# Patient Record
Sex: FEMALE | Race: WHITE | Employment: OTHER | URBAN - METROPOLITAN AREA
[De-identification: names, ages, dates, MRNs, and addresses within clinical notes are randomized per-mention and may not be internally consistent; named-entity substitution may affect disease eponyms.]

---

## 2017-01-17 ENCOUNTER — OFFICE VISIT (OUTPATIENT)
Dept: CARDIOLOGY CLINIC | Age: 77
End: 2017-01-17

## 2017-01-17 VITALS
DIASTOLIC BLOOD PRESSURE: 80 MMHG | WEIGHT: 236.6 LBS | SYSTOLIC BLOOD PRESSURE: 140 MMHG | HEART RATE: 63 BPM | BODY MASS INDEX: 39.42 KG/M2 | RESPIRATION RATE: 18 BRPM | HEIGHT: 65 IN | OXYGEN SATURATION: 98 %

## 2017-01-17 DIAGNOSIS — I48.91 ATRIAL FIBRILLATION, UNSPECIFIED TYPE (HCC): Primary | ICD-10-CM

## 2017-01-17 DIAGNOSIS — E03.9 ACQUIRED HYPOTHYROIDISM: ICD-10-CM

## 2017-01-17 DIAGNOSIS — I10 ESSENTIAL HYPERTENSION: Chronic | ICD-10-CM

## 2017-01-17 DIAGNOSIS — R42 LIGHTHEADEDNESS: ICD-10-CM

## 2017-01-17 DIAGNOSIS — E78.2 MIXED HYPERLIPIDEMIA: ICD-10-CM

## 2017-01-17 DIAGNOSIS — I48.0 PAROXYSMAL ATRIAL FIBRILLATION (HCC): Primary | ICD-10-CM

## 2017-01-17 NOTE — PROGRESS NOTES
Chief Complaint   Patient presents with    Irregular Heart Beat     6 month follow up, c/o lightheadedness at times

## 2017-01-17 NOTE — PROGRESS NOTES
See cardiology for results      Pt received a 2 week event monitor. Instructions given verbally as well as an instruction sheet. Pt verbalized understanding.

## 2017-01-17 NOTE — PROGRESS NOTES
Subjective:      Genaro Manley is a 78 y.o. female is here for f/u regarding afib. She continues to have episodes of lightheadedness with position changes. Particularly when bending over then standing up. The patient denies chest pain/ shortness of breath, orthopnea, PND, LE edema, palpitations, syncope,  or fatigue.        Patient Active Problem List    Diagnosis Date Noted    Acquired hypothyroidism 12/14/2015    ACP (advance care planning) 12/14/2015    HTN (hypertension) 07/21/2015    Thyroid activity decreased 04/14/2015    Atrial fibrillation ([address] 03/16/2015    A-fib ([address] 03/10/2015    Angina, class III ([address] 01/27/2015    Morbid obesity ([address]     Hyperlipidemia 07/14/2014    Hypokalemia 07/14/2014    Encounter for screening colonoscopy 08/21/2013    History of rectal bleeding 08/21/2013    Postmenopausal bleeding 10/18/2011    Endometrial polyp 10/18/2011      Aleksandar Iverson MD  Past Medical History   Diagnosis Date    Arthritis      right knee, left shoulder    Diverticulosis     Frequency of urination     High cholesterol     Hypertension     Morbid obesity ([address]     Thyroid disease      hypothyroid    Unspecified adverse effect of anesthesia      low BP      Past Surgical History   Procedure Laterality Date    Hx other surgical       lymph node bx    Pr breast surgery procedure unlisted  1982     breast bx rt    Hx gyn       myomectomy on uterus    Hx gyn  2011     hystereoscopy D&C    Hx lymph node dissection       biopsy    Pr cardiac surg procedure unlist  01/2015     cardiac catheterization, no intervention, medical management     Allergies   Allergen Reactions    Adhesive Hives    Amoxicillin Unknown (comments)    Lipitor [Atorvastatin] Myalgia     Gets the flu      Family History   Problem Relation Age of Onset    Alzheimer Mother     Heart Disease Mother     Heart Disease Father     Hypertension Father     Cancer Paternal Aunt     Diabetes Paternal Grandmother     negative for cardiac disease  Social History     Social History    Marital status:      Spouse name: N/A    Number of children: N/A    Years of education: N/A     Social History Main Topics    Smoking status: Never Smoker    Smokeless tobacco: Never Used    Alcohol use No    Drug use: No    Sexual activity: Not Asked     Other Topics Concern    None     Social History Narrative     Current Outpatient Prescriptions   Medication Sig    lisinopril (PRINIVIL, ZESTRIL) 10 mg tablet Daily    verapamil ER (CALAN-SR) 120 mg tablet TAKE 1 TABLET BY MOUTH EVERY MORNING FOR BLOOD PRESSURE    rosuvastatin (CRESTOR) 20 mg tablet Take 1 Tab by mouth nightly.  levothyroxine (SYNTHROID) 75 mcg tablet Take 1 Tab by mouth Daily (before breakfast).  cloNIDine HCl (CATAPRES) 0.2 mg tablet Take 1 Tab by mouth two (2) times a day.  hydrochlorothiazide (HYDRODIURIL) 25 mg tablet Take 1 Tab by mouth daily.  potassium chloride (K-DUR, KLOR-CON) 10 mEq tablet Take 2 Tabs by mouth two (2) times a day.  sotalol (BETAPACE) 120 mg tablet Take 1 Tab by mouth every twelve (12) hours.  aspirin delayed-release 81 mg tablet Take 81 mg by mouth every evening.  CALCIUM CARBONATE/VITAMIN D3 (CALTRATE 600 + D PO) Take 1 Tab by mouth two (2) times a day.  MULTIVITAMIN W-MINERALS/LUTEIN (CENTRUM SILVER PO) Take 1 Tab by mouth daily (after lunch).  KLOR-CON 10 10 mEq tablet TAKE 2 TABLETS TWICE A DAY     No current facility-administered medications for this visit. Vitals:    01/17/17 1031 01/17/17 1043 01/17/17 1044   BP: 122/78 140/82 140/80   Pulse: 63     Resp: 18     SpO2: 98%     Weight: 236 lb 9.6 oz (107.3 kg)     Height: 5' 5\" (1.651 m)         I have reviewed the nurses notes, vitals, problem list, allergy list, medical history, family, social history and medications. Review of Symptoms:    General: Pt denies excessive weight gain or loss.  Pt is able to conduct ADL's  HEENT: Denies blurred vision, headaches, epistaxis and difficulty swallowing. Respiratory: Denies shortness of breath, ANGUIANO, wheezing or stridor. Cardiovascular: Denies precordial pain, palpitations, edema or PND  Gastrointestinal: Denies poor appetite, indigestion, abdominal pain or blood in stool  Urinary: Denies dysuria, pyuria  Musculoskeletal: Denies pain or swelling from muscles or joints  Neurologic: Denies tremor, paresthesias, +positional lightheadedness  Skin: Denies rash, itching or texture change. Psych: Denies depression      Physical Exam:      General: Well developed, in no acute distress. HEENT: Eyes - PERRL, no jvd  Heart:  Normal S1/S2 negative S3 or S4. Regular, no murmur, gallop or rub.   Respiratory: Clear bilaterally x 4, no wheezing or rales  Abdomen:   Soft, non-tender, bowel sounds are active.   Extremities:  No edema, normal cap refill, no cyanosis. Musculoskeletal: No clubbing  Neuro: A&Ox3, speech clear, gait stable. Skin: Skin color is normal. No rashes or lesions.  Non diaphoretic  Vascular: 2+ pulses symmetric in all extremities    Cardiographics    Ekg: SB, rate 55    Results for orders placed or performed during the hospital encounter of 03/16/15   EKG, 12 LEAD, INITIAL   Result Value Ref Range    Ventricular Rate 57 BPM    Atrial Rate 57 BPM    P-R Interval 164 ms    QRS Duration 82 ms    Q-T Interval 462 ms    QTC Calculation (Bezet) 449 ms    Calculated P Axis 84 degrees    Calculated R Axis -5 degrees    Calculated T Axis 21 degrees    Diagnosis       Sinus bradycardia      Confirmed by Veronica Macias ([id number]) on 3/18/2015 11:05:42 AM           Lab Results   Component Value Date/Time    WBC 4.8 10/12/2016 08:50 AM    HGB 13.2 10/12/2016 08:50 AM    HCT 39.2 10/12/2016 08:50 AM    PLATELET 644 08/80/4414 08:50 AM    MCV 93 10/12/2016 08:50 AM      Lab Results   Component Value Date/Time    Sodium 143 10/12/2016 08:50 AM    Potassium 4.4 10/12/2016 08:50 AM    Chloride 102 10/12/2016 08:50 AM    CO2 27 10/12/2016 08:50 AM    Anion gap 9 03/18/2015 03:40 AM    Glucose 102 10/12/2016 08:50 AM    BUN 14 10/12/2016 08:50 AM    Creatinine 0.85 10/12/2016 08:50 AM    BUN/Creatinine ratio 16 10/12/2016 08:50 AM    GFR est AA 78 10/12/2016 08:50 AM    GFR est non-AA 68 10/12/2016 08:50 AM    Calcium 9.9 10/12/2016 08:50 AM    Bilirubin, total 0.4 10/12/2016 08:50 AM    ALT 18 10/12/2016 08:50 AM    AST 16 10/12/2016 08:50 AM    Alk. phosphatase 61 10/12/2016 08:50 AM    Protein, total 6.8 10/12/2016 08:50 AM    Albumin 4.3 10/12/2016 08:50 AM    Globulin 3.6 03/16/2015 10:10 AM    A-G Ratio 1.7 10/12/2016 08:50 AM         Assessment:     Assessment:        ICD-10-CM ICD-9-CM    1. Atrial fibrillation, unspecified type (HCC) I48.91 427.31 AMB POC EKG ROUTINE W/ 12 LEADS, INTER & REP      2D ECHO COMPLETE ADULT (TTE) W OR WO CONTR   2. Lightheadedness R42 780.4 AMB POC EKG ROUTINE W/ 12 LEADS, INTER & REP      2D ECHO COMPLETE ADULT (TTE) W OR WO CONTR   3. Essential hypertension I10 401.9 AMB POC EKG ROUTINE W/ 12 LEADS, INTER & REP      2D ECHO COMPLETE ADULT (TTE) W OR WO CONTR   4. Mixed hyperlipidemia E78.2 272.2 AMB POC EKG ROUTINE W/ 12 LEADS, INTER & REP      2D ECHO COMPLETE ADULT (TTE) W OR WO CONTR   5. Acquired hypothyroidism E03.9 244.9 AMB POC EKG ROUTINE W/ 12 LEADS, INTER & REP      2D ECHO COMPLETE ADULT (TTE) W OR WO CONTR     Orders Placed This Encounter    AMB POC EKG ROUTINE W/ 12 LEADS, INTER & REP     Order Specific Question:   Reason for Exam:     Answer:   routine    2D ECHO COMPLETE ADULT (TTE) W OR WO CONTR     Standing Status:   Future     Standing Expiration Date:   7/17/2017     Order Specific Question:   Reason for Exam:     Answer:   lightheadedness     Order Specific Question:   Contrast Enhancement (Bubble Study, Definity, Optison) may be used if criteria listed in established evidence-based protocol has been identified.      Answer:   Yes        Plan:   Ms Felecia Saldivar is here today for f/u regarding afib. She continues to c/o positional lightheadedness, denies other cardiac complaints. ECG remains SB. BP not orthostatic. I will evaluate with an echo and two week event monitor. She will follow up for results. Continue medical management for afib, htn, hypothyroidism. Thank you for allowing me to participate in Marleni Little 's care.     Ella Leonard MD, April Santillan

## 2017-01-19 ENCOUNTER — CLINICAL SUPPORT (OUTPATIENT)
Dept: CARDIOLOGY CLINIC | Age: 77
End: 2017-01-19

## 2017-01-19 DIAGNOSIS — R42 LIGHTHEADEDNESS: ICD-10-CM

## 2017-01-19 DIAGNOSIS — E03.9 ACQUIRED HYPOTHYROIDISM: ICD-10-CM

## 2017-01-19 DIAGNOSIS — I10 ESSENTIAL HYPERTENSION: Chronic | ICD-10-CM

## 2017-01-19 DIAGNOSIS — E78.2 MIXED HYPERLIPIDEMIA: ICD-10-CM

## 2017-01-19 DIAGNOSIS — I48.91 ATRIAL FIBRILLATION, UNSPECIFIED TYPE (HCC): ICD-10-CM

## 2017-01-25 ENCOUNTER — TELEPHONE (OUTPATIENT)
Dept: CARDIOLOGY CLINIC | Age: 77
End: 2017-01-25

## 2017-01-25 NOTE — TELEPHONE ENCOUNTER
----- Message from Lyudmila Radford MD sent at 1/24/2017  8:28 AM EST -----  pls let her know that her lvef is wnl.  ----- Message -----     From: Jerry Gar Result In     Sent: 1/24/2017   8:16 AM       To: Lyudmila Radford MD

## 2017-02-09 RX ORDER — ROSUVASTATIN CALCIUM 20 MG/1
20 TABLET, COATED ORAL
Qty: 30 TAB | Refills: 1 | Status: SHIPPED | OUTPATIENT
Start: 2017-02-09 | End: 2017-04-05 | Stop reason: SDUPTHER

## 2017-02-28 ENCOUNTER — OFFICE VISIT (OUTPATIENT)
Dept: CARDIOLOGY CLINIC | Age: 77
End: 2017-02-28

## 2017-02-28 VITALS
HEART RATE: 72 BPM | BODY MASS INDEX: 38.51 KG/M2 | SYSTOLIC BLOOD PRESSURE: 112 MMHG | DIASTOLIC BLOOD PRESSURE: 68 MMHG | WEIGHT: 231.1 LBS | HEIGHT: 65 IN | RESPIRATION RATE: 16 BRPM | OXYGEN SATURATION: 98 %

## 2017-02-28 DIAGNOSIS — I10 ESSENTIAL HYPERTENSION: ICD-10-CM

## 2017-02-28 DIAGNOSIS — I48.0 PAROXYSMAL ATRIAL FIBRILLATION (HCC): Primary | ICD-10-CM

## 2017-02-28 DIAGNOSIS — E78.2 MIXED HYPERLIPIDEMIA: ICD-10-CM

## 2017-02-28 DIAGNOSIS — I48.91 ATRIAL FIBRILLATION, UNSPECIFIED TYPE (HCC): Primary | ICD-10-CM

## 2017-02-28 NOTE — MR AVS SNAPSHOT
Visit Information Date & Time Provider Department Dept. Phone Encounter #  
 2/28/2017  2:30 PM Dolores Stanley, [address] [facility] [phone] [id number] Follow-up Instructions Return in about 4 weeks (around 3/28/2017). Routing History Follow-up and Disposition History Your Appointments 4/18/2017 11:15 AM  
ROUTINE C[facility] with Jolena Ganser, [address] [facility]: 6 month follow up  
 [facility] [address] [id number]  
[address] [facility] [address] Upcoming Health Maintenance Date Due  
 MEDICARE YEARLY EXAM 12/14/2016 GLAUCOMA SCREENING Q2Y 8/1/2018 Pneumococcal 65+ Low/Medium Risk (2 of 2 - PPSV23) 11/19/2018 COLONOSCOPY 8/21/2023 DTaP/Tdap/Td series (2 - Td) 4/14/2025 Allergies as of 2/28/2017  Review Complete On: 2/28/2017 By: Dolores Stanley MD  
  
 Severity Noted Reaction Type Reactions Adhesive  07/14/2014    Hives Amoxicillin  10/12/2011    Unknown (comments) Lipitor [Atorvastatin]  07/14/2014   Side Effect Myalgia Gets the flu Current Immunizations  Reviewed on 4/14/2015 Name Date Influenza Vaccine 11/18/2014 Influenza Vaccine (Quad) 12/14/2015 12:17 PM  
 Influenza Vaccine (Quad) PF 10/18/2016 Pneumococcal Vaccine (Unspecified Type) 11/19/2013 Tdap 4/14/2015 Not reviewed this visit You Were Diagnosed With   
  
 Codes Comments Atrial fibrillation, unspecified type ([facility]    -  Primary ICD-10-CM: I48.91 
ICD-9-CM: 427.31 Mixed hyperlipidemia     ICD-10-CM: E78.2 ICD-9-CM: 272.2 Essential hypertension     ICD-10-CM: I10 
ICD-9-CM: 401.9 Vitals BP  
  
  
  
  
  
 112/68 (BP 1 Location: Left arm, BP Patient Position: Sitting) Vitals History BMI and BSA Data  Body Mass Index Body Surface Area  
 38.46 kg/m 2 2.19 m 2  
  
  
 Preferred Pharmacy Pharmacy Name Phone [id number]- [place], [place] - [address] [phone] Your Updated Medication List  
  
   
This list is accurate as of: 2/28/17  3:19 PM.  Always use your most recent med list.  
  
  
  
  
 aspirin delayed-release 81 mg tablet Take 81 mg by mouth every evening. CALTRATE 600 + D PO Take 1 Tab by mouth two (2) times a day. CENTRUM SILVER PO Take 1 Tab by mouth daily (after lunch). cloNIDine HCl 0.2 mg tablet Commonly known as:  CATAPRES Take 1 Tab by mouth two (2) times a day. hydroCHLOROthiazide 25 mg tablet Commonly known as:  HYDRODIURIL Take 1 Tab by mouth daily. KLOR-CON 10 10 mEq tablet Generic drug:  potassium chloride SR  
TAKE 2 TABLETS TWICE A DAY  
  
 levothyroxine 75 mcg tablet Commonly known as:  SYNTHROID Take 1 Tab by mouth Daily (before breakfast). lisinopril 10 mg tablet Commonly known as:  Delsie Commander Daily  
  
 rosuvastatin 20 mg tablet Commonly known as:  CRESTOR Take 1 Tab by mouth nightly. sotalol 120 mg tablet Commonly known as:  Mylinda Saint Take 1 Tab by mouth every twelve (12) hours. verapamil  mg tablet Commonly known as:  CALAN-SR  
TAKE 1 TABLET BY MOUTH EVERY MORNING FOR BLOOD PRESSURE We Performed the Following AMB POC EKG ROUTINE W/ 12 LEADS, INTER & REP [56658 CPT(R)] Follow-up Instructions Return in about 4 weeks (around 3/28/2017). Introducing [facility]! Dear Jackson Roman: Thank you for requesting a [employer] account. Our records indicate that you already have an active [employer] account. You can access your account anytime at [email or web address] Did you know that you can access your hospital and ER discharge instructions at any time in [employer]? You can also review all of your test results from your hospital stay or ER visit. Additional Information If you have questions, please visit the Frequently Asked Questions section of the [employer] website at [email or web address] Remember, [facility] is NOT to be used for urgent needs. For medical emergencies, dial 911. Now available from your iPhone and Android! Please provide this summary of care documentation to your next provider. Your primary care clinician is listed as Adriana Willingham. If you have any questions after today's visit, please call [phone].

## 2017-02-28 NOTE — PROGRESS NOTES
Subjective:      Douglas Sykes is a 78 y.o. female is here for follow up of her dizziness and abnormal event monitor.       Patient Active Problem List    Diagnosis Date Noted    Acquired hypothyroidism 12/14/2015    ACP (advance care planning) 12/14/2015    HTN (hypertension) 07/21/2015    Thyroid activity decreased 04/14/2015    Atrial fibrillation ([address] 03/16/2015    A-fib ([address] 03/10/2015    Angina, class III ([address] 01/27/2015    Morbid obesity ([address]     Hyperlipidemia 07/14/2014    Hypokalemia 07/14/2014    Encounter for screening colonoscopy 08/21/2013    History of rectal bleeding 08/21/2013    Postmenopausal bleeding 10/18/2011    Endometrial polyp 10/18/2011      Myron Simmons MD  Past Medical History:   Diagnosis Date    Arthritis     right knee, left shoulder    Diverticulosis     Frequency of urination     High cholesterol     Hypertension     Morbid obesity ([address]     Thyroid disease     hypothyroid    Unspecified adverse effect of anesthesia     low BP      Past Surgical History:   Procedure Laterality Date    BREAST SURGERY PROCEDURE UNLISTED  1982    breast bx rt    CARDIAC SURG PROCEDURE UNLIST  01/2015    cardiac catheterization, no intervention, medical management    HX GYN      myomectomy on uterus    HX GYN  2011    hystereoscopy D&C    HX LYMPH NODE DISSECTION      biopsy    HX OTHER SURGICAL      lymph node bx     Allergies   Allergen Reactions    Adhesive Hives    Amoxicillin Unknown (comments)    Lipitor [Atorvastatin] Myalgia     Gets the flu      Family History   Problem Relation Age of Onset    Alzheimer Mother     Heart Disease Mother     Heart Disease Father     Hypertension Father     Cancer Paternal Aunt     Diabetes Paternal Grandmother     negative for cardiac disease  Social History     Social History    Marital status:      Spouse name: N/A    Number of children: N/A    Years of education: N/A     Social History Main Topics  Smoking status: Never Smoker    Smokeless tobacco: Never Used    Alcohol use No    Drug use: No    Sexual activity: Not Asked     Other Topics Concern    None     Social History Narrative     Current Outpatient Prescriptions   Medication Sig    rosuvastatin (CRESTOR) 20 mg tablet Take 1 Tab by mouth nightly.  lisinopril (PRINIVIL, ZESTRIL) 10 mg tablet Daily    KLOR-CON 10 10 mEq tablet TAKE 2 TABLETS TWICE A DAY    verapamil ER (CALAN-SR) 120 mg tablet TAKE 1 TABLET BY MOUTH EVERY MORNING FOR BLOOD PRESSURE    levothyroxine (SYNTHROID) 75 mcg tablet Take 1 Tab by mouth Daily (before breakfast).  cloNIDine HCl (CATAPRES) 0.2 mg tablet Take 1 Tab by mouth two (2) times a day.  hydrochlorothiazide (HYDRODIURIL) 25 mg tablet Take 1 Tab by mouth daily.  sotalol (BETAPACE) 120 mg tablet Take 1 Tab by mouth every twelve (12) hours.  aspirin delayed-release 81 mg tablet Take 81 mg by mouth every evening.  CALCIUM CARBONATE/VITAMIN D3 (CALTRATE 600 + D PO) Take 1 Tab by mouth two (2) times a day.  MULTIVITAMIN W-MINERALS/LUTEIN (CENTRUM SILVER PO) Take 1 Tab by mouth daily (after lunch). No current facility-administered medications for this visit. Vitals:    02/28/17 1428   BP: 112/68   Pulse: 72   Resp: 16   SpO2: 98%   Weight: 231 lb 1.6 oz (104.8 kg)   Height: 5' 5\" (1.651 m)       I have reviewed the nurses notes, vitals, problem list, allergy list, medical history, family, social history and medications. Review of Symptoms:    General: Pt denies excessive weight gain or loss. Pt is able to conduct ADL's  HEENT: Denies blurred vision, headaches, epistaxis and difficulty swallowing. Respiratory: Denies shortness of breath, ANGUIANO, wheezing or stridor.   Cardiovascular: +palpitations, Denies precordial pain, edema or PND  Gastrointestinal: Denies poor appetite, indigestion, abdominal pain or blood in stool  Urinary: Denies dysuria, pyuria  Musculoskeletal: Denies pain or swelling from muscles or joints  Neurologic: Denies tremor, paresthesias, or sensory motor disturbance  Skin: Denies rash, itching or texture change. Psych: Denies depression      Physical Exam:      General: Well developed, in no acute distress. HEENT: Eyes - PERRL, no jvd  Heart:  Normal S1/S2 negative S3 or S4. Regular, no murmur, gallop or rub.   Respiratory: Clear bilaterally x 4, no wheezing or rales  Abdomen:   Soft, non-tender, bowel sounds are active.   Extremities:  No edema, normal cap refill, no cyanosis. Musculoskeletal: No clubbing  Neuro: A&Ox3, speech clear, gait stable. Skin: Skin color is normal. No rashes or lesions.  Non diaphoretic  Vascular: 2+ pulses symmetric in all extremities    Cardiographics    Ekg: nsr    Monitor - pafib    Results for orders placed or performed during the hospital encounter of 03/16/15   EKG, 12 LEAD, INITIAL   Result Value Ref Range    Ventricular Rate 57 BPM    Atrial Rate 57 BPM    P-R Interval 164 ms    QRS Duration 82 ms    Q-T Interval 462 ms    QTC Calculation (Bezet) 449 ms    Calculated P Axis 84 degrees    Calculated R Axis -5 degrees    Calculated T Axis 21 degrees    Diagnosis       Sinus bradycardia      Confirmed by Monty Tucker ([id number]) on 3/18/2015 11:05:42 AM           Lab Results   Component Value Date/Time    WBC 4.8 10/12/2016 08:50 AM    HGB 13.2 10/12/2016 08:50 AM    HCT 39.2 10/12/2016 08:50 AM    PLATELET 408 46/48/3687 08:50 AM    MCV 93 10/12/2016 08:50 AM      Lab Results   Component Value Date/Time    Sodium 143 10/12/2016 08:50 AM    Potassium 4.4 10/12/2016 08:50 AM    Chloride 102 10/12/2016 08:50 AM    CO2 27 10/12/2016 08:50 AM    Anion gap 9 03/18/2015 03:40 AM    Glucose 102 10/12/2016 08:50 AM    BUN 14 10/12/2016 08:50 AM    Creatinine 0.85 10/12/2016 08:50 AM    BUN/Creatinine ratio 16 10/12/2016 08:50 AM    GFR est AA 78 10/12/2016 08:50 AM    GFR est non-AA 68 10/12/2016 08:50 AM    Calcium 9.9 10/12/2016 08:50 AM Bilirubin, total 0.4 10/12/2016 08:50 AM    AST (SGOT) 16 10/12/2016 08:50 AM    Alk. phosphatase 61 10/12/2016 08:50 AM    Protein, total 6.8 10/12/2016 08:50 AM    Albumin 4.3 10/12/2016 08:50 AM    Globulin 3.6 03/16/2015 10:10 AM    A-G Ratio 1.7 10/12/2016 08:50 AM    ALT (SGPT) 18 10/12/2016 08:50 AM         Assessment:     Assessment:        ICD-10-CM ICD-9-CM    1. Atrial fibrillation, unspecified type ([facility] I48.91 427.31 AMB POC EKG ROUTINE W/ 12 LEADS, INTER & REP   2. Mixed hyperlipidemia E78.2 272.2    3. Essential hypertension I10 401.9      Orders Placed This Encounter    AMB POC EKG ROUTINE W/ 12 LEADS, INTER & REP     Order Specific Question:   Reason for Exam:     Answer:   Routine        Plan:   Ms Qing Riley is having pafib on sotalol and associated dizziness. She is a candidate for an afib ablation/ILR. I discussed the risks/benefits/alternatives of the procedure with the patient. Risks include (but are not limited to) bleeding, heart block, infection, cva/mi/tamponade/esophageal perforation/pv stenosis/death. The patient understands that there is a 8-8% major complication rate and agrees to proceed. Post ablation, we will stop her sotalol. Thank you for this interesting consultation. Continue medical management for htn. Thank you for allowing me to participate in Leona Cooper 's care.     Vj Grullon MD, Vicki Anderson

## 2017-03-01 RX ORDER — SOTALOL HYDROCHLORIDE 120 MG/1
TABLET ORAL
Qty: 60 TAB | Refills: 11 | Status: SHIPPED | OUTPATIENT
Start: 2017-03-01 | End: 2017-03-22

## 2017-03-07 ENCOUNTER — HOSPITAL ENCOUNTER (OUTPATIENT)
Dept: LAB | Age: 77
Discharge: HOME | End: 2017-03-07
Payer: MEDICARE

## 2017-03-07 ENCOUNTER — HOSPITAL ENCOUNTER (OUTPATIENT)
Dept: GENERAL RADIOLOGY | Age: 77
Discharge: HOME | End: 2017-03-07
Payer: MEDICARE

## 2017-03-07 DIAGNOSIS — I48.0 PAROXYSMAL ATRIAL FIBRILLATION (HCC): ICD-10-CM

## 2017-03-07 PROCEDURE — 85025 COMPLETE CBC W/AUTO DIFF WBC: CPT

## 2017-03-07 PROCEDURE — 83735 ASSAY OF MAGNESIUM: CPT

## 2017-03-07 PROCEDURE — 71020 XR CHEST PA LAT: CPT

## 2017-03-07 PROCEDURE — 36415 COLL VENOUS BLD VENIPUNCTURE: CPT

## 2017-03-07 PROCEDURE — 85610 PROTHROMBIN TIME: CPT

## 2017-03-07 PROCEDURE — 80053 COMPREHEN METABOLIC PANEL: CPT

## 2017-03-08 LAB
ALBUMIN SERPL-MCNC: 4.2 G/DL (ref 3.5–4.8)
ALBUMIN/GLOB SERPL: 1.8 {RATIO} (ref 1.1–2.5)
ALP SERPL-CCNC: 64 IU/L (ref 39–117)
ALT SERPL-CCNC: 13 IU/L (ref 0–32)
AST SERPL-CCNC: 12 IU/L (ref 0–40)
BASOPHILS # BLD AUTO: 0.1 X10E3/UL (ref 0–0.2)
BASOPHILS NFR BLD AUTO: 1 %
BILIRUB SERPL-MCNC: 0.3 MG/DL (ref 0–1.2)
BUN SERPL-MCNC: 11 MG/DL (ref 8–27)
BUN/CREAT SERPL: 14 (ref 11–26)
CALCIUM SERPL-MCNC: 9.3 MG/DL (ref 8.7–10.3)
CHLORIDE SERPL-SCNC: 100 MMOL/L (ref 96–106)
CO2 SERPL-SCNC: 26 MMOL/L (ref 18–29)
CREAT SERPL-MCNC: 0.8 MG/DL (ref 0.57–1)
EOSINOPHIL # BLD AUTO: 0.2 X10E3/UL (ref 0–0.4)
EOSINOPHIL NFR BLD AUTO: 4 %
ERYTHROCYTE [DISTWIDTH] IN BLOOD BY AUTOMATED COUNT: 13.7 % (ref 12.3–15.4)
GLOBULIN SER CALC-MCNC: 2.4 G/DL (ref 1.5–4.5)
GLUCOSE SERPL-MCNC: 96 MG/DL (ref 65–99)
HCT VFR BLD AUTO: 38.8 % (ref 34–46.6)
HGB BLD-MCNC: 12.8 G/DL (ref 11.1–15.9)
IMM GRANULOCYTES # BLD: 0 X10E3/UL (ref 0–0.1)
IMM GRANULOCYTES NFR BLD: 0 %
INR PPP: 0.9 (ref 0.8–1.2)
LYMPHOCYTES # BLD AUTO: 1.9 X10E3/UL (ref 0.7–3.1)
LYMPHOCYTES NFR BLD AUTO: 34 %
MAGNESIUM SERPL-MCNC: 2 MG/DL (ref 1.6–2.3)
MCH RBC QN AUTO: 30.5 PG (ref 26.6–33)
MCHC RBC AUTO-ENTMCNC: 33 G/DL (ref 31.5–35.7)
MCV RBC AUTO: 93 FL (ref 79–97)
MONOCYTES # BLD AUTO: 0.5 X10E3/UL (ref 0.1–0.9)
MONOCYTES NFR BLD AUTO: 8 %
NEUTROPHILS # BLD AUTO: 3 X10E3/UL (ref 1.4–7)
NEUTROPHILS NFR BLD AUTO: 53 %
PLATELET # BLD AUTO: 201 X10E3/UL (ref 150–379)
POTASSIUM SERPL-SCNC: 3.8 MMOL/L (ref 3.5–5.2)
PROT SERPL-MCNC: 6.6 G/DL (ref 6–8.5)
PROTHROMBIN TIME: 9.7 SEC (ref 9.1–12)
RBC # BLD AUTO: 4.19 X10E6/UL (ref 3.77–5.28)
SODIUM SERPL-SCNC: 141 MMOL/L (ref 134–144)
WBC # BLD AUTO: 5.7 X10E3/UL (ref 3.4–10.8)

## 2017-03-10 ENCOUNTER — HOSPITAL ENCOUNTER (OUTPATIENT)
Dept: CT IMAGING | Age: 77
Discharge: HOME | End: 2017-03-10
Attending: NURSE PRACTITIONER
Payer: MEDICARE

## 2017-03-10 DIAGNOSIS — I48.0 PAROXYSMAL ATRIAL FIBRILLATION (HCC): ICD-10-CM

## 2017-03-10 PROCEDURE — 74011250636 HC RX REV CODE- 250/636: Performed by: NURSE PRACTITIONER

## 2017-03-10 PROCEDURE — 71275 CT ANGIOGRAPHY CHEST: CPT

## 2017-03-10 PROCEDURE — 74011636320 HC RX REV CODE- 636/320: Performed by: NURSE PRACTITIONER

## 2017-03-10 RX ORDER — SODIUM CHLORIDE 0.9 % (FLUSH) 0.9 %
10 SYRINGE (ML) INJECTION
Status: COMPLETED | OUTPATIENT
Start: 2017-03-10 | End: 2017-03-10

## 2017-03-10 RX ORDER — SODIUM CHLORIDE 9 MG/ML
50 INJECTION, SOLUTION INTRAVENOUS
Status: COMPLETED | OUTPATIENT
Start: 2017-03-10 | End: 2017-03-10

## 2017-03-10 RX ADMIN — IOPAMIDOL 100 ML: 755 INJECTION, SOLUTION INTRAVENOUS at 14:00

## 2017-03-10 RX ADMIN — Medication 10 ML: at 14:00

## 2017-03-10 RX ADMIN — SODIUM CHLORIDE 50 ML/HR: 900 INJECTION, SOLUTION INTRAVENOUS at 14:00

## 2017-03-17 ENCOUNTER — ANESTHESIA (OUTPATIENT)
Dept: NON INVASIVE DIAGNOSTICS | Age: 77
DRG: 274 | End: 2017-03-17
Payer: MEDICARE

## 2017-03-17 ENCOUNTER — ANESTHESIA EVENT (OUTPATIENT)
Dept: NON INVASIVE DIAGNOSTICS | Age: 77
DRG: 274 | End: 2017-03-17
Payer: MEDICARE

## 2017-03-17 ENCOUNTER — TELEPHONE (OUTPATIENT)
Dept: CARDIOLOGY CLINIC | Age: 77
End: 2017-03-17

## 2017-03-17 ENCOUNTER — HOSPITAL ENCOUNTER (INPATIENT)
Dept: NON INVASIVE DIAGNOSTICS | Age: 77
LOS: 3 days | Discharge: NURSING, REHAB OR LONG-TERM CARE | DRG: 274 | End: 2017-03-22
Attending: INTERNAL MEDICINE | Admitting: INTERNAL MEDICINE
Payer: MEDICARE

## 2017-03-17 PROBLEM — Z98.890 S/P ABLATION OF ATRIAL FIBRILLATION: Status: ACTIVE | Noted: 2017-03-17

## 2017-03-17 PROBLEM — Z86.79 S/P ABLATION OF ATRIAL FIBRILLATION: Status: ACTIVE | Noted: 2017-03-17

## 2017-03-17 LAB
ACT BLD: 142 SECS (ref 79–138)
ACT BLD: 255 SECS (ref 79–138)
ACT BLD: 337 SECS (ref 79–138)

## 2017-03-17 PROCEDURE — C1894 INTRO/SHEATH, NON-LASER: HCPCS

## 2017-03-17 PROCEDURE — 77030015398 HC CBL EP EXT STJU -C

## 2017-03-17 PROCEDURE — C1769 GUIDE WIRE: HCPCS

## 2017-03-17 PROCEDURE — 77030034850

## 2017-03-17 PROCEDURE — 77030013079 HC BLNKT BAIR HGGR 3M -A: Performed by: NURSE ANESTHETIST, CERTIFIED REGISTERED

## 2017-03-17 PROCEDURE — 77030020506 HC NDL TRNSPTL NRG BAYL -F

## 2017-03-17 PROCEDURE — 77030030806 HC PTCH ENSIT NAVX STJU -G

## 2017-03-17 PROCEDURE — 74011636320 HC RX REV CODE- 636/320: Performed by: INTERNAL MEDICINE

## 2017-03-17 PROCEDURE — 77030008771 HC TU NG SALEM SUMP -A: Performed by: NURSE ANESTHETIST, CERTIFIED REGISTERED

## 2017-03-17 PROCEDURE — C1759 CATH, INTRA ECHOCARDIOGRAPHY: HCPCS

## 2017-03-17 PROCEDURE — C1893 INTRO/SHEATH, FIXED,NON-PEEL: HCPCS

## 2017-03-17 PROCEDURE — 77030013797 HC KT TRNSDUC PRSSR EDWD -A

## 2017-03-17 PROCEDURE — C1733 CATH, EP, OTHR THAN COOL-TIP: HCPCS

## 2017-03-17 PROCEDURE — 74011250636 HC RX REV CODE- 250/636: Performed by: ANESTHESIOLOGY

## 2017-03-17 PROCEDURE — 77030029163 HC CBL EP DX ACHV DISP MEDT -C

## 2017-03-17 PROCEDURE — 76210000011 HC REC RM PH I  7.5 TO 8 HR

## 2017-03-17 PROCEDURE — 77030010880 HC CBL EP SUPRME STJU -C

## 2017-03-17 PROCEDURE — 93613 INTRACARDIAC EPHYS 3D MAPG: CPT

## 2017-03-17 PROCEDURE — 77030018729 HC ELECTRD DEFIB PAD CARD -B

## 2017-03-17 PROCEDURE — 77030008543 HC TBNG MON PRSS MRTM -A

## 2017-03-17 PROCEDURE — 77030019908 HC STETH ESOPH SIMS -A: Performed by: NURSE ANESTHETIST, CERTIFIED REGISTERED

## 2017-03-17 PROCEDURE — 02K83ZZ MAP CONDUCTION MECHANISM, PERCUTANEOUS APPROACH: ICD-10-PCS | Performed by: INTERNAL MEDICINE

## 2017-03-17 PROCEDURE — 93325 DOPPLER ECHO COLOR FLOW MAPG: CPT

## 2017-03-17 PROCEDURE — 74011250636 HC RX REV CODE- 250/636: Performed by: INTERNAL MEDICINE

## 2017-03-17 PROCEDURE — 02573ZZ DESTRUCTION OF LEFT ATRIUM, PERCUTANEOUS APPROACH: ICD-10-PCS | Performed by: INTERNAL MEDICINE

## 2017-03-17 PROCEDURE — 77030026438 HC STYL ET INTUB CARD -A: Performed by: NURSE ANESTHETIST, CERTIFIED REGISTERED

## 2017-03-17 PROCEDURE — 77030029065 HC DRSG HEMO QCLOT ZMED -B

## 2017-03-17 PROCEDURE — 77030011640 HC PAD GRND REM COVD -A

## 2017-03-17 PROCEDURE — 85347 COAGULATION TIME ACTIVATED: CPT

## 2017-03-17 PROCEDURE — C1731 CATH, EP, 20 OR MORE ELEC: HCPCS

## 2017-03-17 PROCEDURE — 77030016894 HC CBL EP DX CATH3 STJU -B

## 2017-03-17 PROCEDURE — 74011250636 HC RX REV CODE- 250/636

## 2017-03-17 PROCEDURE — 77030030261 HC CBL UMB ELEC DISP MEDT -C

## 2017-03-17 PROCEDURE — 65610000006 HC RM INTENSIVE CARE

## 2017-03-17 PROCEDURE — 99218 HC RM OBSERVATION: CPT

## 2017-03-17 PROCEDURE — C1730 CATH, EP, 19 OR FEW ELECT: HCPCS

## 2017-03-17 PROCEDURE — 77030030278 HC COAX UMB DISP MEDT -C

## 2017-03-17 PROCEDURE — 93308 TTE F-UP OR LMTD: CPT

## 2017-03-17 PROCEDURE — 82962 GLUCOSE BLOOD TEST: CPT

## 2017-03-17 PROCEDURE — 77030008684 HC TU ET CUF COVD -B: Performed by: NURSE ANESTHETIST, CERTIFIED REGISTERED

## 2017-03-17 RX ORDER — MIDAZOLAM HYDROCHLORIDE 1 MG/ML
INJECTION, SOLUTION INTRAMUSCULAR; INTRAVENOUS
Status: DISPENSED
Start: 2017-03-17 | End: 2017-03-18

## 2017-03-17 RX ORDER — FENTANYL CITRATE 50 UG/ML
INJECTION, SOLUTION INTRAMUSCULAR; INTRAVENOUS
Status: DISPENSED
Start: 2017-03-17 | End: 2017-03-18

## 2017-03-17 RX ORDER — PHENYLEPHRINE HCL IN 0.9% NACL 0.4MG/10ML
SYRINGE (ML) INTRAVENOUS AS NEEDED
Status: DISCONTINUED | OUTPATIENT
Start: 2017-03-17 | End: 2017-03-17 | Stop reason: HOSPADM

## 2017-03-17 RX ORDER — SOTALOL HYDROCHLORIDE 80 MG/1
120 TABLET ORAL EVERY 12 HOURS
Status: DISCONTINUED | OUTPATIENT
Start: 2017-03-17 | End: 2017-03-19

## 2017-03-17 RX ORDER — SODIUM CHLORIDE 0.9 % (FLUSH) 0.9 %
5-10 SYRINGE (ML) INJECTION AS NEEDED
Status: DISCONTINUED | OUTPATIENT
Start: 2017-03-17 | End: 2017-03-17 | Stop reason: HOSPADM

## 2017-03-17 RX ORDER — HEPARIN SODIUM 200 [USP'U]/100ML
2000 INJECTION, SOLUTION INTRAVENOUS ONCE
Status: COMPLETED | OUTPATIENT
Start: 2017-03-17 | End: 2017-03-22

## 2017-03-17 RX ORDER — HEPARIN SODIUM 1000 [USP'U]/ML
1000-30000 INJECTION, SOLUTION INTRAVENOUS; SUBCUTANEOUS ONCE
Status: COMPLETED | OUTPATIENT
Start: 2017-03-17 | End: 2017-03-17

## 2017-03-17 RX ORDER — MIDAZOLAM HYDROCHLORIDE 1 MG/ML
INJECTION, SOLUTION INTRAMUSCULAR; INTRAVENOUS AS NEEDED
Status: DISCONTINUED | OUTPATIENT
Start: 2017-03-17 | End: 2017-03-17 | Stop reason: HOSPADM

## 2017-03-17 RX ORDER — SODIUM CHLORIDE, SODIUM LACTATE, POTASSIUM CHLORIDE, CALCIUM CHLORIDE 600; 310; 30; 20 MG/100ML; MG/100ML; MG/100ML; MG/100ML
25 INJECTION, SOLUTION INTRAVENOUS CONTINUOUS
Status: DISCONTINUED | OUTPATIENT
Start: 2017-03-17 | End: 2017-03-17 | Stop reason: HOSPADM

## 2017-03-17 RX ORDER — PROTAMINE SULFATE 10 MG/ML
25-100 INJECTION, SOLUTION INTRAVENOUS
Status: DISCONTINUED | OUTPATIENT
Start: 2017-03-17 | End: 2017-03-17

## 2017-03-17 RX ORDER — ONDANSETRON 4 MG/1
TABLET, ORALLY DISINTEGRATING ORAL
Status: DISPENSED
Start: 2017-03-17 | End: 2017-03-18

## 2017-03-17 RX ORDER — FENTANYL CITRATE 50 UG/ML
25 INJECTION, SOLUTION INTRAMUSCULAR; INTRAVENOUS
Status: DISCONTINUED | OUTPATIENT
Start: 2017-03-17 | End: 2017-03-17 | Stop reason: HOSPADM

## 2017-03-17 RX ORDER — DIPHENHYDRAMINE HYDROCHLORIDE 50 MG/ML
12.5 INJECTION, SOLUTION INTRAMUSCULAR; INTRAVENOUS AS NEEDED
Status: DISCONTINUED | OUTPATIENT
Start: 2017-03-17 | End: 2017-03-17 | Stop reason: HOSPADM

## 2017-03-17 RX ORDER — SODIUM CHLORIDE 9 MG/ML
125 INJECTION, SOLUTION INTRAVENOUS CONTINUOUS
Status: DISCONTINUED | OUTPATIENT
Start: 2017-03-17 | End: 2017-03-20

## 2017-03-17 RX ORDER — FENTANYL CITRATE 50 UG/ML
12.5-5 INJECTION, SOLUTION INTRAMUSCULAR; INTRAVENOUS
Status: DISCONTINUED | OUTPATIENT
Start: 2017-03-17 | End: 2017-03-22 | Stop reason: HOSPADM

## 2017-03-17 RX ORDER — SODIUM CHLORIDE 0.9 % (FLUSH) 0.9 %
5-10 SYRINGE (ML) INJECTION EVERY 8 HOURS
Status: DISCONTINUED | OUTPATIENT
Start: 2017-03-17 | End: 2017-03-17 | Stop reason: HOSPADM

## 2017-03-17 RX ORDER — ONDANSETRON 2 MG/ML
4 INJECTION INTRAMUSCULAR; INTRAVENOUS ONCE
Status: COMPLETED | OUTPATIENT
Start: 2017-03-17 | End: 2017-03-17

## 2017-03-17 RX ORDER — LIDOCAINE HYDROCHLORIDE 10 MG/ML
0.1 INJECTION, SOLUTION EPIDURAL; INFILTRATION; INTRACAUDAL; PERINEURAL AS NEEDED
Status: DISCONTINUED | OUTPATIENT
Start: 2017-03-17 | End: 2017-03-17 | Stop reason: HOSPADM

## 2017-03-17 RX ORDER — DOPAMINE HYDROCHLORIDE 320 MG/100ML
2.5-2 INJECTION, SOLUTION INTRAVENOUS
Status: DISCONTINUED | OUTPATIENT
Start: 2017-03-17 | End: 2017-03-17

## 2017-03-17 RX ORDER — PROPOFOL 10 MG/ML
INJECTION, EMULSION INTRAVENOUS AS NEEDED
Status: DISCONTINUED | OUTPATIENT
Start: 2017-03-17 | End: 2017-03-17 | Stop reason: HOSPADM

## 2017-03-17 RX ORDER — SODIUM CHLORIDE 9 MG/ML
INJECTION, SOLUTION INTRAVENOUS
Status: DISCONTINUED | OUTPATIENT
Start: 2017-03-17 | End: 2017-03-17 | Stop reason: HOSPADM

## 2017-03-17 RX ORDER — HYDROCODONE BITARTRATE AND ACETAMINOPHEN 5; 325 MG/1; MG/1
1 TABLET ORAL
Status: DISCONTINUED | OUTPATIENT
Start: 2017-03-17 | End: 2017-03-22 | Stop reason: HOSPADM

## 2017-03-17 RX ORDER — HYDROMORPHONE HYDROCHLORIDE 1 MG/ML
0.2 INJECTION, SOLUTION INTRAMUSCULAR; INTRAVENOUS; SUBCUTANEOUS
Status: DISCONTINUED | OUTPATIENT
Start: 2017-03-17 | End: 2017-03-17 | Stop reason: HOSPADM

## 2017-03-17 RX ORDER — HEPARIN SODIUM 10000 [USP'U]/100ML
INJECTION, SOLUTION INTRAVENOUS
Status: COMPLETED
Start: 2017-03-17 | End: 2017-03-17

## 2017-03-17 RX ORDER — MIDAZOLAM HYDROCHLORIDE 1 MG/ML
1-5 INJECTION, SOLUTION INTRAMUSCULAR; INTRAVENOUS
Status: DISCONTINUED | OUTPATIENT
Start: 2017-03-17 | End: 2017-03-22 | Stop reason: HOSPADM

## 2017-03-17 RX ORDER — SOTALOL HYDROCHLORIDE 80 MG/1
120 TABLET ORAL ONCE
Status: DISPENSED | OUTPATIENT
Start: 2017-03-17 | End: 2017-03-18

## 2017-03-17 RX ORDER — ACETAMINOPHEN 325 MG/1
650 TABLET ORAL
Status: DISCONTINUED | OUTPATIENT
Start: 2017-03-17 | End: 2017-03-22 | Stop reason: HOSPADM

## 2017-03-17 RX ORDER — MUPIROCIN 20 MG/G
OINTMENT TOPICAL 2 TIMES DAILY
Status: DISCONTINUED | OUTPATIENT
Start: 2017-03-18 | End: 2017-03-22 | Stop reason: HOSPADM

## 2017-03-17 RX ORDER — ONDANSETRON 2 MG/ML
INJECTION INTRAMUSCULAR; INTRAVENOUS AS NEEDED
Status: DISCONTINUED | OUTPATIENT
Start: 2017-03-17 | End: 2017-03-17 | Stop reason: HOSPADM

## 2017-03-17 RX ORDER — SODIUM CHLORIDE 0.9 % (FLUSH) 0.9 %
5-10 SYRINGE (ML) INJECTION AS NEEDED
Status: DISCONTINUED | OUTPATIENT
Start: 2017-03-17 | End: 2017-03-22 | Stop reason: HOSPADM

## 2017-03-17 RX ORDER — DOBUTAMINE HYDROCHLORIDE 200 MG/100ML
INJECTION INTRAVENOUS
Status: COMPLETED
Start: 2017-03-17 | End: 2017-03-17

## 2017-03-17 RX ORDER — ONDANSETRON 2 MG/ML
INJECTION INTRAMUSCULAR; INTRAVENOUS
Status: COMPLETED
Start: 2017-03-17 | End: 2017-03-17

## 2017-03-17 RX ORDER — SUCCINYLCHOLINE CHLORIDE 20 MG/ML
INJECTION INTRAMUSCULAR; INTRAVENOUS AS NEEDED
Status: DISCONTINUED | OUTPATIENT
Start: 2017-03-17 | End: 2017-03-17 | Stop reason: HOSPADM

## 2017-03-17 RX ORDER — PHENYLEPHRINE HYDROCHLORIDE 10 MG/ML
INJECTION INTRAVENOUS
Status: DISPENSED
Start: 2017-03-17 | End: 2017-03-18

## 2017-03-17 RX ORDER — SODIUM CHLORIDE 0.9 % (FLUSH) 0.9 %
5-10 SYRINGE (ML) INJECTION EVERY 8 HOURS
Status: DISCONTINUED | OUTPATIENT
Start: 2017-03-17 | End: 2017-03-22 | Stop reason: HOSPADM

## 2017-03-17 RX ORDER — ASPIRIN 81 MG/1
81 TABLET ORAL EVERY EVENING
Status: DISCONTINUED | OUTPATIENT
Start: 2017-03-17 | End: 2017-03-22 | Stop reason: HOSPADM

## 2017-03-17 RX ORDER — PROTAMINE SULFATE 10 MG/ML
INJECTION, SOLUTION INTRAVENOUS
Status: DISCONTINUED
Start: 2017-03-17 | End: 2017-03-22 | Stop reason: HOSPADM

## 2017-03-17 RX ORDER — FENTANYL CITRATE 50 UG/ML
INJECTION, SOLUTION INTRAMUSCULAR; INTRAVENOUS AS NEEDED
Status: DISCONTINUED | OUTPATIENT
Start: 2017-03-17 | End: 2017-03-17 | Stop reason: HOSPADM

## 2017-03-17 RX ADMIN — SODIUM CHLORIDE: 9 INJECTION, SOLUTION INTRAVENOUS at 12:44

## 2017-03-17 RX ADMIN — Medication 120 MCG: at 13:31

## 2017-03-17 RX ADMIN — DOBUTAMINE HYDROCHLORIDE 20 MCG/KG/MIN: 200 INJECTION INTRAVENOUS at 14:21

## 2017-03-17 RX ADMIN — HYDROMORPHONE HYDROCHLORIDE 0.2 MG: 1 INJECTION, SOLUTION INTRAMUSCULAR; INTRAVENOUS; SUBCUTANEOUS at 21:45

## 2017-03-17 RX ADMIN — ONDANSETRON 4 MG: 2 INJECTION INTRAMUSCULAR; INTRAVENOUS at 19:00

## 2017-03-17 RX ADMIN — SODIUM CHLORIDE 125 ML/HR: 900 INJECTION, SOLUTION INTRAVENOUS at 17:30

## 2017-03-17 RX ADMIN — HYDROMORPHONE HYDROCHLORIDE 0.2 MG: 1 INJECTION, SOLUTION INTRAMUSCULAR; INTRAVENOUS; SUBCUTANEOUS at 20:50

## 2017-03-17 RX ADMIN — PHENYLEPHRINE HYDROCHLORIDE 75 MCG/MIN: 10 INJECTION INTRAVENOUS at 23:28

## 2017-03-17 RX ADMIN — HYDROMORPHONE HYDROCHLORIDE 0.2 MG: 1 INJECTION, SOLUTION INTRAMUSCULAR; INTRAVENOUS; SUBCUTANEOUS at 21:20

## 2017-03-17 RX ADMIN — PHENYLEPHRINE HYDROCHLORIDE 100 MCG/MIN: 10 INJECTION INTRAVENOUS at 19:10

## 2017-03-17 RX ADMIN — HEPARIN SODIUM 8000 UNITS: 1000 INJECTION, SOLUTION INTRAVENOUS; SUBCUTANEOUS at 14:00

## 2017-03-17 RX ADMIN — PROPOFOL 150 MG: 10 INJECTION, EMULSION INTRAVENOUS at 12:49

## 2017-03-17 RX ADMIN — DOPAMINE HYDROCHLORIDE IN DEXTROSE 2.5 MCG/KG/MIN: 3.2 INJECTION, SOLUTION INTRAVENOUS at 18:40

## 2017-03-17 RX ADMIN — HEPARIN SODIUM 5000 UNITS/HR: 10000 INJECTION, SOLUTION INTRAVENOUS at 13:42

## 2017-03-17 RX ADMIN — IOPAMIDOL 50 ML: 755 INJECTION, SOLUTION INTRAVENOUS at 13:34

## 2017-03-17 RX ADMIN — ONDANSETRON HYDROCHLORIDE 4 MG: 2 INJECTION, SOLUTION INTRAMUSCULAR; INTRAVENOUS at 19:00

## 2017-03-17 RX ADMIN — FENTANYL CITRATE 100 MCG: 50 INJECTION, SOLUTION INTRAMUSCULAR; INTRAVENOUS at 12:49

## 2017-03-17 RX ADMIN — PROTAMINE SULFATE 100 MG: 10 INJECTION, SOLUTION INTRAVENOUS at 14:27

## 2017-03-17 RX ADMIN — SODIUM CHLORIDE: 9 INJECTION, SOLUTION INTRAVENOUS at 14:48

## 2017-03-17 RX ADMIN — MIDAZOLAM HYDROCHLORIDE 2 MG: 1 INJECTION, SOLUTION INTRAMUSCULAR; INTRAVENOUS at 12:45

## 2017-03-17 RX ADMIN — SUCCINYLCHOLINE CHLORIDE 160 MG: 20 INJECTION INTRAMUSCULAR; INTRAVENOUS at 12:49

## 2017-03-17 RX ADMIN — HYDROMORPHONE HYDROCHLORIDE 0.2 MG: 1 INJECTION, SOLUTION INTRAMUSCULAR; INTRAVENOUS; SUBCUTANEOUS at 20:35

## 2017-03-17 RX ADMIN — HEPARIN SODIUM 15000 UNITS: 1000 INJECTION, SOLUTION INTRAVENOUS; SUBCUTANEOUS at 13:42

## 2017-03-17 RX ADMIN — SODIUM CHLORIDE 125 ML/HR: 900 INJECTION, SOLUTION INTRAVENOUS at 23:28

## 2017-03-17 RX ADMIN — Medication 80 MCG: at 13:25

## 2017-03-17 RX ADMIN — ONDANSETRON 4 MG: 2 INJECTION INTRAMUSCULAR; INTRAVENOUS at 14:28

## 2017-03-17 RX ADMIN — HYDROMORPHONE HYDROCHLORIDE 0.2 MG: 1 INJECTION, SOLUTION INTRAMUSCULAR; INTRAVENOUS; SUBCUTANEOUS at 21:05

## 2017-03-17 RX ADMIN — Medication 120 MCG: at 14:00

## 2017-03-17 RX ADMIN — HEPARIN SODIUM 4000 UNITS: 200 INJECTION, SOLUTION INTRAVENOUS at 13:34

## 2017-03-17 NOTE — DISCHARGE INSTRUCTIONS
[address]  [phone]        1600 [facility] INSTRUCTIONS    Patient ID:  Monica Gonzalez  [id number]  38 y.o.  1940    Admit Date: 3/17/2017    Discharge Date: 3/22/2017     Admitting Physician: Mechelle Garcia MD     Discharge Physician: Dr. Joon Lorenzo    Admission Diagnoses:   a fib  Recovery Needed in PACU  a fib  Pericardial effusion with cardiac tamponade    Discharge Diagnoses: Active Problems:    S/P ablation of atrial fibrillation (3/17/2017)      Overview: 3/17/17      Pericardial effusion with cardiac tamponade (3/18/2017)        Discharge Condition: Good    Cardiology Procedures this Admission:  a-fib ablation    Disposition: home    Reference discharge instructions provided by nursing for diet and activity. Follow-up with Dr Joon Lorenzo in one week. Call [phone] to make an appointment. Signed:  Girish Rodriguez NP  3/22/2017  3:21 PM    S/P ABLATION DISCHARGE INSTRUCTIONS    It is normal to feel tired the first couple days. Take it easy and follow the physicians instructions. CHECK THE CATHETER INSERTION SITE DAILY:  You may shower 24 hours after the procedure, remove the bandage during showering. Wash with soap and water and pat dry. Gentle cleaning of the site with soap and water is sufficient, cover with a dry clean dressing or bandage. Do not apply creams or powders to the area. Do not sit in a bathtub or pool of water for 7 days or until wound has completely healed. Temporary bruising and discomfort is normal and may last a few weeks. You may have a  formation of a small lump at the site which may last up to 6 weeks. CALL THE PHYSICIAN:  If the site becomes red, swollen or feels warm to the touch  If there is bleeding or drainage or if there is unusual pain at the groin or down the leg. If there is any bleeding, lie down, apply pressure or have someone apply pressure with a clean cloth until the bleeding stops.   If the bleeding continues, call 911 to be transported to the hospital.  DO NOT DRIVE YOURSELF, [address]. Activity:      For the first 24-48 hours or as instructed by the physician:  No lifting, pushing or pulling over 5 pounds and no straining the insertion site. Do not life grocery bags or the garbage can, do not run the vacuum  or  for 7 days. Start with short walks as in the hospital and gradually increase as tolerated each day. It is recommended to walk 30 minutes 5-7 days per week. Follow your physicians instructions on activity. Avoid walking outside in extremes of heat or cold. Walk inside when it is cold and windy or hot and humid. Things to keep in mind:  No driving for at least 5 days, or as designated by your physician. Limit the number of times you go up and down the stairs  Take rests and pace yourself with activity. Be careful and do not strain with bowel movements. Medications: Take all medications as prescribed  Call your physician if you have any questions  Keep an updated list of your medications with you at all times and give a list to your physician and pharmacist    Signs and Symptoms:  Be cautious of symptoms of angina or recurrent symptoms such as chest discomfort, unusual shortness of breath or fatigue, palpitations. After Care: Follow up with your physician as instructed. Follow a heart healthy diet with proper portion control, daily stress management, daily exercise, blood pressure and cholesterol control , and smoking cessation. AFTER YOU TRANSESOPHAGEAL ECHOCARDIOGRAM    Do not eat or drink for at least two hours after your procedure. Your throat will be numb and there is a risk you might have difficulty swallowing for a while. Be careful when you do eat or drink for the first time especially with hot fluids since you could easily burn your throat.     Call your doctor if:    · You are bleeding from your throat or mouth. · You have trouble breathing all of a sudden. · You have chest pain or any pain that spreads to your neck, jaw, or arms. · You have questions or concerns.   · You have a fever greater than 101°F.

## 2017-03-17 NOTE — PERIOP NOTES
Dopamine gtt started as ordered. Patient SBP remains 70s-80s despite increase in titration of gtt. Increased in HR noted to 130s with frequent ectopy. Dopamine decreased, Dr. Tiara Morris re-paged. Telephone orders received to d/c Dopamine and start Naeem gtt, STAT Echo, and transfer order for CCU. Nursing supervisor notified of order for STAT echo and transfer.

## 2017-03-17 NOTE — ANESTHESIA POSTPROCEDURE EVALUATION
Post-Anesthesia Evaluation and Assessment    Patient: Emmanuelle Bhatti MRN: [id number]  SSN: [id number]    YOB: 1940  Age: 78 y.o. Sex: female       Cardiovascular Function/Vital Signs  Visit Vitals    /77    Pulse 82    Temp 36.5 °C (97.7 °F)    Resp 16    Ht 5' 5\" (1.651 m)    Wt 104.3 kg (230 lb)    SpO2 100%    BMI 38.27 kg/m2       Patient is status post general anesthesia for * No procedures listed *. Nausea/Vomiting: None    Postoperative hydration reviewed and adequate. Pain:  Pain Scale 1: Numeric (0 - 10) (03/17/17 1231)  Pain Intensity 1: 0 (03/17/17 1231)   Managed    Neurological Status: At baseline    Mental Status and Level of Consciousness: Arousable    Pulmonary Status:   O2 Device: Nasal cannula (03/17/17 1508)   Adequate oxygenation and airway patent    Complications related to anesthesia: None    Post-anesthesia assessment completed.  No concerns    Signed By: Supriya Steinberg MD     March 17, 2017

## 2017-03-17 NOTE — IP AVS SNAPSHOT
[address] [facility] 
[phone] Patient: Monica Gonzalez MRN: [id number] RLF:2/81/5632 You are allergic to the following Allergen Reactions Adhesive Hives Amoxicillin Unknown (comments) Lipitor (Atorvastatin) Myalgia Gets the flu Recent Documentation Height Weight BMI OB Status Smoking Status 1.651 m 107 kg 39.25 kg/m2 Postmenopausal Never Smoker Emergency Contacts Name Discharge Info Relation Home Work Mobile Jamison Sers  Other Relative [6] [phone] Tracy Fajardo  Friend [5] [phone] About your hospitalization You were admitted on:  March 17, 2017 You last received care in the:  [facility] 2 CARDIOPULMONARY CARE You were discharged on:  March 22, 2017 Unit phone number:  [phone] Why you were hospitalized Your primary diagnosis was:  Not on File Your diagnoses also included:  S/P Ablation Of Atrial Fibrillation, Pericardial Effusion With Cardiac Tamponade Providers Seen During Your Hospitalizations Provider Role Specialty Primary office phone Clinton Kincaid MD Attending Provider Cardiology [phone] Mechelle Garcia MD Attending Provider Cardiology [phone] Your Primary Care Physician (PCP) Primary Care Physician Office Phone Office Fax Doroteo Grady [phone] Follow-up Information Follow up With Details Comments Contact Info Nikko Pisano MD Schedule an appointment as soon as possible for a visit in 10 days  [facility] [address] [facility] [address] [facility] 
[phone] Clinton Kincaid MD Schedule an appointment as soon as possible for a visit in 1 week  [address] [facility] 
[phone] [address] [facility] [address]   [address] [address] 
[phone] Your Appointments Tuesday April 18, 2017 11:15 AM EDT ROUTINE CARE with Rogena Brittle, [address] [facility] [address]) [address] [facility] [address] [place]  
[phone] Current Discharge Medication List  
  
CONTINUE these medications which have CHANGED Dose & Instructions Dispensing Information Comments Morning Noon Evening Bedtime  
 sotalol 80 mg tablet Commonly known as:  Tayler Loja What changed:  See the new instructions. Your last dose was: Your next dose is:    
   
   
 Dose:  40 mg Take 0.5 Tabs by mouth every twelve (12) hours. Quantity:  30 Tab Refills:  2 CONTINUE these medications which have NOT CHANGED Dose & Instructions Dispensing Information Comments Morning Noon Evening Bedtime  
 apixaban 5 mg tablet Commonly known as:  Virgel Fatoumata Your last dose was: Your next dose is:    
   
   
 Dose:  5 mg Take 1 Tab by mouth two (2) times a day. Quantity:  60 Tab Refills:  6  
     
   
   
   
  
 aspirin delayed-release 81 mg tablet Your last dose was: Your next dose is:    
   
   
 Dose:  81 mg Take 81 mg by mouth every evening. Refills:  0  
     
   
   
   
  
 CALTRATE 600 + D PO Your last dose was: Your next dose is:    
   
   
 Dose:  1 Tab Take 1 Tab by mouth two (2) times a day. Refills:  0 CENTRUM SILVER PO Your last dose was: Your next dose is:    
   
   
 Dose:  1 Tab Take 1 Tab by mouth daily (after lunch). Refills:  0  
     
   
   
   
  
 cloNIDine HCl 0.2 mg tablet Commonly known as:  CATAPRES Your last dose was: Your next dose is:    
   
   
 Dose:  0.2 mg Take 1 Tab by mouth two (2) times a day. Quantity:  180 Tab Refills:  3  
     
   
   
   
  
 hydroCHLOROthiazide 25 mg tablet Commonly known as:  HYDRODIURIL Your last dose was: Your next dose is:    
   
   
 Dose:  25 mg Take 1 Tab by mouth daily. Quantity:  90 Tab Refills:  3 KLOR-CON 10 10 mEq tablet Generic drug:  potassium chloride SR Your last dose was: Your next dose is: TAKE 2 TABLETS TWICE A DAY Refills:  3  
     
   
   
   
  
 levothyroxine 75 mcg tablet Commonly known as:  SYNTHROID Your last dose was: Your next dose is:    
   
   
 Dose:  75 mcg Take 1 Tab by mouth Daily (before breakfast). Quantity:  90 Tab Refills:  3  
     
   
   
   
  
 lisinopril 10 mg tablet Commonly known as:  Kyler Shutters Your last dose was: Your next dose is:    
   
   
 Daily Quantity:  90 Tab Refills:  1  
     
   
   
   
  
 rosuvastatin 20 mg tablet Commonly known as:  CRESTOR Your last dose was: Your next dose is:    
   
   
 Dose:  20 mg Take 1 Tab by mouth nightly. Quantity:  30 Tab Refills:  1  
     
   
   
   
  
 verapamil  mg tablet Commonly known as:  CALAN-SR Your last dose was: Your next dose is: TAKE 1 TABLET BY MOUTH EVERY MORNING FOR BLOOD PRESSURE Quantity:  90 Tab Refills:  2 Where to Get Your Medications These medications were sent to [address] [place] [address] S [address] [place], [place] [address] S. [address] [place] [address] Hours:  24-hours Phone:  [phone]  
  sotalol 80 mg tablet Discharge Instructions [address] [phone] ABLATION DISCHARGE INSTRUCTIONS Patient ID: Grace Graham [id number] 
9 y.o. 
1940 Admit Date: 3/17/2017 Discharge Date: 3/22/2017 Admitting Physician: Wade Loo MD  
 
Discharge Physician: Dr. Earline Stewart 
 
Admission Diagnoses:  
a fib Recovery Needed in PACU 
a fib Pericardial effusion with cardiac tamponade Discharge Diagnoses: Active Problems: S/P ablation of atrial fibrillation (3/17/2017) Overview: 3/17/17 Pericardial effusion with cardiac tamponade (3/18/2017) Discharge Condition: Good Cardiology Procedures this Admission:  a-fib ablation Disposition: home Reference discharge instructions provided by nursing for diet and activity. Follow-up with Dr Earline Stewart in one week. Call [phone] to make an appointment. Signed: 
Rita Michelle NP 
3/22/2017 
3:21 PM 
 
S/P ABLATION DISCHARGE INSTRUCTIONS It is normal to feel tired the first couple days. Take it easy and follow the physicians instructions. CHECK THE CATHETER INSERTION SITE DAILY: 
You may shower 24 hours after the procedure, remove the bandage during showering. Wash with soap and water and pat dry. Gentle cleaning of the site with soap and water is sufficient, cover with a dry clean dressing or bandage. Do not apply creams or powders to the area. Do not sit in a bathtub or pool of water for 7 days or until wound has completely healed. Temporary bruising and discomfort is normal and may last a few weeks. You may have a  formation of a small lump at the site which may last up to 6 weeks. CALL THE PHYSICIAN: If the site becomes red, swollen or feels warm to the touch If there is bleeding or drainage or if there is unusual pain at the groin or down the leg. If there is any bleeding, lie down, apply pressure or have someone apply pressure with a clean cloth until the bleeding stops. If the bleeding continues, call 911 to be transported to the hospital. 
DO  [facility] 078. Activity: For the first 24-48 hours or as instructed by the physician: No lifting, pushing or pulling over 5 pounds and no straining the insertion site. Do not life grocery bags or the garbage can, do not run the vacuum  or  for 7 days. Start with short walks as in the hospital and gradually increase as tolerated each day. It is recommended to walk 30 minutes 5-7 days per week. Follow your physicians instructions on activity. Avoid walking outside in extremes of heat or cold. Walk inside when it is cold and windy or hot and humid. Things to keep in mind: 
No driving for at least 5 days, or as designated by your physician. Limit the number of times you go up and down the stairs Take rests and pace yourself with activity. Be careful and do not strain with bowel movements. Medications: Take all medications as prescribed Call your physician if you have any questions Keep an updated list of your medications with you at all times and give a list to your physician and pharmacist 
 
Signs and Symptoms: 
Be cautious of symptoms of angina or recurrent symptoms such as chest discomfort, unusual shortness of breath or fatigue, palpitations. After Care: Follow up with your physician as instructed. Follow a heart healthy diet with proper portion control, daily stress management, daily exercise, blood pressure and cholesterol control , and smoking cessation. AFTER YOU TRANSESOPHAGEAL ECHOCARDIOGRAM 
 
Do not eat or drink for at least two hours after your procedure. Your throat will be numb and there is a risk you might have difficulty swallowing for a while. Be careful when you do eat or drink for the first time especially with hot fluids since you could easily burn your throat. Call your doctor if: 
 
· You are bleeding from your throat or mouth. · You have trouble breathing all of a sudden. · You have chest pain or any pain that spreads to your neck, jaw, or arms. · You have questions or concerns. · You have a fever greater than 101°F. Discharge Instructions Attachments/References ATRIAL FIBRILLATION (ENGLISH) Discharge Orders None ACO Transitions of Care Introducing [employer] [address] Daphney Vidales offers a voluntary care coordination program to provide high quality service and care to [facility] fee-for-service beneficiaries. Anjum Villanueva was designed to help you enhance your health and well-being through the following services: ? Transitions of Care  support for individuals who are transitioning from one care setting to another (example: Hospital to home). ? Chronic and Complex Care Coordination  support for individuals and caregivers of those with serious or chronic illnesses or with more than one chronic (ongoing) condition and those who take a number of different medications. If you meet specific medical criteria, a [facility] [address] may call you directly to coordinate your care with your primary care physician and your other care providers. For questions about the [facility] programs, please, contact your physicians office. For general questions or additional information about Accountable Care Organizations: 
Please visit www.medicare.gov/acos. html or call 1-800-MEDICARE (8-739.803.3128) TTY users should call 3-569.518.1030. [employer] Announcement We are excited to announce that we are making your provider's discharge notes available to you in [employer]. You will see these notes when they are completed and signed by the physician that discharged you from your recent hospital stay. If you have any questions or concerns about any information you see in [employer], please call the Health Information Department where you were seen or reach out to your Primary Care Provider for more information about your plan of care. Introducing [facility]! Dear Lucille Cast: Thank you for requesting a [employer] account. Our records indicate that you already have an active [employer] account. You can access your account anytime at [email or web address] Did you know that you can access your hospital and ER discharge instructions at any time in [employer]? You can also review all of your test results from your hospital stay or ER visit. Additional Information If you have questions, please visit the Frequently Asked Questions section of the [employer] website at [email or web address] Remember, [employer] is NOT to be used for urgent needs. For medical emergencies, dial 911. Now available from your iPhone and Android! General Information Please provide this summary of care documentation to your next provider. Patient Signature:  ____________________________________________________________ Date:  ____________________________________________________________  
  
Pamela Rodriguez Provider Signature:  ____________________________________________________________ Date:  ____________________________________________________________ More Information Atrial Fibrillation: Care Instructions Your Care Instructions Atrial fibrillation is an irregular and often fast heartbeat. Treating this condition is important for several reasons. It can cause blood clots, which can travel from your heart to your brain and cause a stroke. If you have a fast heartbeat, you may feel lightheaded, dizzy, and weak. An irregular heartbeat can also increase your risk for heart failure. Atrial fibrillation is often the result of another heart condition, such as high blood pressure or coronary artery disease. Making changes to improve your heart condition will help you stay healthy and active. Follow-up care is a key part of your treatment and safety.  Be sure to make and go to all appointments, and call your doctor if you are having problems. It's also a good idea to know your test results and keep a list of the medicines you take. How can you care for yourself at home? Medicines · Take your medicines exactly as prescribed. Call your doctor if you think you are having a problem with your medicine. You will get more details on the specific medicines your doctor prescribes. · If your doctor has given you a blood thinner to prevent a stroke, be sure you get instructions about how to take your medicine safely. Blood thinners can cause serious bleeding problems. · Do not take any vitamins, over-the-counter drugs, or herbal products without talking to your doctor first. 
Lifestyle changes · Do not smoke. Smoking can increase your chance of a stroke and heart attack. If you need help quitting, talk to your doctor about stop-smoking programs and medicines. These can increase your chances of quitting for good. · Eat a heart-healthy diet. · Stay at a healthy weight. Lose weight if you need to. · Limit alcohol to 2 drinks a day for men and 1 drink a day for women. Too much alcohol can cause health problems. · Avoid colds and flu. Get a pneumococcal vaccine shot. If you have had one before, ask your doctor whether you need another dose. Get a flu shot every year. If you must be around people with colds or flu, wash your hands often. Activity · If your doctor recommends it, get more exercise. Walking is a good choice. Bit by bit, increase the amount you walk every day. Try for at least 30 minutes on most days of the week. You also may want to swim, bike, or do other activities. Your doctor may suggest that you join a cardiac rehabilitation program so that you can have help increasing your physical activity safely. · Start light exercise if your doctor says it is okay. Even a small amount will help you get stronger, have more energy, and manage stress.  Walking is an easy way to get exercise. Start out by walking a little more than you did in the hospital. Gradually increase the amount you walk. · When you exercise, watch for signs that your heart is working too hard. You are pushing too hard if you cannot talk while you are exercising. If you become short of breath or dizzy or have chest pain, sit down and rest immediately. · Check your pulse regularly. Place two fingers on the artery at the palm side of your wrist, in line with your thumb. If your heartbeat seems uneven or fast, talk to your doctor. When should you call for help? Call 911 anytime you think you may need emergency care. For example, call if: 
· You have symptoms of a heart attack. These may include: ¨ Chest pain or pressure, or a strange feeling in the chest. 
¨ Sweating. ¨ Shortness of breath. ¨ Nausea or vomiting. ¨ Pain, pressure, or a strange feeling in the back, neck, jaw, or upper belly or in one or both shoulders or arms. ¨ Lightheadedness or sudden weakness. ¨ A fast or irregular heartbeat. After you call 911, the  may tell you to chew 1 adult-strength or 2 to 4 low-dose aspirin. Wait for an ambulance. Do not try to drive yourself. · You have symptoms of a stroke. These may include: 
¨ Sudden numbness, tingling, weakness, or loss of movement in your face, arm, or leg, especially on only one side of your body. ¨ Sudden vision changes. ¨ Sudden trouble speaking. ¨ Sudden confusion or trouble understanding simple statements. ¨ Sudden problems with walking or balance. ¨ A sudden, severe headache that is different from past headaches. · You passed out (lost consciousness). Call your doctor now or seek immediate medical care if: 
· You have new or increased shortness of breath. · You feel dizzy or lightheaded, or you feel like you may faint. · Your heart rate becomes irregular. · You can feel your heart flutter in your chest or skip heartbeats.  Tell your doctor if these symptoms are new or worse. Watch closely for changes in your health, and be sure to contact your doctor if you have any problems. Where can you learn more? Go to http://feliz-arlene.info/. Enter U020 in the search box to learn more about \"Atrial Fibrillation: Care Instructions. \" Current as of: January 27, 2016 Content Version: 11.1 © 0577-1349 [employer]. Care instructions adapted under license by [employer] (which disclaims liability or warranty for this information). If you have questions about a medical condition or this instruction, always ask your healthcare professional. [address] any warranty or liability for your use of this information.

## 2017-03-17 NOTE — PROCEDURES
[address] [place] [address]  [phone]    Indications and Pre-Procedure Diagnosis:  Cuba Pereira is a 78 y.o. female with atrial fibrillation is referred for electr-physiologic evaluation and intervention. Post Procedure Diagnosis    Atrial fibrillation    Atrial Fibrillation Ablation Summary  Informed consent was obtained. The patient was brought to the EP lab where KATHRIN demonstrated no thrombus in the left atrial appendage. All vascular access sites were prepped and draped in the usual sterile fashion and the Seldinger technique was used to catherize the RFV and LFV with multi-polar electrode catheters, which were placed in the appropriate intra-cardiac sites under fluoroscopic guidance (see catheter list). Right and left atrial pacing and recording, His bundle recording, and right ventricular pacing and recording were performed. Continuous pulse oximetry and cuff BP monitoring were performed. During the procedure, the patient received Versed, Fentanyl and Propofol for sedation per anesthesia personnel. Transeptal Puncture  A transeptal atrial puncture was performed using fluoroscopic and intracardiac ultrasound guidance. An SL1 sheath was dragged from the SVC along the septum until a sudden leftward displacement was observed. Engagement of the Fossa Ovalis was confirmed by the interatrial tenting seen under intracardiac ultrasound guidance. The Virl [place] NRG needle was advanced into the left atrium and its positioned confirmed with contrast injection. The dilator and sheath were advanced carefully over the needle into the body of the left atrium and the dilator/needle removed. A Flexcath sheath was exchanged over the wire for the SL1 sheath.  No pericardial effusion was appreciated on intracardiac ultrasound so a bolus injection of heparin 100 units/kg was administered, with a continuous heparin gtt of 5000 units/hr so that the activated clotting time maintained was between 300 and 400 seconds with additional boluses q 30 minutes as necessary. A 3D shell representing the left atrium and pulmonary veins was constructed with the electroanatomic mapping system. An Achieve circular mapping catheter was advanced into the left atrium through the Flexcath sheath and a map of the body of the LA and the pulmonary veins was created. Pulmonary vein venography was also performed at that time. A 28 mm Medtronic Cryo balloon was advanced into the left atrium. Cryo ablation of the left atrium was performed at the PV ostia to encircle the right and left PVs. Cryo energy was applied for a total of 20 minutes with confirmed entrance/exit block of four pulmonary veins. Autonomic manipulation with Dobutamine @ 20 mcg/min was performed during this procedure. Post ablation, rapid atrial pacing to 240 msec, on and off dobutamine, failed to induce any atrial arrhythmias. At the end of the procedure, all catheters were removed, heparin reversed, groin sheaths pulled and hemostasis achieved. The patient left the laboratory in a stable condition. Fluoroscopy and procedure times were 7 and 75 minutes respectively. Estimated blood loss: <10 ml. Sharp counts: correct. Specimen (s) collected: none. The procedure related complication occurred: none. The following problems were encountered: none. Conduction intervals (ms)    A-A A-H H-V P-R QRS Q-T R-R V-V  777 84 58 176 76 [phone]    AV mame conduction    VA Block when pacing at 600 ms    Findings and Summary    This study demonstrates:  1. PVI of all 4 PVs  2. No further inducible atrial arrhythmias on dobutamine with rapid atrial pacing    Recommendation:  1. Cont sotalol and OAC    Thank you for allowing me to participate in this patients care.     Edvin Milligan MD, [place] [facility]

## 2017-03-17 NOTE — PERIOP NOTES
Patient started on Dopamine and after titration to 5mcg/kg/min patients HR jumping from 80's - 130's,  Dr. Lia Chandra.

## 2017-03-17 NOTE — H&P (VIEW-ONLY)
Subjective:      Tash Neff is a 78 y.o. female is here for follow up of her dizziness and abnormal event monitor.       Patient Active Problem List    Diagnosis Date Noted    Acquired hypothyroidism 12/14/2015    ACP (advance care planning) 12/14/2015    HTN (hypertension) 07/21/2015    Thyroid activity decreased 04/14/2015    Atrial fibrillation ([address] 03/16/2015    A-fib ([address] 03/10/2015    Angina, class III ([address] 01/27/2015    Morbid obesity ([address]     Hyperlipidemia 07/14/2014    Hypokalemia 07/14/2014    Encounter for screening colonoscopy 08/21/2013    History of rectal bleeding 08/21/2013    Postmenopausal bleeding 10/18/2011    Endometrial polyp 10/18/2011      Naila Michel MD  Past Medical History:   Diagnosis Date    Arthritis     right knee, left shoulder    Diverticulosis     Frequency of urination     High cholesterol     Hypertension     Morbid obesity ([address]     Thyroid disease     hypothyroid    Unspecified adverse effect of anesthesia     low BP      Past Surgical History:   Procedure Laterality Date    BREAST SURGERY PROCEDURE UNLISTED  1982    breast bx rt    CARDIAC SURG PROCEDURE UNLIST  01/2015    cardiac catheterization, no intervention, medical management    HX GYN      myomectomy on uterus    HX GYN  2011    hystereoscopy D&C    HX LYMPH NODE DISSECTION      biopsy    HX OTHER SURGICAL      lymph node bx     Allergies   Allergen Reactions    Adhesive Hives    Amoxicillin Unknown (comments)    Lipitor [Atorvastatin] Myalgia     Gets the flu      Family History   Problem Relation Age of Onset    Alzheimer Mother     Heart Disease Mother     Heart Disease Father     Hypertension Father     Cancer Paternal Aunt     Diabetes Paternal Grandmother     negative for cardiac disease  Social History     Social History    Marital status:      Spouse name: N/A    Number of children: N/A    Years of education: N/A     Social History Main Topics  Smoking status: Never Smoker    Smokeless tobacco: Never Used    Alcohol use No    Drug use: No    Sexual activity: Not Asked     Other Topics Concern    None     Social History Narrative     Current Outpatient Prescriptions   Medication Sig    rosuvastatin (CRESTOR) 20 mg tablet Take 1 Tab by mouth nightly.  lisinopril (PRINIVIL, ZESTRIL) 10 mg tablet Daily    KLOR-CON 10 10 mEq tablet TAKE 2 TABLETS TWICE A DAY    verapamil ER (CALAN-SR) 120 mg tablet TAKE 1 TABLET BY MOUTH EVERY MORNING FOR BLOOD PRESSURE    levothyroxine (SYNTHROID) 75 mcg tablet Take 1 Tab by mouth Daily (before breakfast).  cloNIDine HCl (CATAPRES) 0.2 mg tablet Take 1 Tab by mouth two (2) times a day.  hydrochlorothiazide (HYDRODIURIL) 25 mg tablet Take 1 Tab by mouth daily.  sotalol (BETAPACE) 120 mg tablet Take 1 Tab by mouth every twelve (12) hours.  aspirin delayed-release 81 mg tablet Take 81 mg by mouth every evening.  CALCIUM CARBONATE/VITAMIN D3 (CALTRATE 600 + D PO) Take 1 Tab by mouth two (2) times a day.  MULTIVITAMIN W-MINERALS/LUTEIN (CENTRUM SILVER PO) Take 1 Tab by mouth daily (after lunch). No current facility-administered medications for this visit. Vitals:    02/28/17 1428   BP: 112/68   Pulse: 72   Resp: 16   SpO2: 98%   Weight: 231 lb 1.6 oz (104.8 kg)   Height: 5' 5\" (1.651 m)       I have reviewed the nurses notes, vitals, problem list, allergy list, medical history, family, social history and medications. Review of Symptoms:    General: Pt denies excessive weight gain or loss. Pt is able to conduct ADL's  HEENT: Denies blurred vision, headaches, epistaxis and difficulty swallowing. Respiratory: Denies shortness of breath, ANGUIANO, wheezing or stridor.   Cardiovascular: +palpitations, Denies precordial pain, edema or PND  Gastrointestinal: Denies poor appetite, indigestion, abdominal pain or blood in stool  Urinary: Denies dysuria, pyuria  Musculoskeletal: Denies pain or swelling from muscles or joints  Neurologic: Denies tremor, paresthesias, or sensory motor disturbance  Skin: Denies rash, itching or texture change. Psych: Denies depression      Physical Exam:      General: Well developed, in no acute distress. HEENT: Eyes - PERRL, no jvd  Heart:  Normal S1/S2 negative S3 or S4. Regular, no murmur, gallop or rub.   Respiratory: Clear bilaterally x 4, no wheezing or rales  Abdomen:   Soft, non-tender, bowel sounds are active.   Extremities:  No edema, normal cap refill, no cyanosis. Musculoskeletal: No clubbing  Neuro: A&Ox3, speech clear, gait stable. Skin: Skin color is normal. No rashes or lesions.  Non diaphoretic  Vascular: 2+ pulses symmetric in all extremities    Cardiographics    Ekg: nsr    Monitor - pafib    Results for orders placed or performed during the hospital encounter of 03/16/15   EKG, 12 LEAD, INITIAL   Result Value Ref Range    Ventricular Rate 57 BPM    Atrial Rate 57 BPM    P-R Interval 164 ms    QRS Duration 82 ms    Q-T Interval 462 ms    QTC Calculation (Bezet) 449 ms    Calculated P Axis 84 degrees    Calculated R Axis -5 degrees    Calculated T Axis 21 degrees    Diagnosis       Sinus bradycardia      Confirmed by Monty Tucker ([id number]) on 3/18/2015 11:05:42 AM           Lab Results   Component Value Date/Time    WBC 4.8 10/12/2016 08:50 AM    HGB 13.2 10/12/2016 08:50 AM    HCT 39.2 10/12/2016 08:50 AM    PLATELET 233 07/28/2292 08:50 AM    MCV 93 10/12/2016 08:50 AM      Lab Results   Component Value Date/Time    Sodium 143 10/12/2016 08:50 AM    Potassium 4.4 10/12/2016 08:50 AM    Chloride 102 10/12/2016 08:50 AM    CO2 27 10/12/2016 08:50 AM    Anion gap 9 03/18/2015 03:40 AM    Glucose 102 10/12/2016 08:50 AM    BUN 14 10/12/2016 08:50 AM    Creatinine 0.85 10/12/2016 08:50 AM    BUN/Creatinine ratio 16 10/12/2016 08:50 AM    GFR est AA 78 10/12/2016 08:50 AM    GFR est non-AA 68 10/12/2016 08:50 AM    Calcium 9.9 10/12/2016 08:50 AM Bilirubin, total 0.4 10/12/2016 08:50 AM    AST (SGOT) 16 10/12/2016 08:50 AM    Alk. phosphatase 61 10/12/2016 08:50 AM    Protein, total 6.8 10/12/2016 08:50 AM    Albumin 4.3 10/12/2016 08:50 AM    Globulin 3.6 03/16/2015 10:10 AM    A-G Ratio 1.7 10/12/2016 08:50 AM    ALT (SGPT) 18 10/12/2016 08:50 AM         Assessment:     Assessment:        ICD-10-CM ICD-9-CM    1. Atrial fibrillation, unspecified type ([facility] I48.91 427.31 AMB POC EKG ROUTINE W/ 12 LEADS, INTER & REP   2. Mixed hyperlipidemia E78.2 272.2    3. Essential hypertension I10 401.9      Orders Placed This Encounter    AMB POC EKG ROUTINE W/ 12 LEADS, INTER & REP     Order Specific Question:   Reason for Exam:     Answer:   Routine        Plan:   Ms Francisca Ernandez is having pafib on sotalol and associated dizziness. She is a candidate for an afib ablation/ILR. I discussed the risks/benefits/alternatives of the procedure with the patient. Risks include (but are not limited to) bleeding, heart block, infection, cva/mi/tamponade/esophageal perforation/pv stenosis/death. The patient understands that there is a 1-9% major complication rate and agrees to proceed. Post ablation, we will stop her sotalol. Thank you for this interesting consultation. Continue medical management for htn. Thank you for allowing me to participate in Frank Guardado 's care.     Raiza Junior MD, Javier Triplett

## 2017-03-17 NOTE — INTERVAL H&P NOTE
H&P Update:  Giulia An was seen and examined. History and physical has been reviewed. The patient has been examined.  There have been no significant clinical changes since the completion of the originally dated History and Physical.    Signed By: Madison Venegas MD     March 17, 2017 8:12 AM

## 2017-03-17 NOTE — PERIOP NOTES
notified of BP 85/55 for past 30minutes, and of IV fluids 300cc over past 30mins. A&O ,skin W&D, denies nausea or pain \"just restless:* Cardiac monitor shows NSR @ 87, O2sat RA 98%. No order type specified * rec'd for Dopamine @ 2.5mcg & titrate to keep SBP>90. IVCU notified & charge nurse states can take pt on Dopamine drip up to 10mcg.

## 2017-03-18 PROBLEM — I31.4 PERICARDIAL EFFUSION WITH CARDIAC TAMPONADE: Status: ACTIVE | Noted: 2017-03-18

## 2017-03-18 PROBLEM — I31.39 PERICARDIAL EFFUSION WITH CARDIAC TAMPONADE: Status: ACTIVE | Noted: 2017-03-18

## 2017-03-18 LAB
ANION GAP BLD CALC-SCNC: 12 MMOL/L (ref 5–15)
ATRIAL RATE: 92 BPM
BUN SERPL-MCNC: 15 MG/DL (ref 6–20)
BUN/CREAT SERPL: 10 (ref 12–20)
CALCIUM SERPL-MCNC: 7.9 MG/DL (ref 8.5–10.1)
CALCULATED P AXIS, ECG09: 27 DEGREES
CALCULATED R AXIS, ECG10: -17 DEGREES
CALCULATED T AXIS, ECG11: 44 DEGREES
CHLORIDE SERPL-SCNC: 112 MMOL/L (ref 97–108)
CO2 SERPL-SCNC: 20 MMOL/L (ref 21–32)
CREAT SERPL-MCNC: 1.44 MG/DL (ref 0.55–1.02)
DIAGNOSIS, 93000: NORMAL
ERYTHROCYTE [DISTWIDTH] IN BLOOD BY AUTOMATED COUNT: 13.5 % (ref 11.5–14.5)
GLUCOSE BLD STRIP.AUTO-MCNC: 132 MG/DL (ref 65–100)
GLUCOSE SERPL-MCNC: 136 MG/DL (ref 65–100)
HCT VFR BLD AUTO: 36.7 % (ref 35–47)
HGB BLD-MCNC: 12.5 G/DL (ref 11.5–16)
MAGNESIUM SERPL-MCNC: 1.9 MG/DL (ref 1.6–2.4)
MCH RBC QN AUTO: 31.2 PG (ref 26–34)
MCHC RBC AUTO-ENTMCNC: 34.1 G/DL (ref 30–36.5)
MCV RBC AUTO: 91.5 FL (ref 80–99)
P-R INTERVAL, ECG05: 196 MS
PLATELET # BLD AUTO: 147 K/UL (ref 150–400)
POTASSIUM SERPL-SCNC: 3.7 MMOL/L (ref 3.5–5.1)
Q-T INTERVAL, ECG07: 370 MS
QRS DURATION, ECG06: 80 MS
QTC CALCULATION (BEZET), ECG08: 457 MS
RBC # BLD AUTO: 4.01 M/UL (ref 3.8–5.2)
SERVICE CMNT-IMP: ABNORMAL
SODIUM SERPL-SCNC: 144 MMOL/L (ref 136–145)
VENTRICULAR RATE, ECG03: 92 BPM
WBC # BLD AUTO: 10.6 K/UL (ref 3.6–11)

## 2017-03-18 PROCEDURE — 93308 TTE F-UP OR LMTD: CPT

## 2017-03-18 PROCEDURE — 77010033678 HC OXYGEN DAILY

## 2017-03-18 PROCEDURE — 74011250637 HC RX REV CODE- 250/637: Performed by: INTERNAL MEDICINE

## 2017-03-18 PROCEDURE — 65610000006 HC RM INTENSIVE CARE

## 2017-03-18 PROCEDURE — 93005 ELECTROCARDIOGRAM TRACING: CPT

## 2017-03-18 PROCEDURE — 77030013785 HC KT PERICARDCENT BSC -C

## 2017-03-18 PROCEDURE — 80048 BASIC METABOLIC PNL TOTAL CA: CPT | Performed by: INTERNAL MEDICINE

## 2017-03-18 PROCEDURE — 99218 HC RM OBSERVATION: CPT

## 2017-03-18 PROCEDURE — 74011250636 HC RX REV CODE- 250/636: Performed by: INTERNAL MEDICINE

## 2017-03-18 PROCEDURE — 74011250636 HC RX REV CODE- 250/636

## 2017-03-18 PROCEDURE — 0W9D30Z DRAINAGE OF PERICARDIAL CAVITY WITH DRAINAGE DEVICE, PERCUTANEOUS APPROACH: ICD-10-PCS | Performed by: INTERNAL MEDICINE

## 2017-03-18 PROCEDURE — 83735 ASSAY OF MAGNESIUM: CPT | Performed by: INTERNAL MEDICINE

## 2017-03-18 PROCEDURE — 74011250636 HC RX REV CODE- 250/636: Performed by: ANESTHESIOLOGY

## 2017-03-18 PROCEDURE — 77030033263 HC DRSG MEPILEX 16-48IN BORD MOLN -B

## 2017-03-18 PROCEDURE — 85027 COMPLETE CBC AUTOMATED: CPT | Performed by: INTERNAL MEDICINE

## 2017-03-18 PROCEDURE — 33010 CARDIAC CATHETERIZATION: CPT

## 2017-03-18 PROCEDURE — 77030002996 HC SUT SLK J&J -A

## 2017-03-18 PROCEDURE — 36415 COLL VENOUS BLD VENIPUNCTURE: CPT | Performed by: INTERNAL MEDICINE

## 2017-03-18 PROCEDURE — 77030021678 HC GLIDESCP STAT DISP VERT -B

## 2017-03-18 PROCEDURE — 74011000250 HC RX REV CODE- 250

## 2017-03-18 RX ORDER — POTASSIUM CHLORIDE 750 MG/1
10 TABLET, FILM COATED, EXTENDED RELEASE ORAL DAILY
Status: DISCONTINUED | OUTPATIENT
Start: 2017-03-18 | End: 2017-03-18

## 2017-03-18 RX ORDER — MIDAZOLAM HYDROCHLORIDE 1 MG/ML
INJECTION, SOLUTION INTRAMUSCULAR; INTRAVENOUS
Status: COMPLETED
Start: 2017-03-18 | End: 2017-03-18

## 2017-03-18 RX ORDER — LIDOCAINE HYDROCHLORIDE 10 MG/ML
1-20 INJECTION INFILTRATION; PERINEURAL ONCE
Status: COMPLETED | OUTPATIENT
Start: 2017-03-18 | End: 2017-03-18

## 2017-03-18 RX ORDER — HEPARIN SODIUM 200 [USP'U]/100ML
500 INJECTION, SOLUTION INTRAVENOUS ONCE
Status: COMPLETED | OUTPATIENT
Start: 2017-03-18 | End: 2017-03-18

## 2017-03-18 RX ORDER — VERAPAMIL HYDROCHLORIDE 240 MG/1
120 TABLET, FILM COATED, EXTENDED RELEASE ORAL
Status: DISCONTINUED | OUTPATIENT
Start: 2017-03-18 | End: 2017-03-22 | Stop reason: HOSPADM

## 2017-03-18 RX ORDER — CLONIDINE HYDROCHLORIDE 0.1 MG/1
0.2 TABLET ORAL 2 TIMES DAILY
Status: DISCONTINUED | OUTPATIENT
Start: 2017-03-18 | End: 2017-03-21

## 2017-03-18 RX ORDER — MIDAZOLAM HYDROCHLORIDE 1 MG/ML
.5-2 INJECTION, SOLUTION INTRAMUSCULAR; INTRAVENOUS
Status: DISCONTINUED | OUTPATIENT
Start: 2017-03-18 | End: 2017-03-18 | Stop reason: HOSPADM

## 2017-03-18 RX ORDER — FENTANYL CITRATE 50 UG/ML
25-50 INJECTION, SOLUTION INTRAMUSCULAR; INTRAVENOUS
Status: DISCONTINUED | OUTPATIENT
Start: 2017-03-18 | End: 2017-03-18 | Stop reason: HOSPADM

## 2017-03-18 RX ORDER — LIDOCAINE HYDROCHLORIDE 10 MG/ML
INJECTION INFILTRATION; PERINEURAL
Status: COMPLETED
Start: 2017-03-18 | End: 2017-03-18

## 2017-03-18 RX ORDER — HYDROCHLOROTHIAZIDE 25 MG/1
25 TABLET ORAL DAILY
Status: DISCONTINUED | OUTPATIENT
Start: 2017-03-18 | End: 2017-03-22 | Stop reason: HOSPADM

## 2017-03-18 RX ORDER — HEPARIN SODIUM 200 [USP'U]/100ML
INJECTION, SOLUTION INTRAVENOUS
Status: DISCONTINUED
Start: 2017-03-18 | End: 2017-03-22 | Stop reason: HOSPADM

## 2017-03-18 RX ORDER — LEVOTHYROXINE SODIUM 75 UG/1
75 TABLET ORAL
Status: DISCONTINUED | OUTPATIENT
Start: 2017-03-18 | End: 2017-03-22 | Stop reason: HOSPADM

## 2017-03-18 RX ORDER — FENTANYL CITRATE 50 UG/ML
INJECTION, SOLUTION INTRAMUSCULAR; INTRAVENOUS
Status: COMPLETED
Start: 2017-03-18 | End: 2017-03-18

## 2017-03-18 RX ORDER — POTASSIUM CHLORIDE 750 MG/1
20 TABLET, FILM COATED, EXTENDED RELEASE ORAL 2 TIMES DAILY
Status: DISCONTINUED | OUTPATIENT
Start: 2017-03-18 | End: 2017-03-18

## 2017-03-18 RX ORDER — POTASSIUM CHLORIDE 750 MG/1
20 TABLET, FILM COATED, EXTENDED RELEASE ORAL 2 TIMES DAILY
Status: DISCONTINUED | OUTPATIENT
Start: 2017-03-18 | End: 2017-03-22 | Stop reason: HOSPADM

## 2017-03-18 RX ORDER — HEPARIN SODIUM 200 [USP'U]/100ML
INJECTION, SOLUTION INTRAVENOUS
Status: COMPLETED
Start: 2017-03-18 | End: 2017-03-18

## 2017-03-18 RX ORDER — LISINOPRIL 5 MG/1
10 TABLET ORAL DAILY
Status: DISCONTINUED | OUTPATIENT
Start: 2017-03-18 | End: 2017-03-22 | Stop reason: HOSPADM

## 2017-03-18 RX ADMIN — HYDROCODONE BITARTRATE AND ACETAMINOPHEN 1 TABLET: 5; 325 TABLET ORAL at 08:00

## 2017-03-18 RX ADMIN — ASPIRIN 81 MG: 81 TABLET, COATED ORAL at 01:55

## 2017-03-18 RX ADMIN — Medication 10 ML: at 01:55

## 2017-03-18 RX ADMIN — Medication 10 ML: at 05:33

## 2017-03-18 RX ADMIN — LEVOTHYROXINE SODIUM 75 MCG: 75 TABLET ORAL at 07:32

## 2017-03-18 RX ADMIN — LIDOCAINE HYDROCHLORIDE 8 ML: 10 INJECTION, SOLUTION INFILTRATION; PERINEURAL at 01:00

## 2017-03-18 RX ADMIN — FENTANYL CITRATE 25 MCG: 50 INJECTION, SOLUTION INTRAMUSCULAR; INTRAVENOUS at 00:57

## 2017-03-18 RX ADMIN — APIXABAN 5 MG: 5 TABLET, FILM COATED ORAL at 09:42

## 2017-03-18 RX ADMIN — MIDAZOLAM HYDROCHLORIDE 1 MG: 1 INJECTION, SOLUTION INTRAMUSCULAR; INTRAVENOUS at 00:56

## 2017-03-18 RX ADMIN — VERAPAMIL HYDROCHLORIDE 120 MG: 120 TABLET, FILM COATED, EXTENDED RELEASE ORAL at 08:04

## 2017-03-18 RX ADMIN — CLONIDINE HYDROCHLORIDE 0.2 MG: 0.1 TABLET ORAL at 09:43

## 2017-03-18 RX ADMIN — MUPIROCIN: 20 OINTMENT TOPICAL at 17:51

## 2017-03-18 RX ADMIN — LISINOPRIL 10 MG: 5 TABLET ORAL at 09:45

## 2017-03-18 RX ADMIN — HYDROCODONE BITARTRATE AND ACETAMINOPHEN 1 TABLET: 5; 325 TABLET ORAL at 12:41

## 2017-03-18 RX ADMIN — PHENYLEPHRINE HYDROCHLORIDE 10 MCG/MIN: 10 INJECTION INTRAVENOUS at 17:51

## 2017-03-18 RX ADMIN — SODIUM CHLORIDE 125 ML/HR: 900 INJECTION, SOLUTION INTRAVENOUS at 17:50

## 2017-03-18 RX ADMIN — Medication 10 ML: at 20:37

## 2017-03-18 RX ADMIN — HEPARIN SODIUM 1000 UNITS: 200 INJECTION, SOLUTION INTRAVENOUS at 00:58

## 2017-03-18 RX ADMIN — POTASSIUM CHLORIDE 20 MEQ: 750 TABLET, FILM COATED, EXTENDED RELEASE ORAL at 17:51

## 2017-03-18 RX ADMIN — HYDROCODONE BITARTRATE AND ACETAMINOPHEN 1 TABLET: 5; 325 TABLET ORAL at 16:30

## 2017-03-18 RX ADMIN — PHENYLEPHRINE HYDROCHLORIDE 300 MCG/MIN: 10 INJECTION INTRAVENOUS at 00:01

## 2017-03-18 RX ADMIN — LIDOCAINE HYDROCHLORIDE 8 ML: 10 INJECTION INFILTRATION; PERINEURAL at 01:00

## 2017-03-18 RX ADMIN — SOTALOL HYDROCHLORIDE 120 MG: 80 TABLET ORAL at 09:44

## 2017-03-18 RX ADMIN — HYDROCHLOROTHIAZIDE 25 MG: 25 TABLET ORAL at 09:45

## 2017-03-18 RX ADMIN — POTASSIUM CHLORIDE 20 MEQ: 750 TABLET, FILM COATED, EXTENDED RELEASE ORAL at 09:43

## 2017-03-18 RX ADMIN — ASPIRIN 81 MG: 81 TABLET, COATED ORAL at 17:51

## 2017-03-18 RX ADMIN — APIXABAN 5 MG: 5 TABLET, FILM COATED ORAL at 17:51

## 2017-03-18 RX ADMIN — HYDROCODONE BITARTRATE AND ACETAMINOPHEN 1 TABLET: 5; 325 TABLET ORAL at 20:35

## 2017-03-18 RX ADMIN — SODIUM CHLORIDE 125 ML/HR: 900 INJECTION, SOLUTION INTRAVENOUS at 10:23

## 2017-03-18 RX ADMIN — MUPIROCIN: 20 OINTMENT TOPICAL at 09:48

## 2017-03-18 NOTE — PROGRESS NOTES
Spiritual Care Assessment/Progress Notes    Rohini Che [id number]  [id number]    1940  78 y.o.  female    Patient Telephone Number: [phone] (home)   Orthodox Affiliation: [facility]   Language: [place]   Extended Emergency Contact Information  Primary Emergency Contact: Tabitha Harrell  Address: [facility] [address]            [place] [address] [address] [place] Phone: [phone]  Mobile Phone: [phone]  Relation: Other Relative  Secondary Emergency Contact: Veena Leary Phone: [phone]  Mobile Phone: [phone]  Relation: Friend   Patient Active Problem List    Diagnosis Date Noted    Pericardial effusion with cardiac tamponade 03/18/2017    S/P ablation of atrial fibrillation 03/17/2017    Acquired hypothyroidism 12/14/2015    ACP (advance care planning) 12/14/2015    HTN (hypertension) 07/21/2015    Thyroid activity decreased 04/14/2015    Atrial fibrillation ([facility] 03/16/2015    A-fib ([address] 03/10/2015    Angina, class III ([address] 01/27/2015    Morbid obesity ([facility]     Hyperlipidemia 07/14/2014    Hypokalemia 07/14/2014    Encounter for screening colonoscopy 08/21/2013    History of rectal bleeding 08/21/2013    Postmenopausal bleeding 10/18/2011    Endometrial polyp 10/18/2011        Date: 3/18/2017       Level of Orthodox/Spiritual Activity:  []         Involved in bronson tradition/spiritual practice    []         Not involved in bronson tradition/spiritual practice  [x]         Spiritually oriented    []         Claims no spiritual orientation    []         seeking spiritual identity  []         Feels alienated from Spiritism practice/tradition  []         Feels angry about Spiritism practice/tradition  [x]         Spirituality/Spiritism tradition is a resource for coping at this time.   []         Not able to assess due to medical condition    Services Provided Today:  []         crisis intervention    []         reading Scriptures  [x]         spiritual assessment    [x]         prayer  [x]         empathic listening/emotional support  []         rites and rituals (cite in comments)  []         life review     []         Anabaptist support  []         theological development    []         advocacy  []         ethical dialog     []         blessing  []         bereavement support    []         support to family  []         anticipatory grief support   []         help with AMD  []         spiritual guidance    []         meditation      Spiritual Care Needs  [x]         Emotional Support  [x]         Spiritual/Taoist Care  []         Loss/Adjustment  []         Advocacy/Referral                /Ethics  []         No needs expressed at               this time  []         Other: (note in               comments)  Spiritual Care Plan  []         Follow up visits with               pt/family  []         Provide materials  []         Schedule sacraments  []         Contact Community               Clergy  [x]         Follow up as needed  []         Other: (note in               comments)     Comments:  Initial visit in CCU for spiritual assessment. Patient's sister was visiting. Patient shared she is experiencing some pain in her neck this afternoon. Hailee RN brought medication during visit. Patient shared she feels well supported. She also has spiritual resources on which to lean. She was receptive to assurance of prayer. Advised her of  availability.   Isabel Sanchez, BJ, [facility], [address]     Paging Service  [phone])

## 2017-03-18 NOTE — PROGRESS NOTES
2310 TRANSFER - IN REPORT:    Verbal report received from Mono Luna on Dequan Peoples  being received from pacu(unit) for routine postop  S/p prashanth with ablation    Report consisted of patients Situation, Background, Assessment and   Recommendations(SBAR). Information from the following report(s)  was reviewed with the receiving nurse. Sbar, intake and output, cardiac rhythm    Opportunity for questions and clarification was provided. Assessment completed upon patients arrival to unit and care assumed. Recd pt with pamella at 50 mcgs/min nsr 90s skin cool pt aox4 denies cp/sob b/l groin with dressing cdi, dp pulses check with doppler, faint but palpable, bs check 132,   sbp 60s, gave 250 ml of bolus nss, urine in shannon 10 cc, titrated pamella up to 300 mcgs , and called Dr Digna Mackenzie  Cardiology and updated with pt status  12 mn Dr Digna Mackenzie  At bedside. Pt bp now 124 nsr at 86, pt to go back to cath lab stat echo placed and called  1230 pt remained awake, bedside report given to cath lab rn. Pt to cath lab via bed, monitor  0140 bedside shift report recd from cath lab s/p  Pericardiocentecis ptrocedure .  Patient drowsy easily aroused nsr on monitor pt off pamella sbp 130s, on nss 125 via left hand, b/l groin no bleeding or hematoma, mid chest drain with sanguinous scant amt, denies cp or sob, closely monitor  0400 pt sleeping no complain , sinus on monitor, b/l groin no bleeding or hematoma  0600 no changes, groin site intact no new drain noted on pericardial drain

## 2017-03-18 NOTE — PROGRESS NOTES
Limited stat echo completed in PACU to rule out pericardial effusion. Moderate sized effusion noted to have developed since afternoon procedure where it was noted that none existed. Notified Dr. Rosenda Graham who decided to monitor patient.

## 2017-03-18 NOTE — PROCEDURES
Moderate PE noted earlier this evening without darin tamponnade doubled in size with transient drop in SBP to 60's, recovered after pamella and fluid bolus. Echo guided tap of large PE via subxiphoid approach with success. 500 cc of bloody fluid removed until tapped dry with full recovery of BP and resolution of sinus tach. Pericardial drain left in place.   No complications

## 2017-03-18 NOTE — PROGRESS NOTES
0700 - Verbal and bedside shift report received from [facility]. Care of patient assumed. 0800 - Patient assessment completed as documented. The patient is alert, oriented times four, and follows commands. Lungs are clear bilaterally, bowel sounds are active, and pulses are palpable in all four extremities. The patient denies any shortness of breath, complains of pain at pericardial drain site 6/10, medicated with Norco 5-325 mg. Patient assisted up to chair. 1000 - Oxygen saturations 100%, patient now on room air, no complaints of shortness of breath. Will continue to monitor closely. 1200 - 60 ml of sanguinous drainage from pericardial drain. Patient assisted back up to chair, resting quietly, will sister at bedside. 1241 - Medicated with Norco 5-325 mg for pain at pericardial drain site 6/10.    1400 - Patient assisted back to bed. 40 ml of serosanguinous drainage from pericardial drain. 1406 - BP - 77/50, Phenylephrine infusion started at 15 mcg/min. 1419 - BP - 108/57, Phenylephrine infusion rate decreased to 10 mcg/min. 1600 - 50 ml of serosanguinous drainage from pericardial drain. 1630 - Medicated with Norco 5-325 mg for pain at pericardial drain site 8/10.    1800 - 25 ml of serosanguinous drainage from pericardial drain.

## 2017-03-18 NOTE — PROGRESS NOTES
[address] [address]  [phone]      Cardiology Progress Note      3/18/2017 12:56 PM    Admit Date: 3/17/2017    Admit Diagnosis:   a fib;Recovery Needed in PACU;a fib    Subjective:     Sam Morfin CP with a deep breath    Visit Vitals    /58    Pulse 81    Temp (P) 98.3 °F (36.8 °C)    Resp 15    Ht 5' 5\" (1.651 m)    Wt 235 lb 14.3 oz (107 kg)    SpO2 99%    BMI 39.25 kg/m2       Current Facility-Administered Medications   Medication Dose Route Frequency    hydroCHLOROthiazide (HYDRODIURIL) tablet 25 mg  25 mg Oral DAILY    cloNIDine HCl (CATAPRES) tablet 0.2 mg  0.2 mg Oral BID    levothyroxine (SYNTHROID) tablet 75 mcg  75 mcg Oral ACB    verapamil ER (CALAN-SR) tablet 120 mg  120 mg Oral DAILY WITH BREAKFAST    lisinopril (PRINIVIL, ZESTRIL) tablet 10 mg  10 mg Oral DAILY    heparinized saline 2 units/mL 1,000 unit/500 mL infusion        potassium chloride SR (KLOR-CON 10) tablet 20 mEq  20 mEq Oral BID    fentaNYL citrate (PF) injection 12.5-50 mcg  12.5-50 mcg IntraVENous Multiple    midazolam (VERSED) injection 1-5 mg  1-5 mg IntraVENous Multiple    protamine 10 mg/mL injection        sodium chloride (NS) flush 5-10 mL  5-10 mL IntraVENous Q8H    sodium chloride (NS) flush 5-10 mL  5-10 mL IntraVENous PRN    acetaminophen (TYLENOL) tablet 650 mg  650 mg Oral Q4H PRN    HYDROcodone-acetaminophen (NORCO) 5-325 mg per tablet 1 Tab  1 Tab Oral Q4H PRN    aspirin delayed-release tablet 81 mg  81 mg Oral QPM    sotalol (BETAPACE) tablet 120 mg  120 mg Oral Q12H    apixaban (ELIQUIS) tablet 5 mg  5 mg Oral BID    0.9% sodium chloride infusion  125 mL/hr IntraVENous CONTINUOUS    PHENYLephrine (NEOSYNEPHRINE) 30,000 mcg in 0.9% sodium chloride 250 mL infusion   mcg/min IntraVENous TITRATE    mupirocin (BACTROBAN) 2 % ointment   Both Nostrils BID       Objective:      Physical Exam:  General Appearance:    Chest:   Clear  Cardiovascular:  Regular rate and rhythm, no murmur.   Abdomen:   Soft, non-tender, bowel sounds are active.   Extremities:   Skin:  Warm and dry.     Data Review:   Recent Labs      03/18/17   0609   WBC  10.6   HGB  12.5   HCT  36.7   PLT  147*     Recent Labs      03/18/17   0742   NA  144   K  3.7   CL  112*   CO2  20*   GLU  136*   BUN  15   CREA  1.44*   CA  7.9*   MG  1.9       No results for input(s): TROIQ, CPK, CKMB in the last 72 hours. Intake/Output Summary (Last 24 hours) at 03/18/17 1256  Last data filed at 03/18/17 1100   Gross per 24 hour   Intake          5524.41 ml   Output             1145 ml   Net          4379.41 ml        Telemetry:   EKG:  Cxray:    Assessment:     Active Problems:    Pericardial effusion with cardiac tamponade (3/18/2017)      S/P ablation of atrial fibrillation (3/17/2017)      Overview: 3/17/17        Plan:     Effusion small by echo this AM.  Hemodynamically stable. Juanita Chute out another 60 cc bloody fluid. Leave drain in till drainage stopped.

## 2017-03-18 NOTE — PROGRESS NOTES
[facility]  Pulmonary, Critical Care, and Sleep Medicine    Name: Giulia An MRN: [id number]   : 1940 Hospital: [facility]   Date: 3/18/2017        Critical Care Initial Patient Consult    IMPRESSION:   · S/p Atrial Fibrillation ablation. · Pt had a post procedure pericardial effusion, has pericardial drain in place. Had 500cc of fluid removed last pm.  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 Alba Robin for above. Patient is a 78 y.o. female who is feeling pretty good this am. NO acute complaints. Has mild chest pain worse with deep inspiration. ROS was otherwise negative. Past Medical History:   Diagnosis Date    Arthritis     right knee, left shoulder    Diverticulosis     Frequency of urination     High cholesterol     Hypertension     Morbid obesity ([address]     Thyroid disease     hypothyroid    Unspecified adverse effect of anesthesia     low BP      Past Surgical History:   Procedure Laterality Date    BREAST SURGERY PROCEDURE UNLISTED      breast bx rt    CARDIAC SURG PROCEDURE UNLIST  2015    cardiac catheterization, no intervention, medical management    HX GYN      myomectomy on uterus    HX GYN  2011    hystereoscopy D&C    HX LYMPH NODE DISSECTION      biopsy    HX OTHER SURGICAL      lymph node bx      Prior to Admission medications    Medication Sig Start Date End Date Taking? Authorizing Provider   sotalol (BETAPACE) 120 mg tablet TAKE 1 TAB BY MOUTH EVERY TWELVE (12) HOURS. 3/1/17  Yes Madison Venegas MD   rosuvastatin (CRESTOR) 20 mg tablet Take 1 Tab by mouth nightly.  17  Yes Brando Drew MD   lisinopril (PRINIVIL, ZESTRIL) 10 mg tablet Daily 16  Yes Brando Drew MD   KLOR-CON 10 10 mEq tablet TAKE 2 TABLETS TWICE A DAY 10/7/16  Yes Historical Provider   verapamil ER (CALAN-SR) 120 mg tablet TAKE 1 TABLET BY MOUTH EVERY MORNING FOR BLOOD PRESSURE 10/18/16  Yes Nikko Pisano MD   levothyroxine (SYNTHROID) 75 mcg tablet Take 1 Tab by mouth Daily (before breakfast). 7/11/16  Yes Nikko Pisano MD   cloNIDine HCl (CATAPRES) 0.2 mg tablet Take 1 Tab by mouth two (2) times a day. 4/18/16  Yes Nikko Pisano MD   hydrochlorothiazide (HYDRODIURIL) 25 mg tablet Take 1 Tab by mouth daily. 4/11/16  Yes Nikko Pisano MD   aspirin delayed-release 81 mg tablet Take 81 mg by mouth every evening. Yes Historical Provider   apixaban (ELIQUIS) 5 mg tablet Take 1 Tab by mouth two (2) times a day. 3/16/17   Roddy Ann NP   CALCIUM CARBONATE/VITAMIN D3 (CALTRATE 600 + D PO) Take 1 Tab by mouth two (2) times a day. Historical Provider   MULTIVITAMIN W-MINERALS/LUTEIN (CENTRUM SILVER PO) Take 1 Tab by mouth daily (after lunch).     Historical Provider     Current Facility-Administered Medications   Medication Dose Route Frequency    hydroCHLOROthiazide (HYDRODIURIL) tablet 25 mg  25 mg Oral DAILY    cloNIDine HCl (CATAPRES) tablet 0.2 mg  0.2 mg Oral BID    levothyroxine (SYNTHROID) tablet 75 mcg  75 mcg Oral ACB    verapamil ER (CALAN-SR) tablet 120 mg  120 mg Oral DAILY WITH BREAKFAST    lisinopril (PRINIVIL, ZESTRIL) tablet 10 mg  10 mg Oral DAILY    heparinized saline 2 units/mL 1,000 unit/500 mL infusion        potassium chloride SR (KLOR-CON 10) tablet 20 mEq  20 mEq Oral BID    protamine 10 mg/mL injection        sodium chloride (NS) flush 5-10 mL  5-10 mL IntraVENous Q8H    aspirin delayed-release tablet 81 mg  81 mg Oral QPM    sotalol (BETAPACE) tablet 120 mg  120 mg Oral Q12H    apixaban (ELIQUIS) tablet 5 mg  5 mg Oral BID    0.9% sodium chloride infusion  125 mL/hr IntraVENous CONTINUOUS    PHENYLephrine (NEOSYNEPHRINE) 30,000 mcg in 0.9% sodium chloride 250 mL infusion   mcg/min IntraVENous TITRATE    sotalol (BETAPACE) tablet 120 mg  120 mg Oral ONCE    mupirocin (BACTROBAN) 2 % ointment   Both Nostrils BID     Allergies   Allergen Reactions    Adhesive Hives    Amoxicillin Unknown (comments)    Lipitor [Atorvastatin] Myalgia     Gets the flu      Social History   Substance Use Topics    Smoking status: Never Smoker    Smokeless tobacco: Never Used    Alcohol use No      Family History   Problem Relation Age of Onset    Alzheimer Mother     Heart Disease Mother     Heart Disease Father     Hypertension Father     Cancer Paternal Aunt     Diabetes Paternal Grandmother         Review of Systems:  A comprehensive review of systems was negative. Objective:   Vital Signs:    Visit Vitals    /69    Pulse (!) 102    Temp 99.2 °F (37.3 °C)    Resp 24    Ht 5' 5\" (1.651 m)    Wt 107 kg (235 lb 14.3 oz)    SpO2 98%    BMI 39.25 kg/m2       O2 Device: Nasal cannula   O2 Flow Rate (L/min): 2 l/min   Temp (24hrs), Av °F (36.7 °C), Min:96.8 °F (36 °C), Max:99.2 °F (37.3 °C)       Intake/Output:   Last shift:       07 -  1900  In: 272.9 [I.V.:272.9]  Out: 250 [Urine:250]  Last 3 shifts:  190 -  0700  In: 5001.5 [P.O.:240; I.V.:4761.5]  Out: 835 [Urine:815]    Intake/Output Summary (Last 24 hours) at 17 0935  Last data filed at 17 0900   Gross per 24 hour   Intake          5274.41 ml   Output             1085 ml   Net          4189.41 ml       Physical Exam:    General:  Alert, cooperative, no distress, appears stated age. Sitting up in chair. Head:  Normocephalic, without obvious abnormality, atraumatic. Eyes:  Conjunctivae/corneas clear. PERRL, EOMs intact. Nose: Nares normal. Septum midline. Mucosa normal. No drainage or sinus tenderness. Throat: Lips, mucosa, and tongue normal. Teeth and gums normal.   Neck: Supple, symmetrical, trachea midline, no adenopathy, thyroid: no enlargment/tenderness/nodules, no carotid bruit and no JVD. Back:   Symmetric, no curvature. ROM normal.   Lungs:   Clear to auscultation bilaterally. Chest wall:  No tenderness or deformity. Heart:  Regular rate and rhythm, S1, S2 normal, no murmur, click, rub or gallop. Abdomen:   Soft, non-tender. Bowel sounds normal. No masses,  No organomegaly. Extremities: Extremities normal, atraumatic, no cyanosis or edema. Pulses: 2+ and symmetric all extremities. Skin: Skin color, texture, turgor normal. No rashes or lesions   Lymph nodes: Cervical, supraclavicular, and axillary nodes normal.   Neurologic: Grossly nonfocal       Data:     Recent Results (from the past 24 hour(s))   POC ACTIVATED CLOTTING TIME    Collection Time: 03/17/17  1:55 PM   Result Value Ref Range    Activated Clotting Time (POC) 255 (H) 79 - 138 SECS   POC ACTIVATED CLOTTING TIME    Collection Time: 03/17/17  2:16 PM   Result Value Ref Range    Activated Clotting Time (POC) 337 (H) 79 - 138 SECS   POC ACTIVATED CLOTTING TIME    Collection Time: 03/17/17  2:38 PM   Result Value Ref Range    Activated Clotting Time (POC) 142 (H) 79 - 138 SECS   GLUCOSE, POC    Collection Time: 03/17/17 11:54 PM   Result Value Ref Range    Glucose (POC) 132 (H) 65 - 100 mg/dL    Performed by Killian Tipton    EKG, 12 LEAD, INITIAL    Collection Time: 03/18/17  2:53 AM   Result Value Ref Range    Ventricular Rate 92 BPM    Atrial Rate 92 BPM    P-R Interval 196 ms    QRS Duration 80 ms    Q-T Interval 370 ms    QTC Calculation (Bezet) 457 ms    Calculated P Axis 27 degrees    Calculated R Axis -17 degrees    Calculated T Axis 44 degrees    Diagnosis       Normal sinus rhythm  Low voltage QRS  Cannot rule out Anterior infarct (cited on or before 18-MAR-2017)  Abnormal ECG  When compared with ECG of 18-MAR-2015 06:25,  Vent.  rate has increased BY  35 BPM     CBC W/O DIFF    Collection Time: 03/18/17  6:09 AM   Result Value Ref Range    WBC 10.6 3.6 - 11.0 K/uL    RBC 4.01 3.80 - 5.20 M/uL    HGB 12.5 11.5 - 16.0 g/dL    HCT 36.7 35.0 - 47.0 %    MCV 91.5 80.0 - 99.0 FL    MCH 31.2 26.0 - 34.0 PG    MCHC 34.1 30.0 - 36.5 g/dL    RDW 13.5 11.5 - 14.5 %    PLATELET 295 (L) 366 - 653 K/uL   METABOLIC PANEL, BASIC    Collection Time: 03/18/17  7:42 AM   Result Value Ref Range    Sodium 144 136 - 145 mmol/L    Potassium 3.7 3.5 - 5.1 mmol/L    Chloride 112 (H) 97 - 108 mmol/L    CO2 20 (L) 21 - 32 mmol/L    Anion gap 12 5 - 15 mmol/L    Glucose 136 (H) 65 - 100 mg/dL    BUN 15 6 - 20 MG/DL    Creatinine 1.44 (H) 0.55 - 1.02 MG/DL    BUN/Creatinine ratio 10 (L) 12 - 20      GFR est AA 43 (L) >60 ml/min/1.73m2    GFR est non-AA 35 (L) >60 ml/min/1.73m2    Calcium 7.9 (L) 8.5 - 10.1 MG/DL   MAGNESIUM    Collection Time: 03/18/17  7:42 AM   Result Value Ref Range    Magnesium 1.9 1.6 - 2.4 mg/dL               Imaging:  I have personally reviewed the patients radiographs and have reviewed the reports:  No new CXR       Mercedes Batista MD

## 2017-03-18 NOTE — PERIOP NOTES
TRANSFER - OUT REPORT:    Verbal report given to Sayra GILL(name) on Ashley Arriaza  being transferred to [facility](unit) for routine progression of care       Report consisted of patients Situation, Background, Assessment and   Recommendations(SBAR). Information from the following report(s) OR Summary, Procedure Summary, Intake/Output and MAR was reviewed with the receiving nurse. Opportunity for questions and clarification was provided.       Patient transported with:   Monitor  O2 @ 2 liters  Registered Nurse   Naeem Drip

## 2017-03-18 NOTE — PERIOP NOTES
Dr. Lia Gan notified patient C/O chest tightness and difficulty taking a breath. He said, he expected her to have the discomfort due to the moderate fluid collection.

## 2017-03-18 NOTE — PROGRESS NOTES
[address], [place], [address] [facility]  [phone]      Cardiology Progress Note      3/18/2017 12:47 PM    Admit Date: 3/17/2017    Admit Diagnosis:   a fib;Recovery Needed in PACU;a fib    Subjective:     Silvana Zhao had PVI earlier today. BP dropped and responded to pamella but not volume. Echo this past evening showed mod effusion without darin tamponnade. Had been [address] [address]inner in PACU for several hours with decent BP and UO, though HR has been creeping up. After arrival in ICU SBP dropped to 60 and responded to fluid bolus. Now sbp 110. CP with inspiration.     Visit Vitals    /58    Pulse 81    Temp (P) 98.3 °F (36.8 °C)    Resp 15    Ht 5' 5\" (1.651 m)    Wt 235 lb 14.3 oz (107 kg)    SpO2 99%    BMI 39.25 kg/m2       Current Facility-Administered Medications   Medication Dose Route Frequency    hydroCHLOROthiazide (HYDRODIURIL) tablet 25 mg  25 mg Oral DAILY    cloNIDine HCl (CATAPRES) tablet 0.2 mg  0.2 mg Oral BID    levothyroxine (SYNTHROID) tablet 75 mcg  75 mcg Oral ACB    verapamil ER (CALAN-SR) tablet 120 mg  120 mg Oral DAILY WITH BREAKFAST    lisinopril (PRINIVIL, ZESTRIL) tablet 10 mg  10 mg Oral DAILY    heparinized saline 2 units/mL 1,000 unit/500 mL infusion        potassium chloride SR (KLOR-CON 10) tablet 20 mEq  20 mEq Oral BID    fentaNYL citrate (PF) injection 12.5-50 mcg  12.5-50 mcg IntraVENous Multiple    midazolam (VERSED) injection 1-5 mg  1-5 mg IntraVENous Multiple    protamine 10 mg/mL injection        sodium chloride (NS) flush 5-10 mL  5-10 mL IntraVENous Q8H    sodium chloride (NS) flush 5-10 mL  5-10 mL IntraVENous PRN    acetaminophen (TYLENOL) tablet 650 mg  650 mg Oral Q4H PRN    HYDROcodone-acetaminophen (NORCO) 5-325 mg per tablet 1 Tab  1 Tab Oral Q4H PRN    aspirin delayed-release tablet 81 mg  81 mg Oral QPM    sotalol (BETAPACE) tablet 120 mg  120 mg Oral Q12H    apixaban (ELIQUIS) tablet 5 mg  5 mg Oral BID    0.9% sodium chloride infusion  125 mL/hr IntraVENous CONTINUOUS    PHENYLephrine (NEOSYNEPHRINE) 30,000 mcg in 0.9% sodium chloride 250 mL infusion   mcg/min IntraVENous TITRATE    mupirocin (BACTROBAN) 2 % ointment   Both Nostrils BID       Objective:      Physical Exam:  General Appearance:  clammy  Chest:   Clear  Cardiovascular:  sinus tach  Abdomen:   Soft, non-tender, bowel sounds are active.   Extremities:   Skin:  Warm and dry.     Data Review:   Recent Labs      03/18/17   0609   WBC  10.6   HGB  12.5   HCT  36.7   PLT  147*     Recent Labs      03/18/17   0742   NA  144   K  3.7   CL  112*   CO2  20*   GLU  136*   BUN  15   CREA  1.44*   CA  7.9*   MG  1.9       No results for input(s): TROIQ, CPK, CKMB in the last 72 hours. Intake/Output Summary (Last 24 hours) at 03/18/17 1248  Last data filed at 03/18/17 1100   Gross per 24 hour   Intake          5524.41 ml   Output             1145 ml   Net          4379.41 ml        Telemetry:   EKG:  Cxray:    Assessment:     Active Problems:    Pericardial effusion with cardiac tamponade (3/18/2017)      S/P ablation of atrial fibrillation (3/17/2017)      Overview: 3/17/17        Plan:     Clinically in tamponnade. Mod effusion noted earlier has likely increased. Will bring her emergently to cardiac cath lab for echo guided pericardiocentesis and drain placement.

## 2017-03-19 LAB
ANION GAP BLD CALC-SCNC: 10 MMOL/L (ref 5–15)
BUN SERPL-MCNC: 19 MG/DL (ref 6–20)
BUN/CREAT SERPL: 14 (ref 12–20)
CALCIUM SERPL-MCNC: 7.8 MG/DL (ref 8.5–10.1)
CHLORIDE SERPL-SCNC: 111 MMOL/L (ref 97–108)
CO2 SERPL-SCNC: 22 MMOL/L (ref 21–32)
CREAT SERPL-MCNC: 1.32 MG/DL (ref 0.55–1.02)
ERYTHROCYTE [DISTWIDTH] IN BLOOD BY AUTOMATED COUNT: 13.6 % (ref 11.5–14.5)
GLUCOSE SERPL-MCNC: 104 MG/DL (ref 65–100)
HCT VFR BLD AUTO: 33.9 % (ref 35–47)
HGB BLD-MCNC: 11.3 G/DL (ref 11.5–16)
MAGNESIUM SERPL-MCNC: 2 MG/DL (ref 1.6–2.4)
MCH RBC QN AUTO: 30.7 PG (ref 26–34)
MCHC RBC AUTO-ENTMCNC: 33.3 G/DL (ref 30–36.5)
MCV RBC AUTO: 92.1 FL (ref 80–99)
PLATELET # BLD AUTO: 148 K/UL (ref 150–400)
POTASSIUM SERPL-SCNC: 3.2 MMOL/L (ref 3.5–5.1)
RBC # BLD AUTO: 3.68 M/UL (ref 3.8–5.2)
SODIUM SERPL-SCNC: 143 MMOL/L (ref 136–145)
WBC # BLD AUTO: 11.7 K/UL (ref 3.6–11)

## 2017-03-19 PROCEDURE — 36415 COLL VENOUS BLD VENIPUNCTURE: CPT | Performed by: INTERNAL MEDICINE

## 2017-03-19 PROCEDURE — 83735 ASSAY OF MAGNESIUM: CPT | Performed by: INTERNAL MEDICINE

## 2017-03-19 PROCEDURE — 99218 HC RM OBSERVATION: CPT

## 2017-03-19 PROCEDURE — 74011250637 HC RX REV CODE- 250/637: Performed by: INTERNAL MEDICINE

## 2017-03-19 PROCEDURE — 65660000000 HC RM CCU STEPDOWN

## 2017-03-19 PROCEDURE — 85027 COMPLETE CBC AUTOMATED: CPT | Performed by: INTERNAL MEDICINE

## 2017-03-19 PROCEDURE — 80048 BASIC METABOLIC PNL TOTAL CA: CPT | Performed by: INTERNAL MEDICINE

## 2017-03-19 PROCEDURE — 74011250636 HC RX REV CODE- 250/636: Performed by: ANESTHESIOLOGY

## 2017-03-19 RX ORDER — POTASSIUM CHLORIDE 1.5 G/1.77G
40 POWDER, FOR SOLUTION ORAL
Status: COMPLETED | OUTPATIENT
Start: 2017-03-19 | End: 2017-03-19

## 2017-03-19 RX ORDER — DOCUSATE SODIUM 100 MG/1
100 CAPSULE, LIQUID FILLED ORAL
Status: DISCONTINUED | OUTPATIENT
Start: 2017-03-19 | End: 2017-03-21

## 2017-03-19 RX ORDER — SOTALOL HYDROCHLORIDE 80 MG/1
80 TABLET ORAL DAILY
Status: DISCONTINUED | OUTPATIENT
Start: 2017-03-20 | End: 2017-03-20

## 2017-03-19 RX ADMIN — VERAPAMIL HYDROCHLORIDE 120 MG: 120 TABLET, FILM COATED, EXTENDED RELEASE ORAL at 07:57

## 2017-03-19 RX ADMIN — SODIUM CHLORIDE 125 ML/HR: 900 INJECTION, SOLUTION INTRAVENOUS at 08:00

## 2017-03-19 RX ADMIN — Medication 10 ML: at 21:28

## 2017-03-19 RX ADMIN — CLONIDINE HYDROCHLORIDE 0.2 MG: 0.1 TABLET ORAL at 09:56

## 2017-03-19 RX ADMIN — POTASSIUM CHLORIDE 20 MEQ: 750 TABLET, FILM COATED, EXTENDED RELEASE ORAL at 18:12

## 2017-03-19 RX ADMIN — POTASSIUM CHLORIDE 40 MEQ: 1.5 POWDER, FOR SOLUTION ORAL at 11:05

## 2017-03-19 RX ADMIN — SOTALOL HYDROCHLORIDE 120 MG: 80 TABLET ORAL at 09:57

## 2017-03-19 RX ADMIN — APIXABAN 5 MG: 5 TABLET, FILM COATED ORAL at 18:12

## 2017-03-19 RX ADMIN — APIXABAN 5 MG: 5 TABLET, FILM COATED ORAL at 09:40

## 2017-03-19 RX ADMIN — POTASSIUM CHLORIDE 20 MEQ: 750 TABLET, FILM COATED, EXTENDED RELEASE ORAL at 09:40

## 2017-03-19 RX ADMIN — HYDROCHLOROTHIAZIDE 25 MG: 25 TABLET ORAL at 09:56

## 2017-03-19 RX ADMIN — Medication 10 ML: at 06:20

## 2017-03-19 RX ADMIN — CLONIDINE HYDROCHLORIDE 0.2 MG: 0.1 TABLET ORAL at 18:12

## 2017-03-19 RX ADMIN — DOCUSATE SODIUM 100 MG: 100 CAPSULE, LIQUID FILLED ORAL at 21:42

## 2017-03-19 RX ADMIN — SODIUM CHLORIDE 125 ML/HR: 900 INJECTION, SOLUTION INTRAVENOUS at 00:12

## 2017-03-19 RX ADMIN — MUPIROCIN: 20 OINTMENT TOPICAL at 18:13

## 2017-03-19 RX ADMIN — ASPIRIN 81 MG: 81 TABLET, COATED ORAL at 18:12

## 2017-03-19 RX ADMIN — SODIUM CHLORIDE 125 ML/HR: 900 INJECTION, SOLUTION INTRAVENOUS at 16:00

## 2017-03-19 RX ADMIN — MUPIROCIN: 20 OINTMENT TOPICAL at 09:41

## 2017-03-19 RX ADMIN — LEVOTHYROXINE SODIUM 75 MCG: 75 TABLET ORAL at 07:39

## 2017-03-19 NOTE — PROGRESS NOTES
Pharmacy Monitoring of Sotalol for Atrial Arrhythmias    Pharmacy monitoring of this 78 y.o. female being treated with sotalol for atrial arrhythmias. Sotalol dose ordered: 120 mg BID (PTA dose)  Baseline QTc/JTc interval (on admission prior to dose) for new starts: na (patient was on her home dose of sotalol 120 mg BID)  QTc/JTc interval 2 hours after dose required for new starts or if available for continuation of chronic therapy: to start tomorrow  Drug interactions: none  Creatinine clearance(ml/min): 44  Potassium supplementation ordered: Yes  Magnesium supplementation ordered: no  Phosphate supplementation ordered: no      Recent Labs      03/19/17   0437  03/18/17   0742   K  3.2*  3.7   MG  2.0  1.9   CREA  1.32*  1.44*   BUN  19  15     Wt Readings from Last 1 Encounters:   03/17/17 107 kg (235 lb 14.3 oz)     Ht Readings from Last 1 Encounters:   03/17/17 165.1 cm (65\")       Impression/Plan: Patient admitted with sotalol 120 mg BID (PTA dose), but her SCr on March 7 was 0.8 and on this admission, it went up to 1.44-->1. 32. Discussed with Dr. Selma Downey and he agreed to decrease the dose to 80 mg every day   Will monitor QT, RR, and QRS 2 hours after each dose. Will also monitor K, Mg and SCr. Thanks,    Noe Moe, PHARMD     QTc = QT / Square Root(RR)  RR = 60/HR  JTc = JT / Square Root(RR)  JTc = (QT-QRS) / Square Root(RR) = (QT-QRS) / Square Root (60/HR)    Do not send the sotalol monitoring forms to nursing. 1. Starting Dose: 80 mg twice daily (BID) if CrCl > 60 mL/min and 80 mg once daily (QD) if CrCl is 40-60 mL/min. If CrCl <40 mL/min, sotalol is contraindicated. 2. Administer the appropriate dose of sotalol and begin continuous ECG monitoring with QTc interval measurements 2-4 hours after each dose using the same lead for each measurement. Maintenance of sotalol therapy  · For new starts monitor renal function and QTc interval regularly.  If QTc ? 520 msec (JTc ? 430 msec if QRS > 100), sotalol dose should be reduced and patients monitored until QTc < 520. If QTc >520 msec on lowest maintenance dose (80 mg) the drug should be discontinued. EKG monitoring is not required per our protocol for those admitted from home on the medication. · Electrolytes and renal function should be monitored and corrected as necessary for all patients. Doses usually do not need to be held in the event of hypokalemia and/or hypomagnesemia as long as the electrolytes are being repleted. Contraindications to Sotalol   sick sinus syndrome without pacemaker   HR < 50   second or third degree AV block without pacemaker   congenital or acquired long QT syndrome   baseline QTc interval > 450 msec (JTc ? 330 msec if QRS > 100 msec)   serum potassium < 4 mEq/L   uncontrolled heart failure   cardiogenic shock   creatinine clearance  < 40 mL/min. Sotalol dosing for patients previously tolerating 80 mg dose, but with inadequate symptom control   Calculated CrCl (mL/min) use IBW in Calculation Dose Sotalol dose reduction if QTc increases to > 520 msec   (JTc increases to ? 430 msec if QRS > 100 msec)   > 60 120 mg PO Q12H 80 mg PO Q12H    40 to 60 120 mg PO daily 80 mg PO daily     Sotalol dosing for patients previously tolerating 120 mg dose, but with inadequate symptom control  Calculated CrCl (mL/min) use IBW in Calculation Dose Sotalol dose reduction if QTc increases to > 520 msec   (JTc increases to ? 430 msec if QRS > 100 msec)   > 60 160 mg PO Q12H 120 mg PO Q12H    40 to 60 160 mg PO daily 120 mg PO daily     Drug Interactions  Increased risk bradycardia, AV block and/or hypotension:  digoxin, calcium channel blockers, beta blockers, reserpine, guanethidine.     Increased risk torsades (QT prolongation):  haloperidol, methadone, droperidol, cisapride, bepridil, tricyclic antidepressants, macrolides, fluoroquinolones, drugs causing potassium and/or magnesium wasting (eg. diuretics)  see www.qtdrugs. org for comprehensive information

## 2017-03-19 NOTE — PROGRESS NOTES
[address] [id number]      Cardiology Progress Note      3/19/2017 2:28 PM    Admit Date: 3/17/2017    Admit Diagnosis:   a fib;Recovery Needed in PACU;a fib;Pericardial effusion wi*    Subjective:     Sherryle Freund     Visit Vitals    /62    Pulse 71    Temp 97.6 °F (36.4 °C)    Resp 24    Ht 5' 5\" (1.651 m)    Wt 235 lb 14.3 oz (107 kg)    SpO2 96%    BMI 39.25 kg/m2       Current Facility-Administered Medications   Medication Dose Route Frequency    hydroCHLOROthiazide (HYDRODIURIL) tablet 25 mg  25 mg Oral DAILY    cloNIDine HCl (CATAPRES) tablet 0.2 mg  0.2 mg Oral BID    levothyroxine (SYNTHROID) tablet 75 mcg  75 mcg Oral ACB    verapamil ER (CALAN-SR) tablet 120 mg  120 mg Oral DAILY WITH BREAKFAST    lisinopril (PRINIVIL, ZESTRIL) tablet 10 mg  10 mg Oral DAILY    heparinized saline 2 units/mL 1,000 unit/500 mL infusion        potassium chloride SR (KLOR-CON 10) tablet 20 mEq  20 mEq Oral BID    fentaNYL citrate (PF) injection 12.5-50 mcg  12.5-50 mcg IntraVENous Multiple    midazolam (VERSED) injection 1-5 mg  1-5 mg IntraVENous Multiple    protamine 10 mg/mL injection        sodium chloride (NS) flush 5-10 mL  5-10 mL IntraVENous Q8H    sodium chloride (NS) flush 5-10 mL  5-10 mL IntraVENous PRN    acetaminophen (TYLENOL) tablet 650 mg  650 mg Oral Q4H PRN    HYDROcodone-acetaminophen (NORCO) 5-325 mg per tablet 1 Tab  1 Tab Oral Q4H PRN    aspirin delayed-release tablet 81 mg  81 mg Oral QPM    sotalol (BETAPACE) tablet 120 mg  120 mg Oral Q12H    apixaban (ELIQUIS) tablet 5 mg  5 mg Oral BID    0.9% sodium chloride infusion  125 mL/hr IntraVENous CONTINUOUS    PHENYLephrine (NEOSYNEPHRINE) 30,000 mcg in 0.9% sodium chloride 250 mL infusion   mcg/min IntraVENous TITRATE    mupirocin (BACTROBAN) 2 % ointment   Both Nostrils BID       Objective:      Physical Exam:  General Appearance:    Chest:   Clear  Cardiovascular:  Regular rate and rhythm, no murmur.   Abdomen:   Soft, non-tender, bowel sounds are active.   Extremities:   Skin:  Warm and dry.     Data Review:   Recent Labs      03/19/17   0437  03/18/17   0609   WBC  11.7*  10.6   HGB  11.3*  12.5   HCT  33.9*  36.7   PLT  148*  147*     Recent Labs      03/19/17   0437  03/18/17   0742   NA  143  144   K  3.2*  3.7   CL  111*  112*   CO2  22  20*   GLU  104*  136*   BUN  19  15   CREA  1.32*  1.44*   CA  7.8*  7.9*   MG  2.0  1.9       No results for input(s): TROIQ, CPK, CKMB in the last 72 hours. Intake/Output Summary (Last 24 hours) at 03/19/17 1428  Last data filed at 03/19/17 1330   Gross per 24 hour   Intake          4059.59 ml   Output             2148 ml   Net          1911.59 ml        Telemetry:   EKG:  Cxray:    Assessment:     Active Problems:    Pericardial effusion with cardiac tamponade (3/18/2017)      S/P ablation of atrial fibrillation (3/17/2017)      Overview: 3/17/17        Plan:     Holding lisinopril due to low BP. Only a small amount of  Clear drainage today. Leak appears to have stopped. Drainage catheter out ezra if this remains true.

## 2017-03-19 NOTE — PROGRESS NOTES
1930- Bedside shift change report given to ENA Ko RN (oncoming nurse) by ISAAC Samson(offgoing nurse). Report included the following information SBAR, Kardex, Intake/Output, MAR and Recent Results. [id number]- assessment as noted, pt medicated with norco Per PRN order for pain 4/10. Pericardial drain drained of 28 ml serosang dng. Will cont to monitor closely  2100-Naeem gtt increased to 15 mcg for hypotension, will continue to titrate as necessary(see MAR)  0000- pt denies pain, 25 ml of serosang fluid drained from pericardial drain. 0400- no change in assessment, pericardial drain drained of 15 ml serosang dng, BP improved, will wean naeem gtt as pt tolerates. (see MAR)  0600-remainder of shift uneventful, continuing to wean naeem as pt tolerates.

## 2017-03-19 NOTE — PROGRESS NOTES
0700 - Verbal and bedside shift report received from [facility], [address] [facility]. Care of patient assumed. 0730 - Phenylephrine gtt stopped at this time, Blood Pressure - 159/77.    0800 - Patient assessment complete as documented. The patient is alert, oriented times four and follows commands. Lungs are clear bilaterally, bowel sounds are active, and pulses are palpable in all four extremities. The patient denies any pain or shortness of breath. Vital signs are stable. [address] with Dr. Cecilia Gar regarding patient's current blood pressure - 147/72 and Phenylephrine infusion being stopped at 0730 this morning and issues with hypotension yesterday and overnight. Received orders to hold Lisinopril 10 mg PO. Also reported pericardial drain amount of 15 ml of serous/clear fluid.

## 2017-03-19 NOTE — PROGRESS NOTES
[facility]  Pulmonary, Critical Care, and Sleep Medicine    Name: Federico Lua MRN: [id number]   : 1940 Hospital: [facility]   Date: 3/19/2017        Critical Care Patient Consult    IMPRESSION:   · S/p Atrial Fibrillation ablation. · Pt had a post procedure pericardial effusion, has pericardial drain in place. Had 500cc of fluid removed last pm.  · Hypotension has resolved. Now off pamella. · Peripheral edema in hand and legs. 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 Driss Luther for above. Patient is a 78 y.o. female who is feeling pretty good this am. NO acute complaints. Has mild chest pain worse with deep inspiration. ROS was otherwise negative. Past Medical History:   Diagnosis Date    Arthritis     right knee, left shoulder    Diverticulosis     Frequency of urination     High cholesterol     Hypertension     Morbid obesity ([address]     Thyroid disease     hypothyroid    Unspecified adverse effect of anesthesia     low BP      Past Surgical History:   Procedure Laterality Date    BREAST SURGERY PROCEDURE UNLISTED      breast bx rt    CARDIAC SURG PROCEDURE UNLIST  2015    cardiac catheterization, no intervention, medical management    HX GYN      myomectomy on uterus    HX GYN      hystereoscopy D&C    HX LYMPH NODE DISSECTION      biopsy    HX OTHER SURGICAL      lymph node bx      Prior to Admission medications    Medication Sig Start Date End Date Taking? Authorizing Provider   sotalol (BETAPACE) 120 mg tablet TAKE 1 TAB BY MOUTH EVERY TWELVE (12) HOURS. 3/1/17  Yes Hernandez Bradley MD   rosuvastatin (CRESTOR) 20 mg tablet Take 1 Tab by mouth nightly.  17  Yes Arielle Jackson MD   lisinopril (PRINIVIL, ZESTRIL) 10 mg tablet Daily 12/29/16  Yes Sridhar King MD   KLOR-CON 10 10 mEq tablet TAKE 2 TABLETS TWICE A DAY 10/7/16  Yes Historical Provider   verapamil ER (CALAN-SR) 120 mg tablet TAKE 1 TABLET BY MOUTH EVERY MORNING FOR BLOOD PRESSURE 10/18/16  Yes Sridhar King MD   levothyroxine (SYNTHROID) 75 mcg tablet Take 1 Tab by mouth Daily (before breakfast). 7/11/16  Yes Sridhar King MD   cloNIDine HCl (CATAPRES) 0.2 mg tablet Take 1 Tab by mouth two (2) times a day. 4/18/16  Yes Sridhar King MD   hydrochlorothiazide (HYDRODIURIL) 25 mg tablet Take 1 Tab by mouth daily. 4/11/16  Yes Sridhar King MD   aspirin delayed-release 81 mg tablet Take 81 mg by mouth every evening. Yes Historical Provider   apixaban (ELIQUIS) 5 mg tablet Take 1 Tab by mouth two (2) times a day. 3/16/17   Eusebio Costello NP   CALCIUM CARBONATE/VITAMIN D3 (CALTRATE 600 + D PO) Take 1 Tab by mouth two (2) times a day. Historical Provider   MULTIVITAMIN W-MINERALS/LUTEIN (CENTRUM SILVER PO) Take 1 Tab by mouth daily (after lunch).     Historical Provider     Current Facility-Administered Medications   Medication Dose Route Frequency    hydroCHLOROthiazide (HYDRODIURIL) tablet 25 mg  25 mg Oral DAILY    cloNIDine HCl (CATAPRES) tablet 0.2 mg  0.2 mg Oral BID    levothyroxine (SYNTHROID) tablet 75 mcg  75 mcg Oral ACB    verapamil ER (CALAN-SR) tablet 120 mg  120 mg Oral DAILY WITH BREAKFAST    lisinopril (PRINIVIL, ZESTRIL) tablet 10 mg  10 mg Oral DAILY    heparinized saline 2 units/mL 1,000 unit/500 mL infusion        potassium chloride SR (KLOR-CON 10) tablet 20 mEq  20 mEq Oral BID    protamine 10 mg/mL injection        sodium chloride (NS) flush 5-10 mL  5-10 mL IntraVENous Q8H    aspirin delayed-release tablet 81 mg  81 mg Oral QPM    sotalol (BETAPACE) tablet 120 mg  120 mg Oral Q12H    apixaban (ELIQUIS) tablet 5 mg  5 mg Oral BID    0.9% sodium chloride infusion  125 mL/hr IntraVENous CONTINUOUS    PHENYLephrine (NEOSYNEPHRINE) 30,000 mcg in 0.9% sodium chloride 250 mL infusion   mcg/min IntraVENous TITRATE    mupirocin (BACTROBAN) 2 % ointment   Both Nostrils BID     Allergies   Allergen Reactions    Adhesive Hives    Amoxicillin Unknown (comments)    Lipitor [Atorvastatin] Myalgia     Gets the flu      Social History   Substance Use Topics    Smoking status: Never Smoker    Smokeless tobacco: Never Used    Alcohol use No      Family History   Problem Relation Age of Onset    Alzheimer Mother     Heart Disease Mother     Heart Disease Father     Hypertension Father     Cancer Paternal Aunt     Diabetes Paternal Grandmother         Review of Systems:  A comprehensive review of systems was negative. Objective:   Vital Signs:    Visit Vitals    /72    Pulse 88    Temp 97.6 °F (36.4 °C)    Resp 20    Ht 5' 5\" (1.651 m)    Wt 107 kg (235 lb 14.3 oz)    SpO2 97%    BMI 39.25 kg/m2       O2 Device: Room air   O2 Flow Rate (L/min): 2 l/min   Temp (24hrs), Av.6 °F (37 °C), Min:97.6 °F (36.4 °C), Max:99.4 °F (37.4 °C)       Intake/Output:   Last shift:       0701 - 1900  In: 376.3 [I.V.:376.3]  Out: 440 [Urine:425; Drains:15]  Last 3 shifts:  190 -  0700  In: 7447.8 [P.O.:1560; I.V.:5887.8]  Out: 7942 [Urine:2520; Drains:243]    Intake/Output Summary (Last 24 hours) at 17 1007  Last data filed at 17 1000   Gross per 24 hour   Intake          4184.59 ml   Output             2078 ml   Net          2106.59 ml       Physical Exam:    General:  Alert, cooperative, no distress, appears stated age. Sitting up in chair eating breakfast when seen . Head:  Normocephalic, without obvious abnormality, atraumatic. Eyes:  Conjunctivae/corneas clear. PERRL, EOMs intact. Nose: Nares normal. Septum midline. Mucosa normal. No drainage or sinus tenderness.    Throat: Lips, mucosa, and tongue normal. Teeth and gums normal.   Neck: Supple, symmetrical, trachea midline, no adenopathy, thyroid: no enlargment/tenderness/nodules, no carotid bruit and no JVD. Back:   Symmetric, no curvature. ROM normal.   Lungs:   Clear to auscultation bilaterally. Chest wall:  No tenderness or deformity. Heart:  Regular rate and rhythm, S1, S2 normal, no murmur, click, rub or gallop. Abdomen:   Soft, non-tender. Bowel sounds normal. No masses,  No organomegaly. Extremities: Extremities normal, atraumatic, no cyanosis or has edema in hand and feet. Pulses: 2+ and symmetric all extremities.    Skin: Skin color, texture, turgor normal. No rashes or lesions   Lymph nodes: Cervical, supraclavicular, and axillary nodes normal.   Neurologic: Grossly nonfocal       Data:     Recent Results (from the past 24 hour(s))   CBC W/O DIFF    Collection Time: 03/19/17  4:37 AM   Result Value Ref Range    WBC 11.7 (H) 3.6 - 11.0 K/uL    RBC 3.68 (L) 3.80 - 5.20 M/uL    HGB 11.3 (L) 11.5 - 16.0 g/dL    HCT 33.9 (L) 35.0 - 47.0 %    MCV 92.1 80.0 - 99.0 FL    MCH 30.7 26.0 - 34.0 PG    MCHC 33.3 30.0 - 36.5 g/dL    RDW 13.6 11.5 - 14.5 %    PLATELET 490 (L) 174 - 142 K/uL   METABOLIC PANEL, BASIC    Collection Time: 03/19/17  4:37 AM   Result Value Ref Range    Sodium 143 136 - 145 mmol/L    Potassium 3.2 (L) 3.5 - 5.1 mmol/L    Chloride 111 (H) 97 - 108 mmol/L    CO2 22 21 - 32 mmol/L    Anion gap 10 5 - 15 mmol/L    Glucose 104 (H) 65 - 100 mg/dL    BUN 19 6 - 20 MG/DL    Creatinine 1.32 (H) 0.55 - 1.02 MG/DL    BUN/Creatinine ratio 14 12 - 20      GFR est AA 48 (L) >60 ml/min/1.73m2    GFR est non-AA 39 (L) >60 ml/min/1.73m2    Calcium 7.8 (L) 8.5 - 10.1 MG/DL   MAGNESIUM    Collection Time: 03/19/17  4:37 AM   Result Value Ref Range    Magnesium 2.0 1.6 - 2.4 mg/dL               Imaging:  I have personally reviewed the patients radiographs and have reviewed the reports:  No new CXR       Demarco Guzmán MD

## 2017-03-19 NOTE — PROGRESS NOTES
Electrolyte repletion for patients receiving sotalol      Estimated Creatinine Clearance: 44.8 mL/min (based on Cr of 1.32). Estimated Creatinine Clearance (using IBW): 33.1 mL/min  Recent Labs      17   0437  17   0742  17   0609   CREA  1.32*  1.44*   --    BUN  19  15   --    WBC  11.7*   --   10.6   NA  143  144   --    K  3.2*  3.7   --    MG  2.0  1.9   --    CA  7.8*  7.9*   --    HGB  11.3*   --   12.5   HCT  33.9*   --   36.7   PLT  148*   --   147*     Temp (24hrs), Av.6 °F (37 °C), Min:97.6 °F (36.4 °C), Max:99.4 °F (37.4 °C)      Class III AAD patient receiving: Sotalol 120mg q12h    CrCl ~44 ml/min  Magnesium sulfate dose ordered: NO  Potassium chloride dose ordered: Yes  Potassium phosphate dose ordered: No     A/P:  Based on the current renal function, please consider decreasing the dose of Sotalol to 80 mg every day   Repleted K and will recheck later this afternoon.     Roma Bravo, RONNAD

## 2017-03-20 LAB
ANION GAP BLD CALC-SCNC: 8 MMOL/L (ref 5–15)
BUN SERPL-MCNC: 15 MG/DL (ref 6–20)
BUN/CREAT SERPL: 13 (ref 12–20)
CALCIUM SERPL-MCNC: 7.9 MG/DL (ref 8.5–10.1)
CHLORIDE SERPL-SCNC: 109 MMOL/L (ref 97–108)
CO2 SERPL-SCNC: 23 MMOL/L (ref 21–32)
CREAT SERPL-MCNC: 1.14 MG/DL (ref 0.55–1.02)
GLUCOSE SERPL-MCNC: 99 MG/DL (ref 65–100)
MAGNESIUM SERPL-MCNC: 2.1 MG/DL (ref 1.6–2.4)
POTASSIUM SERPL-SCNC: 3.4 MMOL/L (ref 3.5–5.1)
SODIUM SERPL-SCNC: 140 MMOL/L (ref 136–145)

## 2017-03-20 PROCEDURE — 65660000000 HC RM CCU STEPDOWN

## 2017-03-20 PROCEDURE — 83735 ASSAY OF MAGNESIUM: CPT | Performed by: INTERNAL MEDICINE

## 2017-03-20 PROCEDURE — 80048 BASIC METABOLIC PNL TOTAL CA: CPT | Performed by: INTERNAL MEDICINE

## 2017-03-20 PROCEDURE — 74011250637 HC RX REV CODE- 250/637: Performed by: INTERNAL MEDICINE

## 2017-03-20 PROCEDURE — 36415 COLL VENOUS BLD VENIPUNCTURE: CPT | Performed by: INTERNAL MEDICINE

## 2017-03-20 PROCEDURE — 93306 TTE W/DOPPLER COMPLETE: CPT

## 2017-03-20 PROCEDURE — 97161 PT EVAL LOW COMPLEX 20 MIN: CPT

## 2017-03-20 PROCEDURE — 97116 GAIT TRAINING THERAPY: CPT

## 2017-03-20 RX ORDER — SOTALOL HYDROCHLORIDE 80 MG/1
120 TABLET ORAL DAILY
Status: DISCONTINUED | OUTPATIENT
Start: 2017-03-20 | End: 2017-03-21

## 2017-03-20 RX ORDER — ROSUVASTATIN CALCIUM 20 MG/1
20 TABLET, COATED ORAL
Status: DISCONTINUED | OUTPATIENT
Start: 2017-03-20 | End: 2017-03-22 | Stop reason: HOSPADM

## 2017-03-20 RX ORDER — DOCUSATE SODIUM 100 MG/1
100 CAPSULE, LIQUID FILLED ORAL 2 TIMES DAILY
Status: DISCONTINUED | OUTPATIENT
Start: 2017-03-20 | End: 2017-03-21

## 2017-03-20 RX ORDER — POTASSIUM CHLORIDE 750 MG/1
40 TABLET, FILM COATED, EXTENDED RELEASE ORAL
Status: COMPLETED | OUTPATIENT
Start: 2017-03-20 | End: 2017-03-20

## 2017-03-20 RX ADMIN — DOCUSATE SODIUM 100 MG: 100 CAPSULE, LIQUID FILLED ORAL at 17:59

## 2017-03-20 RX ADMIN — Medication 10 ML: at 05:26

## 2017-03-20 RX ADMIN — LISINOPRIL 10 MG: 5 TABLET ORAL at 09:22

## 2017-03-20 RX ADMIN — APIXABAN 5 MG: 5 TABLET, FILM COATED ORAL at 09:23

## 2017-03-20 RX ADMIN — POTASSIUM CHLORIDE 20 MEQ: 750 TABLET, FILM COATED, EXTENDED RELEASE ORAL at 17:58

## 2017-03-20 RX ADMIN — LEVOTHYROXINE SODIUM 75 MCG: 75 TABLET ORAL at 07:36

## 2017-03-20 RX ADMIN — POTASSIUM CHLORIDE 40 MEQ: 750 TABLET, FILM COATED, EXTENDED RELEASE ORAL at 09:21

## 2017-03-20 RX ADMIN — APIXABAN 5 MG: 5 TABLET, FILM COATED ORAL at 17:58

## 2017-03-20 RX ADMIN — ROSUVASTATIN CALCIUM 20 MG: 20 TABLET, COATED ORAL at 22:00

## 2017-03-20 RX ADMIN — VERAPAMIL HYDROCHLORIDE 120 MG: 120 TABLET, FILM COATED, EXTENDED RELEASE ORAL at 07:36

## 2017-03-20 RX ADMIN — MUPIROCIN: 20 OINTMENT TOPICAL at 09:25

## 2017-03-20 RX ADMIN — CLONIDINE HYDROCHLORIDE 0.2 MG: 0.1 TABLET ORAL at 17:58

## 2017-03-20 RX ADMIN — POTASSIUM CHLORIDE 20 MEQ: 750 TABLET, FILM COATED, EXTENDED RELEASE ORAL at 09:21

## 2017-03-20 RX ADMIN — DOCUSATE SODIUM 100 MG: 100 CAPSULE, LIQUID FILLED ORAL at 10:44

## 2017-03-20 RX ADMIN — SOTALOL HYDROCHLORIDE 120 MG: 80 TABLET ORAL at 09:23

## 2017-03-20 RX ADMIN — HYDROCHLOROTHIAZIDE 25 MG: 25 TABLET ORAL at 09:23

## 2017-03-20 RX ADMIN — CLONIDINE HYDROCHLORIDE 0.2 MG: 0.1 TABLET ORAL at 09:22

## 2017-03-20 NOTE — PROGRESS NOTES
1324: TRANSFER - IN REPORT:    Verbal report received from [facility] (name) on Olga Alarcon  being received from CCU(unit) for routine progression of care      Report consisted of patients Situation, Background, Assessment and   Recommendations(SBAR). Information from the following report(s) SBAR, Kardex, Procedure Summary and Recent Results was reviewed with the receiving nurse. Opportunity for questions and clarification was provided. Assessment completed upon patients arrival to unit and care assumed. 1400: Primary Nurse Katherine Ayoub RN and Taj Nathan RN performed a dual skin assessment on this patient No impairment noted except Bilateral incisions to Groin and Incision to Midsternal region.    Yonathan score is 19

## 2017-03-20 NOTE — PROGRESS NOTES
Cardiology Progress Note            [address]  [phone]    3/20/2017 10:23 AM    Admit Date: 3/17/2017    Admit Diagnosis: a fib;Recovery Needed in PACU;a fib;Pericardial effusion wi*    Subjective:     Rolo Minor   denies chest pain.     Visit Vitals    /74    Pulse 86    Temp 99 °F (37.2 °C)    Resp 18    Ht 5' 5\" (1.651 m)    Wt 235 lb 14.3 oz (107 kg)    SpO2 98%    BMI 39.25 kg/m2     Current Facility-Administered Medications   Medication Dose Route Frequency    sotalol (BETAPACE) tablet 120 mg  120 mg Oral DAILY    docusate sodium (COLACE) capsule 100 mg  100 mg Oral BID    Sotalol (please measure the QT, RR and QRS intervals 2 hours after the dose)  1 Each Other DAILY    docusate sodium (COLACE) capsule 100 mg  100 mg Oral DAILY PRN    hydroCHLOROthiazide (HYDRODIURIL) tablet 25 mg  25 mg Oral DAILY    cloNIDine HCl (CATAPRES) tablet 0.2 mg  0.2 mg Oral BID    levothyroxine (SYNTHROID) tablet 75 mcg  75 mcg Oral ACB    verapamil ER (CALAN-SR) tablet 120 mg  120 mg Oral DAILY WITH BREAKFAST    lisinopril (PRINIVIL, ZESTRIL) tablet 10 mg  10 mg Oral DAILY    heparinized saline 2 units/mL 1,000 unit/500 mL infusion        potassium chloride SR (KLOR-CON 10) tablet 20 mEq  20 mEq Oral BID    fentaNYL citrate (PF) injection 12.5-50 mcg  12.5-50 mcg IntraVENous Multiple    midazolam (VERSED) injection 1-5 mg  1-5 mg IntraVENous Multiple    protamine 10 mg/mL injection        sodium chloride (NS) flush 5-10 mL  5-10 mL IntraVENous Q8H    sodium chloride (NS) flush 5-10 mL  5-10 mL IntraVENous PRN    acetaminophen (TYLENOL) tablet 650 mg  650 mg Oral Q4H PRN    HYDROcodone-acetaminophen (NORCO) 5-325 mg per tablet 1 Tab  1 Tab Oral Q4H PRN    aspirin delayed-release tablet 81 mg  81 mg Oral QPM    apixaban (ELIQUIS) tablet 5 mg  5 mg Oral BID    mupirocin (BACTROBAN) 2 % ointment   Both Nostrils BID         Objective:      Visit Vitals  /74    Pulse 86    Temp 99 °F (37.2 °C)    Resp 18    Ht 5' 5\" (1.651 m)    Wt 235 lb 14.3 oz (107 kg)    SpO2 98%    BMI 39.25 kg/m2       Physical Exam:  Abdomen: soft, non-tender  Extremities: extremities normal  Heart: regular rate and rhythm  Lungs: clear to auscultation bilaterally  Pulses: 2+ and symmetric    Data Review:   Labs:    Recent Labs      03/19/17   0437  03/18/17   0609   WBC  11.7*  10.6   HGB  11.3*  12.5   HCT  33.9*  36.7   PLT  148*  147*     Recent Labs      03/20/17   0420  03/19/17   0437  03/18/17   0742   NA  140  143  144   K  3.4*  3.2*  3.7   CL  109*  111*  112*   CO2  23  22  20*   GLU  99  104*  136*   BUN  15  19  15   CREA  1.14*  1.32*  1.44*   CA  7.9*  7.8*  7.9*   MG  2.1  2.0  1.9       No results for input(s): TROIQ, CPK, CKMB in the last 72 hours. Intake/Output Summary (Last 24 hours) at 03/20/17 1023  Last data filed at 03/20/17 0900   Gross per 24 hour   Intake             3135 ml   Output             4545 ml   Net            -1410 ml        Telemetry: nsr    Assessment:     Active Problems:    S/P ablation of atrial fibrillation (3/17/2017)      Overview: 3/17/17      Pericardial effusion with cardiac tamponade (3/18/2017)        Plan:     Emmanuelle Bhatti is doing well. She is in sinus. She had minimal pericardial drainage overnight - less than 50 cc of serosanginous fluid. Will pull drain today and then can be transferred to floor.     Fiordaliza Andino MD, [facility]    3/20/2017

## 2017-03-20 NOTE — PROGRESS NOTES
Pharmacy Monitoring of Sotalol for Atrial Arrhythmias    Pharmacy monitoring of this 78 y.o. female being treated with sotalol for atrial arrhythmias. Sotalol dose ordered: 120 mg BID (PTA dose)  Baseline QTc/JTc interval (on admission prior to dose) for new starts: na (patient was on her home dose of sotalol 120 mg BID)  QTc/JTc interval 2 hours after dose required for new starts or if available for continuation of chronic therapy: to start tomorrow  Drug interactions: none  Creatinine clearance(ml/min): 51  Potassium supplementation ordered: Yes  Magnesium supplementation ordered: no  Phosphate supplementation ordered: no      Recent Labs      03/20/17   0420  03/19/17   0437  03/18/17   0742   K  3.4*  3.2*  3.7   MG  2.1  2.0  1.9   CREA  1.14*  1.32*  1.44*   BUN  15  19  15     Wt Readings from Last 1 Encounters:   03/17/17 107 kg (235 lb 14.3 oz)     Ht Readings from Last 1 Encounters:   03/17/17 165.1 cm (65\")       Impression/Plan: Patient admitted with sotalol 120 mg BID (PTA dose), but her SCr on March 7 was 0.8 and on this admission, it 1.44-->1.32-->1.14 (improving)  Discussed with Dr. Katerine Shaffer and he agreed to decrease the dose to 120 mg every day   Will monitor QT, RR, and QRS 2 hours after each dose.   Will increase the sotalol to 120 mg every day  Will replete with 40 meq of K in addition to the 20 BID  On 3/20, AR 0.15, QRS 0.1, QT 0.43, RR 0.81 and QTC 0.48     Noe Camargo, PHARMD       Sotalol dosing for patients previously tolerating 80 mg dose, but with inadequate symptom control   Calculated CrCl (mL/min) use IBW in Calculation Dose Sotalol dose reduction if QTc increases to > 520 msec   (JTc increases to ? 430 msec if QRS > 100 msec)   > 60 120 mg PO Q12H 80 mg PO Q12H    40 to 60 120 mg PO daily 80 mg PO daily      Sotalol dosing for patients previously tolerating 120 mg dose, but with inadequate symptom control  Calculated CrCl (mL/min) use IBW in Calculation Dose Sotalol dose reduction if QTc increases to > 520 msec   (JTc increases to ? 430 msec if QRS > 100 msec)   > 60 160 mg PO Q12H 120 mg PO Q12H    40 to 60 160 mg PO daily 120 mg PO daily

## 2017-03-20 NOTE — PROGRESS NOTES
0700 - Verbal and bedside shift report received from Harriet Nova RN. Care of patient assumed. 0800 - Patient assessment completed as documented. The patient is alert, oriented times four, and follows commands. Lungs are clear bilaterally, bowel sounds are active, and pulses are palpable in all four extremities. The patient denies any pain or shortness of breath. Vital signs stable. 0815 - ECHO Tech at bedside. 0900 - Patient up to chair with 2 person assist, currently eating breakfast, with no complaints. 1315 - Urinary catheter discontinued    1325- TRANSFER - OUT REPORT:    Verbal report given to Marilin Travis RN on Concha Torres  being transferred to CIU (unit) for routine progression of care       Report consisted of patients Situation, Background, Assessment and   Recommendations(SBAR). Information from the following report(s) SBAR, Kardex, Procedure Summary, Intake/Output, MAR, Recent Results and Cardiac Rhythm normal sinus rhythm was reviewed with the receiving nurse. Lines:   Peripheral IV 03/16/15 Left Hand (Active)       Peripheral IV 03/18/17 Left Hand (Active)   Site Assessment Clean, dry, & intact 3/20/2017 12:00 PM   Phlebitis Assessment 0 3/20/2017 12:00 PM   Infiltration Assessment 0 3/20/2017 12:00 PM   Dressing Status Clean, dry, & intact; Occlusive 3/20/2017 12:00 PM   Dressing Type Tape;Transparent 3/20/2017 12:00 PM   Hub Color/Line Status Pink;Capped 3/20/2017 12:00 PM   Action Taken Open ports on tubing capped 3/20/2017 12:00 AM   Alcohol Cap Used Yes 3/20/2017 12:00 PM        Opportunity for questions and clarification was provided.       Patient transported with:   Monitor  Registered Nurse

## 2017-03-20 NOTE — PROGRESS NOTES
[facility]  Pulmonary, Critical Care, and Sleep Medicine    Name: Emmanuelle Bhatti MRN: [id number]   : 1940 Hospital: [facility]   Date: 3/20/2017        Critical Care Patient Consult    IMPRESSION:   · S/p Atrial Fibrillation ablation. · Pt had a post procedure pericardial effusion, has pericardial drain in place. Had 500cc of fluid removed when pigtail was placed. Managed per Cardiology. · Hypotension has resolved. Now off pamella for 24 hrs. · Peripheral edema in hand and legs. 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  · BP meds per cards  · Will be available as needed     Subjective/History:   Last 24 hrs: Pt has no acute complaints. NO back pain, no chest pain, no abdominal pain. Has some swelling of hands and feet. This patient has been seen and evaluated at the request of Dr. Eloy Orosco for above. Patient is a 78 y.o. female who is feeling pretty good this am. NO acute complaints. Has mild chest pain worse with deep inspiration. ROS was otherwise negative. Past Medical History:   Diagnosis Date    Arthritis     right knee, left shoulder    Diverticulosis     Frequency of urination     High cholesterol     Hypertension     Morbid obesity ([address]     Thyroid disease     hypothyroid    Unspecified adverse effect of anesthesia     low BP      Past Surgical History:   Procedure Laterality Date    BREAST SURGERY PROCEDURE UNLISTED      breast bx rt    CARDIAC SURG PROCEDURE UNLIST  2015    cardiac catheterization, no intervention, medical management    HX GYN      myomectomy on uterus    HX GYN      hystereoscopy D&C    HX LYMPH NODE DISSECTION      biopsy    HX OTHER SURGICAL      lymph node bx      Prior to Admission medications    Medication Sig Start Date End Date Taking?  Authorizing Provider   sotalol (BETAPACE) 120 mg tablet TAKE 1 TAB BY MOUTH EVERY TWELVE (12) HOURS. 3/1/17  Yes Nikolas Rojas MD   rosuvastatin (CRESTOR) 20 mg tablet Take 1 Tab by mouth nightly. 2/9/17  Yes Ramon Cook MD   lisinopril (PRINIVIL, ZESTRIL) 10 mg tablet Daily 12/29/16  Yes Ramon Cook MD   KLOR-CON 10 10 mEq tablet TAKE 2 TABLETS TWICE A DAY 10/7/16  Yes Historical Provider   verapamil ER (CALAN-SR) 120 mg tablet TAKE 1 TABLET BY MOUTH EVERY MORNING FOR BLOOD PRESSURE 10/18/16  Yes Ramon Cook MD   levothyroxine (SYNTHROID) 75 mcg tablet Take 1 Tab by mouth Daily (before breakfast). 7/11/16  Yes Ramon Cook MD   cloNIDine HCl (CATAPRES) 0.2 mg tablet Take 1 Tab by mouth two (2) times a day. 4/18/16  Yes Ramon Cook MD   hydrochlorothiazide (HYDRODIURIL) 25 mg tablet Take 1 Tab by mouth daily. 4/11/16  Yes Ramon Cook MD   aspirin delayed-release 81 mg tablet Take 81 mg by mouth every evening. Yes Historical Provider   apixaban (ELIQUIS) 5 mg tablet Take 1 Tab by mouth two (2) times a day. 3/16/17   Carlos Herbert NP   CALCIUM CARBONATE/VITAMIN D3 (CALTRATE 600 + D PO) Take 1 Tab by mouth two (2) times a day. Historical Provider   MULTIVITAMIN W-MINERALS/LUTEIN (CENTRUM SILVER PO) Take 1 Tab by mouth daily (after lunch).     Historical Provider     Current Facility-Administered Medications   Medication Dose Route Frequency    sotalol (BETAPACE) tablet 120 mg  120 mg Oral DAILY    potassium chloride SR (KLOR-CON 10) tablet 40 mEq  40 mEq Oral NOW    Sotalol (please measure the QT, RR and QRS intervals 2 hours after the dose)  1 Each Other DAILY    hydroCHLOROthiazide (HYDRODIURIL) tablet 25 mg  25 mg Oral DAILY    cloNIDine HCl (CATAPRES) tablet 0.2 mg  0.2 mg Oral BID    levothyroxine (SYNTHROID) tablet 75 mcg  75 mcg Oral ACB    verapamil ER (CALAN-SR) tablet 120 mg  120 mg Oral DAILY WITH BREAKFAST    lisinopril (PRINIVIL, ZESTRIL) tablet 10 mg  10 mg Oral DAILY    heparinized saline 2 units/mL 1,000 unit/500 mL infusion        potassium chloride SR (KLOR-CON 10) tablet 20 mEq  20 mEq Oral BID    protamine 10 mg/mL injection        sodium chloride (NS) flush 5-10 mL  5-10 mL IntraVENous Q8H    aspirin delayed-release tablet 81 mg  81 mg Oral QPM    apixaban (ELIQUIS) tablet 5 mg  5 mg Oral BID    0.9% sodium chloride infusion  125 mL/hr IntraVENous CONTINUOUS    PHENYLephrine (NEOSYNEPHRINE) 30,000 mcg in 0.9% sodium chloride 250 mL infusion   mcg/min IntraVENous TITRATE    mupirocin (BACTROBAN) 2 % ointment   Both Nostrils BID     Allergies   Allergen Reactions    Adhesive Hives    Amoxicillin Unknown (comments)    Lipitor [Atorvastatin] Myalgia     Gets the flu      Social History   Substance Use Topics    Smoking status: Never Smoker    Smokeless tobacco: Never Used    Alcohol use No      Family History   Problem Relation Age of Onset    Alzheimer Mother     Heart Disease Mother     Heart Disease Father     Hypertension Father     Cancer Paternal Aunt     Diabetes Paternal Grandmother         Review of Systems:  A comprehensive review of systems was negative. Objective:   Vital Signs:    Visit Vitals    /78 (BP 1 Location: Right arm, BP Patient Position: At rest;Head of bed elevated (Comment degrees))    Pulse 75    Temp 99 °F (37.2 °C)    Resp 19    Ht 5' 5\" (1.651 m)    Wt 107 kg (235 lb 14.3 oz)    SpO2 97%    BMI 39.25 kg/m2       O2 Device: Room air   O2 Flow Rate (L/min): 2 l/min   Temp (24hrs), Av.9 °F (37.2 °C), Min:98.3 °F (36.8 °C), Max:99.4 °F (37.4 °C)       Intake/Output:   Last shift:      701 - 1900  In: 0   Out: 180 [Urine:175; Drains:5]  Last 3 shifts: 1901 -  07  In: 4962.1 [P.O.:1140;  I.V.:3822.1]  Out: 7877 [Urine:5390; Drains:108]    Intake/Output Summary (Last 24 hours) at 17 0847  Last data filed at 17 0800   Gross per 24 hour   Intake             3145 ml   Output             4645 ml Net            -1500 ml       Physical Exam:    General:  Alert, cooperative, no distress, appears stated age. Lying in bed, no distress. Head:  Normocephalic, without obvious abnormality, atraumatic. Eyes:  Conjunctivae/corneas clear. PERRL, EOMs intact. Nose: Nares normal. Septum midline. Mucosa normal. No drainage or sinus tenderness. Throat: Lips, mucosa, and tongue normal. Teeth and gums normal.   Neck: Supple, symmetrical, trachea midline, no adenopathy, thyroid: no enlargment/tenderness/nodules, no carotid bruit and no JVD. Back:   Symmetric, no curvature. ROM normal.   Lungs:   Clear to auscultation bilaterally. Chest wall:  No tenderness or deformity. Heart:  Regular rate and rhythm, S1, S2 normal, no murmur, click, rub or gallop. Abdomen:   Soft, non-tender. Bowel sounds normal. No masses,  No organomegaly. Extremities: Extremities normal, atraumatic, no cyanosis or has edema in hand and feet. Pulses: 2+ and symmetric all extremities.    Skin: Skin color, texture, turgor normal. No rashes or lesions   Lymph nodes: Cervical, supraclavicular, and axillary nodes normal.   Neurologic: Grossly nonfocal       Data:     Recent Results (from the past 24 hour(s))   METABOLIC PANEL, BASIC    Collection Time: 03/20/17  4:20 AM   Result Value Ref Range    Sodium 140 136 - 145 mmol/L    Potassium 3.4 (L) 3.5 - 5.1 mmol/L    Chloride 109 (H) 97 - 108 mmol/L    CO2 23 21 - 32 mmol/L    Anion gap 8 5 - 15 mmol/L    Glucose 99 65 - 100 mg/dL    BUN 15 6 - 20 MG/DL    Creatinine 1.14 (H) 0.55 - 1.02 MG/DL    BUN/Creatinine ratio 13 12 - 20      GFR est AA 56 (L) >60 ml/min/1.73m2    GFR est non-AA 46 (L) >60 ml/min/1.73m2    Calcium 7.9 (L) 8.5 - 10.1 MG/DL   MAGNESIUM    Collection Time: 03/20/17  4:20 AM   Result Value Ref Range    Magnesium 2.1 1.6 - 2.4 mg/dL               Imaging:  I have personally reviewed the patients radiographs and have reviewed the reports:  No new CXR       Ninfa Cristina MD

## 2017-03-20 NOTE — PROGRESS NOTES
Problem: Mobility Impaired (Adult and Pediatric)  Goal: *Acute Goals and Plan of Care (Insert Text)  Physical Therapy Goals  Initiated 3/20/2017  1. Patient will move from supine to sit and sit to supine in bed with independence within 7 day(s). 2. Patient will transfer from bed to chair and chair to bed with modified independence using the least restrictive device within 7 day(s). 3. Patient will perform sit to stand with modified independence within 7 day(s). 4. Patient will ambulate with modified independence for 150 feet with the least restrictive device within 7 day(s). 5. Patient will ascend/descend 3 stairs with single handrail(s) with supervision/set-up within 7 day(s). PHYSICAL THERAPY EVALUATION  Patient: Radha Antonio (81 y.o. female)  Date: 3/20/2017  Primary Diagnosis: a fib  Recovery Needed in PACU  a fib  Pericardial effusion with cardiac tamponade    3 Days Post-Op   Precautions:   Fall      ASSESSMENT :  Based on the objective data described below, the patient presents with decreased functional mobility, impaired balance and gait, decreased tolerance to activity and patient complaints of bilateral LE edema. Patient received supine in bed and agreeable to therapy. Patient able to complete supine to sit with CGA and additional time to complete, sit<>stand with cardiac cart with CGA-min assist. Patient ambulated 75 feet with cardiac cart with CGA-min assist at times for generalized instability; no overt LOB noted. Patient with continued reports of \"my legs feel heavy! This feels so weird! \" Patient encouraged to remain seated up in chair after gait. RN present to transport patient to new unit with wheelchair. Patient will continue to benefit from PT to progress mobility as tolerated and return to prior level of function. Prior to admission patient was ambulatory without assistive device and was living alone. Patient may benefit from a short stay at rehab upon discharge.  Will continue to further assess d/c recs during hospital stay. Patient will benefit from skilled intervention to address the above impairments. Patients rehabilitation potential is considered to be Good  Factors which may influence rehabilitation potential include:   [ ]         None noted  [ ]         Mental ability/status  [X]         Medical condition  [ ]         Home/family situation and support systems  [ ]         Safety awareness  [ ]         Pain tolerance/management  [ ]         Other:        PLAN :  Recommendations and Planned Interventions:  [X]           Bed Mobility Training             [X]    Neuromuscular Re-Education  [X]           Transfer Training                   [ ]    Orthotic/Prosthetic Training  [X]           Gait Training                         [ ]    Modalities  [X]           Therapeutic Exercises           [ ]    Edema Management/Control  [X]           Therapeutic Activities            [X]    Patient and Family Training/Education  [ ]           Other (comment):     Frequency/Duration: Patient will be followed by physical therapy  5 times a week to address goals. Discharge Recommendations: Rehab  Further Equipment Recommendations for Discharge: TBD at rehab       SUBJECTIVE:   Patient stated I can't believe how weak I have gotten since being here.       OBJECTIVE DATA SUMMARY:   HISTORY:    Past Medical History:   Diagnosis Date    Arthritis       right knee, left shoulder    Diverticulosis      Frequency of urination      High cholesterol      Hypertension      Morbid obesity ([address]      Thyroid disease       hypothyroid    Unspecified adverse effect of anesthesia       low BP     Past Surgical History:   Procedure Laterality Date    BREAST SURGERY PROCEDURE UNLISTED   1982     breast bx rt    CARDIAC SURG PROCEDURE UNLIST   01/2015     cardiac catheterization, no intervention, medical management    HX GYN         myomectomy on uterus    HX GYN   2011     hystereoscopy D&C    HX LYMPH NODE DISSECTION         biopsy    HX OTHER SURGICAL         lymph node bx     Prior Level of Function/Home Situation: independent, ambulatory without assistive device. Lives alone in a 1 story home, +  Personal factors and/or comorbidities impacting plan of care:      Home Situation  Home Environment: Private residence  # Steps to Enter: 3  Rails to Enter: Yes  Hand Rails : Bilateral (too wide to reach at the same time)  One/Two Story Residence: One story  Living Alone: Yes  Support Systems: Friends \ neighbors  Patient Expects to be Discharged to[de-identified] Private residence  Current DME Used/Available at Home: None     EXAMINATION/PRESENTATION/DECISION MAKING:   Critical Behavior:  Neurologic State: Alert, Appropriate for age, Eyes open spontaneously  Orientation Level: Oriented X4, Appropriate for age  Cognition: Follows commands, Appropriate safety awareness, Appropriate for age attention/concentration, Appropriate decision making, Recognition of people/places     Hearing: Auditory  Auditory Impairment: None  Skin:  Intact to exposed skin  Edema: bilateral LE edema  Range Of Motion:  AROM: Within functional limits           PROM: Within functional limits           Strength:    Strength: Within functional limits  Tone & Sensation:      Sensation: Intact      Functional Mobility:  Bed Mobility:     Supine to Sit: Contact guard assistance; Additional time     Scooting: Contact guard assistance  Transfers:  Sit to Stand: Contact guard assistance;Minimum assistance  Stand to Sit: Contact guard assistance  Balance:   Sitting: Intact  Standing: Impaired  Standing - Static: Good  Standing - Dynamic : Fair  Ambulation/Gait Training:  Distance (ft): 75 Feet (ft)  Assistive Device: Gait belt (cardiac cart)  Ambulation - Level of Assistance: Minimal assistance;Contact guard assistance        Gait Abnormalities: Decreased step clearance;Trunk sway increased; Path deviations        Base of Support: Widened     Speed/Letty: Volar Video decreased (<100 feet/min)  Step Length: Right shortened;Left shortened     Functional Measure:  Tinetti test:      Sitting Balance: 1  Arises: 1  Attempts to Rise: 2  Immediate Standing Balance: 1  Standing Balance: 1  Nudged: 1  Eyes Closed: 1  Turn 360 Degrees - Continuous/Discontinuous: 0  Turn 360 Degrees - Steady/Unsteady: 0  Sitting Down: 1  Balance Score: 9  Indication of Gait: 1  R Step Length/Height: 1  L Step Length/Height: 1  R Foot Clearance: 1  L Foot Clearance: 1  Step Symmetry: 1  Step Continuity: 1  Path: 1  Trunk: 0  Walking Time: 0  Gait Score: 8  Total Score: 17         Tinetti Test and G-code impairment scale:  Percentage of Impairment CH     0%    CI     1-19% CJ     20-39% CK     40-59% CL     60-79% CM     80-99% CN      100%   Tinetti  Score 0-28 28 23-27 17-22 12-16 6-11 1-5 0          Tinetti Tool Score Risk of Falls  <19 = High Fall Risk  19-24 = Moderate Fall Risk  25-28 = Low Fall Risk  Tinetti ME. Performance-Oriented Assessment of Mobility Problems in Elderly Patients. [facility]; [id number]. (Scoring Description: PT Bulletin Feb. 10, 1993)     Older adults: Gino Stern et al, 2009; n = 1000 [facility] elderly evaluated with ABC, FRANNIE, ADL, and IADL)  · Mean FRANNIE score for males aged 69-68 years = 26.21(3.40)  · Mean FRANNIE score for females age 69-68 years = 25.16(4.30)  · Mean FRANNIE score for males over 80 years = 23.29(6.02)  · Mean FRANNIE score for females over 80 years = 17.20(8.32)            G codes: In compliance with CMSs Claims Based Outcome Reporting, the following G-code set was chosen for this patient based on their primary functional limitation being treated: The outcome measure chosen to determine the severity of the functional limitation was the Tinetti with a score of 17/28 which was correlated with the impairment scale.       · Mobility - Walking and Moving Around:               - CURRENT STATUS:    CJ - 20%-39% impaired, limited or restricted               - GOAL STATUS:           CI - 1%-19% impaired, limited or restricted               - D/C STATUS:                       ---------------To be determined---------------      Physical Therapy Evaluation Charge Determination   History Examination Presentation Decision-Making   HIGH Complexity :3+ comorbidities / personal factors will impact the outcome/ POC  HIGH Complexity : 4+ Standardized tests and measures addressing body structure, function, activity limitation and / or participation in recreation  LOW Complexity : Stable, uncomplicated  Other outcome measures Tinetti  LOW       Based on the above components, the patient evaluation is determined to be of the following complexity level: LOW      Pain:  Pain Scale 1: Numeric (0 - 10)  Pain Intensity 1: 0  Pain Location 1: Back;Neck     Pain Description 1: Aching  Pain Intervention(s) 1: Declines  Activity Tolerance:   Good  Please refer to the flowsheet for vital signs taken during this treatment. After treatment:   [X]         Patient left in no apparent distress sitting up in chair--wheelchair to be transferred off the unit  [ ]         Patient left in no apparent distress in bed  [X]         Call bell left within reach  [X]         Nursing notified  [ ]         Caregiver present  [ ]         Bed alarm activated      COMMUNICATION/EDUCATION:   The patients plan of care was discussed with: Registered Nurse.  [X]         Fall prevention education was provided and the patient/caregiver indicated understanding. [X]         Patient/family have participated as able in goal setting and plan of care. [X]         Patient/family agree to work toward stated goals and plan of care. [ ]         Patient understands intent and goals of therapy, but is neutral about his/her participation. [ ]         Patient is unable to participate in goal setting and plan of care.      Thank you for this referral.  Alycia Gresham, PT , DPT   Time Calculation: 20 mins

## 2017-03-20 NOTE — CARDIO/PULMONARY
Cardiopulmonary Rehab Nursing Entry:    Chart reviewed and pt visited. Pt 79 yo admitted with a-fib, a/p ablation. Printed material given and discussed re: cardiac ablation, post ablation instructions, healthy heart diet, and risk factors for CVD. Discussed post ablation restrictions (Avoiding heavy lifting and keeping the site clean and dry), what to do if bleeding/bruising is noted at the insertion site and when to call the dorctor. Also discussed heart healthy habits  and avoiding cardiac risk factors. Pt denied smoking. Pt non-smoker. Pt verbalized understanding.

## 2017-03-20 NOTE — INTERDISCIPLINARY ROUNDS
Interdisciplinary team rounds were held with the following team members:Case Management, Diabetes Treatment Specialist, Nursing, Nutrition, Occupational Therapy, Physical Therapy,Pharmacy, Physician and Respiratory Therapy. Plan of care discussed. See clinical pathway and/or care plan for interventions and desired outcomes.     Goals: PT/OT, up OOB with assist, echo completed

## 2017-03-21 LAB
ANION GAP BLD CALC-SCNC: 9 MMOL/L (ref 5–15)
BASOPHILS # BLD AUTO: 0 K/UL (ref 0–0.1)
BASOPHILS # BLD: 0 % (ref 0–1)
BUN SERPL-MCNC: 14 MG/DL (ref 6–20)
BUN/CREAT SERPL: 12 (ref 12–20)
CALCIUM SERPL-MCNC: 9.1 MG/DL (ref 8.5–10.1)
CHLORIDE SERPL-SCNC: 108 MMOL/L (ref 97–108)
CO2 SERPL-SCNC: 25 MMOL/L (ref 21–32)
CREAT SERPL-MCNC: 1.13 MG/DL (ref 0.55–1.02)
EOSINOPHIL # BLD: 0.2 K/UL (ref 0–0.4)
EOSINOPHIL NFR BLD: 3 % (ref 0–7)
ERYTHROCYTE [DISTWIDTH] IN BLOOD BY AUTOMATED COUNT: 13.2 % (ref 11.5–14.5)
GLUCOSE SERPL-MCNC: 101 MG/DL (ref 65–100)
HCT VFR BLD AUTO: 31.8 % (ref 35–47)
HGB BLD-MCNC: 10.7 G/DL (ref 11.5–16)
LYMPHOCYTES # BLD AUTO: 24 % (ref 12–49)
LYMPHOCYTES # BLD: 1.7 K/UL (ref 0.8–3.5)
MAGNESIUM SERPL-MCNC: 2 MG/DL (ref 1.6–2.4)
MCH RBC QN AUTO: 30.7 PG (ref 26–34)
MCHC RBC AUTO-ENTMCNC: 33.6 G/DL (ref 30–36.5)
MCV RBC AUTO: 91.4 FL (ref 80–99)
MONOCYTES # BLD: 0.5 K/UL (ref 0–1)
MONOCYTES NFR BLD AUTO: 7 % (ref 5–13)
NEUTS SEG # BLD: 4.6 K/UL (ref 1.8–8)
NEUTS SEG NFR BLD AUTO: 66 % (ref 32–75)
PLATELET # BLD AUTO: 154 K/UL (ref 150–400)
POTASSIUM SERPL-SCNC: 3.8 MMOL/L (ref 3.5–5.1)
POTASSIUM SERPL-SCNC: 4.1 MMOL/L (ref 3.5–5.1)
RBC # BLD AUTO: 3.48 M/UL (ref 3.8–5.2)
SODIUM SERPL-SCNC: 142 MMOL/L (ref 136–145)
WBC # BLD AUTO: 7 K/UL (ref 3.6–11)

## 2017-03-21 PROCEDURE — 97116 GAIT TRAINING THERAPY: CPT

## 2017-03-21 PROCEDURE — 36415 COLL VENOUS BLD VENIPUNCTURE: CPT | Performed by: INTERNAL MEDICINE

## 2017-03-21 PROCEDURE — 74011250637 HC RX REV CODE- 250/637: Performed by: NURSE PRACTITIONER

## 2017-03-21 PROCEDURE — G8988 SELF CARE GOAL STATUS: HCPCS | Performed by: OCCUPATIONAL THERAPIST

## 2017-03-21 PROCEDURE — 97165 OT EVAL LOW COMPLEX 30 MIN: CPT | Performed by: OCCUPATIONAL THERAPIST

## 2017-03-21 PROCEDURE — 74011250637 HC RX REV CODE- 250/637: Performed by: INTERNAL MEDICINE

## 2017-03-21 PROCEDURE — 83735 ASSAY OF MAGNESIUM: CPT | Performed by: INTERNAL MEDICINE

## 2017-03-21 PROCEDURE — 84132 ASSAY OF SERUM POTASSIUM: CPT | Performed by: INTERNAL MEDICINE

## 2017-03-21 PROCEDURE — 65660000000 HC RM CCU STEPDOWN

## 2017-03-21 PROCEDURE — 97535 SELF CARE MNGMENT TRAINING: CPT | Performed by: OCCUPATIONAL THERAPIST

## 2017-03-21 PROCEDURE — 85025 COMPLETE CBC W/AUTO DIFF WBC: CPT | Performed by: INTERNAL MEDICINE

## 2017-03-21 PROCEDURE — 80048 BASIC METABOLIC PNL TOTAL CA: CPT | Performed by: INTERNAL MEDICINE

## 2017-03-21 PROCEDURE — G8987 SELF CARE CURRENT STATUS: HCPCS | Performed by: OCCUPATIONAL THERAPIST

## 2017-03-21 RX ORDER — CLONIDINE HYDROCHLORIDE 0.1 MG/1
0.3 TABLET ORAL 2 TIMES DAILY
Status: DISCONTINUED | OUTPATIENT
Start: 2017-03-21 | End: 2017-03-22 | Stop reason: HOSPADM

## 2017-03-21 RX ORDER — SOTALOL HYDROCHLORIDE 80 MG/1
40 TABLET ORAL EVERY 12 HOURS
Status: DISCONTINUED | OUTPATIENT
Start: 2017-03-21 | End: 2017-03-22 | Stop reason: HOSPADM

## 2017-03-21 RX ORDER — POLYETHYLENE GLYCOL 3350 17 G/17G
17 POWDER, FOR SOLUTION ORAL DAILY
Status: DISCONTINUED | OUTPATIENT
Start: 2017-03-21 | End: 2017-03-22 | Stop reason: HOSPADM

## 2017-03-21 RX ORDER — DOCUSATE SODIUM 100 MG/1
100 CAPSULE, LIQUID FILLED ORAL 2 TIMES DAILY
Status: DISCONTINUED | OUTPATIENT
Start: 2017-03-21 | End: 2017-03-22 | Stop reason: HOSPADM

## 2017-03-21 RX ORDER — FACIAL-BODY WIPES
10 EACH TOPICAL DAILY PRN
Status: DISCONTINUED | OUTPATIENT
Start: 2017-03-21 | End: 2017-03-22 | Stop reason: HOSPADM

## 2017-03-21 RX ORDER — POTASSIUM CHLORIDE 1.5 G/1.77G
40 POWDER, FOR SOLUTION ORAL
Status: COMPLETED | OUTPATIENT
Start: 2017-03-21 | End: 2017-03-21

## 2017-03-21 RX ORDER — SOTALOL HYDROCHLORIDE 80 MG/1
80 TABLET ORAL EVERY 12 HOURS
Status: DISCONTINUED | OUTPATIENT
Start: 2017-03-21 | End: 2017-03-21

## 2017-03-21 RX ADMIN — APIXABAN 5 MG: 5 TABLET, FILM COATED ORAL at 09:07

## 2017-03-21 RX ADMIN — SOTALOL HYDROCHLORIDE 120 MG: 80 TABLET ORAL at 09:06

## 2017-03-21 RX ADMIN — DOCUSATE SODIUM 100 MG: 100 CAPSULE, LIQUID FILLED ORAL at 17:19

## 2017-03-21 RX ADMIN — LISINOPRIL 10 MG: 5 TABLET ORAL at 09:06

## 2017-03-21 RX ADMIN — CLONIDINE HYDROCHLORIDE 0.3 MG: 0.1 TABLET ORAL at 17:19

## 2017-03-21 RX ADMIN — POTASSIUM CHLORIDE 40 MEQ: 1.5 POWDER, FOR SOLUTION ORAL at 11:51

## 2017-03-21 RX ADMIN — POLYETHYLENE GLYCOL 3350 17 G: 17 POWDER, FOR SOLUTION ORAL at 12:02

## 2017-03-21 RX ADMIN — CLONIDINE HYDROCHLORIDE 0.2 MG: 0.1 TABLET ORAL at 09:08

## 2017-03-21 RX ADMIN — MUPIROCIN: 20 OINTMENT TOPICAL at 09:12

## 2017-03-21 RX ADMIN — SOTALOL HYDROCHLORIDE 40 MG: 80 TABLET ORAL at 21:36

## 2017-03-21 RX ADMIN — Medication 5 ML: at 14:00

## 2017-03-21 RX ADMIN — DOCUSATE SODIUM 100 MG: 100 CAPSULE, LIQUID FILLED ORAL at 09:07

## 2017-03-21 RX ADMIN — BISACODYL 10 MG: 10 SUPPOSITORY RECTAL at 17:19

## 2017-03-21 RX ADMIN — POTASSIUM CHLORIDE 20 MEQ: 750 TABLET, FILM COATED, EXTENDED RELEASE ORAL at 17:19

## 2017-03-21 RX ADMIN — Medication 10 ML: at 02:41

## 2017-03-21 RX ADMIN — POTASSIUM CHLORIDE 20 MEQ: 750 TABLET, FILM COATED, EXTENDED RELEASE ORAL at 09:06

## 2017-03-21 RX ADMIN — APIXABAN 5 MG: 5 TABLET, FILM COATED ORAL at 17:19

## 2017-03-21 RX ADMIN — VERAPAMIL HYDROCHLORIDE 120 MG: 120 TABLET, FILM COATED, EXTENDED RELEASE ORAL at 09:07

## 2017-03-21 RX ADMIN — LEVOTHYROXINE SODIUM 75 MCG: 75 TABLET ORAL at 09:08

## 2017-03-21 RX ADMIN — MUPIROCIN: 20 OINTMENT TOPICAL at 18:00

## 2017-03-21 RX ADMIN — Medication 10 ML: at 21:39

## 2017-03-21 RX ADMIN — ROSUVASTATIN CALCIUM 20 MG: 20 TABLET, COATED ORAL at 21:38

## 2017-03-21 RX ADMIN — HYDROCHLOROTHIAZIDE 25 MG: 25 TABLET ORAL at 09:07

## 2017-03-21 NOTE — PROGRESS NOTES
Problem: Mobility Impaired (Adult and Pediatric)  Goal: *Acute Goals and Plan of Care (Insert Text)  Physical Therapy Goals  Initiated 3/20/2017  1. Patient will move from supine to sit and sit to supine in bed with independence within 7 day(s). 2. Patient will transfer from bed to chair and chair to bed with modified independence using the least restrictive device within 7 day(s). 3. Patient will perform sit to stand with modified independence within 7 day(s). 4. Patient will ambulate with modified independence for 150 feet with the least restrictive device within 7 day(s). 5. Patient will ascend/descend 3 stairs with single handrail(s) with supervision/set-up within 7 day(s). PHYSICAL THERAPY TREATMENT  Patient: Marissa Champagne (14 y.o. female)  Date: 3/21/2017  Diagnosis: a fib, Pericardial effusion with cardiac tamponade 4 Days Post-Op      Precautions: Fall  Chart, physical therapy assessment, plan of care and goals were reviewed. ASSESSMENT: pt progressing well towards goals, no LOB(with RW), no SOB, did well with transfers, good motivation, vc's for safety and proper RW use. Progression toward goals:  [ ]      Improving appropriately and progressing toward goals  [X]      Improving slowly and progressing toward goals  [ ]      Not making progress toward goals and plan of care will be adjusted       PLAN:  Patient continues to benefit from skilled intervention to address the above impairments. Continue treatment per established plan of care.   Discharge Recommendations:  Home Health  Further Equipment Recommendations for Discharge:  rolling walker       OBJECTIVE DATA SUMMARY:      Critical Behavior:  Neurologic State: Alert  Orientation Level: Oriented X4  Cognition: Follows commands, Appropriate for age attention/concentration, Appropriate safety awareness  Safety/Judgement: Awareness of environment, Good awareness of safety precautions, Insight into deficits      Functional Mobility Training:  Bed Mobility: pt sitting in chair on arrival     Transfers:  Sit to Stand: Stand-by asssistance  Stand to Sit: Stand-by asssistance  Level of Assistance: Stand-by asssistance      Balance:  Sitting: Intact; Without support  Standing: Intact; With support  Standing - Static: Good;Constant support  Standing - Dynamic : Good      Ambulation/Gait Training:  Distance (ft): 140 Feet (ft)  Assistive Device: Gait belt;Walker, rolling  Ambulation - Level of Assistance: Contact guard assistance  Gait Abnormalities: Decreased step clearance  Right Side Weight Bearing: Full  Left Side Weight Bearing: Full  Base of Support: Widened  Stance:  (equal)  Speed/Letty: Pace decreased (<100 feet/min)  Step Length: Left shortened;Right shortened     Pain:  Pain Scale 1: Numeric (0 - 10)  Pain Intensity 1: 0     Activity Tolerance: fair     After treatment:   [X] Patient left in no apparent distress sitting up in chair  [ ] Patient left in no apparent distress in bed  [X] Call bell left within reach  [X] Nursing notified  [ ] Caregiver present  [ ] Bed alarm activated      COMMUNICATION/COLLABORATION:   The patients plan of care was discussed with: Registered Nurse     Deya Gonzalez PTA   Time Calculation: 20 mins

## 2017-03-21 NOTE — PROGRESS NOTES
Care Management:    Patient admitted with afib . She is post pericardial effusion with cardiac tamponade. She had drain placed and that is now being removed. She is for an echo today. Cardiology and pulmonary following. PT and OT are working with patient and recommending rehab. I have sent referral via ecin to [address] [place] [address]. FOC on chart. Patient independent with ADL's prior to admission . She is active with her PCP. She uses [facility]. Care Management Interventions  PCP Verified by CM: Yes  Mode of Transport at Discharge: Other (see comment) (family)  Physical Therapy Consult: Yes  Occupational Therapy Consult: Yes  Current Support Network: Lives Alone (Lives in a single story home. she is independent with ADLs. She drives. No DME. )  Confirm Follow Up Transport: Self  Plan discussed with Pt/Family/Caregiver: Yes     Chart reviewed and we will cont to follow. Anticipate rehab prior to returning home. Second IM letter given.      Gregoria Hilliard crm ac [id number]

## 2017-03-21 NOTE — PROGRESS NOTES
Problem: Self Care Deficits Care Plan (Adult)  Goal: *Acute Goals and Plan of Care (Insert Text)  Occupational Therapy Goals  Initiated 3/21/2017  1. Patient will perform grooming while standing at the sink with modified independence within 7 day(s). 2. Patient will perform bathing with supervision/set-up within 7 day(s). 3. Patient will perform lower body dressing with modified independence within 7 day(s). 4. Patient will perform toilet transfers with modified independence within 7 day(s). 5. Patient will perform all aspects of toileting with modified independence within 7 day(s). 6. Patient will independently utilize energy conservation techniques during functional activities within 7 day(s). OCCUPATIONAL THERAPY EVALUATION  Patient: Brittny Scott (66 y.o. female)  Date: 3/21/2017  Primary Diagnosis: a fib  Recovery Needed in PACU  a fib  Pericardial effusion with cardiac tamponade    4 Days Post-Op   Precautions:  Fall      ASSESSMENT :  Based on the objective data described below, the patient presents with deficits in self-care as compared to her functional baseline, primarily due to decreased activity tolerance, decreased strength, decreased dynamic balance, and mild decreased safety. She would benefit from use of RW during functional mobility/ADL, but she declines need, even though she admits to being slightly unsteady. Discussed this with PT in hopes that they can convince her to use a RW to increase safety. She does require some assistance with lower body ADL and will benefit from skilled OT treatment to maximize independence and safety. At this point, she is not safe to return home alone. She will benefit from brief inpatient rehab to enable her to safely transition back to independent living. Patient will benefit from skilled intervention to address the above impairments.   Patients rehabilitation potential is considered to be Good  Factors which may influence rehabilitation potential include:   [X]             None noted  [ ]             Mental ability/status  [ ]             Medical condition  [ ]             Home/family situation and support systems  [ ]             Safety awareness  [ ]             Pain tolerance/management  [ ]             Other:        PLAN :  Recommendations and Planned Interventions:  [X]               Self Care Training                  [X]        Therapeutic Activities  [X]               Functional Mobility Training    [ ]        Cognitive Retraining  [ ]               Therapeutic Exercises           [X]        Endurance Activities  [X]               Balance Training                   [ ]        Neuromuscular Re-Education  [ ]               Visual/Perceptual Training     [X]   Home Safety Training  [X]               Patient Education                 [X]        Family Training/Education  [ ]               Other (comment):     Frequency/Duration: Patient will be followed by occupational therapy 4 times a week to address goals. Discharge Recommendations: Inpatient Rehab  Further Equipment Recommendations for Discharge: Defer to rehab       SUBJECTIVE:   Patient stated I don't this I need that thing.  (rolling walker)      OBJECTIVE DATA SUMMARY:   HISTORY:   Past Medical History:   Diagnosis Date    Arthritis       right knee, left shoulder    Diverticulosis      Frequency of urination      High cholesterol      Hypertension      Morbid obesity ([address]      Thyroid disease       hypothyroid    Unspecified adverse effect of anesthesia       low BP     Past Surgical History:   Procedure Laterality Date    BREAST SURGERY PROCEDURE UNLISTED   1982     breast bx rt    CARDIAC SURG PROCEDURE UNLIST   01/2015     cardiac catheterization, no intervention, medical management    HX GYN         myomectomy on uterus    HX GYN   2011     hystereoscopy D&C    HX LYMPH NODE DISSECTION         biopsy    HX OTHER SURGICAL         lymph node bx        Prior Level of Function/Home Situation: Lives alone and was independent with ADL and light IADL. Expanded or extensive additional review of patient history:      Home Situation  Home Environment: Private residence  # Steps to Enter: 3  Rails to Enter: Yes  Hand Rails : Bilateral (too wide to reach at the same time)  One/Two Story Residence: One story  Living Alone: Yes  Support Systems: Friends \ neighbors  Patient Expects to be Discharged to[de-identified] Private residence  Current DME Used/Available at Home: None  Tub or Shower Type: Shower  [X]  Right hand dominant             [ ]  Left hand dominant     EXAMINATION OF PERFORMANCE DEFICITS:  Cognitive/Behavioral Status:  Neurologic State: Alert  Orientation Level: Oriented X4  Cognition: Follows commands; Appropriate for age attention/concentration; Appropriate safety awareness  Perception: Appears intact  Perseveration: No perseveration noted  Safety/Judgement: Awareness of environment;Good awareness of safety precautions; Insight into deficits     Vision/Perceptual:    Not assessed. No acute changes reported. Range of Motion:  AROM: Within functional limits  PROM: Within functional limits                    Strength:  Strength: Within functional limits              Coordination:     Fine Motor Skills-Upper: Left Intact; Right Intact    Gross Motor Skills-Upper: Left Intact; Right Intact     Tone & Sensation:     Sensation: Intact                       Balance:  Sitting: Intact  Standing: Impaired  Standing - Static: Good  Standing - Dynamic : Fair (Discussed possibility of using RW, but she declines)     Functional Mobility and Transfers for ADLs:  Bed Mobility:        Transfers:  Sit to Stand: Stand-by asssistance  Stand to Sit: Stand-by asssistance  Toilet Transfer : Stand-by asssistance     ADL Assessment:  Feeding: Independent     Oral Facial Hygiene/Grooming: Stand-by assistance     Bathing: Minimum assistance     Upper Body Dressing: Supervision     Lower Body Dressing: Moderate assistance     Toileting: Contact guard assistance;Minimum assistance;Assist x1                 ADL Intervention and task modifications:                                            Cognitive Retraining  Safety/Judgement: Awareness of environment;Good awareness of safety precautions; Insight into deficits        Functional Measure:  Barthel Index:      Bathin  Bladder: 10  Bowels: 10  Groomin  Dressin  Feeding: 10  Mobility: 0 (less than 50 yards)  Stairs: 0  Toilet Use: 5  Transfer (Bed to Chair and Back): 10  Total: 55         Barthel and G-code impairment scale:  Percentage of impairment CH  0% CI  1-19% CJ  20-39% CK  40-59% CL  60-79% CM  80-99% CN  100%   Barthel Score 0-100 100 99-80 79-60 59-40 20-39 1-19    0   Barthel Score 0-20 20 17-19 13-16 9-12 5-8 1-4 0      The Barthel ADL Index: Guidelines  1. The index should be used as a record of what a patient does, not as a record of what a patient could do. 2. The main aim is to establish degree of independence from any help, physical or verbal, however minor and for whatever reason. 3. The need for supervision renders the patient not independent. 4. A patient's performance should be established using the best available evidence. Asking the patient, friends/relatives and nurses are the usual sources, but direct observation and common sense are also important. However direct testing is not needed. 5. Usually the patient's performance over the preceding 24-48 hours is important, but occasionally longer periods will be relevant. 6. Middle categories imply that the patient supplies over 50 per cent of the effort. 7. Use of aids to be independent is allowed. Cristian Amin., Barthel, D.W. (0793). Functional evaluation: the Barthel Index. [facility] (14)2. Essie Haro yaneli SHANNON Storey, Deepthi Sykes.Batsheva., [facility] [place] [facility], [facility] ().  Measuring the change indisability after inpatient rehabilitation; comparison of the responsiveness of the Barthel Index and Functional [place] Measure. Journal of Neurology, Neurosurgery, and Psychiatry, 66(4), 121-887. MICAELA Mercedes, BRIAN Garcia, & Umair Cole M.A. (2004.) Assessment of post-stroke quality of life in cost-effectiveness studies: The usefulness of the Barthel Index and the EuroQoL-5D. Quality of Life Research, 13, 510-73         G codes: In compliance with CMSs Claims Based Outcome Reporting, the following G-code set was chosen for this patient based on their primary functional limitation being treated: The outcome measure chosen to determine the severity of the functional limitation was the Barthel Index with a score of 55/100 which was correlated with the impairment scale.       · Self Care:               - CURRENT STATUS:    CK - 40%-59% impaired, limited or restricted               - GOAL STATUS:           CI - 1%-19% impaired, limited or restricted               - D/C STATUS:                       ---------------To be determined---------------      Occupational Therapy Evaluation Charge Determination   History Examination Decision-Making   LOW Complexity : Brief history review  LOW Complexity : 1-3 performance deficits relating to physical, cognitive , or psychosocial skils that result in activity limitations and / or participation restrictions  LOW Complexity : No comorbidities that affect functional and no verbal or physical assistance needed to complete eval tasks       Based on the above components, the patient evaluation is determined to be of the following complexity level: LOW   Pain:  Pain Scale 1: Numeric (0 - 10)  Pain Intensity 1: 0              Activity Tolerance:   Fair  After treatment:   [X] Patient left in no apparent distress sitting up in recliner chair with feet elevated  [ ] Patient left in no apparent distress in bed  [X] Call bell left within reach  [X] Nursing notified  [ ] Caregiver present  [ ] Bed alarm activated      COMMUNICATION/EDUCATION:   The patients plan of care was discussed with: Physical Therapist, Care Manager, and Registered Nurse.  [X] Home safety education was provided and the patient/caregiver indicated understanding. [ ] Patient/family have participated as able in goal setting and plan of care. [X] Patient/family agree to work toward stated goals and plan of care. [ ] Patient understands intent and goals of therapy, but is neutral about his/her participation. [ ] Patient is unable to participate in goal setting and plan of care. This patients plan of care is appropriate for delegation to [facility] [place].      Thank you for this referral.  Willam Barnes OTR/L  Time Calculation: 24 mins

## 2017-03-21 NOTE — PROGRESS NOTES
Bedside and Verbal shift change report given to BRIDGET Mejia by Jane Castellano RN.  Report included the following information SBAR, Kardex, Intake/Output, MAR, Recent Results and Cardiac Rhythm NSR. ]

## 2017-03-21 NOTE — PROGRESS NOTES
Bedside and Verbal shift change report given to Tomás Winston, RN by Norma Rolon RN. Report included the following information SBAR, Kardex, Intake/Output, MAR, Recent Results and Cardiac Rhythm NSR\.

## 2017-03-21 NOTE — PROGRESS NOTES
Cardiology Progress Note            [address], [address] [place] [address]  [phone]    3/21/2017 12:25 PM    Admit Date: 3/17/2017    Admit Diagnosis: a fib;Recovery Needed in PACU;a fib;Pericardial effusion wi*    Subjective:     Federico Grit   denies chest pain.     Visit Vitals    /66 (BP 1 Location: Left arm, BP Patient Position: At rest)    Pulse 74    Temp 98 °F (36.7 °C)    Resp 16    Ht 5' 5\" (1.651 m)    Wt 235 lb 14.3 oz (107 kg)    SpO2 97%    BMI 39.25 kg/m2     Current Facility-Administered Medications   Medication Dose Route Frequency    Potassium - Redraw 2 hours after replacement 40 meq given  1 Each Other ONCE    cloNIDine HCl (CATAPRES) tablet 0.3 mg  0.3 mg Oral BID    polyethylene glycol (MIRALAX) packet 17 g  17 g Oral DAILY    bisacodyl (DULCOLAX) suppository 10 mg  10 mg Rectal DAILY PRN    sotalol (BETAPACE) tablet 80 mg  80 mg Oral Q12H    docusate sodium (COLACE) capsule 100 mg  100 mg Oral BID    rosuvastatin (CRESTOR) tablet 20 mg  20 mg Oral QHS    Sotalol (please measure the QT, RR and QRS intervals 2 hours after the dose)  1 Each Other DAILY    docusate sodium (COLACE) capsule 100 mg  100 mg Oral DAILY PRN    hydroCHLOROthiazide (HYDRODIURIL) tablet 25 mg  25 mg Oral DAILY    levothyroxine (SYNTHROID) tablet 75 mcg  75 mcg Oral ACB    verapamil ER (CALAN-SR) tablet 120 mg  120 mg Oral DAILY WITH BREAKFAST    lisinopril (PRINIVIL, ZESTRIL) tablet 10 mg  10 mg Oral DAILY    heparinized saline 2 units/mL 1,000 unit/500 mL infusion        potassium chloride SR (KLOR-CON 10) tablet 20 mEq  20 mEq Oral BID    fentaNYL citrate (PF) injection 12.5-50 mcg  12.5-50 mcg IntraVENous Multiple    midazolam (VERSED) injection 1-5 mg  1-5 mg IntraVENous Multiple    protamine 10 mg/mL injection        sodium chloride (NS) flush 5-10 mL  5-10 mL IntraVENous Q8H    sodium chloride (NS) flush 5-10 mL  5-10 mL IntraVENous PRN    acetaminophen (TYLENOL) tablet 650 mg  650 mg Oral Q4H PRN    HYDROcodone-acetaminophen (NORCO) 5-325 mg per tablet 1 Tab  1 Tab Oral Q4H PRN    aspirin delayed-release tablet 81 mg  81 mg Oral QPM    apixaban (ELIQUIS) tablet 5 mg  5 mg Oral BID    mupirocin (BACTROBAN) 2 % ointment   Both Nostrils BID         Objective:      Visit Vitals    /66 (BP 1 Location: Left arm, BP Patient Position: At rest)    Pulse 74    Temp 98 °F (36.7 °C)    Resp 16    Ht 5' 5\" (1.651 m)    Wt 235 lb 14.3 oz (107 kg)    SpO2 97%    BMI 39.25 kg/m2       Physical Exam:  Abdomen: soft, non-tender  Extremities: extremities normal  Heart: regular rate and rhythm  Lungs: clear to auscultation bilaterally  Pulses: 2+ and symmetric    Data Review:   Labs:    Recent Labs      03/21/17   0242  03/19/17   0437   WBC  7.0  11.7*   HGB  10.7*  11.3*   HCT  31.8*  33.9*   PLT  154  148*     Recent Labs      03/21/17   0242  03/20/17   0420  03/19/17   0437   NA  142  140  143   K  3.8  3.4*  3.2*   CL  108  109*  111*   CO2  25  23  22   GLU  101*  99  104*   BUN  14  15  19   CREA  1.13*  1.14*  1.32*   CA  9.1  7.9*  7.8*   MG  2.0  2.1  2.0       No results for input(s): TROIQ, CPK, CKMB in the last 72 hours. Intake/Output Summary (Last 24 hours) at 03/21/17 1225  Last data filed at 03/20/17 1731   Gross per 24 hour   Intake                0 ml   Output             1225 ml   Net            -1225 ml        Telemetry: nsr    Assessment:     Active Problems:    S/P ablation of atrial fibrillation (3/17/2017)      Overview: 3/17/17      Pericardial effusion with cardiac tamponade (3/18/2017)        Plan:     Concha Torres is in sinus on sotalol. Will cont eliquis and low dose sotalol. Appreciate case management input for dispo.     Crystal Barrera MD, [facility]    3/21/2017

## 2017-03-22 VITALS
TEMPERATURE: 98.3 F | HEART RATE: 67 BPM | DIASTOLIC BLOOD PRESSURE: 57 MMHG | HEIGHT: 65 IN | WEIGHT: 235.89 LBS | RESPIRATION RATE: 18 BRPM | BODY MASS INDEX: 39.3 KG/M2 | OXYGEN SATURATION: 96 % | SYSTOLIC BLOOD PRESSURE: 105 MMHG

## 2017-03-22 LAB
ANION GAP BLD CALC-SCNC: 10 MMOL/L (ref 5–15)
BUN SERPL-MCNC: 15 MG/DL (ref 6–20)
BUN/CREAT SERPL: 13 (ref 12–20)
CALCIUM SERPL-MCNC: 9.3 MG/DL (ref 8.5–10.1)
CHLORIDE SERPL-SCNC: 106 MMOL/L (ref 97–108)
CO2 SERPL-SCNC: 24 MMOL/L (ref 21–32)
CREAT SERPL-MCNC: 1.12 MG/DL (ref 0.55–1.02)
GLUCOSE SERPL-MCNC: 98 MG/DL (ref 65–100)
MAGNESIUM SERPL-MCNC: 2 MG/DL (ref 1.6–2.4)
POTASSIUM SERPL-SCNC: 3.9 MMOL/L (ref 3.5–5.1)
SODIUM SERPL-SCNC: 140 MMOL/L (ref 136–145)

## 2017-03-22 PROCEDURE — 74011250637 HC RX REV CODE- 250/637: Performed by: INTERNAL MEDICINE

## 2017-03-22 PROCEDURE — 97116 GAIT TRAINING THERAPY: CPT

## 2017-03-22 PROCEDURE — 36415 COLL VENOUS BLD VENIPUNCTURE: CPT | Performed by: INTERNAL MEDICINE

## 2017-03-22 PROCEDURE — 83735 ASSAY OF MAGNESIUM: CPT | Performed by: INTERNAL MEDICINE

## 2017-03-22 PROCEDURE — 74011250637 HC RX REV CODE- 250/637: Performed by: NURSE PRACTITIONER

## 2017-03-22 PROCEDURE — 80048 BASIC METABOLIC PNL TOTAL CA: CPT | Performed by: INTERNAL MEDICINE

## 2017-03-22 RX ORDER — SOTALOL HYDROCHLORIDE 80 MG/1
40 TABLET ORAL EVERY 12 HOURS
Qty: 30 TAB | Refills: 2 | Status: SHIPPED | OUTPATIENT
Start: 2017-03-22 | End: 2017-06-15 | Stop reason: SDUPTHER

## 2017-03-22 RX ORDER — POTASSIUM CHLORIDE 1.5 G/1.77G
40 POWDER, FOR SOLUTION ORAL
Status: COMPLETED | OUTPATIENT
Start: 2017-03-22 | End: 2017-03-22

## 2017-03-22 RX ADMIN — POLYETHYLENE GLYCOL 3350 17 G: 17 POWDER, FOR SOLUTION ORAL at 08:17

## 2017-03-22 RX ADMIN — APIXABAN 5 MG: 5 TABLET, FILM COATED ORAL at 08:17

## 2017-03-22 RX ADMIN — SOTALOL HYDROCHLORIDE 40 MG: 80 TABLET ORAL at 08:17

## 2017-03-22 RX ADMIN — POTASSIUM CHLORIDE 20 MEQ: 750 TABLET, FILM COATED, EXTENDED RELEASE ORAL at 08:17

## 2017-03-22 RX ADMIN — HYDROCHLOROTHIAZIDE 25 MG: 25 TABLET ORAL at 08:17

## 2017-03-22 RX ADMIN — MUPIROCIN: 20 OINTMENT TOPICAL at 08:18

## 2017-03-22 RX ADMIN — Medication 10 ML: at 14:06

## 2017-03-22 RX ADMIN — LEVOTHYROXINE SODIUM 75 MCG: 75 TABLET ORAL at 07:18

## 2017-03-22 RX ADMIN — CLONIDINE HYDROCHLORIDE 0.3 MG: 0.1 TABLET ORAL at 08:17

## 2017-03-22 RX ADMIN — Medication 10 ML: at 05:55

## 2017-03-22 RX ADMIN — LISINOPRIL 10 MG: 5 TABLET ORAL at 08:17

## 2017-03-22 RX ADMIN — VERAPAMIL HYDROCHLORIDE 120 MG: 120 TABLET, FILM COATED, EXTENDED RELEASE ORAL at 08:17

## 2017-03-22 RX ADMIN — DOCUSATE SODIUM 100 MG: 100 CAPSULE, LIQUID FILLED ORAL at 08:17

## 2017-03-22 RX ADMIN — POTASSIUM CHLORIDE 40 MEQ: 1.5 POWDER, FOR SOLUTION ORAL at 14:06

## 2017-03-22 NOTE — DISCHARGE SUMMARY
[address] [place] [address]  [phone]     Cardiology Discharge Summary     Patient ID:  Jamison Talavera  [id number]  28 y.o.  1940    Admit Date: 3/17/2017    Discharge Date: 3/22/2017     Admitting Physician: Es Higgins MD     Discharge Physician: Eva Gandhi NP/ Dr. Inez Madrigal    Admission Diagnoses:   a fib  Recovery Needed in PACU  a fib  Pericardial effusion with cardiac tamponade    Discharge Diagnoses:    Active Problems:    S/P ablation of atrial fibrillation (3/17/2017)      Overview: 3/17/17      Pericardial effusion with cardiac tamponade (3/18/2017)        Discharge Condition: Good    Cardiology Procedures this Admission:  a-fib ablation; drained pericardial effusion    Patient Active Problem List    Diagnosis Date Noted    Pericardial effusion with cardiac tamponade 03/18/2017    S/P ablation of atrial fibrillation 03/17/2017    Acquired hypothyroidism 12/14/2015    ACP (advance care planning) 12/14/2015    HTN (hypertension) 07/21/2015    Thyroid activity decreased 04/14/2015    Atrial fibrillation ([address] 03/16/2015    A-fib ([address] 03/10/2015    Angina, class III ([address] 01/27/2015    Morbid obesity ([address]     Hyperlipidemia 07/14/2014    Hypokalemia 07/14/2014    Encounter for screening colonoscopy 08/21/2013    History of rectal bleeding 08/21/2013    Postmenopausal bleeding 10/18/2011    Endometrial polyp 10/18/2011      Past Medical History:   Diagnosis Date    Arthritis     right knee, left shoulder    Diverticulosis     Frequency of urination     High cholesterol     Hypertension     Morbid obesity ([address]     Thyroid disease     hypothyroid    Unspecified adverse effect of anesthesia     low BP      Social History     Social History    Marital status:      Spouse name: N/A    Number of children: N/A    Years of education: N/A     Social History Main Topics    Smoking status: Never Smoker    Smokeless tobacco: Never Used    Alcohol use No    Drug use: No    Sexual activity: Not Asked     Other Topics Concern    None     Social History Narrative     Allergies   Allergen Reactions    Adhesive Hives    Amoxicillin Unknown (comments)    Lipitor [Atorvastatin] Myalgia     Gets the flu      Family History   Problem Relation Age of Onset    Alzheimer Mother     Heart Disease Mother     Heart Disease Father     Hypertension Father     Cancer Paternal Aunt     Diabetes Paternal Grandmother         Hospital Course: Sylvain Peterson is a 78 y.o. female who underwent an elective a-fib ablation on 3/17/17. Her recovery was complicated by development of a pericardial effusion. The patient began to develop symptoms of tamponade, so her pericardial effusion was drained by Dr. Sofia Garcia. Follow-up ECHOs showed improvement and the patient had no further symptoms. A-fib s/p ablation:  SR after ablation. Continue new dose of sotalol, potassium replacement, and eliquis. HTN:  Patient's BP well controlled at discharged. Patient was leary of increase in clonidine, so dose was returned to patient's home dose at discharge, and any further changes can occur during out-patient follow-up. Continue home clonidine, sotalol, verapamil, lisinopril. No other changes to home medications. Consults: None    Visit Vitals    /57 (BP 1 Location: Right arm, BP Patient Position: Sitting)    Pulse 67    Temp 98.3 °F (36.8 °C)    Resp 18    Ht 5' 5\" (1.651 m)    Wt 107 kg (235 lb 14.3 oz)    SpO2 96%    BMI 39.25 kg/m2       Physical Exam  Abdomen: obese, soft, non-tender.  Bowel sounds normal.   Extremities: extremities normal, trace edema, pulses palpable  Heart: regular rate and rhythm, no murmur, S3/JVD  Lungs: clear to auscultation bilaterally  Neck: supple, symmetrical, trachea midline,   Neurologic: Grossly normal    Labs:   Recent Labs      03/21/17   0242   WBC  7.0   HGB  10.7*   HCT  31.8*   PLT  154     Recent Labs 03/22/17   0252  03/21/17   1500  03/21/17   0242  03/20/17   0420   NA  140   --   142  140   K  3.9  4.1  3.8  3.4*   CL  106   --   108  109*   CO2  24   --   25  23   GLU  98   --   101*  99   BUN  15   --   14  15   CREA  1.12*   --   1.13*  1.14*   CA  9.3   --   9.1  7.9*   MG  2.0   --   2.0  2.1       No results for input(s): TROIQ, CPK, CKMB in the last 72 hours. EKG: SR  Echo: (3/17) EF 50-55%; right atrium compressing. Moderate pericardial effusion was identified circumferential to the heart. No evidence of hemodynampic compromise. (3/18) a large pericardial effusion was identified. Features were consistent with tamponade physiology. (3/18)  EF 55-60%; a small pericardial effusion was identified                (3/20) EF 60%; no wall motion abnormalities; a trivial pericardial effusion was identified. Disposition: rehab      Patient Instructions:   Current Discharge Medication List      CONTINUE these medications which have CHANGED    Details   sotalol (BETAPACE) 80 mg tablet Take 0.5 Tabs by mouth every twelve (12) hours. Qty: 30 Tab, Refills: 2         CONTINUE these medications which have NOT CHANGED    Details   rosuvastatin (CRESTOR) 20 mg tablet Take 1 Tab by mouth nightly. Qty: 30 Tab, Refills: 1      lisinopril (PRINIVIL, ZESTRIL) 10 mg tablet Daily  Qty: 90 Tab, Refills: 1      KLOR-CON 10 10 mEq tablet TAKE 2 TABLETS TWICE A DAY  Refills: 3      verapamil ER (CALAN-SR) 120 mg tablet TAKE 1 TABLET BY MOUTH EVERY MORNING FOR BLOOD PRESSURE  Qty: 90 Tab, Refills: 2    Associated Diagnoses: Essential hypertension      levothyroxine (SYNTHROID) 75 mcg tablet Take 1 Tab by mouth Daily (before breakfast). Qty: 90 Tab, Refills: 3      cloNIDine HCl (CATAPRES) 0.2 mg tablet Take 1 Tab by mouth two (2) times a day. Qty: 180 Tab, Refills: 3      hydrochlorothiazide (HYDRODIURIL) 25 mg tablet Take 1 Tab by mouth daily.   Qty: 90 Tab, Refills: 3      aspirin delayed-release 81 mg tablet Take 81 mg by mouth every evening. apixaban (ELIQUIS) 5 mg tablet Take 1 Tab by mouth two (2) times a day. Qty: 60 Tab, Refills: 6      CALCIUM CARBONATE/VITAMIN D3 (CALTRATE 600 + D PO) Take 1 Tab by mouth two (2) times a day. MULTIVITAMIN W-MINERALS/LUTEIN (CENTRUM SILVER PO) Take 1 Tab by mouth daily (after lunch). Referenced discharge instructions provided by nursing for diet and activity. Follow up with: Dr. Shelli Gooden in 1 week. Signed:  Valorie Robertson NP  3/22/2017  3:18 PM     Pt seen and examined. Agree with assessment and plan as documented above.     Hernandez Bradley MD, [facility]

## 2017-03-22 NOTE — CARDIO/PULMONARY
Cardiopulmonary Rehab Nursing Entry:     Chart reviewed and pt visited. Pt 77 yo admitted with a-fib, a/p ablation. Met with pt sitting up in chair. She denied questions regarding post-procedure instructions. Current d/c pending placement in short-term rehab. Pt is well versed in medications and doses that she will be on. No additional concerns offered.

## 2017-03-22 NOTE — PROGRESS NOTES
Cardiology Progress Note            [address] [place], [place] [address]Becky, [address] [facility]  [phone]    3/22/2017 10:03 AM    Admit Date: 3/17/2017    Admit Diagnosis: a fib;Recovery Needed in PACU;a fib;Pericardial effusion wi*    Subjective:     Veola Gutting   denies chest pain.     Visit Vitals    /69 (BP 1 Location: Left arm, BP Patient Position: Sitting)    Pulse 66    Temp 98.2 °F (36.8 °C)    Resp 18    Ht 5' 5\" (1.651 m)    Wt 235 lb 14.3 oz (107 kg)    SpO2 98%    BMI 39.25 kg/m2     Current Facility-Administered Medications   Medication Dose Route Frequency    cloNIDine HCl (CATAPRES) tablet 0.3 mg  0.3 mg Oral BID    polyethylene glycol (MIRALAX) packet 17 g  17 g Oral DAILY    bisacodyl (DULCOLAX) suppository 10 mg  10 mg Rectal DAILY PRN    sotalol (BETAPACE) tablet 40 mg  40 mg Oral Q12H    docusate sodium (COLACE) capsule 100 mg  100 mg Oral BID    rosuvastatin (CRESTOR) tablet 20 mg  20 mg Oral QHS    Sotalol (please measure the QT, RR and QRS intervals 2 hours after the dose)  1 Each Other DAILY    hydroCHLOROthiazide (HYDRODIURIL) tablet 25 mg  25 mg Oral DAILY    levothyroxine (SYNTHROID) tablet 75 mcg  75 mcg Oral ACB    verapamil ER (CALAN-SR) tablet 120 mg  120 mg Oral DAILY WITH BREAKFAST    lisinopril (PRINIVIL, ZESTRIL) tablet 10 mg  10 mg Oral DAILY    heparinized saline 2 units/mL 1,000 unit/500 mL infusion        potassium chloride SR (KLOR-CON 10) tablet 20 mEq  20 mEq Oral BID    fentaNYL citrate (PF) injection 12.5-50 mcg  12.5-50 mcg IntraVENous Multiple    midazolam (VERSED) injection 1-5 mg  1-5 mg IntraVENous Multiple    protamine 10 mg/mL injection        sodium chloride (NS) flush 5-10 mL  5-10 mL IntraVENous Q8H    sodium chloride (NS) flush 5-10 mL  5-10 mL IntraVENous PRN    acetaminophen (TYLENOL) tablet 650 mg  650 mg Oral Q4H PRN    HYDROcodone-acetaminophen (NORCO) 5-325 mg per tablet 1 Tab  1 Tab Oral Q4H PRN    aspirin delayed-release tablet 81 mg  81 mg Oral QPM    apixaban (ELIQUIS) tablet 5 mg  5 mg Oral BID    mupirocin (BACTROBAN) 2 % ointment   Both Nostrils BID         Objective:      Visit Vitals    /69 (BP 1 Location: Left arm, BP Patient Position: Sitting)    Pulse 66    Temp 98.2 °F (36.8 °C)    Resp 18    Ht 5' 5\" (1.651 m)    Wt 235 lb 14.3 oz (107 kg)    SpO2 98%    BMI 39.25 kg/m2       Physical Exam:  Abdomen: soft, non-tender  Extremities: extremities normal  Heart: regular rate and rhythm  Lungs: clear to auscultation bilaterally  Pulses: 2+ and symmetric    Data Review:   Labs:    Recent Labs      03/21/17   0242   WBC  7.0   HGB  10.7*   HCT  31.8*   PLT  154     Recent Labs      03/22/17   0252  03/21/17   1500  03/21/17   0242  03/20/17   0420   NA  140   --   142  140   K  3.9  4.1  3.8  3.4*   CL  106   --   108  109*   CO2  24   --   25  23   GLU  98   --   101*  99   BUN  15   --   14  15   CREA  1.12*   --   1.13*  1.14*   CA  9.3   --   9.1  7.9*   MG  2.0   --   2.0  2.1       No results for input(s): TROIQ, CPK, CKMB in the last 72 hours. Intake/Output Summary (Last 24 hours) at 03/22/17 1003  Last data filed at 03/22/17 0401   Gross per 24 hour   Intake             1200 ml   Output                0 ml   Net             1200 ml        Telemetry: nsr    Assessment:     Active Problems:    S/P ablation of atrial fibrillation (3/17/2017)      Overview: 3/17/17      Pericardial effusion with cardiac tamponade (3/18/2017)        Plan:     Cuba Pereira is doing well. Her cr is at baseline. Repeat echo demonstrates nl lvef. She is on oac.  Discharge is pending placement per case management    Marvin Puckett MD, [facility]    3/22/2017

## 2017-03-22 NOTE — PROGRESS NOTES
[facility] declined patient for she was too high level for the program. Patient still wishes to go to rehab as she reports being unsteady and has no support at home. [place] of choice provided and referral placed to [facility] per patient request. [facility] has accepted patient and has a bed today. Patient is arranging transport to SNF    Nursing to call report to [phone]    Care Management Interventions  PCP Verified by CM: Yes  Mode of Transport at Discharge:  Other (see comment)  Transition of Care Consult (CM Consult): SNF ([facility])  MyChart Signup: No  Discharge Durable Medical Equipment: No  Physical Therapy Consult: Yes  Occupational Therapy Consult: Yes  Speech Therapy Consult: No  Current Support Network: Lives Alone  Confirm Follow Up Transport: Family  Plan discussed with Pt/Family/Caregiver: Yes  Freedom of Choice Offered: Yes  Discharge Location  Discharge Placement: Skilled nursing facility

## 2017-03-22 NOTE — PROGRESS NOTES
Nutrition Services      Nutrition Screen:  Wt Readings from Last 10 Encounters:   03/17/17 107 kg (235 lb 14.3 oz)   02/28/17 104.8 kg (231 lb 1.6 oz)   01/17/17 107.3 kg (236 lb 9.6 oz)   10/18/16 106.6 kg (235 lb)   08/01/16 107 kg (235 lb 12.8 oz)   07/12/16 107.2 kg (236 lb 6.4 oz)   01/07/16 108.3 kg (238 lb 12.8 oz)   12/14/15 106.6 kg (235 lb)   10/08/15 108 kg (238 lb)   07/21/15 108.6 kg (239 lb 6.4 oz)     Body mass index is 39.25 kg/(m^2). Supplements:                        _____ ordered ______  declined. __ __  Pt is nutritionally stable at this time, will rescreen in 7 days. __x__    Pt is at nutritional risk and will be rescreened in 3-6 days. __ __  Pt is at moderate or high nutritional risk, will refer to RD for assessment.        Rachel Rosenberg  Dietetic Technician, Registered

## 2017-03-22 NOTE — PROGRESS NOTES
[facility] to [address] [place] [address]                                                                        78 y.o.   female    Kelly [address]   Room: [address] [place] [address] 2 CARDIOPULMONARY CARE  Unit Phone# :  [phone]      [address]  [facility] [address] [facility]  [address] [address]  Dept: [phone]  Loc: [phone]                    SITUATION     Admitted:  3/17/2017         Attending Provider:  Mariah Younger MD       Consultations:  None    PCP:  Tim Grace MD   [phone]    Treatment Team: Attending Provider: Mariah Younger MD; Care Manager: SHANKAR Whiting    Admitting Dx:  a fib  Recovery Needed in PACU  a fib  Pericardial effusion with cardiac tamponade       Principal Problem: <principal problem not specified>    5 Days Post-Op of   * No procedures listed *   BY: * No surgeons listed *             ON: * No dates entered *                  Code Status: Full Code                Advance Directives:   Advance Care Planning 3/17/2017   Patient's Healthcare Decision Maker is: Legal Next of Jared 71   Primary Decision Maker Name -   Primary Decision [facility] Phone Number -   Primary Decision Maker Relationship to Patient -   Secondary Decision Maker Name Thuy Ward (sister)   Confirm Advance Directive None   Patient Would Like to Complete Advance Directive No    (Send w/patient)   No Doesnt Have       Isolation:  There are currently no Active Isolations       MDRO: No current active infections    Pain Medications given:  N/A        Special Equipment needed: yes  Type of equipment: walker       BACKGROUND     Allergies:   Allergies   Allergen Reactions    Adhesive Hives    Amoxicillin Unknown (comments)    Lipitor [Atorvastatin] Myalgia     Gets the flu       Past Medical History:   Diagnosis Date    Arthritis     right knee, left shoulder    Diverticulosis     Frequency of urination     High cholesterol     Hypertension     Morbid obesity ([facility] [address]     Thyroid disease     hypothyroid    Unspecified adverse effect of anesthesia     low BP       Past Surgical History:   Procedure Laterality Date    BREAST SURGERY PROCEDURE UNLISTED  1982    breast bx rt    CARDIAC SURG PROCEDURE UNLIST  01/2015    cardiac catheterization, no intervention, medical management    HX GYN      myomectomy on uterus    HX GYN  2011    hystereoscopy D&C    HX LYMPH NODE DISSECTION      biopsy    HX OTHER SURGICAL      lymph node bx       Prescriptions Prior to Admission   Medication Sig    sotalol (BETAPACE) 120 mg tablet TAKE 1 TAB BY MOUTH EVERY TWELVE (12) HOURS.  rosuvastatin (CRESTOR) 20 mg tablet Take 1 Tab by mouth nightly.  lisinopril (PRINIVIL, ZESTRIL) 10 mg tablet Daily    KLOR-CON 10 10 mEq tablet TAKE 2 TABLETS TWICE A DAY    verapamil ER (CALAN-SR) 120 mg tablet TAKE 1 TABLET BY MOUTH EVERY MORNING FOR BLOOD PRESSURE    levothyroxine (SYNTHROID) 75 mcg tablet Take 1 Tab by mouth Daily (before breakfast).  cloNIDine HCl (CATAPRES) 0.2 mg tablet Take 1 Tab by mouth two (2) times a day.  hydrochlorothiazide (HYDRODIURIL) 25 mg tablet Take 1 Tab by mouth daily.  aspirin delayed-release 81 mg tablet Take 81 mg by mouth every evening.  apixaban (ELIQUIS) 5 mg tablet Take 1 Tab by mouth two (2) times a day.  CALCIUM CARBONATE/VITAMIN D3 (CALTRATE 600 + D PO) Take 1 Tab by mouth two (2) times a day.  MULTIVITAMIN W-MINERALS/LUTEIN (CENTRUM SILVER PO) Take 1 Tab by mouth daily (after lunch). Hard scripts included in transfer packet no    Vaccinations:    Immunization History   Administered Date(s) Administered    Influenza Vaccine 11/18/2014    Influenza Vaccine (Quad) 12/14/2015    Influenza Vaccine (Quad) PF 10/18/2016    Pneumococcal Vaccine (Unspecified Type) 11/19/2013    Tdap 04/14/2015       Readmission Risks:    Known Risks:          The Charlson CoMorbitiy Index tool is an evidenced based tool that has more automatic generated information. The tool looks at many different items such as the age of the patient, how many times they were admitted in the last calendar year, current length of stay in the hospital and their diagnosis. All of these items are pulled automatically from information documented in the chart from various places and will generate a score that predicts whether a patient is at low (less than 13), medium (13-20) or high (21 or greater) risk of being readmitted.         ASSESSMENT                Temp: 98.3 °F (36.8 °C) (03/22/17 1518) Pulse (Heart Rate): 67 (03/22/17 1518)     Resp Rate: 18 (03/22/17 1518)           BP: 105/57 (03/22/17 1518)     O2 Sat (%): 96 % (03/22/17 1518)     Weight: 107 kg (235 lb 14.3 oz)    Height: 5' 5\" (165.1 cm) (03/17/17 1231)       If above not within 1 hour of discharge:    BP:_____  P:____  R:____ T:_____ O2 Sat: ___%  O2: ______    Active Orders   Diet    DIET CARDIAC Regular         Orientation: oriented to time, place, person and situation     Active Behaviors: None                                   Active Lines/Drains:  (Peg Tube / Anderson / CL or S/L?): no    Urinary Status: Voiding     Last BM: Last Bowel Movement Date: 03/22/17     Skin Integrity: Incision (comment)   Wound Groin Left-DRESSING STATUS: Clean, dry, and intact  Wound Groin Right-DRESSING STATUS: Clean, dry, and intact  Wound Abdomen Mid-DRESSING STATUS: Clean, dry, and intact    Wound Groin Left-DRESSING TYPE: Open to air  Wound Groin Right-DRESSING TYPE: Open to air  Wound Abdomen Mid-DRESSING TYPE: Foam    Mobility: Slightly limited   Weight Bearing Status: WBAT (Weight Bearing as Tolerated)      Gait Training  Assistive Device: Walker, rolling, Gait belt  Ambulation - Level of Assistance: Contact guard assistance  Distance (ft): 160 Feet (ft)         Lab Results   Component Value Date/Time    Glucose 98 03/22/2017 02:52 AM    Hemoglobin A1c 5.8 10/12/2016 08:50 AM    INR 0.9 03/07/2017 02:40 PM INR 0.9 03/16/2015 10:10 AM    HGB 10.7 03/21/2017 02:42 AM    HGB 11.3 03/19/2017 04:37 AM        RECOMMENDATION     See After Visit Summary (AVS) for:  · Discharge instructions  · After [address]   · Special equipment needed (entered pre-discharge by Care Management)  · Medication Reconciliation    · Follow up Appointment(s)         Report given/sent by:   Bhupendra Sam RN                    Verbal report given to: Mayelin Jarquin RN         Estimated discharge time:  3/22/2017 at 36

## 2017-03-22 NOTE — PROGRESS NOTES
went into the room because pt needed help with her chair. 2 Yazidi members of the pt's Yazidi, [facility], were present. she shared of how her bronson has helped through this time. Pt's Yazidi is supporting her well. Advised of  availability and assurance of continued spiritual support from chaplains if needed. Clyde Avina M.S.   Spiritual Care Department  If need arise please call LOTUS ([phone])

## 2017-03-22 NOTE — PROGRESS NOTES
1930 Received report from Kiersten Beltran RN. Assumed patient care. [facility] SHIFT NURSING NOTE    Bedside shift change report given to Hugo Vasquez RN (oncoming nurse) by dR Mendoza RN (offgoing nurse). Report included the following information SBAR, Kardex, Intake/Output, MAR, Recent Results and Cardiac Rhythm NSR.    SHIFT SUMMARY:         Admission Date 3/17/2017   Admission Diagnosis a fib  Recovery Needed in PACU  a fib  Pericardial effusion with cardiac tamponade   Consults None        Consults   [x] PT   [x] OT   [] Speech   [] Palliative      [] Hospice    [] Case Management   [] None   Cardiac Monitoring   [x] Yes   [] No     Antibiotics   [] Yes   [x] No   GI Prophylaxis  (Ex: Protonix, Pepcid, etc,.)   [] Yes   [x] No          DVT Prophylaxis   SCDs:             Papa stockings:         [x] Medication (Ex: Lovenox, Eliquis, Brilinta, Coumadin,  Heparin, etc..)   [] Contraindicated   [] No VTE needed       Urinary Catheter [REMOVED] Urinary Catheter 03/17/17 Anderson-Criteria for Appropriate Use: Strict I/Os     [REMOVED] Urinary Catheter 03/17/17 Anderson-Urine Output (mL): 225 ml     LDAs               Peripheral IV 03/21/17 Left Antecubital (Active)   Site Assessment Clean, dry, & intact 3/22/2017  4:01 AM   Phlebitis Assessment 0 3/22/2017  4:01 AM   Infiltration Assessment 0 3/22/2017  4:01 AM   Dressing Status Clean, dry, & intact 3/22/2017  4:01 AM   Dressing Type Tape;Transparent 3/22/2017  4:01 AM   Hub Color/Line Status Blue;Flushed;Patent 3/22/2017  4:01 AM                      I/Os   Intake/Output Summary (Last 24 hours) at 03/22/17 0612  Last data filed at 03/22/17 0401   Gross per 24 hour   Intake             1200 ml   Output                0 ml   Net             1200 ml         Activity Level Activity Level: Up with Assistance     Activity Assistance: Partial (one person)   Diet Active Orders   Diet    DIET CARDIAC Regular      Purposeful Rounding every 1-2 hour?    [x] Yes    Luis Score  Total Score: 3   Bed Alarm (If score 3 or >)   [x] Yes    [] Refused (See signed refusal form in chart)   Yonathan Score  Yonathan Score: 18       Yonathan Score (if score 14 or less)   [] PMT consult   [] Nutrition consult   [] Wound Care consult      []  Specialty bed         Influenza Vaccine Received Flu Vaccine for Current Season (usually Sept-March): Yes               Needs prior to discharge:   Home O2 required:    [] Yes   [x] No     If yes, how much O2 required?     Other:    Last Bowel Movement Date: 03/21/17   Readmission Risk Assessment Tool Score Low Risk            12       Total Score        3 Relationship with PCP    4 More than 1 Admission in calendar year    5 Patient Insurance is Medicare, Medicaid or Self Pay        Criteria that do not apply:    Patient Living Status    Patient Length of Stay > 5    Charlson Comorbidity Score       Expected Length of Stay 4d 9h   Actual Length of Stay 3

## 2017-03-23 ENCOUNTER — PATIENT OUTREACH (OUTPATIENT)
Dept: INTERNAL MEDICINE CLINIC | Age: 77
End: 2017-03-23

## 2017-03-23 NOTE — PROGRESS NOTES
NN DK IP post hospitalization      Patient referral received from [facility]. Patient admitted to [facility] 3/17/17-3/22/17 with diagnosis of Atrial Fibrillation. Patient is followed by [facility];per EMR, patient outreach attempt made today by Cardiology NN for DK follow up. This note will not be viewable in [address]

## 2017-03-28 ENCOUNTER — OFFICE VISIT (OUTPATIENT)
Dept: CARDIOLOGY CLINIC | Age: 77
End: 2017-03-28

## 2017-03-28 VITALS
RESPIRATION RATE: 16 BRPM | HEIGHT: 65 IN | WEIGHT: 233.8 LBS | BODY MASS INDEX: 38.95 KG/M2 | OXYGEN SATURATION: 97 % | DIASTOLIC BLOOD PRESSURE: 88 MMHG | HEART RATE: 76 BPM | SYSTOLIC BLOOD PRESSURE: 142 MMHG

## 2017-03-28 DIAGNOSIS — I10 ESSENTIAL HYPERTENSION: ICD-10-CM

## 2017-03-28 DIAGNOSIS — I48.91 ATRIAL FIBRILLATION, UNSPECIFIED TYPE (HCC): Primary | ICD-10-CM

## 2017-03-28 RX ORDER — SOTALOL HYDROCHLORIDE 120 MG/1
TABLET ORAL
Refills: 11 | COMMUNITY
Start: 2017-03-01 | End: 2017-03-28

## 2017-03-28 NOTE — MR AVS SNAPSHOT
Visit Information Date & Time Provider Department Dept. Phone Encounter #  
 3/28/2017  1:15 PM Nikolas Pate, [address] [facility] [phone] [id number] Your Appointments 4/18/2017 11:15 AM  
ROUTINE CARE with Jaida Srini, [address] [place] [address],[address]) Appt Note: 6 month follow up  
 [address] [address]  
[address] [facility] [address] [place] [address] Upcoming Health Maintenance Date Due  
 MEDICARE YEARLY EXAM 12/14/2016 GLAUCOMA SCREENING Q2Y 8/1/2018 Pneumococcal 65+ Low/Medium Risk (2 of 2 - PPSV23) 11/19/2018 COLONOSCOPY 8/21/2023 DTaP/Tdap/Td series (2 - Td) 4/14/2025 Allergies as of 3/28/2017  Review Complete On: 3/28/2017 By: Foreign Gold NP Severity Noted Reaction Type Reactions Adhesive  07/14/2014    Hives Amoxicillin  10/12/2011    Unknown (comments) Lipitor [Atorvastatin]  07/14/2014   Side Effect Myalgia Gets the flu Current Immunizations  Reviewed on 4/14/2015 Name Date Influenza Vaccine 11/18/2014 Influenza Vaccine (Quad) 12/14/2015 12:17 PM  
 Influenza Vaccine (Quad) PF 10/18/2016 Pneumococcal Vaccine (Unspecified Type) 11/19/2013 Tdap 4/14/2015 Not reviewed this visit You Were Diagnosed With   
  
 Codes Comments Atrial fibrillation, unspecified type ([facility]    -  Primary ICD-10-CM: I48.91 
ICD-9-CM: 427.31 Vitals BP Pulse Resp Height(growth percentile) Weight(growth percentile) SpO2  
 142/88 (BP 1 Location: Left arm, BP Patient Position: Sitting) 76 16 5' 5\" (1.651 m) 233 lb 12.8 oz (106.1 kg) 97% BMI OB Status Smoking Status 38.91 kg/m2 Postmenopausal Never Smoker Vitals History BMI and BSA Data Body Mass Index Body Surface Area  
 38.91 kg/m 2 2.21 m 2 Preferred Pharmacy Pharmacy Name Phone [id number]- [place], [place] - [address] [phone] Your Updated Medication List  
  
   
This list is accurate as of: 3/28/17  2:08 PM.  Always use your most recent med list.  
  
  
  
  
 apixaban 5 mg tablet Commonly known as:  Sejal Loll Take 1 Tab by mouth two (2) times a day. CALTRATE 600 + D PO Take 1 Tab by mouth two (2) times a day. CENTRUM SILVER PO Take 1 Tab by mouth daily (after lunch). cloNIDine HCl 0.2 mg tablet Commonly known as:  CATAPRES Take 1 Tab by mouth two (2) times a day. hydroCHLOROthiazide 25 mg tablet Commonly known as:  HYDRODIURIL Take 1 Tab by mouth daily. KLOR-CON 10 10 mEq tablet Generic drug:  potassium chloride SR  
TAKE 2 TABLETS TWICE A DAY  
  
 levothyroxine 75 mcg tablet Commonly known as:  SYNTHROID Take 1 Tab by mouth Daily (before breakfast). lisinopril 10 mg tablet Commonly known as:  Darlene Pinch Daily  
  
 rosuvastatin 20 mg tablet Commonly known as:  CRESTOR Take 1 Tab by mouth nightly. sotalol 80 mg tablet Commonly known as:  Alphia Sin Take 0.5 Tabs by mouth every twelve (12) hours. verapamil  mg tablet Commonly known as:  CALAN-SR  
TAKE 1 TABLET BY MOUTH EVERY MORNING FOR BLOOD PRESSURE We Performed the Following AMB POC EKG ROUTINE W/ 12 LEADS, INTER & REP [11021 CPT(R)] Introducing [facility] [place] [facility]! Dear Haresh Alvarado: Thank you for requesting a [employer] account. Our records indicate that you already have an active [employer] account. You can access your account anytime at [email or web address] Did you know that you can access your hospital and ER discharge instructions at any time in [employer]? You can also review all of your test results from your hospital stay or ER visit. Additional Information If you have questions, please visit the Frequently Asked Questions section of the [email or web address] com website at [email or web address] Remember, [email or web address]. com is NOT to be used for urgent needs. For medical emergencies, dial 911. Now available from your iPhone and Android! Please provide this summary of care documentation to your next provider. Your primary care clinician is listed as Anaid Jennings. If you have any questions after today's visit, please call [phone].

## 2017-03-28 NOTE — PROGRESS NOTES
Chief Complaint   Patient presents with   [facility] Follow Up     s/p ablation

## 2017-03-28 NOTE — PROGRESS NOTES
Subjective:      Dequan Davalos is a 78 y.o. female is here for f/u s/p AF ablation. She denies cardiac complaints but is concerned that her blood pressure is too high today. The patient denies chest pain/ shortness of breath, orthopnea, PND, LE edema, palpitations, syncope, presyncope or fatigue.        Patient Active Problem List    Diagnosis Date Noted    Pericardial effusion with cardiac tamponade 03/18/2017    S/P ablation of atrial fibrillation 03/17/2017    Acquired hypothyroidism 12/14/2015    ACP (advance care planning) 12/14/2015    HTN (hypertension) 07/21/2015    Thyroid activity decreased 04/14/2015    Atrial fibrillation ([address] 03/16/2015    A-fib ([address] 03/10/2015    Angina, class III ([address] 01/27/2015    Morbid obesity ([address]     Hyperlipidemia 07/14/2014    Hypokalemia 07/14/2014    Encounter for screening colonoscopy 08/21/2013    History of rectal bleeding 08/21/2013    Postmenopausal bleeding 10/18/2011    Endometrial polyp 10/18/2011      Darrion Vieira MD  Past Medical History:   Diagnosis Date    Arthritis     right knee, left shoulder    Diverticulosis     Frequency of urination     High cholesterol     Hypertension     Morbid obesity ([address]     Thyroid disease     hypothyroid    Unspecified adverse effect of anesthesia     low BP      Past Surgical History:   Procedure Laterality Date    BREAST SURGERY PROCEDURE UNLISTED  1982    breast bx rt    CARDIAC SURG PROCEDURE UNLIST  01/2015    cardiac catheterization, no intervention, medical management    HX GYN      myomectomy on uterus    HX GYN  2011    hystereoscopy D&C    HX LYMPH NODE DISSECTION      biopsy    HX OTHER SURGICAL      lymph node bx     Allergies   Allergen Reactions    Adhesive Hives    Amoxicillin Unknown (comments)    Lipitor [Atorvastatin] Myalgia     Gets the flu      Family History   Problem Relation Age of Onset    Alzheimer Mother     Heart Disease Mother     Heart Disease Father  Hypertension Father     Cancer Paternal Aunt     Diabetes Paternal Grandmother     negative for cardiac disease  Social History     Social History    Marital status:      Spouse name: N/A    Number of children: N/A    Years of education: N/A     Social History Main Topics    Smoking status: Never Smoker    Smokeless tobacco: Never Used    Alcohol use No    Drug use: No    Sexual activity: Not Asked     Other Topics Concern    None     Social History Narrative     Current Outpatient Prescriptions   Medication Sig    sotalol (BETAPACE) 80 mg tablet Take 0.5 Tabs by mouth every twelve (12) hours.  apixaban (ELIQUIS) 5 mg tablet Take 1 Tab by mouth two (2) times a day.  rosuvastatin (CRESTOR) 20 mg tablet Take 1 Tab by mouth nightly.  lisinopril (PRINIVIL, ZESTRIL) 10 mg tablet Daily    KLOR-CON 10 10 mEq tablet TAKE 2 TABLETS TWICE A DAY    verapamil ER (CALAN-SR) 120 mg tablet TAKE 1 TABLET BY MOUTH EVERY MORNING FOR BLOOD PRESSURE    levothyroxine (SYNTHROID) 75 mcg tablet Take 1 Tab by mouth Daily (before breakfast).  cloNIDine HCl (CATAPRES) 0.2 mg tablet Take 1 Tab by mouth two (2) times a day.  hydrochlorothiazide (HYDRODIURIL) 25 mg tablet Take 1 Tab by mouth daily.  CALCIUM CARBONATE/VITAMIN D3 (CALTRATE 600 + D PO) Take 1 Tab by mouth two (2) times a day.  MULTIVITAMIN W-MINERALS/LUTEIN (CENTRUM SILVER PO) Take 1 Tab by mouth daily (after lunch). No current facility-administered medications for this visit. Vitals:    03/28/17 1318 03/28/17 1324   BP: 140/90 142/88   Pulse: 76    Resp: 16    SpO2: 97%    Weight: 233 lb 12.8 oz (106.1 kg)    Height: 5' 5\" (1.651 m)        I have reviewed the nurses notes, vitals, problem list, allergy list, medical history, family, social history and medications. Review of Symptoms:    General: Pt denies excessive weight gain or loss.  Pt is able to conduct ADL's  HEENT: Denies blurred vision, headaches, epistaxis and difficulty swallowing. Respiratory: Denies shortness of breath, ANGUIANO, wheezing or stridor. Cardiovascular: Denies precordial pain, palpitations, edema or PND  Gastrointestinal: Denies poor appetite, indigestion, abdominal pain or blood in stool  Urinary: Denies dysuria, pyuria  Musculoskeletal: Denies pain or swelling from muscles or joints  Neurologic: Denies tremor, paresthesias, or sensory motor disturbance  Skin: Denies rash, itching or texture change. Psych: Denies depression      Physical Exam:      General: Well developed, in no acute distress. HEENT: Eyes - PERRL, no jvd  Heart:  Normal S1/S2 negative S3 or S4. Regular, no murmur, gallop or rub.   Respiratory: Clear bilaterally x 4, no wheezing or rales  Abdomen:   Soft, non-tender, bowel sounds are active.   Extremities:  No edema, normal cap refill, no cyanosis. Musculoskeletal: No clubbing  Neuro: A&Ox3, speech clear, gait stable. Skin: Skin color is normal. No rashes or lesions.  Non diaphoretic  Vascular: 2+ pulses symmetric in all extremities    Cardiographics    Ekg:sinus rhythm     Results for orders placed or performed during the hospital encounter of 03/17/17   EKG, 12 LEAD, INITIAL   Result Value Ref Range    Ventricular Rate 92 BPM    Atrial Rate 92 BPM    P-R Interval 196 ms    QRS Duration 80 ms    Q-T Interval 370 ms    QTC Calculation (Bezet) 457 ms    Calculated P Axis 27 degrees    Calculated R Axis -17 degrees    Calculated T Axis 44 degrees    Diagnosis       Normal sinus rhythm  Low voltage QRS  Confirmed by Angelito Stevens ([id number]) on 3/18/2017 10:40:23 AM           Lab Results   Component Value Date/Time    WBC 7.0 03/21/2017 02:42 AM    HGB 10.7 03/21/2017 02:42 AM    HCT 31.8 03/21/2017 02:42 AM    PLATELET 844 01/84/5376 02:42 AM    MCV 91.4 03/21/2017 02:42 AM      Lab Results   Component Value Date/Time    Sodium 140 03/22/2017 02:52 AM    Potassium 3.9 03/22/2017 02:52 AM    Chloride 106 03/22/2017 02:52 AM    CO2 24 03/22/2017 02:52 AM    Anion gap 10 03/22/2017 02:52 AM    Glucose 98 03/22/2017 02:52 AM    BUN 15 03/22/2017 02:52 AM    Creatinine 1.12 03/22/2017 02:52 AM    BUN/Creatinine ratio 13 03/22/2017 02:52 AM    GFR est AA 57 03/22/2017 02:52 AM    GFR est non-AA 47 03/22/2017 02:52 AM    Calcium 9.3 03/22/2017 02:52 AM    Bilirubin, total 0.3 03/07/2017 02:40 PM    AST (SGOT) 12 03/07/2017 02:40 PM    Alk. phosphatase 64 03/07/2017 02:40 PM    Protein, total 6.6 03/07/2017 02:40 PM    Albumin 4.2 03/07/2017 02:40 PM    Globulin 3.6 03/16/2015 10:10 AM    A-G Ratio 1.8 03/07/2017 02:40 PM    ALT (SGPT) 13 03/07/2017 02:40 PM         Assessment:     Assessment:        ICD-10-CM ICD-9-CM    1. Atrial fibrillation, unspecified type (HCC) I48.91 427.31 AMB POC EKG ROUTINE W/ 12 LEADS, INTER & REP     Orders Placed This Encounter    AMB POC EKG ROUTINE W/ 12 LEADS, INTER & REP     Order Specific Question:   Reason for Exam:     Answer:   Routine    DISCONTD: sotalol (BETAPACE) 120 mg tablet     Sig: TAKE 1 TAB BY MOUTH EVERY TWELVE (12) HOURS. Refill:  11        Plan:   Ms. Jeffery Butt is here for follow up of PVI with subsequent pericardial effusion with cardiac tamponade. Dressing was removed in office today, drain site is intact without redness, drainage or swelling. EKG demonstrates NSR on Sotalol 40mg BID. She will start recording blood pressures at home and follow up with Dr. Zelda Wade for higher trending numbers. Will plan for follow up with Dr. Guillermina Barnett in 3 months with monitor before visit. BP upon retake: 138/78    Continue medical management for HTN. Thank you for allowing me to participate in Olga Alarcon 's care.     Kitty Barraza MD, Zuleyka Sousa

## 2017-03-29 ENCOUNTER — PATIENT OUTREACH (OUTPATIENT)
Dept: INTERNAL MEDICINE CLINIC | Age: 77
End: 2017-03-29

## 2017-03-29 NOTE — PROGRESS NOTES
Community Care Team Documentation for Patient in [facility]     Patient discharged from [facility] to [address] [place] [address], on 3/22/17 (date). Hospital Discharge diagnosis:  S/p ablation of atrial fibrillation. SNF Attending Provider:  Dr. Pina Milligan    Patient was discharged home on 3/26/17 with [facility] [address] Will notify nurse navigators for PCP and cardiology. Patient will need follow up appointments scheduled.       PCP : Rhoderick Romberg, MD    Nurse Navigator:  Rupal Otero RN  Cardiology Nurse Navigator:  Martha Barr RN

## 2017-03-30 ENCOUNTER — TELEPHONE (OUTPATIENT)
Dept: INTERNAL MEDICINE CLINIC | Age: 77
End: 2017-03-30

## 2017-03-30 ENCOUNTER — PATIENT OUTREACH (OUTPATIENT)
Dept: INTERNAL MEDICINE CLINIC | Age: 77
End: 2017-03-30

## 2017-03-30 ENCOUNTER — PATIENT OUTREACH (OUTPATIENT)
Dept: CARDIOLOGY CLINIC | Age: 77
End: 2017-03-30

## 2017-03-30 NOTE — PROGRESS NOTES
Called PCP [facility] to follow up on outpt therapy for pt. NN Eitan to explore and appreciates call that pt was discharged from facility.

## 2017-03-30 NOTE — PROGRESS NOTES
4:28 pm:  Received call from SCOT, Nurse Navigator at [facility] re: patient possibly needing physical therapy. It was noted that [facility] was to be pt's home care agency after discharge from SNF, and wondering if therapy was ordered as well as nursing. No notes present in chart. Will call [facility]. 4:45 pm:  Called [facility]. Was informed that patient declined their service, saying that she hoped to get the ok to resume driving from her cardiologist during office visit, and that she would do physical therapy as outpatient. .  Will call patient to clarify.

## 2017-03-31 ENCOUNTER — PATIENT OUTREACH (OUTPATIENT)
Dept: INTERNAL MEDICINE CLINIC | Age: 77
End: 2017-03-31

## 2017-03-31 NOTE — PROGRESS NOTES
Post Discharge Note    Patient discharged on 3/26/17 from [facility]. Spoke with patient today and reviewed home medication and patient verbalizes understanding self management of medications. Reviewed red flags for signs of infection, bleeding precautions, elevated blood pressure and patient understands when to seek medical attention from PCP. Patient attended initial f/u with Dr. Larisa Reyes on 3/28/17. Patients  follow up visit with pcp Dr. Jazlyn Arenas is scheduled for 4/5/17. Patient verbalizes understanding of discharge plan and special follow up with Dr. Larisa Reyes in 3 months. Goals        Patient Stated     Returns to baseline activity level. (pt-stated)            3-31-17:  Patient states she wants to get outpatient physical therapy. Will request order from Dr. Keith Peters at f/u visit next week. States she feels weak and is not able to walk long distances. States she has had fractured ankles in the past and wants to increase strength, flexibility, and balance. Other     Attends follow-up appointments as directed. 3/31/17:  Saw Dr. Larisa Reyes on 3-28-17 for f/u post discharge from SNF. Appt scheduled with Dr. Keith Peters on April 5 for pcp f/u.  Knowledge and adherence of prescribed medication (ie. action, side effects, missed dose, etc.).            3-31-17:  Med rec completed. Patient states compliance with meds, no issues with getting them filled. Reviewed new med Eliquis:  Reason for prescribing, side effects, bleeding precautions.  Patient/Family verbalizes understanding of self-management of chronic disease. 3-31-17:  Patient states she is monitoring her blood pressure at home. States it has been running 188/70 to 131/80. Patient to maintain a list of daily readings and bring to her office visit with Dr. Keith Peters.  Understands red flags post discharge. 3/31/17:  Patient states bilateral groin wounds are healing well.   Bruising present but no redness, swelling, or discharge. States chest wound is clear. No bandages on wounds currently. Reviewed signs/symptoms of infection: redness, swelling, discharge, fever, and to contact pcp or Dr. Hernandez Shown office if they occur. This note will not be viewable in [address]

## 2017-04-03 ENCOUNTER — PATIENT OUTREACH (OUTPATIENT)
Dept: INTERNAL MEDICINE CLINIC | Age: 77
End: 2017-04-03

## 2017-04-03 ENCOUNTER — HOSPITAL ENCOUNTER (OUTPATIENT)
Dept: LAB | Age: 77
Discharge: HOME | End: 2017-04-03
Payer: MEDICARE

## 2017-04-03 PROCEDURE — 80061 LIPID PANEL: CPT

## 2017-04-03 PROCEDURE — 83036 HEMOGLOBIN GLYCOSYLATED A1C: CPT

## 2017-04-03 PROCEDURE — 84443 ASSAY THYROID STIM HORMONE: CPT

## 2017-04-03 PROCEDURE — 36415 COLL VENOUS BLD VENIPUNCTURE: CPT

## 2017-04-03 PROCEDURE — 80053 COMPREHEN METABOLIC PANEL: CPT

## 2017-04-03 NOTE — PROGRESS NOTES
Goals        Patient Stated     Returns to baseline activity level. (pt-stated)            3-31-17:  Patient states she wants to get outpatient physical therapy. Will request order from Dr. Breanne Vail at f/u visit next week. States she feels weak and is not able to walk long distances. States she has had fractured ankles in the past and wants to increase strength, flexibility, and balance. Other     Attends follow-up appointments as directed. 3/31/17:  Saw Dr. Skinny Garcia on 3-28-17 for f/u post discharge from SNF. Appt scheduled with Dr. Breanne Vail on April 5 for pcp f/u.  Knowledge and adherence of prescribed medication (ie. action, side effects, missed dose, etc.).            3-31-17:  Med rec completed. Patient states compliance with meds, no issues with getting them filled. Reviewed new med Eliquis:  Reason for prescribing, side effects, bleeding precautions.  Patient/Family verbalizes understanding of self-management of chronic disease. 3-31-17:  Patient states she is monitoring her blood pressure at home. States it has been running 188/70 to 131/80. Patient to maintain a list of daily readings and bring to her office visit with Dr. Breanne Vail. 4/3/17:  Patient concerned due to fluctuating blood pressure readings. On 4/1/17:  633 to 967 systolic. On 4/2/17: 162/98 and 158/101. On 4/3/17:  142/88 and 133/94. States she was worried about the 629 diastolic reading. Has had achy feeling in the back of her head and had trouble sleeping last night. No head pain, no blurred vision, no nausea, no dizziness.  Understands red flags post discharge. 3/31/17:  Patient states bilateral groin wounds are healing well. Bruising present but no redness, swelling, or discharge. States chest wound is clear. No bandages on wounds currently.   Reviewed signs/symptoms of infection: redness, swelling, discharge, fever, and to contact pcp or Dr. Denis Jean office if they occur. Patient reassured about blood pressure readings (see note in goals above), advised to take blood pressure once a day at the same time of day, and to record the readings to bring to office visit 4/5/17. If she develops symptoms such as headache, blurred vision, dizziness, nausea to take it more often, and to notify the office. Reinforced that she may call this [facility] on direct line if needed and that there is an on call provider available after hours. Patient also states that she has developed a frequent dry cough. Patient described it as \"involuntary\" and that she has no symptoms of URI or allergies. Advised that this is a known side effect of lisinopril and to let Dr. Papo Gee know during her upcoming visit.

## 2017-04-04 ENCOUNTER — DOCUMENTATION ONLY (OUTPATIENT)
Dept: INTERNAL MEDICINE CLINIC | Age: 77
End: 2017-04-04

## 2017-04-04 LAB
ALBUMIN SERPL-MCNC: 4.3 G/DL (ref 3.5–4.8)
ALBUMIN/GLOB SERPL: 1.7 {RATIO} (ref 1.2–2.2)
ALP SERPL-CCNC: 75 IU/L (ref 39–117)
ALT SERPL-CCNC: 20 IU/L (ref 0–32)
AST SERPL-CCNC: 12 IU/L (ref 0–40)
BILIRUB SERPL-MCNC: 0.2 MG/DL (ref 0–1.2)
BUN SERPL-MCNC: 17 MG/DL (ref 8–27)
BUN/CREAT SERPL: 16 (ref 12–28)
CALCIUM SERPL-MCNC: 9.8 MG/DL (ref 8.7–10.3)
CHLORIDE SERPL-SCNC: 101 MMOL/L (ref 96–106)
CHOLEST SERPL-MCNC: 154 MG/DL (ref 100–199)
CO2 SERPL-SCNC: 24 MMOL/L (ref 18–29)
CREAT SERPL-MCNC: 1.05 MG/DL (ref 0.57–1)
EST. AVERAGE GLUCOSE BLD GHB EST-MCNC: 123 MG/DL
GLOBULIN SER CALC-MCNC: 2.6 G/DL (ref 1.5–4.5)
GLUCOSE SERPL-MCNC: 114 MG/DL (ref 65–99)
HBA1C MFR BLD: 5.9 % (ref 4.8–5.6)
HDLC SERPL-MCNC: 43 MG/DL
LDLC SERPL CALC-MCNC: 81 MG/DL (ref 0–99)
POTASSIUM SERPL-SCNC: 4.1 MMOL/L (ref 3.5–5.2)
PROT SERPL-MCNC: 6.9 G/DL (ref 6–8.5)
SODIUM SERPL-SCNC: 141 MMOL/L (ref 134–144)
TRIGL SERPL-MCNC: 151 MG/DL (ref 0–149)
TSH SERPL DL<=0.005 MIU/L-ACNC: 0.78 UIU/ML (ref 0.45–4.5)
VLDLC SERPL CALC-MCNC: 30 MG/DL (ref 5–40)

## 2017-04-04 NOTE — PROGRESS NOTES
Medicare Part B Preventive Services  Limitations  Recommendation  Scheduled    Bone Mass Measurement  (age 72 & older, biennial)  Requires diagnosis related to osteoporosis or estrogen deficiency. Biennial benefit unless patient has history of long-term glucocorticoid tx or baseline is needed because initial test was by other method  Completed 8/08/16 and normal     Recommended every 2 years  Due 8/2018   Cardiovascular Screening Blood Tests (every 5 years)  Total cholesterol, HDL, Triglycerides  Order as a panel if possible  Completed 4/2017    Recommended annually  Due 4/2018   Colorectal Cancer Screening  -Fecal occult blood test (annual)  -Flexible sigmoidoscopy (5y)  -Screening colonoscopy (10y)  -Barium Enema    Completed 8/21/2013 with Dr. Padmaja Alves    Recommended every 10 years  Due 2023    Counseling to Prevent Tobacco Use (up to 8 sessions per year)  - Counseling greater than 3 and up to 10 minutes  - Counseling greater than 10 minutes  Patients must be asymptomatic of tobacco-related conditions to receive as preventive service  N/A  N/A    Diabetes Screening Tests (at least every 3 years, Medicare covers annually or at 6-month intervals for prediabetic patients)     Fasting blood sugar (FBS) or glucose tolerance test (GTT)  Patient must be diagnosed with one of the following:  -Hypertension, Dyslipidemia, obesity, previous impaired FBS or GTT  Or any two of the following: overweight, FH of diabetes, age ? 72, history of gestational diabetes, birth of baby weighing more than 9 pounds  Completed: A1C 5.9 in 4/17    Recommended every 3-6 months for diabetics/pre diabetics  Due 7/2017   Diabetes Self-Management Training (DSMT) (no USPSTF recommendation)  Requires referral by treating physician for patient with diabetes or renal disease. 10 hours of initial DSMT session of no less than 30 minutes each in a continuous 12-month period. 2 hours of follow-up DSMT in subsequent years.   N/A  N/A    Glaucoma Screening (no USPSTF recommendation)  Diabetes mellitus, family history, , age 48 or over,  American, age 72 or over  Completed 8/01/2016 with Dr. Joshua Wan    Recommended annually    Due 8/2017   Human Immunodeficiency Virus (HIV) Screening (annually for increased risk patients)  HIV-1 and HIV-2 by EIA, RAY, rapid antibody test, or oral mucosa transudate  Patient must be at increased risk for HIV infection per USPSTF guidelines or pregnant. Tests covered annually for patients at increased risk. Pregnant patients may receive up to 3 test during pregnancy. N/A  N/A    Medical Nutrition Therapy (MNT) (for diabetes or renal disease not recommended schedule)  Requires referral by treating physician for patient with diabetes or renal disease. Can be provided in same year as diabetes self-management training (DSMT), and CMS recommends medical nutrition therapy take place after DSMT. Up to 3 hours for initial year and 2 hours in subsequent years. N/A  N/A    Prostate Cancer Screening (annually up to age 76)  - Digital rectal exam (ANGEL)  - Prostate specific antigen (PSA)  Annually (age 48 or over), ANGEL not paid separately when covered E/M service is provided on same date  N/A  N/A    Seasonal Influenza Vaccination (annually)    Completed 10/28/2016    Recommended annually    Due fall 2017   Pneumococcal Vaccination (once after 72)    Completed pneumovax:  11/19/2013  Recommended once over the age of 72  You are due for a Prevnar 13 vaccine. You can get this at your local pharmacy. Discuss this with Dr. Grace Kilpatrick during your next visit due to reaction to last Pneumonia vaccine      Hepatitis B Vaccinations (if medium/high risk)  Medium/high risk factors: End-stage renal disease,  Hemophiliacs who received Factor VIII or IX concentrates, Clients of institutions for the mentally retarded, Persons who live in the same house as a HepB virus carrier, Homosexual men, Illicit injectable drug abusers. N/A  N/A    Screening Mammography (biennial age 54-69)? Annually (age 36 or over)  Completed 8/08/16 and negative    Recommended annually   Due 8/2017   Screening Pap Tests and Pelvic Examination (up to age 79 and after 79 if unknown history or abnormal study last 10 years)  Every 25 months except high risk  Completed 11/09/15 with Dr. Jinny Willard    Recommended every 2 years    Due 11/2017   Ultrasound Screening for Abdominal Aortic Aneurysm (AAA) (once)  Patient must be referred through [facility] and not have had a screening for abdominal aortic aneurysm before under Medicare.  Limited to patients who meet one of the following criteria:  - Men who are 73-68 years old and have smoked more than 100 cigarettes in their lifetime.  -Anyone with a FH of AAA  -Anyone recommended for screening by USPSTF  N/A  n/A    Family Practice Management 2011

## 2017-04-05 ENCOUNTER — OFFICE VISIT (OUTPATIENT)
Dept: INTERNAL MEDICINE CLINIC | Age: 77
End: 2017-04-05

## 2017-04-05 ENCOUNTER — TELEPHONE (OUTPATIENT)
Dept: INTERNAL MEDICINE CLINIC | Age: 77
End: 2017-04-05

## 2017-04-05 VITALS
DIASTOLIC BLOOD PRESSURE: 70 MMHG | TEMPERATURE: 98.5 F | HEIGHT: 65 IN | OXYGEN SATURATION: 99 % | RESPIRATION RATE: 18 BRPM | HEART RATE: 70 BPM | SYSTOLIC BLOOD PRESSURE: 109 MMHG | BODY MASS INDEX: 37.49 KG/M2 | WEIGHT: 225 LBS

## 2017-04-05 DIAGNOSIS — R73.01 IFG (IMPAIRED FASTING GLUCOSE): ICD-10-CM

## 2017-04-05 DIAGNOSIS — I48.0 PAROXYSMAL ATRIAL FIBRILLATION (HCC): ICD-10-CM

## 2017-04-05 DIAGNOSIS — I10 ESSENTIAL HYPERTENSION: Primary | ICD-10-CM

## 2017-04-05 DIAGNOSIS — R26.89 POOR BALANCE: ICD-10-CM

## 2017-04-05 DIAGNOSIS — E87.6 HYPOKALEMIA: ICD-10-CM

## 2017-04-05 DIAGNOSIS — Z23 ENCOUNTER FOR IMMUNIZATION: ICD-10-CM

## 2017-04-05 DIAGNOSIS — R05.9 COUGH: ICD-10-CM

## 2017-04-05 DIAGNOSIS — E78.2 MIXED HYPERLIPIDEMIA: ICD-10-CM

## 2017-04-05 DIAGNOSIS — Z13.39 SCREENING FOR ALCOHOLISM: ICD-10-CM

## 2017-04-05 DIAGNOSIS — Z00.00 ROUTINE GENERAL MEDICAL EXAMINATION AT A HEALTH CARE FACILITY: ICD-10-CM

## 2017-04-05 DIAGNOSIS — E03.9 ACQUIRED HYPOTHYROIDISM: ICD-10-CM

## 2017-04-05 RX ORDER — ROSUVASTATIN CALCIUM 20 MG/1
20 TABLET, COATED ORAL
Qty: 90 TAB | Refills: 1 | Status: SHIPPED | OUTPATIENT
Start: 2017-04-05 | End: 2017-10-20 | Stop reason: SDUPTHER

## 2017-04-05 RX ORDER — LOSARTAN POTASSIUM 25 MG/1
25 TABLET ORAL DAILY
Qty: 30 TAB | Refills: 6 | Status: SHIPPED | OUTPATIENT
Start: 2017-04-05 | End: 2017-10-06 | Stop reason: SDUPTHER

## 2017-04-05 NOTE — PROGRESS NOTES
HISTORY OF PRESENT ILLNESS  Irene Zavala is a 78 y.o. female. HPI   Last here 10/18/16. Pt is here to f/u on chronic conditions    BP today is 109/70  BP at home has been fluctuating- 140s/90s, 150/101, 131/82, most recently 120s/70s, 113/74  Pt brought in her home cuff today to check for accuracy and her cuff read higher than ours   Continues lisinopril 10mg, clonidine 0.2mg BID, and HCTZ 25mg, and verapamil 120mg   She is also still taking sotalol but taking decreased dose of this     Ms. Shivani Busch is a 78y.o. year old female, she is seen today for Transition of Care services following a hospital admission from 3/17/17 to 3/22/17 and then SN 3/22/17 to 3/26/17. Our office Nurse Navigator performed an outreach to Ms. Erick Adkins with variety of NN first on 3/23/17, 3/29/17 and 3/30/17 and 3/31/17 and again 4/03/17 (within 2 business days of discharge) to complete medication reconciliation and a telephonic assessment of her condition. Pt completed cardiac ablation 3/17/17 for her afib per Dr. Tayla Jones (cardio)  She was admitted from 3/17/17 to 4/84/30 d/t complications  She had pericardial effusion with this  Pt had been in afib and had been following closely with Dr. Kim Olivares (cardio)  Reviewed notes 2/28/17: Ms Erick Adkins is having pafib on sotalol and associated dizziness. She is a candidate for an afib ablation/ILR. I discussed the risks/benefits/alternatives of the procedure with the patient. Risks include (but are not limited to) bleeding, heart block, infection, cva/mi/tamponade/esophageal perforation/pv stenosis/death. The patient understands that there is a 0-0% major complication rate and agrees to proceed. Post ablation, we will stop her sotalol. Thank you for this interesting consultation.   Pt was then in subacute rehab after her hospital admission for four days  She has been discharged and at home for about one week   She was not discharged with any home health but will be setting up home PT, she would like me to order this for her, provided this for her today   Pt would like to complete PT for her strengthening, she has some weakness to her legs   Since being home she has been doing well   She is now on eliquis 5mg BID per cardiology  No longer taking ASA daily   Denies any bleeding or falls with this, some increased bruising    Reviewed last note from Dr. Felipe Stern (cardio) 3/28/17  Ms. Niels Collet is here for follow up of PVI with subsequent pericardial effusion with cardiac tamponade. Dressing was removed in office today, drain site is intact without redness, drainage or swelling. EKG demonstrates NSR on Sotalol 40mg BID. She will start recording blood pressures at home and follow up with Dr. Jeremy Peralta for higher trending numbers. Will plan for follow up with Dr. Felipe Stern in 3 months with monitor before visit. BP upon retake: 138/78  Continue medical management for HTN. Reviewed ECHO 1/17: normal  Pt will f/u with Dr. Felipe Stern in about 3 months     Continues crestor 20mg daily for cholesterol   Lipids now at goal    Reviewed last labs 4/17   a1c stable, LDL 81- at goal, thyroid nl    Continues klor-con 20meq BID for her potassium    Wt is down 10 lbs since last visit  Congratulated her on this  Discussed diet and weight loss     Pt c/o cough since her ablation  Discussed phrenic nerve irritation  Also discussed lisinopril and SE of cough  Changing this to losartan  If not improving, she will contact office. Continues synthroid 75mcg daily     Recall She had a cervical polyp, was having vaginal bleeding, this was removed by galgano, vaginal bleeding has since resolved.      Recall She reports being given a cpap but states she does not have gama was told she just has jerking legs at night.      ACP: SDM is her sister, Albino Goldstein  Provided information today for her to complete at home        PREVENTIVE:    Colonoscopy: 8/21/13, Dr. Ketan Menchaca, repeat 10 years   Pap: Dr. Pricilla Armstrong, 11/15   Mammogram: 8/08/16 negative  DEXA: 8/08/16 normal   Tdap: 4/14/2015  Pneumovax: 11/19/2013  [id number]: given today, 4/05/2017  Zostavax: h/o shingles, declines  Flu shot: 10/18/2016  A1C: 11/14 6.1, 4/15 6.1, 7/15 6.1, 12/15 5.9, 4/16 5.8, 8/16 5.9, 10/16 5.8, 4/17 5.9  Eye exam: Dr. Arabella Maria, 8/01/16  Lipids: 4/17 LDL 81          Patient Active Problem List    Diagnosis Date Noted    Pericardial effusion with cardiac tamponade 03/18/2017    S/P ablation of atrial fibrillation 03/17/2017    Acquired hypothyroidism 12/14/2015    ACP (advance care planning) 12/14/2015    HTN (hypertension) 07/21/2015    Thyroid activity decreased 04/14/2015    Atrial fibrillation ([facility] 03/16/2015    A-fib ([facility] 03/10/2015    Angina, class III ([facility] [address] 01/27/2015    Morbid obesity ([facility]     Hyperlipidemia 07/14/2014    Hypokalemia 07/14/2014    Encounter for screening colonoscopy 08/21/2013    History of rectal bleeding 08/21/2013    Postmenopausal bleeding 10/18/2011    Endometrial polyp 10/18/2011     Current Outpatient Prescriptions   Medication Sig Dispense Refill    sotalol (BETAPACE) 80 mg tablet Take 0.5 Tabs by mouth every twelve (12) hours. 30 Tab 2    apixaban (ELIQUIS) 5 mg tablet Take 1 Tab by mouth two (2) times a day. 60 Tab 6    rosuvastatin (CRESTOR) 20 mg tablet Take 1 Tab by mouth nightly. 30 Tab 1    lisinopril (PRINIVIL, ZESTRIL) 10 mg tablet Daily 90 Tab 1    KLOR-CON 10 10 mEq tablet TAKE 2 TABLETS TWICE A DAY  3    verapamil ER (CALAN-SR) 120 mg tablet TAKE 1 TABLET BY MOUTH EVERY MORNING FOR BLOOD PRESSURE 90 Tab 2    levothyroxine (SYNTHROID) 75 mcg tablet Take 1 Tab by mouth Daily (before breakfast). 90 Tab 3    cloNIDine HCl (CATAPRES) 0.2 mg tablet Take 1 Tab by mouth two (2) times a day. 180 Tab 3    hydrochlorothiazide (HYDRODIURIL) 25 mg tablet Take 1 Tab by mouth daily. 90 Tab 3    CALCIUM CARBONATE/VITAMIN D3 (CALTRATE 600 + D PO) Take 1 Tab by mouth two (2) times a day.       MULTIVITAMIN W-MINERALS/LUTEIN (CENTRUM SILVER PO) Take 1 Tab by mouth daily (after lunch). Past Surgical History:   Procedure Laterality Date    BREAST SURGERY PROCEDURE UNLISTED  1982    breast bx rt    CARDIAC SURG PROCEDURE UNLIST  01/2015    cardiac catheterization, no intervention, medical management    HX GYN      myomectomy on uterus    HX GYN  2011    hystereoscopy D&C    HX LYMPH NODE DISSECTION      biopsy    HX OTHER SURGICAL      lymph node bx      Lab Results  Component Value Date/Time   WBC 7.0 03/21/2017 02:42 AM   HGB 10.7 03/21/2017 02:42 AM   HCT 31.8 03/21/2017 02:42 AM   PLATELET 314 72/93/7856 02:42 AM   MCV 91.4 03/21/2017 02:42 AM       Lab Results  Component Value Date/Time   Cholesterol, total 154 04/03/2017 09:03 AM   HDL Cholesterol 43 04/03/2017 09:03 AM   LDL, calculated 81 04/03/2017 09:03 AM   Triglyceride 151 04/03/2017 09:03 AM       Lab Results  Component Value Date/Time   GFR est AA 60 04/03/2017 09:03 AM   GFR est non-AA 52 04/03/2017 09:03 AM   Creatinine 1.05 04/03/2017 09:03 AM   BUN 17 04/03/2017 09:03 AM   Sodium 141 04/03/2017 09:03 AM   Potassium 4.1 04/03/2017 09:03 AM   Chloride 101 04/03/2017 09:03 AM   CO2 24 04/03/2017 09:03 AM         Review of Systems   Respiratory: Negative for shortness of breath. Cardiovascular: Negative for chest pain. Physical Exam   Constitutional: She is oriented to person, place, and time. She appears well-developed and well-nourished. No distress. HENT:   Head: Normocephalic and atraumatic. Mouth/Throat: Oropharynx is clear and moist. No oropharyngeal exudate. Eyes: Conjunctivae and EOM are normal. Right eye exhibits no discharge. Left eye exhibits no discharge. Neck: Normal range of motion. Neck supple. Cardiovascular: Normal rate, regular rhythm, normal heart sounds and intact distal pulses. Exam reveals no gallop and no friction rub. No murmur heard. Pulmonary/Chest: Effort normal and breath sounds normal. No respiratory distress. She has no wheezes. She has no rales. She exhibits no tenderness. Musculoskeletal: Normal range of motion. She exhibits no edema, tenderness or deformity. Lymphadenopathy:     She has no cervical adenopathy. Neurological: She is alert and oriented to person, place, and time. Coordination normal.   Skin: Skin is warm and dry. No rash noted. She is not diaphoretic. No erythema. No pallor. Psychiatric: She has a normal mood and affect. Her behavior is normal.       ASSESSMENT and PLAN    ICD-10-CM ICD-9-CM    1. Essential hypertension    BP is well controlled on verapamil, HCTZ, lisinopril, and clonidine 0.2mg BID, however she does report of a cough since her ablation, unclear if d/t lisinopril or from the ablation,     will change lisinopril 10mg to losartan 25mg. Cough should resolve in next six weeks, if it does not, she will contact office. Otherwise, no changes to BP medication   I10 401.9    2. Routine general medical examination at a health care facility Z00.00 V70.0    3. Screening for alcoholism    Screen    Z13.89 V79.1    4. Paroxysmal atrial fibrillation (HCC)    S/p ablation with some complications from this. Now back home, done with rehab, UTD with Dr. Felipe Stern, on sotalol still, hopeful to get off of this down the road, on eliquis for antiocoagulation, currently in nsr. Stable   I48.0 427.31    5. Poor balance    Will start PT    R26.89 781.99 REFERRAL TO PHYSICAL THERAPY   6. Hypokalemia    Continues on klor-con 10meq, two tablets BID. At goal on last labs    E87.6 276.8    7. Mixed hyperlipidemia    Now on crestor, well controlled, continue. E78.2 272.2    8. Acquired hypothyroidism    Continues on synthroid 75mcg, at goal on recent labs    E03.9 244.9    9. IFG (impaired fasting glucose)    a1c stable, continue with diet and weight loss    R73.01 790.21    10. Encounter for immunization    ionutni91 given today    Z23 V03.89 PNEUMOCOCCAL CONJ VACCINE 13 VALENT IM   11.  Cough    See above, changing lisinopril  R05 786.2       Depression screen reviewed and negative    Written by Rosa Sanchez, as dictated by González Bronson MD.    Current diagnosis and concerns discussed with pt at length. Understands risks and benefits or current treatment plan and medications and accepts the treatment and medication with any possible risks.   Pt asks appropriate questions which were answered.   Pt instructed to call with any concerns or problems. This note will not be viewable in [address]

## 2017-04-05 NOTE — MR AVS SNAPSHOT
Visit Information Date & Time Provider Department Dept. Phone Encounter #  
 4/5/2017  2:00 PM Vidhya Foley, [address] [phone] [id number] Follow-up Instructions Return in about 6 months (around 10/5/2017). Your Appointments 6/21/2017 11:30 AM  
HOLTER MONITOR with [address], [facility]) Appt Note: Per Dr Yonny Arana. Dr ABDULLAHI to put in order. REM $0CP  
 [address] [facility]  
[phone] [address]  
  
    
 6/27/2017 10:45 AM  
3 MONTH with Leobardo Berry MD  
[address] [facility]) Appt Note: Per Dr Yonny Arana $0CP REM  
 [address]  
[phone] [address] Upcoming Health Maintenance Date Due  
 MEDICARE YEARLY EXAM 12/14/2016 GLAUCOMA SCREENING Q2Y 8/1/2018 Pneumococcal 65+ Low/Medium Risk (2 of 2 - PPSV23) 11/19/2018 COLONOSCOPY 8/21/2023 DTaP/Tdap/Td series (2 - Td) 4/14/2025 Allergies as of 4/5/2017  Review Complete On: 4/5/2017 By: Vidhya Foley MD  
  
 Severity Noted Reaction Type Reactions Adhesive  07/14/2014    Hives Amoxicillin  10/12/2011    Unknown (comments) Lipitor [Atorvastatin]  07/14/2014   Side Effect Myalgia Gets the flu Current Immunizations  Reviewed on 4/14/2015 Name Date Influenza Vaccine 11/18/2014 Influenza Vaccine (Quad) 12/14/2015 12:17 PM  
 Influenza Vaccine (Quad) PF 10/18/2016 Pneumococcal Vaccine (Unspecified Type) 11/19/2013 Tdap 4/14/2015 Not reviewed this visit You Were Diagnosed With   
  
 Codes Comments Essential hypertension    -  Primary ICD-10-CM: I10 
ICD-9-CM: 401.9 Routine general medical examination at a health care facility     ICD-10-CM: Z00.00 ICD-9-CM: V70.0  Screening for alcoholism     ICD-10-CM: Z13.89 
 ICD-9-CM: V79.1 Paroxysmal atrial fibrillation (HCC)     ICD-10-CM: I48.0 ICD-9-CM: 427.31 Poor balance     ICD-10-CM: R26.89 
ICD-9-CM: 781.99 Hypokalemia     ICD-10-CM: E87.6 ICD-9-CM: 276.8 Mixed hyperlipidemia     ICD-10-CM: E78.2 ICD-9-CM: 272.2 Acquired hypothyroidism     ICD-10-CM: E03.9 ICD-9-CM: 244.9 IFG (impaired fasting glucose)     ICD-10-CM: R73.01 
ICD-9-CM: 790.21 Vitals BP Pulse Temp Resp Height(growth percentile) SpO2  
 109/70 (BP 1 Location: Left arm, BP Patient Position: Sitting) 70 98.5 °F (36.9 °C) (Oral) 18 5' 5\" (1.651 m) 99% OB Status Smoking Status Postmenopausal Never Smoker Vitals History Preferred Pharmacy Pharmacy Name Phone [id number], [place] - [address] [phone] Your Updated Medication List  
  
   
This list is accurate as of: 4/5/17  2:29 PM.  Always use your most recent med list.  
  
  
  
  
 apixaban 5 mg tablet Commonly known as:  Cyndia Senait Take 1 Tab by mouth two (2) times a day. CALTRATE 600 + D PO Take 1 Tab by mouth two (2) times a day. CENTRUM SILVER PO Take 1 Tab by mouth daily (after lunch). cloNIDine HCl 0.2 mg tablet Commonly known as:  CATAPRES Take 1 Tab by mouth two (2) times a day. hydroCHLOROthiazide 25 mg tablet Commonly known as:  HYDRODIURIL Take 1 Tab by mouth daily. KLOR-CON 10 10 mEq tablet Generic drug:  potassium chloride SR  
TAKE 2 TABLETS TWICE A DAY  
  
 levothyroxine 75 mcg tablet Commonly known as:  SYNTHROID Take 1 Tab by mouth Daily (before breakfast). lisinopril 10 mg tablet Commonly known as:  Liset Aidan Daily  
  
 rosuvastatin 20 mg tablet Commonly known as:  CRESTOR Take 1 Tab by mouth nightly. sotalol 80 mg tablet Commonly known as:  Arce Benne Take 0.5 Tabs by mouth every twelve (12) hours. verapamil  mg tablet Commonly known as:  CALAN-SR  
TAKE 1 TABLET BY MOUTH EVERY MORNING FOR BLOOD PRESSURE Prescriptions Printed Refills  
 rosuvastatin (CRESTOR) 20 mg tablet 1 Sig: Take 1 Tab by mouth nightly. Class: Print Route: Oral  
  
We Performed the Following REFERRAL TO PHYSICAL THERAPY [FWK48 Custom] Comments:  
 Please evaluate patient for balance/strength/gait Please schedule and authorize patient for services daily Follow-up Instructions Return in about 6 months (around 10/5/2017). Referral Information Referral ID Referred By Referred To  
  
 [address] [place] [address], [address] [place] [address] [address] [place], [address] [facility] Phone: [phone] Fax: [phone] Visits Status Start Date End Date 1 New Request 4/5/17 4/5/18 If your referral has a status of pending review or denied, additional information will be sent to support the outcome of this decision. Patient Instructions Medicare Part B Preventive Services  Limitations  Recommendation  Scheduled Bone Mass Measurement 
(age 72 & older, biennial)  Requires diagnosis related to osteoporosis or estrogen deficiency. Biennial benefit unless patient has history of long-term glucocorticoid tx or baseline is needed because initial test was by other method  Completed 8/08/16 and normal 
   
Recommended every 2 years  Due 8/2018 Cardiovascular Screening Blood Tests (every 5 years) Total cholesterol, HDL, Triglycerides  Order as a panel if possible  Completed 4/2017 
  
Recommended annually  Due 4/2018 Colorectal Cancer Screening 
-Fecal occult blood test (annual) -Flexible sigmoidoscopy (5y) 
-Screening colonoscopy (10y) -Barium Enema     Completed 8/21/2013 with Dr. Kenna Knowles 
  
Recommended every 10 years  Due 2023 Counseling to Prevent Tobacco Use (up to 8 sessions per year) - Counseling greater than 3 and up to 10 minutes - Counseling greater than 10 minutes  Patients must be asymptomatic of tobacco-related conditions to receive as preventive service  N/A  N/A Diabetes Screening Tests (at least every 3 years, Medicare covers annually or at 6-month intervals for prediabetic patients) 
   
Fasting blood sugar (FBS) or glucose tolerance test (GTT)  Patient must be diagnosed with one of the following: 
-Hypertension, Dyslipidemia, obesity, previous impaired FBS or GTT 
Or any two of the following: overweight, FH of diabetes, age ? 72, history of gestational diabetes, birth of baby weighing more than 9 pounds  Completed: A1C 5.9 in 4/17 
  
Recommended every 3-6 months for diabetics/pre diabetics  Due 7/2017 Diabetes Self-Management Training (DSMT) (no USPSTF recommendation)  Requires referral by treating physician for patient with diabetes or renal disease. 10 hours of initial DSMT session of no less than 30 minutes each in a continuous 12-month period. 2 hours of follow-up DSMT in subsequent years. N/A  N/A Glaucoma Screening (no USPSTF recommendation)  Diabetes mellitus, family history, , age 48 or over,  American, age 72 or over  Completed 8/01/2016 with Dr. Kathie Benjamin 
  
Recommended annually  Due 8/2017 Human Immunodeficiency Virus (HIV) Screening (annually for increased risk patients) HIV-1 and HIV-2 by EIA, RAY, rapid antibody test, or oral mucosa transudate  Patient must be at increased risk for HIV infection per USPSTF guidelines or pregnant. Tests covered annually for patients at increased risk. Pregnant patients may receive up to 3 test during pregnancy. N/A  N/A Medical Nutrition Therapy (MNT) (for diabetes or renal disease not recommended schedule)  Requires referral by treating physician for patient with diabetes or renal disease.  Can be provided in same year as diabetes self-management training (DSMT), and CMS recommends medical nutrition therapy take place after DSMT. Up to 3 hours for initial year and 2 hours in subsequent years. N/A  N/A Prostate Cancer Screening (annually up to age 76) - Digital rectal exam (ANGEL) - Prostate specific antigen (PSA)  Annually (age 48 or over), ANGEL not paid separately when covered E/M service is provided on same date  N/A  N/A Seasonal Influenza Vaccination (annually)     Completed 10/28/2016 
  
Recommended annually  Due fall 2017 Pneumococcal Vaccination (once after 65)     Completed pneumovax: 
11/19/2013 Recommended once over the age of 72  You are due for a Prevnar 13 vaccine. --given today Hepatitis B Vaccinations (if medium/high risk)  Medium/high risk factors: End-stage renal disease, Hemophiliacs who received Factor VIII or IX concentrates, Clients of institutions for the mentally retarded, Persons who live in the same house as a HepB virus carrier, Homosexual men, Illicit injectable drug abusers. N/A  N/A Screening Mammography (biennial age 54-69)? Annually (age 36 or over)  Completed 8/08/16 and negative 
  
Recommended annually 
  Due 8/2017 Screening Pap Tests and Pelvic Examination (up to age 79 and after 79 if unknown history or abnormal study last 10 years)  Every 24 months except high risk  Completed 11/09/15 with Dr. Aldo Rivera 
  
Recommended every 2 years  Due 11/2017 Ultrasound Screening for Abdominal Aortic Aneurysm (AAA) (once)  Patient must be referred through IPPE and not have had a screening for abdominal aortic aneurysm before under Medicare. Limited to patients who meet one of the following criteria: 
- Men who are 73-68 years old and have smoked more than 100 cigarettes in their lifetime. 
-Anyone with a FH of AAA 
-Anyone recommended for screening by USPSTF  N/A  n/A Family Practice Management 2011 
  
 
 
  
Introducing hospitals & HEALTH SERVICES! Dear Lorenzo Damian: Thank you for requesting a [employer] account. Our records indicate that you already have an active [employer] account. You can access your account anytime at [email or web address] Did you know that you can access your hospital and ER discharge instructions at any time in [employer]? You can also review all of your test results from your hospital stay or ER visit. Additional Information If you have questions, please visit the Frequently Asked Questions section of the [employer] website at [email or web address] Remember, [employer] is NOT to be used for urgent needs. For medical emergencies, dial 911. Now available from your iPhone and Android! Please provide this summary of care documentation to your next provider. Your primary care clinician is listed as Jorge A Awad. If you have any questions after today's visit, please call [phone].

## 2017-04-05 NOTE — PROGRESS NOTES
This is a Subsequent Medicare Annual Wellness Visit providing Personalized Prevention Plan Services (PPPS) (Performed 12 months after initial AWV and PPPS )    I have reviewed the patient's medical history in detail and updated the computerized patient record. History     Past Medical History:   Diagnosis Date    Arthritis     right knee, left shoulder    Diverticulosis     Frequency of urination     High cholesterol     Hypertension     Morbid obesity ([address]     Thyroid disease     hypothyroid    Unspecified adverse effect of anesthesia     low BP      Past Surgical History:   Procedure Laterality Date    BREAST SURGERY PROCEDURE UNLISTED  1982    breast bx rt    CARDIAC SURG PROCEDURE UNLIST  01/2015    cardiac catheterization, no intervention, medical management    HX GYN      myomectomy on uterus    HX GYN  2011    hystereoscopy D&C    HX LYMPH NODE DISSECTION      biopsy    HX OTHER SURGICAL      lymph node bx     Current Outpatient Prescriptions   Medication Sig Dispense Refill    sotalol (BETAPACE) 80 mg tablet Take 0.5 Tabs by mouth every twelve (12) hours. 30 Tab 2    apixaban (ELIQUIS) 5 mg tablet Take 1 Tab by mouth two (2) times a day. 60 Tab 6    rosuvastatin (CRESTOR) 20 mg tablet Take 1 Tab by mouth nightly. 30 Tab 1    lisinopril (PRINIVIL, ZESTRIL) 10 mg tablet Daily 90 Tab 1    KLOR-CON 10 10 mEq tablet TAKE 2 TABLETS TWICE A DAY  3    verapamil ER (CALAN-SR) 120 mg tablet TAKE 1 TABLET BY MOUTH EVERY MORNING FOR BLOOD PRESSURE 90 Tab 2    levothyroxine (SYNTHROID) 75 mcg tablet Take 1 Tab by mouth Daily (before breakfast). 90 Tab 3    cloNIDine HCl (CATAPRES) 0.2 mg tablet Take 1 Tab by mouth two (2) times a day. 180 Tab 3    hydrochlorothiazide (HYDRODIURIL) 25 mg tablet Take 1 Tab by mouth daily. 90 Tab 3    CALCIUM CARBONATE/VITAMIN D3 (CALTRATE 600 + D PO) Take 1 Tab by mouth two (2) times a day.       MULTIVITAMIN W-MINERALS/LUTEIN (CENTRUM SILVER PO) Take 1 Tab by mouth daily (after lunch). Allergies   Allergen Reactions    Adhesive Hives    Amoxicillin Unknown (comments)    Lipitor [Atorvastatin] Myalgia     Gets the flu     Family History   Problem Relation Age of Onset    Alzheimer Mother     Heart Disease Mother     Heart Disease Father     Hypertension Father     Cancer Paternal Aunt     Diabetes Paternal Grandmother      Social History   Substance Use Topics    Smoking status: Never Smoker    Smokeless tobacco: Never Used    Alcohol use No     Patient Active Problem List   Diagnosis Code    Postmenopausal bleeding N95.0    Endometrial polyp N84.0    Encounter for screening colonoscopy Z12.11    History of rectal bleeding Z87.19    Hyperlipidemia E78.5    Hypokalemia E87.6    Morbid obesity ([address] E66.01    Angina, class III ([address] I20.9    A-fib ([address] I48.91    Atrial fibrillation ([address] I48.91    Thyroid activity decreased E03.9    HTN (hypertension) I10    Acquired hypothyroidism E03.9    ACP (advance care planning) Z71.89    S/P ablation of atrial fibrillation Z98.890, Z86.79    Pericardial effusion with cardiac tamponade I31.3, I31.4       Depression Risk Factor Screening:     PHQ 2 / 9, over the last two weeks 4/5/2017   Little interest or pleasure in doing things Not at all   Feeling down, depressed or hopeless Not at all   Total Score PHQ 2 0     Alcohol Risk Factor Screening: On any occasion during the past 3 months, have you had more than 3 drinks containing alcohol? No    Do you average more than 7 drinks per week? No  No alcohol     Functional Ability and Level of Safety:     Hearing Loss   normal-to-mild  No issues  Activities of Daily Living   Self-care. Requires assistance with: no ADLs    Fall Risk     Fall Risk Assessment, last 12 mths 4/5/2017   Able to walk? Yes   Fall in past 12 months?  No   Fall with injury? -   Number of falls in past 12 months -   Fall Risk Score -   poor balance discussed PT   Abuse Screen Patient is not abused  Lives alone    Has friends who help her as needed  Sister lives in town   Review of Systems   Not required    Physical Examination     Evaluation of Cognitive Function:  Mood/affect:  neutral  Appearance: age appropriate  Family member/caregiver input: none    No exam performed today, awv. Patient Care Team:  Alejandra Conrad MD as PCP - General (Internal Medicine)  Christie Palma MD (Colon and Rectal Surgery)  Cathy Vale MD (Ophthalmology)  Elysia Wilburn MD (Obstetrics & Gynecology)  Deborah Shoemaker, BRIDGET as Nurse Leonela Ceja MD (Cardiology)  Piter Quintana MD (Gynecology)  Dipika Pitts LPN as Ambulatory Care Navigator  Laurel Armstrong RN as Transitional Nurse Navigator  Katina Padron, BRIDGET as Ambulatory Care Navigator  Reuben Grady MD (Cardiology)  Updated    Advice/Referrals/Counseling   Education and counseling provided:  Are appropriate based on today's review and evaluation  End-of-Life planning (with patient's consent)  Pneumococcal Vaccine  Screening for glaucoma      Assessment/Plan       ICD-10-CM ICD-9-CM    1. Routine general medical examination at a health care facility Z00.00 V70.0    2. Screening for alcoholism Z13.89 V79.1    . Discussed with patient about advance medical directive. Provided patient blank AMD and Your Right to Decide Booklet. Requested that if completed to provide a copy of AMD to office.    ACP: SDM is her sister, David Dunne  Provided information today for her to complete at home        Colonoscopy: 8/21/13, Dr. Jimbo Verma, repeat 10 years   Pap: Dr. Willi Dick, 11/15   Repeat 11/17  Mammogram: 8/08/16 negative repeat 8/17  DEXA: 8/08/16 normal  Repeat 8/18     Tdap: 4/14/2015  Pneumovax: 11/19/2013  [id number]: given today, 4/05/2017  Zostavax: h/o shingles, declines  Flu shot: 10/18/2016    Lipids: 4/17 LDL 81  A1C: 4/17 5.9    Eye exam: Dr. Josh Norton, 8/01/16      Medication reconciliation completed by MA and reviewed by me. Medical/surgical/social/family history reviewed and updated by me. Patient provided AVS and preventative screening table. Patient verbalized understanding of all information discussed.

## 2017-04-05 NOTE — TELEPHONE ENCOUNTER
Spoke to Alejandra Mcleod at [address] [place] [address] states that w/ losartan and the potassium, it could result in hyperkalemia. Alejandra Mcleod informed per PCP for pt to continue w/ potassium and start losartan. Alejandra Mcleod verbalized understanding of information discussed w/ no further questions at this time.

## 2017-04-05 NOTE — PATIENT INSTRUCTIONS
Medicare Part B Preventive Services  Limitations  Recommendation  Scheduled    Bone Mass Measurement  (age 72 & older, biennial)  Requires diagnosis related to osteoporosis or estrogen deficiency. Biennial benefit unless patient has history of long-term glucocorticoid tx or baseline is needed because initial test was by other method  Completed 8/08/16 and normal      Recommended every 2 years  Due 8/2018   Cardiovascular Screening Blood Tests (every 5 years)  Total cholesterol, HDL, Triglycerides  Order as a panel if possible  Completed 4/2017     Recommended annually  Due 4/2018   Colorectal Cancer Screening  -Fecal occult blood test (annual)  -Flexible sigmoidoscopy (5y)  -Screening colonoscopy (10y)  -Barium Enema     Completed 8/21/2013 with Dr. Bruce Spivey     Recommended every 10 years  Due 2023    Counseling to Prevent Tobacco Use (up to 8 sessions per year)  - Counseling greater than 3 and up to 10 minutes  - Counseling greater than 10 minutes  Patients must be asymptomatic of tobacco-related conditions to receive as preventive service  N/A  N/A    Diabetes Screening Tests (at least every 3 years, Medicare covers annually or at 6-month intervals for prediabetic patients)      Fasting blood sugar (FBS) or glucose tolerance test (GTT)  Patient must be diagnosed with one of the following:  -Hypertension, Dyslipidemia, obesity, previous impaired FBS or GTT  Or any two of the following: overweight, FH of diabetes, age ? 72, history of gestational diabetes, birth of baby weighing more than 9 pounds  Completed: A1C 5.9 in 4/17     Recommended every 3-6 months for diabetics/pre diabetics  Due 7/2017   Diabetes Self-Management Training (DSMT) (no USPSTF recommendation)  Requires referral by treating physician for patient with diabetes or renal disease. 10 hours of initial DSMT session of no less than 30 minutes each in a continuous 12-month period. 2 hours of follow-up DSMT in subsequent years.   N/A  N/A    Glaucoma Screening (no USPSTF recommendation)  Diabetes mellitus, family history, , age 48 or over,  American, age 72 or over  Completed 8/01/2016 with Dr. Tanisha Prado     Recommended annually  Due 8/2017   Human Immunodeficiency Virus (HIV) Screening (annually for increased risk patients)  HIV-1 and HIV-2 by EIA, RAY, rapid antibody test, or oral mucosa transudate  Patient must be at increased risk for HIV infection per USPSTF guidelines or pregnant. Tests covered annually for patients at increased risk. Pregnant patients may receive up to 3 test during pregnancy. N/A  N/A    Medical Nutrition Therapy (MNT) (for diabetes or renal disease not recommended schedule)  Requires referral by treating physician for patient with diabetes or renal disease. Can be provided in same year as diabetes self-management training (DSMT), and CMS recommends medical nutrition therapy take place after DSMT. Up to 3 hours for initial year and 2 hours in subsequent years. N/A  N/A    Prostate Cancer Screening (annually up to age 76)  - Digital rectal exam (ANGEL)  - Prostate specific antigen (PSA)  Annually (age 48 or over), ANGEL not paid separately when covered E/M service is provided on same date  N/A  N/A    Seasonal Influenza Vaccination (annually)     Completed 10/28/2016     Recommended annually  Due fall 2017   Pneumococcal Vaccination (once after 72)     Completed pneumovax:  11/19/2013  Recommended once over the age of 72  You are due for a Prevnar 13 vaccine. --given today    Hepatitis B Vaccinations (if medium/high risk)  Medium/high risk factors: End-stage renal disease,  Hemophiliacs who received Factor VIII or IX concentrates, Clients of institutions for the mentally retarded, Persons who live in the same house as a HepB virus carrier, Homosexual men, Illicit injectable drug abusers. N/A  N/A    Screening Mammography (biennial age 54-69)?   Annually (age 36 or over)  Completed 8/08/16 and negative     Recommended annually    Due 8/2017   Screening Pap Tests and Pelvic Examination (up to age 79 and after 79 if unknown history or abnormal study last 10 years)  Every 25 months except high risk  Completed 11/09/15 with Dr. Shmuel Dominguez     Recommended every 2 years  Due 11/2017   Ultrasound Screening for Abdominal Aortic Aneurysm (AAA) (once)  Patient must be referred through [facility] and not have had a screening for abdominal aortic aneurysm before under Medicare. Limited to patients who meet one of the following criteria:  - Men who are 73-68 years old and have smoked more than 100 cigarettes in their lifetime.  -Anyone with a FH of AAA  -Anyone recommended for screening by USPSTF  N/A  n/A    Family Practice Management 2011       Vaccine Information Statement     Pneumococcal Conjugate Vaccine (PCV13): What You Need to Know    Many Vaccine Information Statements are available in German and other languages. See www.immunize.org/vis. Hojas de información Sobre Vacunas están disponibles en español y en muchos otros idiomas. Visite www.immunize.org/vis. 1. Why get vaccinated? Vaccination can protect both children and adults from pneumococcal disease. Pneumococcal disease is caused by bacteria that can spread from person to person through close contact. It can cause ear infections, and it can also lead to more serious infections of the:   Lungs (pneumonia),   Blood (bacteremia), and   Covering of the brain and spinal cord (meningitis). Pneumococcal pneumonia is most common among adults. Pneumococcal meningitis can cause deafness and brain damage, and it kills about 1 child in 10 who get it. Anyone can get pneumococcal disease, but children under 3years of age and adults 72 years and older, people with certain medical conditions, and cigarette smokers are at the highest risk.      Before there was a vaccine, the [facility] saw:   more than 700 cases of meningitis,   about 13,000 blood infections,   about 5 million ear infections, and   about 200 deaths  in children under 5 each year from pneumococcal disease. Since vaccine became available, severe pneumococcal disease in these children has fallen by 88%. About 18,000 older adults die of pneumococcal disease each year in the [place]. Treatment of pneumococcal infections with penicillin and other drugs is not as effective as it used to be, because some strains of the disease have become resistant to these drugs. This makes prevention of the disease, through vaccination, even more important. 2. PCV13 vaccine    Pneumococcal conjugate vaccine (called PCV13) protects against 13 types of pneumococcal bacteria. PCV13 is routinely given to children at 2, 4, 6, and 1515 months of age. It is also recommended for children and adults 3to 59years of age with certain health conditions, and for all adults 72years of age and older. Your doctor can give you details. 3. Some people should not get this vaccine    Anyone who has ever had a life-threatening allergic reaction to a dose of this vaccine, to an earlier pneumococcal vaccine called PCV7, or to any vaccine containing diphtheria toxoid (for example, DTaP), should not get PCV13. Anyone with a severe allergy to any component of PCV13 should not get the vaccine. Tell your doctor if the person being vaccinated has any severe allergies. If the person scheduled for vaccination is not feeling well, your healthcare provider might decide to reschedule the shot on another day. 4. Risks of a vaccine reaction    With any medicine, including vaccines, there is a chance of reactions. These are usually mild and go away on their own, but serious reactions are also possible. Problems reported following PCV13 varied by age and dose in the series.  The most common problems reported among children were:    About half became drowsy after the shot, had a temporary loss of appetite, or had redness or tenderness where the shot was given.  About 1 out of 3 had swelling where the shot was given.  About 1 out of 3 had a mild fever, and about 1 in 20 had a fever over 102.2°F.   Up to about 8 out of 10 became fussy or irritable. Adults have reported pain, redness, and swelling where the shot was given; also mild fever, fatigue, headache, chills, or muscle pain. [facility] children who get PCV13 along with inactivated flu vaccine at the same time may be at increased risk for seizures caused by fever. Ask your doctor for more information. Problems that could happen after any vaccine:     People sometimes faint after a medical procedure, including vaccination. Sitting or lying down for about 15 minutes can help prevent fainting, and injuries caused by a fall. Tell your doctor if you feel dizzy, or have vision changes or ringing in the ears.  Some older children and adults get severe pain in the shoulder and have difficulty moving the arm where a shot was given. This happens very rarely.  Any medication can cause a severe allergic reaction. Such reactions from a vaccine are very rare, estimated at about 1 in a million doses, and would happen within a few minutes to a few hours after the vaccination. As with any medicine, there is a very small chance of a vaccine causing a serious injury or death. The safety of vaccines is always being monitored. For more information, visit: www.cdc.gov/vaccinesafety/     5. What if there is a serious reaction? What should I look for?  Look for anything that concerns you, such as signs of a severe allergic reaction, very high fever, or unusual behavior. Signs of a severe allergic reaction can include hives, swelling of the face and throat, difficulty breathing, a fast heartbeat, dizziness, and weakness  usually within a few minutes to a few hours after the vaccination. What should I do?      If you think it is a severe allergic reaction or other emergency that cant wait, call 9-1-1 or get the person to the nearest hospital. Otherwise, call your doctor. Reactions should be reported to the Vaccine Adverse Event Reporting System (VAERS). Your doctor should file this report, or you can do it yourself through the VAERS web site at www.vaers. [facility].gov, or by calling 1-748.690.2206. VAERS does not give medical advice. 6. The National Vaccine Injury Compensation Program    The [facility] Vaccine Injury Compensation Program (VICP) is a federal program that was created to compensate people who may have been injured by certain vaccines. Persons who believe they may have been injured by a vaccine can learn about the program and about filing a claim by calling 8-554.163.5793 or visiting the [employer] website at www.[facility].gov/vaccinecompensation. There is a time limit to file a claim for compensation. 7. How can I learn more?  Ask your healthcare provider. He or she can give you the vaccine package insert or suggest other sources of information.  Call your local or state health department.  Contact the Centers for Disease Control and Prevention (CDC):  - Call 1-846.171.8349 (5-276-CNR-INFO) or  - Visit CDCs website at www.cdc.gov/vaccines    Vaccine Information Statement   PCV13 Vaccine   11/5/2015   44 VEGA Mike Mage [id number]    Department of Health and Human Services  Centers for Disease Control and Prevention    Office Use Only

## 2017-04-10 ENCOUNTER — HOSPITAL ENCOUNTER (OUTPATIENT)
Dept: PHYSICAL THERAPY | Age: 77
Discharge: HOME | End: 2017-04-10
Payer: MEDICARE

## 2017-04-10 PROCEDURE — G8978 MOBILITY CURRENT STATUS: HCPCS | Performed by: PHYSICAL THERAPIST

## 2017-04-10 PROCEDURE — 97161 PT EVAL LOW COMPLEX 20 MIN: CPT | Performed by: PHYSICAL THERAPIST

## 2017-04-10 PROCEDURE — G8979 MOBILITY GOAL STATUS: HCPCS | Performed by: PHYSICAL THERAPIST

## 2017-04-10 PROCEDURE — 97110 THERAPEUTIC EXERCISES: CPT | Performed by: PHYSICAL THERAPIST

## 2017-04-10 NOTE — PROGRESS NOTES
PT INITIAL EVALUATION NOTE - [facility] 2-15    Patient Name: Jaqueline Dyson  Date:4/10/2017  : 1940  [x]  Patient  Verified  Payor: Soraya Seymour / Plan: [place] MEDICARE PART A & B / Product Type: Medicare /    In time: 3:05pm  Out time:4:00pm  Total Treatment Time (min): 55  Total Timed Codes (min): 30  1:1 Treatment Time ( [address] only): 30   Visit #: 1     Treatment Area: Other abnormalities of gait and mobility [R26.89]    SUBJECTIVE  Pain Level (0-10 scale): 0  Any medication changes, allergies to medications, adverse drug reactions, diagnosis change, or new procedure performed?: [] No    [x] Yes (see summary sheet for update)  Subjective: The patient reports that she had a heart ablasion in March and had some complications that resulted in ICU and became weak. She went to [facility] for 4 days but couldn't stand her room/roommate situation and left early. She still feels a little weak in her legs. She has a history of falls , broke her left ankle, then broke her left wrist 6 weeks after, and broke both ankles after a fall in . She can go to the grocery store for 1 hour with the cart but is tired afterwards. She likes doing yardwork and would like to not feel so tired. Her heart medicines may be causing some dizziness. She lives alone.      PLOF: independent with ADLs  Mechanism of Injury: chronic/acute onset from heart surgery  [facility]  PMHx/Surgical Hx: recent heart ablation  Work Hx: n/a  Living Situation: lives alone  Pt Goals: to not fear falling  Barriers: none  Motivation: good  Substance use: n/a  FABQ Score: 18/24  Cognition: A & O x 3        OBJECTIVE    Posture:  Slight kyphotic  Gait and Functional Mobility:  Slight right trunk sway  Palpation: no pain        Lumbar AROM:          R  L    Flexion    75%      Extension   limited      Side Bending   Limited bilaterally      Rotation   Limited bilaterally        LOWER QUARTER   MUSCLE STRENGTH  KEY       R  L  0 - No Contraction  L1, L2 Psoas  4-  4-  1 - Trace   L3 Quads  4-  4-  2 - Poor   L4 Tib Ant  4  4  3 - Fair    L5 EHL  4  4  4 - Good   S1 Peroneals  4  4  5 - Normal   S2 Hams  4  4    Mobility Assessment: hypomobile in hips and lumbar bilaterally      MMT:              HIP Abd: 3+/5 bilaterally  Neurological: Reflexes / Sensations: WFL    30 min Therapeutic Exercise:  [x] See flow sheet :   Rationale: increase ROM, increase strength, improve coordination and improve balance to improve the patients ability to perform daily activities.           With   [x] TE   [] TA   [] neuro   [] other: Patient Education: [x] Review HEP    [x] Progressed/Changed HEP based on:   [x] positioning   [x] body mechanics   [] transfers   [] heat/ice application    [] other:      Other Objective/Functional Measures:FOTO=  52    SLS: R= 8s; L=5s  ModCTSIB: 1= 30s; 2= 30s; 4= 30s; 5= 18s (LOB)  Tandem: R= 30s; L= 30s  30s sit to stand: 6x  5x sit to stand: 25s  TUs    Pain Level (0-10 scale) post treatment: 0    ASSESSMENT/Changes in Function:     [x]  See Plan of [facility], PT 4/10/2017  3:07 PM

## 2017-04-10 NOTE — PROGRESS NOTES
[address] [place] [address] [address] (MOB IV), [address] [facility]  [place], [address]  Phone: [phone] Fax: [phone]     Plan of Care/Statement of Necessity for Physical Therapy Services  2-15    Patient name: Lisha Negron  : 1940  Provider#: [id number]  Referral source: Molly Romberg, MD      Medical/Treatment Diagnosis: Other abnormalities of gait and mobility [R26.89]     Prior Hospitalization: see medical history     Comorbidities: arthritis, back pain, BMI over 30, CHF, HTN, osteoporosis  Prior Level of Function: 20 min. Of exercise seldom or never  Medications: Verified on Patient Summary List  Start of Care: 4/10/17      Onset Date: 2017   The Plan of Care and following information is based on the information from the initial evaluation. Assessment/ key information: The patient has had chronic fall risk issues, but was recently hospitalized due to complications after heart ablation surgery and has been weak ever since. She has decreased lower extremity strength bilaterally, particularly with hip abduction, and she has poor vestibular and proprioceptive input that also affect her balance. She will benefit from generalized strengthening and neuromusculature re-education exercises to decrease her fear of falling and improve her functional mobility.      Evaluation Complexity History MEDIUM  Complexity : 1-2 comorbidities / personal factors will impact the outcome/ POC ; Examination MEDIUM Complexity : 3 Standardized tests and measures addressing body structure, function, activity limitation and / or participation in recreation  ;Presentation LOW Complexity : Stable, uncomplicated  ;Clinical Decision Making MEDIUM Complexity : FOTO score of 26-74  Overall Complexity Rating: LOW     Problem List: pain affecting function, decrease ROM, decrease strength, impaired gait/ balance, decrease ADL/ functional abilitiies, decrease activity tolerance, decrease flexibility/ joint mobility and decrease transfer abilities   Treatment Plan may include any combination of the following: Therapeutic exercise, Therapeutic activities, Neuromuscular re-education, Physical agent/modality, Gait/balance training, Manual therapy, Patient education, Self Care training, Functional mobility training, Home safety training and Stair training  Patient / Family readiness to learn indicated by: asking questions and trying to perform skills  Persons(s) to be included in education: patient (P)  Barriers to Learning/Limitations: None  Patient Goal (s): to not have a fear of falling  Patient Self Reported Health Status: fair  Rehabilitation Potential: good    Short Term Goals: To be accomplished in 2 treatments:   1.) The patient will be independent with her HEP. Long Term Goals: To be accomplished in 16 treatments:   1.) The patient will improve her 30s sit to stand test to at least 8x to show improvement in functional strength.   2.) The patient will improve her SLS to at least 10s bilaterall to show improvement in general balance for daily activities. 3.) The patient will report decreased fatigue after 1 hour of ambulation while grocery shopping. Frequency / Duration: Patient to be seen 2 times per week for 16 treatments. Patient/ Caregiver education and instruction: self care, activity modification and exercises    [x]  Plan of care has been reviewed with PTA    G-Codes (GP)  Mobility   Current  CK= 40-59%   Goal  CJ= 20-39%    The severity rating is based on clinical judgment and the FOTO Score score. Certification Period: 4/10/17-7/10/17  Felecia Rodriguez, PT 4/10/2017 4:34 PM    ________________________________________________________________________    I certify that the above Therapy Services are being furnished while the patient is under my care. I agree with the treatment plan and certify that this therapy is necessary.     Physician's Signature:____________________ Date:____________Time: _________

## 2017-04-11 ENCOUNTER — TELEPHONE (OUTPATIENT)
Dept: INTERNAL MEDICINE CLINIC | Age: 77
End: 2017-04-11

## 2017-04-11 NOTE — TELEPHONE ENCOUNTER
Spoke to Riccardo at [place] [address] states that pt requesting order for rosuvastatin. The following prescription was called into pt's local pharmacy:    rosuvastatin (CRESTOR) 20 mg tablet [[id number]]   Order Details   Dose: 20 mg Route: Oral Frequency: EVERY BEDTIME   Dispense Quantity:  90 Tab Refills:  1 Fills Remaining:  --           Sig: Take 1 Tab by mouth nightly.          Written Date:  04/05/17 Expiration Date:  --     Start Date:  04/05/17 End Date:  --            Ordering Provider:  -- ARUN #:  -- NPI:  --    Authorizing Provider:  Caron Cameron MD ARUN #:  [id number] NPI:  [id number]       Original Order:  rosuvastatin (CRESTOR) 20 mg tablet [[id number]]      Pharmacy:  [address] [place] [address] [address]  [address] #:  [id number]     Pharmacy Comments:  --          Quantity Remaining:  -- Quantity Filled:  --

## 2017-04-11 NOTE — TELEPHONE ENCOUNTER
[facility] needs a call back in reference to Prescription we show approved on 4/5/17 that they have not received. Please call to update.  Thank you

## 2017-04-12 RX ORDER — HYDROCHLOROTHIAZIDE 25 MG/1
25 TABLET ORAL DAILY
Qty: 90 TAB | Refills: 3 | Status: SHIPPED | COMMUNITY
Start: 2017-04-12 | End: 2018-04-04 | Stop reason: SDUPTHER

## 2017-04-13 ENCOUNTER — HOSPITAL ENCOUNTER (OUTPATIENT)
Dept: PHYSICAL THERAPY | Age: 77
Discharge: HOME | End: 2017-04-13
Payer: MEDICARE

## 2017-04-13 PROCEDURE — 97110 THERAPEUTIC EXERCISES: CPT | Performed by: PHYSICAL THERAPIST

## 2017-04-13 PROCEDURE — 97112 NEUROMUSCULAR REEDUCATION: CPT | Performed by: PHYSICAL THERAPIST

## 2017-04-13 RX ORDER — POTASSIUM CHLORIDE 750 MG/1
TABLET, FILM COATED, EXTENDED RELEASE ORAL
Qty: 360 TAB | Refills: 1 | Status: SHIPPED | COMMUNITY
Start: 2017-04-13 | End: 2017-10-11 | Stop reason: SDUPTHER

## 2017-04-13 NOTE — PROGRESS NOTES
PT DAILY TREATMENT NOTE - [facility] 2-15    Patient Name: Anthony Cao  Date:2017  : 1940  [x]  Patient  Verified  Payor: Shelby Adarsh / Plan: [place] / Product Type: Medicare /    In time:2:08am  Out time:3:05am  Total Treatment Time (min): 57  Total Timed Codes (min): 45  1:1 Treatment Time ( [address] only): 45   Visit #: 2     Treatment Area: Other abnormalities of gait and mobility [R26.89]    SUBJECTIVE  Pain Level (0-10 scale): 0  Any medication changes, allergies to medications, adverse drug reactions, diagnosis change, or new procedure performed?: [x] No    [] Yes (see summary sheet for update)  Subjective functional status/changes:   [] No changes reported  The patient reports that she's doing okay with the exercises at home. OBJECTIVE    30 min Therapeutic Exercise:  [x] See flow sheet :   Rationale: increase ROM, increase strength and improve coordination to improve the patients ability to perform daily activities. 15 min Neuromuscular Re-education:  [x]  See flow sheet :   Rationale: improve coordination, improve balance and increase proprioception  to improve the patients ability to perform daily activities. With   [x] TE   [] TA   [] neuro   [] other: Patient Education: [x] Review HEP    [x] Progressed/Changed HEP based on:   [x] positioning   [x] body mechanics   [] transfers   [] heat/ice application    [] other:      Other Objective/Functional Measures: The patient had good balance today     Pain Level (0-10 scale) post treatment: 0    ASSESSMENT/Changes in Function:     The patient was challenged with a wobble board but improved with strength overall.  Patient will continue to benefit from skilled PT services to modify and progress therapeutic interventions, address functional mobility deficits, address ROM deficits, address strength deficits, analyze and address soft tissue restrictions, analyze and cue movement patterns, analyze and modify body mechanics/ergonomics, assess and modify postural abnormalities, address imbalance/dizziness and instruct in home and community integration to attain remaining goals. [x]  See Plan of Care  []  See progress note/recertification  []  See Discharge Summary         Progress towards goals / Updated goals: The patient is progressing towards goals.     PLAN  [x]  Upgrade activities as tolerated     [x]  Continue plan of care  [x]  Update interventions per flow sheet       []  Discharge due to:_  []  Other:_      Edie Tang, PT 4/13/2017  3:46 PM

## 2017-04-18 ENCOUNTER — HOSPITAL ENCOUNTER (OUTPATIENT)
Dept: PHYSICAL THERAPY | Age: 77
Discharge: HOME | End: 2017-04-18
Payer: MEDICARE

## 2017-04-18 PROCEDURE — 97110 THERAPEUTIC EXERCISES: CPT | Performed by: PHYSICAL THERAPIST

## 2017-04-18 PROCEDURE — 97112 NEUROMUSCULAR REEDUCATION: CPT | Performed by: PHYSICAL THERAPIST

## 2017-04-18 NOTE — PROGRESS NOTES
PT DAILY TREATMENT NOTE - [facility] 2-15    Patient Name: Berna Funez  Date:2017  : 1940  [x]  Patient  Verified  Payor: Jeff Fuentes / Plan: [place] MED[place] PART A & B / Product Type: Medicare /    In time:2:05pm  Out time:3:00pm  Total Treatment Time (min): 55  Total Timed Codes (min): 55  1:1 Treatment Time ( [address] only): 55   Visit #: 3     Treatment Area: Other abnormalities of gait and mobility [R26.89]    SUBJECTIVE  Pain Level (0-10 scale): 0  Any medication changes, allergies to medications, adverse drug reactions, diagnosis change, or new procedure performed?: [x] No    [] Yes (see summary sheet for update)  Subjective functional status/changes:   [] No changes reported  Patient stated she felt a little tired today from doing yard work this morning    OBJECTIVE    35 min Therapeutic Exercise:  [x] See flow sheet :   Rationale: increase ROM, increase strength, improve coordination and improve balance to improve the patients ability to perform dynamic balance activities without loss of balance. 20 min Neuromuscular Re-education:  []  See flow sheet :   Rationale: increase strength, improve coordination, improve balance and increase proprioception  to improve the patients ability to perform dynamic balance activities without loss of balance. With   [x] TE   [] TA   [x] neuro   [] other: Patient Education: [x] Review HEP    [x] Progressed/Changed HEP based on:   [] positioning   [x] body mechanics   [] transfers   [] heat/ice application    [] other:      Pain Level (0-10 scale) post treatment: 0    ASSESSMENT/Changes in Function:   Patient showed improved strength in bilateral hip abduction today with increased number of repetition of exercises and better quality movement. She continues to demonstrate issues with balance requiring CG assist for most dynamic LE activities today and minimal assist to recover balance on several occasions while performing step up activity.   Patient will continue to benefit from skilled PT services to modify and progress therapeutic interventions, address functional mobility deficits, address ROM deficits, address strength deficits, analyze and address soft tissue restrictions, analyze and cue movement patterns, analyze and modify body mechanics/ergonomics, assess and modify postural abnormalities, address imbalance/dizziness and instruct in home and community integration to attain remaining goals.      [x]  See Plan of Care  []  See progress note/recertification  []  See Discharge Summary      PLAN  [x]  Upgrade activities as tolerated     [x]  Continue plan of care  [x]  Update interventions per flow sheet       []  Discharge due to:_  []  Other:_      Inder Baumann, PT 4/18/2017  3:06 PM

## 2017-04-20 ENCOUNTER — HOSPITAL ENCOUNTER (OUTPATIENT)
Dept: PHYSICAL THERAPY | Age: 77
Discharge: HOME | End: 2017-04-20
Payer: MEDICARE

## 2017-04-20 PROCEDURE — 97110 THERAPEUTIC EXERCISES: CPT | Performed by: PHYSICAL THERAPIST

## 2017-04-20 NOTE — PROGRESS NOTES
PT DAILY TREATMENT NOTE - [facility] 2-15    Patient Name: Juliet Peter  Date:2017  : 1940  [x]  Patient  Verified  Payor: Eddie Hutchinson / Plan: [place] MEDICARE PART A & B / Product Type: Medicare /    In time:1:35pm  Out time:2:18pm  Total Treatment Time (min): 43  Total Timed Codes (min): 43  1:1 Treatment Time ([address] only): 35   Visit #: 4     Treatment Area: Other abnormalities of gait and mobility [R26.89]    SUBJECTIVE  Pain Level (0-10 scale): 0  Any medication changes, allergies to medications, adverse drug reactions, diagnosis change, or new procedure performed?: [x] No    [] Yes (see summary sheet for update)  Subjective functional status/changes:   [] No changes reported  The patient reports that she was a little sore but was fine after last visit. OBJECTIVE     35 min Therapeutic Exercise:  [x] See flow sheet :   Rationale: increase ROM, increase strength and improve coordination to improve the patients ability to perform daily activities. With   [x] TE   [] TA   [] neuro   [] other: Patient Education: [x] Review HEP    [x] Progressed/Changed HEP based on:   [x] positioning   [x] body mechanics   [] transfers   [] heat/ice application    [] other:      Other Objective/Functional Measures: Pt. Had improved strength today     Pain Level (0-10 scale) post treatment: 0    ASSESSMENT/Changes in Function:     Patient tolerated more dynamic balancing therex today. Patient will continue to benefit from skilled PT services to modify and progress therapeutic interventions, address functional mobility deficits, address ROM deficits, address strength deficits, analyze and address soft tissue restrictions, analyze and cue movement patterns, analyze and modify body mechanics/ergonomics, assess and modify postural abnormalities, address imbalance/dizziness and instruct in home and community integration to attain remaining goals.      [x]  See Plan of Care  []  See progress note/recertification  []  See Discharge Summary         Progress towards goals / Updated goals: The patient is progressing towards goals.     PLAN  [x]  Upgrade activities as tolerated     [x]  Continue plan of care  [x]  Update interventions per flow sheet       []  Discharge due to:_  []  Other:_      Johnson Baez, PT 4/20/2017  1:58 PM

## 2017-04-25 ENCOUNTER — HOSPITAL ENCOUNTER (OUTPATIENT)
Dept: PHYSICAL THERAPY | Age: 77
Discharge: HOME | End: 2017-04-25
Payer: MEDICARE

## 2017-04-25 PROCEDURE — 97112 NEUROMUSCULAR REEDUCATION: CPT | Performed by: PHYSICAL THERAPIST

## 2017-04-25 PROCEDURE — 97110 THERAPEUTIC EXERCISES: CPT | Performed by: PHYSICAL THERAPIST

## 2017-04-25 NOTE — PROGRESS NOTES
PT DAILY TREATMENT NOTE - [facility] 2-15    Patient Name: Sandra Marcos  Date:2017  : 1940  [x]  Patient  Verified  Payor: Chris De Santiago / Plan: [place] MEDICARE PART A & B / Product Type: Medicare /    In time:1:01pm  Out time:1:40pm  Total Treatment Time (min): 39  Total Timed Codes (min): 30  1:1 Treatment Time ([address] only): 30   Visit #: 5     Treatment Area: Other abnormalities of gait and mobility [R26.89]    SUBJECTIVE  Pain Level (0-10 scale): 0  Any medication changes, allergies to medications, adverse drug reactions, diagnosis change, or new procedure performed?: [x] No    [] Yes (see summary sheet for update)  Subjective functional status/changes:   [] No changes reported  The patient reports that she's doing okay. OBJECTIVE    10 min Therapeutic Exercise:  [x] See flow sheet :   Rationale: increase ROM, increase strength and improve coordination to improve the patients ability to perform daily activities. 20 min Neuromuscular Re-education:  []  See flow sheet :   Rationale: improve coordination, improve balance and increase proprioception  to improve the patients ability to perform daily activities. With   [x] TE   [] TA   [] neuro   [] other: Patient Education: [x] Review HEP    [x] Progressed/Changed HEP based on:   [x] positioning   [x] body mechanics   [] transfers   [] heat/ice application    [] other:      Other Objective/Functional Measures: Pt. Had improved balance today     Pain Level (0-10 scale) post treatment: 0    ASSESSMENT/Changes in Function:     The patient progressed with balancing therex and obstacle course.  Patient will continue to benefit from skilled PT services to modify and progress therapeutic interventions, address functional mobility deficits, address ROM deficits, address strength deficits, analyze and address soft tissue restrictions, analyze and cue movement patterns, analyze and modify body mechanics/ergonomics, assess and modify postural abnormalities, address imbalance/dizziness and instruct in home and community integration to attain remaining goals. [x]  See Plan of Care  []  See progress note/recertification  []  See Discharge Summary         Progress towards goals / Updated goals: The patient is progressing towards goals.     PLAN  [x]  Upgrade activities as tolerated     [x]  Continue plan of care  [x]  Update interventions per flow sheet       []  Discharge due to:_  []  Other:_      Johnson Baez, PT 4/25/2017  2:05 PM

## 2017-04-27 ENCOUNTER — HOSPITAL ENCOUNTER (OUTPATIENT)
Dept: PHYSICAL THERAPY | Age: 77
Discharge: HOME | End: 2017-04-27
Payer: MEDICARE

## 2017-04-27 PROCEDURE — 97112 NEUROMUSCULAR REEDUCATION: CPT | Performed by: PHYSICAL THERAPIST

## 2017-04-27 PROCEDURE — 97110 THERAPEUTIC EXERCISES: CPT | Performed by: PHYSICAL THERAPIST

## 2017-04-27 RX ORDER — CLONIDINE HYDROCHLORIDE 0.2 MG/1
0.2 TABLET ORAL 2 TIMES DAILY
Qty: 180 TAB | Refills: 1 | Status: SHIPPED | OUTPATIENT
Start: 2017-04-27 | End: 2017-10-18 | Stop reason: SDUPTHER

## 2017-04-27 NOTE — PROGRESS NOTES
PT DAILY TREATMENT NOTE - [facility] 2-15    Patient Name: Ludmila Medal  Date:2017  : 1940  [x]  Patient  Verified  Payor: Renaldo Early / Plan: [place] MEDICARE PART A & B / Product Type: Medicare /    In time:1:52pm  Out time:2:40pm  Total Treatment Time (min): 48  Total Timed Codes (min): 45  1:1 Treatment Time ( [address] only): 45   Visit #: 6     Treatment Area: Other abnormalities of gait and mobility [R26.89]    SUBJECTIVE  Pain Level (0-10 scale): 0  Any medication changes, allergies to medications, adverse drug reactions, diagnosis change, or new procedure performed?: [x] No    [] Yes (see summary sheet for update)  Subjective functional status/changes:   [] No changes reported  The patient reports that she felt like she did something after last session, and was able to garden yesterday, but gets fatigued quickly. She felt fine walking around the garden. OBJECTIVE    20 min Therapeutic Exercise:  [x] See flow sheet :   Rationale: increase ROM, increase strength and improve coordination to improve the patients ability to perform daily activities. 25 min Neuromuscular Re-education:  [x]  See flow sheet :   Rationale: improve coordination, improve balance and increase proprioception  to improve the patients ability to perform daily activities. With   [x] TE   [] TA   [] neuro   [] other: Patient Education: [x] Review HEP    [x] Progressed/Changed HEP based on:   [x] positioning   [x] body mechanics   [] transfers   [] heat/ice application    [] other:      Other Objective/Functional Measures: Pt. Had improved balance     Pain Level (0-10 scale) post treatment: 0    ASSESSMENT/Changes in Function:     The patient progressed with being comfortable stepping up on a 6in box during a dynamic obstacle course.  Patient will continue to benefit from skilled PT services to modify and progress therapeutic interventions, address functional mobility deficits, address ROM deficits, address strength deficits, analyze and address soft tissue restrictions, analyze and cue movement patterns, analyze and modify body mechanics/ergonomics, assess and modify postural abnormalities, address imbalance/dizziness and instruct in home and community integration to attain remaining goals. [x]  See Plan of Care  []  See progress note/recertification  []  See Discharge Summary         Progress towards goals / Updated goals: The patient is progressing towards goals.     PLAN  [x]  Upgrade activities as tolerated     [x]  Continue plan of care  [x]  Update interventions per flow sheet       []  Discharge due to:_  []  Other:_      Pretty Lilly, PT 4/27/2017  4:00 PM

## 2017-04-28 ENCOUNTER — PATIENT OUTREACH (OUTPATIENT)
Dept: INTERNAL MEDICINE CLINIC | Age: 77
End: 2017-04-28

## 2017-05-02 ENCOUNTER — HOSPITAL ENCOUNTER (OUTPATIENT)
Dept: PHYSICAL THERAPY | Age: 77
Discharge: HOME | End: 2017-05-02
Payer: MEDICARE

## 2017-05-02 PROCEDURE — 97110 THERAPEUTIC EXERCISES: CPT | Performed by: PHYSICAL THERAPIST

## 2017-05-02 PROCEDURE — 97112 NEUROMUSCULAR REEDUCATION: CPT | Performed by: PHYSICAL THERAPIST

## 2017-05-02 NOTE — PROGRESS NOTES
PT DAILY TREATMENT NOTE - [facility] 2-15    Patient Name: Tracy Iverson  Date:2017  : 1940  [x]  Patient  Verified  Payor: Alena Washington / Plan: [place] MEDICARE PART A & B / Product Type: Medicare /    In time:1:08pm  Out time:1:47pm  Total Treatment Time (min): 39  Total Timed Codes (min): 30  1:1 Treatment Time ( [address] only): 30   Visit #: 7     Treatment Area: Other abnormalities of gait and mobility [R26.89]    SUBJECTIVE  Pain Level (0-10 scale): 0  Any medication changes, allergies to medications, adverse drug reactions, diagnosis change, or new procedure performed?: [x] No    [] Yes (see summary sheet for update)  Subjective functional status/changes:   [] No changes reported  The patient reports that she is feeling pretty good. OBJECTIVE    15 min Therapeutic Exercise:  [x] See flow sheet :   Rationale: increase ROM, increase strength and improve coordination to improve the patients ability to perform daily activities. 15 min Neuromuscular Re-education:  [x]  See flow sheet :   Rationale: improve coordination, improve balance and increase proprioception  to improve the patients ability to perform daily activities. With   [x] TE   [] TA   [] neuro   [] other: Patient Education: [x] Review HEP    [x] Progressed/Changed HEP based on:   [x] positioning   [x] body mechanics   [] transfers   [] heat/ice application    [] other:      Other Objective/Functional Measures: The patient had improved balance today. Pain Level (0-10 scale) post treatment: 0    ASSESSMENT/Changes in Function:     The patient progressed with increased resistance and dynamic exercises to tolerance today.  Patient will continue to benefit from skilled PT services to modify and progress therapeutic interventions, address functional mobility deficits, address ROM deficits, address strength deficits, analyze and address soft tissue restrictions, analyze and cue movement patterns, analyze and modify body mechanics/ergonomics, assess and modify postural abnormalities, address imbalance/dizziness and instruct in home and community integration to attain remaining goals. [x]  See Plan of Care  []  See progress note/recertification  []  See Discharge Summary         Progress towards goals / Updated goals: The patient is progressing towards goals.     PLAN  [x]  Upgrade activities as tolerated     [x]  Continue plan of care  [x]  Update interventions per flow sheet       []  Discharge due to:_  []  Other:_      Inder Baumann, PT 5/2/2017  1:34 PM

## 2017-05-04 ENCOUNTER — HOSPITAL ENCOUNTER (OUTPATIENT)
Dept: PHYSICAL THERAPY | Age: 77
Discharge: HOME | End: 2017-05-04
Payer: MEDICARE

## 2017-05-04 PROCEDURE — 97112 NEUROMUSCULAR REEDUCATION: CPT | Performed by: PHYSICAL THERAPIST

## 2017-05-04 PROCEDURE — 97110 THERAPEUTIC EXERCISES: CPT | Performed by: PHYSICAL THERAPIST

## 2017-05-04 NOTE — PROGRESS NOTES
PT DAILY TREATMENT NOTE - [facility] 2-15    Patient Name: Ata Ennis  Date:2017  : 1940  [x]  Patient  Verified  Payor: [place] MEDICARE / Plan: [place] MEDICARE PART A & B / Product Type: Medicare /    In time:1:00pm  Out time:1:46pm  Total Treatment Time (min): 46  Total Timed Codes (min): 1:30pm  1:1 Treatment Time ( [address] only): 30   Visit #: 8     Treatment Area: Other abnormalities of gait and mobility [R26.89]    SUBJECTIVE  Pain Level (0-10 scale): 0  Any medication changes, allergies to medications, adverse drug reactions, diagnosis change, or new procedure performed?: [x] No    [] Yes (see summary sheet for update)  Subjective functional status/changes:   [] No changes reported  The patient reports that she feels like she's getting stronger, but still feels off balance, especially when she leans forward. OBJECTIVE    15 min Therapeutic Exercise:  [x] See flow sheet :   Rationale: increase ROM, increase strength and improve coordination to improve the patients ability to perform daily activities. 15 min Neuromuscular Re-education:  [x]  See flow sheet :   Rationale: improve coordination, improve balance and increase proprioception  to improve the patients ability to perform daily activities. With   [x] TE   [] TA   [] neuro   [] other: Patient Education: [x] Review HEP    [x] Progressed/Changed HEP based on:   [x] positioning   [x] body mechanics   [] transfers   [] heat/ice application    [] other:      Other Objective/Functional Measures: Pt. Had improved strength and balance     Pain Level (0-10 scale) post treatment: 0    ASSESSMENT/Changes in Function:     The patient progressed with improved balance and strength today.  Patient will continue to benefit from skilled PT services to modify and progress therapeutic interventions, address functional mobility deficits, address ROM deficits, address strength deficits, analyze and address soft tissue restrictions, analyze and cue movement patterns, analyze and modify body mechanics/ergonomics, assess and modify postural abnormalities, address imbalance/dizziness and instruct in home and community integration to attain remaining goals. [x]  See Plan of Care  []  See progress note/recertification  []  See Discharge Summary         Progress towards goals / Updated goals: The patient is progressing towards goals.     PLAN  [x]  Upgrade activities as tolerated     [x]  Continue plan of care  [x]  Update interventions per flow sheet       []  Discharge due to:_  []  Other:_      Robert Carrasquillo, PT 5/4/2017  1:21 PM

## 2017-05-09 ENCOUNTER — HOSPITAL ENCOUNTER (OUTPATIENT)
Dept: PHYSICAL THERAPY | Age: 77
Discharge: HOME | End: 2017-05-09
Payer: MEDICARE

## 2017-05-09 PROCEDURE — 97112 NEUROMUSCULAR REEDUCATION: CPT | Performed by: PHYSICAL THERAPIST

## 2017-05-09 PROCEDURE — G8979 MOBILITY GOAL STATUS: HCPCS | Performed by: PHYSICAL THERAPIST

## 2017-05-09 PROCEDURE — G8980 MOBILITY D/C STATUS: HCPCS | Performed by: PHYSICAL THERAPIST

## 2017-05-09 NOTE — PROGRESS NOTES
PT DAILY TREATMENT NOTE - [facility] 2-15    Patient Name: Pepe Francis  Date:2017  : 1940  [x]  Patient  Verified  Payor: Rachael Host / Plan: [place] MED[place] PART [place] & B / Product Type: Medicare /    In time:1:10pm  Out time:1:40pm  Total Treatment Time (min): 30  Total Timed Codes (min): 30  1:1 Treatment Time ([address] only): 30   Visit #: 9     Treatment Area: Other abnormalities of gait and mobility [R26.89]    SUBJECTIVE  Pain Level (0-10 scale): 0  Any medication changes, allergies to medications, adverse drug reactions, diagnosis change, or new procedure performed?: [x] No    [] Yes (see summary sheet for update)  Subjective functional status/changes:   [] No changes reported  The patient reports that she feels stronger    OBJECTIVE     30 min Neuromuscular Re-education:  [x]  See flow sheet :   Rationale: increase strength, improve coordination, improve balance and increase proprioception  to improve the patients ability to perform daily activities. With   [x] TE   [] TA   [] neuro   [] other: Patient Education: [x] Review HEP    [x] Progressed/Changed HEP based on:   [x] positioning   [x] body mechanics   [] transfers   [] heat/ice application    [] other:      Other Objective/Functional Measures: FOTO= 50 (52 at eval)    SLS: R=  20s (8s at eval); L= 11s (5s at eval)  ModCTSIB: 1= 30s; 2= 30s; 4= 30s; 5= 28s (18s with LOB at eval)  30s sit to stand: 13x (6x)  5x sit to stand: 10s (25s at eval)  TUs (17s at eval)     Pain Level (0-10 scale) post treatment: 0    ASSESSMENT/Changes in Function:     Patient has MET goals and will be discharged to home with her independent HEP. []  See Plan of Care  []  See progress note/recertification  [x]  See Discharge Summary         Progress towards goals / Updated goals: The patient has MET goals and will be discharged today.      PLAN  []  Upgrade activities as tolerated     []  Continue plan of care  []  Update interventions per flow sheet       [x] Discharge due to: Pt. Has MET goals.   []  Other:_      Andres Wu, PT 5/9/2017  3:58 PM

## 2017-05-09 NOTE — PROGRESS NOTES
[employer] [facility]  [address] ([facility]), [address] [facility]  [place], [address]  Phone: [phone] Fax: [phone]    Discharge Summary 2-15    Patient name: Paola Diez  : 1940  Provider#: [id number]  Referral source: Romy Kemp MD      Medical/Treatment Diagnosis: Other abnormalities of gait and mobility [R26.89]     Prior Hospitalization: see medical history     Comorbidities: See Plan of Care  Prior Level of Function: See Plan of Care  Medications: Verified on Patient Summary List    Start of Care: 4/10/17      Onset Date:2017   Visits from Start of Care: 9     Missed Visits: 0  Reporting Period : 4/10/17 to 17    Assessment/Summary of care: The patient has progressed significantly in all balance and objective measures with functional mobility, more than doubling her single leg stance time from 5s to 11s on the left and 8s to 20s on the right, improving her 30s sit to stand test from only 6x to 13x, and improving her time up and go time from 17s to 9s. She reports that she feels like she has improved strength, and will still get fatigued if she's working out in the yard for long periods of time, but feels more comfortable walking. She will continue to maintain her improvements with her home exercise program.    Short Term Goals: To be accomplished in 2 treatments:  1.) The patient will be independent with her HEP.- MET  Long Term Goals: To be accomplished in 16 treatments:  1.) The patient will improve her 30s sit to stand test to at least 8x to show improvement in functional strength.- MET  2.) The patient will improve her SLS to at least 10s bilaterally to show improvement in general balance for daily activities. - MET  3.) The patient will report decreased fatigue after 1 hour of ambulation while grocery shopping.- MET     G-Codes (GP)  Mobility    Goal  CJ= 20-39%  D/C  CJ= 20-39%    The severity rating is based on clinical judgment and the FOTO Score score.     RECOMMENDATIONS:  [x]Discontinue therapy: [x]Patient has reached or is progressing toward set goals     []Patient is non-compliant or has abdicated     []Due to lack of appreciable progress towards set goals     []Other   Ania, YOVANY 5/9/2017 4:05 PM

## 2017-05-11 ENCOUNTER — APPOINTMENT (OUTPATIENT)
Dept: PHYSICAL THERAPY | Age: 77
End: 2017-05-11
Payer: MEDICARE

## 2017-06-15 RX ORDER — SOTALOL HYDROCHLORIDE 80 MG/1
40 TABLET ORAL EVERY 12 HOURS
Qty: 30 TAB | Refills: 2 | Status: SHIPPED | OUTPATIENT
Start: 2017-06-15 | End: 2017-07-25 | Stop reason: ALTCHOICE

## 2017-07-10 RX ORDER — LEVOTHYROXINE SODIUM 75 UG/1
75 TABLET ORAL
Qty: 90 TAB | Refills: 3 | Status: SHIPPED | OUTPATIENT
Start: 2017-07-10 | End: 2018-04-11 | Stop reason: SDUPTHER

## 2017-07-10 NOTE — TELEPHONE ENCOUNTER
Requested Prescriptions     Pending Prescriptions Disp Refills    levothyroxine (SYNTHROID) 75 mcg tablet 90 Tab 3     Sig: Take 1 Tab by mouth Daily (before breakfast).        Last Office Visit: 4.5.17    Upcoming Appointment: 10.6.17

## 2017-07-19 ENCOUNTER — CLINICAL SUPPORT (OUTPATIENT)
Dept: CARDIOLOGY CLINIC | Age: 77
End: 2017-07-19

## 2017-07-19 DIAGNOSIS — I48.0 PAROXYSMAL ATRIAL FIBRILLATION (HCC): Primary | ICD-10-CM

## 2017-07-25 ENCOUNTER — OFFICE VISIT (OUTPATIENT)
Dept: CARDIOLOGY CLINIC | Age: 77
End: 2017-07-25

## 2017-07-25 VITALS
RESPIRATION RATE: 18 BRPM | WEIGHT: 230 LBS | HEART RATE: 84 BPM | BODY MASS INDEX: 38.32 KG/M2 | DIASTOLIC BLOOD PRESSURE: 76 MMHG | SYSTOLIC BLOOD PRESSURE: 148 MMHG | OXYGEN SATURATION: 96 % | HEIGHT: 65 IN

## 2017-07-25 DIAGNOSIS — E03.9 ACQUIRED HYPOTHYROIDISM: ICD-10-CM

## 2017-07-25 DIAGNOSIS — I10 ESSENTIAL HYPERTENSION: Primary | Chronic | ICD-10-CM

## 2017-07-25 DIAGNOSIS — I48.0 PAROXYSMAL ATRIAL FIBRILLATION (HCC): ICD-10-CM

## 2017-07-25 DIAGNOSIS — E78.2 MIXED HYPERLIPIDEMIA: ICD-10-CM

## 2017-07-25 NOTE — PROGRESS NOTES
Subjective:      Bronson Barajas is a 78 y.o. female is here for follow up of AF. She reports some fatigue since her ablation, it has improved with time. The patient denies chest pain/ shortness of breath, orthopnea, PND, LE edema, palpitations, syncope, presyncope.        Patient Active Problem List    Diagnosis Date Noted    Pericardial effusion with cardiac tamponade 03/18/2017    S/P ablation of atrial fibrillation 03/17/2017    Acquired hypothyroidism 12/14/2015    ACP (advance care planning) 12/14/2015    HTN (hypertension) 07/21/2015    Thyroid activity decreased 04/14/2015    Atrial fibrillation ([address] 03/16/2015    A-fib ([address] 03/10/2015    Angina, class III ([address] 01/27/2015    Morbid obesity ([address]     Hyperlipidemia 07/14/2014    Hypokalemia 07/14/2014    Encounter for screening colonoscopy 08/21/2013    History of rectal bleeding 08/21/2013    Postmenopausal bleeding 10/18/2011    Endometrial polyp 10/18/2011      Angelica Chan MD  Past Medical History:   Diagnosis Date    Arthritis     right knee, left shoulder    Diverticulosis     Frequency of urination     High cholesterol     Hypertension     Morbid obesity ([address]     Thyroid disease     hypothyroid    Unspecified adverse effect of anesthesia     low BP      Past Surgical History:   Procedure Laterality Date    BREAST SURGERY PROCEDURE UNLISTED  1982    breast bx rt    CARDIAC SURG PROCEDURE UNLIST  01/2015    cardiac catheterization, no intervention, medical management    HX GYN      myomectomy on uterus    HX GYN  2011    hystereoscopy D&C    HX LYMPH NODE DISSECTION      biopsy    HX OTHER SURGICAL      lymph node bx     Allergies   Allergen Reactions    Adhesive Hives    Amoxicillin Unknown (comments)    Lipitor [Atorvastatin] Myalgia     Gets the flu      Family History   Problem Relation Age of Onset    Alzheimer Mother     Heart Disease Mother     Heart Disease Father     Hypertension Father    [facility] Cancer Paternal Aunt     Diabetes Paternal Grandmother     negative for cardiac disease  Social History     Social History    Marital status:      Spouse name: N/A    Number of children: N/A    Years of education: N/A     Social History Main Topics    Smoking status: Never Smoker    Smokeless tobacco: Never Used    Alcohol use No    Drug use: No    Sexual activity: Not Asked     Other Topics Concern    None     Social History Narrative     Current Outpatient Prescriptions   Medication Sig    levothyroxine (SYNTHROID) 75 mcg tablet Take 1 Tab by mouth Daily (before breakfast).  cloNIDine HCl (CATAPRES) 0.2 mg tablet Take 1 Tab by mouth two (2) times a day.  KLOR-CON 10 10 mEq tablet Take two tablets twice a day    hydroCHLOROthiazide (HYDRODIURIL) 25 mg tablet Take 1 Tab by mouth daily.  rosuvastatin (CRESTOR) 20 mg tablet Take 1 Tab by mouth nightly.  losartan (COZAAR) 25 mg tablet Take 1 Tab by mouth daily.  apixaban (ELIQUIS) 5 mg tablet Take 1 Tab by mouth two (2) times a day.  verapamil ER (CALAN-SR) 120 mg tablet TAKE 1 TABLET BY MOUTH EVERY MORNING FOR BLOOD PRESSURE    CALCIUM CARBONATE/VITAMIN D3 (CALTRATE 600 + D PO) Take 1 Tab by mouth two (2) times a day.  MULTIVITAMIN W-MINERALS/LUTEIN (CENTRUM SILVER PO) Take 1 Tab by mouth daily (after lunch). No current facility-administered medications for this visit. Vitals:    07/25/17 1506 07/25/17 1519 07/25/17 1521   BP: 136/70 146/74 148/76   Pulse: 84     Resp: 18     SpO2: 96%     Weight: 230 lb (104.3 kg)     Height: 5' 5\" (1.651 m)         I have reviewed the nurses notes, vitals, problem list, allergy list, medical history, family, social history and medications. Review of Symptoms:    General: +fatigue, Pt denies excessive weight gain or loss. Pt is able to conduct ADL's  HEENT: Denies blurred vision, headaches, epistaxis and difficulty swallowing.   Respiratory: Denies shortness of breath, ANGUIANO, wheezing or stridor. Cardiovascular: Denies precordial pain, palpitations, edema or PND  Gastrointestinal: Denies poor appetite, indigestion, abdominal pain or blood in stool  Urinary: Denies dysuria, pyuria  Musculoskeletal: Denies pain or swelling from muscles or joints  Neurologic: Denies tremor, paresthesias, or sensory motor disturbance  Skin: Denies rash, itching or texture change. Psych: Denies depression      Physical Exam:      General: Well developed, in no acute distress. HEENT: Eyes - PERRL, no jvd  Heart:  Normal S1/S2 negative S3 or S4. Regular, no murmur, gallop or rub.   Respiratory: Clear bilaterally x 4, no wheezing or rales  Abdomen:   Soft, non-tender, bowel sounds are active.   Extremities:  No edema, normal cap refill, no cyanosis. Musculoskeletal: No clubbing  Neuro: A&Ox3, speech clear, gait stable. Skin: Skin color is normal. No rashes or lesions.  Non diaphoretic  Vascular: 2+ pulses symmetric in all extremities    Cardiographics    Ekg: NSR      Results for orders placed or performed in visit on 07/19/17   CARDIAC HOLTER MONITOR, 24 HOURS    Narrative    ECG Monitor/24 hours, Complete    Reason for Holter Monitor   A-FIB    Heartbeat    Slowest 47  Average 64  Fastest  101      Results:   Underlying Rhythm: Normal sinus rhythm      Atrial Arrhythmias: premature atrial contractions; occasional, atrial couplets and atrial triplets            AV Conduction: normal    Ventricular Arrhythmias: premature ventricular contractions; rare     ST Segment Analysis:normal     Symptom Correlation:  none    Comment:   Sinus rhythm with bursts of atrial ectopy (less than 10 beats a time)     Jessica Valladares MD, Roselie Hash            Results for orders placed or performed during the hospital encounter of 03/17/17   EKG, 12 LEAD, INITIAL   Result Value Ref Range    Ventricular Rate 92 BPM    Atrial Rate 92 BPM    P-R Interval 196 ms    QRS Duration 80 ms    Q-T Interval 370 ms    QTC Calculation (Bezet) 457 ms    Calculated P Axis 27 degrees    Calculated R Axis -17 degrees    Calculated T Axis 44 degrees    Diagnosis       Normal sinus rhythm  Low voltage QRS  Confirmed by Sharrell Halsted ([id number]) on 3/18/2017 10:40:23 AM           Lab Results   Component Value Date/Time    WBC 7.0 03/21/2017 02:42 AM    HGB 10.7 03/21/2017 02:42 AM    HCT 31.8 03/21/2017 02:42 AM    PLATELET 285 59/67/0730 02:42 AM    MCV 91.4 03/21/2017 02:42 AM      Lab Results   Component Value Date/Time    Sodium 141 04/03/2017 09:03 AM    Potassium 4.1 04/03/2017 09:03 AM    Chloride 101 04/03/2017 09:03 AM    CO2 24 04/03/2017 09:03 AM    Anion gap 10 03/22/2017 02:52 AM    Glucose 114 04/03/2017 09:03 AM    BUN 17 04/03/2017 09:03 AM    Creatinine 1.05 04/03/2017 09:03 AM    BUN/Creatinine ratio 16 04/03/2017 09:03 AM    GFR est AA 60 04/03/2017 09:03 AM    GFR est non-AA 52 04/03/2017 09:03 AM    Calcium 9.8 04/03/2017 09:03 AM    Bilirubin, total 0.2 04/03/2017 09:03 AM    AST (SGOT) 12 04/03/2017 09:03 AM    Alk. phosphatase 75 04/03/2017 09:03 AM    Protein, total 6.9 04/03/2017 09:03 AM    Albumin 4.3 04/03/2017 09:03 AM    Globulin 3.6 03/16/2015 10:10 AM    A-G Ratio 1.7 04/03/2017 09:03 AM    ALT (SGPT) 20 04/03/2017 09:03 AM         Assessment:     Assessment:        ICD-10-CM ICD-9-CM    1. Essential hypertension I10 401.9 AMB POC EKG ROUTINE W/ 12 LEADS, INTER & REP   2. Paroxysmal atrial fibrillation (HCC) I48.0 427.31    3. Mixed hyperlipidemia E78.2 272.2    4. Acquired hypothyroidism E03.9 244.9      Orders Placed This Encounter    AMB POC EKG ROUTINE W/ 12 LEADS, INTER & REP     Order Specific Question:   Reason for Exam:     Answer:   routine        Plan:   Ms. Cari Bailey is here for follow up regarding afib. She has some lingering fatigue, denies other cardiac complaints. ECG remains SR. Echocardiogram demonstrated EF of 60%. Holter monitor demonstrated NSR with short bursts of atrial ectopy.  She can stop sotalol and follow up in 3 months with monitor prior to visit. Continue medical management for HTN, hyperlipidemia, hypothyroidism. Thank you for allowing me to participate in Mac Santos 's care.     Liliana Crigler, NP Gerilyn Body, MD, Gilson Joe

## 2017-07-25 NOTE — PROGRESS NOTES
Chief Complaint   Patient presents with    Irregular Heart Beat     3 mo appt. C/O fatigue. C/O dizziness.

## 2017-07-25 NOTE — MR AVS SNAPSHOT
Visit Information Date & Time Provider Department Dept. Phone Encounter #  
 7/25/2017  3:30 PM Lyudmila Clement Radford, [address] [facility] [phone] [id number] Your Appointments 8/1/2017  4:00 PM  
Vascular Consult with Christi Rick MD  
[facility] [address]) Appt Note: per Dr Robert Murguia -- check circulation in [address] [facility]  
[phone] [address] [facility]  
  
    
 10/6/2017  1:45 PM  
ROUTINE CARE with Kia Patches, [address] [place] [address],[address]) Appt Note: 6 month follow up  
 [facility] [address] [facility]  
[phone]  
  
   
 [facility] [address] [facility]  [address] [facility]  
  
    
 10/24/2017 10:00 AM  
HOLTER MONITOR with Vi Limb [facility] [address]) Appt Note: $0CP 7/25/17ksr [address] [facility]  
[phone] [address] [facility]  
  
    
 10/31/2017  1:00 PM  
3 MONTH with 91 Clement Radford MD  
[facility] [address]) Appt Note: $0CP 7/25/17ksr [address] [facility]  
[phone] [address] [facility] Upcoming Health Maintenance Date Due INFLUENZA AGE 9 TO ADULT 8/1/2017 MEDICARE YEARLY EXAM 4/6/2018 GLAUCOMA SCREENING Q2Y 8/1/2018 COLONOSCOPY 8/21/2023 DTaP/Tdap/Td series (2 - Td) 4/14/2025 Allergies as of 7/25/2017  Review Complete On: 7/25/2017 By: Tasneem Juarez NP Severity Noted Reaction Type Reactions Adhesive  07/14/2014    Hives Amoxicillin  10/12/2011    Unknown (comments) Lipitor [Atorvastatin]  07/14/2014   Side Effect Myalgia Gets the flu Current Immunizations  Reviewed on 4/5/2017 Name Date Influenza Vaccine 11/18/2014 Influenza Vaccine (Quad) 12/14/2015 12:17 PM  
 Influenza Vaccine (Quad) PF 10/18/2016 Pneumococcal Conjugate (PCV-13) 4/5/2017  2:45 PM  
 Pneumococcal Vaccine (Unspecified Type) 11/19/2013 Tdap 4/14/2015 Not reviewed this visit You Were Diagnosed With   
  
 Codes Comments Essential hypertension    -  Primary ICD-10-CM: I10 
ICD-9-CM: 401.9 Paroxysmal atrial fibrillation (HCC)     ICD-10-CM: I48.0 ICD-9-CM: 427.31 Mixed hyperlipidemia     ICD-10-CM: E78.2 ICD-9-CM: 272.2 Acquired hypothyroidism     ICD-10-CM: E03.9 ICD-9-CM: 279. 9 Vitals BP Pulse Resp Height(growth percentile) Weight(growth percentile) SpO2  
 148/76 (BP 1 Location: Right arm, BP Patient Position: Standing) 84 18 5' 5\" (1.651 m) 230 lb (104.3 kg) 96% BMI OB Status Smoking Status 38.27 kg/m2 Postmenopausal Never Smoker Vitals History BMI and BSA Data Body Mass Index Body Surface Area  
 38.27 kg/m 2 2.19 m 2 Preferred Pharmacy Pharmacy Name Phone [facility]Y #[id number]- [place], [place] - [address] [phone] Your Updated Medication List  
  
   
This list is accurate as of: 7/25/17  4:14 PM.  Always use your most recent med list.  
  
  
  
  
 apixaban 5 mg tablet Commonly known as:  Artice Josephine Take 1 Tab by mouth two (2) times a day. CALTRATE 600 + D PO Take 1 Tab by mouth two (2) times a day. CENTRUM SILVER PO Take 1 Tab by mouth daily (after lunch). cloNIDine HCl 0.2 mg tablet Commonly known as:  CATAPRES Take 1 Tab by mouth two (2) times a day. hydroCHLOROthiazide 25 mg tablet Commonly known as:  HYDRODIURIL Take 1 Tab by mouth daily. KLOR-CON 10 10 mEq tablet Generic drug:  potassium chloride SR Take two tablets twice a day  
  
 levothyroxine 75 mcg tablet Commonly known as:  SYNTHROID Take 1 Tab by mouth Daily (before breakfast). losartan 25 mg tablet Commonly known as:  COZAAR Take 1 Tab by mouth daily. rosuvastatin 20 mg tablet Commonly known as:  CRESTOR Take 1 Tab by mouth nightly. verapamil  mg tablet Commonly known as:  CALAN-SR  
TAKE 1 TABLET BY MOUTH EVERY MORNING FOR BLOOD PRESSURE We Performed the Following AMB POC EKG ROUTINE W/ 12 LEADS, INTER & REP [22723 CPT(R)] Introducing [facility]! Dear Malika Moss: Thank you for requesting a [employer] account. Our records indicate that you already have an active [employer] account. You can access your account anytime at [email or web address] Did you know that you can access your hospital and ER discharge instructions at any time in Bionaturis? You can also review all of your test results from your hospital stay or ER visit. Additional Information If you have questions, please visit the Frequently Asked Questions section of the B[employer] website at [email or web address] Remember, Bionaturis is NOT to be used for urgent needs. For medical emergencies, dial 911. Now available from your iPhone and Android! Please provide this summary of care documentation to your next provider. Your primary care clinician is listed as Jaida Sinclair. If you have any questions after today's visit, please call [phone].

## 2017-08-01 ENCOUNTER — OFFICE VISIT (OUTPATIENT)
Dept: CARDIOLOGY CLINIC | Age: 77
End: 2017-08-01

## 2017-08-01 VITALS
BODY MASS INDEX: 38.42 KG/M2 | HEIGHT: 65 IN | HEART RATE: 91 BPM | SYSTOLIC BLOOD PRESSURE: 140 MMHG | DIASTOLIC BLOOD PRESSURE: 81 MMHG | WEIGHT: 230.6 LBS | RESPIRATION RATE: 20 BRPM

## 2017-08-01 DIAGNOSIS — I73.9 CLAUDICATION (HCC): Primary | ICD-10-CM

## 2017-08-01 DIAGNOSIS — I10 ESSENTIAL HYPERTENSION: Chronic | ICD-10-CM

## 2017-08-01 DIAGNOSIS — E78.2 MIXED HYPERLIPIDEMIA: ICD-10-CM

## 2017-08-01 NOTE — MR AVS SNAPSHOT
Visit Information Date & Time Provider Department Dept. Phone Encounter #  
 8/1/2017  4:00 PM Destin Bravo, [address] [facility] [phone] [id number] Your Appointments 8/7/2017 11:30 AM  
ANKLE BRIACHIAL INDEX with VASCULAR, [facility] [address] [place] [address] Road) Appt Note: ANKLE BRACHIAL INDEX [id number] (Order [id number]) [address]  
[phone] [address]  
  
    
 10/6/2017  1:45 PM  
ROUTINE CARE with Lety Cohn, [address] [place] [address],[address]) Appt Note: 6 month follow up  
 [address] [facility]  
[phone]  
  
   
 [address] [facility]  [address] [facility]  
  
    
 10/24/2017 10:00 AM  
HOLTER MONITOR with Shailesh Israel [facility] [address] [place] [address]) Appt Note: $0CP 7/25/17ksr [address]  
[phone] [address]  
  
    
 10/31/2017  1:00 PM  
3 MONTH with Heidi Milan MD  
[facility] [address] [place] [address]) Appt Note: $0CP 7/25/17ksr [address]  
[phone] [address] Upcoming Health Maintenance Date Due INFLUENZA AGE 9 TO ADULT 8/1/2017 MEDICARE YEARLY EXAM 4/6/2018 GLAUCOMA SCREENING Q2Y 8/1/2018 COLONOSCOPY 8/21/2023 DTaP/Tdap/Td series (2 - Td) 4/14/2025 Allergies as of 8/1/2017  Review Complete On: 8/1/2017 By: Destin Bravo MD  
  
 Severity Noted Reaction Type Reactions Adhesive  07/14/2014    Hives Amoxicillin  10/12/2011    Unknown (comments) Lipitor [Atorvastatin]  07/14/2014   Side Effect Myalgia Gets the flu Current Immunizations  Reviewed on 4/5/2017 Name Date Influenza Vaccine 11/18/2014 Influenza Vaccine (Quad) 12/14/2015 12:17 PM  
 Influenza Vaccine (Quad) PF 10/18/2016 Pneumococcal Conjugate (PCV-13) 4/5/2017  2:45 PM  
 Pneumococcal Vaccine (Unspecified Type) 11/19/2013 Tdap 4/14/2015 Not reviewed this visit You Were Diagnosed With   
  
 Codes Comments Claudication [facility])    -  Primary ICD-10-CM: I73.9 ICD-9-CM: 443.9 Mixed hyperlipidemia     ICD-10-CM: E78.2 ICD-9-CM: 272.2 Essential hypertension     ICD-10-CM: I10 
ICD-9-CM: 401.9 Vitals BP Pulse Resp Height(growth percentile) Weight(growth percentile) BMI  
 140/81 (BP 1 Location: Left arm, BP Patient Position: Sitting) 91 20 5' 5\" (1.651 m) 230 lb 9.6 oz (104.6 kg) 38.37 kg/m2 OB Status Smoking Status Postmenopausal Never Smoker Vitals History BMI and BSA Data Body Mass Index Body Surface Area  
 38.37 kg/m 2 2.19 m 2 Preferred Pharmacy Pharmacy Name Phone [facility]ACY [id number], [place] - [address] [phone] Your Updated Medication List  
  
   
This list is accurate as of: 8/1/17  4:32 PM.  Always use your most recent med list.  
  
  
  
  
 apixaban 5 mg tablet Commonly known as:  Obed Gentleman Take 1 Tab by mouth two (2) times a day. CALTRATE 600 + D PO Take 1 Tab by mouth two (2) times a day. CENTRUM SILVER PO Take 1 Tab by mouth daily (after lunch). cloNIDine HCl 0.2 mg tablet Commonly known as:  CATAPRES Take 1 Tab by mouth two (2) times a day. hydroCHLOROthiazide 25 mg tablet Commonly known as:  HYDRODIURIL Take 1 Tab by mouth daily. KLOR-CON 10 10 mEq tablet Generic drug:  potassium chloride SR Take two tablets twice a day  
  
 levothyroxine 75 mcg tablet Commonly known as:  SYNTHROID Take 1 Tab by mouth Daily (before breakfast). losartan 25 mg tablet Commonly known as:  COZAAR  
 Take 1 Tab by mouth daily. rosuvastatin 20 mg tablet Commonly known as:  CRESTOR Take 1 Tab by mouth nightly. verapamil  mg tablet Commonly known as:  CALAN-SR  
TAKE 1 TABLET BY MOUTH EVERY MORNING FOR BLOOD PRESSURE To-Do List   
 08/01/2017 Imaging:  ANKLE BRACHIAL INDEX Introducing [facility]! Dear Rock Parker: Thank you for requesting a Socialb[employer] account. Our records indicate that you already have an active Socialb[employer] account. You can access your account anytime at [email or web address] Did you know that you can access your hospital and ER discharge instructions at any time in Socialbomb? You can also review all of your test results from your hospital stay or ER visit. Additional Information If you have questions, please visit the Frequently Asked Questions section of the Socialb[employer] website at [email or web address] Remember, Socialbomb is NOT to be used for urgent needs. For medical emergencies, dial 911. Now available from your iPhone and Android! Please provide this summary of care documentation to your next provider. Your primary care clinician is listed as Violet Baker. If you have any questions after today's visit, please call [phone].

## 2017-08-01 NOTE — PROGRESS NOTES
8/1/2017 4:31 PM      Subjective:     Monty Dykes is seen in office today for further evaluation of left foot claudication and tightness. She was seen by Dr. Micaela Goins last week and referred to my vascular clinic to assess for PAD due to above symptoms. She denies any symptoms of leg cramps, swelling, leg heaviness, throbbing. Symptom are primarily focused in left foot. No previous vascular history. Denies any leg swelling or h/o DVT. Visit Vitals    /81 (BP 1 Location: Left arm, BP Patient Position: Sitting)    Pulse 91    Resp 20    Ht 5' 5\" (1.651 m)    Wt 230 lb 9.6 oz (104.6 kg)    BMI 38.37 kg/m2     Current Outpatient Prescriptions   Medication Sig    levothyroxine (SYNTHROID) 75 mcg tablet Take 1 Tab by mouth Daily (before breakfast).  cloNIDine HCl (CATAPRES) 0.2 mg tablet Take 1 Tab by mouth two (2) times a day.  KLOR-CON 10 10 mEq tablet Take two tablets twice a day    hydroCHLOROthiazide (HYDRODIURIL) 25 mg tablet Take 1 Tab by mouth daily.  rosuvastatin (CRESTOR) 20 mg tablet Take 1 Tab by mouth nightly.  losartan (COZAAR) 25 mg tablet Take 1 Tab by mouth daily.  apixaban (ELIQUIS) 5 mg tablet Take 1 Tab by mouth two (2) times a day.  verapamil ER (CALAN-SR) 120 mg tablet TAKE 1 TABLET BY MOUTH EVERY MORNING FOR BLOOD PRESSURE    CALCIUM CARBONATE/VITAMIN D3 (CALTRATE 600 + D PO) Take 1 Tab by mouth two (2) times a day.  MULTIVITAMIN W-MINERALS/LUTEIN (CENTRUM SILVER PO) Take 1 Tab by mouth daily (after lunch). No current facility-administered medications for this visit.           Objective:      Visit Vitals    /81 (BP 1 Location: Left arm, BP Patient Position: Sitting)    Pulse 91    Resp 20    Ht 5' 5\" (1.651 m)    Wt 230 lb 9.6 oz (104.6 kg)    BMI 38.37 kg/m2       Data Review:     Reviewed and/or ordered active problem list, medication list tests    Past Medical History:   Diagnosis Date    Arthritis     right knee, left shoulder    Diverticulosis     Frequency of urination     High cholesterol     Hypertension     Morbid obesity ([address]     Thyroid disease     hypothyroid    Unspecified adverse effect of anesthesia     low BP      Past Surgical History:   Procedure Laterality Date    BREAST SURGERY PROCEDURE UNLISTED  1982    breast bx rt    CARDIAC SURG PROCEDURE UNLIST  01/2015    cardiac catheterization, no intervention, medical management    HX GYN      myomectomy on uterus    HX GYN  2011    hystereoscopy D&C    HX LYMPH NODE DISSECTION      biopsy    HX OTHER SURGICAL      lymph node bx     Allergies   Allergen Reactions    Adhesive Hives    Amoxicillin Unknown (comments)    Lipitor [Atorvastatin] Myalgia     Gets the flu      Family History   Problem Relation Age of Onset    Alzheimer Mother     Heart Disease Mother     Heart Disease Father     Hypertension Father     Cancer Paternal Aunt     Diabetes Paternal Grandmother       Social History     Social History    Marital status:      Spouse name: N/A    Number of children: N/A    Years of education: N/A     Occupational History    Not on file. Social History Main Topics    Smoking status: Never Smoker    Smokeless tobacco: Never Used    Alcohol use No    Drug use: No    Sexual activity: Not on file     Other Topics Concern    Not on file     Social History Narrative       Review of Systems     General: Not Present- Anorexia, Chills, Dietary Changes, Fatigue, Fever, Medication Changes, Night Sweats, Weight Gain > 10lbs. and Weight Loss > 10lbs. .  Skin: Not Present- Bruising and Excessive Sweating. HEENT: Not Present- Headache, Visual Loss and Vertigo. Respiratory: Not Present- Cough, Decreased Exercise Tolerance, Difficulty Breathing, Snoring and Wheezing.   Cardiovascular: Not Present- Abnormal Blood Pressure, Chest Pain, Difficulty Breathing On Exertion, Edema, Fainting / Blacking Out, Irregular Heart Beat, Orthopnea, Palpitations, Paroxysmal Nocturnal Dyspnea, Rapid Heart Rate, Shortness of Breath and Swelling of Extremities. Gastrointestinal: Not Present- Black, Tarry Stool, Bloody Stool, Diarrhea, Hematemesis, Rectal Bleeding and Vomiting. Musculoskeletal: Not Present- Muscle Pain and Muscle Weakness. Neurological: Not Present- Dizziness. Psychiatric: Not Present- Depression. Endocrine: Not Present- Cold Intolerance, Heat Intolerance and Thyroid Problems. Hematology: Not Present- Abnormal Bleeding, Anemia, Blood Clots and Easy Bruising.       Physical Exam   The physical exam findings are as follows:       General   Mental Status - Alert. General Appearance - Cooperative and Well groomed. Not in acute distress. Orientation - Oriented to time, Oriented to place and Oriented to person. Build & Nutrition - Well developed. HEENT  Head - Normal.  Eye - Normal.      Neck   Carotid Arteries - normal upstroke. No Bruits. Chest and Lung Exam   Inspection:   Chest Wall: - Normal. Accessory muscles - No use of accessory muscles in breathing. Auscultation:   Breath sounds: - Normal.      Cardiovascular   Inspection: Jugular vein - Bilateral - Inspection Normal.  Palpation/Percussion:   Apical Impulse: - Normal.  Auscultation: Rhythm - Regular. Heart Sounds - S1 WNL and S2 WNL. No S3 or S4. Murmurs & Other Heart Sounds: Auscultation of the heart reveals - No Murmurs. Abdomen   Palpation/Percussion: Palpation and Percussion of the abdomen reveal - No Palpable abdominal masses. Auscultation: Auscultation of the abdomen reveals - Bowel sounds normal.      Peripheral Vascular   Upper Extremity: Inspection - Bilateral - No Cyanotic nailbeds or Digital clubbing. Palpation: Radial pulse - Bilateral - Normal.  Lower Extremity:   Palpation: Femoral pulse - Bilateral - Normal. Dorsalis pedis pulse - Bilateral - Normal. Posterior tibia pulse - Bilateral - Normal. Edema - Bilateral - No edema.   Auscultation - No Bilateral Groin Bruits. Neurologic   Mental Status: Affect - normal.  Motor: - Normal. Gait - Normal.        Assessment:       ICD-10-CM ICD-9-CM    1. Claudication ([address] I73.9 443. 9 ANKLE BRACHIAL INDEX   2. Mixed hyperlipidemia E78.2 272.2 ANKLE BRACHIAL INDEX   3. Essential hypertension I10 401.9 ANKLE BRACHIAL INDEX       Plan:     1. Check ADA to assess for PAD. If normal, then advised pt to f/u wit podiatry. Symptoms appears to be secondary to musculoskeletal and probably ingrowing big toenail. 2. HTN: BP controlled. 3. On statin. 4. F/u with EP for a fib. On NOAC.

## 2017-08-03 ENCOUNTER — TELEPHONE (OUTPATIENT)
Dept: CARDIOLOGY CLINIC | Age: 77
End: 2017-08-03

## 2017-08-03 NOTE — TELEPHONE ENCOUNTER
Spoke with patient - head feels funny and she feels shaky. Her BP was elevated (unsure of the numbers) and was HR >100 when she had it check at the local fire station. Her Sotalol was stopped two weeks ago at the last visit. Please advise.

## 2017-08-03 NOTE — TELEPHONE ENCOUNTER
Please call patient she states that Dr. Evie Cabral changed her meds and she is now feeling shakey her head feels funny. She went to a local fire station to have her BP checked and they advised her to go the er or call her doctor.     Thanks

## 2017-08-04 ENCOUNTER — CLINICAL SUPPORT (OUTPATIENT)
Dept: CARDIOLOGY CLINIC | Age: 77
End: 2017-08-04

## 2017-08-04 VITALS
BODY MASS INDEX: 37.95 KG/M2 | DIASTOLIC BLOOD PRESSURE: 80 MMHG | OXYGEN SATURATION: 98 % | RESPIRATION RATE: 18 BRPM | SYSTOLIC BLOOD PRESSURE: 156 MMHG | HEIGHT: 65 IN | WEIGHT: 227.8 LBS | HEART RATE: 92 BPM

## 2017-08-04 DIAGNOSIS — I10 ESSENTIAL HYPERTENSION: Chronic | ICD-10-CM

## 2017-08-04 DIAGNOSIS — I48.0 PAROXYSMAL ATRIAL FIBRILLATION (HCC): Primary | ICD-10-CM

## 2017-08-04 RX ORDER — SOTALOL HYDROCHLORIDE 80 MG/1
40 TABLET ORAL 2 TIMES DAILY
Qty: 30 TAB | Refills: 3
Start: 2017-08-04 | End: 2017-09-25 | Stop reason: SDUPTHER

## 2017-08-04 NOTE — PROGRESS NOTES
Chief Complaint   Patient presents with    Irregular Heart Beat     For EKG. Off Sotalol. C/O fast HR.

## 2017-08-04 NOTE — PROGRESS NOTES
Suzanne Taylor, ERIK  Yolie Martinez  1940  Magan Feliz MD          Subjective:    Yolie Martinez is a 78 y.o. female is here for a follow up on atrial fibrillation. The patient denies chest pain or shortness of breath. Noted not feeling quite right on Tuesday on and off. Felt poorly most of yesterday. Noted her HR was above 100 and irregular.  She was never aware of her atrial fibrillation in the past.      Patient Active Problem List    Diagnosis Date Noted    Pericardial effusion with cardiac tamponade 03/18/2017    S/P ablation of atrial fibrillation 03/17/2017    Acquired hypothyroidism 12/14/2015    ACP (advance care planning) 12/14/2015    HTN (hypertension) 07/21/2015    Thyroid activity decreased 04/14/2015    Atrial fibrillation ([address] 03/16/2015    A-fib ([address] 03/10/2015    Angina, class III ([address] 01/27/2015    Morbid obesity ([address]     Hyperlipidemia 07/14/2014    Hypokalemia 07/14/2014    Encounter for screening colonoscopy 08/21/2013    History of rectal bleeding 08/21/2013    Postmenopausal bleeding 10/18/2011    Endometrial polyp 10/18/2011      Past Medical History:   Diagnosis Date    Arthritis     right knee, left shoulder    Diverticulosis     Frequency of urination     High cholesterol     Hypertension     Morbid obesity ([address]     Thyroid disease     hypothyroid    Unspecified adverse effect of anesthesia     low BP      Past Surgical History:   Procedure Laterality Date    BREAST SURGERY PROCEDURE UNLISTED  1982    breast bx rt    CARDIAC SURG PROCEDURE UNLIST  01/2015    cardiac catheterization, no intervention, medical management    HX GYN      myomectomy on uterus    HX GYN  2011    hystereoscopy D&C    HX LYMPH NODE DISSECTION      biopsy    HX OTHER SURGICAL      lymph node bx     Allergies   Allergen Reactions    Adhesive Hives    Amoxicillin Unknown (comments)    Lipitor [Atorvastatin] Myalgia     Gets the flu      Family History   Problem Relation Age of Onset    Alzheimer Mother     Heart Disease Mother     Heart Disease Father     Hypertension Father     Cancer Paternal Aunt     Diabetes Paternal Grandmother       Current Outpatient Prescriptions   Medication Sig    sotalol (BETAPACE) 80 mg tablet Take 0.5 Tabs by mouth two (2) times a day.  levothyroxine (SYNTHROID) 75 mcg tablet Take 1 Tab by mouth Daily (before breakfast).  cloNIDine HCl (CATAPRES) 0.2 mg tablet Take 1 Tab by mouth two (2) times a day.  KLOR-CON 10 10 mEq tablet Take two tablets twice a day    hydroCHLOROthiazide (HYDRODIURIL) 25 mg tablet Take 1 Tab by mouth daily.  rosuvastatin (CRESTOR) 20 mg tablet Take 1 Tab by mouth nightly.  losartan (COZAAR) 25 mg tablet Take 1 Tab by mouth daily.  apixaban (ELIQUIS) 5 mg tablet Take 1 Tab by mouth two (2) times a day.  verapamil ER (CALAN-SR) 120 mg tablet TAKE 1 TABLET BY MOUTH EVERY MORNING FOR BLOOD PRESSURE    CALCIUM CARBONATE/VITAMIN D3 (CALTRATE 600 + D PO) Take 1 Tab by mouth two (2) times a day.  MULTIVITAMIN W-MINERALS/LUTEIN (CENTRUM SILVER PO) Take 1 Tab by mouth daily (after lunch). No current facility-administered medications for this visit. Vitals:    08/04/17 0817 08/04/17 0823   BP: 160/80 156/80   Pulse: 92    Resp: 18    SpO2: 98%    Weight: 227 lb 12.8 oz (103.3 kg)    Height: 5' 5\" (1.651 m)      EKG - Sinus  Rhythm   -Old inferior infarct. -  Negative precordial T-waves. Ventricular rate 94. I have reviewed the nurses notes, vitals, problem list, allergy list, medical history, social history and medications. Review of Systems:    General: Pt denies excessive weight gain or loss. Pt is able to conduct ADL's  Respiratory: Denies shortness of breath, ANGUIANO, wheezing or stridor. Cardiovascular: Denies precordial pain, palpitations, edema or PND  Gastrointestinal: Denies poor appetite, indigestion, abdominal pain or blood in stool  .     Physical ExamPhysical Exam:    General: Well developed, in no acute distress. Benna Him Heart:  Normal S1/S2, No murmur, no S3 or S4. Neuro: A&Ox3, speech clear, gait stable. Assessment:   Assessment:     ICD-10-CM ICD-9-CM    1. Paroxysmal atrial fibrillation (HCC) I48.0 427.31    2. Essential hypertension I10 401.9 AMB POC EKG ROUTINE W/ 12 LEADS, INTER & REP        Plan:     Pt presents today with suspected PAF for the last 2 days. She is in sinus rhythm today and feels ok. Will restart sotalol 40 mg BID. She is on eliquis. Will touch base with patient next week. She will purchase a home bp monitor and possibly pulse ox to monitor her VS at home. She is scheduled to see EP in 3 mo. Advised pt to call on call MD this weekend if she feels poorly. Pt verbalizes understanding and is in agreement with the plan.        Maria Dolores Mcgee, ANP

## 2017-08-07 ENCOUNTER — OFFICE VISIT (OUTPATIENT)
Dept: CARDIOLOGY CLINIC | Age: 77
End: 2017-08-07

## 2017-08-07 ENCOUNTER — TELEPHONE (OUTPATIENT)
Dept: CARDIOLOGY CLINIC | Age: 77
End: 2017-08-07

## 2017-08-07 DIAGNOSIS — M79.672 LEFT FOOT PAIN: Primary | ICD-10-CM

## 2017-08-08 DIAGNOSIS — I10 ESSENTIAL HYPERTENSION: Chronic | ICD-10-CM

## 2017-08-08 RX ORDER — VERAPAMIL HYDROCHLORIDE 120 MG/1
TABLET, FILM COATED, EXTENDED RELEASE ORAL
Qty: 90 TAB | Refills: 2 | Status: SHIPPED | OUTPATIENT
Start: 2017-08-08 | End: 2017-10-31 | Stop reason: SDUPTHER

## 2017-08-17 ENCOUNTER — TELEPHONE (OUTPATIENT)
Dept: CARDIOLOGY CLINIC | Age: 77
End: 2017-08-17

## 2017-08-17 ENCOUNTER — HOSPITAL ENCOUNTER (EMERGENCY)
Age: 77
Discharge: HOME | End: 2017-08-17
Attending: EMERGENCY MEDICINE | Admitting: EMERGENCY MEDICINE
Payer: MEDICARE

## 2017-08-17 ENCOUNTER — APPOINTMENT (OUTPATIENT)
Dept: CT IMAGING | Age: 77
End: 2017-08-17
Attending: EMERGENCY MEDICINE
Payer: MEDICARE

## 2017-08-17 VITALS
HEART RATE: 93 BPM | RESPIRATION RATE: 16 BRPM | WEIGHT: 225.53 LBS | HEIGHT: 65 IN | DIASTOLIC BLOOD PRESSURE: 93 MMHG | SYSTOLIC BLOOD PRESSURE: 163 MMHG | OXYGEN SATURATION: 96 % | BODY MASS INDEX: 37.58 KG/M2 | TEMPERATURE: 98.6 F

## 2017-08-17 DIAGNOSIS — R00.2 PALPITATIONS: Primary | ICD-10-CM

## 2017-08-17 DIAGNOSIS — R51.9 PRESSURE IN HEAD: ICD-10-CM

## 2017-08-17 DIAGNOSIS — N30.01 ACUTE CYSTITIS WITH HEMATURIA: ICD-10-CM

## 2017-08-17 DIAGNOSIS — I99.8 FLUCTUATING BLOOD PRESSURE: ICD-10-CM

## 2017-08-17 LAB
ALBUMIN SERPL-MCNC: 3.8 G/DL (ref 3.5–5)
ALBUMIN/GLOB SERPL: 1 {RATIO} (ref 1.1–2.2)
ALP SERPL-CCNC: 68 U/L (ref 45–117)
ALT SERPL-CCNC: 21 U/L (ref 12–78)
ANION GAP SERPL CALC-SCNC: 5 MMOL/L (ref 5–15)
APPEARANCE UR: CLEAR
AST SERPL-CCNC: 11 U/L (ref 15–37)
ATRIAL RATE: 87 BPM
BACTERIA URNS QL MICRO: NEGATIVE /HPF
BASOPHILS # BLD: 0 K/UL (ref 0–0.1)
BASOPHILS NFR BLD: 1 % (ref 0–1)
BILIRUB SERPL-MCNC: 0.4 MG/DL (ref 0.2–1)
BILIRUB UR QL: NEGATIVE
BUN SERPL-MCNC: 17 MG/DL (ref 6–20)
BUN/CREAT SERPL: 15 (ref 12–20)
CALCIUM SERPL-MCNC: 9.2 MG/DL (ref 8.5–10.1)
CALCULATED P AXIS, ECG09: 36 DEGREES
CALCULATED R AXIS, ECG10: -15 DEGREES
CALCULATED T AXIS, ECG11: 35 DEGREES
CHLORIDE SERPL-SCNC: 104 MMOL/L (ref 97–108)
CK SERPL-CCNC: 86 U/L (ref 26–192)
CO2 SERPL-SCNC: 28 MMOL/L (ref 21–32)
COLOR UR: ABNORMAL
CREAT SERPL-MCNC: 1.11 MG/DL (ref 0.55–1.02)
DIAGNOSIS, 93000: NORMAL
EOSINOPHIL # BLD: 0.1 K/UL (ref 0–0.4)
EOSINOPHIL NFR BLD: 2 % (ref 0–7)
EPITH CASTS URNS QL MICRO: ABNORMAL /LPF
ERYTHROCYTE [DISTWIDTH] IN BLOOD BY AUTOMATED COUNT: 12.6 % (ref 11.5–14.5)
GLOBULIN SER CALC-MCNC: 3.9 G/DL (ref 2–4)
GLUCOSE SERPL-MCNC: 110 MG/DL (ref 65–100)
GLUCOSE UR STRIP.AUTO-MCNC: NEGATIVE MG/DL
HCT VFR BLD AUTO: 35.4 % (ref 35–47)
HGB BLD-MCNC: 12.5 G/DL (ref 11.5–16)
HGB UR QL STRIP: ABNORMAL
HYALINE CASTS URNS QL MICRO: ABNORMAL /LPF (ref 0–5)
INR PPP: 1 (ref 0.9–1.1)
KETONES UR QL STRIP.AUTO: NEGATIVE MG/DL
LEUKOCYTE ESTERASE UR QL STRIP.AUTO: ABNORMAL
LYMPHOCYTES # BLD: 1 K/UL (ref 0.8–3.5)
LYMPHOCYTES NFR BLD: 18 % (ref 12–49)
MAGNESIUM SERPL-MCNC: 2.2 MG/DL (ref 1.6–2.4)
MCH RBC QN AUTO: 31.9 PG (ref 26–34)
MCHC RBC AUTO-ENTMCNC: 35.3 G/DL (ref 30–36.5)
MCV RBC AUTO: 90.3 FL (ref 80–99)
MONOCYTES # BLD: 0.4 K/UL (ref 0–1)
MONOCYTES NFR BLD: 8 % (ref 5–13)
NEUTS SEG # BLD: 3.8 K/UL (ref 1.8–8)
NEUTS SEG NFR BLD: 71 % (ref 32–75)
NITRITE UR QL STRIP.AUTO: NEGATIVE
P-R INTERVAL, ECG05: 158 MS
PH UR STRIP: 7 [PH] (ref 5–8)
PLATELET # BLD AUTO: 176 K/UL (ref 150–400)
POTASSIUM SERPL-SCNC: 3.4 MMOL/L (ref 3.5–5.1)
PROT SERPL-MCNC: 7.7 G/DL (ref 6.4–8.2)
PROT UR STRIP-MCNC: NEGATIVE MG/DL
PROTHROMBIN TIME: 10.4 SEC (ref 9–11.1)
Q-T INTERVAL, ECG07: 400 MS
QRS DURATION, ECG06: 82 MS
QTC CALCULATION (BEZET), ECG08: 481 MS
RBC # BLD AUTO: 3.92 M/UL (ref 3.8–5.2)
RBC #/AREA URNS HPF: ABNORMAL /HPF (ref 0–5)
SODIUM SERPL-SCNC: 137 MMOL/L (ref 136–145)
SP GR UR REFRACTOMETRY: 1.02 (ref 1–1.03)
TROPONIN I SERPL-MCNC: <0.04 NG/ML
UROBILINOGEN UR QL STRIP.AUTO: 0.2 EU/DL (ref 0.2–1)
VENTRICULAR RATE, ECG03: 87 BPM
WBC # BLD AUTO: 5.3 K/UL (ref 3.6–11)
WBC URNS QL MICRO: ABNORMAL /HPF (ref 0–4)

## 2017-08-17 PROCEDURE — 93005 ELECTROCARDIOGRAM TRACING: CPT

## 2017-08-17 PROCEDURE — 80053 COMPREHEN METABOLIC PANEL: CPT | Performed by: EMERGENCY MEDICINE

## 2017-08-17 PROCEDURE — 84484 ASSAY OF TROPONIN QUANT: CPT | Performed by: EMERGENCY MEDICINE

## 2017-08-17 PROCEDURE — 82550 ASSAY OF CK (CPK): CPT | Performed by: EMERGENCY MEDICINE

## 2017-08-17 PROCEDURE — 99284 EMERGENCY DEPT VISIT MOD MDM: CPT

## 2017-08-17 PROCEDURE — 85610 PROTHROMBIN TIME: CPT | Performed by: EMERGENCY MEDICINE

## 2017-08-17 PROCEDURE — 70450 CT HEAD/BRAIN W/O DYE: CPT

## 2017-08-17 PROCEDURE — 81001 URINALYSIS AUTO W/SCOPE: CPT | Performed by: EMERGENCY MEDICINE

## 2017-08-17 PROCEDURE — 83735 ASSAY OF MAGNESIUM: CPT | Performed by: EMERGENCY MEDICINE

## 2017-08-17 PROCEDURE — 85025 COMPLETE CBC W/AUTO DIFF WBC: CPT | Performed by: EMERGENCY MEDICINE

## 2017-08-17 PROCEDURE — 36415 COLL VENOUS BLD VENIPUNCTURE: CPT | Performed by: EMERGENCY MEDICINE

## 2017-08-17 RX ORDER — NITROFURANTOIN 25; 75 MG/1; MG/1
100 CAPSULE ORAL 2 TIMES DAILY
Qty: 6 CAP | Refills: 0 | Status: SHIPPED | OUTPATIENT
Start: 2017-08-17 | End: 2017-08-20

## 2017-08-17 RX ORDER — POTASSIUM CHLORIDE 20 MEQ/1
40 TABLET, EXTENDED RELEASE ORAL
Status: DISCONTINUED | OUTPATIENT
Start: 2017-08-17 | End: 2017-08-17 | Stop reason: HOSPADM

## 2017-08-17 NOTE — ED PROVIDER NOTES
HPI Comments: Irma Ramos is a 78 y.o. Female with hx of HTN, hypercholesterolemia, arthritis, and diverticulosis who presents ambulatory to [facility] ED with CC of palpitations since ~2200 last night (8/16/17). Pt also reports 5/10 head \"pressure\" x \"a few weeks,\" becoming worse since last night, which she states is exacerbated by ambulation and by positional changes. She states she has had elevated blood pressure since onset of her symptoms, which she states has been progressively increasing, and was as high as ~181/101 last night. Pt also notes blurred vision in her right eye PTA today, which has since resolved. Pt denies specific trauma or injury to which her symptoms might be attributed. She reports hx of A-fib and associated ablation. Pt specifically denies nausea, vomiting, CP, SOB, numbness/tingling, and weakness of any nature. PCP: Karis Pollard MD  Cardiology: Triny Lea MD    There are no other complaints, changes, or physical findings at this time. The history is provided by the patient. No  was used.         Past Medical History:   Diagnosis Date    Arthritis     right knee, left shoulder    Diverticulosis     Frequency of urination     High cholesterol     Hypertension     Morbid obesity ([address]     Thyroid disease     hypothyroid    Unspecified adverse effect of anesthesia     low BP       Past Surgical History:   Procedure Laterality Date    BREAST SURGERY PROCEDURE UNLISTED  1982    breast bx rt    CARDIAC SURG PROCEDURE UNLIST  01/2015    cardiac catheterization, no intervention, medical management    HX GYN      myomectomy on uterus    HX GYN  2011    hystereoscopy D&C    HX LYMPH NODE DISSECTION      biopsy    HX OTHER SURGICAL      lymph node bx         Family History:   Problem Relation Age of Onset    Alzheimer Mother     Heart Disease Mother     Heart Disease Father     Hypertension Father     Cancer Paternal Aunt     Diabetes Paternal Grandmother        Social History     Social History    Marital status:      Spouse name: N/A    Number of children: N/A    Years of education: N/A     Occupational History    Not on file. Social History Main Topics    Smoking status: Never Smoker    Smokeless tobacco: Never Used    Alcohol use No    Drug use: No    Sexual activity: Not on file     Other Topics Concern    Not on file     Social History Narrative         ALLERGIES: Adhesive; Amoxicillin; and Lipitor [atorvastatin]    Review of Systems   Constitutional: Negative for fever. HENT: Negative for congestion. Eyes: Positive for visual disturbance (R eye blurred; resolved). Respiratory: Negative for shortness of breath. Cardiovascular: Positive for palpitations. Negative for chest pain. + elevated BP   Gastrointestinal: Negative for nausea and vomiting. Endocrine: Negative for heat intolerance. Genitourinary: Negative. Musculoskeletal: Negative for back pain. Skin: Negative for rash. Allergic/Immunologic: Negative for immunocompromised state. Neurological: Negative for weakness, numbness and headaches. - tingling; +head \"pressure\"   Hematological: Does not bruise/bleed easily. Psychiatric/Behavioral: Negative. All other systems reviewed and are negative. Patient Vitals for the past 12 hrs:   Temp Pulse Resp BP SpO2   08/17/17 0930 98.6 °F (37 °C) 93 16 (!) 163/93 96 %            Physical Exam   Constitutional: She is oriented to person, place, and time. She appears well-developed and well-nourished. She appears distressed (mild). Elevated BMI   HENT:   Head: Normocephalic and atraumatic. Eyes: EOM are normal. Pupils are equal, round, and reactive to light. Fundi normal   Neck: Normal range of motion. No spinous process tenderness and no muscular tenderness present. Cardiovascular: Normal rate, regular rhythm and normal heart sounds.     Pulmonary/Chest: Effort normal and breath sounds normal. No respiratory distress. Abdominal: Soft. Bowel sounds are normal. She exhibits no mass. There is no tenderness. Musculoskeletal: Normal range of motion. She exhibits no edema. Neurological: She is alert and oriented to person, place, and time. Coordination normal.   Sensory and motor intact    Skin: Skin is warm and dry. Psychiatric: She has a normal mood and affect. Nursing note and vitals reviewed. MDM  Number of Diagnoses or Management Options  Acute cystitis with hematuria:   Fluctuating blood pressure:   Palpitations:   Pressure in head:   Diagnosis management comments: DDx: Palpitations, A-fib, SVT, Electrolyte abnormality, CAD, Dehydration, Anemia, CVA, TIA, ICH       Amount and/or Complexity of Data Reviewed  Clinical lab tests: reviewed and ordered  Tests in the radiology section of CPT®: ordered and reviewed  Tests in the medicine section of CPT®: ordered and reviewed  Review and summarize past medical records: yes  Independent visualization of images, tracings, or specimens: yes    Patient Progress  Patient progress: stable    ED Course       Procedures    EKG interpretation: 09:39  Rhythm: normal sinus rhythm; and regular . Rate (approx.): 87; Axis: normal; CT interval: normal; QRS interval: normal ; ST/T wave: normal; Other findings: no significant changes.     LABORATORY TESTS:  Recent Results (from the past 12 hour(s))   EKG, 12 LEAD, INITIAL    Collection Time: 08/17/17  9:39 AM   Result Value Ref Range    Ventricular Rate 87 BPM    Atrial Rate 87 BPM    P-R Interval 158 ms    QRS Duration 82 ms    Q-T Interval 400 ms    QTC Calculation (Bezet) 481 ms    Calculated P Axis 36 degrees    Calculated R Axis -15 degrees    Calculated T Axis 35 degrees    Diagnosis       Normal sinus rhythm  Inferior infarct , age undetermined  When compared with ECG of 18-MAR-2017 02:53,  No significant change was found     CBC WITH AUTOMATED DIFF    Collection Time: 08/17/17 10:11 AM Result Value Ref Range    WBC 5.3 3.6 - 11.0 K/uL    RBC 3.92 3.80 - 5.20 M/uL    HGB 12.5 11.5 - 16.0 g/dL    HCT 35.4 35.0 - 47.0 %    MCV 90.3 80.0 - 99.0 FL    MCH 31.9 26.0 - 34.0 PG    MCHC 35.3 30.0 - 36.5 g/dL    RDW 12.6 11.5 - 14.5 %    PLATELET 675 398 - 861 K/uL    NEUTROPHILS 71 32 - 75 %    LYMPHOCYTES 18 12 - 49 %    MONOCYTES 8 5 - 13 %    EOSINOPHILS 2 0 - 7 %    BASOPHILS 1 0 - 1 %    ABS. NEUTROPHILS 3.8 1.8 - 8.0 K/UL    ABS. LYMPHOCYTES 1.0 0.8 - 3.5 K/UL    ABS. MONOCYTES 0.4 0.0 - 1.0 K/UL    ABS. EOSINOPHILS 0.1 0.0 - 0.4 K/UL    ABS. BASOPHILS 0.0 0.0 - 0.1 K/UL   PROTHROMBIN TIME + INR    Collection Time: 08/17/17 10:11 AM   Result Value Ref Range    INR 1.0 0.9 - 1.1      Prothrombin time 10.4 9.0 - 38.9 sec   METABOLIC PANEL, COMPREHENSIVE    Collection Time: 08/17/17 10:11 AM   Result Value Ref Range    Sodium 137 136 - 145 mmol/L    Potassium 3.4 (L) 3.5 - 5.1 mmol/L    Chloride 104 97 - 108 mmol/L    CO2 28 21 - 32 mmol/L    Anion gap 5 5 - 15 mmol/L    Glucose 110 (H) 65 - 100 mg/dL    BUN 17 6 - 20 MG/DL    Creatinine 1.11 (H) 0.55 - 1.02 MG/DL    BUN/Creatinine ratio 15 12 - 20      GFR est AA 58 (L) >60 ml/min/1.73m2    GFR est non-AA 48 (L) >60 ml/min/1.73m2    Calcium 9.2 8.5 - 10.1 MG/DL    Bilirubin, total 0.4 0.2 - 1.0 MG/DL    ALT (SGPT) 21 12 - 78 U/L    AST (SGOT) 11 (L) 15 - 37 U/L    Alk.  phosphatase 68 45 - 117 U/L    Protein, total 7.7 6.4 - 8.2 g/dL    Albumin 3.8 3.5 - 5.0 g/dL    Globulin 3.9 2.0 - 4.0 g/dL    A-G Ratio 1.0 (L) 1.1 - 2.2     MAGNESIUM    Collection Time: 08/17/17 10:11 AM   Result Value Ref Range    Magnesium 2.2 1.6 - 2.4 mg/dL   CK    Collection Time: 08/17/17 10:11 AM   Result Value Ref Range    CK 86 26 - 192 U/L   TROPONIN I    Collection Time: 08/17/17 10:11 AM   Result Value Ref Range    Troponin-I, Qt. <0.04 <0.05 ng/mL   URINALYSIS W/ RFLX MICROSCOPIC    Collection Time: 08/17/17 10:57 AM   Result Value Ref Range    Color YELLOW/STRAW Appearance CLEAR CLEAR      Specific gravity 1.017 1.003 - 1.030      pH (UA) 7.0 5.0 - 8.0      Protein NEGATIVE  NEG mg/dL    Glucose NEGATIVE  NEG mg/dL    Ketone NEGATIVE  NEG mg/dL    Bilirubin NEGATIVE  NEG      Blood TRACE (A) NEG      Urobilinogen 0.2 0.2 - 1.0 EU/dL    Nitrites NEGATIVE  NEG      Leukocyte Esterase SMALL (A) NEG      WBC 5-10 0 - 4 /hpf    RBC 5-10 0 - 5 /hpf    Epithelial cells FEW FEW /lpf    Bacteria NEGATIVE  NEG /hpf    Hyaline cast 0-2 0 - 5 /lpf       IMAGING RESULTS:  CT Results  (Last 48 hours)               08/17/17 1036  CT HEAD WO CONT Final result    Impression:  IMPRESSION: No acute abnormality. Narrative:  EXAM:  CT HEAD WO CONT       INDICATION:   Headache       COMPARISON: None. CONTRAST:  None. TECHNIQUE: Unenhanced CT of the head was performed using 5 mm images. Brain and   bone windows were generated. CT dose reduction was achieved through use of a   standardized protocol tailored for this examination and automatic exposure   control for dose modulation. FINDINGS:   The ventricles and sulci are normal in size, shape and configuration and   midline. Minimal small vessel ischemic changes are seen in the periventricular   white matter. There is no intracranial hemorrhage, extra-axial collection, mass,   mass effect or midline shift. The basilar cisterns are open. No acute infarct   is identified. The bone windows demonstrate no abnormalities. The visualized   portions of the paranasal sinuses and mastoid air cells are clear. Vascular   calcification is noted. MEDICATIONS GIVEN:  Medications   potassium chloride (K-DUR, KLOR-CON) SR tablet 40 mEq (not administered)       IMPRESSION:  1. Palpitations    2. Pressure in head    3. Fluctuating blood pressure    4. Acute cystitis with hematuria        PLAN:  1.    Current Discharge Medication List      START taking these medications    Details   nitrofurantoin, macrocrystal-monohydrate, (MACROBID) 100 mg capsule Take 1 Cap by mouth two (2) times a day for 3 days. Qty: 6 Cap, Refills: 0           2. Follow-up Information     Follow up With Details Comments [address], [place] Call today  [address] [facility]  [address] Neri Latham MD In 4 days As needed [address] [facility]  [address] [id number]      [facility]ospitals EMERGENCY DEPT  If symptoms worsen [address]  [address] [facility]  [phone]        Return to ED if worse     DISCHARGE NOTE  11:29 AM  The patient has been re-evaluated and is ready for discharge. Reviewed available results with patient. Counseled pt on diagnosis and care plan. Pt has expressed understanding, and all questions have been answered. Pt agrees with plan and agrees to follow up as recommended, or return to the ED if their symptoms worsen. Discharge instructions have been provided and explained to the pt, along with reasons to return to the ED. Attestations: This note is prepared by Reece Riedel, acting as Scribe for Merari Shen MD.    Merari Shen MD: The scribe's documentation has been prepared under my direction and personally reviewed by me in its entirety. I confirm that the note above accurately reflects all work, treatment, procedures, and medical decision making performed by me.

## 2017-08-17 NOTE — DISCHARGE INSTRUCTIONS
Headache: Care Instructions  Your Care Instructions    Headaches have many possible causes. Most headaches aren't a sign of a more serious problem, and they will get better on their own. Home treatment may help you feel better faster. The doctor has checked you carefully, but problems can develop later. If you notice any problems or new symptoms, get medical treatment right away. Follow-up care is a key part of your treatment and safety. Be sure to make and go to all appointments, and call your doctor if you are having problems. It's also a good idea to know your test results and keep a list of the medicines you take. How can you care for yourself at home? · Do not drive if you have taken a prescription pain medicine. · Rest in a quiet, dark room until your headache is gone. Close your eyes and try to relax or go to sleep. Don't watch TV or read. · Put a cold, moist cloth or cold pack on the painful area for 10 to 20 minutes at a time. Put a thin cloth between the cold pack and your skin. · Use a warm, moist towel or a heating pad set on low to relax tight shoulder and neck muscles. · Have someone gently massage your neck and shoulders. · Take pain medicines exactly as directed. ¨ If the doctor gave you a prescription medicine for pain, take it as prescribed. ¨ If you are not taking a prescription pain medicine, ask your doctor if you can take an over-the-counter medicine. · Be careful not to take pain medicine more often than the instructions allow, because you may get worse or more frequent headaches when the medicine wears off. · Do not ignore new symptoms that occur with a headache, such as a fever, weakness or numbness, vision changes, or confusion. These may be signs of a more serious problem. To prevent headaches  · Keep a headache diary so you can figure out what triggers your headaches. Avoiding triggers may help you prevent headaches.  Record when each headache began, how long it lasted, and what the pain was like (throbbing, aching, stabbing, or dull). Write down any other symptoms you had with the headache, such as nausea, flashing lights or dark spots, or sensitivity to bright light or loud noise. Note if the headache occurred near your period. List anything that might have triggered the headache, such as certain foods (chocolate, cheese, wine) or odors, smoke, bright light, stress, or lack of sleep. · Find healthy ways to deal with stress. Headaches are most common during or right after stressful times. Take time to relax before and after you do something that has caused a headache in the past.  · Try to keep your muscles relaxed by keeping good posture. Check your jaw, face, neck, and shoulder muscles for tension, and try relaxing them. When sitting at a desk, change positions often, and stretch for 30 seconds each hour. · Get plenty of sleep and exercise. · Eat regularly and well. Long periods without food can trigger a headache. · Treat yourself to a massage. Some people find that regular massages are very helpful in relieving tension. · Limit caffeine by not drinking too much coffee, tea, or soda. But don't quit caffeine suddenly, because that can also give you headaches. · Reduce eyestrain from computers by blinking frequently and looking away from the computer screen every so often. Make sure you have proper eyewear and that your monitor is set up properly, about an arm's length away. · Seek help if you have depression or anxiety. Your headaches may be linked to these conditions. Treatment can both prevent headaches and help with symptoms of anxiety or depression. When should you call for help? Call 911 anytime you think you may need emergency care. For example, call if:  · You have signs of a stroke. These may include:  ¨ Sudden numbness, paralysis, or weakness in your face, arm, or leg, especially on only one side of your body. ¨ Sudden vision changes.   ¨ Sudden trouble speaking. ¨ Sudden confusion or trouble understanding simple statements. ¨ Sudden problems with walking or balance. ¨ A sudden, severe headache that is different from past headaches. Call your doctor now or seek immediate medical care if:  · You have a new or worse headache. · Your headache gets much worse. Where can you learn more? Go to http://feliz-arlene.info/. Enter M271 in the search box to learn more about \"Headache: Care Instructions. \"  Current as of: October 14, 2016  Content Version: 11.3  © 5723-6160 [employer]. Care instructions adapted under license by [employer] (which disclaims liability or warranty for this information). If you have questions about a medical condition or this instruction, always ask your healthcare professional. [address] any warranty or liability for your use of this information. Palpitations: Care Instructions  Your Care Instructions    Heart palpitations are the uncomfortable sensation that your heart is beating fast or irregularly. You might feel pounding or fluttering in your chest. It might feel like your heart is skipping a beat. Although palpitations may be caused by a heart problem, they also occur because of stress, fatigue, or use of alcohol, caffeine, or nicotine. Many medicines, including diet pills, antihistamines, decongestants, and some herbal products, can cause heart palpitations. Nearly everyone has palpitations from time to time. Depending on your symptoms, your doctor may need to do more tests to try to find the cause of your palpitations. Follow-up care is a key part of your treatment and safety. Be sure to make and go to all appointments, and call your doctor if you are having problems. It's also a good idea to know your test results and keep a list of the medicines you take. How can you care for yourself at home? · Avoid caffeine, nicotine, and excess alcohol.   · Do not take illegal drugs, such as methamphetamines and cocaine. · Do not take weight loss or diet medicines unless you talk with your doctor first.  · Get plenty of sleep. · Do not overeat. · If you have palpitations again, take deep breaths and try to relax. This may slow a racing heart. · If you start to feel lightheaded, lie down to avoid injuries that might result if you pass out and fall down. · Keep a record of your palpitations and bring it to your next doctor's appointment. Write down:  ¨ The date and time. ¨ Your pulse. (If your heart is beating fast, it may be hard to count your pulse.)  ¨ What you were doing when the palpitations started. ¨ How long the palpitations lasted. ¨ Any other symptoms. · If an activity causes palpitations, slow down or stop. Talk to your doctor before you do that activity again. · Take your medicines exactly as prescribed. Call your doctor if you think you are having a problem with your medicine. When should you call for help? Call 911 anytime you think you may need emergency care. For example, call if:  · You passed out (lost consciousness). · You have symptoms of a heart attack. These may include:  ¨ Chest pain or pressure, or a strange feeling in the chest.  ¨ Sweating. ¨ Shortness of breath. ¨ Pain, pressure, or a strange feeling in the back, neck, jaw, or upper belly or in one or both shoulders or arms. ¨ Lightheadedness or sudden weakness. ¨ A fast or irregular heartbeat. After you call 911, the  may tell you to chew 1 adult-strength or 2 to 4 low-dose aspirin. Wait for an ambulance. Do not try to drive yourself. · You have symptoms of a stroke. These may include:  ¨ Sudden numbness, tingling, weakness, or loss of movement in your face, arm, or leg, especially on only one side of your body. ¨ Sudden vision changes. ¨ Sudden trouble speaking. ¨ Sudden confusion or trouble understanding simple statements.   ¨ Sudden problems with walking or balance. ¨ A sudden, severe headache that is different from past headaches. Call your doctor now or seek immediate medical care if:  · You have heart palpitations and:  ¨ Are dizzy or lightheaded, or you feel like you may faint. ¨ Have new or increased shortness of breath. Watch closely for changes in your health, and be sure to contact your doctor if:  · You continue to have heart palpitations. Where can you learn more? Go to http://feliz-arlene.info/. Enter R508 in the search box to learn more about \"Palpitations: Care Instructions. \"  Current as of: April 3, 2017  Content Version: 11.3  © 7869-7817 [employer]. Care instructions adapted under license by [employer] (which disclaims liability or warranty for this information). If you have questions about a medical condition or this instruction, always ask your healthcare professional. [place] [address] any warranty or liability for your use of this information. Urinary Tract Infection in Women: Care Instructions  Your Care Instructions    A urinary tract infection, or UTI, is a general term for an infection anywhere between the kidneys and the urethra (where urine comes out). Most UTIs are bladder infections. They often cause pain or burning when you urinate. UTIs are caused by bacteria and can be cured with antibiotics. Be sure to complete your treatment so that the infection goes away. Follow-up care is a key part of your treatment and safety. Be sure to make and go to all appointments, and call your doctor if you are having problems. It's also a good idea to know your test results and keep a list of the medicines you take. How can you care for yourself at home? · Take your antibiotics as directed. Do not stop taking them just because you feel better. You need to take the full course of antibiotics. · Drink extra water and other fluids for the next day or two.  This may help wash out the bacteria that are causing the infection. (If you have kidney, heart, or liver disease and have to limit fluids, talk with your doctor before you increase your fluid intake.)  · Avoid drinks that are carbonated or have caffeine. They can irritate the bladder. · Urinate often. Try to empty your bladder each time. · To relieve pain, take a hot bath or lay a heating pad set on low over your lower belly or genital area. Never go to sleep with a heating pad in place. To prevent UTIs  · Drink plenty of water each day. This helps you urinate often, which clears bacteria from your system. (If you have kidney, heart, or liver disease and have to limit fluids, talk with your doctor before you increase your fluid intake.)  · Urinate when you need to. · Urinate right after you have sex. · Change sanitary pads often. · Avoid douches, bubble baths, feminine hygiene sprays, and other feminine hygiene products that have deodorants. · After going to the bathroom, wipe from front to back. When should you call for help? Call your doctor now or seek immediate medical care if:  · Symptoms such as fever, chills, nausea, or vomiting get worse or appear for the first time. · You have new pain in your back just below your rib cage. This is called flank pain. · There is new blood or pus in your urine. · You have any problems with your antibiotic medicine. Watch closely for changes in your health, and be sure to contact your doctor if:  · You are not getting better after taking an antibiotic for 2 days. · Your symptoms go away but then come back. Where can you learn more? Go to http://feliz-arlene.info/. Enter O932 in the search box to learn more about \"Urinary Tract Infection in Women: Care Instructions. \"  Current as of: November 28, 2016  Content Version: 11.3  © 0407-6910 [employer].  Care instructions adapted under license by [employer] (which disclaims liability or warranty for this information). If you have questions about a medical condition or this instruction, always ask your healthcare professional. [place] [address] any warranty or liability for your use of this information.

## 2017-08-17 NOTE — ED NOTES
Patient arrived to ED with c/o heart palpitations that started last night when she was going to bed at 2200. Pt denies pain with palpitations. Pt also notes head pressure for a few weeks that is worse when she bends over. Pt reported \"I just don't feel right. \"  Pt placed on monitor x3. Will continue to monitor.

## 2017-08-17 NOTE — TELEPHONE ENCOUNTER
Received a call from patient stating her BP is elevated and she doesn't feel well. Advised pt that she will need to be seen at either an ED or urgent care. Pt declined and wants to be seen in the office. Transferred to call center to schedule appt with Dr. Erika Ricks.

## 2017-09-26 RX ORDER — SOTALOL HYDROCHLORIDE 80 MG/1
40 TABLET ORAL 2 TIMES DAILY
Qty: 30 TAB | Refills: 3 | Status: SHIPPED | OUTPATIENT
Start: 2017-09-26 | End: 2018-01-23 | Stop reason: SDUPTHER

## 2017-09-28 ENCOUNTER — TELEPHONE (OUTPATIENT)
Dept: INTERNAL MEDICINE CLINIC | Age: 77
End: 2017-09-28

## 2017-09-28 NOTE — TELEPHONE ENCOUNTER
Pt wants to come in tomorrow morning @ 8 am to do labs.   Please put orders in system today yet she is asking

## 2017-09-29 ENCOUNTER — HOSPITAL ENCOUNTER (OUTPATIENT)
Dept: LAB | Age: 77
Discharge: HOME | End: 2017-09-29
Payer: MEDICARE

## 2017-09-29 ENCOUNTER — APPOINTMENT (OUTPATIENT)
Dept: INTERNAL MEDICINE CLINIC | Age: 77
End: 2017-09-29

## 2017-09-29 DIAGNOSIS — E78.2 MIXED HYPERLIPIDEMIA: ICD-10-CM

## 2017-09-29 DIAGNOSIS — E03.1 CONGENITAL HYPOTHYROIDISM WITHOUT GOITER: Primary | ICD-10-CM

## 2017-09-29 PROCEDURE — 80061 LIPID PANEL: CPT

## 2017-09-29 PROCEDURE — 36415 COLL VENOUS BLD VENIPUNCTURE: CPT

## 2017-09-29 PROCEDURE — 84443 ASSAY THYROID STIM HORMONE: CPT

## 2017-09-29 PROCEDURE — 85025 COMPLETE CBC W/AUTO DIFF WBC: CPT

## 2017-09-29 PROCEDURE — 80053 COMPREHEN METABOLIC PANEL: CPT

## 2017-09-30 LAB
ALBUMIN SERPL-MCNC: 4.4 G/DL (ref 3.5–4.8)
ALBUMIN/GLOB SERPL: 1.6 {RATIO} (ref 1.2–2.2)
ALP SERPL-CCNC: 66 IU/L (ref 39–117)
ALT SERPL-CCNC: 16 IU/L (ref 0–32)
AST SERPL-CCNC: 16 IU/L (ref 0–40)
BASOPHILS # BLD AUTO: 0 X10E3/UL (ref 0–0.2)
BASOPHILS NFR BLD AUTO: 1 %
BILIRUB SERPL-MCNC: 0.3 MG/DL (ref 0–1.2)
BUN SERPL-MCNC: 17 MG/DL (ref 8–27)
BUN/CREAT SERPL: 16 (ref 12–28)
CALCIUM SERPL-MCNC: 9.8 MG/DL (ref 8.7–10.3)
CHLORIDE SERPL-SCNC: 103 MMOL/L (ref 96–106)
CHOLEST SERPL-MCNC: 157 MG/DL (ref 100–199)
CO2 SERPL-SCNC: 26 MMOL/L (ref 18–29)
CREAT SERPL-MCNC: 1.07 MG/DL (ref 0.57–1)
EOSINOPHIL # BLD AUTO: 0.2 X10E3/UL (ref 0–0.4)
EOSINOPHIL NFR BLD AUTO: 4 %
ERYTHROCYTE [DISTWIDTH] IN BLOOD BY AUTOMATED COUNT: 13.5 % (ref 12.3–15.4)
GLOBULIN SER CALC-MCNC: 2.8 G/DL (ref 1.5–4.5)
GLUCOSE SERPL-MCNC: 100 MG/DL (ref 65–99)
HCT VFR BLD AUTO: 36.1 % (ref 34–46.6)
HDLC SERPL-MCNC: 51 MG/DL
HGB BLD-MCNC: 12.3 G/DL (ref 11.1–15.9)
IMM GRANULOCYTES # BLD: 0 X10E3/UL (ref 0–0.1)
IMM GRANULOCYTES NFR BLD: 0 %
LDLC SERPL CALC-MCNC: 72 MG/DL (ref 0–99)
LYMPHOCYTES # BLD AUTO: 1.6 X10E3/UL (ref 0.7–3.1)
LYMPHOCYTES NFR BLD AUTO: 29 %
MCH RBC QN AUTO: 31 PG (ref 26.6–33)
MCHC RBC AUTO-ENTMCNC: 34.1 G/DL (ref 31.5–35.7)
MCV RBC AUTO: 91 FL (ref 79–97)
MONOCYTES # BLD AUTO: 0.3 X10E3/UL (ref 0.1–0.9)
MONOCYTES NFR BLD AUTO: 6 %
NEUTROPHILS # BLD AUTO: 3.3 X10E3/UL (ref 1.4–7)
NEUTROPHILS NFR BLD AUTO: 60 %
PLATELET # BLD AUTO: 219 X10E3/UL (ref 150–379)
POTASSIUM SERPL-SCNC: 4.2 MMOL/L (ref 3.5–5.2)
PROT SERPL-MCNC: 7.2 G/DL (ref 6–8.5)
RBC # BLD AUTO: 3.97 X10E6/UL (ref 3.77–5.28)
SODIUM SERPL-SCNC: 143 MMOL/L (ref 134–144)
TRIGL SERPL-MCNC: 168 MG/DL (ref 0–149)
TSH SERPL DL<=0.005 MIU/L-ACNC: 1.19 UIU/ML (ref 0.45–4.5)
VLDLC SERPL CALC-MCNC: 34 MG/DL (ref 5–40)
WBC # BLD AUTO: 5.5 X10E3/UL (ref 3.4–10.8)

## 2017-10-06 ENCOUNTER — OFFICE VISIT (OUTPATIENT)
Dept: INTERNAL MEDICINE CLINIC | Age: 77
End: 2017-10-06

## 2017-10-06 VITALS
RESPIRATION RATE: 16 BRPM | HEIGHT: 65 IN | DIASTOLIC BLOOD PRESSURE: 76 MMHG | BODY MASS INDEX: 37.99 KG/M2 | TEMPERATURE: 97.8 F | WEIGHT: 228 LBS | SYSTOLIC BLOOD PRESSURE: 135 MMHG | HEART RATE: 60 BPM | OXYGEN SATURATION: 98 %

## 2017-10-06 DIAGNOSIS — D17.1 LIPOMA OF TORSO: ICD-10-CM

## 2017-10-06 DIAGNOSIS — L60.0 INGROWN TOENAIL: ICD-10-CM

## 2017-10-06 DIAGNOSIS — E78.2 MIXED HYPERLIPIDEMIA: ICD-10-CM

## 2017-10-06 DIAGNOSIS — E87.6 HYPOKALEMIA: ICD-10-CM

## 2017-10-06 DIAGNOSIS — Z23 ENCOUNTER FOR IMMUNIZATION: ICD-10-CM

## 2017-10-06 DIAGNOSIS — R73.01 IFG (IMPAIRED FASTING GLUCOSE): ICD-10-CM

## 2017-10-06 DIAGNOSIS — I10 ESSENTIAL HYPERTENSION: Primary | Chronic | ICD-10-CM

## 2017-10-06 DIAGNOSIS — I48.91 ATRIAL FIBRILLATION, UNSPECIFIED TYPE (HCC): ICD-10-CM

## 2017-10-06 DIAGNOSIS — E66.01 MORBID OBESITY (HCC): ICD-10-CM

## 2017-10-06 DIAGNOSIS — E03.9 ACQUIRED HYPOTHYROIDISM: ICD-10-CM

## 2017-10-06 LAB — HBA1C MFR BLD HPLC: 5.6 %

## 2017-10-06 RX ORDER — LOSARTAN POTASSIUM 25 MG/1
25 TABLET ORAL DAILY
Qty: 90 TAB | Refills: 1 | Status: SHIPPED | OUTPATIENT
Start: 2017-10-06 | End: 2018-04-20 | Stop reason: SDUPTHER

## 2017-10-06 NOTE — MR AVS SNAPSHOT
Visit Information Date & Time Provider Department Dept. Phone Encounter #  
 10/6/2017  1:45 PM Leeann Fan, [address] [phone] [id number] Follow-up Instructions Return in about 6 months (around 4/6/2018). Your Appointments 10/24/2017 10:00 AM  
HOLTER MONITOR with [address], [facility]) Appt Note: $0CP 7/25/17ksr [address].  
[phone] [address].  
  
    
 10/31/2017  1:00 PM  
3 MONTH with Ana María Tinajero MD  
[facility]) Appt Note: $0CP 7/25/17ksr [address].  
[phone] [address] Upcoming Health Maintenance Date Due INFLUENZA AGE 9 TO ADULT 8/1/2017 MEDICARE YEARLY EXAM 4/6/2018 GLAUCOMA SCREENING Q2Y 8/1/2018 COLONOSCOPY 8/21/2023 DTaP/Tdap/Td series (2 - Td) 4/14/2025 Allergies as of 10/6/2017  Review Complete On: 10/6/2017 By: Shannan Sandoval LPN Severity Noted Reaction Type Reactions Adhesive  07/14/2014    Hives Amoxicillin  10/12/2011    Unknown (comments) Lipitor [Atorvastatin]  07/14/2014   Side Effect Myalgia Gets the flu Current Immunizations  Reviewed on 4/5/2017 Name Date Influenza Vaccine 11/18/2014 Influenza Vaccine (Quad) 12/14/2015 12:17 PM  
 Influenza Vaccine (Quad) PF 10/18/2016 Pneumococcal Conjugate (PCV-13) 4/5/2017  2:45 PM  
 Pneumococcal Vaccine (Unspecified Type) 11/19/2013 Tdap 4/14/2015 Not reviewed this visit You Were Diagnosed With   
  
 Codes Comments Essential hypertension    -  Primary ICD-10-CM: I10 
ICD-9-CM: 401.9 Ingrown toenail     ICD-10-CM: L60.0 ICD-9-CM: 703.0  IFG (impaired fasting glucose)     ICD-10-CM: R73.01 
ICD-9-CM: 790.21   
 Morbid obesity ([facility]     ICD-10-CM: E66.01 
ICD-9-CM: 278.01 Atrial fibrillation, unspecified type ([facility]     ICD-10-CM: I48.91 
ICD-9-CM: 427.31 Acquired hypothyroidism     ICD-10-CM: E03.9 ICD-9-CM: 244.9 Mixed hyperlipidemia     ICD-10-CM: E78.2 ICD-9-CM: 272.2 Hypokalemia     ICD-10-CM: E87.6 ICD-9-CM: 276.8 Body mass index (BMI) of 35.0 to 35.9 in adult     ICD-10-CM: Z68.35 ICD-9-CM: V85.35 Vitals BP Pulse Temp Resp Height(growth percentile) Weight(growth percentile) 135/76 (BP 1 Location: Left arm, BP Patient Position: Sitting) 60 97.8 °F (36.6 °C) (Oral) 16 5' 5\" (1.651 m) 228 lb (103.4 kg) SpO2 BMI OB Status Smoking Status 98% 37.94 kg/m2 Postmenopausal Never Smoker Vitals History BMI and BSA Data Body Mass Index Body Surface Area  
 37.94 kg/m 2 2.18 m 2 Preferred Pharmacy Pharmacy Name Phone [id number]- [place], [place] - [address] [phone] Your Updated Medication List  
  
   
This list is accurate as of: 10/6/17  2:42 PM.  Always use your most recent med list.  
  
  
  
  
 apixaban 5 mg tablet Commonly known as:  Kenvil Maze Take 1 Tab by mouth two (2) times a day. CALTRATE 600 + D PO Take 1 Tab by mouth two (2) times a day. CENTRUM SILVER PO Take 1 Tab by mouth daily (after lunch). cloNIDine HCl 0.2 mg tablet Commonly known as:  CATAPRES Take 1 Tab by mouth two (2) times a day. hydroCHLOROthiazide 25 mg tablet Commonly known as:  HYDRODIURIL Take 1 Tab by mouth daily. KLOR-CON 10 10 mEq tablet Generic drug:  potassium chloride SR Take two tablets twice a day  
  
 levothyroxine 75 mcg tablet Commonly known as:  SYNTHROID Take 1 Tab by mouth Daily (before breakfast). losartan 25 mg tablet Commonly known as:  COZAAR Take 1 Tab by mouth daily. rosuvastatin 20 mg tablet Commonly known as:  CRESTOR  
 Take 1 Tab by mouth nightly. sotalol 80 mg tablet Commonly known as:  Sherl Shine Take 0.5 Tabs by mouth two (2) times a day. * verapamil  mg tablet Commonly known as:  CALAN-SR  
TAKE 1 TABLET BY MOUTH EVERY MORNING FOR BLOOD PRESSURE  
  
 * verapamil  mg tablet Commonly known as:  CALAN-SR  
TAKE 1 TABLET BY MOUTH EVERY MORNING FOR BLOOD PRESSURE  
  
 * Notice: This list has 2 medication(s) that are the same as other medications prescribed for you. Read the directions carefully, and ask your doctor or other care provider to review them with you. Prescriptions Printed Refills  
 losartan (COZAAR) 25 mg tablet 1 Sig: Take 1 Tab by mouth daily. Class: Print Route: Oral  
  
We Performed the Following AMB POC HEMOGLOBIN A1C [51336 CPT(R)] REFERRAL TO PODIATRY [REF90 Custom] Follow-up Instructions Return in about 6 months (around 4/6/2018). Referral Information Referral ID Referred By Referred To  
  
 [id number] Gayle King [address], [address] [place] [address] Visits Status Start Date End Date 1 New Request 10/6/17 10/6/18 If your referral has a status of pending review or denied, additional information will be sent to support the outcome of this decision. Introducing [facility]! Dear Radha Burgess: Thank you for requesting a [employer] account. Our records indicate that you already have an active [employer] account. You can access your account anytime at [email or web address] Did you know that you can access your hospital and ER discharge instructions at any time in [employer]? You can also review all of your test results from your hospital stay or ER visit. Additional Information If you have questions, please visit the Frequently Asked Questions section of the [employer] website at [email or web address] Remember, MyChart is NOT to be used for urgent needs. For medical emergencies, dial 911. Now available from your iPhone and Android! Please provide this summary of care documentation to your next provider. Your primary care clinician is listed as Patricia Centeno. If you have any questions after today's visit, please call [phone].

## 2017-10-06 NOTE — PROGRESS NOTES
HISTORY OF PRESENT ILLNESS  Fiordaliza Robbins is a 78 y.o. female. HPI   Last here 4/5/17. Pt is here to f/u on chronic conditions.     BP today is 135/76   BP at home running around 164/97, 152/84, 176/92, 144/81, 125/76, 141/70, however she was feeling poorly for awhile, now better, readings at home in the 130/70 range   Last visit, her home cuff read higher than our cuff  Continues on clonidine 0.2mg BID, HCTZ 25mg and verapamil 120mg   Last visit, I changed her lisinopril to losartan 25mg daily as she was having a cough - reports compliance, tolerating well  Cough is now resolved    She is also still taking a decreased dose of sotalol for afib and eliquis--no bleeding/falls    Wt is up 3 lbs since last visit  Discussed diet and weight loss     Reviewed last labs 9/17   Thyroid, kidney, liver and blood counts were nl  Will get A1C today    Pt states that her BP has been fluctuating  Pt was told to go to the ED per Dr. Abena Huston (cardio)  Pt went to the ED on 8/17/17  Pt was told that she had a UTI  Pt was provided with macrobid with improvement to sx     Since then bp has been good    Pt completed cardiac ablation 3/17/17 for her afib per Dr. Jordan Burgos (cardio)  Pt states that she has been feeling lethargic since this procedure    Pt has been completing PT for balance with improvement to sx    Pt saw Dr. Abena Huston (cardio) for f/u after a fib  Reviewed last notes 7/5/17: Ms. Jagdeep Hilario is here for follow up regarding afib. She has some lingering fatigue, denies other cardiac complaints. ECG remains SR. Echocardiogram demonstrated EF of 60%. Holter monitor demonstrated NSR with short bursts of atrial ectopy. She can stop sotalol and follow up in 3 months with monitor prior to visit. Continue medical management for HTN, hyperlipidemia, hypothyroidism. Next visit scheduled for end of 10/17    Pt follows with Dr. Jordyn Briseno (cardio) for foot circulation  Reviewed last notes 8/1/17: 1. Check ADA to assess for PAD.  If normal, then advised pt to f/u wit podiatry. Symptoms appears to be secondary to musculoskeletal and probably ingrowing big toenail. 2. HTN: BP controlled. 3. On statin. 4. F/u with EP for a fib. On NOAC.      Reviewed ABIs 8/7/17: normal resting ABIs  Reviewed Holter Monitor 7/17: sinus rhythm with bursts of ectopy    Pt has an ingrown toenail per Dr. Camelia Abreu (cardio)  Pt used to follow with a pod on [facility]  Provided referral for Dr. Toshia Goldstein (pod) today    On exam, pt had a 2\" palpable mass, mid abd  Discussed that mass in abd is likely a lipoma   Discussed possibly having a US abd in the near future    Continues on eliquis 5mg BID for anticoagulation per cardio  Pt is no longer taking ASA daily   Denies any bleeding or falls with this medication     Continues crestor 20mg daily for cholesterol, lipids at goal  Pt wonders about the SE of this medication - addressed this today     Continues klor-con 20meq BID for her potassium     Last visit, pt c/o cough since her ablation - resolved    Pt c/o feeling bloated since last visit  Pt thought that she had a bladder infection  Pt went to Dr. Luci Servin (gyn) and had a UA completed - negative  If sx exacerbate, an US of ovaries will be completed  Discussed possibly taking prilosec     Continues on synthroid 75mcg daily      Recall She had a cervical polyp, was having vaginal bleeding, this was removed by julio césar, vaginal bleeding has since resolved.       Recall She reports being given a cpap but states she does not have gama was told she just has jerking legs at night.      ACP: SDM is her sister, Kathe Boyle  Provided information today for her to complete at home         PREVENTIVE:    Colonoscopy: 8/21/13, Dr. Lesly Sears, repeat 10 years   Pap: Dr. Lisa Robbins, 11/15   [facility] will get [facility] for review  DEXA: 8/08/16 normal   Tdap: 4/14/2015  Pneumovax: 11/19/2013  [id number]: given today, 4/05/2017  Zostavax: h/o shingles, declines  Flu shot: 10/06/17  A1C: 11/14 6.1, 4/15 6.1, 7/15 6.1, 12/15 5.9, 4/16 5.8, 8/16 5.9, 10/16 5.8, 4/17 5.9, 10/17 POC 5.6  Eye exam: Dr. Michoacano Pryor, 8/17  Lipids: 9/17 LDL 72       Patient Active Problem List    Diagnosis Date Noted    Pericardial effusion with cardiac tamponade 03/18/2017    S/P ablation of atrial fibrillation 03/17/2017    Acquired hypothyroidism 12/14/2015    ACP (advance care planning) 12/14/2015    HTN (hypertension) 07/21/2015    Thyroid activity decreased 04/14/2015    Atrial fibrillation ([facility] [address] [address] 03/16/2015    A-fib ([facility] [address] 03/10/2015    Angina, class III ([facility] [address] 01/27/2015    Morbid obesity ([facility] [address] [facility]     Hyperlipidemia 07/14/2014    Hypokalemia 07/14/2014    Encounter for screening colonoscopy 08/21/2013    History of rectal bleeding 08/21/2013    Postmenopausal bleeding 10/18/2011    Endometrial polyp 10/18/2011     Current Outpatient Prescriptions   Medication Sig Dispense Refill    sotalol (BETAPACE) 80 mg tablet Take 0.5 Tabs by mouth two (2) times a day. 30 Tab 3    verapamil ER (CALAN-SR) 120 mg tablet TAKE 1 TABLET BY MOUTH EVERY MORNING FOR BLOOD PRESSURE 90 Tab 2    levothyroxine (SYNTHROID) 75 mcg tablet Take 1 Tab by mouth Daily (before breakfast). 90 Tab 3    cloNIDine HCl (CATAPRES) 0.2 mg tablet Take 1 Tab by mouth two (2) times a day. 180 Tab 1    KLOR-CON 10 10 mEq tablet Take two tablets twice a day 360 Tab 1    hydroCHLOROthiazide (HYDRODIURIL) 25 mg tablet Take 1 Tab by mouth daily. 90 Tab 3    rosuvastatin (CRESTOR) 20 mg tablet Take 1 Tab by mouth nightly. 90 Tab 1    losartan (COZAAR) 25 mg tablet Take 1 Tab by mouth daily. 30 Tab 6    apixaban (ELIQUIS) 5 mg tablet Take 1 Tab by mouth two (2) times a day. 60 Tab 6    verapamil ER (CALAN-SR) 120 mg tablet TAKE 1 TABLET BY MOUTH EVERY MORNING FOR BLOOD PRESSURE 90 Tab 2    CALCIUM CARBONATE/VITAMIN D3 (CALTRATE 600 + D PO) Take 1 Tab by mouth two (2) times a day.       MULTIVITAMIN W-MINERALS/LUTEIN (CENTRUM SILVER PO) Take 1 Tab by mouth daily (after lunch). Past Surgical History:   Procedure Laterality Date    BREAST SURGERY PROCEDURE UNLISTED  1982    breast bx rt    CARDIAC SURG PROCEDURE UNLIST  01/2015    cardiac catheterization, no intervention, medical management    HX GYN      myomectomy on uterus    HX GYN  2011    hystereoscopy D&C    HX LYMPH NODE DISSECTION      biopsy    HX OTHER SURGICAL      lymph node bx      Lab Results  Component Value Date/Time   WBC 5.5 09/29/2017 09:11 AM   HGB 12.3 09/29/2017 09:11 AM   HCT 36.1 09/29/2017 09:11 AM   PLATELET 991 38/20/7476 09:11 AM   MCV 91 09/29/2017 09:11 AM     Lab Results  Component Value Date/Time   Cholesterol, total 157 09/29/2017 09:11 AM   HDL Cholesterol 51 09/29/2017 09:11 AM   LDL, calculated 72 09/29/2017 09:11 AM   Triglyceride 168 09/29/2017 09:11 AM     Lab Results  Component Value Date/Time   GFR est non-AA 51 09/29/2017 09:11 AM   GFR est AA 59 09/29/2017 09:11 AM   Creatinine 1.07 09/29/2017 09:11 AM   BUN 17 09/29/2017 09:11 AM   Sodium 143 09/29/2017 09:11 AM   Potassium 4.2 09/29/2017 09:11 AM   Chloride 103 09/29/2017 09:11 AM   CO2 26 09/29/2017 09:11 AM   Magnesium 2.2 08/17/2017 10:11 AM          Review of Systems   Constitutional: Positive for malaise/fatigue. Respiratory: Negative for cough and shortness of breath. Cardiovascular: Negative for chest pain. Physical Exam   Constitutional: She is oriented to person, place, and time. She appears well-developed and well-nourished. No distress. HENT:   Head: Normocephalic and atraumatic. Right Ear: External ear normal.   Left Ear: External ear normal.   Mouth/Throat: Oropharynx is clear and moist. No oropharyngeal exudate. Eyes: Conjunctivae and EOM are normal. Right eye exhibits no discharge. Left eye exhibits no discharge. Neck: Normal range of motion. Neck supple. No thyromegaly present. Cardiovascular: Normal rate, regular rhythm and intact distal pulses. Exam reveals no gallop and no friction rub. Murmur (Slight) heard. Pulmonary/Chest: Effort normal and breath sounds normal. No respiratory distress. She has no wheezes. She has no rales. She exhibits no tenderness. Abdominal: Soft. She exhibits mass (2\" palpable mass, mid abd). She exhibits no distension. There is no tenderness. There is no rebound and no guarding. Musculoskeletal: Normal range of motion. She exhibits no edema, tenderness or deformity. Lymphadenopathy:     She has no cervical adenopathy. Neurological: She is alert and oriented to person, place, and time. She has normal reflexes. She exhibits normal muscle tone. Coordination normal.   Skin: Skin is warm and dry. No rash noted. She is not diaphoretic. No erythema. No pallor. Psychiatric: She has a normal mood and affect. Her behavior is normal.       ASSESSMENT and PLAN    ICD-10-CM ICD-9-CM    1. Essential hypertension    BP adequately controlled on losartan 25mg, clonidine 0.2mg BID, HCTZ and verapamil, cough has resolved off of lisinopril  I10 401.9 LIPID PANEL      METABOLIC PANEL, COMPREHENSIVE      HEMOGLOBIN A1C WITH EAG      TSH 3RD GENERATION      CBC W/O DIFF   2. Ingrown toenail    Refer to pod as she desires L60.0 703.0 REFERRAL TO PODIATRY      LIPID PANEL      METABOLIC PANEL, COMPREHENSIVE      HEMOGLOBIN A1C WITH EAG      TSH 3RD GENERATION      CBC W/O DIFF   3. IFG (impaired fasting glucose)    a1c stable today in clinic, will continue to monitor R73.01 790.21 AMB POC HEMOGLOBIN A1C      LIPID PANEL      METABOLIC PANEL, COMPREHENSIVE      HEMOGLOBIN A1C WITH EAG      TSH 3RD GENERATION      CBC W/O DIFF   4. Morbid obesity ([address]    Counseled on portion control, diet and w/l E66.01 278.01 LIPID PANEL      METABOLIC PANEL, COMPREHENSIVE      HEMOGLOBIN A1C WITH EAG      TSH 3RD GENERATION      CBC W/O DIFF   5.  Atrial fibrillation, unspecified type [facility])    S/p ablation, remains on sotatol and an NSR, continues on eliquis for anticoagulation  I48.91 427.31 LIPID PANEL      METABOLIC PANEL, COMPREHENSIVE      HEMOGLOBIN A1C WITH EAG      TSH 3RD GENERATION      CBC W/O DIFF   6. Acquired hypothyroidism    At goal on synthroid 75mcg daily, continue, no change to dose   E03.9 244.9 LIPID PANEL      METABOLIC PANEL, COMPREHENSIVE      HEMOGLOBIN A1C WITH EAG      TSH 3RD GENERATION      CBC W/O DIFF   7. Mixed hyperlipidemia    Controlled on crestor, does not have SE, will continue  E78.2 272.2 LIPID PANEL      METABOLIC PANEL, COMPREHENSIVE      HEMOGLOBIN A1C WITH EAG      TSH 3RD GENERATION      CBC W/O DIFF   8. Hypokalemia    Continues klor-con 20meqs BID E87.6 276.8 LIPID PANEL      METABOLIC PANEL, COMPREHENSIVE      HEMOGLOBIN A1C WITH EAG      TSH 3RD GENERATION      CBC W/O DIFF   9. Body mass index (BMI) of 35.0 to 35.9 in adult    See above Z68.35 V85.35 LIPID PANEL      METABOLIC PANEL, COMPREHENSIVE      HEMOGLOBIN A1C WITH EAG      TSH 3RD GENERATION      CBC W/O DIFF   10. Lipoma of torso    Discussed that mass in abd is likely a lipoma, will get US to confirm D17.1 214.1 [facility]      LIPID PANEL      METABOLIC PANEL, COMPREHENSIVE      HEMOGLOBIN A1C WITH EAG      TSH 3RD GENERATION      CBC W/O DIFF      Depression screen reviewed and negative. Scribed by Beau Martinez of [address], as dictated by Dr. Mateusz Delgado. Current diagnosis and concerns discussed with pt at length. Pt understands risks and benefits or current treatment plan and medications, and accepts the treatment and medication with any possible risks. Pt asks appropriate questions, which were answered. Pt was instructed to call with any concerns or problems. This note will not be viewable in [address]

## 2017-10-11 ENCOUNTER — HOSPITAL ENCOUNTER (OUTPATIENT)
Dept: ULTRASOUND IMAGING | Age: 77
Discharge: HOME | End: 2017-10-11
Attending: INTERNAL MEDICINE
Payer: MEDICARE

## 2017-10-11 DIAGNOSIS — D17.1 LIPOMA OF TORSO: ICD-10-CM

## 2017-10-11 PROCEDURE — 76705 ECHO EXAM OF ABDOMEN: CPT

## 2017-10-11 RX ORDER — POTASSIUM CHLORIDE 750 MG/1
TABLET, FILM COATED, EXTENDED RELEASE ORAL
Qty: 360 TAB | Refills: 1 | Status: SHIPPED | OUTPATIENT
Start: 2017-10-11 | End: 2018-04-11 | Stop reason: SDUPTHER

## 2017-10-12 RX ORDER — APIXABAN 5 MG/1
TABLET, FILM COATED ORAL
Qty: 60 TAB | Refills: 6 | Status: SHIPPED | OUTPATIENT
Start: 2017-10-12 | End: 2018-05-14 | Stop reason: SDUPTHER

## 2017-10-12 NOTE — PROGRESS NOTES
Let her know that [address] shows mass c/w lipoma (benign in nature) if causing sx can see surgeon to discuss removal.

## 2017-10-13 ENCOUNTER — TELEPHONE (OUTPATIENT)
Dept: INTERNAL MEDICINE CLINIC | Age: 77
End: 2017-10-13

## 2017-10-13 NOTE — PROGRESS NOTES
Called, spoke to pt. Two pt identifiers confirmed. Pt informed per Dr. Roxanne Orellana shows mass c/w lipoma (benign in nature) if causing sx can see surgeon to discuss removal.  Pt verbalized understanding of information discussed w/ no further questions at this time.

## 2017-10-18 RX ORDER — CLONIDINE HYDROCHLORIDE 0.2 MG/1
TABLET ORAL
Qty: 180 TAB | Refills: 1 | Status: SHIPPED | OUTPATIENT
Start: 2017-10-18 | End: 2018-04-20 | Stop reason: SDUPTHER

## 2017-10-20 RX ORDER — ROSUVASTATIN CALCIUM 20 MG/1
TABLET, COATED ORAL
Qty: 90 TAB | Refills: 1 | Status: SHIPPED | OUTPATIENT
Start: 2017-10-20 | End: 2018-04-20 | Stop reason: SDUPTHER

## 2017-10-24 ENCOUNTER — CLINICAL SUPPORT (OUTPATIENT)
Dept: CARDIOLOGY CLINIC | Age: 77
End: 2017-10-24

## 2017-10-24 DIAGNOSIS — Z86.79 S/P ABLATION OF ATRIAL FIBRILLATION: Primary | ICD-10-CM

## 2017-10-24 DIAGNOSIS — Z98.890 S/P ABLATION OF ATRIAL FIBRILLATION: Primary | ICD-10-CM

## 2017-10-31 ENCOUNTER — OFFICE VISIT (OUTPATIENT)
Dept: CARDIOLOGY CLINIC | Age: 77
End: 2017-10-31

## 2017-10-31 VITALS
HEIGHT: 65 IN | BODY MASS INDEX: 38.29 KG/M2 | WEIGHT: 229.8 LBS | RESPIRATION RATE: 18 BRPM | DIASTOLIC BLOOD PRESSURE: 72 MMHG | SYSTOLIC BLOOD PRESSURE: 118 MMHG | OXYGEN SATURATION: 97 % | HEART RATE: 72 BPM

## 2017-10-31 DIAGNOSIS — Z98.890 S/P ABLATION OF ATRIAL FIBRILLATION: Primary | ICD-10-CM

## 2017-10-31 DIAGNOSIS — Z98.890 S/P ABLATION OF ATRIAL FIBRILLATION: ICD-10-CM

## 2017-10-31 DIAGNOSIS — Z86.79 S/P ABLATION OF ATRIAL FIBRILLATION: Primary | ICD-10-CM

## 2017-10-31 DIAGNOSIS — Z86.79 S/P ABLATION OF ATRIAL FIBRILLATION: ICD-10-CM

## 2017-10-31 DIAGNOSIS — I48.0 PAROXYSMAL ATRIAL FIBRILLATION (HCC): ICD-10-CM

## 2017-10-31 DIAGNOSIS — I49.9 IRREGULAR HEART BEAT: Primary | ICD-10-CM

## 2017-10-31 DIAGNOSIS — I10 ESSENTIAL HYPERTENSION: Chronic | ICD-10-CM

## 2017-10-31 DIAGNOSIS — I49.9 IRREGULAR HEART BEAT: ICD-10-CM

## 2017-10-31 DIAGNOSIS — E78.2 MIXED HYPERLIPIDEMIA: ICD-10-CM

## 2017-10-31 NOTE — PROGRESS NOTES
Subjective:      Sam Morfin is a 78 y.o. female is here for follow up of AF. She has ongoing fatigue despite trial off of Sotalol. She is now taking it again. She states that she has palpitations every other week or so.     Patient Active Problem List    Diagnosis Date Noted    Irregular heart beat 10/31/2017    Pericardial effusion with cardiac tamponade 03/18/2017    S/P ablation of atrial fibrillation 03/17/2017    Acquired hypothyroidism 12/14/2015    ACP (advance care planning) 12/14/2015    HTN (hypertension) 07/21/2015    Thyroid activity decreased 04/14/2015    Atrial fibrillation ([address] 03/16/2015    A-fib ([address] 03/10/2015    Angina, class III ([address] 01/27/2015    Morbid obesity ([address]     Hyperlipidemia 07/14/2014    Hypokalemia 07/14/2014    Encounter for screening colonoscopy 08/21/2013    History of rectal bleeding 08/21/2013    Postmenopausal bleeding 10/18/2011    Endometrial polyp 10/18/2011      Damaris Thrasher MD  Past Medical History:   Diagnosis Date    Arthritis     right knee, left shoulder    Diverticulosis     Frequency of urination     High cholesterol     Hypertension     Morbid obesity ([address]     Thyroid disease     hypothyroid    Unspecified adverse effect of anesthesia     low BP      Past Surgical History:   Procedure Laterality Date    BREAST SURGERY PROCEDURE UNLISTED  1982    breast bx rt    CARDIAC SURG PROCEDURE UNLIST  01/2015    cardiac catheterization, no intervention, medical management    HX GYN      myomectomy on uterus    HX GYN  2011    hystereoscopy D&C    HX LYMPH NODE DISSECTION      biopsy    HX OTHER SURGICAL      lymph node bx     Allergies   Allergen Reactions    Adhesive Hives    Amoxicillin Unknown (comments)    Lipitor [Atorvastatin] Myalgia     Gets the flu      Family History   Problem Relation Age of Onset    Alzheimer Mother     Heart Disease Mother     Heart Disease Father     Hypertension Father     Cancer Paternal Aunt     Diabetes Paternal Grandmother     negative for cardiac disease  Social History     Social History    Marital status:      Spouse name: N/A    Number of children: N/A    Years of education: N/A     Social History Main Topics    Smoking status: Never Smoker    Smokeless tobacco: Never Used    Alcohol use No    Drug use: No    Sexual activity: Not Asked     Other Topics Concern    None     Social History Narrative     Current Outpatient Prescriptions   Medication Sig    rosuvastatin (CRESTOR) 20 mg tablet TAKE 1 TABLET BY MOUTH EVERY DAY    cloNIDine HCl (CATAPRES) 0.2 mg tablet TAKE 1 TAB BY MOUTH TWO (2) TIMES A DAY.  ELIQUIS 5 mg tablet TAKE 1 TAB BY MOUTH TWO (2) TIMES A DAY.  KLOR-CON 10 10 mEq tablet TAKE TWO TABLETS TWICE A DAY    losartan (COZAAR) 25 mg tablet Take 1 Tab by mouth daily.  sotalol (BETAPACE) 80 mg tablet Take 0.5 Tabs by mouth two (2) times a day.  levothyroxine (SYNTHROID) 75 mcg tablet Take 1 Tab by mouth Daily (before breakfast).  hydroCHLOROthiazide (HYDRODIURIL) 25 mg tablet Take 1 Tab by mouth daily.  verapamil ER (CALAN-SR) 120 mg tablet TAKE 1 TABLET BY MOUTH EVERY MORNING FOR BLOOD PRESSURE    CALCIUM CARBONATE/VITAMIN D3 (CALTRATE 600 + D PO) Take 1 Tab by mouth daily.  MULTIVITAMIN W-MINERALS/LUTEIN (CENTRUM SILVER PO) Take 1 Tab by mouth daily (after lunch). No current facility-administered medications for this visit. Vitals:    10/31/17 1254   BP: 118/72   Pulse: 72   Resp: 18   SpO2: 97%   Weight: 229 lb 12.8 oz (104.2 kg)   Height: 5' 5\" (1.651 m)       I have reviewed the nurses notes, vitals, problem list, allergy list, medical history, family, social history and medications. Review of Symptoms:    General: Pt denies excessive weight gain or loss. Pt is able to conduct ADL's  HEENT: Denies blurred vision, headaches, epistaxis and difficulty swallowing.   Respiratory: Denies shortness of breath, ANGUIANO, wheezing or stridor. Cardiovascular: +palpitations, Denies precordial pain, edema or PND  Gastrointestinal: Denies poor appetite, indigestion, abdominal pain or blood in stool  Urinary: Denies dysuria, pyuria  Musculoskeletal: Denies pain or swelling from muscles or joints  Neurologic: Denies tremor, paresthesias, or sensory motor disturbance  Skin: Denies rash, itching or texture change. Psych: Denies depression      Physical Exam:      General: Well developed, in no acute distress. HEENT: Eyes - PERRL, no jvd  Heart:  Normal S1/S2 negative S3 or S4. Regular, no murmur, gallop or rub.   Respiratory: Clear bilaterally x 4, no wheezing or rales  Abdomen:   Soft, non-tender, bowel sounds are active.   Extremities:  No edema, normal cap refill, no cyanosis. Musculoskeletal: No clubbing  Neuro: A&Ox3, speech clear, gait stable. Skin: Skin color is normal. No rashes or lesions.  Non diaphoretic  Vascular: 2+ pulses symmetric in all extremities    Cardiographics    Ekg: normal sinus rhythm     Results for orders placed or performed in visit on 10/24/17   CARDIAC HOLTER MONITOR, 24 HOURS    Narrative    ECG Monitor/24 hours, Complete    Reason for Holter Monitor   PAF    Heartbeat    Slowest 51  Average 67  Fastest  102      Results:   Underlying Rhythm: Normal sinus rhythm      Atrial Arrhythmias: premature atrial contractions; occasional, atrial couplets and atrial triplets            AV Conduction: normal    Ventricular Arrhythmias: premature ventricular contractions; occasional     ST Segment Analysis:normal     Symptom Correlation:  none    Comment:   Sinus rhythm throughout with occasional atrial ectopy     Therese Villarreal MD, [facility]      Results for orders placed or performed during the hospital encounter of 08/17/17   EKG, 12 LEAD, INITIAL   Result Value Ref Range    Ventricular Rate 87 BPM    Atrial Rate 87 BPM    P-R Interval 158 ms    QRS Duration 82 ms    Q-T Interval 400 ms    QTC Calculation (Bezet) 481 ms Calculated P Axis 36 degrees    Calculated R Axis -15 degrees    Calculated T Axis 35 degrees    Diagnosis       Normal sinus rhythm  Cannot rule out Inferior infarct , age undetermined  When compared with ECG of 18-MAR-2017 02:53,  No significant change was found  Confirmed by Ambreen Jaramillo ([id number]) on 8/17/2017 8:29:00 PM           Lab Results   Component Value Date/Time    WBC 5.5 09/29/2017 09:11 AM    HGB 12.3 09/29/2017 09:11 AM    HCT 36.1 09/29/2017 09:11 AM    PLATELET 416 23/71/2735 09:11 AM    MCV 91 09/29/2017 09:11 AM      Lab Results   Component Value Date/Time    Sodium 143 09/29/2017 09:11 AM    Potassium 4.2 09/29/2017 09:11 AM    Chloride 103 09/29/2017 09:11 AM    CO2 26 09/29/2017 09:11 AM    Anion gap 5 08/17/2017 10:11 AM    Glucose 100 09/29/2017 09:11 AM    BUN 17 09/29/2017 09:11 AM    Creatinine 1.07 09/29/2017 09:11 AM    BUN/Creatinine ratio 16 09/29/2017 09:11 AM    GFR est AA 59 09/29/2017 09:11 AM    GFR est non-AA 51 09/29/2017 09:11 AM    Calcium 9.8 09/29/2017 09:11 AM    Bilirubin, total 0.3 09/29/2017 09:11 AM    AST (SGOT) 16 09/29/2017 09:11 AM    Alk. phosphatase 66 09/29/2017 09:11 AM    Protein, total 7.2 09/29/2017 09:11 AM    Albumin 4.4 09/29/2017 09:11 AM    Globulin 3.9 08/17/2017 10:11 AM    A-G Ratio 1.6 09/29/2017 09:11 AM    ALT (SGPT) 16 09/29/2017 09:11 AM         Assessment:     Assessment:        ICD-10-CM ICD-9-CM    1. Irregular heart beat I49.9 427.9 AMB POC EKG ROUTINE W/ 12 LEADS, INTER & REP   2. Essential hypertension I10 401.9    3. Paroxysmal atrial fibrillation (HCC) I48.0 427.31    4. S/P ablation of atrial fibrillation Z98.890 V45.89     Z86.79     5. Mixed hyperlipidemia E78.2 272.2      Orders Placed This Encounter    AMB POC EKG ROUTINE W/ 12 LEADS, INTER & REP     Order Specific Question:   Reason for Exam:     Answer:   routine        Plan:   Ms. Kalia Zarate is here for follow up of her AF. Last visit she reported fatigue and sotalol was discontinued. Recent holter monitor demonstrated SR with occasional atrial ectopy. EKG demonstrates normal sinus rhythm. She feels palpitations once every other week - lasting minutes. We will obtain a 2 week event monitor and she will f/u for results. Continue medical management for HTN, hyperlipidemia. Thank you for allowing me to participate in Brittny Scott 's care.     JORDY Cherry MD, [facility]

## 2017-10-31 NOTE — MR AVS SNAPSHOT
Visit Information Date & Time Provider Department Dept. Phone Encounter #  
 10/31/2017  1:00 PM Nikolas Esparza, 1024 [facility] [phone] [id number] Follow-up Instructions Return in about 4 weeks (around 11/28/2017). Routing History Follow-up and Disposition History Your Appointments 12/19/2017  3:15 PM  
2 MONTH with Fadia Benavides MD  
[facility] Appt Note: $0CP 10/31/17ksr [address]  
[phone] [address]  
  
    
 4/11/2018 11:30 AM  
ROUTINE CARE with Sheila George, [address] [place] [address],[address] [facility]) Appt Note: 6 month follow up  
 [facility] [address] [address]  
[address] [facility] [address] [facility] Upcoming Health Maintenance Date Due  
 MEDICARE YEARLY EXAM 4/6/2018 GLAUCOMA SCREENING Q2Y 8/1/2018 COLONOSCOPY 8/21/2023 DTaP/Tdap/Td series (2 - Td) 4/14/2025 Allergies as of 10/31/2017  Review Complete On: 10/31/2017 By: Fadia Benavides MD  
  
 Severity Noted Reaction Type Reactions Adhesive  07/14/2014    Hives Amoxicillin  10/12/2011    Unknown (comments) Lipitor [Atorvastatin]  07/14/2014   Side Effect Myalgia Gets the flu Current Immunizations  Reviewed on 4/5/2017 Name Date Influenza High Dose Vaccine PF 10/6/2017 Influenza Vaccine 11/18/2014 Influenza Vaccine (Quad) 12/14/2015 12:17 PM  
 Influenza Vaccine (Quad) PF 10/18/2016 Pneumococcal Conjugate (PCV-13) 4/5/2017  2:45 PM  
 Pneumococcal Vaccine (Unspecified Type) 11/19/2013 Tdap 4/14/2015 Not reviewed this visit You Were Diagnosed With   
  
 Codes Comments Irregular heart beat    -  Primary ICD-10-CM: I49.9 ICD-9-CM: 427.9 Essential hypertension     ICD-10-CM: I10 
ICD-9-CM: 401.9 Paroxysmal atrial fibrillation (HCC)     ICD-10-CM: I48.0 ICD-9-CM: 427.31 S/P ablation of atrial fibrillation     ICD-10-CM: Z98.890, Z86.79 
ICD-9-CM: V45.89 Mixed hyperlipidemia     ICD-10-CM: E78.2 ICD-9-CM: 272.2 Vitals BP Pulse Resp Height(growth percentile) Weight(growth percentile) SpO2  
 118/72 (BP 1 Location: Right arm, BP Patient Position: Sitting) 72 18 5' 5\" (1.651 m) 229 lb 12.8 oz (104.2 kg) 97% BMI OB Status Smoking Status 38.24 kg/m2 Postmenopausal Never Smoker Vitals History BMI and BSA Data Body Mass Index Body Surface Area  
 38.24 kg/m 2 2.19 m 2 Preferred Pharmacy Pharmacy Name Phone [facility]/PHARMACY #[id number]- [place], [place] - [address] [phone] Your Updated Medication List  
  
   
This list is accurate as of: 10/31/17  1:52 PM.  Always use your most recent med list.  
  
  
  
  
 CALTRATE 600 + D PO Take 1 Tab by mouth daily. CENTRUM SILVER PO Take 1 Tab by mouth daily (after lunch). cloNIDine HCl 0.2 mg tablet Commonly known as:  CATAPRES  
TAKE 1 TAB BY MOUTH TWO (2) TIMES A DAY. ELIQUIS 5 mg tablet Generic drug:  apixaban TAKE 1 TAB BY MOUTH TWO (2) TIMES A DAY. hydroCHLOROthiazide 25 mg tablet Commonly known as:  HYDRODIURIL Take 1 Tab by mouth daily. KLOR-CON 10 10 mEq tablet Generic drug:  potassium chloride SR  
TAKE TWO TABLETS TWICE A DAY  
  
 levothyroxine 75 mcg tablet Commonly known as:  SYNTHROID Take 1 Tab by mouth Daily (before breakfast). losartan 25 mg tablet Commonly known as:  COZAAR Take 1 Tab by mouth daily. rosuvastatin 20 mg tablet Commonly known as:  CRESTOR  
TAKE 1 TABLET BY MOUTH EVERY DAY  
  
 sotalol 80 mg tablet Commonly known as:  [place] Morale Take 0.5 Tabs by mouth two (2) times a day. verapamil  mg tablet Commonly known as:  CALAN-SR  
 TAKE 1 TABLET BY MOUTH EVERY MORNING FOR BLOOD PRESSURE We Performed the Following AMB POC EKG ROUTINE W/ 12 LEADS, INTER & REP [05778 CPT(R)] Follow-up Instructions Return in about 4 weeks (around 11/28/2017). Introducing [facility] HEALTH SERVICES! Dear Salas Campos: Thank you for requesting a [employer] account. Our records indicate that you already have an active [employer] account. You can access your account anytime at [email or web address] Did you know that you can access your hospital and ER discharge instructions at any time in [employer]? You can also review all of your test results from your hospital stay or ER visit. Additional Information If you have questions, please visit the Frequently Asked Questions section of the [employer] website at [email or web address] Remember, [employer] is NOT to be used for urgent needs. For medical emergencies, dial 911. Now available from your iPhone and Android! Please provide this summary of care documentation to your next provider. Your primary care clinician is listed as Costa Roman. If you have any questions after today's visit, please call [phone].

## 2017-10-31 NOTE — PROGRESS NOTES
Chief Complaint   Patient presents with    Irregular Heart Beat     3 mo appt. C/O fast HR and fatigue.

## 2017-12-19 ENCOUNTER — OFFICE VISIT (OUTPATIENT)
Dept: CARDIOLOGY CLINIC | Age: 77
End: 2017-12-19

## 2017-12-19 VITALS
SYSTOLIC BLOOD PRESSURE: 140 MMHG | RESPIRATION RATE: 18 BRPM | HEART RATE: 71 BPM | OXYGEN SATURATION: 97 % | HEIGHT: 65 IN | WEIGHT: 231.9 LBS | DIASTOLIC BLOOD PRESSURE: 84 MMHG | BODY MASS INDEX: 38.64 KG/M2

## 2017-12-19 DIAGNOSIS — Z98.890 S/P ABLATION OF ATRIAL FIBRILLATION: ICD-10-CM

## 2017-12-19 DIAGNOSIS — I10 ESSENTIAL HYPERTENSION: Primary | Chronic | ICD-10-CM

## 2017-12-19 DIAGNOSIS — I48.91 ATRIAL FIBRILLATION, UNSPECIFIED TYPE (HCC): ICD-10-CM

## 2017-12-19 DIAGNOSIS — E78.2 MIXED HYPERLIPIDEMIA: ICD-10-CM

## 2017-12-19 DIAGNOSIS — Z86.79 S/P ABLATION OF ATRIAL FIBRILLATION: ICD-10-CM

## 2017-12-19 DIAGNOSIS — E66.01 MORBID OBESITY (HCC): ICD-10-CM

## 2017-12-19 NOTE — PROGRESS NOTES
Subjective:      Frank Guardado is a 78 y.o. female is here for follow up of AF. She reports fatigue since her ablation. The patient denies chest pain/ shortness of breath, orthopnea, PND, LE edema, palpitations, syncope, presyncope.      Patient Active Problem List    Diagnosis Date Noted    Irregular heart beat 10/31/2017    Pericardial effusion with cardiac tamponade 03/18/2017    S/P ablation of atrial fibrillation 03/17/2017    Acquired hypothyroidism 12/14/2015    ACP (advance care planning) 12/14/2015    HTN (hypertension) 07/21/2015    Thyroid activity decreased 04/14/2015    Atrial fibrillation ([address] 03/16/2015    A-fib ([address] 03/10/2015    Angina, class III ([address] 01/27/2015    Morbid obesity ([address]     Hyperlipidemia 07/14/2014    Hypokalemia 07/14/2014    Encounter for screening colonoscopy 08/21/2013    History of rectal bleeding 08/21/2013    Postmenopausal bleeding 10/18/2011    Endometrial polyp 10/18/2011      Tariq Amado MD  Past Medical History:   Diagnosis Date    Arthritis     right knee, left shoulder    Diverticulosis     Frequency of urination     High cholesterol     Hypertension     Morbid obesity ([address]     Thyroid disease     hypothyroid    Unspecified adverse effect of anesthesia     low BP      Past Surgical History:   Procedure Laterality Date    BREAST SURGERY PROCEDURE UNLISTED  1982    breast bx rt    CARDIAC SURG PROCEDURE UNLIST  01/2015    cardiac catheterization, no intervention, medical management    HX GYN      myomectomy on uterus    HX GYN  2011    hystereoscopy D&C    HX LYMPH NODE DISSECTION      biopsy    HX OTHER SURGICAL      lymph node bx     Allergies   Allergen Reactions    Adhesive Hives    Amoxicillin Unknown (comments)    Lipitor [Atorvastatin] Myalgia     Gets the flu      Family History   Problem Relation Age of Onset    Alzheimer Mother     Heart Disease Mother     Heart Disease Father     Hypertension Father    Judieth Chelsi Cancer Paternal Aunt     Diabetes Paternal Grandmother     negative for cardiac disease  Social History     Social History    Marital status:      Spouse name: N/A    Number of children: N/A    Years of education: N/A     Social History Main Topics    Smoking status: Never Smoker    Smokeless tobacco: Never Used    Alcohol use No    Drug use: No    Sexual activity: Not Asked     Other Topics Concern    None     Social History Narrative     Current Outpatient Prescriptions   Medication Sig    rosuvastatin (CRESTOR) 20 mg tablet TAKE 1 TABLET BY MOUTH EVERY DAY    cloNIDine HCl (CATAPRES) 0.2 mg tablet TAKE 1 TAB BY MOUTH TWO (2) TIMES A DAY.  ELIQUIS 5 mg tablet TAKE 1 TAB BY MOUTH TWO (2) TIMES A DAY.  KLOR-CON 10 10 mEq tablet TAKE TWO TABLETS TWICE A DAY    losartan (COZAAR) 25 mg tablet Take 1 Tab by mouth daily.  sotalol (BETAPACE) 80 mg tablet Take 0.5 Tabs by mouth two (2) times a day.  levothyroxine (SYNTHROID) 75 mcg tablet Take 1 Tab by mouth Daily (before breakfast).  hydroCHLOROthiazide (HYDRODIURIL) 25 mg tablet Take 1 Tab by mouth daily.  verapamil ER (CALAN-SR) 120 mg tablet TAKE 1 TABLET BY MOUTH EVERY MORNING FOR BLOOD PRESSURE    CALCIUM CARBONATE/VITAMIN D3 (CALTRATE 600 + D PO) Take 1 Tab by mouth daily.  MULTIVITAMIN W-MINERALS/LUTEIN (CENTRUM SILVER PO) Take 1 Tab by mouth daily (after lunch). No current facility-administered medications for this visit. Vitals:    12/19/17 1503   BP: 140/84   Pulse: 71   Resp: 18   SpO2: 97%   Weight: 231 lb 14.4 oz (105.2 kg)   Height: 5' 5\" (1.651 m)       I have reviewed the nurses notes, vitals, problem list, allergy list, medical history, family, social history and medications. Review of Symptoms:    General: Pt denies excessive weight gain or loss. Pt is able to conduct ADL's  HEENT: Denies blurred vision, headaches, epistaxis and difficulty swallowing.   Respiratory: Denies shortness of breath, ANGUIANO, wheezing or stridor. Cardiovascular: Denies precordial pain, palpitations, edema or PND  Gastrointestinal: Denies poor appetite, indigestion, abdominal pain or blood in stool  Urinary: Denies dysuria, pyuria  Musculoskeletal: Denies pain or swelling from muscles or joints  Neurologic: Denies tremor, paresthesias, or sensory motor disturbance  Skin: Denies rash, itching or texture change. Psych: Denies depression      Physical Exam:      General: Well developed, in no acute distress. HEENT: Eyes - PERRL, no jvd  Heart:  Normal S1/S2 negative S3 or S4. Regular, no murmur, gallop or rub.   Respiratory: Clear bilaterally x 4, no wheezing or rales  Abdomen:   Soft, non-tender, bowel sounds are active.   Extremities:  No edema, normal cap refill, no cyanosis. Musculoskeletal: No clubbing  Neuro: A&Ox3, speech clear, gait stable. Skin: Skin color is normal. No rashes or lesions.  Non diaphoretic  Vascular: 2+ pulses symmetric in all extremities    Cardiographics    Ekg: nsr    Results for orders placed or performed in visit on 10/24/17   CARDIAC HOLTER MONITOR, 24 HOURS    Narrative    ECG Monitor/24 hours, Complete    Reason for Holter Monitor   PAF    Heartbeat    Slowest 51  Average 67  Fastest  102      Results:   Underlying Rhythm: Normal sinus rhythm      Atrial Arrhythmias: premature atrial contractions; occasional, atrial couplets and atrial triplets            AV Conduction: normal    Ventricular Arrhythmias: premature ventricular contractions; occasional     ST Segment Analysis:normal     Symptom Correlation:  none    Comment:   Sinus rhythm throughout with occasional atrial ectopy     Rob Barrera MD, [facility]      Results for orders placed or performed during the hospital encounter of 08/17/17   EKG, 12 LEAD, INITIAL   Result Value Ref Range    Ventricular Rate 87 BPM    Atrial Rate 87 BPM    P-R Interval 158 ms    QRS Duration 82 ms    Q-T Interval 400 ms    QTC Calculation (Bezet) 481 ms Calculated P Axis 36 degrees    Calculated R Axis -15 degrees    Calculated T Axis 35 degrees    Diagnosis       Normal sinus rhythm  Cannot rule out Inferior infarct , age undetermined  When compared with ECG of 18-MAR-2017 02:53,  No significant change was found  Confirmed by Ambreen Jaramillo ([id number]) on 8/17/2017 8:29:00 PM           Lab Results   Component Value Date/Time    WBC 5.5 09/29/2017 09:11 AM    HGB 12.3 09/29/2017 09:11 AM    HCT 36.1 09/29/2017 09:11 AM    PLATELET 509 38/90/7567 09:11 AM    MCV 91 09/29/2017 09:11 AM      Lab Results   Component Value Date/Time    Sodium 143 09/29/2017 09:11 AM    Potassium 4.2 09/29/2017 09:11 AM    Chloride 103 09/29/2017 09:11 AM    CO2 26 09/29/2017 09:11 AM    Anion gap 5 08/17/2017 10:11 AM    Glucose 100 09/29/2017 09:11 AM    BUN 17 09/29/2017 09:11 AM    Creatinine 1.07 09/29/2017 09:11 AM    BUN/Creatinine ratio 16 09/29/2017 09:11 AM    GFR est AA 59 09/29/2017 09:11 AM    GFR est non-AA 51 09/29/2017 09:11 AM    Calcium 9.8 09/29/2017 09:11 AM    Bilirubin, total 0.3 09/29/2017 09:11 AM    AST (SGOT) 16 09/29/2017 09:11 AM    Alk. phosphatase 66 09/29/2017 09:11 AM    Protein, total 7.2 09/29/2017 09:11 AM    Albumin 4.4 09/29/2017 09:11 AM    Globulin 3.9 08/17/2017 10:11 AM    A-G Ratio 1.6 09/29/2017 09:11 AM    ALT (SGPT) 16 09/29/2017 09:11 AM         Assessment:     Assessment:        ICD-10-CM ICD-9-CM    1. Essential hypertension I10 401.9 AMB POC EKG ROUTINE W/ 12 LEADS, INTER & REP   2. Atrial fibrillation, unspecified type ([facility] I48.91 427.31    3. Mixed hyperlipidemia E78.2 272.2    4. Morbid obesity ([facility] E66.01 278.01    5. S/P ablation of atrial fibrillation Z98.890 V45.89     Z86.79       Orders Placed This Encounter    AMB POC EKG ROUTINE W/ 12 LEADS, INTER & REP     Order Specific Question:   Reason for Exam:     Answer:   routine        Plan:   Ms. Kalia Zarate is here for follow up of AF. She continues to complain of fatigue.  EKG demonstrates NSR. Recent two week monitor failed to reveal recurrent arrhythmia. Continue sotalol and eliquis and follow up in one year. Continue medical management for obesity, hyperlipidemia, HTN. Thank you for allowing me to participate in Ashley Arriaza 's care.     Anthony Frost, JORDY Barillas MD, [facility]

## 2017-12-19 NOTE — PROGRESS NOTES
1. Have you been to the ER, urgent care clinic since your last visit? Hospitalized since your last visit? No.      2. Have you seen or consulted any other health care providers outside of the [address] [place] [address] since your last visit? Include any pap smears or colon screening.        No.    Chief Complaint   Patient presents with    Other     2 month- pt denies any cardiac symptoms

## 2017-12-19 NOTE — MR AVS SNAPSHOT
Visit Information Date & Time Provider Department Dept. Phone Encounter #  
 12/19/2017  3:15 PM Nikolas Hudson, 1024 [facility] [phone] Follow-up Instructions Return in about 1 year (around 12/19/2018). Routing History Follow-up and Disposition History Your Appointments 4/11/2018 11:30 AM  
ROUTINE CARE with Rob King MD  
[facility] Appt Note: 6 month follow up  
 [facility] [address] [address]  
[address] [facility] [address] [place] [address] Upcoming Health Maintenance Date Due  
 MEDICARE YEARLY EXAM 4/6/2018 GLAUCOMA SCREENING Q2Y 8/1/2018 COLONOSCOPY 8/21/2023 DTaP/Tdap/Td series (2 - Td) 4/14/2025 Allergies as of 12/19/2017  Review Complete On: 12/19/2017 By: 91 Clement Radford MD  
  
 Severity Noted Reaction Type Reactions Adhesive  07/14/2014    Hives Amoxicillin  10/12/2011    Unknown (comments) Lipitor [Atorvastatin]  07/14/2014   Side Effect Myalgia Gets the flu Current Immunizations  Reviewed on 4/5/2017 Name Date Influenza High Dose Vaccine PF 10/6/2017 Influenza Vaccine 11/18/2014 Influenza Vaccine (Quad) 12/14/2015 12:17 PM  
 Influenza Vaccine (Quad) PF 10/18/2016 Pneumococcal Conjugate (PCV-13) 4/5/2017  2:45 PM  
 Pneumococcal Vaccine (Unspecified Type) 11/19/2013 Tdap 4/14/2015 Not reviewed this visit You Were Diagnosed With   
  
 Codes Comments Essential hypertension    -  Primary ICD-10-CM: I10 
ICD-9-CM: 401.9 Atrial fibrillation, unspecified type ([address]     ICD-10-CM: I48.91 
ICD-9-CM: 427.31 Mixed hyperlipidemia     ICD-10-CM: E78.2 ICD-9-CM: 272.2 Morbid obesity ([address]     ICD-10-CM: E66.01 
ICD-9-CM: 278.01 S/P ablation of atrial fibrillation     ICD-10-CM: Z98.890, Z86.79 
ICD-9-CM: V45.89 Vitals BP Pulse Resp Height(growth percentile) Weight(growth percentile) SpO2  
 140/84 (BP 1 Location: Left arm, BP Patient Position: Sitting) 71 18 5' 5\" (1.651 m) 231 lb 14.4 oz (105.2 kg) 97% BMI OB Status Smoking Status 38.59 kg/m2 Postmenopausal Never Smoker Vitals History BMI and BSA Data Body Mass Index Body Surface Area 38.59 kg/m 2 2.2 m 2 Preferred Pharmacy Pharmacy Name Phone [facility]/PHARMACY #[id number]- [place], [place] - [address] [phone] Your Updated Medication List  
  
   
This list is accurate as of: 12/19/17  3:49 PM.  Always use your most recent med list.  
  
  
  
  
 CALTRATE 600 + D PO Take 1 Tab by mouth daily. CENTRUM SILVER PO Take 1 Tab by mouth daily (after lunch). cloNIDine HCl 0.2 mg tablet Commonly known as:  CATAPRES  
TAKE 1 TAB BY MOUTH TWO (2) TIMES A DAY. ELIQUIS 5 mg tablet Generic drug:  apixaban TAKE 1 TAB BY MOUTH TWO (2) TIMES A DAY. hydroCHLOROthiazide 25 mg tablet Commonly known as:  HYDRODIURIL Take 1 Tab by mouth daily. KLOR-CON 10 10 mEq tablet Generic drug:  potassium chloride SR  
TAKE TWO TABLETS TWICE A DAY  
  
 levothyroxine 75 mcg tablet Commonly known as:  SYNTHROID Take 1 Tab by mouth Daily (before breakfast). losartan 25 mg tablet Commonly known as:  COZAAR Take 1 Tab by mouth daily. rosuvastatin 20 mg tablet Commonly known as:  CRESTOR  
TAKE 1 TABLET BY MOUTH EVERY DAY  
  
 sotalol 80 mg tablet Commonly known as:  [place]ennyfer Jurist Take 0.5 Tabs by mouth two (2) times a day. verapamil  mg tablet Commonly known as:  CALAN-SR  
TAKE 1 TABLET BY MOUTH EVERY MORNING FOR BLOOD PRESSURE We Performed the Following AMB POC EKG ROUTINE W/ 12 LEADS, INTER & REP [22963 CPT(R)] Follow-up Instructions Return in about 1 year (around 12/19/2018). Introducing [facility] & HEALTH SERVICES! Dear Dimitry Ruiz: Thank you for requesting a [employer] account. Our records indicate that you already have an active [employer] account. You can access your account anytime at [email or web address] Did you know that you can access your hospital and ER discharge instructions at any time in [employer]? You can also review all of your test results from your hospital stay or ER visit. Additional Information If you have questions, please visit the Frequently Asked Questions section of the [employer] website at [email or web address] Remember, [employer] is NOT to be used for urgent needs. For medical emergencies, dial 911. Now available from your iPhone and Android! Please provide this summary of care documentation to your next provider. Your primary care clinician is listed as Adriana Willingham. If you have any questions after today's visit, please call [phone].

## 2017-12-22 ENCOUNTER — TELEPHONE (OUTPATIENT)
Dept: INTERNAL MEDICINE CLINIC | Age: 77
End: 2017-12-22

## 2017-12-22 ENCOUNTER — OFFICE VISIT (OUTPATIENT)
Dept: INTERNAL MEDICINE CLINIC | Age: 77
End: 2017-12-22

## 2017-12-22 VITALS
WEIGHT: 230.4 LBS | OXYGEN SATURATION: 97 % | SYSTOLIC BLOOD PRESSURE: 130 MMHG | HEIGHT: 65 IN | DIASTOLIC BLOOD PRESSURE: 77 MMHG | HEART RATE: 74 BPM | RESPIRATION RATE: 18 BRPM | TEMPERATURE: 98.4 F | BODY MASS INDEX: 38.39 KG/M2

## 2017-12-22 DIAGNOSIS — I10 ESSENTIAL HYPERTENSION: Chronic | ICD-10-CM

## 2017-12-22 DIAGNOSIS — J01.10 ACUTE NON-RECURRENT FRONTAL SINUSITIS: Primary | ICD-10-CM

## 2017-12-22 DIAGNOSIS — B37.9 CANDIDA INFECTION: ICD-10-CM

## 2017-12-22 RX ORDER — DOXYCYCLINE 100 MG/1
100 TABLET ORAL 2 TIMES DAILY
Qty: 14 TAB | Refills: 0 | Status: SHIPPED | OUTPATIENT
Start: 2017-12-22 | End: 2017-12-29

## 2017-12-22 RX ORDER — NYSTATIN 100000 U/G
CREAM TOPICAL 2 TIMES DAILY
Qty: 30 G | Refills: 1 | Status: SHIPPED | OUTPATIENT
Start: 2017-12-22 | End: 2019-10-23 | Stop reason: CLARIF

## 2017-12-22 NOTE — PROGRESS NOTES
HISTORY OF PRESENT ILLNESS  Tiara Romero is a 78 y.o. female. HPI   Last here 10/6/17. Pt is here for acute care. Pt c/o sore throat and a frontal HA x 2 days - worsening    Pt denies having wheezing, coughing or ear pain  Pt denies being around ill contact   Pt states that she does not get colds very often (last cold was 10/15)  Pt has been using sugar-free lozenges for her sx   Pt wonders if she can take corcidin - addressed this being OK  Will get a rapid flu test - negative  Ordered hurricane spray  Ordered doxycycline BID for 7 days  Discussed not taking vitamins while on doxycycline   Advised her not to sun-bathe, as doxycyline causes sun-sensitivity     Pt c/o spots under her BL breasts  Pt has been using cerave cream  Discussed her sx being indicative of candida   Ordered nystatin cream    Patient Active Problem List    Diagnosis Date Noted    Irregular heart beat 10/31/2017    Pericardial effusion with cardiac tamponade 03/18/2017    S/P ablation of atrial fibrillation 03/17/2017    Acquired hypothyroidism 12/14/2015    ACP (advance care planning) 12/14/2015    HTN (hypertension) 07/21/2015    Thyroid activity decreased 04/14/2015    Atrial fibrillation ([facility] [address] 03/16/2015    A-fib ([facility] [address] [facility] 03/10/2015    Angina, class III ([facility] [address] 01/27/2015    Morbid obesity ([facility] [address] [facility]     Hyperlipidemia 07/14/2014    Hypokalemia 07/14/2014    Encounter for screening colonoscopy 08/21/2013    History of rectal bleeding 08/21/2013    Postmenopausal bleeding 10/18/2011    Endometrial polyp 10/18/2011     Current Outpatient Prescriptions   Medication Sig Dispense Refill    rosuvastatin (CRESTOR) 20 mg tablet TAKE 1 TABLET BY MOUTH EVERY DAY 90 Tab 1    cloNIDine HCl (CATAPRES) 0.2 mg tablet TAKE 1 TAB BY MOUTH TWO (2) TIMES A DAY. 180 Tab 1    ELIQUIS 5 mg tablet TAKE 1 TAB BY MOUTH TWO (2) TIMES A DAY.  60 Tab 6    KLOR-CON 10 10 mEq tablet TAKE TWO TABLETS TWICE A  Tab 1    losartan (COZAAR) 25 mg tablet Take 1 Tab by mouth daily. 90 Tab 1    sotalol (BETAPACE) 80 mg tablet Take 0.5 Tabs by mouth two (2) times a day. 30 Tab 3    levothyroxine (SYNTHROID) 75 mcg tablet Take 1 Tab by mouth Daily (before breakfast). 90 Tab 3    verapamil ER (CALAN-SR) 120 mg tablet TAKE 1 TABLET BY MOUTH EVERY MORNING FOR BLOOD PRESSURE 90 Tab 2    CALCIUM CARBONATE/VITAMIN D3 (CALTRATE 600 + D PO) Take 1 Tab by mouth daily.  MULTIVITAMIN W-MINERALS/LUTEIN (CENTRUM SILVER PO) Take 1 Tab by mouth daily (after lunch).  hydroCHLOROthiazide (HYDRODIURIL) 25 mg tablet Take 1 Tab by mouth daily. 90 Tab 3     Past Surgical History:   Procedure Laterality Date    BREAST SURGERY PROCEDURE UNLISTED  1982    breast bx rt    CARDIAC SURG PROCEDURE UNLIST  01/2015    cardiac catheterization, no intervention, medical management    HX GYN      myomectomy on uterus    HX GYN  2011    hystereoscopy D&C    HX LYMPH NODE DISSECTION      biopsy    HX OTHER SURGICAL      lymph node bx      Lab Results  Component Value Date/Time   WBC 5.5 09/29/2017 09:11 AM   HGB 12.3 09/29/2017 09:11 AM   HCT 36.1 09/29/2017 09:11 AM   PLATELET 530 43/59/6324 09:11 AM   MCV 91 09/29/2017 09:11 AM     Lab Results  Component Value Date/Time   Cholesterol, total 157 09/29/2017 09:11 AM   HDL Cholesterol 51 09/29/2017 09:11 AM   LDL, calculated 72 09/29/2017 09:11 AM   Triglyceride 168 09/29/2017 09:11 AM     Lab Results  Component Value Date/Time   GFR est non-AA 51 09/29/2017 09:11 AM   GFR est AA 59 09/29/2017 09:11 AM   Creatinine 1.07 09/29/2017 09:11 AM   BUN 17 09/29/2017 09:11 AM   Sodium 143 09/29/2017 09:11 AM   Potassium 4.2 09/29/2017 09:11 AM   Chloride 103 09/29/2017 09:11 AM   CO2 26 09/29/2017 09:11 AM   Magnesium 2.2 08/17/2017 10:11 AM        Review of Systems   HENT: Positive for sore throat. Negative for ear pain. Respiratory: Negative for cough, shortness of breath and wheezing. Cardiovascular: Negative for chest pain. Neurological: Positive for headaches. Physical Exam   Constitutional: She is oriented to person, place, and time. She appears well-developed and well-nourished. No distress. HENT:   Head: Normocephalic and atraumatic. Right Ear: External ear normal.   Left Ear: External ear normal.   Mouth/Throat: Oropharynx is clear and moist. No oropharyngeal exudate. Mild cerumen BL TM, more so on R TM   Eyes: Conjunctivae and EOM are normal. Right eye exhibits no discharge. Left eye exhibits no discharge. Neck: Normal range of motion. Neck supple. Cardiovascular: Normal rate, regular rhythm and normal heart sounds. Exam reveals no gallop and no friction rub. No murmur heard. Pulmonary/Chest: Effort normal and breath sounds normal. No respiratory distress. She has no wheezes. She has no rales. She exhibits no tenderness. Musculoskeletal: Normal range of motion. She exhibits no edema, tenderness or deformity. Lymphadenopathy:     She has no cervical adenopathy. Neurological: She is alert and oriented to person, place, and time. Coordination normal.   Skin: Skin is warm and dry. Rash noted. She is not diaphoretic. There is erythema (Erythematous plaque beneath breasts). No pallor. Psychiatric: She has a normal mood and affect. Her behavior is normal.       ASSESSMENT and PLAN    ICD-10-CM ICD-9-CM    1. Acute non-recurrent frontal sinusitis    Flu negative, will treat with doxycycline BID for 7 days, flonase and zyrtec OTC, provided hurricane spray for symptomatic relief   J01.10 461.1    2. Essential hypertension    Well-controled, no change to meds today   I10 401.9      3. Candida - will treat with nystatin cream    Depression screen reviewed and negative. Scribed by Emely Zuleta of [address] [place] [address] [address], as dictated by Dr. Griselda Simmer. Current diagnosis and concerns discussed with pt at length.  Pt understands risks and benefits or current treatment plan and medications, and accepts the treatment and medication with any possible risks. Pt asks appropriate questions, which were answered. Pt was instructed to call with any concerns or problems. This note will not be viewable in [address]

## 2017-12-22 NOTE — MR AVS SNAPSHOT
Visit Information Date & Time Provider Department Dept. Phone Encounter #  
 12/22/2017  3:00 PM Shima Joyner, [address] [phone] [id number] Follow-up Instructions Return if symptoms worsen or fail to improve. Your Appointments 4/11/2018 11:30 AM  
ROUTINE CARE with Shima Joyner MD  
[facility] [address]) Appt Note: 6 month follow up  
 [facility] [address] [id number]  
[address] [facility] [address] [place] [address] Upcoming Health Maintenance Date Due  
 MEDICARE YEARLY EXAM 4/6/2018 GLAUCOMA SCREENING Q2Y 8/1/2018 COLONOSCOPY 8/21/2023 DTaP/Tdap/Td series (2 - Td) 4/14/2025 Allergies as of 12/22/2017  Review Complete On: 12/19/2017 By: Marvin Puckett MD  
  
 Severity Noted Reaction Type Reactions Adhesive  07/14/2014    Hives Amoxicillin  10/12/2011    Unknown (comments) Lipitor [Atorvastatin]  07/14/2014   Side Effect Myalgia Gets the flu Current Immunizations  Reviewed on 4/5/2017 Name Date Influenza High Dose Vaccine PF 10/6/2017 Influenza Vaccine 11/18/2014 Influenza Vaccine (Quad) 12/14/2015 12:17 PM  
 Influenza Vaccine (Quad) PF 10/18/2016 Pneumococcal Conjugate (PCV-13) 4/5/2017  2:45 PM  
 Pneumococcal Vaccine (Unspecified Type) 11/19/2013 Tdap 4/14/2015 Not reviewed this visit You Were Diagnosed With   
  
 Codes Comments Acute non-recurrent frontal sinusitis    -  Primary ICD-10-CM: J01.10 ICD-9-CM: 978.5 Essential hypertension     ICD-10-CM: I10 
ICD-9-CM: 401.9 Vitals BP Pulse Temp Resp Height(growth percentile) Weight(growth percentile) 130/77 (BP 1 Location: Left arm, BP Patient Position: Sitting) 74 98.4 °F (36.9 °C) (Oral) 18 5' 5\" (1.651 m) 230 lb 6.4 oz (104.5 kg) SpO2 BMI OB Status Smoking Status 97% 38.34 kg/m2 Postmenopausal Never Smoker BMI and BSA Data Body Mass Index Body Surface Area  
 38.34 kg/m 2 2.19 m 2 Preferred Pharmacy Pharmacy Name Phone [id number] [address] [phone] Your Updated Medication List  
  
   
This list is accurate as of: 12/22/17  3:25 PM.  Always use your most recent med list.  
  
  
  
  
 benzocaine 20 % Spra Commonly known as:  HURRICAINE  
2 Sprays by Mucous Membrane route as needed. CALTRATE 600 + D PO Take 1 Tab by mouth daily. CENTRUM SILVER PO Take 1 Tab by mouth daily (after lunch). cloNIDine HCl 0.2 mg tablet Commonly known as:  CATAPRES  
TAKE 1 TAB BY MOUTH TWO (2) TIMES A DAY. doxycycline 100 mg tablet Commonly known as:  ADOXA Take 1 Tab by mouth two (2) times a day for 7 days. ELIQUIS 5 mg tablet Generic drug:  apixaban TAKE 1 TAB BY MOUTH TWO (2) TIMES A DAY. hydroCHLOROthiazide 25 mg tablet Commonly known as:  HYDRODIURIL Take 1 Tab by mouth daily. KLOR-CON 10 10 mEq tablet Generic drug:  potassium chloride SR  
TAKE TWO TABLETS TWICE A DAY  
  
 levothyroxine 75 mcg tablet Commonly known as:  SYNTHROID Take 1 Tab by mouth Daily (before breakfast). losartan 25 mg tablet Commonly known as:  COZAAR Take 1 Tab by mouth daily. rosuvastatin 20 mg tablet Commonly known as:  CRESTOR  
TAKE 1 TABLET BY MOUTH EVERY DAY  
  
 sotalol 80 mg tablet Commonly known as:  Sherl Shine Take 0.5 Tabs by mouth two (2) times a day. verapamil  mg tablet Commonly known as:  CALAN-SR  
TAKE 1 TABLET BY MOUTH EVERY MORNING FOR BLOOD PRESSURE Prescriptions Sent to Pharmacy Refills  
 doxycycline (ADOXA) 100 mg tablet 0 Sig: Take 1 Tab by mouth two (2) times a day for 7 days. Class: Normal  
 Pharmacy: [facility] [id number] [facility] [address] Ph #: [phone] Route: Oral  
 benzocaine (HURRICAINE) 20 % spra 0 Si Sprays by Mucous Membrane route as needed. Class: Normal  
 Pharmacy: [facility] [id number] [facility] [address]  #: [phone] Route: Mucous Membrane Follow-up Instructions Return if symptoms worsen or fail to improve. Introducing [facility]! Dear Yosvany Min: Thank you for requesting a [employer] account. Our records indicate that you already have an active [employer] account. You can access your account anytime at [email or web address] Did you know that you can access your hospital and ER discharge instructions at any time in [employer]? You can also review all of your test results from your hospital stay or ER visit. Additional Information If you have questions, please visit the Frequently Asked Questions section of the [employer]ep[employer] website at [email or web address] Remember, [employer] is NOT to be used for urgent needs. For medical emergencies, dial 911. Now available from your iPhone and Android! Please provide this summary of care documentation to your next provider. Your primary care clinician is listed as Travis Mcclelland. If you have any questions after today's visit, please call [phone].

## 2017-12-22 NOTE — TELEPHONE ENCOUNTER
Patient is having flu like symptoms and needs to be seen. I did not see anything available today. Can you assist? Thanks.

## 2017-12-22 NOTE — TELEPHONE ENCOUNTER
Called, spoke to pt. Two pt identifiers confirmed. Pt offered and accepted appt for 12/22/17 1500. Pt verbalized understanding of information discussed w/ no further questions at this time.

## 2018-01-23 RX ORDER — SOTALOL HYDROCHLORIDE 80 MG/1
TABLET ORAL
Qty: 30 TAB | Refills: 3 | Status: SHIPPED | OUTPATIENT
Start: 2018-01-23 | End: 2018-05-21 | Stop reason: SDUPTHER

## 2018-04-04 ENCOUNTER — HOSPITAL ENCOUNTER (OUTPATIENT)
Dept: LAB | Age: 78
Discharge: HOME | End: 2018-04-04
Payer: MEDICARE

## 2018-04-04 PROCEDURE — 80053 COMPREHEN METABOLIC PANEL: CPT

## 2018-04-04 PROCEDURE — 85027 COMPLETE CBC AUTOMATED: CPT

## 2018-04-04 PROCEDURE — 80061 LIPID PANEL: CPT

## 2018-04-04 PROCEDURE — 36415 COLL VENOUS BLD VENIPUNCTURE: CPT

## 2018-04-04 PROCEDURE — 84443 ASSAY THYROID STIM HORMONE: CPT

## 2018-04-04 PROCEDURE — 83036 HEMOGLOBIN GLYCOSYLATED A1C: CPT

## 2018-04-05 LAB
ALBUMIN SERPL-MCNC: 4.3 G/DL (ref 3.5–4.8)
ALBUMIN/GLOB SERPL: 1.7 {RATIO} (ref 1.2–2.2)
ALP SERPL-CCNC: 62 IU/L (ref 39–117)
ALT SERPL-CCNC: 14 IU/L (ref 0–32)
AST SERPL-CCNC: 13 IU/L (ref 0–40)
BILIRUB SERPL-MCNC: 0.3 MG/DL (ref 0–1.2)
BUN SERPL-MCNC: 16 MG/DL (ref 8–27)
BUN/CREAT SERPL: 16 (ref 12–28)
CALCIUM SERPL-MCNC: 9.5 MG/DL (ref 8.7–10.3)
CHLORIDE SERPL-SCNC: 102 MMOL/L (ref 96–106)
CHOLEST SERPL-MCNC: 137 MG/DL (ref 100–199)
CO2 SERPL-SCNC: 26 MMOL/L (ref 18–29)
CREAT SERPL-MCNC: 1.03 MG/DL (ref 0.57–1)
ERYTHROCYTE [DISTWIDTH] IN BLOOD BY AUTOMATED COUNT: 13.5 % (ref 12.3–15.4)
EST. AVERAGE GLUCOSE BLD GHB EST-MCNC: 114 MG/DL
GFR SERPLBLD CREATININE-BSD FMLA CKD-EPI: 53 ML/MIN/1.73
GFR SERPLBLD CREATININE-BSD FMLA CKD-EPI: 61 ML/MIN/1.73
GLOBULIN SER CALC-MCNC: 2.5 G/DL (ref 1.5–4.5)
GLUCOSE SERPL-MCNC: 104 MG/DL (ref 65–99)
HBA1C MFR BLD: 5.6 % (ref 4.8–5.6)
HCT VFR BLD AUTO: 36.4 % (ref 34–46.6)
HDLC SERPL-MCNC: 43 MG/DL
HGB BLD-MCNC: 11.9 G/DL (ref 11.1–15.9)
LDLC SERPL CALC-MCNC: 68 MG/DL (ref 0–99)
MCH RBC QN AUTO: 30 PG (ref 26.6–33)
MCHC RBC AUTO-ENTMCNC: 32.7 G/DL (ref 31.5–35.7)
MCV RBC AUTO: 92 FL (ref 79–97)
PLATELET # BLD AUTO: 252 X10E3/UL (ref 150–379)
POTASSIUM SERPL-SCNC: 4.1 MMOL/L (ref 3.5–5.2)
PROT SERPL-MCNC: 6.8 G/DL (ref 6–8.5)
RBC # BLD AUTO: 3.97 X10E6/UL (ref 3.77–5.28)
SODIUM SERPL-SCNC: 143 MMOL/L (ref 134–144)
TRIGL SERPL-MCNC: 130 MG/DL (ref 0–149)
TSH SERPL DL<=0.005 MIU/L-ACNC: 1.03 UIU/ML (ref 0.45–4.5)
VLDLC SERPL CALC-MCNC: 26 MG/DL (ref 5–40)
WBC # BLD AUTO: 4.8 X10E3/UL (ref 3.4–10.8)

## 2018-04-05 RX ORDER — HYDROCHLOROTHIAZIDE 25 MG/1
TABLET ORAL
Qty: 90 TAB | Refills: 3 | Status: SHIPPED | OUTPATIENT
Start: 2018-04-05 | End: 2019-04-07 | Stop reason: SDUPTHER

## 2018-04-10 ENCOUNTER — DOCUMENTATION ONLY (OUTPATIENT)
Dept: INTERNAL MEDICINE CLINIC | Age: 78
End: 2018-04-10

## 2018-04-10 NOTE — PROGRESS NOTES
Medicare Part B Preventive Services  Limitations  Recommendation  Scheduled    Bone Mass Measurement  (age 72 & older, biennial)  Requires diagnosis related to osteoporosis or estrogen deficiency. Biennial benefit unless patient has history of long-term glucocorticoid tx or baseline is needed because initial test was by other method  Completed 8/08/16      Recommended every 2 years  Due 8/2018   Cardiovascular Screening Blood Tests (every 5 years)  Total cholesterol, HDL, Triglycerides  Order as a panel if possible  Completed 4/2018      Recommended annually  Due 4/2019   Colorectal Cancer Screening  -Fecal occult blood test (annual)  -Flexible sigmoidoscopy (5y)  -Screening colonoscopy (10y)  -Barium Enema     Completed 8/21/2013 with Dr. Terry Salazar  Recommended every 10 years  Due 8/2023    Counseling to Prevent Tobacco Use (up to 8 sessions per year)  - Counseling greater than 3 and up to 10 minutes  - Counseling greater than 10 minutes  Patients must be asymptomatic of tobacco-related conditions to receive as preventive service  N/A  N/A    Diabetes Screening Tests (at least every 3 years, Medicare covers annually or at 6-month intervals for prediabetic patients)      Fasting blood sugar (FBS) or glucose tolerance test (GTT)  Patient must be diagnosed with one of the following:  -Hypertension, Dyslipidemia, obesity, previous impaired FBS or GTT  Or any two of the following: overweight, FH of diabetes, age ? 72, history of gestational diabetes, birth of baby weighing more than 9 pounds  Completed 4/2018 with A1C 5.6      Recommended annually   Due 4/2019   Diabetes Self-Management Training (DSMT) (no USPSTF recommendation)  Requires referral by treating physician for patient with diabetes or renal disease. 10 hours of initial DSMT session of no less than 30 minutes each in a continuous 12-month period. 2 hours of follow-up DSMT in subsequent years.   N/A  N/A    Glaucoma Screening (no USPSTF recommendation) Diabetes mellitus, family history, , age 48 or over,  American, age 72 or over  Completed 8/2017 with Dr. Robson Franklin      Recommended annually  Due 8/2018   Human Immunodeficiency Virus (HIV) Screening (annually for increased risk patients)  HIV-1 and HIV-2 by EIA, RAY, rapid antibody test, or oral mucosa transudate  Patient must be at increased risk for HIV infection per USPSTF guidelines or pregnant. Tests covered annually for patients at increased risk. Pregnant patients may receive up to 3 test during pregnancy. N/A  N/A    Medical Nutrition Therapy (MNT) (for diabetes or renal disease not recommended schedule)  Requires referral by treating physician for patient with diabetes or renal disease. Can be provided in same year as diabetes self-management training (DSMT), and CMS recommends medical nutrition therapy take place after DSMT. Up to 3 hours for initial year and 2 hours in subsequent years. N/A  N/A    Prostate Cancer Screening (annually up to age 76)  - Digital rectal exam (ANGEL)  - Prostate specific antigen (PSA)  Annually (age 48 or over), ANGEL not paid separately when covered E/M service is provided on same date  N/A  N/A    Seasonal Influenza Vaccination (annually)     Completed 10/2017      Recommended annually  Due Fall 2018   Pneumococcal Vaccination (once after 72)     Pneumovax: 11/2013    Prevnar 13: 4/2017    Both recommended once over the age of 72  Completed       Completed    Hepatitis B Vaccinations (if medium/high risk)  Medium/high risk factors: End-stage renal disease,  Hemophiliacs who received Factor VIII or IX concentrates, Clients of institutions for the mentally retarded, Persons who live in the same house as a HepB virus carrier, Homosexual men, Illicit injectable drug abusers. N/A  N/A    Screening Mammography (biennial age 54-69)?   Annually (age 36 or over)  Completed 8/2017      Recommended annually     Due 8/2018   Screening Pap Tests and Pelvic Examination (up to age 79 and after 79 if unknown history or abnormal study last 10 years)  Every 24 months except high risk  Completed 11/09/15 with Dr. Racheal Singh  Recommended every 2 years  Due now   Ultrasound Screening for Abdominal Aortic Aneurysm (AAA) (once)  Patient must be referred through [facility] and not have had a screening for abdominal aortic aneurysm before under Medicare.  Limited to patients who meet one of the following criteria:  - Men who are 73-68 years old and have smoked more than 100 cigarettes in their lifetime.  -Anyone with a FH of AAA  -Anyone recommended for screening by USPSTF  Completed CTA chest 3/2017 - negative Completed

## 2018-04-11 ENCOUNTER — OFFICE VISIT (OUTPATIENT)
Dept: INTERNAL MEDICINE CLINIC | Age: 78
End: 2018-04-11

## 2018-04-11 VITALS
BODY MASS INDEX: 38.15 KG/M2 | WEIGHT: 229 LBS | HEIGHT: 65 IN | TEMPERATURE: 97.8 F | DIASTOLIC BLOOD PRESSURE: 69 MMHG | RESPIRATION RATE: 16 BRPM | SYSTOLIC BLOOD PRESSURE: 106 MMHG | HEART RATE: 66 BPM | OXYGEN SATURATION: 97 %

## 2018-04-11 DIAGNOSIS — E87.6 HYPOKALEMIA: ICD-10-CM

## 2018-04-11 DIAGNOSIS — I48.0 PAROXYSMAL ATRIAL FIBRILLATION (HCC): ICD-10-CM

## 2018-04-11 DIAGNOSIS — I10 ESSENTIAL HYPERTENSION: Primary | Chronic | ICD-10-CM

## 2018-04-11 DIAGNOSIS — E78.2 MIXED HYPERLIPIDEMIA: ICD-10-CM

## 2018-04-11 DIAGNOSIS — R14.0 BLOATING: ICD-10-CM

## 2018-04-11 DIAGNOSIS — R73.01 IFG (IMPAIRED FASTING GLUCOSE): ICD-10-CM

## 2018-04-11 DIAGNOSIS — E03.9 ACQUIRED HYPOTHYROIDISM: ICD-10-CM

## 2018-04-11 DIAGNOSIS — Z00.00 MEDICARE ANNUAL WELLNESS VISIT, SUBSEQUENT: ICD-10-CM

## 2018-04-11 DIAGNOSIS — R53.82 CHRONIC FATIGUE: ICD-10-CM

## 2018-04-11 RX ORDER — LEVOTHYROXINE SODIUM 75 UG/1
75 TABLET ORAL
Qty: 90 TAB | Refills: 1 | Status: SHIPPED | OUTPATIENT
Start: 2018-04-11 | End: 2018-10-02 | Stop reason: SDUPTHER

## 2018-04-11 RX ORDER — POTASSIUM CHLORIDE 750 MG/1
20 TABLET, FILM COATED, EXTENDED RELEASE ORAL 2 TIMES DAILY
Qty: 360 TAB | Refills: 1 | Status: SHIPPED | OUTPATIENT
Start: 2018-04-11 | End: 2018-10-09 | Stop reason: SDUPTHER

## 2018-04-11 NOTE — MR AVS SNAPSHOT
[address] 
[phone] Patient: Estelle Cervantes MRN: [id number] BIP:[id number]/[id number] Visit Information Date & Time Provider Department Dept. Phone Encounter #  
 4/11/2018 11:30 AM Mason Humphrey, [address] [place] [address],[address] [phone] [id number] Follow-up Instructions Return in about 6 months (around 10/11/2018). Upcoming Health Maintenance Date Due  
 MEDICARE YEARLY EXAM 4/6/2018 GLAUCOMA SCREENING Q2Y 8/1/2018 COLONOSCOPY 8/21/2023 DTaP/Tdap/Td series (2 - Td) 4/14/2025 Allergies as of 4/11/2018  Review Complete On: 4/11/2018 By: Mason Humphrey MD  
  
 Severity Noted Reaction Type Reactions Adhesive  07/14/2014    Hives Amoxicillin  10/12/2011    Unknown (comments) Lipitor [Atorvastatin]  07/14/2014   Side Effect Myalgia Gets the flu Current Immunizations  Reviewed on 4/5/2017 Name Date Influenza High Dose Vaccine PF 10/6/2017 Influenza Vaccine 11/18/2014 Influenza Vaccine (Quad) 12/14/2015 12:17 PM  
 Influenza Vaccine (Quad) PF 10/18/2016 Pneumococcal Conjugate (PCV-13) 4/5/2017  2:45 PM  
 Pneumococcal Vaccine (Unspecified Type) 11/19/2013 Tdap 4/14/2015 Not reviewed this visit You Were Diagnosed With   
  
 Codes Comments Essential hypertension    -  Primary ICD-10-CM: I10 
ICD-9-CM: 401.9 Medicare annual wellness visit, subsequent     ICD-10-CM: Z00.00 ICD-9-CM: V70.0 Hypokalemia     ICD-10-CM: E87.6 ICD-9-CM: 276.8 Paroxysmal atrial fibrillation (HCC)     ICD-10-CM: I48.0 ICD-9-CM: 427.31 Mixed hyperlipidemia     ICD-10-CM: E78.2 ICD-9-CM: 272.2 Acquired hypothyroidism     ICD-10-CM: E03.9 ICD-9-CM: 244.9 IFG (impaired fasting glucose)     ICD-10-CM: R73.01 
ICD-9-CM: 790.21 Chronic fatigue     ICD-10-CM: R53.82 
ICD-9-CM: 780.79 Bloating     ICD-10-CM: R14.0 ICD-9-CM: 787.3 Vitals BP Pulse Temp Resp Height(growth percentile) Weight(growth percentile) 106/69 (BP 1 Location: Left arm, BP Patient Position: Sitting) 66 97.8 °F (36.6 °C) (Oral) 16 5' 5\" (1.651 m) 229 lb (103.9 kg) SpO2 BMI OB Status Smoking Status 97% 38.11 kg/m2 Postmenopausal Never Smoker Vitals History BMI and BSA Data Body Mass Index Body Surface Area  
 38.11 kg/m 2 2.18 m 2 Preferred Pharmacy Pharmacy Name Phone [id number], [place] - [address] [phone] Your Updated Medication List  
  
   
This list is accurate as of 4/11/18 12:07 PM.  Always use your most recent med list.  
  
  
  
  
 benzocaine 20 % Spra Commonly known as:  HURRICAINE  
2 Sprays by Mucous Membrane route as needed. CALTRATE 600 + D PO Take 1 Tab by mouth daily. CENTRUM SILVER PO Take 1 Tab by mouth daily (after lunch). cloNIDine HCl 0.2 mg tablet Commonly known as:  CATAPRES  
TAKE 1 TAB BY MOUTH TWO (2) TIMES A DAY. ELIQUIS 5 mg tablet Generic drug:  apixaban TAKE 1 TAB BY MOUTH TWO (2) TIMES A DAY. hydroCHLOROthiazide 25 mg tablet Commonly known as:  HYDRODIURIL  
TAKE 1 TAB BY MOUTH DAILY. levothyroxine 75 mcg tablet Commonly known as:  SYNTHROID Take 1 Tab by mouth Daily (before breakfast). losartan 25 mg tablet Commonly known as:  COZAAR Take 1 Tab by mouth daily. nystatin topical cream  
Commonly known as:  MYCOSTATIN Apply  to affected area two (2) times a day. potassium chloride SR 10 mEq tablet Commonly known as:  KLOR-CON 10 Take 2 Tabs by mouth two (2) times a day. rosuvastatin 20 mg tablet Commonly known as:  CRESTOR  
TAKE 1 TABLET BY MOUTH EVERY DAY  
  
 sotalol 80 mg tablet Commonly known as:  BETAPACE  
TAKE 1/2 TABLET BY MOUTH TWICE A DAY  
  
 verapamil  mg tablet Commonly known as:  CALAN-SR  
 TAKE 1 TABLET BY MOUTH EVERY MORNING FOR BLOOD PRESSURE Prescriptions Sent to Pharmacy Refills  
 potassium chloride SR (KLOR-CON 10) 10 mEq tablet 1 Sig: Take 2 Tabs by mouth two (2) times a day. Class: Normal  
 Pharmacy: [facility]/pharmacy [address] [place] [address] [address] Ph #: [phone] Route: Oral  
 levothyroxine (SYNTHROID) 75 mcg tablet 1 Sig: Take 1 Tab by mouth Daily (before breakfast). Class: Normal  
 Pharmacy: [facility]/pharmacy [address] [place] [address] [address] Ph #: [phone] Route: Oral  
  
We Performed the Following REFERRAL TO SLEEP STUDIES [REF99 Custom] Follow-up Instructions Return in about 6 months (around 10/11/2018). To-Do List   
 04/17/2018 Lab:  CBC W/O DIFF   
  
 04/17/2018 Lab:  HEMOGLOBIN A1C WITH EAG   
  
 04/17/2018 Lab:  METABOLIC PANEL, COMPREHENSIVE   
  
 04/17/2018 Lab:  TSH 3RD GENERATION Referral Information Referral ID Referred By Referred To  
  
 [id number] Yahir hZao MD   
   [address] [place] [address] [address] [place], [address] [facility] Phone: [phone] Fax: [phone] Visits Status Start Date End Date 1 New Request 4/11/18 4/11/19 If your referral has a status of pending review or denied, additional information will be sent to support the outcome of this decision. Patient Instructions Medicare Wellness Visit, Female The best way to live healthy is to have a healthy lifestyle by eating a well-balanced diet, exercising regularly, limiting alcohol and stopping smoking. Regular physical exams and screening tests are another way to keep healthy. Preventive exams provided by your health care provider can find health problems before they become diseases or illnesses.  Preventive services including immunizations, screening tests, monitoring and exams can help you take care of your own health. All people over age 72 should have a pneumovax  and and a prevnar shot to prevent pneumonia. These are once in a lifetime unless you and your provider decide differently. All people over 65 should have a yearly flu shot and a tetanus vaccine every 10 years. A bone mass density to screen for osteoporosis or thinning of the bones should be done every 2 years after 65. Screening for diabetes mellitus with a blood sugar test should be done every year. Glaucoma is a disease of the eye due to increased ocular pressure that can lead to blindness and it should be done every year by an eye professional. 
 
Cardiovascular screening tests that check for elevated lipids (fatty part of blood) which can lead to heart disease and strokes should be done every 5 years. Colorectal screening that evaluates for blood or polyps in your colon should be done yearly as a stool test or every five years as a flexible sigmoidoscope or every 10 years as a colonoscopy up to age 76. Breast cancer screening with a mammogram is recommended biennially  for women age 54-69. Screening for cervical cancer with a pap smear and pelvic exam is recommended for women after age 72 years every 2 years up to age 79 or when the provider and patient decide to stop. If there is a history of cervical abnormalities or other increased risk for cancer then the test is recommended yearly. Hepatitis C screening is also recommended for anyone born between 80 through [address]. A shingles vaccine is also recommended once in a lifetime after age 61. Your Medicare Wellness Exam is recommended annually. Here is a list of your current Health Maintenance items with a due date: 
Health Maintenance Due Topic Date Due  
 Annual Well Visit  04/06/2018 Medicare Part B Preventive Services  Limitations  Recommendation Scheduled Bone Mass Measurement 
(age 72 & older, biennial)  Requires diagnosis related to osteoporosis or estrogen deficiency. Biennial benefit unless patient has history of long-term glucocorticoid tx or baseline is needed because initial test was by other method  Completed 8/08/16 
   
Recommended every 2 years  Due 8/2018 Cardiovascular Screening Blood Tests (every 5 years) Total cholesterol, HDL, Triglycerides  Order as a panel if possible  Completed 4/2018 
   
Recommended annually  Due 4/2019 Colorectal Cancer Screening 
-Fecal occult blood test (annual) -Flexible sigmoidoscopy (5y) 
-Screening colonoscopy (10y) -Barium Enema     Completed 8/21/2013 with Dr. Radha Pearl 
Recommended every 10 years  Due 8/2023 Counseling to Prevent Tobacco Use (up to 8 sessions per year) - Counseling greater than 3 and up to 10 minutes - Counseling greater than 10 minutes  Patients must be asymptomatic of tobacco-related conditions to receive as preventive service  N/A  N/A Diabetes Screening Tests (at least every 3 years, Medicare covers annually or at 6-month intervals for prediabetic patients) 
   
Fasting blood sugar (FBS) or glucose tolerance test (GTT)  Patient must be diagnosed with one of the following: 
-Hypertension, Dyslipidemia, obesity, previous impaired FBS or GTT 
Or any two of the following: overweight, FH of diabetes, age ? 72, history of gestational diabetes, birth of baby weighing more than 9 pounds  Completed 4/2018 with A1C 5.6 
   
Recommended annually   Due 4/2019 Diabetes Self-Management Training (DSMT) (no USPSTF recommendation)  Requires referral by treating physician for patient with diabetes or renal disease. 10 hours of initial DSMT session of no less than 30 minutes each in a continuous 12-month period. 2 hours of follow-up DSMT in subsequent years. N/A  N/A Glaucoma Screening (no USPSTF recommendation)  Diabetes mellitus, family history, , age 48 or over,  American, age 72 or over  Completed 8/2017 with Dr. Margy Trejo 
   
Recommended annually  Due 8/2018 Human Immunodeficiency Virus (HIV) Screening (annually for increased risk patients) HIV-1 and HIV-2 by EIA, RAY, rapid antibody test, or oral mucosa transudate  Patient must be at increased risk for HIV infection per USPSTF guidelines or pregnant. Tests covered annually for patients at increased risk. Pregnant patients may receive up to 3 test during pregnancy. N/A  N/A Medical Nutrition Therapy (MNT) (for diabetes or renal disease not recommended schedule)  Requires referral by treating physician for patient with diabetes or renal disease. Can be provided in same year as diabetes self-management training (DSMT), and CMS recommends medical nutrition therapy take place after DSMT. Up to 3 hours for initial year and 2 hours in subsequent years. N/A  N/A Prostate Cancer Screening (annually up to age 76) - Digital rectal exam (ANGEL) - Prostate specific antigen (PSA)  Annually (age 48 or over), ANGEL not paid separately when covered E/M service is provided on same date  N/A  N/A Seasonal Influenza Vaccination (annually)     Completed 10/2017 
   
Recommended annually  Due Fall 2018 Pneumococcal Vaccination (once after 65)     Pneumovax: 11/2013 
  
Prevnar 13: 4/2017 
  
Both recommended once over the age of 72  Completed  
  
  
Completed Hepatitis B Vaccinations (if medium/high risk)  Medium/high risk factors: End-stage renal disease, Hemophiliacs who received Factor VIII or IX concentrates, Clients of institutions for the mentally retarded, Persons who live in the same house as a HepB virus carrier, Homosexual men, Illicit injectable drug abusers. N/A  N/A Screening Mammography (biennial age 54-69)? Annually (age 36 or over)  Completed 8/2017 
   
Recommended annually 
   Due 8/2018 Screening Pap Tests and Pelvic Examination (up to age 79 and after 79 if unknown history or abnormal study last 10 years)  Every 24 months except high risk  Completed 8/17 with Dr. Jenna Chavez 
Recommended every 2 years  Due 8/19 Ultrasound Screening for Abdominal Aortic Aneurysm (AAA) (once)  Patient must be referred through IPPE and not have had a screening for abdominal aortic aneurysm before under Medicare. Limited to patients who meet one of the following criteria: 
- Men who are 73-68 years old and have smoked more than 100 cigarettes in their lifetime. 
-Anyone with a FH of AAA 
-Anyone recommended for screening by USPSTF  Completed CTA chest 3/2017 - negative Completed   
  
  
Please bring in a copy of your advanced directive to your next office visit so we can have a copy on file. Trial probiotic--pharmacist 
Trial prilosec--over the counter 20mg for 2 weeks Introducing [facility]! Dear Aysha Casarez: Thank you for requesting a [employer] account. Our records indicate that you already have an active [employer] account. You can access your account anytime at [email or web address]. [employer] Did you know that you can access your hospital and ER discharge instructions at any time in [employer]? You can also review all of your test results from your hospital stay or ER visit. Additional Information If you have questions, please visit the Frequently Asked Questions section of the [employer] website at [email or web address]. [employer]/. Remember, [employer] is NOT to be used for urgent needs. For medical emergencies, dial 911. Now available from your iPhone and Android! Please provide this summary of care documentation to your next provider. Your primary care clinician is listed as Chaitanya Latham. If you have any questions after today's visit, please call [phone].

## 2018-04-11 NOTE — PROGRESS NOTES
HISTORY OF PRESENT ILLNESS  Raoul Guadalupe is a 70 y.o. female. HPI   Last here 12/22/17. Pt is here to f/u on chronic conditions.      BP today is 106/69  BPs are 130s/70s, upper 180s/upper 90s (rarely) at home  Of note, her home cuff read higher than our cuff in the past  Continues on clonidine 0.2mg BID, HCTZ 25mg, verapamil 120mg and losartan 25mg daily   Recall she had a cough on lisinopril      Wt is stable since last visit  Discussed diet and weight loss      Reviewed last labs 4/18     Reviewed depression screen 04/11/18: +1  Pt denies feeling depressed  Pt states that she gets tired easily    Pt was evaluated for sleep apnea in 2012  Pt tested negative for sleep apnea   Pt did not note an improvement to sx on trial of CPAP  Pt was told that she grinds her teeth and had \"mini seizures\"  Pt uses a mouth guard qhs  Pt was recommended klonopin for her sx  Provided referral for a sleep eval    Pt c/o feeling bloated  Advised her to try prilosec OTC 20mg daily for 2 weeks and a daily probiotic  Advised her to try core abd exercises   If her sx progress, would consider EGD/COLO    Pt c/o recurrent cerumen build-up in R ear  Discussed possibly seeing an ENT for recurrent cerumen build-up      Pt had a cardiac ablation for her afib per Dr. Valeria Red (cardio) on 3/17/17   Pt will only f/u with this physician prn      Pt saw Dr. Hayden Vigil (cardio) for f/u after a fib  Reviewed last notes 12/19/17: Ms. Kenney Gil is here for follow up of AF. She continues to complain of fatigue. EKG demonstrates NSR. Recent two week monitor failed to reveal recurrent arrhythmia. Continue sotalol and eliquis and follow up in one year. Continue medical management for obesity, hyperlipidemia, HTN.     Pt takes eliquis and sotalol for afib    Pt denies any bleeding/falls on these meds     Pt saw Dr. Melodi Landau (cardio) for foot circulation  Last visit was 8/1/17   Pt will only f/u with this physician prn     Pt saw Dr. Narda Smalls (uro-gyn) for urinary sx  Reviewed notes 11/17: did pelvic exam, BP up, had recurrent UTIs and overactive bladder, did cystoscopy  Pt was told she had lichen sclerosus   Pt states that she used to have lichen sclerosus in her mouth - resolved     Pt has an ingrown toenail   Pt has not scheduled an appt with Dr. Silvino Harrison (podiatrist)   Advised her to schedule an appt      Continues crestor 20mg daily for cholesterol, lipids at goal      Continues klor-con 20meqs BID for her potassium    Continues on synthroid 75mcg daily     Pt is independent (pt can drive/feed/bathe etc. herself)    Pt lives alone  However, her sister lives nearby    Pt denies having memory loss  Pt has a FMHx dementia (maternal siblings)  Advised her to keep her mind active       ACP not on file. SDM is her sister, Agnes Hernández.   Provided information in the past.    Recall She had a cervical polyp, was having vaginal bleeding, this was removed by galgano, vaginal bleeding has since resolved.        Recall She reports being given a cpap but states she does not have gama was told she just has jerking legs at night.         PREVENTIVE:    Colonoscopy: 8/21/13, Dr. Steven Cade, repeat 10 years   Pap: Usually Dr. Debbie Narvaez, 8/17, Dr. Prieto Hutchinson, 11/17  Mammogram: 8/17, [address] [place] [address], will get report for review, due 8/18  DEXA: 8/08/16, nl, due 8/18  Tdap: 4/14/2015  Pneumovax: 11/19/2013  [id number]: 4/05/2017  Zostavax: h/o shingles, declines  Flu shot: 10/06/17  A1C: 11/14 6.1, 4/15 6.1, 7/15 6.1, 12/15 5.9, 4/16 5.8, 8/16 5.9, 10/16 5.8, 4/17 5.9, 10/17 POC 5.6, 4/18 5.6  Eye exam: Dr. Dali Ponce, 8/17, due 8/18  Lipids: 4/18 LDL 68    Patient Active Problem List    Diagnosis Date Noted    Irregular heart beat 10/31/2017    Pericardial effusion with cardiac tamponade 03/18/2017    S/P ablation of atrial fibrillation 03/17/2017    Acquired hypothyroidism 12/14/2015    ACP (advance care planning) 12/14/2015    HTN (hypertension) 07/21/2015    Thyroid activity decreased 04/14/2015    Atrial fibrillation ([facility] 03/16/2015    A-fib ([facility] 03/10/2015    Angina, class III ([facility] 01/27/2015    Morbid obesity ([facility]     Hyperlipidemia 07/14/2014    Hypokalemia 07/14/2014    Encounter for screening colonoscopy 08/21/2013    History of rectal bleeding 08/21/2013    Postmenopausal bleeding 10/18/2011    Endometrial polyp 10/18/2011     Current Outpatient Prescriptions   Medication Sig Dispense Refill    hydroCHLOROthiazide (HYDRODIURIL) 25 mg tablet TAKE 1 TAB BY MOUTH DAILY. 90 Tab 3    sotalol (BETAPACE) 80 mg tablet TAKE 1/2 TABLET BY MOUTH TWICE A DAY 30 Tab 3    rosuvastatin (CRESTOR) 20 mg tablet TAKE 1 TABLET BY MOUTH EVERY DAY 90 Tab 1    cloNIDine HCl (CATAPRES) 0.2 mg tablet TAKE 1 TAB BY MOUTH TWO (2) TIMES A DAY. 180 Tab 1    ELIQUIS 5 mg tablet TAKE 1 TAB BY MOUTH TWO (2) TIMES A DAY. 60 Tab 6    KLOR-CON 10 10 mEq tablet TAKE TWO TABLETS TWICE A  Tab 1    losartan (COZAAR) 25 mg tablet Take 1 Tab by mouth daily. 90 Tab 1    levothyroxine (SYNTHROID) 75 mcg tablet Take 1 Tab by mouth Daily (before breakfast). 90 Tab 3    verapamil ER (CALAN-SR) 120 mg tablet TAKE 1 TABLET BY MOUTH EVERY MORNING FOR BLOOD PRESSURE 90 Tab 2    CALCIUM CARBONATE/VITAMIN D3 (CALTRATE 600 + D PO) Take 1 Tab by mouth daily.  MULTIVITAMIN W-MINERALS/LUTEIN (CENTRUM SILVER PO) Take 1 Tab by mouth daily (after lunch).  benzocaine (HURRICAINE) 20 % spra 2 Sprays by Mucous Membrane route as needed. 1 Can 0    nystatin (MYCOSTATIN) topical cream Apply  to affected area two (2) times a day.  30 g 1     Past Surgical History:   Procedure Laterality Date    BREAST SURGERY PROCEDURE UNLISTED  1982    breast bx rt    CARDIAC SURG PROCEDURE UNLIST  01/2015    cardiac catheterization, no intervention, medical management    HX GYN      myomectomy on uterus    HX GYN  2011    hystereoscopy D&C    HX LYMPH NODE DISSECTION      biopsy    HX OTHER SURGICAL      lymph node bx      Lab Results  Component Value Date/Time   WBC 4.8 04/04/2018 09:04 AM   HGB 11.9 04/04/2018 09:04 AM   HCT 36.4 04/04/2018 09:04 AM   PLATELET 374 93/87/9134 09:04 AM   MCV 92 04/04/2018 09:04 AM     Lab Results  Component Value Date/Time   Cholesterol, total 137 04/04/2018 09:04 AM   HDL Cholesterol 43 04/04/2018 09:04 AM   LDL, calculated 68 04/04/2018 09:04 AM   Triglyceride 130 04/04/2018 09:04 AM     Lab Results  Component Value Date/Time   GFR est non-AA 53 (L) 04/04/2018 09:04 AM   GFR est AA 61 04/04/2018 09:04 AM   Creatinine 1.03 (H) 04/04/2018 09:04 AM   BUN 16 04/04/2018 09:04 AM   Sodium 143 04/04/2018 09:04 AM   Potassium 4.1 04/04/2018 09:04 AM   Chloride 102 04/04/2018 09:04 AM   CO2 26 04/04/2018 09:04 AM   Magnesium 2.2 08/17/2017 10:11 AM        Review of Systems   Constitutional: Positive for malaise/fatigue. Respiratory: Negative for shortness of breath. Cardiovascular: Negative for chest pain. Musculoskeletal: Negative for falls. Psychiatric/Behavioral: Negative for depression. Physical Exam   Constitutional: She is oriented to person, place, and time. She appears well-developed and well-nourished. No distress. HENT:   Head: Normocephalic and atraumatic. Right Ear: External ear normal.   Left Ear: External ear normal.   Mouth/Throat: Oropharynx is clear and moist. No oropharyngeal exudate. Mild cerumen to R TM   Eyes: Conjunctivae and EOM are normal. Right eye exhibits no discharge. Left eye exhibits no discharge. No scleral icterus. Neck: Normal range of motion. Neck supple. No carotid bruits    Cardiovascular: Normal rate and regular rhythm. Exam reveals no gallop and no friction rub. Murmur (1/6) heard. Pulmonary/Chest: Effort normal and breath sounds normal. No respiratory distress. She has no wheezes. She has no rales. She exhibits no tenderness. Musculoskeletal: Normal range of motion. She exhibits no edema, tenderness or deformity. Lymphadenopathy:     She has no cervical adenopathy. Neurological: She is alert and oriented to person, place, and time. Coordination normal.   Skin: Skin is warm and dry. No rash noted. She is not diaphoretic. No erythema. No pallor. Psychiatric: She has a normal mood and affect. Her behavior is normal.       ASSESSMENT and PLAN    ICD-10-CM ICD-9-CM    1. Essential hypertension    Controled on clonidine, HCTZ and verapamil   E34 319.5 METABOLIC PANEL, COMPREHENSIVE      CBC W/O DIFF      TSH 3RD GENERATION      HEMOGLOBIN A1C WITH EAG   2. Medicare annual wellness visit, subsequent I54.19 Y15.1 METABOLIC PANEL, COMPREHENSIVE      CBC W/O DIFF      TSH 3RD GENERATION      HEMOGLOBIN A1C WITH EAG   3. Hypokalemia    On klor-con 20meqs BID, at goal on recent labs   B59.4 089.0 METABOLIC PANEL, COMPREHENSIVE      CBC W/O DIFF      TSH 3RD GENERATION      HEMOGLOBIN A1C WITH EAG   4. Paroxysmal atrial fibrillation (HCC)    Controled on sotaolol, anticoagulated on eliquis   X81.5 199.59 METABOLIC PANEL, COMPREHENSIVE      CBC W/O DIFF      TSH 3RD GENERATION      HEMOGLOBIN A1C WITH EAG   5. Mixed hyperlipidemia    At goal on crestor   I19.1 011.4 METABOLIC PANEL, COMPREHENSIVE      CBC W/O DIFF      TSH 3RD GENERATION      HEMOGLOBIN A1C WITH EAG   6. Acquired hypothyroidism    At goal on synthroid 75, refilled   I78.5 747.9 METABOLIC PANEL, COMPREHENSIVE      CBC W/O DIFF      TSH 3RD GENERATION      HEMOGLOBIN A1C WITH EAG   7. IFG (impaired fasting glucose)    a1c nl last check, continue to work on w/l and diet   Z24.19 371.84 METABOLIC PANEL, COMPREHENSIVE      CBC W/O DIFF      TSH 3RD GENERATION      HEMOGLOBIN A1C WITH EAG   8. Chronic fatigue    Sleep study   R53.82 780.79 REFERRAL TO SLEEP STUDIES   9. Bloating    Sx are mild, discussed protonix daily for 2 weeks and probiotic, if sx progress would consider EGD/COLO   R14.0 787.3         Depression screen reviewed and positive (1).  Pt denies feeling depressed. Scribed by Jay Messer of [address] [place] [address] [address], as dictated by Dr. Wayne Archuleta. Current diagnosis and concerns discussed with pt at length. Pt understands risks and benefits or current treatment plan and medications, and accepts the treatment and medication with any possible risks. Pt asks appropriate questions, which were answered. Pt was instructed to call with any concerns or problems. This note will not be viewable in [address]

## 2018-04-11 NOTE — PATIENT INSTRUCTIONS
Medicare Wellness Visit, Female    The best way to live healthy is to have a healthy lifestyle by eating a well-balanced diet, exercising regularly, limiting alcohol and stopping smoking. Regular physical exams and screening tests are another way to keep healthy. Preventive exams provided by your health care provider can find health problems before they become diseases or illnesses. Preventive services including immunizations, screening tests, monitoring and exams can help you take care of your own health. All people over age 72 should have a pneumovax  and and a prevnar shot to prevent pneumonia. These are once in a lifetime unless you and your provider decide differently. All people over 65 should have a yearly flu shot and a tetanus vaccine every 10 years. A bone mass density to screen for osteoporosis or thinning of the bones should be done every 2 years after 65. Screening for diabetes mellitus with a blood sugar test should be done every year. Glaucoma is a disease of the eye due to increased ocular pressure that can lead to blindness and it should be done every year by an eye professional.    Cardiovascular screening tests that check for elevated lipids (fatty part of blood) which can lead to heart disease and strokes should be done every 5 years. Colorectal screening that evaluates for blood or polyps in your colon should be done yearly as a stool test or every five years as a flexible sigmoidoscope or every 10 years as a colonoscopy up to age 76. Breast cancer screening with a mammogram is recommended biennially  for women age 54-69. Screening for cervical cancer with a pap smear and pelvic exam is recommended for women after age 72 years every 2 years up to age 79 or when the provider and patient decide to stop. If there is a history of cervical abnormalities or other increased risk for cancer then the test is recommended yearly.     Hepatitis C screening is also recommended for anyone born between [birth year] through [facility]. A shingles vaccine is also recommended once in a lifetime after age 61. Your Medicare Wellness Exam is recommended annually. Here is a list of your current Health Maintenance items with a due date:  Health Maintenance Due   Topic Date Due    Annual Well Visit  04/06/2018     Medicare Part B Preventive Services  Limitations  Recommendation  Scheduled    Bone Mass Measurement  (age 72 & older, biennial)  Requires diagnosis related to osteoporosis or estrogen deficiency. Biennial benefit unless patient has history of long-term glucocorticoid tx or baseline is needed because initial test was by other method  Completed 8/08/16      Recommended every 2 years  Due 8/2018   Cardiovascular Screening Blood Tests (every 5 years)  Total cholesterol, HDL, Triglycerides  Order as a panel if possible  Completed 4/2018      Recommended annually  Due 4/2019   Colorectal Cancer Screening  -Fecal occult blood test (annual)  -Flexible sigmoidoscopy (5y)  -Screening colonoscopy (10y)  -Barium Enema     Completed 8/21/2013 with Dr. Nely Franklin  Recommended every 10 years  Due 8/2023    Counseling to Prevent Tobacco Use (up to 8 sessions per year)  - Counseling greater than 3 and up to 10 minutes  - Counseling greater than 10 minutes  Patients must be asymptomatic of tobacco-related conditions to receive as preventive service  N/A  N/A    Diabetes Screening Tests (at least every 3 years, Medicare covers annually or at 6-month intervals for prediabetic patients)      Fasting blood sugar (FBS) or glucose tolerance test (GTT)  Patient must be diagnosed with one of the following:  -Hypertension, Dyslipidemia, obesity, previous impaired FBS or GTT  Or any two of the following: overweight, FH of diabetes, age ? 72, history of gestational diabetes, birth of baby weighing more than 9 pounds  Completed 4/2018 with A1C 5.6      Recommended annually   Due 4/2019   Diabetes Self-Management Training (DSMT) (no USPSTF recommendation)  Requires referral by treating physician for patient with diabetes or renal disease. 10 hours of initial DSMT session of no less than 30 minutes each in a continuous 12-month period. 2 hours of follow-up DSMT in subsequent years. N/A  N/A    Glaucoma Screening (no USPSTF recommendation)  Diabetes mellitus, family history, , age 48 or over,  American, age 72 or over  Completed 8/2017 with Dr. Pura Oakley      Recommended annually  Due 8/2018   Human Immunodeficiency Virus (HIV) Screening (annually for increased risk patients)  HIV-1 and HIV-2 by EIA, RAY, rapid antibody test, or oral mucosa transudate  Patient must be at increased risk for HIV infection per USPSTF guidelines or pregnant. Tests covered annually for patients at increased risk. Pregnant patients may receive up to 3 test during pregnancy. N/A  N/A    Medical Nutrition Therapy (MNT) (for diabetes or renal disease not recommended schedule)  Requires referral by treating physician for patient with diabetes or renal disease. Can be provided in same year as diabetes self-management training (DSMT), and CMS recommends medical nutrition therapy take place after DSMT. Up to 3 hours for initial year and 2 hours in subsequent years.   N/A  N/A    Prostate Cancer Screening (annually up to age 76)  - Digital rectal exam (ANGEL)  - Prostate specific antigen (PSA)  Annually (age 48 or over), ANGEL not paid separately when covered E/M service is provided on same date  N/A  N/A    Seasonal Influenza Vaccination (annually)     Completed 10/2017      Recommended annually  Due Fall 2018   Pneumococcal Vaccination (once after 65)     Pneumovax: 11/2013     Prevnar 13: 4/2017     Both recommended once over the age of 72  Completed         Completed    Hepatitis B Vaccinations (if medium/high risk)  Medium/high risk factors: End-stage renal disease,  Hemophiliacs who received Factor VIII or IX concentrates, Clients of institutions for the mentally retarded, Persons who live in the same house as a HepB virus carrier, Homosexual men, Illicit injectable drug abusers. N/A  N/A    Screening Mammography (biennial age 54-69)? Annually (age 36 or over)  Completed 8/2017      Recommended annually     Due 8/2018   Screening Pap Tests and Pelvic Examination (up to age 79 and after 79 if unknown history or abnormal study last 10 years)  Every 25 months except high risk  Completed 8/17 with Dr. Berna Hernandez  Recommended every 2 years  Due 8/19   Ultrasound Screening for Abdominal Aortic Aneurysm (AAA) (once)  Patient must be referred through [facility] and not have had a screening for abdominal aortic aneurysm before under Medicare. Limited to patients who meet one of the following criteria:  - Men who are 73-68 years old and have smoked more than 100 cigarettes in their lifetime.  -Anyone with a FH of AAA  -Anyone recommended for screening by USPSTF  Completed CTA chest 3/2017 - negative Completed          Please bring in a copy of your advanced directive to your next office visit so we can have a copy on file.     Trial probiotic--pharmacist  Trial prilosec--over the counter 20mg for 2 weeks

## 2018-04-11 NOTE — PROGRESS NOTES
This is the Subsequent Medicare Annual Wellness Exam, performed 12 months or more after the Initial AWV or the last Subsequent AWV    I have reviewed the patient's medical history in detail and updated the computerized patient record. History     Past Medical History:   Diagnosis Date    Arthritis     right knee, left shoulder    Diverticulosis     Frequency of urination     High cholesterol     Hypertension     Morbid obesity ([address]     Thyroid disease     hypothyroid    Unspecified adverse effect of anesthesia     low BP      Past Surgical History:   Procedure Laterality Date    BREAST SURGERY PROCEDURE UNLISTED  1982    breast bx rt    CARDIAC SURG PROCEDURE UNLIST  01/2015    cardiac catheterization, no intervention, medical management    HX GYN      myomectomy on uterus    HX GYN  2011    hystereoscopy D&C    HX LYMPH NODE DISSECTION      biopsy    HX OTHER SURGICAL      lymph node bx     Current Outpatient Prescriptions   Medication Sig Dispense Refill    hydroCHLOROthiazide (HYDRODIURIL) 25 mg tablet TAKE 1 TAB BY MOUTH DAILY. 90 Tab 3    sotalol (BETAPACE) 80 mg tablet TAKE 1/2 TABLET BY MOUTH TWICE A DAY 30 Tab 3    rosuvastatin (CRESTOR) 20 mg tablet TAKE 1 TABLET BY MOUTH EVERY DAY 90 Tab 1    cloNIDine HCl (CATAPRES) 0.2 mg tablet TAKE 1 TAB BY MOUTH TWO (2) TIMES A DAY. 180 Tab 1    ELIQUIS 5 mg tablet TAKE 1 TAB BY MOUTH TWO (2) TIMES A DAY. 60 Tab 6    KLOR-CON 10 10 mEq tablet TAKE TWO TABLETS TWICE A  Tab 1    losartan (COZAAR) 25 mg tablet Take 1 Tab by mouth daily. 90 Tab 1    levothyroxine (SYNTHROID) 75 mcg tablet Take 1 Tab by mouth Daily (before breakfast). 90 Tab 3    verapamil ER (CALAN-SR) 120 mg tablet TAKE 1 TABLET BY MOUTH EVERY MORNING FOR BLOOD PRESSURE 90 Tab 2    CALCIUM CARBONATE/VITAMIN D3 (CALTRATE 600 + D PO) Take 1 Tab by mouth daily.  MULTIVITAMIN W-MINERALS/LUTEIN (CENTRUM SILVER PO) Take 1 Tab by mouth daily (after lunch).       benzocaine (HURRICAINE) 20 % spra 2 Sprays by Mucous Membrane route as needed. 1 Can 0    nystatin (MYCOSTATIN) topical cream Apply  to affected area two (2) times a day. 30 g 1     Allergies   Allergen Reactions    Adhesive Hives    Amoxicillin Unknown (comments)    Lipitor [Atorvastatin] Myalgia     Gets the flu     Family History   Problem Relation Age of Onset    Alzheimer Mother     Heart Disease Mother     Heart Disease Father     Hypertension Father     Cancer Paternal Aunt     Diabetes Paternal Grandmother      Social History   Substance Use Topics    Smoking status: Never Smoker    Smokeless tobacco: Never Used    Alcohol use No     Patient Active Problem List   Diagnosis Code    Postmenopausal bleeding N95.0    Endometrial polyp N84.0    Encounter for screening colonoscopy Z12.11    History of rectal bleeding Z87.19    Hyperlipidemia E78.5    Hypokalemia E87.6    Morbid obesity ([address] E66.01    Angina, class III ([address] I20.9    A-fib ([facility] I48.91    Atrial fibrillation (HCC) I48.91    Thyroid activity decreased E03.9    HTN (hypertension) I10    Acquired hypothyroidism E03.9    ACP (advance care planning) Z71.89    S/P ablation of atrial fibrillation Z98.890, Z86.79    Pericardial effusion with cardiac tamponade I31.3, I31.4    Irregular heart beat I49.9       Depression Risk Factor Screening:     PHQ over the last two weeks 4/11/2018   Little interest or pleasure in doing things Several days   Feeling down, depressed or hopeless Not at all   Total Score PHQ 2 1   not depressed just gets tired at times, not depressed not interested in treatement  Alcohol Risk Factor Screening: You do not drink alcohol or very rarely. Functional Ability and Level of Safety:   Hearing Loss  Hearing is good. Activities of Daily Living  The home contains: no safety equipment. Patient does total self care    Fall Risk  Fall Risk Assessment, last 12 mths 4/11/2018   Able to walk?  Yes   Fall in past 12 months? No   Fall with injury? -   Number of falls in past 12 months -   Fall Risk Score -       Abuse Screen  Patient is not abused  Lives alone    Has neighbors  Cognitive Screening   Evaluation of Cognitive Function:  Has your family/caregiver stated any concerns about your memory: no  Normal  Of note her mother has dementia  Patient Care Team   Patient Care Team:  Hansa Dillon MD as PCP - General (Internal Medicine)  Joe Yo MD (Colon and Rectal Surgery)  Pineda Mathur MD (Ophthalmology)  Ronny Gonzalez MD (Obstetrics & Gynecology)  Renate Osler, RN as Nurse Adán Darden MD (Cardiology)  Ange English MD (Gynecology)  Rosario Rizzo LPN as Ambulatory Care Navigator  Israel Shipman MD (Cardiology)  Sejal Ko MD as Physician (Cardiology)  Snow Worthington MD (Cardiology)  Brendan Cisneros DPM (Podiatry)     updated    Assessment/Plan   Education and counseling provided:  Are appropriate based on today's review and evaluation  End-of-Life planning (with patient's consent)  Screening Mammography  Bone mass measurement (DEXA)  Screening for glaucoma    Diagnoses and all orders for this visit:    1. Medicare annual wellness visit, subsequent    Other orders  -     potassium chloride SR (KLOR-CON 10) 10 mEq tablet; Take 2 Tabs by mouth two (2) times a day. Health Maintenance Due   Topic Date Due    MEDICARE YEARLY EXAM  04/06/2018       Discussed with patient about advance medical directive. Provided patient blank AMD and Your Right to Decide Booklet. Requested that if completed to provide a copy of AMD to office. ACP not on file.  SDM is her sister, Marty Monahan.            Colonoscopy: 8/21/13, Dr. Shruthi Valle, repeat 10 years   Pap:  Dr. Steve Fregoso, 11/17 every other year  Mammogram: 8/17, [facility], will get report for review, due 8/18  DEXA: 8/08/16, nl, due 8/18    Tdap: 4/14/2015  Pneumovax: 11/19/2013  [id number]: 4/05/2017  Zostavax: h/o shingles, declines  Flu shot: 10/06/17    Lipids: 4/18 LDL 68 annual   A1C:  4/18 5.6 q 6months    Eye exam: Dr. Rosy Adkins, 8/17, due 8/18      Medication reconciliation completed by MA and reviewed by me. Medical/surgical/social/family history reviewed and updated by me. Patient provided AVS and preventative screening table. Patient verbalized understanding of all information discussed.

## 2018-04-20 RX ORDER — CLONIDINE HYDROCHLORIDE 0.2 MG/1
TABLET ORAL
Qty: 180 TAB | Refills: 1 | Status: SHIPPED | OUTPATIENT
Start: 2018-04-20 | End: 2018-10-15 | Stop reason: SDUPTHER

## 2018-04-20 RX ORDER — LOSARTAN POTASSIUM 25 MG/1
TABLET ORAL
Qty: 90 TAB | Refills: 1 | Status: SHIPPED | OUTPATIENT
Start: 2018-04-20 | End: 2018-10-15 | Stop reason: SDUPTHER

## 2018-04-20 RX ORDER — ROSUVASTATIN CALCIUM 20 MG/1
TABLET, COATED ORAL
Qty: 90 TAB | Refills: 1 | Status: SHIPPED | OUTPATIENT
Start: 2018-04-20 | End: 2018-10-15 | Stop reason: SDUPTHER

## 2018-04-20 NOTE — TELEPHONE ENCOUNTER
PCP: Pascual Conklin MD    Last appt: 4/11/2018  Future Appointments  Date Time Provider Marilin Ventura   7/3/2018 10:00 AM Hernan Walker  [facility]   10/10/2018 12:00 PM Pascual Conklin MD [facility]       Requested Prescriptions     Pending Prescriptions Disp Refills    rosuvastatin (CRESTOR) 20 mg tablet [Pharmacy Med Name: ROSUVASTATIN CALCIUM 20 MG TAB] 90 Tab 1     Sig: TAKE 1 TABLET BY MOUTH EVERY DAY    losartan (COZAAR) 25 mg tablet [Pharmacy Med Name: LOSARTAN POTASSIUM 25 MG TAB] 90 Tab 1     Sig: TAKE ONE TABLET BY MOUTH DAILY    cloNIDine HCl (CATAPRES) 0.2 mg tablet [Pharmacy Med Name: CLONIDINE HCL 0.2 MG TABLET] 180 Tab 1     Sig: TAKE 1 TAB BY MOUTH TWO (2) TIMES A DAY.

## 2018-05-02 DIAGNOSIS — I10 ESSENTIAL HYPERTENSION: Chronic | ICD-10-CM

## 2018-05-02 RX ORDER — VERAPAMIL HYDROCHLORIDE 120 MG/1
TABLET, FILM COATED, EXTENDED RELEASE ORAL
Qty: 90 TAB | Refills: 2 | Status: SHIPPED | OUTPATIENT
Start: 2018-05-02 | End: 2018-06-13

## 2018-05-14 RX ORDER — APIXABAN 5 MG/1
TABLET, FILM COATED ORAL
Qty: 60 TAB | Refills: 6 | Status: SHIPPED | OUTPATIENT
Start: 2018-05-14 | End: 2018-12-16 | Stop reason: SDUPTHER

## 2018-05-21 RX ORDER — SOTALOL HYDROCHLORIDE 80 MG/1
TABLET ORAL
Qty: 30 TAB | Refills: 3 | Status: SHIPPED | OUTPATIENT
Start: 2018-05-21 | End: 2018-09-19 | Stop reason: SDUPTHER

## 2018-06-13 ENCOUNTER — OFFICE VISIT (OUTPATIENT)
Dept: SURGERY | Age: 78
End: 2018-06-13

## 2018-06-13 ENCOUNTER — OFFICE VISIT (OUTPATIENT)
Dept: INTERNAL MEDICINE CLINIC | Age: 78
End: 2018-06-13

## 2018-06-13 VITALS
RESPIRATION RATE: 18 BRPM | DIASTOLIC BLOOD PRESSURE: 76 MMHG | OXYGEN SATURATION: 96 % | WEIGHT: 226 LBS | SYSTOLIC BLOOD PRESSURE: 124 MMHG | BODY MASS INDEX: 37.65 KG/M2 | TEMPERATURE: 98.7 F | HEART RATE: 69 BPM | HEIGHT: 65 IN

## 2018-06-13 VITALS
TEMPERATURE: 97.8 F | HEIGHT: 65 IN | HEART RATE: 84 BPM | BODY MASS INDEX: 37.69 KG/M2 | DIASTOLIC BLOOD PRESSURE: 82 MMHG | OXYGEN SATURATION: 99 % | SYSTOLIC BLOOD PRESSURE: 121 MMHG | WEIGHT: 226.2 LBS | RESPIRATION RATE: 18 BRPM

## 2018-06-13 DIAGNOSIS — D22.9 NUMEROUS MOLES: ICD-10-CM

## 2018-06-13 DIAGNOSIS — N60.01 CYST (SOLITARY) OF BREAST, RIGHT: Primary | ICD-10-CM

## 2018-06-13 DIAGNOSIS — L08.9 INFECTED SEBACEOUS CYST OF SKIN: Primary | ICD-10-CM

## 2018-06-13 DIAGNOSIS — M25.551 PAIN OF RIGHT HIP JOINT: ICD-10-CM

## 2018-06-13 DIAGNOSIS — E87.6 HYPOKALEMIA: ICD-10-CM

## 2018-06-13 DIAGNOSIS — I48.0 PAROXYSMAL ATRIAL FIBRILLATION (HCC): ICD-10-CM

## 2018-06-13 DIAGNOSIS — I10 ESSENTIAL HYPERTENSION: Chronic | ICD-10-CM

## 2018-06-13 DIAGNOSIS — L72.3 INFECTED SEBACEOUS CYST OF SKIN: Primary | ICD-10-CM

## 2018-06-13 RX ORDER — SULFAMETHOXAZOLE AND TRIMETHOPRIM 800; 160 MG/1; MG/1
1 TABLET ORAL 2 TIMES DAILY
Qty: 14 TAB | Refills: 0 | Status: SHIPPED | OUTPATIENT
Start: 2018-06-13 | End: 2018-06-20

## 2018-06-13 NOTE — MR AVS SNAPSHOT
[address] [place] [address] [address], [address] [facility], [address] [place] [address] [facility] 
[phone] Patient: Amy Turner MRN: [id number] Visit Information Date & Time Provider Department Dept. Phone Encounter #  
 6/13/2018 11:20 AM Bernardo James MD [facility] [address] [id number] Your Appointments 6/18/2018  3:00 PM  
ESTABLISHED PATIENT with Bernardo James MD  
[facility] Dr. Mickey Saenz [facility] ([address] [facility]) Appt Note: f/u cyst on breast  
 [address] [facility], [address] [facility], [address] [phone]  
[address] [place] [place] [facility], [address] [place] [address] [address] [phone]  
  
    
 7/3/2018 10:00 AM  
New Patient with Balwinder Christensen MD  
[address] [place] [address] ([address]) Appt Note: NP refd by Chad Barriga MD- snoring and ROMARIO symptoms-  
 [address] [place] [address], [address] [phone] [address] [place] [address], [address] [phone]  
  
    
 10/10/2018 12:00 PM  
ROUTINE CARE with Chad Barriga MD  
[facility] [address] [facility]) Appt Note: 6 month follow up  
 [facility] [address] [address] [address] [place] [address] Upcoming Health Maintenance Date Due  
 GLAUCOMA SCREENING Q2Y 8/1/2018 Influenza Age 5 to Adult 8/1/2018 MEDICARE YEARLY EXAM 4/12/2019 COLONOSCOPY 8/21/2023 DTaP/Tdap/Td series (2 - Td) 4/14/2025 Allergies as of 6/13/2018  Review Complete On: 6/13/2018 By: Celia Denton LPN Severity Noted Reaction Type Reactions Adhesive  07/14/2014    Hives Amoxicillin  10/12/2011    Unknown (comments) Lipitor [Atorvastatin]  07/14/2014   Side Effect Myalgia Gets the flu Current Immunizations  Reviewed on 4/5/2017 Name Date Influenza High Dose Vaccine PF 10/6/2017 Influenza Vaccine 11/18/2014 Influenza Vaccine (Quad) 12/14/2015 12:17 PM  
 Influenza Vaccine (Quad) PF 10/18/2016 Pneumococcal Conjugate (PCV-13) 4/5/2017  2:45 PM  
 Pneumococcal Vaccine (Unspecified Type) 11/19/2013 Tdap 4/14/2015 Not reviewed this visit Vitals BP Pulse Temp Resp Height(growth percentile) Weight(growth percentile) 124/76 (BP 1 Location: Left arm, BP Patient Position: Sitting) 69 98.7 °F (37.1 °C) (Oral) 18 5' 5\" (1.651 m) 226 lb (102.5 kg) SpO2 BMI OB Status Smoking Status 96% 37.61 kg/m2 Postmenopausal Never Smoker BMI and BSA Data Body Mass Index Body Surface Area  
 37.61 kg/m 2 2.17 m 2 Preferred Pharmacy Pharmacy Name Phone [id number] [address] [phone] Your Updated Medication List  
  
   
This list is accurate as of 6/13/18 12:27 PM.  Always use your most recent med list.  
  
  
  
  
 benzocaine 20 % Spra Commonly known as:  HURRICAINE  
2 Sprays by Mucous Membrane route as needed. CALTRATE 600 + D PO Take 1 Tab by mouth daily. CENTRUM SILVER PO Take 1 Tab by mouth daily (after lunch). cloNIDine HCl 0.2 mg tablet Commonly known as:  CATAPRES  
TAKE 1 TAB BY MOUTH TWO (2) TIMES A DAY. ELIQUIS 5 mg tablet Generic drug:  apixaban TAKE 1 TAB BY MOUTH TWO (2) TIMES A DAY. hydroCHLOROthiazide 25 mg tablet Commonly known as:  HYDRODIURIL  
TAKE 1 TAB BY MOUTH DAILY. levothyroxine 75 mcg tablet Commonly known as:  SYNTHROID Take 1 Tab by mouth Daily (before breakfast). losartan 25 mg tablet Commonly known as:  COZAAR  
TAKE ONE TABLET BY MOUTH DAILY  
  
 nystatin topical cream  
Commonly known as:  MYCOSTATIN Apply  to affected area two (2) times a day. potassium chloride SR 10 mEq tablet Commonly known as:  KLOR-CON 10  
 Take 2 Tabs by mouth two (2) times a day. rosuvastatin 20 mg tablet Commonly known as:  CRESTOR  
TAKE 1 TABLET BY MOUTH EVERY DAY  
  
 sotalol 80 mg tablet Commonly known as:  BETAPACE  
TAKE 1/2 TABLET BY MOUTH TWICE A DAY  
  
 trimethoprim-sulfamethoxazole 160-800 mg per tablet Commonly known as:  BACTRIM DS, SEPTRA DS Take 1 Tab by mouth two (2) times a day for 7 days. verapamil  mg tablet Commonly known as:  CALAN-SR  
TAKE 1 TABLET BY MOUTH EVERY MORNING FOR BLOOD PRESSURE Introducing [facility]! Dear Ramu Sweeney: Thank you for requesting a [employer] account. Our records indicate that you already have an active [employer] account. You can access your account anytime at [email or web address] Did you know that you can access your hospital and ER discharge instructions at any time in [employer]? You can also review all of your test results from your hospital stay or ER visit. Additional Information If you have questions, please visit the Frequently Asked Questions section of the [employer] website at [email or web address] Remember, [employer] is NOT to be used for urgent needs. For medical emergencies, dial 911. Now available from your iPhone and Android! Please provide this summary of care documentation to your next provider. Your primary care clinician is listed as Zoe Calderon. If you have any questions after today's visit, please call [phone].

## 2018-06-13 NOTE — PROGRESS NOTES
Chief Complaint   Patient presents with    Breast Problem     Seen at the request of samantha Nelson right breast cyst.      1. Have you been to the ER, urgent care clinic since your last visit? Hospitalized since your last visit? No    2. Have you seen or consulted any other health care providers outside of the [address] [place] [address] since your last visit? Include any pap smears or colon screening.  No

## 2018-06-13 NOTE — MR AVS SNAPSHOT
[address] [facility] 
[phone] Patient: Bruce Sorenson MRN: [id number] WDT:2/67/0445 Visit Information Date & Time Provider Department Dept. Phone Encounter #  
 6/13/2018  9:00 AM Chelsi Stewart, [address] [place] [address],[address] [phone] [id number] Follow-up Instructions Return for as scheduled. Your Appointments 7/3/2018 10:00 AM  
New Patient with Latoya Coppola MD  
[address] [facility] ([facility]) Appt Note: NP refd by Chelsi Stewart MD- snoring and ROMARIO symptoms-  
 [address] [facility], [address] [phone] 06 [facility], [address] [address]  
  
    
 10/10/2018 12:00 PM  
ROUTINE CARE with Chelsi Stewart MD  
[facility] [address] [facility]) Appt Note: 6 month follow up  
 [facility] [address] [phone]  
[address] [facility] [address] [facility] Upcoming Health Maintenance Date Due  
 GLAUCOMA SCREENING Q2Y 8/1/2018 Influenza Age 5 to Adult 8/1/2018 MEDICARE YEARLY EXAM 4/12/2019 COLONOSCOPY 8/21/2023 DTaP/Tdap/Td series (2 - Td) 4/14/2025 Allergies as of 6/13/2018  Review Complete On: 6/13/2018 By: Chelsi Stewart MD  
  
 Severity Noted Reaction Type Reactions Adhesive  07/14/2014    Hives Amoxicillin  10/12/2011    Unknown (comments) Lipitor [Atorvastatin]  07/14/2014   Side Effect Myalgia Gets the flu Current Immunizations  Reviewed on 4/5/2017 Name Date Influenza High Dose Vaccine PF 10/6/2017 Influenza Vaccine 11/18/2014 Influenza Vaccine (Quad) 12/14/2015 12:17 PM  
 Influenza Vaccine (Quad) PF 10/18/2016 Pneumococcal Conjugate (PCV-13) 4/5/2017  2:45 PM  
 Pneumococcal Vaccine (Unspecified Type) 11/19/2013 Tdap 4/14/2015 Not reviewed this visit You Were Diagnosed With   
  
 Codes Comments Cyst (solitary) of breast, right    -  Primary ICD-10-CM: N60.01 
ICD-9-CM: 610.0 Pain of right hip joint     ICD-10-CM: M25.551 ICD-9-CM: 719.45 Essential hypertension     ICD-10-CM: I10 
ICD-9-CM: 401.9 Numerous moles     ICD-10-CM: D22.9 ICD-9-CM: 216.9 Hypokalemia     ICD-10-CM: E87.6 ICD-9-CM: 276.8 Paroxysmal atrial fibrillation (HCC)     ICD-10-CM: I48.0 ICD-9-CM: 427.31 Vitals BP Pulse Temp Resp Height(growth percentile) Weight(growth percentile) 156/84 (BP 1 Location: Right arm, BP Patient Position: Sitting) 84 97.8 °F (36.6 °C) (Oral) 18 5' 5\" (1.651 m) 226 lb 3.2 oz (102.6 kg) SpO2 BMI OB Status Smoking Status 99% 37.64 kg/m2 Postmenopausal Never Smoker BMI and BSA Data Body Mass Index Body Surface Area  
 37.64 kg/m 2 2.17 m 2 Preferred Pharmacy Pharmacy Name Phone [facility]Y #[id number]- [place], [place] - [address] [phone] Your Updated Medication List  
  
   
This list is accurate as of 6/13/18  9:14 AM.  Always use your most recent med list.  
  
  
  
  
 benzocaine 20 % Spra Commonly known as:  HURRICAINE  
2 Sprays by Mucous Membrane route as needed. CALTRATE 600 + D PO Take 1 Tab by mouth daily. CENTRUM SILVER PO Take 1 Tab by mouth daily (after lunch). cloNIDine HCl 0.2 mg tablet Commonly known as:  CATAPRES  
TAKE 1 TAB BY MOUTH TWO (2) TIMES A DAY. ELIQUIS 5 mg tablet Generic drug:  apixaban TAKE 1 TAB BY MOUTH TWO (2) TIMES A DAY. hydroCHLOROthiazide 25 mg tablet Commonly known as:  HYDRODIURIL  
TAKE 1 TAB BY MOUTH DAILY. levothyroxine 75 mcg tablet Commonly known as:  SYNTHROID Take 1 Tab by mouth Daily (before breakfast). losartan 25 mg tablet Commonly known as:  COZAAR  
TAKE ONE TABLET BY MOUTH DAILY nystatin topical cream  
Commonly known as:  MYCOSTATIN Apply  to affected area two (2) times a day. potassium chloride SR 10 mEq tablet Commonly known as:  KLOR-CON 10 Take 2 Tabs by mouth two (2) times a day. rosuvastatin 20 mg tablet Commonly known as:  CRESTOR  
TAKE 1 TABLET BY MOUTH EVERY DAY  
  
 sotalol 80 mg tablet Commonly known as:  BETAPACE  
TAKE 1/2 TABLET BY MOUTH TWICE A DAY  
  
 trimethoprim-sulfamethoxazole 160-800 mg per tablet Commonly known as:  BACTRIM DS, SEPTRA DS Take 1 Tab by mouth two (2) times a day for 7 days. verapamil  mg tablet Commonly known as:  CALAN-SR  
TAKE 1 TABLET BY MOUTH EVERY MORNING FOR BLOOD PRESSURE Prescriptions Sent to Pharmacy Refills  
 trimethoprim-sulfamethoxazole (BACTRIM DS, SEPTRA DS) 160-800 mg per tablet 0 Sig: Take 1 Tab by mouth two (2) times a day for 7 days. Class: Normal  
 Pharmacy: [facility]/pharmacy [address] [place] [address] [address] [facility] #: [phone] Route: Oral  
  
We Performed the Following REFERRAL TO DERMATOLOGY [REF19 Custom] REFERRAL TO GENERAL SURGERY [REF27 Custom] Comments:  
 Infected cyst  
 REFERRAL TO PHYSICAL THERAPY [BDI01 Custom] Follow-up Instructions Return for as scheduled. Referral Information Referral ID Referred By Referred To  
  
 [id number] Myra Morris MD   
   [address] [place] [address] Phone: [phone] Fax: [phone] Visits Status Start Date End Date 1 New Request 6/13/18 6/13/19 If your referral has a status of pending review or denied, additional information will be sent to support the outcome of this decision. Referral ID Referred By Referred To  
 [id number] [address] [place] [address] [facility] Sam[place], [address] [facility] Phone: [phone] Visits Status Start Date End Date 1 New Request 6/13/18 6/13/19 If your referral has a status of pending review or denied, additional information will be sent to support the outcome of this decision. Referral ID Referred By Referred To  
 [id number] Joi [address] [facility] [address] [place], [address] Phone: [phone] Fax: [phone] Visits Status Start Date End Date 1 New Request 6/13/18 6/13/19 If your referral has a status of pending review or denied, additional information will be sent to support the outcome of this decision. Patient Instructions CUT KLOR CON--POTASSIUM IN HALF WHILE ON BACTRIM 
 
BACTRIM FOR 7 DAYS Introducing \[facility] [place] [facility]\"" & HEALTH SERVICES! Dear Ramu Sweeney: Thank you for requesting a [employer] account. Our records indicate that you already have an active [employer] account. You can access your account anytime at [email or web address] Did you know that you can access your hospital and ER discharge instructions at any time in [employer]? You can also review all of your test results from your hospital stay or ER visit. Additional Information If you have questions, please visit the Frequently Asked Questions section of the [employer] website at [email or web address] Remember, [email or web address] is NOT to be used for urgent needs. For medical emergencies, dial 911. Now available from your iPhone and Android! Please provide this summary of care documentation to your next provider. Your primary care clinician is listed as Zoe Calderon. If you have any questions after today's visit, please call [phone].

## 2018-06-13 NOTE — PROGRESS NOTES
Surgery Consult:  Infected right breast cyst  Requesting physician:  Dr. Nataly Abraham    Subjective:   Patient 70 y.o.  female has had small right breast cyst for about 1 year. It hasn't been bothering her until 2 weeks ago when she noted increase in size with erythema and mild tenderness. No drainage. No F/C/S. No recent trauma to this area. Patient was seen in PCP office today and was given Rx for Bactrim which she hasn't filled yet. Past Medical & Surgical History:  Past Medical History:   Diagnosis Date    Arrhythmia     Arthritis     right knee, left shoulder    Diverticulosis     Frequency of urination     High cholesterol     Hypertension     Morbid obesity ([facility] [address]     Thyroid disease     hypothyroid    Unspecified adverse effect of anesthesia     low BP      Past Surgical History:   Procedure Laterality Date    BREAST SURGERY PROCEDURE UNLISTED  1982    breast bx rt    CARDIAC SURG PROCEDURE UNLIST  01/2015    cardiac catheterization, no intervention, medical management    HX GYN      myomectomy on uterus    HX GYN  2011    hystereoscopy D&C    HX LYMPH NODE DISSECTION      biopsy    HX OTHER SURGICAL      lymph node bx       Social History:  Social History     Social History    Marital status:      Spouse name: N/A    Number of children: N/A    Years of education: N/A     Occupational History    Not on file.      Social History Main Topics    Smoking status: Never Smoker    Smokeless tobacco: Never Used    Alcohol use No    Drug use: No    Sexual activity: Not on file     Other Topics Concern    Not on file     Social History Narrative        Family History:  Family History   Problem Relation Age of Onset    Alzheimer Mother     Heart Disease Mother     Heart Disease Father     Hypertension Father     Cancer Paternal Aunt     Diabetes Paternal Grandmother         Medications:  Current Outpatient Prescriptions   Medication Sig    sotalol (BETAPACE) 80 mg tablet TAKE 1/2 TABLET BY MOUTH TWICE A DAY    ELIQUIS 5 mg tablet TAKE 1 TAB BY MOUTH TWO (2) TIMES A DAY.  rosuvastatin (CRESTOR) 20 mg tablet TAKE 1 TABLET BY MOUTH EVERY DAY    losartan (COZAAR) 25 mg tablet TAKE ONE TABLET BY MOUTH DAILY    cloNIDine HCl (CATAPRES) 0.2 mg tablet TAKE 1 TAB BY MOUTH TWO (2) TIMES A DAY.  potassium chloride SR (KLOR-CON 10) 10 mEq tablet Take 2 Tabs by mouth two (2) times a day.  levothyroxine (SYNTHROID) 75 mcg tablet Take 1 Tab by mouth Daily (before breakfast).  hydroCHLOROthiazide (HYDRODIURIL) 25 mg tablet TAKE 1 TAB BY MOUTH DAILY.  verapamil ER (CALAN-SR) 120 mg tablet TAKE 1 TABLET BY MOUTH EVERY MORNING FOR BLOOD PRESSURE    CALCIUM CARBONATE/VITAMIN D3 (CALTRATE 600 + D PO) Take 1 Tab by mouth daily.  MULTIVITAMIN W-MINERALS/LUTEIN (CENTRUM SILVER PO) Take 1 Tab by mouth daily (after lunch).  trimethoprim-sulfamethoxazole (BACTRIM DS, SEPTRA DS) 160-800 mg per tablet Take 1 Tab by mouth two (2) times a day for 7 days.  benzocaine (HURRICAINE) 20 % spra 2 Sprays by Mucous Membrane route as needed.  nystatin (MYCOSTATIN) topical cream Apply  to affected area two (2) times a day. No current facility-administered medications for this visit. Allergies: Allergies   Allergen Reactions    Adhesive Hives    Amoxicillin Unknown (comments)    Lipitor [Atorvastatin] Myalgia     Gets the flu       Review of Systems  A comprehensive review of systems was negative except for that written in the HPI.     Objective:     Exam:    Visit Vitals    /76 (BP 1 Location: Left arm, BP Patient Position: Sitting)    Pulse 69    Temp 98.7 °F (37.1 °C) (Oral)    Resp 18    Ht 5' 5\" (1.651 m)    Wt 102.5 kg (226 lb)    SpO2 96%    BMI 37.61 kg/m2     General appearance: alert, cooperative, no distress, appears stated age  Lungs: clear to auscultation bilaterally  Heart: regular rate and rhythm  Extremities: extremities normal, atraumatic, no cyanosis or edema. SMITH. Skin: Skin color, texture, turgor normal.  Right breast at 3 o'clock with 2 x 3 cm erythematous indurated area without drainage. +tenderness. Bedside US performed and it showed tiny fluid collection measuring 1 x 0.6 cm. Neurologic: Grossly normal    Assessment:     Infected right breast sebaceous cyst    Plan:     Given tiny fluid collection and patient on Eliquis for A Fib, will try antibiotics first.  Antibiotics as prescribed by PCP  Hold Eliquis  RTC on Fri if no improvement for I&D.

## 2018-06-13 NOTE — PROGRESS NOTES
HISTORY OF PRESENT ILLNESS  Yolie Martinez is a 70 y.o. female. HPI   Last here 4/11/18. Pt is here for acute care.     BP is 156/84, will repeat this today   Of note, her home cuff read higher than our cuff in the past  Continues on clonidine 0.2mg BID, HCTZ 25mg, verapamil 120mg and losartan 25mg daily   Pt took most of her AM meds  Recall she had a cough on lisinopril     Lov, pt stated that she got tired easily    Lov, I provided her with a referral for a sleep eval  Pt has a sleep eval scheduled with Dr. Jake Renteria (sleep) for 7/3/18     Lov, pt c/o feeling bloated  Lov, I advised her to try prilosec OTC 20mg daily for 2 weeks and a daily probiotic  Pt states that her sx are mildly improved     Pt c/o erythematous and sore cyst on R breast x 2.5 weeks - worsening  Pt denies having discharge from this cyst   Pt has not taken any meds/applied any creams to this area  Of note, pt takes eliquis for a fib  However, she did not take this med this AM  Discussed this being OK  Ordered bactrim BID for 7 days  Advised her to cut klor-con in half while on bactrim   Provided referral for a gen surg    Pt states that she has moles  Pt would like a referral for a derm  Provided referral for Dr. Arthur Gong (derm)    Pt c/o intermittent mild generalized myalgias  Pt experienced sx in her R thigh, knee and groin after standing  Pt denies having sx radiating to her RLE or burning to this area   Pt wonders if she has arthritis  Discussed her sx being indicative of arthritis or muscle weakness  Ordered PT    Pt c/o mosquito bites on her BLUE  Pt likes to go outside and work in her yard  Pt wears coil bracelets with no improvement to sx  Discussed certain people being more sensitive to mosquito bites  Advised her to wear bug spray with DEET      Patient Active Problem List    Diagnosis Date Noted    Irregular heart beat 10/31/2017    Pericardial effusion with cardiac tamponade 03/18/2017    S/P ablation of atrial fibrillation 03/17/2017    Acquired hypothyroidism 12/14/2015    ACP (advance care planning) 12/14/2015    HTN (hypertension) 07/21/2015    Thyroid activity decreased 04/14/2015    Atrial fibrillation ([facility] [address] 03/16/2015    A-fib ([facility] [address] 03/10/2015    Angina, class III ([facility] [address] 01/27/2015    [facility]     Hyperlipidemia 07/14/2014    Hypokalemia 07/14/2014    Encounter for screening colonoscopy 08/21/2013    History of rectal bleeding 08/21/2013    Postmenopausal bleeding 10/18/2011    Endometrial polyp 10/18/2011     Current Outpatient Prescriptions   Medication Sig Dispense Refill    sotalol (BETAPACE) 80 mg tablet TAKE 1/2 TABLET BY MOUTH TWICE A DAY 30 Tab 3    ELIQUIS 5 mg tablet TAKE 1 TAB BY MOUTH TWO (2) TIMES A DAY. 60 Tab 6    verapamil ER (CALAN-SR) 120 mg tablet TAKE 1 TABLET BY MOUTH EVERY MORNING FOR BLOOD PRESSURE 90 Tab 2    rosuvastatin (CRESTOR) 20 mg tablet TAKE 1 TABLET BY MOUTH EVERY DAY 90 Tab 1    losartan (COZAAR) 25 mg tablet TAKE ONE TABLET BY MOUTH DAILY 90 Tab 1    cloNIDine HCl (CATAPRES) 0.2 mg tablet TAKE 1 TAB BY MOUTH TWO (2) TIMES A DAY. 180 Tab 1    potassium chloride SR (KLOR-CON 10) 10 mEq tablet Take 2 Tabs by mouth two (2) times a day. 360 Tab 1    levothyroxine (SYNTHROID) 75 mcg tablet Take 1 Tab by mouth Daily (before breakfast). 90 Tab 1    hydroCHLOROthiazide (HYDRODIURIL) 25 mg tablet TAKE 1 TAB BY MOUTH DAILY. 90 Tab 3    benzocaine (HURRICAINE) 20 % spra 2 Sprays by Mucous Membrane route as needed. 1 Can 0    nystatin (MYCOSTATIN) topical cream Apply  to affected area two (2) times a day. 30 g 1    verapamil ER (CALAN-SR) 120 mg tablet TAKE 1 TABLET BY MOUTH EVERY MORNING FOR BLOOD PRESSURE 90 Tab 2    CALCIUM CARBONATE/VITAMIN D3 (CALTRATE 600 + D PO) Take 1 Tab by mouth daily.  MULTIVITAMIN W-MINERALS/LUTEIN (CENTRUM SILVER PO) Take 1 Tab by mouth daily (after lunch).        Past Surgical History:   Procedure Laterality Date    BREAST SURGERY PROCEDURE UNLISTED  1982    breast bx rt    CARDIAC SURG PROCEDURE UNLIST  01/2015    cardiac catheterization, no intervention, medical management    HX GYN      myomectomy on uterus    HX GYN  2011    hystereoscopy D&C    HX LYMPH NODE DISSECTION      biopsy    HX OTHER SURGICAL      lymph node bx      Lab Results  Component Value Date/Time   WBC 4.8 04/04/2018 09:04 AM   HGB 11.9 04/04/2018 09:04 AM   HCT 36.4 04/04/2018 09:04 AM   PLATELET 247 03/92/8957 09:04 AM   MCV 92 04/04/2018 09:04 AM     Lab Results  Component Value Date/Time   Cholesterol, total 137 04/04/2018 09:04 AM   HDL Cholesterol 43 04/04/2018 09:04 AM   LDL, calculated 68 04/04/2018 09:04 AM   Triglyceride 130 04/04/2018 09:04 AM     Lab Results  Component Value Date/Time   GFR est non-AA 53 (L) 04/04/2018 09:04 AM   GFR est AA 61 04/04/2018 09:04 AM   Creatinine 1.03 (H) 04/04/2018 09:04 AM   BUN 16 04/04/2018 09:04 AM   Sodium 143 04/04/2018 09:04 AM   Potassium 4.1 04/04/2018 09:04 AM   Chloride 102 04/04/2018 09:04 AM   CO2 26 04/04/2018 09:04 AM   Magnesium 2.2 08/17/2017 10:11 AM        Review of Systems   Respiratory: Negative for shortness of breath. Cardiovascular: Negative for chest pain. Musculoskeletal: Positive for myalgias. Negative for falls. Psychiatric/Behavioral: Negative for depression. Physical Exam   Constitutional: She is oriented to person, place, and time. She appears well-developed and well-nourished. No distress. HENT:   Head: Normocephalic and atraumatic. Eyes: Conjunctivae and EOM are normal. Right eye exhibits no discharge. Left eye exhibits no discharge. Neck: Normal range of motion. Neck supple. Cardiovascular: Normal rate, regular rhythm, normal heart sounds and intact distal pulses. Exam reveals no gallop and no friction rub. No murmur heard. Pulmonary/Chest: Effort normal and breath sounds normal. No respiratory distress. She has no wheezes. She has no rales.  She exhibits no tenderness. Musculoskeletal: Normal range of motion. She exhibits no edema, tenderness or deformity. Minimal crepitus R knee, more so on L knee  5/5 strength BLE   Lymphadenopathy:     She has no cervical adenopathy. Neurological: She is alert and oriented to person, place, and time. Coordination normal.   Skin: Skin is warm and dry. No rash noted. She is not diaphoretic. There is erythema. No pallor. R breast: 1.5-2\" diameter, indurated, epidermal inclusion cyst with some mild erythema    Psychiatric: She has a normal mood and affect. Her behavior is normal.       ASSESSMENT and PLAN    ICD-10-CM ICD-9-CM    1. Cyst (solitary) of breast, right    Have set her up to see surg today for I&D, of note she is on eliquis but has not taken her AM dose today, will give bactrim BID for 7 days to prevent progression of cellulitis    N60.01 610.0 REFERRAL TO GENERAL SURGERY   2. Pain of right hip joint    Pt with some R hip and knee pain, primarily with walking on and off, will set up with PT, has nl ROM and minimal crepitus on exam, likely muscular weakness, if progressive sx would get hip and knee XR for further eval   M25.551 719.45 REFERRAL TO PHYSICAL THERAPY   3. Essential hypertension    Well-controled on current meds, no change to dose today   I10 401.9    4. Numerous moles    Refer to derm for skin check    D22.9 216.9 REFERRAL TO DERMATOLOGY   5. Hypokalemia    On 2 K+ tabs BID, while on bactrim will decrease to 1 tab BID, cutting dose in half for the next 7 days, last K+ level was OK, resume nl dosing after bactrim is complete    E87.6 276.8    6. Paroxysmal atrial fibrillation (HCC)    On eliquis, has not taken AM dose yet today, will hold off on taking med until she discusses this with surg    I48.0 427.31         Depression screen reviewed and negative. Scribed by Michele Batista of [address], as dictated by Dr. Neo Sanchez. Current diagnosis and concerns discussed with pt at length.  Pt understands risks and benefits or current treatment plan and medications, and accepts the treatment and medication with any possible risks. Pt asks appropriate questions, which were answered. Pt was instructed to call with any concerns or problems. This note will not be viewable in [address]

## 2018-06-18 ENCOUNTER — OFFICE VISIT (OUTPATIENT)
Dept: SURGERY | Age: 78
End: 2018-06-18

## 2018-06-18 VITALS
DIASTOLIC BLOOD PRESSURE: 68 MMHG | OXYGEN SATURATION: 96 % | BODY MASS INDEX: 37.82 KG/M2 | RESPIRATION RATE: 18 BRPM | HEART RATE: 74 BPM | TEMPERATURE: 99 F | SYSTOLIC BLOOD PRESSURE: 156 MMHG | WEIGHT: 227 LBS | HEIGHT: 65 IN

## 2018-06-18 DIAGNOSIS — L72.3 INFECTED SEBACEOUS CYST: Primary | ICD-10-CM

## 2018-06-18 DIAGNOSIS — L08.9 INFECTED SEBACEOUS CYST: Primary | ICD-10-CM

## 2018-06-18 NOTE — PROGRESS NOTES
Patient is here for follow-up. Patient reports little improvement in erythema over the right breast.  No drainage. Still on antibiotics. Patient reports low grade temperature. Off Eliquis. PE:  Visit Vitals    /68 (BP 1 Location: Left arm, BP Patient Position: Sitting)    Pulse 74    Temp 99 °F (37.2 °C)    Resp 18    Ht 5' 5\" (1.651 m)    Wt 103 kg (227 lb)    SpO2 96%    BMI 37.77 kg/m2     Alert. NAD. Skin:  Right breast at 3 o'clock with 2 x 3 cm erythematous indurated area without drainage. Small fluctuance at the center. +tenderness    Procedure Note:  I&D right breast abscess    After informed consent and time out were performed, right breast was prepped and draped in standard surgical fashion. Local was injected in the abscess with central fluctuance and an incision was made. Small amount of pus and waxy material drained. Purulent drainage was sent for culture. Abscess cavity was explored and it measured 1 x 2 cm. Abscess cavity was then irrigated with hydrogen peroxide followed by packing. Hemostasis was good. Dry dressing was then applied. Patient tolerated the procedure well.     Assessment:  S/p I&D right breast abscess    Plan:  -continue antibiotics  -check culture  -RTC in 2 weeks; earlier if no improvement  -Resume Eliquis in 2 days

## 2018-06-18 NOTE — MR AVS SNAPSHOT
[address] [place] [address], [address] [place] [address], [address] [place] [address] [place] [phone] Patient: Natalee Mejía MRN: [id number] RNW:3[birth year]16/[birth year]120 Visit Information Date & Time Provider Department Dept. Phone Encounter #  
 6/18/2018  3:00 PM Stephanie Hernandes MD [facility] [id number] Your Appointments 7/3/2018 10:00 AM  
New Patient with Anisha Anaya MD  
[address] [place] [address] ([facility] Appt Note: NP refd by Alena Gray MD- snoring and ROMARIO symptoms-  
 [address] [facility]., [address] [address] 46 [facility]., [address] [address]  
  
    
 10/10/2018 12:00 PM  
ROUTINE CARE with Alena Gray MD  
[facility] Appt Note: 6 month follow up  
 [address] [address]  
[address] Upcoming Health Maintenance Date Due  
 GLAUCOMA SCREENING Q2Y 8/1/2018 Influenza Age 5 to Adult 8/1/2018 MEDICARE YEARLY EXAM 4/12/2019 COLONOSCOPY 8/21/2023 DTaP/Tdap/Td series (2 - Td) 4/14/2025 Allergies as of 6/18/2018  Review Complete On: 6/18/2018 By: Keyon Kirby Severity Noted Reaction Type Reactions Adhesive  07/14/2014    Hives Amoxicillin  10/12/2011    Unknown (comments) Lipitor [Atorvastatin]  07/14/2014   Side Effect Myalgia Gets the flu Current Immunizations  Reviewed on 4/5/2017 Name Date Influenza High Dose Vaccine PF 10/6/2017 Influenza Vaccine 11/18/2014 Influenza Vaccine (Quad) 12/14/2015 12:17 PM  
 Influenza Vaccine (Quad) PF 10/18/2016 Pneumococcal Conjugate (PCV-13) 4/5/2017  2:45 PM  
 Pneumococcal Vaccine (Unspecified Type) 11/19/2013 Tdap 4/14/2015 Not reviewed this visit Vitals BP Pulse Temp Resp Height(growth percentile) Weight(growth percentile) 156/68 (BP 1 Location: Left arm, BP Patient Position: Sitting) 74 99 °F (37.2 °C) 18 5' 5\" (1.651 m) 227 lb (103 kg) SpO2 BMI OB Status Smoking Status 96% 37.77 kg/m2 Postmenopausal Never Smoker BMI and BSA Data Body Mass Index Body Surface Area  
 37.77 kg/m 2 2.17 m 2 Preferred Pharmacy Pharmacy Name Phone [facility]ACY #[id number]- [place], [place] - [address] [phone] Your Updated Medication List  
  
   
This list is accurate as of 6/18/18  3:25 PM.  Always use your most recent med list.  
  
  
  
  
 benzocaine 20 % Spra Commonly known as:  HURRICAINE  
2 Sprays by Mucous Membrane route as needed. CALTRATE 600 + D PO Take 1 Tab by mouth daily. CENTRUM SILVER PO Take 1 Tab by mouth daily (after lunch). cloNIDine HCl 0.2 mg tablet Commonly known as:  CATAPRES  
TAKE 1 TAB BY MOUTH TWO (2) TIMES A DAY. ELIQUIS 5 mg tablet Generic drug:  apixaban TAKE 1 TAB BY MOUTH TWO (2) TIMES A DAY. hydroCHLOROthiazide 25 mg tablet Commonly known as:  HYDRODIURIL  
TAKE 1 TAB BY MOUTH DAILY. levothyroxine 75 mcg tablet Commonly known as:  SYNTHROID Take 1 Tab by mouth Daily (before breakfast). losartan 25 mg tablet Commonly known as:  COZAAR  
TAKE ONE TABLET BY MOUTH DAILY  
  
 nystatin topical cream  
Commonly known as:  MYCOSTATIN Apply  to affected area two (2) times a day. potassium chloride SR 10 mEq tablet Commonly known as:  KLOR-CON 10 Take 2 Tabs by mouth two (2) times a day. rosuvastatin 20 mg tablet Commonly known as:  CRESTOR  
TAKE 1 TABLET BY MOUTH EVERY DAY  
  
 sotalol 80 mg tablet Commonly known as:  BETAPACE  
TAKE 1/2 TABLET BY MOUTH TWICE A DAY  
  
 trimethoprim-sulfamethoxazole 160-800 mg per tablet Commonly known as:  BACTRIM DS, SEPTRA DS  
 Take 1 Tab by mouth two (2) times a day for 7 days. verapamil  mg tablet Commonly known as:  CALAN-SR  
TAKE 1 TABLET BY MOUTH EVERY MORNING FOR BLOOD PRESSURE Introducing [facility] & [facility] SERVICES! Dear Charito Ortez: Thank you for requesting a New Travelcoo account. Our records indicate that you already have an active New Travelcoo account. You can access your account anytime at [email or web address] Did you know that you can access your hospital and ER discharge instructions at any time in New Travelcoo? You can also review all of your test results from your hospital stay or ER visit. Additional Information If you have questions, please visit the Frequently Asked Questions section of the New Travelcoo website at [email or web address] Remember, New Travelcoo is NOT to be used for urgent needs. For medical emergencies, dial 911. Now available from your iPhone and Android! Please provide this summary of care documentation to your next provider. Your primary care clinician is listed as Chad Barriga. If you have any questions after today's visit, please call [phone].

## 2018-06-19 DIAGNOSIS — N61.1 ABSCESS OF BREAST, RIGHT: Primary | ICD-10-CM

## 2018-06-21 ENCOUNTER — TELEPHONE (OUTPATIENT)
Dept: SURGERY | Age: 78
End: 2018-06-21

## 2018-06-21 NOTE — TELEPHONE ENCOUNTER
Patient had a cyst removed off her breast on Monday, 6/18/18 and is running fevers between 99.00 and 100.00. Patient also says her neck is stiff too.

## 2018-06-22 ENCOUNTER — OFFICE VISIT (OUTPATIENT)
Dept: SURGERY | Age: 78
End: 2018-06-22

## 2018-06-22 VITALS
DIASTOLIC BLOOD PRESSURE: 79 MMHG | SYSTOLIC BLOOD PRESSURE: 158 MMHG | OXYGEN SATURATION: 97 % | WEIGHT: 226.8 LBS | HEIGHT: 65 IN | HEART RATE: 67 BPM | RESPIRATION RATE: 20 BRPM | TEMPERATURE: 98.5 F | BODY MASS INDEX: 37.79 KG/M2

## 2018-06-22 DIAGNOSIS — L02.91 ABSCESS: Primary | ICD-10-CM

## 2018-06-22 NOTE — PROGRESS NOTES
Patient is here for follow-up. Patient underwent I&D right breast abscess on 6/18/18. Had some bloody drainage from the wound yesterday and fever. Feeling lot better today. Finished antibiotics 2 days ago. PE:  Visit Vitals    /79 (BP 1 Location: Right arm, BP Patient Position: Sitting)    Pulse 67    Temp 98.5 °F (36.9 °C)    Resp 20    Ht 5' 5\" (1.651 m)    Wt 102.9 kg (226 lb 12.8 oz)    SpO2 97%    BMI 37.74 kg/m2     Skin:  Right breast I&D site well healed. No erythema or drainage.     Assessment:    S/p I&D right breast abscess     Plan:  -f/u prn

## 2018-06-22 NOTE — MR AVS SNAPSHOT
[address], [address] [facility], [address] [place] [address] [facility] 
[phone] Patient: Manuel Rosa MRN: [id number] NYB:[birth year] Visit Information Date & Time Provider Department Dept. Phone Encounter #  
 6/22/2018  2:20 PM Jimbo Abbott MD [facility] [place] [address] [id number] Your Appointments 7/3/2018 10:00 AM  
New Patient with Arturo Jung MD  
[address] [facility] ([address]) Appt Note: NP refd by Ann Ambrosio MD- snoring and ROMARIO symptoms-  
 [address] [facility]., [address] [address] [address] [place] [address], [address] [address]  
  
    
 10/10/2018 12:00 PM  
ROUTINE CARE with Ann Ambrosio MD  
[facility] [address]) Appt Note: 6 month follow up  
 [facility] [address] [address]  
[address] [place] [address] Upcoming Health Maintenance Date Due  
 GLAUCOMA SCREENING Q2Y 8/1/2018 Influenza Age 5 to Adult 8/1/2018 MEDICARE YEARLY EXAM 4/12/2019 COLONOSCOPY 8/21/2023 DTaP/Tdap/Td series (2 - Td) 4/14/2025 Allergies as of 6/22/2018  Review Complete On: 6/22/2018 By: Jimbo Abbott MD  
  
 Severity Noted Reaction Type Reactions Adhesive  07/14/2014    Hives Amoxicillin  10/12/2011    Unknown (comments) Lipitor [Atorvastatin]  07/14/2014   Side Effect Myalgia Gets the flu Current Immunizations  Reviewed on 4/5/2017 Name Date Influenza High Dose Vaccine PF 10/6/2017 Influenza Vaccine 11/18/2014 Influenza Vaccine (Quad) 12/14/2015 12:17 PM  
 Influenza Vaccine (Quad) PF 10/18/2016 Pneumococcal Conjugate (PCV-13) 4/5/2017  2:45 PM  
 Pneumococcal Vaccine (Unspecified Type) 11/19/2013 Tdap 4/14/2015 Not reviewed this visit You Were Diagnosed With   
  
 Codes Comments Abscess    -  Primary ICD-10-CM: L02.91 
ICD-9-CM: 353. 9 Vitals BP Pulse Temp Resp Height(growth percentile) Weight(growth percentile) 158/79 (BP 1 Location: Right arm, BP Patient Position: Sitting) 67 98.5 °F (36.9 °C) 20 5' 5\" (1.651 m) 226 lb 12.8 oz (102.9 kg) SpO2 BMI OB Status Smoking Status 97% 37.74 kg/m2 Postmenopausal Never Smoker BMI and BSA Data Body Mass Index Body Surface Area  
 37.74 kg/m 2 2.17 m 2 Preferred Pharmacy Pharmacy Name Phone [id number]- [place], [place] - [address] [phone] Your Updated Medication List  
  
   
This list is accurate as of 6/22/18  2:57 PM.  Always use your most recent med list.  
  
  
  
  
 benzocaine 20 % Spra Commonly known as:  HURRICAINE  
2 Sprays by Mucous Membrane route as needed. CALTRATE 600 + D PO Take 1 Tab by mouth daily. CENTRUM SILVER PO Take 1 Tab by mouth daily (after lunch). cloNIDine HCl 0.2 mg tablet Commonly known as:  CATAPRES  
TAKE 1 TAB BY MOUTH TWO (2) TIMES A DAY. ELIQUIS 5 mg tablet Generic drug:  apixaban TAKE 1 TAB BY MOUTH TWO (2) TIMES A DAY. hydroCHLOROthiazide 25 mg tablet Commonly known as:  HYDRODIURIL  
TAKE 1 TAB BY MOUTH DAILY. levothyroxine 75 mcg tablet Commonly known as:  SYNTHROID Take 1 Tab by mouth Daily (before breakfast). losartan 25 mg tablet Commonly known as:  COZAAR  
TAKE ONE TABLET BY MOUTH DAILY  
  
 nystatin topical cream  
Commonly known as:  MYCOSTATIN Apply  to affected area two (2) times a day. potassium chloride SR 10 mEq tablet Commonly known as:  KLOR-CON 10 Take 2 Tabs by mouth two (2) times a day. rosuvastatin 20 mg tablet Commonly known as:  CRESTOR  
TAKE 1 TABLET BY MOUTH EVERY DAY  
  
 sotalol 80 mg tablet Commonly known as:  BETAPACE  
TAKE 1/2 TABLET BY MOUTH TWICE A DAY  
  
 verapamil  mg tablet Commonly known as:  CALAN-SR  
TAKE 1 TABLET BY MOUTH EVERY MORNING FOR BLOOD PRESSURE Introducing [facility] & HEALTH SERVICES! Dear Malika Moss: Thank you for requesting a Intergeneraciones [employer]ic[employer] account. Our records indicate that you already have an active [employer]gener[employer]icios account. You can access your account anytime at [email or web address] Applied DNA [email or web address]/Syncurity Did you know that you can access your hospital and ER discharge instructions at any time in Intergeneraciones Servicios? You can also review all of your test results from your hospital stay or ER visit. Additional Information If you have questions, please visit the Frequently Asked Questions section of the [employer] website at [email or web address] Remember, Inter[employer]aciones Serv[employer]ios is NOT to be used for urgent needs. For medical emergencies, dial 911. Now available from your iPhone and Android! Please provide this summary of care documentation to your next provider. Your primary care clinician is listed as Angelica Chan. If you have any questions after today's visit, please call [phone].

## 2018-06-23 LAB — BACTERIA SPEC AEROBE CULT: ABNORMAL

## 2018-07-03 ENCOUNTER — OFFICE VISIT (OUTPATIENT)
Dept: INTERNAL MEDICINE CLINIC | Age: 78
End: 2018-07-03

## 2018-07-03 ENCOUNTER — TELEPHONE (OUTPATIENT)
Dept: INTERNAL MEDICINE CLINIC | Age: 78
End: 2018-07-03

## 2018-07-03 ENCOUNTER — OFFICE VISIT (OUTPATIENT)
Dept: SLEEP MEDICINE | Age: 78
End: 2018-07-03

## 2018-07-03 VITALS
DIASTOLIC BLOOD PRESSURE: 78 MMHG | BODY MASS INDEX: 37.65 KG/M2 | HEIGHT: 65 IN | WEIGHT: 226 LBS | OXYGEN SATURATION: 96 % | SYSTOLIC BLOOD PRESSURE: 137 MMHG | HEART RATE: 81 BPM

## 2018-07-03 VITALS
RESPIRATION RATE: 18 BRPM | BODY MASS INDEX: 37.61 KG/M2 | HEART RATE: 75 BPM | DIASTOLIC BLOOD PRESSURE: 74 MMHG | TEMPERATURE: 98.2 F | HEIGHT: 65 IN | OXYGEN SATURATION: 98 % | SYSTOLIC BLOOD PRESSURE: 116 MMHG
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
## 2018-07-03 DIAGNOSIS — L72.3 SEBACEOUS CYST: ICD-10-CM

## 2018-07-03 DIAGNOSIS — L03.116 CELLULITIS OF LEFT LOWER EXTREMITY: ICD-10-CM

## 2018-07-03 DIAGNOSIS — I10 ESSENTIAL HYPERTENSION: Chronic | ICD-10-CM

## 2018-07-03 DIAGNOSIS — M79.10 MYALGIA: ICD-10-CM

## 2018-07-03 DIAGNOSIS — G47.33 OSA (OBSTRUCTIVE SLEEP APNEA): Primary | ICD-10-CM

## 2018-07-03 RX ORDER — DOXYCYCLINE 100 MG/1
100 TABLET ORAL 2 TIMES DAILY
Qty: 28 TAB | Refills: 0 | Status: SHIPPED | OUTPATIENT
Start: 2018-07-03 | End: 2018-07-17

## 2018-07-03 NOTE — MR AVS SNAPSHOT
[address] [facility] 
[phone] Patient: Cuba Pereira MRN: [id number] [facility]:[birth year] Visit Information Date & Time Provider Department Dept. Phone Encounter #  
 7/3/2018 12:00 PM Shima Anya, [facility] [phone] [id number] Follow-up Instructions Return if symptoms worsen or fail to improve, for as scheduled. Your Appointments 10/10/2018 12:00 PM  
ROUTINE CARE with Shima Joyner, [address] [place] [address] Chelsi Ospina) Appt Note: 6 month follow up  
 [address] [facility] [address] [address]  
[address] [facility] Upcoming Health Maintenance Date Due  
 GLAUCOMA SCREENING Q2Y 8/1/2018 Influenza Age 5 to Adult 8/1/2018 MEDICARE YEARLY EXAM 4/12/2019 COLONOSCOPY 8/21/2023 DTaP/Tdap/Td series (2 - Td) 4/14/2025 Allergies as of 7/3/2018  Review Complete On: 7/3/2018 By: Eda Nicole Severity Noted Reaction Type Reactions Adhesive  07/14/2014    Hives Amoxicillin  10/12/2011    Unknown (comments) Lipitor [Atorvastatin]  07/14/2014   Side Effect Myalgia Gets the flu Current Immunizations  Reviewed on 4/5/2017 Name Date Influenza High Dose Vaccine PF 10/6/2017 Influenza Vaccine 11/18/2014 Influenza Vaccine (Quad) 12/14/2015 12:17 PM  
 Influenza Vaccine (Quad) PF 10/18/2016 Pneumococcal Conjugate (PCV-13) 4/5/2017  2:45 PM  
 Pneumococcal Vaccine (Unspecified Type) 11/19/2013 Tdap 4/14/2015 Not reviewed this visit You Were Diagnosed With   
  
 Codes Comments Tick bite, initial encounter    -  Primary ICD-10-CM: W57. Daryl Forte ICD-9-CM: 919.4, E906.4 Myalgia     ICD-10-CM: M79.1 ICD-9-CM: 729.1 Essential hypertension     ICD-10-CM: I10 
ICD-9-CM: 401.9 Cellulitis of left lower extremity     ICD-10-CM: L03.116 ICD-9-CM: 682.6 Sebaceous cyst     ICD-10-CM: L72.3 ICD-9-CM: 706. 2 Vitals BP Pulse Temp Resp Height(growth percentile) SpO2  
 116/74 (BP 1 Location: Left arm, BP Patient Position: Sitting) 75 98.2 °F (36.8 °C) (Oral) 18 5' 5\" (1.651 m) 98% BMI OB Status Smoking Status 37.61 kg/m2 Postmenopausal Never Smoker BMI and BSA Data Body Mass Index Body Surface Area  
 37.61 kg/m 2 2.17 m 2 Preferred Pharmacy Pharmacy Name Phone [facility]ACY #[id number]- [place], [place] - [address] [phone] Your Updated Medication List  
  
   
This list is accurate as of 7/3/18 12:14 PM.  Always use your most recent med list.  
  
  
  
  
 benzocaine 20 % Spra Commonly known as:  HURRICAINE  
2 Sprays by Mucous Membrane route as needed. CALTRATE 600 + D PO Take 1 Tab by mouth daily. CENTRUM SILVER PO Take 1 Tab by mouth daily (after lunch). cloNIDine HCl 0.2 mg tablet Commonly known as:  CATAPRES  
TAKE 1 TAB BY MOUTH TWO (2) TIMES A DAY. doxycycline 100 mg tablet Commonly known as:  ADOXA Take 1 Tab by mouth two (2) times a day for 14 days. ELIQUIS 5 mg tablet Generic drug:  apixaban TAKE 1 TAB BY MOUTH TWO (2) TIMES A DAY. hydroCHLOROthiazide 25 mg tablet Commonly known as:  HYDRODIURIL  
TAKE 1 TAB BY MOUTH DAILY. levothyroxine 75 mcg tablet Commonly known as:  SYNTHROID Take 1 Tab by mouth Daily (before breakfast). losartan 25 mg tablet Commonly known as:  COZAAR  
TAKE ONE TABLET BY MOUTH DAILY  
  
 nystatin topical cream  
Commonly known as:  MYCOSTATIN Apply  to affected area two (2) times a day. potassium chloride SR 10 mEq tablet Commonly known as:  KLOR-CON 10 Take 2 Tabs by mouth two (2) times a day. rosuvastatin 20 mg tablet Commonly known as:  CRESTOR  
 TAKE 1 TABLET BY MOUTH EVERY DAY  
  
 sotalol 80 mg tablet Commonly known as:  BETAPACE  
TAKE 1/2 TABLET BY MOUTH TWICE A DAY  
  
 verapamil  mg tablet Commonly known as:  CALAN-SR  
TAKE 1 TABLET BY MOUTH EVERY MORNING FOR BLOOD PRESSURE Prescriptions Sent to Pharmacy Refills  
 doxycycline (ADOXA) 100 mg tablet 0 Sig: Take 1 Tab by mouth two (2) times a day for 14 days. Class: [place]  
 Pharmacy: [facility]/pharmacy [address] [address]  #: [phone] Route: Oral  
  
Follow-up Instructions Return if symptoms worsen or fail to improve, for as scheduled. To-Do List   
 09/24/2018 8:30 PM  
  Appointment with BED[facility] 1 [facility] at [address]  SLEEP LAB [address] [place], [place] [address]ie [facility] ([phone]) 1. Do not take a nap the day of the study  2. No caffeine after 12 noon the day of the study  3. Bring a 2 piece sleeping garment  4. Hair should be clean and dry, no oils, sprays, powders and remove wigs, weaves or other hair accessories  6. Patient should eat dinner prior to arriving for the test and a light breakfast will be provided upon discharge in the morning  7. Patient encouraged to bring activity items such as books, magazines, laptop, IPAD, etc, as well as toiletry items needed for the next morning  8. Bring all medications with you to the center  9. For specific questions please contact the sleep center directly, [phone] to 5p  10. Do not arrive more than 15 minutes prior to appt time and use the doorbell to enter the sleep center. Introducing [facility] & HEALTH SERVICES! Dear Amy Yañez: Thank you for requesting a Tixers account. Our records indicate that you already have an active Tixers account. You can access your account anytime at [email or web address] [email or web address]. [employer]/Seldom Seen Adventures Did you know that you can access your hospital and ER discharge instructions at any time in Tixers?   You can also review all of your test results from your hospital stay or ER visit. Additional Information If you have questions, please visit the Frequently Asked Questions section of the Uevoc website at [email or web address] Remember, Uevoc is NOT to be used for urgent needs. For medical emergencies, dial 911. Now available from your iPhone and Android! Please provide this summary of care documentation to your next provider. Your primary care clinician is listed as Christiano Prado. If you have any questions after today's visit, please call [phone].

## 2018-07-03 NOTE — PATIENT INSTRUCTIONS
Sleep Apnea: Care Instructions  Your Care Instructions    Sleep apnea means that you frequently stop breathing for 10 seconds or longer during sleep. It can be mild to severe, based on the number of times an hour that you stop breathing or have slowed breathing. Blocked or narrowed airways in your nose, mouth, or throat can cause sleep apnea. Your airway can become blocked when your throat muscles and tongue relax during sleep. You can treat sleep apnea at home by making lifestyle changes. You also can use a CPAP breathing machine that keeps tissues in the throat from blocking your airway. Or your doctor may suggest that you use a breathing device while you sleep. It helps keep your airway open. This could be a device that you put in your mouth. Other examples include strips or disks that you use on your nose. In some cases, surgery may be needed to remove enlarged tissues in the throat. Follow-up care is a key part of your treatment and safety. Be sure to make and go to all appointments, and call your doctor if you are having problems. It's also a good idea to know your test results and keep a list of the medicines you take. How can you care for yourself at home? · Lose weight, if needed. It may reduce the number of times you stop breathing or have slowed breathing. · Sleep on your side. It may stop mild apnea. If you tend to roll onto your back, sew a pocket in the back of your pajama top. Put a tennis ball into the pocket, and stitch the pocket shut. This will help keep you from sleeping on your back. · Avoid alcohol and medicines such as sleeping pills and sedatives before bed. · Do not smoke. Smoking can make sleep apnea worse. If you need help quitting, talk to your doctor about stop-smoking programs and medicines. These can increase your chances of quitting for good. · Prop up the head of your bed 4 to 6 inches by putting bricks under the legs of the bed.   · Treat breathing problems, such as a stuffy nose, caused by a cold or allergies. · Try a continuous positive airway pressure (CPAP) breathing machine if your doctor recommends it. The machine keeps your airway open when you sleep. · If CPAP does not work for you, ask your doctor if you can try other breathing machines. A bilevel positive airway pressure machine uses one type of air pressure for breathing in and another type for breathing out. Another device raises or lowers air pressure as needed while you breathe. · Talk to your doctor if:  ¨ Your nose feels dry or bleeds when you use one of these machines. You may need to increase moisture in the air. A humidifier may help. ¨ Your nose is runny or stuffy from using a breathing machine. Decongestants or a corticosteroid nasal spray may help. ¨ You are sleepy during the day and it gets in the way of the normal things you do. Do not drive when you are drowsy. When should you call for help? Watch closely for changes in your health, and be sure to contact your doctor if:  ? · You still have sleep apnea even though you have made lifestyle changes. ? · You are thinking of trying a device such as CPAP. ? · You are having problems using a CPAP or similar machine. Where can you learn more? Go to http://feliz-arlene.info/. Enter U214 in the search box to learn more about \"Sleep Apnea: Care Instructions. \"  Current as of: May 12, 2017  Content Version: 11.4  © 3369-4208 [employer]. Care instructions adapted under license by Explorer.io (which disclaims liability or warranty for this information). If you have questions about a medical condition or this instruction, always ask your healthcare professional. [address] any warranty or liability for your use of this information.

## 2018-07-03 NOTE — TELEPHONE ENCOUNTER
Patient requesting call back to her cell at [phone]. Rigoberto Garrett has a 10AM dr appt at the sleep clinic, but would like for you to leave a message on her cell.  She was bitten by a tick. Joe Smith is sore and red and not healing.  She also has muscle aches and she has a hx of lymes. Rigoberto Garrett is very concerned and wanted to see Dr Genesis Bocanegra today. Lena Suggs advise.        Message received & copied from [facility]

## 2018-07-03 NOTE — PROGRESS NOTES
HISTORY OF PRESENT ILLNESS  Silvana Zhao is a 70 y.o. female. HPI   Last here 6/13/18. Pt is here for acute care. Pt had tick bite, which she discovered on Saturday on L leg  Also had a bite on R shoulder  Pt states the area on her leg has been red  Pt states she has been having pains down her legs, and an ache in her back today  Pt had Lyme disease 10 years ago, had a bull's eye that time  Discussed it likely not being Lyme's disease  Ordered doxycycline  Discussed avoiding the sun and not taking vitamins while on the doxycycline    Lov, pt had cyst on R breast  Pt has had this removed, healing    Patient Active Problem List    Diagnosis Date Noted    Irregular heart beat 10/31/2017    Pericardial effusion with cardiac tamponade 03/18/2017    S/P ablation of atrial fibrillation 03/17/2017    Acquired hypothyroidism 12/14/2015    ACP (advance care planning) 12/14/2015    HTN (hypertension) 07/21/2015    Thyroid activity decreased 04/14/2015    Atrial fibrillation ([facility] [address] 03/16/2015    A-fib ([facility] [address] 03/10/2015    Angina, class III ([facility] [address] 01/27/2015    Morbid obesity ([facility] [address]     Hyperlipidemia 07/14/2014    Hypokalemia 07/14/2014    Encounter for screening colonoscopy 08/21/2013    History of rectal bleeding 08/21/2013    Postmenopausal bleeding 10/18/2011    Endometrial polyp 10/18/2011     Current Outpatient Prescriptions   Medication Sig Dispense Refill    sotalol (BETAPACE) 80 mg tablet TAKE 1/2 TABLET BY MOUTH TWICE A DAY 30 Tab 3    ELIQUIS 5 mg tablet TAKE 1 TAB BY MOUTH TWO (2) TIMES A DAY. 60 Tab 6    rosuvastatin (CRESTOR) 20 mg tablet TAKE 1 TABLET BY MOUTH EVERY DAY 90 Tab 1    losartan (COZAAR) 25 mg tablet TAKE ONE TABLET BY MOUTH DAILY 90 Tab 1    cloNIDine HCl (CATAPRES) 0.2 mg tablet TAKE 1 TAB BY MOUTH TWO (2) TIMES A DAY. 180 Tab 1    potassium chloride SR (KLOR-CON 10) 10 mEq tablet Take 2 Tabs by mouth two (2) times a day.  360 Tab 1    levothyroxine (SYNTHROID) 75 mcg tablet Take 1 Tab by mouth Daily (before breakfast). 90 Tab 1    hydroCHLOROthiazide (HYDRODIURIL) 25 mg tablet TAKE 1 TAB BY MOUTH DAILY. 90 Tab 3    benzocaine (HURRICAINE) 20 % spra 2 Sprays by Mucous Membrane route as needed. 1 Can 0    nystatin (MYCOSTATIN) topical cream Apply  to affected area two (2) times a day. 30 g 1    verapamil ER (CALAN-SR) 120 mg tablet TAKE 1 TABLET BY MOUTH EVERY MORNING FOR BLOOD PRESSURE 90 Tab 2    CALCIUM CARBONATE/VITAMIN D3 (CALTRATE 600 + D PO) Take 1 Tab by mouth daily.  MULTIVITAMIN W-MINERALS/LUTEIN (CENTRUM SILVER PO) Take 1 Tab by mouth daily (after lunch). Past Surgical History:   Procedure Laterality Date    BREAST SURGERY PROCEDURE UNLISTED  1982    breast bx rt    CARDIAC SURG PROCEDURE UNLIST  01/2015    cardiac catheterization, no intervention, medical management    HX GYN      myomectomy on uterus    HX GYN  2011    hystereoscopy D&C    HX LYMPH NODE DISSECTION      biopsy    HX OTHER SURGICAL      lymph node bx      Lab Results  Component Value Date/Time   WBC 4.8 04/04/2018 09:04 AM   HGB 11.9 04/04/2018 09:04 AM   HCT 36.4 04/04/2018 09:04 AM   PLATELET 336 34/37/5079 09:04 AM   MCV 92 04/04/2018 09:04 AM     Lab Results  Component Value Date/Time   Cholesterol, total 137 04/04/2018 09:04 AM   HDL Cholesterol 43 04/04/2018 09:04 AM   LDL, calculated 68 04/04/2018 09:04 AM   Triglyceride 130 04/04/2018 09:04 AM     Lab Results  Component Value Date/Time   GFR est non-AA 53 (L) 04/04/2018 09:04 AM   GFR est AA 61 04/04/2018 09:04 AM   Creatinine 1.03 (H) 04/04/2018 09:04 AM   BUN 16 04/04/2018 09:04 AM   Sodium 143 04/04/2018 09:04 AM   Potassium 4.1 04/04/2018 09:04 AM   Chloride 102 04/04/2018 09:04 AM   CO2 26 04/04/2018 09:04 AM   Magnesium 2.2 08/17/2017 10:11 AM        Review of Systems   Respiratory: Negative for shortness of breath. Cardiovascular: Negative for chest pain.        Physical Exam   Constitutional: She is oriented to person, place, and time. She appears well-developed and well-nourished. No distress. HENT:   Head: Normocephalic and atraumatic. Mouth/Throat: Oropharynx is clear and moist. No oropharyngeal exudate. Eyes: Conjunctivae and EOM are normal. Right eye exhibits no discharge. Left eye exhibits no discharge. Neck: Normal range of motion. Neck supple. Cardiovascular: Normal rate, regular rhythm and normal heart sounds. Exam reveals no gallop and no friction rub. No murmur heard. Pulmonary/Chest: Effort normal and breath sounds normal. No respiratory distress. She has no wheezes. She has no rales. She exhibits no tenderness. Musculoskeletal: Normal range of motion. She exhibits no edema, tenderness or deformity. Lymphadenopathy:     She has no cervical adenopathy. Neurological: She is alert and oriented to person, place, and time. Coordination normal.   Skin: Skin is warm and dry. No rash noted. She is not diaphoretic. No erythema. No pallor. Quarter sixed are of erythythema, mild induration, no bull's eye  3 mm papule R shoulder  Incision healing on R breast   Psychiatric: She has a normal mood and affect. Her behavior is normal.       ASSESSMENT and PLAN    ICD-10-CM ICD-9-CM    1. Tick bite, initial encounter    Will treat with doxycycline BID 14 days, pt with erythema but no actual bull's eye   W57. XXXA 919.4      E906.4    2. Myalgia    Doubt related to current tick bite, of note she has arthritic pains and will be starting pt now that cyst healed   M79.1 729.1    3. Essential hypertension    Controlled   I10 401.9    4. Cellulitis of left lower extremity    Treat with doxycycline   L03.116 682.6         Scribed by José Miguel Grissom of [address] [place] [address] [address], as dictated by Dr. Carlton Nassar. Current diagnosis and concerns discussed with pt at length. Pt understands risks and benefits or current treatment plan and medications, and accepts the treatment and medication with any possible risks.  Pt asks appropriate questions, which were answered. Pt was instructed to call with any concerns or problems. This note will not be viewable in [address]

## 2018-07-03 NOTE — PROGRESS NOTES
[address] [place]. [place], [address]  Tel.  [phone]  Fax. [address] [facility] 60  [place], [address]  Tel.  [phone]  Fax. [phone] [facility] [address]  Tel.  [phone]  Fax. [phone]       Chief Complaint       Chief Complaint   Patient presents with    Sleep Problem     NP refd by Mendoza Winkler MD-snoring and ROMARIO symptoms       HPI      Zac Calderon is a  70y.o. year old female referred by Dr. Jacky Castellanos for evaluation of a sleep disorder  . The patient has a medical history of atrial fibrillation, status post ablation. She has a many year history of frequent nocturnal awakening. The patient retires at 8 :30-11 pm and awakens at 7-7: 30 am. The patient notes that she will experience frequent awakening from sleep. In general she is able to return to sleep after awakening. she tends to awaken spontaneously. The patient reports she has experienced excessive daytime sleepiness for a number of years. The patient experiences fatigue when seated and inactive.;  reading, doing puzzles, watching television. She notes that there are 2 soap operas she enjoys watching and she will often fall asleep during those. The severity of the day time fatigue has impacted the ability to function normally during the day. The patient has not undergone diagnostic testing for the current problems. [place] Sleepiness Score: 11       Allergies   Allergen Reactions    Adhesive Hives    Amoxicillin Unknown (comments)    Lipitor [Atorvastatin] Myalgia     Gets the flu       Current Outpatient Prescriptions   Medication Sig Dispense Refill    sotalol (BETAPACE) 80 mg tablet TAKE 1/2 TABLET BY MOUTH TWICE A DAY 30 Tab 3    ELIQUIS 5 mg tablet TAKE 1 TAB BY MOUTH TWO (2) TIMES A DAY.  60 Tab 6    rosuvastatin (CRESTOR) 20 mg tablet TAKE 1 TABLET BY MOUTH EVERY DAY 90 Tab 1    losartan (COZAAR) 25 mg tablet TAKE ONE TABLET BY MOUTH DAILY 90 Tab 1    cloNIDine HCl (CATAPRES) 0.2 mg tablet TAKE 1 TAB BY MOUTH TWO (2) TIMES A DAY. 180 Tab 1    potassium chloride SR (KLOR-CON 10) 10 mEq tablet Take 2 Tabs by mouth two (2) times a day. 360 Tab 1    levothyroxine (SYNTHROID) 75 mcg tablet Take 1 Tab by mouth Daily (before breakfast). 90 Tab 1    hydroCHLOROthiazide (HYDRODIURIL) 25 mg tablet TAKE 1 TAB BY MOUTH DAILY. 90 Tab 3    benzocaine (HURRICAINE) 20 % spra 2 Sprays by Mucous Membrane route as needed. 1 Can 0    nystatin (MYCOSTATIN) topical cream Apply  to affected area two (2) times a day. 30 g 1    verapamil ER (CALAN-SR) 120 mg tablet TAKE 1 TABLET BY MOUTH EVERY MORNING FOR BLOOD PRESSURE 90 Tab 2    CALCIUM CARBONATE/VITAMIN D3 (CALTRATE 600 + D PO) Take 1 Tab by mouth daily.  MULTIVITAMIN W-MINERALS/LUTEIN (CENTRUM SILVER PO) Take 1 Tab by mouth daily (after lunch). She  has a past medical history of Arrhythmia; Arthritis; Diverticulosis; Frequency of urination; High cholesterol; Hypertension; Morbid obesity ([address] Thyroid disease; and Unspecified adverse effect of anesthesia. She  has a past surgical history that includes hx other surgical; pr breast surgery procedure unlisted (1982); hx gyn; hx gyn (2011); hx lymph node dissection; and pr cardiac surg procedure unlist (01/2015). She family history includes Alzheimer in her mother; Cancer in her paternal aunt; Diabetes in her paternal grandmother; Heart Disease in her father and mother; Hypertension in her father. She  reports that she has never smoked. She has never used smokeless tobacco. She reports that she does not drink alcohol or use illicit drugs. Review of Systems:  Review of Systems   Constitutional: Positive for fever. Eyes: Positive for blurred vision. Negative for double vision. Respiratory: Negative for cough and wheezing. Cardiovascular: Positive for palpitations.    Gastrointestinal: Negative for abdominal pain and heartburn. Genitourinary: Positive for frequency and urgency. Skin: Positive for itching and rash. Neurological: Positive for dizziness. Negative for headaches. Endo/Heme/Allergies: Does not bruise/bleed easily. Psychiatric/Behavioral: Negative for depression. The patient is not nervous/anxious. Objective:     Visit Vitals    /78    Pulse 81    Ht 5' 5\" (1.651 m)    Wt 226 lb (102.5 kg)    SpO2 96%    BMI 37.61 kg/m2     Body mass index is 37.61 kg/(m^2). General:   Conversant, cooperative   Eyes: Pupils equal,no nystagmus   Oropharynx:   Mallampati score II, tongue normal   Tonsils:   tonsils normal   Neck:   No carotid bruits; Neck circ. in \"inches\": 15.25   Chest/Lungs:  Clear on auscultation    CVS:  Normal rate, regular rhythm   Skin:  Warm to touch; no obvious rashes   Neuro:  Speech fluent, face symmetrical, tongue movement normal   Psych:  Normal affect,  normal countenance        Assessment:       ICD-10-CM ICD-9-CM    1. ROMARIO (obstructive sleep apnea) G47.33 327.23 SPLIT CPAP/PSG     History consistent with sleep disordered breathing with frequent nocturnal awakening, nonrestorative sleep and daytime fatigue. She will evaluated with a sleep study. Plan:     Orders Placed This Encounter    SPLIT CPAP/PSG     Standing Status:   Future     Standing Expiration Date:   1/3/2019     Order Specific Question:   Reason for Exam     Answer:   snoring, nocturnal awakening       * Patient has a history and examination consistent with the diagnosis of sleep apnea. * Sleep testing was ordered for initial evaluation. * She was provided information on sleep apnea including corresponding risk factors and the importance of proper treatment. * Treatment options if indicated were reviewed today. Potential benefit of weight reduction was discussed. Weight reduction techniques reviewed.       Oren Osorio MD, Dolly Thrasher  07/03/18

## 2018-07-03 NOTE — TELEPHONE ENCOUNTER
Called, spoke to pt. Two pt identifiers confirmed. Pt offered and accepted appt for 7/3/18 1200. Pt verbalized understanding of information discussed w/ no further questions at this time.

## 2018-09-20 RX ORDER — SOTALOL HYDROCHLORIDE 80 MG/1
TABLET ORAL
Qty: 30 TAB | Refills: 3 | Status: SHIPPED | OUTPATIENT
Start: 2018-09-20 | End: 2018-12-20

## 2018-09-24 ENCOUNTER — HOSPITAL ENCOUNTER (OUTPATIENT)
Dept: SLEEP MEDICINE | Age: 78
Discharge: HOME | End: 2018-09-24
Payer: MEDICARE

## 2018-09-24 VITALS
HEART RATE: 76 BPM | OXYGEN SATURATION: 97 % | SYSTOLIC BLOOD PRESSURE: 168 MMHG | WEIGHT: 219.9 LBS | HEIGHT: 65 IN | DIASTOLIC BLOOD PRESSURE: 90 MMHG | BODY MASS INDEX: 36.64 KG/M2

## 2018-09-24 DIAGNOSIS — G47.33 OSA (OBSTRUCTIVE SLEEP APNEA): ICD-10-CM

## 2018-09-24 PROCEDURE — 95810 POLYSOM 6/> YRS 4/> PARAM: CPT | Performed by: SPECIALIST

## 2018-09-28 ENCOUNTER — TELEPHONE (OUTPATIENT)
Dept: SLEEP MEDICINE | Age: 78
End: 2018-09-28

## 2018-09-28 NOTE — TELEPHONE ENCOUNTER
Nocturnal polysomnogram did not demonstrate significant sleep disordered breathing; overall AHI of 0.6/h with minimal SaO2 91%. Sleep efficiency was reduced at 39.6%. At present, further polysomnographic recording not indicated. Chief technologist: Please review the sleep study with the patient.

## 2018-10-01 ENCOUNTER — DOCUMENTATION ONLY (OUTPATIENT)
Dept: SLEEP MEDICINE | Age: 78
End: 2018-10-01

## 2018-10-03 ENCOUNTER — HOSPITAL ENCOUNTER (OUTPATIENT)
Dept: LAB | Age: 78
Discharge: HOME | End: 2018-10-03
Payer: MEDICARE

## 2018-10-03 PROCEDURE — 36415 COLL VENOUS BLD VENIPUNCTURE: CPT

## 2018-10-03 PROCEDURE — 80053 COMPREHEN METABOLIC PANEL: CPT

## 2018-10-03 PROCEDURE — 85027 COMPLETE CBC AUTOMATED: CPT

## 2018-10-03 PROCEDURE — 84443 ASSAY THYROID STIM HORMONE: CPT

## 2018-10-03 PROCEDURE — 83036 HEMOGLOBIN GLYCOSYLATED A1C: CPT

## 2018-10-03 NOTE — TELEPHONE ENCOUNTER
Reviewed sleep study results with patient. She expressed understanding and will follow up with her PCP as she has an appointment next week.

## 2018-10-04 LAB
ALBUMIN SERPL-MCNC: 4.3 G/DL (ref 3.5–4.8)
ALBUMIN/GLOB SERPL: 1.7 {RATIO} (ref 1.2–2.2)
ALP SERPL-CCNC: 57 IU/L (ref 39–117)
ALT SERPL-CCNC: 11 IU/L (ref 0–32)
AST SERPL-CCNC: 17 IU/L (ref 0–40)
BILIRUB SERPL-MCNC: 0.4 MG/DL (ref 0–1.2)
BUN SERPL-MCNC: 14 MG/DL (ref 8–27)
BUN/CREAT SERPL: 16 (ref 12–28)
CALCIUM SERPL-MCNC: 9.5 MG/DL (ref 8.7–10.3)
CHLORIDE SERPL-SCNC: 104 MMOL/L (ref 96–106)
CO2 SERPL-SCNC: 25 MMOL/L (ref 20–29)
CREAT SERPL-MCNC: 0.9 MG/DL (ref 0.57–1)
ERYTHROCYTE [DISTWIDTH] IN BLOOD BY AUTOMATED COUNT: 13.9 % (ref 12.3–15.4)
EST. AVERAGE GLUCOSE BLD GHB EST-MCNC: 114 MG/DL
GLOBULIN SER CALC-MCNC: 2.6 G/DL (ref 1.5–4.5)
GLUCOSE SERPL-MCNC: 105 MG/DL (ref 65–99)
HBA1C MFR BLD: 5.6 % (ref 4.8–5.6)
HCT VFR BLD AUTO: 36.8 % (ref 34–46.6)
HGB BLD-MCNC: 12.6 G/DL (ref 11.1–15.9)
MCH RBC QN AUTO: 31.7 PG (ref 26.6–33)
MCHC RBC AUTO-ENTMCNC: 34.2 G/DL (ref 31.5–35.7)
MCV RBC AUTO: 93 FL (ref 79–97)
PLATELET # BLD AUTO: 179 X10E3/UL (ref 150–379)
POTASSIUM SERPL-SCNC: 4 MMOL/L (ref 3.5–5.2)
PROT SERPL-MCNC: 6.9 G/DL (ref 6–8.5)
RBC # BLD AUTO: 3.97 X10E6/UL (ref 3.77–5.28)
SODIUM SERPL-SCNC: 141 MMOL/L (ref 134–144)
TSH SERPL DL<=0.005 MIU/L-ACNC: 0.88 UIU/ML (ref 0.45–4.5)
WBC # BLD AUTO: 4.2 X10E3/UL (ref 3.4–10.8)

## 2018-10-10 ENCOUNTER — OFFICE VISIT (OUTPATIENT)
Dept: INTERNAL MEDICINE CLINIC | Age: 78
End: 2018-10-10

## 2018-10-10 VITALS
HEART RATE: 60 BPM | DIASTOLIC BLOOD PRESSURE: 76 MMHG | BODY MASS INDEX: 35.82 KG/M2 | OXYGEN SATURATION: 98 % | RESPIRATION RATE: 16 BRPM | HEIGHT: 65 IN | WEIGHT: 215 LBS | TEMPERATURE: 98 F | SYSTOLIC BLOOD PRESSURE: 125 MMHG

## 2018-10-10 DIAGNOSIS — Z23 ENCOUNTER FOR IMMUNIZATION: ICD-10-CM

## 2018-10-10 DIAGNOSIS — R25.2 CRAMPS OF LOWER EXTREMITY: ICD-10-CM

## 2018-10-10 DIAGNOSIS — E87.6 HYPOKALEMIA: ICD-10-CM

## 2018-10-10 DIAGNOSIS — R09.81 HEAD CONGESTION: ICD-10-CM

## 2018-10-10 DIAGNOSIS — I10 ESSENTIAL HYPERTENSION: Primary | Chronic | ICD-10-CM

## 2018-10-10 DIAGNOSIS — E66.9 OBESITY (BMI 35.0-39.9 WITHOUT COMORBIDITY): ICD-10-CM

## 2018-10-10 DIAGNOSIS — I48.0 PAROXYSMAL ATRIAL FIBRILLATION (HCC): ICD-10-CM

## 2018-10-10 DIAGNOSIS — R73.01 IFG (IMPAIRED FASTING GLUCOSE): ICD-10-CM

## 2018-10-10 DIAGNOSIS — E03.9 ACQUIRED HYPOTHYROIDISM: ICD-10-CM

## 2018-10-10 DIAGNOSIS — E78.2 MIXED HYPERLIPIDEMIA: ICD-10-CM

## 2018-10-10 NOTE — PROGRESS NOTES
HISTORY OF PRESENT ILLNESS Ned Uribe is a 70 y.o. female. HPI Last here 7/3/18. Pt is here to f/u on chronic conditions. 
   
BP today is 125/76 BP was 168/90 at sleep clinic, pt states it is elevated sometimes at home but not usually Of note, her home cuff read higher than our cuff in the past 
Continues on clonidine 0.2mg BID, HCTZ 25mg, verapamil 120mg and losartan 25mg daily Pt took these meds today Recall she had a cough on lisinopril  
    
Wt is down 11 lbs x lov Congratulated pt on this feat Pt does not think she was doing anything to cause this w/l Discussed diet and weight loss  
   
Reviewed last labs 10/18 Ordered labs 
  
Pt had a cardiac ablation for her afib per Dr. Luis Eduardo Phillips (cardio) on 3/17/17 Pt will only f/u with this physician prn  
   
Pt saw Dr. Ruth Sierra (cardio) for f/u after a fib Last visit was 12/19/17 Pt takes eliquis and sotalol for afib Pt denies any bleeding/falls on these meds Next visit scheduled for 12/20/18  
  
Pt saw Dr. Kerry Jacobs (cardio) for foot circulation Last visit was 8/1/17 Pt will only f/u with this physician prn  
  
Pt saw Dr. Nba Saucedo (uro-gyn) for urinary sx Last visit was 11/17 Pt was told she had lichen sclerosus Pt states that she used to have lichen sclerosus in her mouth - resolved Pt saw Dr Nam Rodriguez (sleep) Reviewed notes 7/3/18: * Patient has a history and examination consistent with the diagnosis of sleep apnea. * Sleep testing was ordered for initial evaluation. * She was provided information on sleep apnea including corresponding risk factors and the importance of proper treatment. * Treatment options if indicated were reviewed today. Potential benefit of weight reduction was discussed. Weight reduction techniques reviewed. 
 Pt had a poor experience and was told she has no sleep apnea Pt had a cyst on R breast removed by Dr Shan Keith (surg) 
   
Continues crestor 20mg daily for cholesterol, lipids at goal 
Yuliana Gonsalves 
 Continues klor-con 20meqs BID for her potassium Pt wonders about her K level, as she sometimes sees the pill in her stool Discussed that her K level is very good, and that she is likely just seeing the shell 
  
Continues on synthroid 75mcg daily Pt c/o cramps in her L leg that feels like a tightness Sx sometimes go into he rfoot Discussed that these could be idiopathic Pt wonders if this is a sx of a stroke, discussed this is not the case Advised stretching, trying tonic water Will check labs Pt c/o a \"fullness\" in her head, particularly in R side of forehead This does not always happen, last episode was 2 days ago She especially notices this when her BP is high, but this is not the only times it happens Denies sinus congestion Reviewed CT head 8/17: No acute abnormality. Advised trying flonase OTC Advised checking BP when that happens to see if it always correlates  
   
ACP not on file. SDM is her sister, Singh Becker. Provided information in the past. 
  
Recall She had a cervical polyp, was having vaginal bleeding, this was removed by julio césar, vaginal bleeding has since resolved.   
   
Recall She reports being given a cpap but states she does not have gama was told she just has jerking legs at night.    
   
PREVENTIVE:   
Colonoscopy: 8/21/13, Dr. Denise Yu, repeat 10 years  
Pap: Usually Dr. Sari Paredes, 10/18 Mammogram: 8/17, [facility], will get report for review, due 8/18 DEXA: 8/08/16, nl, due 8/18 Tdap: 4/14/2015 Pneumovax: 11/19/2013 [id number]: 4/05/2017 Zostavax: h/o shingles, declines Flu shot: 10/06/17 A1C: 11/14 6.1, 4/15 6.1, 7/15 6.1, 12/15 5.9, 4/16 5.8, 8/16 5.9, 10/16 5.8, 4/17 5.9, 10/17 POC 5.6, 4/18 5.6, 10/18 5.6 Eye exam: Dr. Yoseph Pandey, 8/18 Lipids: 4/18 LDL 68 Patient Active Problem List  
 Diagnosis Date Noted  Irregular heart beat 10/31/2017  Pericardial effusion with cardiac tamponade 03/18/2017  S/P ablation of atrial fibrillation 03/17/2017  Acquired hypothyroidism 12/14/2015  ACP (advance care planning) 12/14/2015  
 HTN (hypertension) 07/21/2015  Thyroid activity decreased 04/14/2015  Atrial fibrillation ([facility] [address] 03/16/2015  A-fib ([facility] [address] 03/10/2015  Angina, class III ([facility] [address] 01/27/2015  Morbid obesity ([facility] [address]  Hyperlipidemia 07/14/2014  Hypokalemia 07/14/2014  Encounter for screening colonoscopy 08/21/2013  History of rectal bleeding 08/21/2013  Postmenopausal bleeding 10/18/2011  Endometrial polyp 10/18/2011 Current Outpatient Prescriptions Medication Sig Dispense Refill  
 SYNTHROID 75 mcg tablet TAKE 1 TAB BY MOUTH DAILY (BEFORE BREAKFAST). 90 Tab 0  
 sotalol (BETAPACE) 80 mg tablet TAKE 1/2 TABLET BY MOUTH TWICE A DAY 30 Tab 3  
 ELIQUIS 5 mg tablet TAKE 1 TAB BY MOUTH TWO (2) TIMES A DAY. 60 Tab 6  
 rosuvastatin (CRESTOR) 20 mg tablet TAKE 1 TABLET BY MOUTH EVERY DAY 90 Tab 1  
 losartan (COZAAR) 25 mg tablet TAKE ONE TABLET BY MOUTH DAILY 90 Tab 1  cloNIDine HCl (CATAPRES) 0.2 mg tablet TAKE 1 TAB BY MOUTH TWO (2) TIMES A DAY. 180 Tab 1  potassium chloride SR (KLOR-CON 10) 10 mEq tablet Take 2 Tabs by mouth two (2) times a day. 360 Tab 1  
 hydroCHLOROthiazide (HYDRODIURIL) 25 mg tablet TAKE 1 TAB BY MOUTH DAILY. 90 Tab 3  verapamil ER (CALAN-SR) 120 mg tablet TAKE 1 TABLET BY MOUTH EVERY MORNING FOR BLOOD PRESSURE 90 Tab 2  
 CALCIUM CARBONATE/VITAMIN D3 (CALTRATE 600 + D PO) Take 1 Tab by mouth daily.  MULTIVITAMIN W-MINERALS/LUTEIN (CENTRUM SILVER PO) Take 1 Tab by mouth daily (after lunch).  benzocaine (HURRICAINE) 20 % spra 2 Sprays by Mucous Membrane route as needed. 1 Can 0  
 nystatin (MYCOSTATIN) topical cream Apply  to affected area two (2) times a day. 30 g 1 Past Surgical History:  
Procedure Laterality Date [address]  
 breast bx rt  CARDIAC SURG PROCEDURE UNLIST  01/2015 cardiac catheterization, no intervention, medical management  HX GYN    
 myomectomy on uterus  HX GYN  2011  
 hystereoscopy D&C  
 HX LYMPH NODE DISSECTION    
 biopsy  HX OTHER SURGICAL    
 lymph node bx Lab Results Component Value Date/Time WBC 4.2 10/03/2018 09:24 AM  
HGB 12.6 10/03/2018 09:24 AM  
HCT 36.8 10/03/2018 09:24 AM  
PLATELET 389 14/09/1703 09:24 AM  
MCV 93 10/03/2018 09:24 AM  
 
Lab Results Component Value Date/Time Cholesterol, total 137 04/04/2018 09:04 AM  
HDL Cholesterol 43 04/04/2018 09:04 AM  
LDL, calculated 68 04/04/2018 09:04 AM  
Triglyceride 130 04/04/2018 09:04 AM  
 
Lab Results Component Value Date/Time GFR est non-AA 62 10/03/2018 09:24 AM  
GFR est AA 72 10/03/2018 09:24 AM  
Creatinine 0.90 10/03/2018 09:24 AM  
BUN 14 10/03/2018 09:24 AM  
Sodium 141 10/03/2018 09:24 AM  
Potassium 4.0 10/03/2018 09:24 AM  
Chloride 104 10/03/2018 09:24 AM  
CO2 25 10/03/2018 09:24 AM  
Magnesium 2.2 08/17/2017 10:11 AM  
  
 
Review of Systems Respiratory: Negative for shortness of breath. Cardiovascular: Negative for chest pain. Musculoskeletal: Positive for myalgias. Psychiatric/Behavioral: Negative for depression. Physical Exam  
Constitutional: She is oriented to person, place, and time. She appears well-developed and well-nourished. No distress. HENT:  
Head: Normocephalic and atraumatic. Right Ear: External ear normal.  
Left Ear: External ear normal.  
Mouth/Throat: Oropharynx is clear and moist. No oropharyngeal exudate. Cerumen to R ear Eyes: Conjunctivae and EOM are normal. Right eye exhibits no discharge. Left eye exhibits no discharge. Neck: Normal range of motion. Neck supple. Cardiovascular: Normal rate, regular rhythm, normal heart sounds and intact distal pulses. Exam reveals no gallop and no friction rub. No murmur heard.  
Pulmonary/Chest: Effort normal and breath sounds normal. No respiratory distress. She has no wheezes. She has no rales. She exhibits no tenderness. Musculoskeletal: Normal range of motion. She exhibits no edema, tenderness or deformity. Lymphadenopathy:  
  She has no cervical adenopathy. Neurological: She is alert and oriented to person, place, and time. Coordination normal.  
Skin: Skin is warm and dry. No rash noted. She is not diaphoretic. No erythema. No pallor. Psychiatric: She has a normal mood and affect. Her behavior is normal.  
 
 
ASSESSMENT and PLAN 
  ICD-10-CM ICD-9-CM 1. Essential hypertension Controlled on clonodine, HCTZ, verapamil, and losartan, of note she gets some head fullness at times which she associates with elevated BP, discussed monitoring her BP closely when this happens I10 401.9 LIPID PANEL  
   METABOLIC PANEL, COMPREHENSIVE  
   TSH 3RD GENERATION  
   HEMOGLOBIN A1C WITH EAG 2. Encounter for immunization Z23 V03.89 INFLUENZA VACCINE INACTIVATED (IIV), SUBUNIT, ADJUVANTED, IM  
   ADMIN INFLUENZA VIRUS VAC  
   LIPID PANEL  
   METABOLIC PANEL, COMPREHENSIVE  
   TSH 3RD GENERATION  
   HEMOGLOBIN A1C WITH EAG 3. Paroxysmal atrial fibrillation (HCC) S/p ablation in the past, currently rhythm controlled on sotalol and continues on eliquis for anticoagulation I48.0 427.31 LIPID PANEL  
   METABOLIC PANEL, COMPREHENSIVE  
   TSH 3RD GENERATION  
   HEMOGLOBIN A1C WITH EAG  
4. Mixed hyperlipidemia Controlled on crestor, continue E78.2 272.2 LIPID PANEL  
   METABOLIC PANEL, COMPREHENSIVE  
   TSH 3RD GENERATION  
   HEMOGLOBIN A1C WITH EAG  
5. Hypokalemia Controlled on klor-con, continue no change to dose E87.6 276.8 LIPID PANEL  
   METABOLIC PANEL, COMPREHENSIVE  
   TSH 3RD GENERATION  
   HEMOGLOBIN A1C WITH EAG 6. Acquired hypothyroidism At goal on synthroid 75mcgs daily  E03.9 244.9 LIPID PANEL  
   METABOLIC PANEL, COMPREHENSIVE  
   TSH 3RD GENERATION  
   HEMOGLOBIN A1C WITH EAG  
 7. Obesity (BMI 35.0-39.9 without comorbidity) Wt improved over last year, discussed need for continued diet and w/l and portion control E66.9 278.00 LIPID PANEL  
   METABOLIC PANEL, COMPREHENSIVE  
   TSH 3RD GENERATION  
   HEMOGLOBIN A1C WITH EAG  
8. IFG (impaired fasting glucose) a1c stable, continue with w/l 
 R73.01 790.21 LIPID PANEL  
   METABOLIC PANEL, COMPREHENSIVE  
   TSH 3RD GENERATION  
   HEMOGLOBIN A1C WITH EAG 9. Cramps of lower extremity Discussed stretching and tonic water, will check CK level with next labs R25.2 729.82 CK 10. Head congestion Will try flonase in case this is related to sinus congestion, had negative head CT a year ago, she reports this is an ongoing problem and not new, will check BP when she has these sx to see if elevated BP is contributing R09.81 478.19   
 will return for cerumen removal  
 
Depression screen reviewed and negative. Scribed by Orville Ford of [facility], as dictated by Dr. Emily Beltran. Current diagnosis and concerns discussed with pt at length. Pt understands risks and benefits or current treatment plan and medications, and accepts the treatment and medication with any possible risks. Pt asks appropriate questions, which were answered. Pt was instructed to call with any concerns or problems. This note will not be viewable in [address]

## 2018-10-10 NOTE — PATIENT INSTRUCTIONS
Tonic water over the counter as needed Labs 1 week prior to follow up  
 
flonase nasal spray in the morning

## 2018-10-10 NOTE — MR AVS SNAPSHOT
[address] [facility] 
[phone] Patient: Todd Willingham MRN: [id number] AFB:[id number] Visit Information Date & Time Provider Department Dept. Phone Encounter #  
 10/10/2018 12:00 PM Giuliano Spears, [address] [place] [address],[address] [phone] [id number] Follow-up Instructions Return in about 6 months (around 4/10/2019). Your Appointments 12/20/2018 10:45 AM  
ESTABLISHED PATIENT with ECHO, [facility] [address]) [address] [facility]  
[phone] [address] [facility] Upcoming Health Maintenance Date Due Shingrix Vaccine Age 50> (1 of 2) 2/26/1992 GLAUCOMA SCREENING Q2Y 8/1/2018 Influenza Age 5 to Adult 8/1/2018 MEDICARE YEARLY EXAM 4/12/2019 COLONOSCOPY 8/21/2023 DTaP/Tdap/Td series (2 - Td) 4/14/2025 Allergies as of 10/10/2018  Review Complete On: 7/3/2018 By: Giuliano Spears MD  
  
 Severity Noted Reaction Type Reactions Adhesive  07/14/2014    Hives Amoxicillin  10/12/2011    Unknown (comments) Lipitor [Atorvastatin]  07/14/2014   Side Effect Myalgia Gets the flu Current Immunizations  Reviewed on 4/5/2017 Name Date Influenza High Dose Vaccine PF 10/6/2017 Influenza Vaccine 11/18/2014 Influenza Vaccine (Quad) 12/14/2015 12:17 PM  
 Influenza Vaccine (Quad) PF 10/18/2016 Influenza Vaccine (Tri) Adjuvanted 10/10/2018 Pneumococcal Conjugate (PCV-13) 4/5/2017  2:45 PM  
 Pneumococcal Vaccine (Unspecified Type) 11/19/2013 Tdap 4/14/2015 Not reviewed this visit You Were Diagnosed With   
  
 Codes Comments Essential hypertension    -  Primary ICD-10-CM: I10 
ICD-9-CM: 401.9 Encounter for immunization     ICD-10-CM: S98 ICD-9-CM: V03.89 Paroxysmal atrial fibrillation (HCC)     ICD-10-CM: I48.0 ICD-9-CM: 427.31 Mixed hyperlipidemia     ICD-10-CM: E78.2 ICD-9-CM: 272.2 Hypokalemia     ICD-10-CM: E87.6 ICD-9-CM: 276.8 Acquired hypothyroidism     ICD-10-CM: E03.9 ICD-9-CM: 244.9 Obesity (BMI 35.0-39.9 without comorbidity)     ICD-10-CM: A14.3 ICD-9-CM: 278.00 IFG (impaired fasting glucose)     ICD-10-CM: R73.01 
ICD-9-CM: 790.21 Cramps of lower extremity     ICD-10-CM: R25.2 ICD-9-CM: 729.82 Head congestion     ICD-10-CM: R09.81 ICD-9-CM: 478.19 Vitals BP Pulse Temp Resp Height(growth percentile) Weight(growth percentile) 125/76 (BP 1 Location: Left arm, BP Patient Position: Sitting) 60 98 °F (36.7 °C) (Oral) 16 5' 5\" (1.651 m) 215 lb (97.5 kg) SpO2 BMI OB Status Smoking Status 98% 35.78 kg/m2 Postmenopausal Never Smoker Vitals History BMI and BSA Data Body Mass Index Body Surface Area 35.78 kg/m 2 2.11 m 2 Preferred Pharmacy Pharmacy Name Phone [id number]- [place], [place] - [address] [phone] Your Updated Medication List  
  
   
This list is accurate as of 10/10/18 12:28 PM.  Always use your most recent med list.  
  
  
  
  
 benzocaine 20 % Spra Commonly known as:  HURRICAINE  
2 Sprays by Mucous Membrane route as needed. CALTRATE 600 + D PO Take 1 Tab by mouth daily. CENTRUM SILVER PO Take 1 Tab by mouth daily (after lunch). cloNIDine HCl 0.2 mg tablet Commonly known as:  CATAPRES  
TAKE 1 TAB BY MOUTH TWO (2) TIMES A DAY. ELIQUIS 5 mg tablet Generic drug:  apixaban TAKE 1 TAB BY MOUTH TWO (2) TIMES A DAY. hydroCHLOROthiazide 25 mg tablet Commonly known as:  HYDRODIURIL  
TAKE 1 TAB BY MOUTH DAILY. losartan 25 mg tablet Commonly known as:  COZAAR  
TAKE ONE TABLET BY MOUTH DAILY  
  
 nystatin topical cream  
Commonly known as:  MYCOSTATIN Apply  to affected area two (2) times a day. potassium chloride SR 10 mEq tablet Commonly known as:  KLOR-CON 10 Take 2 Tabs by mouth two (2) times a day. rosuvastatin 20 mg tablet Commonly known as:  CRESTOR  
TAKE 1 TABLET BY MOUTH EVERY DAY  
  
 sotalol 80 mg tablet Commonly known as:  BETAPACE  
TAKE 1/2 TABLET BY MOUTH TWICE A DAY  
  
 SYNTHROID 75 mcg tablet Generic drug:  levothyroxine TAKE 1 TAB BY MOUTH DAILY (BEFORE BREAKFAST). verapamil  mg tablet Commonly known as:  CALAN-SR  
TAKE 1 TABLET BY MOUTH EVERY MORNING FOR BLOOD PRESSURE We Performed the Following ADMIN INFLUENZA VIRUS VAC [ [facility]] INFLUENZA VACCINE INACTIVATED (IIV), SUBUNIT, ADJUVANTED, IM [id number] CPT(R)] Follow-up Instructions Return in about 6 months (around 4/10/2019). To-Do List   
 10/11/2018 Lab:  HEMOGLOBIN A1C WITH EAG   
  
 10/11/2018 Lab:  LIPID PANEL   
  
 10/11/2018 Lab:  METABOLIC PANEL, COMPREHENSIVE   
  
 10/11/2018 Lab:  TSH 3RD GENERATION   
  
 04/10/2019 Lab:  CK Patient Instructions Tonic water over the counter as needed Labs 1 week prior to follow up  
 
flonase nasal spray in the morning Introducing [facility] & HEALTH SERVICES! Dear Chucho Fields: Thank you for requesting a [employer] account. Our records indicate that you already have an active [employer] account. You can access your account anytime at [email or web address] Did you know that you can access your hospital and ER discharge instructions at any time in [employer]? You can also review all of your test results from your hospital stay or ER visit. Additional Information If you have questions, please visit the Frequently Asked Questions section of the [employer] website at [email or web address] Remember, [employer] is NOT to be used for urgent needs. For medical emergencies, dial 911. Now available from your iPhone and Android! Please provide this summary of care documentation to your next provider. Your primary care clinician is listed as Racheal Foot. If you have any questions after today's visit, please call [phone].

## 2018-10-11 ENCOUNTER — CLINICAL SUPPORT (OUTPATIENT)
Dept: INTERNAL MEDICINE CLINIC | Age: 78
End: 2018-10-11

## 2018-10-11 DIAGNOSIS — H61.20 WAX IN EAR: Primary | ICD-10-CM

## 2018-10-11 NOTE — PROGRESS NOTES
Pt here for ear flush to right ear. Wax removed with warm water and hydrogen peroxide. Pt tolerated well with no complaints.

## 2018-10-12 RX ORDER — POTASSIUM CHLORIDE 750 MG/1
TABLET, FILM COATED, EXTENDED RELEASE ORAL
Qty: 360 TAB | Refills: 1 | Status: SHIPPED | OUTPATIENT
Start: 2018-10-12 | End: 2018-10-17 | Stop reason: SDUPTHER

## 2018-10-12 RX ORDER — POTASSIUM CHLORIDE 750 MG/1
TABLET, FILM COATED, EXTENDED RELEASE ORAL
Qty: 360 TAB | Refills: 1 | Status: SHIPPED | OUTPATIENT
Start: 2018-10-12 | End: 2018-10-12 | Stop reason: SDUPTHER
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
## 2018-10-12 NOTE — TELEPHONE ENCOUNTER
PCP: Kaci Waterman MD    Last appt: 10/11/2018  Future Appointments  Date Time Provider Marilin Ventura   12/20/2018 10:45 AM ECHO, [facility] SCHED   4/17/2019 11:30 AM Kaci Waterman MD [facility] 87       Requested Prescriptions     Pending Prescriptions Disp Refills    potassium chloride SR (KLOR-CON 10) 10 mEq tablet 360 Tab 1     Sig: TAKE 2 TABS BY MOUTH TWO (2) TIMES A DAY.

## 2018-10-15 RX ORDER — CLONIDINE HYDROCHLORIDE 0.2 MG/1
TABLET ORAL
Qty: 180 TAB | Refills: 1 | Status: SHIPPED | OUTPATIENT
Start: 2018-10-15 | End: 2018-12-20 | Stop reason: SDUPTHER

## 2018-10-15 RX ORDER — LOSARTAN POTASSIUM 25 MG/1
TABLET ORAL
Qty: 90 TAB | Refills: 1 | Status: SHIPPED | OUTPATIENT
Start: 2018-10-15 | End: 2019-04-17 | Stop reason: SDUPTHER

## 2018-10-15 RX ORDER — ROSUVASTATIN CALCIUM 20 MG/1
TABLET, COATED ORAL
Qty: 90 TAB | Refills: 1 | Status: SHIPPED | OUTPATIENT
Start: 2018-10-15 | End: 2018-12-20 | Stop reason: SDUPTHER

## 2018-10-16 RX ORDER — LOSARTAN POTASSIUM 25 MG/1
TABLET ORAL
Qty: 90 TAB | Refills: 1 | Status: SHIPPED | OUTPATIENT
Start: 2018-10-16 | End: 2018-12-20 | Stop reason: SDUPTHER

## 2018-10-16 RX ORDER — CLONIDINE HYDROCHLORIDE 0.2 MG/1
TABLET ORAL
Qty: 180 TAB | Refills: 1 | Status: SHIPPED | OUTPATIENT
Start: 2018-10-16 | End: 2019-04-17 | Stop reason: SDUPTHER

## 2018-10-16 RX ORDER — ROSUVASTATIN CALCIUM 20 MG/1
TABLET, COATED ORAL
Qty: 90 TAB | Refills: 1 | Status: SHIPPED | OUTPATIENT
Start: 2018-10-16 | End: 2019-04-17 | Stop reason: SDUPTHER
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
## 2018-10-17 RX ORDER — POTASSIUM CHLORIDE 750 MG/1
TABLET, FILM COATED, EXTENDED RELEASE ORAL
Qty: 360 TAB | Refills: 1 | Status: SHIPPED | OUTPATIENT
Start: 2018-10-17 | End: 2019-04-17 | Stop reason: SDUPTHER

## 2018-10-17 NOTE — TELEPHONE ENCOUNTER
PCP: Nieves Dhillon MD    Last appt: 10/11/2018  Future Appointments   Date Time Provider Marilin Ventura   12/20/2018 10:45 AM Rony CARNES   4/17/2019 11:30 AM [facility]       Requested Prescriptions     Pending Prescriptions Disp Refills    potassium chloride SR (KLOR-CON 10) 10 mEq tablet 360 Tab 1     Sig: TAKE 2 TABS BY MOUTH TWO (2) TIMES A DAY.

## 2018-12-16 RX ORDER — APIXABAN 5 MG/1
TABLET, FILM COATED ORAL
Qty: 60 TAB | Refills: 6 | Status: SHIPPED | OUTPATIENT
Start: 2018-12-16 | End: 2019-07-15 | Stop reason: SDUPTHER

## 2018-12-20 ENCOUNTER — OFFICE VISIT (OUTPATIENT)
Dept: CARDIOLOGY CLINIC | Age: 78
End: 2018-12-20

## 2018-12-20 VITALS
BODY MASS INDEX: 35.4 KG/M2 | DIASTOLIC BLOOD PRESSURE: 82 MMHG | HEART RATE: 68 BPM | SYSTOLIC BLOOD PRESSURE: 118 MMHG | OXYGEN SATURATION: 98 % | WEIGHT: 212.5 LBS | HEIGHT: 65 IN | RESPIRATION RATE: 18 BRPM

## 2018-12-20 DIAGNOSIS — I49.9 IRREGULAR HEART BEAT: Primary | ICD-10-CM

## 2018-12-20 DIAGNOSIS — I48.19 PERSISTENT ATRIAL FIBRILLATION (HCC): ICD-10-CM

## 2018-12-20 DIAGNOSIS — I10 ESSENTIAL HYPERTENSION: ICD-10-CM

## 2018-12-20 DIAGNOSIS — E78.2 MIXED HYPERLIPIDEMIA: ICD-10-CM

## 2018-12-20 NOTE — PROGRESS NOTES
1. Have you been to the ER, urgent care clinic since your last visit? Hospitalized since your last visit? No.      2. Have you seen or consulted any other health care providers outside of the [address] [place] [address] since your last visit? Include any pap smears or colon screening. Seen by PCP.         Chief Complaint   Patient presents with    Annual Exam     some heart fluttering-denies any new cardiac symptoms

## 2018-12-20 NOTE — PROGRESS NOTES
Subjective:      Cha Dykes is a 70 y.o. female is here for f/u of her afib. She complains of fatigue. The patient denies chest pain/ shortness of breath, orthopnea, PND, LE edema, palpitations, syncope, presyncope.        Patient Active Problem List    Diagnosis Date Noted    Irregular heart beat 10/31/2017    Pericardial effusion with cardiac tamponade 03/18/2017    S/P ablation of atrial fibrillation 03/17/2017    Acquired hypothyroidism 12/14/2015    ACP (advance care planning) 12/14/2015    HTN (hypertension) 07/21/2015    Thyroid activity decreased 04/14/2015    Atrial fibrillation ([address] 03/16/2015    A-fib ([facility] 03/10/2015    Angina, class III ([address] 01/27/2015    Morbid obesity ([facility]     Hyperlipidemia 07/14/2014    Hypokalemia 07/14/2014    Encounter for screening colonoscopy 08/21/2013    History of rectal bleeding 08/21/2013    Postmenopausal bleeding 10/18/2011    Endometrial polyp 10/18/2011      Ilana Sweet MD  Past Medical History:   Diagnosis Date    Arrhythmia     Arthritis     right knee, left shoulder    Diverticulosis     Frequency of urination     High cholesterol     Hypertension     Morbid obesity ([address]     Thyroid disease     hypothyroid    Unspecified adverse effect of anesthesia     low BP      Past Surgical History:   Procedure Laterality Date    BREAST SURGERY PROCEDURE UNLISTED  1982    breast bx rt    CARDIAC SURG PROCEDURE UNLIST  01/2015    cardiac catheterization, no intervention, medical management    HX GYN      myomectomy on uterus    HX GYN  2011    hystereoscopy D&C    HX LYMPH NODE DISSECTION      biopsy    HX OTHER SURGICAL      lymph node bx     Allergies   Allergen Reactions    Adhesive Hives    Amoxicillin Unknown (comments)    Lipitor [Atorvastatin] Myalgia     Gets the flu      Family History   Problem Relation Age of Onset    Alzheimer Mother     Heart Disease Mother     Heart Disease Father     Hypertension Father  Cancer Paternal Aunt     Diabetes Paternal Grandmother     negative for cardiac disease  Social History     Socioeconomic History    Marital status:      Spouse name: Not on file    Number of children: Not on file    Years of education: Not on file    Highest education level: Not on file   Tobacco Use    Smoking status: Never Smoker    Smokeless tobacco: Never Used   Substance and Sexual Activity    Alcohol use: No    Drug use: No     Current Outpatient Medications   Medication Sig    ELIQUIS 5 mg tablet TAKE 1 TAB BY MOUTH TWO (2) TIMES A DAY.  potassium chloride SR (KLOR-CON 10) 10 mEq tablet TAKE 2 TABS BY MOUTH TWO (2) TIMES A DAY.  cloNIDine HCl (CATAPRES) 0.2 mg tablet TAKE 1 TAB BY MOUTH TWO (2) TIMES A DAY.  rosuvastatin (CRESTOR) 20 mg tablet TAKE 1 TABLET BY MOUTH EVERY DAY    losartan (COZAAR) 25 mg tablet TAKE ONE TABLET BY MOUTH DAILY    SYNTHROID 75 mcg tablet TAKE 1 TAB BY MOUTH DAILY (BEFORE BREAKFAST).  hydroCHLOROthiazide (HYDRODIURIL) 25 mg tablet TAKE 1 TAB BY MOUTH DAILY.  verapamil ER (CALAN-SR) 120 mg tablet TAKE 1 TABLET BY MOUTH EVERY MORNING FOR BLOOD PRESSURE    CALCIUM CARBONATE/VITAMIN D3 (CALTRATE 600 + D PO) Take 1 Tab by mouth daily.  MULTIVITAMIN W-MINERALS/LUTEIN (CENTRUM SILVER PO) Take 1 Tab by mouth daily (after lunch).  nystatin (MYCOSTATIN) topical cream Apply  to affected area two (2) times a day. No current facility-administered medications for this visit. Vitals:    12/20/18 1046   BP: 118/82   Pulse: 68   Resp: 18   SpO2: 98%   Weight: 212 lb 8 oz (96.4 kg)   Height: 5' 5\" (1.651 m)       I have reviewed the nurses notes, vitals, problem list, allergy list, medical history, family, social history and medications. Review of Symptoms:    General: Pt denies excessive weight gain or loss. Pt is able to conduct ADL's  HEENT: Denies blurred vision, headaches, epistaxis and difficulty swallowing.   Respiratory: Denies shortness of breath, ANGUIANO, wheezing or stridor. Cardiovascular: Denies precordial pain, palpitations, edema or PND  Gastrointestinal: Denies poor appetite, indigestion, abdominal pain or blood in stool  Urinary: Denies dysuria, pyuria  Musculoskeletal: Denies pain or swelling from muscles or joints  Neurologic: Denies tremor, paresthesias, or sensory motor disturbance  Skin: Denies rash, itching or texture change. Psych: Denies depression      Physical Exam:      General: Well developed, in no acute distress. HEENT: Eyes - PERRL, no jvd  Heart:  Normal S1/S2 negative S3 or S4. Regular, no murmur, gallop or rub.   Respiratory: Clear bilaterally x 4, no wheezing or rales  Abdomen:   Soft, non-tender, bowel sounds are active.   Extremities:  No edema, normal cap refill, no cyanosis. Musculoskeletal: No clubbing  Neuro: A&Ox3, speech clear, gait stable. Skin: Skin color is normal. No rashes or lesions.  Non diaphoretic  Vascular: 2+ pulses symmetric in all extremities    Cardiographics    Ekg: nsr    Results for orders placed or performed in visit on 10/24/17   CARDIAC HOLTER MONITOR, 24 HOURS    Narrative    ECG Monitor/24 hours, Complete    Reason for Holter Monitor   PAF    Heartbeat    Slowest 51  Average 67  Fastest  102      Results:   Underlying Rhythm: Normal sinus rhythm      Atrial Arrhythmias: premature atrial contractions; occasional, atrial couplets and atrial triplets            AV Conduction: normal    Ventricular Arrhythmias: premature ventricular contractions; occasional     ST Segment Analysis:normal     Symptom Correlation:  none    Comment:   Sinus rhythm throughout with occasional atrial ectopy     Neetu Oconnell MD, [facility], [facility]      Results for orders placed or performed during the hospital encounter of 08/17/17   EKG, 12 LEAD, INITIAL   Result Value Ref Range    Ventricular Rate 87 BPM    Atrial Rate 87 BPM    P-R Interval 158 ms    QRS Duration 82 ms    Q-T Interval 400 ms    QTC Calculation (Bezet) 481 ms    Calculated P Axis 36 degrees    Calculated R Axis -15 degrees    Calculated T Axis 35 degrees    Diagnosis       Normal sinus rhythm  Cannot rule out Inferior infarct , age undetermined  When compared with ECG of 18-MAR-2017 02:53,  No significant change was found  Confirmed by Carmen Hill ([id number]) on 8/17/2017 8:29:00 PM           Lab Results   Component Value Date/Time    WBC 4.2 10/03/2018 09:24 AM    HGB 12.6 10/03/2018 09:24 AM    HCT 36.8 10/03/2018 09:24 AM    PLATELET 342 37/38/0989 09:24 AM    MCV 93 10/03/2018 09:24 AM      Lab Results   Component Value Date/Time    Sodium 141 10/03/2018 09:24 AM    Potassium 4.0 10/03/2018 09:24 AM    Chloride 104 10/03/2018 09:24 AM    CO2 25 10/03/2018 09:24 AM    Anion gap 5 08/17/2017 10:11 AM    Glucose 105 (H) 10/03/2018 09:24 AM    BUN 14 10/03/2018 09:24 AM    Creatinine 0.90 10/03/2018 09:24 AM    BUN/Creatinine ratio 16 10/03/2018 09:24 AM    GFR est AA 72 10/03/2018 09:24 AM    GFR est non-AA 62 10/03/2018 09:24 AM    Calcium 9.5 10/03/2018 09:24 AM    Bilirubin, total 0.4 10/03/2018 09:24 AM    AST (SGOT) 17 10/03/2018 09:24 AM    Alk. phosphatase 57 10/03/2018 09:24 AM    Protein, total 6.9 10/03/2018 09:24 AM    Albumin 4.3 10/03/2018 09:24 AM    Globulin 3.9 08/17/2017 10:11 AM    A-G Ratio 1.7 10/03/2018 09:24 AM    ALT (SGPT) 11 10/03/2018 09:24 AM         Assessment:     Assessment:        ICD-10-CM ICD-9-CM    1. Irregular heart beat I49.9 427.9 AMB POC EKG ROUTINE W/ 12 LEADS, INTER & REP   2. Persistent atrial fibrillation (HCC) I48.1 427.31    3. Essential hypertension I10 401.9    4. Mixed hyperlipidemia E78.2 272.2      Orders Placed This Encounter    AMB POC EKG ROUTINE W/ 12 LEADS, INTER & REP     Order Specific Question:   Reason for Exam:     Answer:   routine        Plan:   Ms Kadeem Light is complaining of fatigue. She is in sinus. We will stop her sotalol. She will cont her oac. She will cont med rx for htn and hyperlipidemia.  She will f/u in 4 weeks to reassess her symptoms. Continue medical management for htn, hyperlipidemia and AF. Thank you for allowing me to participate in Rom Gold 's care.     [address] Radford MD, [facility]

## 2019-01-02 RX ORDER — LEVOTHYROXINE SODIUM 75 UG/1
TABLET ORAL
Qty: 90 TAB | Refills: 0 | Status: SHIPPED | OUTPATIENT
Start: 2019-01-02 | End: 2019-03-31 | Stop reason: SDUPTHER

## 2019-01-09 DIAGNOSIS — I10 ESSENTIAL HYPERTENSION: Chronic | ICD-10-CM

## 2019-01-09 RX ORDER — VERAPAMIL HYDROCHLORIDE 120 MG/1
TABLET, FILM COATED, EXTENDED RELEASE ORAL
Qty: 90 TAB | Refills: 1 | Status: SHIPPED | OUTPATIENT
Start: 2019-01-09 | End: 2019-01-24 | Stop reason: SDUPTHER

## 2019-01-24 ENCOUNTER — OFFICE VISIT (OUTPATIENT)
Dept: CARDIOLOGY CLINIC | Age: 79
End: 2019-01-24

## 2019-01-24 VITALS
SYSTOLIC BLOOD PRESSURE: 160 MMHG | DIASTOLIC BLOOD PRESSURE: 80 MMHG | RESPIRATION RATE: 18 BRPM | HEIGHT: 65 IN | HEART RATE: 84 BPM | WEIGHT: 209 LBS | OXYGEN SATURATION: 95 % | BODY MASS INDEX: 34.82 KG/M2

## 2019-01-24 DIAGNOSIS — E03.9 ACQUIRED HYPOTHYROIDISM: ICD-10-CM

## 2019-01-24 DIAGNOSIS — I48.91 ATRIAL FIBRILLATION, UNSPECIFIED TYPE (HCC): ICD-10-CM

## 2019-01-24 DIAGNOSIS — I10 ESSENTIAL HYPERTENSION: ICD-10-CM

## 2019-01-24 DIAGNOSIS — E66.01 MORBID OBESITY (HCC): ICD-10-CM

## 2019-01-24 DIAGNOSIS — Z86.79 S/P ABLATION OF ATRIAL FIBRILLATION: ICD-10-CM

## 2019-01-24 DIAGNOSIS — R53.82 CHRONIC FATIGUE: ICD-10-CM

## 2019-01-24 DIAGNOSIS — Z98.890 S/P ABLATION OF ATRIAL FIBRILLATION: ICD-10-CM

## 2019-01-24 DIAGNOSIS — I49.9 IRREGULAR HEART BEAT: Primary | ICD-10-CM

## 2019-01-24 NOTE — PROGRESS NOTES
Subjective:      Natalee Mejía is a 70 y.o. female is here for EP consult. The patient denies chest pain/ shortness of breath, orthopnea, PND, LE edema, palpitations, syncope, presyncope. Continues to feel some fatigue. States she has never gained her strength back since PVI in 2017.      Patient Active Problem List    Diagnosis Date Noted    Irregular heart beat 10/31/2017    Pericardial effusion with cardiac tamponade 03/18/2017    S/P ablation of atrial fibrillation 03/17/2017    Acquired hypothyroidism 12/14/2015    ACP (advance care planning) 12/14/2015    HTN (hypertension) 07/21/2015    Thyroid activity decreased 04/14/2015    Atrial fibrillation ([address] 03/16/2015    A-fib ([address] 03/10/2015    Angina, class III ([address] 01/27/2015    Morbid obesity ([address]     Hyperlipidemia 07/14/2014    Hypokalemia 07/14/2014    Encounter for screening colonoscopy 08/21/2013    History of rectal bleeding 08/21/2013    Postmenopausal bleeding 10/18/2011    Endometrial polyp 10/18/2011      Mimi Schultz MD  Past Medical History:   Diagnosis Date    Arrhythmia     Arthritis     right knee, left shoulder    Diverticulosis     Frequency of urination     High cholesterol     Hypertension     Morbid obesity ([address]     Thyroid disease     hypothyroid    Unspecified adverse effect of anesthesia     low BP      Past Surgical History:   Procedure Laterality Date    BREAST SURGERY PROCEDURE UNLISTED  1982    breast bx rt    CARDIAC SURG PROCEDURE UNLIST  01/2015    cardiac catheterization, no intervention, medical management    HX GYN      myomectomy on uterus    HX GYN  2011    hystereoscopy D&C    HX LYMPH NODE DISSECTION      biopsy    HX OTHER SURGICAL      lymph node bx     Allergies   Allergen Reactions    Adhesive Hives    Amoxicillin Unknown (comments)    Lipitor [Atorvastatin] Myalgia     Gets the flu      Family History   Problem Relation Age of Onset    Alzheimer Mother     Heart Disease Mother     Heart Disease Father     Hypertension Father     Cancer Paternal Aunt     Diabetes Paternal Grandmother     negative for cardiac disease  Social History     Socioeconomic History    Marital status:      Spouse name: Not on file    Number of children: Not on file    Years of education: Not on file    Highest education level: Not on file   Tobacco Use    Smoking status: Never Smoker    Smokeless tobacco: Never Used   Substance and Sexual Activity    Alcohol use: No    Drug use: No     Current Outpatient Medications   Medication Sig    SYNTHROID 75 mcg tablet TAKE 1 TAB BY MOUTH DAILY (BEFORE BREAKFAST).  ELIQUIS 5 mg tablet TAKE 1 TAB BY MOUTH TWO (2) TIMES A DAY.  potassium chloride SR (KLOR-CON 10) 10 mEq tablet TAKE 2 TABS BY MOUTH TWO (2) TIMES A DAY.  cloNIDine HCl (CATAPRES) 0.2 mg tablet TAKE 1 TAB BY MOUTH TWO (2) TIMES A DAY.  rosuvastatin (CRESTOR) 20 mg tablet TAKE 1 TABLET BY MOUTH EVERY DAY    losartan (COZAAR) 25 mg tablet TAKE ONE TABLET BY MOUTH DAILY    hydroCHLOROthiazide (HYDRODIURIL) 25 mg tablet TAKE 1 TAB BY MOUTH DAILY.  verapamil ER (CALAN-SR) 120 mg tablet TAKE 1 TABLET BY MOUTH EVERY MORNING FOR BLOOD PRESSURE    CALCIUM CARBONATE/VITAMIN D3 (CALTRATE 600 + D PO) Take 1 Tab by mouth daily.  MULTIVITAMIN W-MINERALS/LUTEIN (CENTRUM SILVER PO) Take 1 Tab by mouth daily (after lunch).  nystatin (MYCOSTATIN) topical cream Apply  to affected area two (2) times a day. No current facility-administered medications for this visit. Vitals:    01/24/19 1049   BP: 160/80   Pulse: 84   Resp: 18   SpO2: 95%   Weight: 209 lb (94.8 kg)   Height: 5' 5\" (1.651 m)       I have reviewed the nurses notes, vitals, problem list, allergy list, medical history, family, social history and medications. Review of Symptoms:    General: Pt denies excessive weight gain or loss.  Pt is able to conduct ADL's  HEENT: Denies blurred vision, headaches, epistaxis and difficulty swallowing. Respiratory: Denies shortness of breath, ANGUIANO, wheezing or stridor. Cardiovascular: Denies precordial pain, palpitations, edema or PND  Gastrointestinal: Denies poor appetite, indigestion, abdominal pain or blood in stool  Urinary: Denies dysuria, pyuria  Musculoskeletal: Denies pain or swelling from muscles or joints  Neurologic: Denies tremor, paresthesias, or sensory motor disturbance  Skin: Denies rash, itching or texture change. Psych: Denies depression      Physical Exam:      General: Well developed, in no acute distress. HEENT: Eyes - PERRL, no jvd  Heart:  Normal S1/S2 negative S3 or S4. Regular, no murmur, gallop or rub.   Respiratory: Clear bilaterally x 4, no wheezing or rales  Extremities:  No edema, normal cap refill, no cyanosis. Musculoskeletal: No clubbing  Neuro: A&Ox3, speech clear, gait stable. Skin: Skin color is normal. No rashes or lesions. Non diaphoretic  Vascular: 2+ pulses symmetric in all extremities    Cardiographics    Ekg: Sinus  Rhythm   Low voltage in precordial leads.    -Old inferior infarct  -Old anterior infarct.  Ventricular rate 84    Results for orders placed or performed in visit on 10/24/17   CARDIAC HOLTER MONITOR, 24 HOURS    Narrative    ECG Monitor/24 hours, Complete    Reason for Holter Monitor   PAF    Heartbeat    Slowest 51  Average 67  Fastest  102      Results:   Underlying Rhythm: Normal sinus rhythm      Atrial Arrhythmias: premature atrial contractions; occasional, atrial couplets and atrial triplets            AV Conduction: normal    Ventricular Arrhythmias: premature ventricular contractions; occasional     ST Segment Analysis:normal     Symptom Correlation:  none    Comment:   Sinus rhythm throughout with occasional atrial ectopy     Mayco Marie MD, Piter Abbott      Results for orders placed or performed during the hospital encounter of 08/17/17   EKG, 12 LEAD, INITIAL   Result Value Ref Range    Ventricular Rate 87 BPM    Atrial Rate 87 BPM    P-R Interval 158 ms    QRS Duration 82 ms    Q-T Interval 400 ms    QTC Calculation (Bezet) 481 ms    Calculated P Axis 36 degrees    Calculated R Axis -15 degrees    Calculated T Axis 35 degrees    Diagnosis       Normal sinus rhythm  Cannot rule out Inferior infarct , age undetermined  When compared with ECG of 18-MAR-2017 02:53,  No significant change was found  Confirmed by Teena Kulkarni ([id number]) on 8/17/2017 8:29:00 PM           Lab Results   Component Value Date/Time    WBC 4.2 10/03/2018 09:24 AM    HGB 12.6 10/03/2018 09:24 AM    HCT 36.8 10/03/2018 09:24 AM    PLATELET 791 93/68/2379 09:24 AM    MCV 93 10/03/2018 09:24 AM      Lab Results   Component Value Date/Time    Sodium 141 10/03/2018 09:24 AM    Potassium 4.0 10/03/2018 09:24 AM    Chloride 104 10/03/2018 09:24 AM    CO2 25 10/03/2018 09:24 AM    Anion gap 5 08/17/2017 10:11 AM    Glucose 105 (H) 10/03/2018 09:24 AM    BUN 14 10/03/2018 09:24 AM    Creatinine 0.90 10/03/2018 09:24 AM    BUN/Creatinine ratio 16 10/03/2018 09:24 AM    GFR est AA 72 10/03/2018 09:24 AM    GFR est non-AA 62 10/03/2018 09:24 AM    Calcium 9.5 10/03/2018 09:24 AM    Bilirubin, total 0.4 10/03/2018 09:24 AM    AST (SGOT) 17 10/03/2018 09:24 AM    Alk. phosphatase 57 10/03/2018 09:24 AM    Protein, total 6.9 10/03/2018 09:24 AM    Albumin 4.3 10/03/2018 09:24 AM    Globulin 3.9 08/17/2017 10:11 AM    A-G Ratio 1.7 10/03/2018 09:24 AM    ALT (SGPT) 11 10/03/2018 09:24 AM      Lab Results   Component Value Date/Time    TSH 0.878 10/03/2018 09:24 AM        Assessment:            ICD-10-CM ICD-9-CM    1. Irregular heart beat I49.9 427.9 AMB POC EKG ROUTINE W/ 12 LEADS, INTER & REP   2. Essential hypertension I10 401.9    3. S/P ablation of atrial fibrillation Z98.890 V45.89     Z86.79     4. Atrial fibrillation, unspecified type ([facility] I48.91 427.31    5. Morbid obesity ([facility] E66.01 278.01    6.  Acquired hypothyroidism E03.9 244.9      Orders Placed This Encounter    AMB POC EKG ROUTINE W/ 12 LEADS, INTER & REP     Order Specific Question:   Reason for Exam:     Answer:   routine        Plan:     Ms Sally Gary is s/p PVI 3/17, complaining of fatigue. She is in sinus off sotalol x 1 mo. She will cont her oac. Her symptoms are modestly better. Strong fam hx of ASHD in 45s and 46s. With her continued fatigue almost 2 years post PVI, off sotalol, will assess for ischemia. Is negative, follow up in 1 year. Continue medical management for htn, hyperlipidemia and AF. Thank you for allowing me to participate in Kyle Parker 's care. Rigo Abdalla NP    Patient seen and examined. All pertinent data reviewed. I have reviewed detailed note as outlined by Rigo Abdalla NP. Case discussed with Nursing/medical assistant staff and Rigo Abdalla NP. Plans as outlined. In sinus - still fatigued off of sotalol. Cont oac for her paf. Cont med rx for htn and hyperlipidemia. Several RF and fam hx - will obtain a stress test. F/u in one year if nl.     Chele Tamayo MD, Heide Montano

## 2019-01-24 NOTE — PROGRESS NOTES
1. Have you been to the ER, urgent care clinic since your last visit? Hospitalized since your last visit? No    2. Have you seen or consulted any other health care providers outside of the [address] [place] [address] since your last visit? Include any pap smears or colon screening. No    Chief Complaint   Patient presents with    Irregular Heart Beat     1 mo appt. S/P med adjustment. Denied cardiac symptoms.

## 2019-02-12 ENCOUNTER — CLINICAL SUPPORT (OUTPATIENT)
Dept: CARDIOLOGY CLINIC | Age: 79
End: 2019-02-12

## 2019-02-12 VITALS
HEIGHT: 65 IN | DIASTOLIC BLOOD PRESSURE: 80 MMHG | SYSTOLIC BLOOD PRESSURE: 130 MMHG | WEIGHT: 209 LBS | BODY MASS INDEX: 34.82 KG/M2

## 2019-02-12 DIAGNOSIS — R07.9 CHEST PAIN, UNSPECIFIED TYPE: ICD-10-CM

## 2019-02-12 LAB
STRESS BASELINE DIAS BP: 80 MMHG
STRESS BASELINE HR: 67 BPM
STRESS BASELINE SYS BP: 130 MMHG
STRESS PEAK DIAS BP: 66 MMHG
STRESS PEAK SYS BP: 140 MMHG
STRESS PERCENT HR ACHIEVED: 83 %
STRESS POST PEAK HR: 101 BPM
STRESS RATE PRESSURE PRODUCT: NORMAL BPM*MMHG
STRESS TARGET HR: 122 BPM

## 2019-04-01 RX ORDER — LEVOTHYROXINE SODIUM 75 UG/1
TABLET ORAL
Qty: 90 TAB | Refills: 0 | Status: SHIPPED | OUTPATIENT
Start: 2019-04-01 | End: 2019-04-17 | Stop reason: SDUPTHER

## 2019-04-10 ENCOUNTER — HOSPITAL ENCOUNTER (OUTPATIENT)
Dept: LAB | Age: 79
Discharge: HOME | End: 2019-04-10
Payer: MEDICARE

## 2019-04-10 PROCEDURE — 80061 LIPID PANEL: CPT

## 2019-04-10 PROCEDURE — 82550 ASSAY OF CK (CPK): CPT

## 2019-04-10 PROCEDURE — 84443 ASSAY THYROID STIM HORMONE: CPT

## 2019-04-10 PROCEDURE — 80053 COMPREHEN METABOLIC PANEL: CPT

## 2019-04-10 PROCEDURE — 36415 COLL VENOUS BLD VENIPUNCTURE: CPT

## 2019-04-10 PROCEDURE — 83036 HEMOGLOBIN GLYCOSYLATED A1C: CPT

## 2019-04-11 LAB
ALBUMIN SERPL-MCNC: 4.3 G/DL (ref 3.5–4.8)
ALBUMIN/GLOB SERPL: 1.7 {RATIO} (ref 1.2–2.2)
ALP SERPL-CCNC: 65 IU/L (ref 39–117)
ALT SERPL-CCNC: 14 IU/L (ref 0–32)
AST SERPL-CCNC: 16 IU/L (ref 0–40)
BILIRUB SERPL-MCNC: 0.3 MG/DL (ref 0–1.2)
BUN SERPL-MCNC: 14 MG/DL (ref 8–27)
BUN/CREAT SERPL: 15 (ref 12–28)
CALCIUM SERPL-MCNC: 9.7 MG/DL (ref 8.7–10.3)
CHLORIDE SERPL-SCNC: 104 MMOL/L (ref 96–106)
CHOLEST SERPL-MCNC: 129 MG/DL (ref 100–199)
CK SERPL-CCNC: 100 U/L (ref 24–173)
CO2 SERPL-SCNC: 26 MMOL/L (ref 20–29)
CREAT SERPL-MCNC: 0.94 MG/DL (ref 0.57–1)
EST. AVERAGE GLUCOSE BLD GHB EST-MCNC: 114 MG/DL
GLOBULIN SER CALC-MCNC: 2.5 G/DL (ref 1.5–4.5)
GLUCOSE SERPL-MCNC: 95 MG/DL (ref 65–99)
HBA1C MFR BLD: 5.6 % (ref 4.8–5.6)
HDLC SERPL-MCNC: 49 MG/DL
LDLC SERPL CALC-MCNC: 58 MG/DL (ref 0–99)
POTASSIUM SERPL-SCNC: 3.9 MMOL/L (ref 3.5–5.2)
PROT SERPL-MCNC: 6.8 G/DL (ref 6–8.5)
SODIUM SERPL-SCNC: 145 MMOL/L (ref 134–144)
TRIGL SERPL-MCNC: 112 MG/DL (ref 0–149)
TSH SERPL DL<=0.005 MIU/L-ACNC: 0.8 UIU/ML (ref 0.45–4.5)
VLDLC SERPL CALC-MCNC: 22 MG/DL (ref 5–40)

## 2019-04-16 ENCOUNTER — DOCUMENTATION ONLY (OUTPATIENT)
Dept: INTERNAL MEDICINE CLINIC | Age: 79
End: 2019-04-16

## 2019-04-16 NOTE — PROGRESS NOTES
Medicare Part B Preventive Services  Limitations  Recommendation  Scheduled    Bone Mass Measurement  (age 72 & older, biennial)  Requires diagnosis related to osteoporosis or estrogen deficiency. Biennial benefit unless patient has history of long-term glucocorticoid tx or baseline is needed because initial test was by other method  Completed 8/08/16      Recommended every 2 years  Due now   Cardiovascular Screening Blood Tests (every 5 years)  Total cholesterol, HDL, Triglycerides  Order as a panel if possible  Completed 4/2019      Recommended annually  Due 4/2020   Colorectal Cancer Screening  -Fecal occult blood test (annual)  -Flexible sigmoidoscopy (5y)  -Screening colonoscopy (10y)  -Barium Enema     Completed 8/21/2013 with Dr. Sallie Harvey      Recommended every 10 years  Due 8/2023    Counseling to Prevent Tobacco Use (up to 8 sessions per year)  - Counseling greater than 3 and up to 10 minutes  - Counseling greater than 10 minutes  Patients must be asymptomatic of tobacco-related conditions to receive as preventive service  N/A  N/A    Diabetes Screening Tests (at least every 3 years, Medicare covers annually or at 6-month intervals for prediabetic patients)      Fasting blood sugar (FBS) or glucose tolerance test (GTT)  Patient must be diagnosed with one of the following:  -Hypertension, Dyslipidemia, obesity, previous impaired FBS or GTT  Or any two of the following: overweight, FH of diabetes, age ? 72, history of gestational diabetes, birth of baby weighing more than 9 pounds  Completed 4/2019 with A1C 5.6      Recommended annually   Due 4/2020   Diabetes Self-Management Training (DSMT) (no USPSTF recommendation)  Requires referral by treating physician for patient with diabetes or renal disease. 10 hours of initial DSMT session of no less than 30 minutes each in a continuous 12-month period. 2 hours of follow-up DSMT in subsequent years.   N/A  N/A    Glaucoma Screening (no USPSTF recommendation) Diabetes mellitus, family history, , age 48 or over,  American, age 72 or over  Completed 8/2018      Recommended annually  Due 8/2019   Human Immunodeficiency Virus (HIV) Screening (annually for increased risk patients)  HIV-1 and HIV-2 by EIA, RAY, rapid antibody test, or oral mucosa transudate  Patient must be at increased risk for HIV infection per USPSTF guidelines or pregnant. Tests covered annually for patients at increased risk. Pregnant patients may receive up to 3 test during pregnancy. N/A  N/A    Medical Nutrition Therapy (MNT) (for diabetes or renal disease not recommended schedule)  Requires referral by treating physician for patient with diabetes or renal disease. Can be provided in same year as diabetes self-management training (DSMT), and CMS recommends medical nutrition therapy take place after DSMT. Up to 3 hours for initial year and 2 hours in subsequent years. N/A  N/A          Seasonal Influenza Vaccination (annually)     Completed 10/2018      Recommended annually  Due Fall 2019   Pneumococcal Vaccination (once after 72)     Pneumovax: 11/2013       Prevnar 13: 4/2017     Both recommended once over the age of 72  Completed         Completed    Hepatitis B Vaccinations (if medium/high risk)  Medium/high risk factors: End-stage renal disease,  Hemophiliacs who received Factor VIII or IX concentrates, Clients of institutions for the mentally retarded, Persons who live in the same house as a HepB virus carrier, Homosexual men, Illicit injectable drug abusers. N/A  N/A    Screening Mammography (biennial age 54-69)?   Annually (age 36 or over)  Completed 8/2017      Recommended annually     Due now   Screening Pap Tests and Pelvic Examination (up to age 79 and after 79 if unknown history or abnormal study last 10 years)  Every 24 months except high risk  Completed 10/2018      Recommended every 2 years  Due 10/2020   Ultrasound Screening for Abdominal Aortic Aneurysm (AAA) (once)  Patient must be referred through [facility] and not have had a screening for abdominal aortic aneurysm before under Medicare.  Limited to patients who meet one of the following criteria:  - Men who are 73-68 years old and have smoked more than 100 cigarettes in their lifetime.  -Anyone with a FH of AAA  -Anyone recommended for screening by USPSTF  Completed CTA chest 3/2017 - negative Completed

## 2019-04-17 ENCOUNTER — OFFICE VISIT (OUTPATIENT)
Dept: INTERNAL MEDICINE CLINIC | Age: 79
End: 2019-04-17

## 2019-04-17 VITALS
WEIGHT: 201 LBS | OXYGEN SATURATION: 97 % | TEMPERATURE: 98 F | RESPIRATION RATE: 16 BRPM | SYSTOLIC BLOOD PRESSURE: 126 MMHG | HEART RATE: 79 BPM | HEIGHT: 65 IN | DIASTOLIC BLOOD PRESSURE: 79 MMHG | BODY MASS INDEX: 33.49 KG/M2

## 2019-04-17 DIAGNOSIS — R63.4 WEIGHT LOSS: ICD-10-CM

## 2019-04-17 DIAGNOSIS — I10 ESSENTIAL HYPERTENSION: Primary | ICD-10-CM

## 2019-04-17 DIAGNOSIS — Z00.00 MEDICARE ANNUAL WELLNESS VISIT, SUBSEQUENT: ICD-10-CM

## 2019-04-17 DIAGNOSIS — D17.9 LIPOMA, UNSPECIFIED SITE: ICD-10-CM

## 2019-04-17 DIAGNOSIS — I48.19 PERSISTENT ATRIAL FIBRILLATION (HCC): ICD-10-CM

## 2019-04-17 DIAGNOSIS — E87.6 HYPOKALEMIA: ICD-10-CM

## 2019-04-17 DIAGNOSIS — E03.9 ACQUIRED HYPOTHYROIDISM: ICD-10-CM

## 2019-04-17 DIAGNOSIS — R63.4 LOSS OF WEIGHT: Primary | ICD-10-CM

## 2019-04-17 DIAGNOSIS — E66.01 MORBID OBESITY (HCC): ICD-10-CM

## 2019-04-17 RX ORDER — POTASSIUM CHLORIDE 750 MG/1
TABLET, FILM COATED, EXTENDED RELEASE ORAL
Qty: 360 TAB | Refills: 1 | Status: SHIPPED | OUTPATIENT
Start: 2019-04-17 | End: 2019-06-21 | Stop reason: SDUPTHER

## 2019-04-17 RX ORDER — HYDROCHLOROTHIAZIDE 25 MG/1
25 TABLET ORAL DAILY
Qty: 90 TAB | Refills: 1 | Status: SHIPPED | OUTPATIENT
Start: 2019-04-17 | End: 2019-12-30

## 2019-04-17 RX ORDER — LOSARTAN POTASSIUM 25 MG/1
25 TABLET ORAL DAILY
Qty: 90 TAB | Refills: 1 | Status: SHIPPED | OUTPATIENT
Start: 2019-04-17 | End: 2019-06-21 | Stop reason: SDUPTHER

## 2019-04-17 RX ORDER — CLONIDINE HYDROCHLORIDE 0.2 MG/1
TABLET ORAL
Qty: 180 TAB | Refills: 1 | Status: SHIPPED | OUTPATIENT
Start: 2019-04-17 | End: 2020-04-08

## 2019-04-17 RX ORDER — CLONIDINE HYDROCHLORIDE 0.2 MG/1
0.2 TABLET ORAL 2 TIMES DAILY
Qty: 180 TAB | Refills: 1 | Status: SHIPPED | OUTPATIENT
Start: 2019-04-17 | End: 2019-06-21 | Stop reason: SDUPTHER

## 2019-04-17 RX ORDER — ROSUVASTATIN CALCIUM 20 MG/1
20 TABLET, COATED ORAL DAILY
Qty: 90 TAB | Refills: 1 | Status: SHIPPED | OUTPATIENT
Start: 2019-04-17 | End: 2019-06-21 | Stop reason: SDUPTHER

## 2019-04-17 RX ORDER — LEVOTHYROXINE SODIUM 75 UG/1
75 TABLET ORAL
Qty: 90 TAB | Refills: 1 | Status: SHIPPED | OUTPATIENT
Start: 2019-04-17 | End: 2019-12-23

## 2019-04-17 RX ORDER — ROSUVASTATIN CALCIUM 20 MG/1
TABLET, COATED ORAL
Qty: 90 TAB | Refills: 1 | Status: SHIPPED | OUTPATIENT
Start: 2019-04-17 | End: 2020-04-20

## 2019-04-17 RX ORDER — LOSARTAN POTASSIUM 25 MG/1
TABLET ORAL
Qty: 90 TAB | Refills: 1 | Status: SHIPPED | OUTPATIENT
Start: 2019-04-17 | End: 2020-04-08

## 2019-04-17 RX ORDER — POTASSIUM CHLORIDE 750 MG/1
TABLET, FILM COATED, EXTENDED RELEASE ORAL
Qty: 360 TAB | Refills: 1 | Status: SHIPPED | OUTPATIENT
Start: 2019-04-17 | End: 2020-04-08

## 2019-04-17 NOTE — PROGRESS NOTES
HISTORY OF PRESENT ILLNESS Sam Morfin is a 70 y.o. female. HPI Last here 10/10/18. Pt is here to f/u on chronic conditions. 
   
BP today is 126/79 BP at home around 179 (high), 95, 120s-130s/70s Of note, her home cuff read higher than our cuff in the past 
Continues on clonidine 0.2mg BID, HCTZ 25mg, verapamil 120mg and losartan 25mg daily Pt took these meds today Recall she had a cough on lisinopril  
    
Wt today is 201 lbs --down 14 lbs x lov, down 25lbs x 1 year Congratulated pt on this feat Pt watches what she eats, but is not actively working on w/l Reviewed labs Ordered CXR Discussed diet and weight loss  
   
Reviewed labs 4/19 
  
Pt had a cardiac ablation for her afib per Dr. Samson Abbott (cardio) on 3/17/17 Pt will only f/u with this physician prn  
   
Pt saw Dr. Chapin Phillips (cardio) for f/u after a fib Reviewed notes 1/24/19: Ms Mark José is s/p PVI 3/17, complaining of fatigue. She is in sinus off sotalol x 1 mo. She will cont her oac. Her symptoms are modestly better. Strong fam hx of ASHD in 45s and 46s. With her continued fatigue almost 2 years post PVI, off sotalol, will assess for ischemia. Is negative, follow up in 1 year. Continue medical management for htn, hyperlipidemia and AF. Thank you for allowing me to participate in Sam Morfin 's care. Sima Samson NP Patient seen and examined. All pertinent data reviewed. I have reviewed detailed note as outlined by Sima Samson NP. Case discussed with Nursing/medical assistant staff and Sima Samson NP. Plans as outlined. In sinus - still fatigued off of sotalol. Cont oac for her paf. Cont med rx for htn and hyperlipidemia. Several RF and fam hx - will obtain a stress test. F/u in one year if nl. Pt was taken off of sotalol Feels better more energy with this Pt continues on eliquis 5mg bid for afib   
Pt denies any bleeding/falls on these meds 4/19 
  
Pt saw Dr. Migdalia Rubio (cardio) for foot circulation Last visit was 8/1/17 Recall has chronic sx of leg tightness Pt will only f/u with this physician prn  
  
Pt saw Dr. Santino Mejía (uro-gyn) for urinary sx Last visit was 11/17 Pt was told she had lichen sclerosus Pt states that she used to have lichen sclerosus in her mouth - resolved 
  
Pt saw Dr Isabel Chavira (sleep) Last visit was 7/3/18 Reviewed telephone note 9/18: no significant sleep disordered breathing, overall AHI of 0.6/h Pt had a poor experience and was told she has no sleep apnea 
  
Pt had a cyst on R breast removed by Dr Devonte Miller (surg) in the past 
   
Continues crestor 20mg daily for cholesterol, lipids at goal 
   
Continues klor-con 20meqs BID for her potassium 
  
Continues on synthroid 75mcg daily  
  
Pt c/o cramps in her toes/tightness in lower leg when she gets up This goes away--recurrent issue, not new Had pad eval and labs done Discussed trying tonic water Pt c/o her L hand shaking sometimes She gets red in the face when this happens, for about an hour This does not always happen with doing something or with rest 
 
Pt wonders about her nails turning red Discussed this being age related changes Pt c/o her R ear popping sometimes Discussed that it looks nl Discussed that popping could be related to pressure from allergies, for instance Discussed nasal spray Pt c/o fatty deposits around her body, which appear to be lipomas Discussed surgeon removing larger lipoma However pt would like to avoid this if it is not necessary 
   
ACP not on file.  SDM is her sister, Iman Rebollar. Provided information  
  
Recall She had a cervical polyp, was having vaginal bleeding, this was removed by julio césar, vaginal bleeding has since resolved.   
   
PREVENTIVE:   
Colonoscopy: 8/21/13, Dr. Clair Ramirez, repeat 10 years  
Pap: Usually Dr. Lena Stearns, 10/18 Mammogram: summer 2018, [facility], will get report for review DEXA: 8/08/16, nl, due 8/18 Tdap: 4/14/2015 Pneumovax: 11/19/2013 [id number]: 4/05/2017 Zostavax: declines Shingrix: ordered 04/17/19, h/o shingles Flu shot: 10/2018 A1C: , 12/15 5.9, 4/16 5.8, 8/16 5.9, 10/16 5.8, 4/17 5.9, 10/17 POC 5.6, 4/18 5.6, 10/18 5.6, 4/19 5.6 Eye exam: Dr. Damaris Carmen, 8/18 Lipids: 4/19 LDL 58 Patient Active Problem List  
 Diagnosis Date Noted  Irregular heart beat 10/31/2017  Pericardial effusion with cardiac tamponade 03/18/2017  S/P ablation of atrial fibrillation 03/17/2017  Acquired hypothyroidism 12/14/2015  ACP (advance care planning) 12/14/2015  
 HTN (hypertension) 07/21/2015  Thyroid activity decreased 04/14/2015  Atrial fibrillation ([facility] [address] 03/16/2015  A-fib ([facility] [address] 03/10/2015  Angina, class III ([facility] [address] 01/27/2015  Morbid obesity ([facility] [address]  Hyperlipidemia 07/14/2014  Hypokalemia 07/14/2014  Encounter for screening colonoscopy 08/21/2013  History of rectal bleeding 08/21/2013  Postmenopausal bleeding 10/18/2011  Endometrial polyp 10/18/2011 Current Outpatient Medications Medication Sig Dispense Refill  hydroCHLOROthiazide (HYDRODIURIL) 25 mg tablet TAKE 1 TABLET BY MOUTH EVERY DAY 90 Tab 0  
 SYNTHROID 75 mcg tablet TAKE 1 TAB BY MOUTH DAILY (BEFORE BREAKFAST). 90 Tab 0  
 ELIQUIS 5 mg tablet TAKE 1 TAB BY MOUTH TWO (2) TIMES A DAY. 60 Tab 6  potassium chloride SR (KLOR-CON 10) 10 mEq tablet TAKE 2 TABS BY MOUTH TWO (2) TIMES A DAY. 360 Tab 1  cloNIDine HCl (CATAPRES) 0.2 mg tablet TAKE 1 TAB BY MOUTH TWO (2) TIMES A DAY. 180 Tab 1  
 rosuvastatin (CRESTOR) 20 mg tablet TAKE 1 TABLET BY MOUTH EVERY DAY 90 Tab 1  
 losartan (COZAAR) 25 mg tablet TAKE ONE TABLET BY MOUTH DAILY 90 Tab 1  verapamil ER (CALAN-SR) 120 mg tablet TAKE 1 TABLET BY MOUTH EVERY MORNING FOR BLOOD PRESSURE 90 Tab 2  
 CALCIUM CARBONATE/VITAMIN D3 (CALTRATE 600 + D PO) Take 1 Tab by mouth daily.  MULTIVITAMIN W-MINERALS/LUTEIN (CENTRUM SILVER PO) Take 1 Tab by mouth daily (after lunch).  nystatin (MYCOSTATIN) topical cream Apply  to affected area two (2) times a day. 30 g 1 Past Surgical History:  
Procedure Laterality Date [address]  
 breast bx rt  CARDIAC SURG PROCEDURE UNLIST  01/2015  
 cardiac catheterization, no intervention, medical management  HX GYN    
 myomectomy on uterus  HX GYN  2011  
 hystereoscopy D&C  
 HX LYMPH NODE DISSECTION    
 biopsy  HX OTHER SURGICAL    
 lymph node bx Lab Results Component Value Date/Time WBC 4.2 10/03/2018 09:24 AM  
 HGB 12.6 10/03/2018 09:24 AM  
 HCT 36.8 10/03/2018 09:24 AM  
 PLATELET 519 00/50/6297 09:24 AM  
 MCV 93 10/03/2018 09:24 AM  
 
Lab Results Component Value Date/Time Cholesterol, total 129 04/10/2019 08:34 AM  
 HDL Cholesterol 49 04/10/2019 08:34 AM  
 LDL, calculated 58 04/10/2019 08:34 AM  
 Triglyceride 112 04/10/2019 08:34 AM  
 
Lab Results Component Value Date/Time GFR est non-AA 59 (L) 04/10/2019 08:34 AM  
 GFR est AA 68 04/10/2019 08:34 AM  
 Creatinine 0.94 04/10/2019 08:34 AM  
 BUN 14 04/10/2019 08:34 AM  
 Sodium 145 (H) 04/10/2019 08:34 AM  
 Potassium 3.9 04/10/2019 08:34 AM  
 Chloride 104 04/10/2019 08:34 AM  
 CO2 26 04/10/2019 08:34 AM  
 Magnesium 2.2 08/17/2017 10:11 AM  
  
Review of Systems Respiratory: Negative for shortness of breath. Cardiovascular: Negative for chest pain. Physical Exam  
Constitutional: She is oriented to person, place, and time. She appears well-developed and well-nourished. No distress. HENT:  
Head: Normocephalic and atraumatic. Right Ear: External ear normal.  
Left Ear: External ear normal.  
Mouth/Throat: Oropharynx is clear and moist. No oropharyngeal exudate. Eyes: Conjunctivae and EOM are normal. Right eye exhibits no discharge. Left eye exhibits no discharge. Neck: Normal range of motion. Neck supple. No carotid bruits Cardiovascular: Normal rate and regular rhythm. Exam reveals no gallop and no friction rub. Murmur (2/6) heard. Pulmonary/Chest: Effort normal and breath sounds normal. No respiratory distress. She has no wheezes. She has no rales. She exhibits no tenderness. Abdominal: She exhibits no distension. There is no tenderness. There is no rebound and no guarding. Lipomas Musculoskeletal: She exhibits no edema, tenderness or deformity. Lymphadenopathy:  
  She has no cervical adenopathy. Neurological: She is alert and oriented to person, place, and time. Coordination abnormal.  
Skin: Skin is warm and dry. No rash noted. She is not diaphoretic. No erythema. No pallor. Varicose veins Psychiatric: She has a normal mood and affect. Her behavior is normal.  
 
 
ASSESSMENT and PLAN 
  ICD-10-CM ICD-9-CM 1. Essential hypertension Controlled on clonidine, HCTZ, verapamil, and losartan, continue no change to dose I10 401.9 2. Medicare annual wellness visit, subsequent Z00.00 V70.0 3. Persistent atrial fibrillation ([facility] [address] No longer on sotalol, energy has improved since then, remains anticoagulated on eliquis, no recent bleeding or injury I48.1 427.31   
4. Lipoma, unspecified site Has several lipomas, discussed that lesions consistent with lipomas, would have to confirm by bx, discussed surg referral, for now she would like to hold off but will get these evaluated if any change or become painful 
 D17.9 214.9 5. Hypokalemia Controlled on klor-con 20meqs BID 
 E87.6 276.8 6. Morbid obesity ([facility] [address] Wt is down 25 lbs in last year, congratulated her on this, stressed need for continued w/l, she feels she has not made any significant dietary changes though she does try to eat small portions so she is not sure if she should be losing wt, reviewed labs which were extensive and in nl range, discussed keeping cancer screening UTD and will check CXR 
 E66.01 278.01   
 7. Acquired hypothyroidism Controlled on synthroid 75mcgs daily E03.9 244.9 8. Weight loss See above R63.4 783.21 Depression screen reviewed and negative. Scribed by Renee Huizar of [facility], as dictated by Dr. Madi Miranda. Current diagnosis and concerns discussed with pt at length. Pt understands risks and benefits or current treatment plan and medications, and accepts the treatment and medication with any possible risks. Pt asks appropriate questions, which were answered. Pt was instructed to call with any concerns or problems. I have reviewed the note documented by the scribe. The services provided are my own. The documentation is accurate

## 2019-04-17 NOTE — PATIENT INSTRUCTIONS
Medicare Part B Preventive Services  Limitations  Recommendation  Scheduled Bone Mass Measurement 
(age 72 & older, biennial)  Requires diagnosis related to osteoporosis or estrogen deficiency. Biennial benefit unless patient has history of long-term glucocorticoid tx or baseline is needed because initial test was by other method  Completed 8/08/16 
  
Recommended every 2 years  Due now Cardiovascular Screening Blood Tests (every 5 years) Total cholesterol, HDL, Triglycerides  Order as a panel if possible  Completed 4/2019 
  
Recommended annually  Due 4/2020 Colorectal Cancer Screening 
-Fecal occult blood test (annual) -Flexible sigmoidoscopy (5y) 
-Screening colonoscopy (10y) -Barium Enema    Completed 8/21/2013 with Dr. Deion Deng 
  
Recommended every 10 years  Due 8/2023 Counseling to Prevent Tobacco Use (up to 8 sessions per year) - Counseling greater than 3 and up to 10 minutes - Counseling greater than 10 minutes  Patients must be asymptomatic of tobacco-related conditions to receive as preventive service  N/A  N/A Diabetes Screening Tests (at least every 3 years, Medicare covers annually or at 6-month intervals for prediabetic patients) 
  Fasting blood sugar (FBS) or glucose tolerance test (GTT)  Patient must be diagnosed with one of the following: 
-Hypertension, Dyslipidemia, obesity, previous impaired FBS or GTT 
Or any two of the following: overweight, FH of diabetes, age ? 72, history of gestational diabetes, birth of baby weighing more than 9 pounds  Completed 4/2019 with A1C 5.6 
  
Recommended annually   Due 4/2020 Diabetes Self-Management Training (DSMT) (no USPSTF recommendation)  Requires referral by treating physician for patient with diabetes or renal disease. 10 hours of initial DSMT session of no less than 30 minutes each in a continuous 12-month period. 2 hours of follow-up DSMT in subsequent years.   N/A  N/A   
 Glaucoma Screening (no USPSTF recommendation)  Diabetes mellitus, family history, , age 48 or over,  American, age 72 or over  Completed 8/2018 
  
Recommended annually  Due 8/2019 Human Immunodeficiency Virus (HIV) Screening (annually for increased risk patients) HIV-1 and HIV-2 by EIA, RAY, rapid antibody test, or oral mucosa transudate  Patient must be at increased risk for HIV infection per USPSTF guidelines or pregnant. Tests covered annually for patients at increased risk. Pregnant patients may receive up to 3 test during pregnancy. N/A  N/A Medical Nutrition Therapy (MNT) (for diabetes or renal disease not recommended schedule)  Requires referral by treating physician for patient with diabetes or renal disease. Can be provided in same year as diabetes self-management training (DSMT), and CMS recommends medical nutrition therapy take place after DSMT. Up to 3 hours for initial year and 2 hours in subsequent years. N/A  N/A   
         
Seasonal Influenza Vaccination (annually)    Completed 10/2018 
  
Recommended annually  Due Fall 2019 Pneumococcal Vaccination (once after 65)    Pneumovax: 11/2013 
  
  
Prevnar 13: 4/2017 
  
Both recommended once over the age of 72  Completed  
  
  
Completed Hepatitis B Vaccinations (if medium/high risk)  Medium/high risk factors: End-stage renal disease, Hemophiliacs who received Factor VIII or IX concentrates, Clients of institutions for the mentally retarded, Persons who live in the same house as a HepB virus carrier, Homosexual men, Illicit injectable drug abusers. N/A  N/A Screening Mammography (biennial age 54-69)? Annually (age 36 or over)  Completed 8/2018 
  
Recommended annually 
  Due 8/2019 Screening Pap Tests and Pelvic Examination (up to age 79 and after 79 if unknown history or abnormal study last 10 years)  Every 24 months except high risk  Completed 10/2018 
  
Recommended every 2 years  Due 10/2020 Ultrasound Screening for Abdominal Aortic Aneurysm (AAA) (once)  Patient must be referred through IPPE and not have had a screening for abdominal aortic aneurysm before under Medicare. Limited to patients who meet one of the following criteria: 
- Men who are 73-68 years old and have smoked more than 100 cigarettes in their lifetime. 
-Anyone with a FH of AAA 
-Anyone recommended for screening by USPSTF  Completed CTA chest 3/2017 - negative Completed   
  
 
Medicare Wellness Visit, Female The best way to live healthy is to have a lifestyle where you eat a well-balanced diet, exercise regularly, limit alcohol use, and quit all forms of tobacco/nicotine, if applicable. Regular preventive services are another way to keep healthy. Preventive services (vaccines, screening tests, monitoring & exams) can help personalize your care plan, which helps you manage your own care. Screening tests can find health problems at the earliest stages, when they are easiest to treat. [facility] follows the current, evidence-based guidelines published by the [facility] [employer]ov[employer] Chapman (USPSTF) when recommending preventive services for our patients. Because we follow these guidelines, sometimes recommendations change over time as research supports it. (For example, mammograms used to be recommended annually. Even though Medicare will still pay for an annual mammogram, the newer guidelines recommend a mammogram every two years for women of average risk.) Of course, you and your doctor may decide to screen more often for some diseases, based on your risk and your health status. Preventive services for you include: - Medicare offers their members a free annual wellness visit, which is time for you and your primary care provider to discuss and plan for your preventive service needs.  Take advantage of this benefit every year! 
-All adults over the age of 72 should receive the recommended pneumonia vaccines. Current USPSTF guidelines recommend a series of two vaccines for the best pneumonia protection.  
-All adults should have a flu vaccine yearly and a tetanus vaccine every 10 years. All adults age 61 and older should receive a shingles vaccine once in their lifetime.   
-A bone mass density test is recommended when a woman turns 65 to screen for osteoporosis. This test is only recommended one time, as a screening. Some providers will use this same test as a disease monitoring tool if you already have osteoporosis. -All adults age 38-68 who are overweight should have a diabetes screening test once every three years.  
-Other screening tests and preventive services for persons with diabetes include: an eye exam to screen for diabetic retinopathy, a kidney function test, a foot exam, and stricter control over your cholesterol.  
-Cardiovascular screening for adults with routine risk involves an electrocardiogram (ECG) at intervals determined by your doctor.  
-Colorectal cancer screenings should be done for adults age 54-65 with no increased risk factors for colorectal cancer. There are a number of acceptable methods of screening for this type of cancer. Each test has its own benefits and drawbacks. Discuss with your doctor what is most appropriate for you during your annual wellness visit. The different tests include: colonoscopy (considered the best screening method), a fecal occult blood test, a fecal DNA test, and sigmoidoscopy. -Breast cancer screenings are recommended every other year for women of normal risk, age 54-69. 
-Cervical cancer screenings for women over age 72 are only recommended with certain risk factors.  
-All adults born between [facility] should be screened once for Hepatitis C. Here is a list of your current Health Maintenance items (your personalized list of preventive services) with a due date: 
Health Maintenance Due Topic Date Due  
  Shingles Vaccine (1 of 2) 02/26/1992  Glaucoma Screening   08/01/2018 [facility] Annual Well Visit  04/12/2019

## 2019-04-17 NOTE — PROGRESS NOTES
This is the Subsequent Medicare Annual Wellness Exam, performed 12 months or more after the Initial AWV or the last Subsequent AWV I have reviewed the patient's medical history in detail and updated the computerized patient record. History Past Medical History:  
Diagnosis Date  Arrhythmia  Arthritis   
 right knee, left shoulder  Diverticulosis  Frequency of urination  High cholesterol  Hypertension  Morbid obesity ([address]  Thyroid disease   
 hypothyroid  Unspecified adverse effect of anesthesia   
 low BP Past Surgical History:  
Procedure Laterality Date [address]  
 breast bx rt  CARDIAC SURG PROCEDURE UNLIST  01/2015  
 cardiac catheterization, no intervention, medical management  HX GYN    
 myomectomy on uterus  HX GYN  2011  
 hystereoscopy D&C  
 HX LYMPH NODE DISSECTION    
 biopsy  HX OTHER SURGICAL    
 lymph node bx Current Outpatient Medications Medication Sig Dispense Refill  cloNIDine HCl (CATAPRES) 0.2 mg tablet Take 1 Tab by mouth two (2) times a day. 180 Tab 1  potassium chloride SR (KLOR-CON 10) 10 mEq tablet TAKE 2 TABS BY MOUTH TWO (2) TIMES A DAY. 360 Tab 1  
 losartan (COZAAR) 25 mg tablet Take 1 Tab by mouth daily. 90 Tab 1  
 rosuvastatin (CRESTOR) 20 mg tablet Take 1 Tab by mouth daily. 90 Tab 1  
 hydroCHLOROthiazide (HYDRODIURIL) 25 mg tablet Take 1 Tab by mouth daily. 90 Tab 1  
 SYNTHROID 75 mcg tablet Take 1 Tab by mouth Daily (before breakfast). 90 Tab 1  varicella-zoster recombinant, PF, (SHINGRIX, PF,) 50 mcg/0.5 mL susr injection 0.5mL by IntraMUSCular route once now and then repeat in 2-6 months 0.5 mL 1  
 ELIQUIS 5 mg tablet TAKE 1 TAB BY MOUTH TWO (2) TIMES A DAY. 60 Tab 6  verapamil ER (CALAN-SR) 120 mg tablet TAKE 1 TABLET BY MOUTH EVERY MORNING FOR BLOOD PRESSURE 90 Tab 2  
 CALCIUM CARBONATE/VITAMIN D3 (CALTRATE 600 + D PO) Take 1 Tab by mouth daily.  MULTIVITAMIN W-MINERALS/LUTEIN (CENTRUM SILVER PO) Take 1 Tab by mouth daily (after lunch).  nystatin (MYCOSTATIN) topical cream Apply  to affected area two (2) times a day. 30 g 1 Allergies Allergen Reactions  Adhesive Hives  Amoxicillin Unknown (comments)  Lipitor [Atorvastatin] Myalgia Gets the flu Family History Problem Relation Age of Onset  Alzheimer Mother  Heart Disease Mother  Heart Disease Father  Hypertension Father  Cancer Paternal Aunt  Diabetes Paternal Grandmother Social History Tobacco Use  Smoking status: Never Smoker  Smokeless tobacco: Never Used Substance Use Topics  Alcohol use: No  
 
Patient Active Problem List  
Diagnosis Code  Postmenopausal bleeding N95.0  Endometrial polyp N84.0  
 Encounter for screening colonoscopy Z12.11  
 History of rectal bleeding Z87.19  
 Hyperlipidemia E78.5  Hypokalemia E87.6  Morbid obesity ([address] E66.01  
 Angina, class III (HCC) I20.9  A-fib (HCC) I48.91  
 Atrial fibrillation (HCC) I48.91  
 Thyroid activity decreased E03.9  
 HTN (hypertension) I10  
 Acquired hypothyroidism E03.9  ACP (advance care planning) Z71.89  
 S/P ablation of atrial fibrillation Z98.890, Z86.79  
 Pericardial effusion with cardiac tamponade I31.3, I31.4  Irregular heart beat I49.9 Depression Risk Factor Screening:  
 
3 most recent PHQ Screens 4/17/2019 Little interest or pleasure in doing things Not at all Feeling down, depressed, irritable, or hopeless Not at all Total Score PHQ 2 0 Alcohol Risk Factor Screening: You do not drink alcohol or very rarely. Functional Ability and Level of Safety:  
Hearing Loss Hearing is good. Activities of Daily Living The home contains: no safety equipment. Patient does total self care Fall Risk Fall Risk Assessment, last 12 mths 4/17/2019 Able to walk? Yes Fall in past 12 months? No  
Fall with injury?  -  
 Number of falls in past 12 months - Fall Risk Score -  
 
 
Abuse Screen Patient is not abused Lives alone 
safe Cognitive Screening Evaluation of Cognitive Function: 
Has your family/caregiver stated any concerns about your memory: no 
Normal 
 
Patient Care Team  
Patient Care Team: 
Lauren Weaver MD as PCP - General (Internal Medicine) Cate Falk MD (Colon and Rectal Surgery) Taiwo Coleman MD (Ophthalmology) Brigitte Sylvester MD (Obstetrics & Gynecology) Renato Olson RN as Nurse Navigator Kylie Gonzalez MD (Cardiology) Andreea Jiang MD (Gynecology) Tiera Orellana LPN as Ambulatory Care Navigator Pawan Brizuela MD (Cardiology) Maday Milner MD as Physician (Cardiology) Kylie Gonzalez MD (Cardiology) Juana Rutherford DPM (Podiatry) Luis Maya MD as Surgeon (General Surgery) Dary Washington MD as Physician (Sleep Medicine) uopdated Assessment/Plan Education and counseling provided: 
Are appropriate based on today's review and evaluation End-of-Life planning (with patient's consent) Screening Mammography Screening Pap and pelvic (covered once every 2 years) Bone mass measurement (DEXA) Screening for glaucoma Diabetes screening test 
 
Diagnoses and all orders for this visit: 
 
1. Medicare annual wellness visit, subsequent Other orders 
-     cloNIDine HCl (CATAPRES) 0.2 mg tablet; Take 1 Tab by mouth two (2) times a day. -     potassium chloride SR (KLOR-CON 10) 10 mEq tablet; TAKE 2 TABS BY MOUTH TWO (2) TIMES A DAY. -     losartan (COZAAR) 25 mg tablet; Take 1 Tab by mouth daily. -     rosuvastatin (CRESTOR) 20 mg tablet; Take 1 Tab by mouth daily. -     hydroCHLOROthiazide (HYDRODIURIL) 25 mg tablet; Take 1 Tab by mouth daily. 
-     SYNTHROID 75 mcg tablet; Take 1 Tab by mouth Daily (before breakfast).  
-     varicella-zoster recombinant, PF, (SHINGRIX, PF,) 50 mcg/0.5 mL susr injection; 0.5mL by IntraMUSCular route once now and then repeat in 2-6 months Health Maintenance Due Topic Date Due  Shingrix Vaccine Age 50> (1 of 2) 02/26/1992  GLAUCOMA SCREENING Q2Y  08/01/2018  MEDICARE YEARLY EXAM  04/12/2019 Discussed with patient about advance medical directive. Provided patient blank AMD and Your Right to Decide Booklet. Requested that if completed to provide a copy of AMD to office. ACP not on file.  SDM is her sister, Siddharth Summers. Provided information 
  
  
Colonoscopy: 8/21/13, Dr. Rudy Srinivasan, repeat 10 years  
Pap: Usually Dr. Mary Carmen Fermin, 10/18 due 10/20 Mammogram: summer 2018, [address], will get report for review--repeat 8/19 DEXA: 8/08/16, nl, due 8/18 Tdap: 4/14/2015 Pneumovax: 11/19/2013 [id number]: 4/05/2017 Zostavax: declines Shingrix: ordered 04/17/19, h/o shingles Flu shot: 10/2018 Eye exam: Dr. Rubia Graham, 8/18 annual  
 
A1C:  4/19 5.6 annual 
 
Lipids: 4/19 LDL 58  Annual  
 
Medication reconciliation completed by MA and reviewed by me. Medical/surgical/social/family history reviewed and updated by me. Patient provided AVS and preventative screening table. Patient verbalized understanding of all information discussed.

## 2019-04-18 DIAGNOSIS — I10 ESSENTIAL HYPERTENSION: Primary | Chronic | ICD-10-CM

## 2019-04-18 DIAGNOSIS — R73.01 IFG (IMPAIRED FASTING GLUCOSE): ICD-10-CM

## 2019-05-01 ENCOUNTER — TELEPHONE (OUTPATIENT)
Dept: INTERNAL MEDICINE CLINIC | Age: 79
End: 2019-05-01

## 2019-05-01 NOTE — TELEPHONE ENCOUNTER
Patient states she needs a call back to discuss fluctuating blood pressure & higher than Normal Pulse rate. Patient states normal Pulse rate is in the 60-70's & recently has been 94, 87 & 90's. Please call to advise what to do or if appt is needed.  Thank you

## 2019-05-02 ENCOUNTER — CLINICAL SUPPORT (OUTPATIENT)
Dept: INTERNAL MEDICINE CLINIC | Age: 79
End: 2019-05-02

## 2019-05-02 VITALS
HEART RATE: 79 BPM | BODY MASS INDEX: 33.25 KG/M2 | WEIGHT: 199.8 LBS | SYSTOLIC BLOOD PRESSURE: 104 MMHG | DIASTOLIC BLOOD PRESSURE: 65 MMHG

## 2019-05-02 DIAGNOSIS — Z01.30 BP CHECK: Primary | ICD-10-CM

## 2019-05-02 NOTE — PROGRESS NOTES
Pt here for blood pressure check due to higher than normal readings at home. Clinic readin-65, 79BMP  Home cuff: 125/69, 82BMP    Pt was not putting home cuff on properly, teaching was given. Pt requested ENT information,  info given to pt. Pt complained of ear popping, advise to start flonase otc. Pt request to be weighed, 199.8lbs. Pt is concerned about losing weight without trying. Getting a new scale for her house to keep track.

## 2019-05-02 NOTE — TELEPHONE ENCOUNTER
Called, spoke to pt. Two pt identifiers confirmed. Pt states that the BP has been fluctuating and pulse has been consistently in the 80's-90's. Pt states that no sx besides slight head pressure. Pt educated on factors for elevated pulse rates. Pt reports BP: 167/79, 164/91, 129/77, 164/91, 124/61  75 P this morning. Pt has new BP monitor and would like to check compatibility w/ the office BP monitor. Pt offered and accepted appt for 5/2/19 for BP check-will be in between [id number]. Pt verbalized understanding of information discussed w/ no further questions at this time.

## 2019-05-02 NOTE — TELEPHONE ENCOUNTER
Pt is requesting a call back regarding to come in to have her BP checked along with her new BP monitor. She would like to know why her pulse is staying high. This is second attempt. Best contact is [phone]. Akash Miller        Message received & copied from [facility]

## 2019-05-02 NOTE — PATIENT INSTRUCTIONS
Dr. [facility] Dr. Becky WILKS          Flonase 50mcg daily- over the counter to help with ear congestion

## 2019-06-16 ENCOUNTER — OFFICE VISIT (OUTPATIENT)
Dept: URGENT CARE | Age: 79
End: 2019-06-16

## 2019-06-16 VITALS
BODY MASS INDEX: 32.65 KG/M2 | RESPIRATION RATE: 16 BRPM | HEIGHT: 65 IN | SYSTOLIC BLOOD PRESSURE: 146 MMHG | HEART RATE: 86 BPM | WEIGHT: 196 LBS | OXYGEN SATURATION: 99 % | DIASTOLIC BLOOD PRESSURE: 67 MMHG | TEMPERATURE: 98.3 F

## 2019-06-16 DIAGNOSIS — T63.301A SPIDER BITE WOUND, ACCIDENTAL OR UNINTENTIONAL, INITIAL ENCOUNTER: ICD-10-CM
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
## 2019-06-16 RX ORDER — DOXYCYCLINE 100 MG/1
200 TABLET ORAL ONCE
Qty: 2 TAB | Refills: 0 | Status: SHIPPED | OUTPATIENT
Start: 2019-06-16 | End: 2019-06-16

## 2019-06-16 NOTE — PROGRESS NOTES
69 yo female presents to  with cc of \"spider bite\" on shoulder. She reports that this morning she noted a black spider on her screen door and then she did not see it again. She did start to feel a bump on her shoulder, which is itchy. She also reports some blurred vision on the right eye, and a headache. She is not sure if this is related to her shoulder. She also reports that she removed a small tick from her ankle on Wednesday. She has a history of previous lyme disease treatment.        Insect Bite          Past Medical History:   Diagnosis Date    Arrhythmia     Arthritis     right knee, left shoulder    Diverticulosis     Frequency of urination     High cholesterol     Hypertension     Morbid obesity ([address]     Thyroid disease     hypothyroid    Unspecified adverse effect of anesthesia     low BP        Past Surgical History:   Procedure Laterality Date    BREAST SURGERY PROCEDURE UNLISTED  1982    breast bx rt    CARDIAC SURG PROCEDURE UNLIST  01/2015    cardiac catheterization, no intervention, medical management    HX GYN      myomectomy on uterus    HX GYN  2011    hystereoscopy D&C    HX LYMPH NODE DISSECTION      biopsy    HX OTHER SURGICAL      lymph node bx         Family History   Problem Relation Age of Onset    Alzheimer Mother     Heart Disease Mother     Heart Disease Father     Hypertension Father     Cancer Paternal Aunt     Diabetes Paternal Grandmother         Social History     Socioeconomic History    Marital status:      Spouse name: Not on file    Number of children: Not on file    Years of education: Not on file    Highest education level: Not on file   Occupational History    Not on file   Social Needs    Financial resource strain: Not on file    Food insecurity:     Worry: Not on file     Inability: Not on file    Transportation needs:     Medical: Not on file     Non-medical: Not on file   Tobacco Use    Smoking status: Never Smoker    Smokeless tobacco: Never Used   Substance and Sexual Activity    Alcohol use: No    Drug use: No    Sexual activity: Not on file   Lifestyle    Physical activity:     Days per week: Not on file     Minutes per session: Not on file    Stress: Not on file   Relationships    Social connections:     Talks on phone: Not on file     Gets together: Not on file     Attends Holiness service: Not on file     Active member of club or organization: Not on file     Attends meetings of clubs or organizations: Not on file     Relationship status: Not on file    Intimate partner violence:     Fear of current or ex partner: Not on file     Emotionally abused: Not on file     Physically abused: Not on file     Forced sexual activity: Not on file   Other Topics Concern    Not on file   Social History Narrative    Not on file                ALLERGIES: Adhesive; Amoxicillin; and Lipitor [atorvastatin]    Review of Systems    Vitals:    06/16/19 1550 06/16/19 1617   BP: 161/75 146/67   Pulse: 86    Resp: 16    Temp: 98.3 °F (36.8 °C)    SpO2: 99%    Weight: 196 lb (88.9 kg)    Height: 5' 5\" (1.651 m)        Physical Exam   Constitutional: She appears well-developed and well-nourished. HENT:   Head: Normocephalic and atraumatic. Eyes:   Slit lamp exam:       The left eye shows no corneal abrasion, no corneal flare, no corneal ulcer and no foreign body. Cardiovascular: Normal rate, regular rhythm and normal heart sounds. No murmur heard. Skin: Skin is warm and dry. No rash noted. No erythema. Nursing note and vitals reviewed. MDM     Differential Diagnosis; Clinical Impression; Plan:     Blurred Vision right eye  -slit lamp exam negative for foreign object or abrasion.    -visual acuity unchanged    Bite on shoulder  -no expanding erythema, no central clearing.  -appears appropriately controlled with cortisone cream    Tick Bite  -single dose doxycycline lyme ppx prescribed.       Orders Placed This Encounter      doxycycline (ADOXA) 100 mg tablet          Sig: Take 2 Tabs by mouth once for 1 dose. Dispense:  2 Tab          Refill:  0    The patients condition was discussed with the patient and they understand. The patient is to follow up with PCP. If signs and symptoms become worse the pt is to go to the ER. The patient is to take medications as prescribed.          Procedures

## 2019-06-16 NOTE — PATIENT INSTRUCTIONS
Tick Bite: Care Instructions  Your Care Instructions    Ticks are small spiderlike animals. They bite to fasten themselves onto your skin and feed on your blood. Ticks can carry diseases. But most ticks do not carry diseases, and most tick bites do not cause serious health problems. Some people may have an allergic reaction to a tick bite. This reaction may be mild, with symptoms like itching and swelling. In rare cases, a severe allergic reaction may occur. Most of the time, all you need to do for a tick bite is relieve any symptoms you may have. Follow-up care is a key part of your treatment and safety. Be sure to make and go to all appointments, and call your doctor if you are having problems. It's also a good idea to know your test results and keep a list of the medicines you take. How can you care for yourself at home? · Put ice or a cold pack on the bite for 15 to 20 minutes once an hour. Put a thin cloth between the ice and your skin. · Try an over-the-counter medicine to relieve itching, redness, swelling, and pain. Be safe with medicines. Read and follow all instructions on the label. ? Take an antihistamine medicine, such as a nondrowsy one like loratadine (Claritin) or one that might make you sleepy like diphenhydramine (Benadryl). These medicines may help relieve itching, redness, and swelling. ? Use a spray of local anesthetic that contains benzocaine, such as Solarcaine. It may help relieve pain. If your skin reacts to the spray, stop using it. ? Put calamine lotion on the skin. It may help relieve itching. To avoid tick bites  · Avoid ticks:  ? Learn where ticks are found in your community, and stay away from those areas if possible. ? Cover as much of your body as possible when you work or play in grassy or wooded areas. ? Use insect repellents, such as products containing DEET. You can spray them on your skin.   ? Take steps to control ticks on your property if you live in an area where Lyme disease occurs. Clear leaves, brush, tall grasses, woodpiles, and stone fences from around your house and the edges of your yard or garden. This may help get rid of ticks. · When you come in from outdoors, check your body for ticks, including your groin, head, and underarms. The ticks may be about the size of a sesame seed. If no one else can help you check for ticks on your scalp, comb your hair with a fine-tooth comb. · If you find a tick, remove it quickly. Use tweezers to grasp the tick as close to its mouth (the part in your skin) as possible. Slowly pull the tick straight out--do not twist or yank--until its mouth releases from your skin. If part of the tick stays in the skin, leave it alone. It will likely come out on its own in a few days. · Ticks can come into your house on clothing, outdoor gear, and pets. These ticks can fall off and attach to you. ? Check your clothing and outdoor gear. Remove any ticks you find. Then put your clothing in a clothes dryer on high heat for 1 hour to kill any ticks that might remain. ? Check your pets for ticks after they have been outdoors. · When hiking in the woods, carry a small dry jar or ziplock bag. If you find a tick on your body, remove the tick and put it in the jar or bag. Store the container in the freezer so you can give it to your doctor if symptoms develop. The tick can be tested to learn whether it is carrying the bacteria that cause Lyme disease. When should you call for help? Call 911 anytime you think you may need emergency care. For example, call if:    · You have symptoms of a severe allergic reaction. These may include:  ? Sudden raised, red areas (hives) all over your body. ? Swelling of the throat, mouth, lips, or tongue. ? Trouble breathing. ? Passing out (losing consciousness).  Or you may feel very lightheaded or suddenly feel weak, confused, or restless.    Call your doctor now or seek immediate medical care if:    · You have signs of infection, such as:  ? Increased pain, swelling, warmth, or redness around the bite. ? Red streaks leading from the bite. ? Pus draining from the bite. ? A fever.    Watch closely for changes in your health, and be sure to contact your doctor if:    · You develop a new rash.     · You have joint pain.     · You are very tired.     · You have flu-like symptoms.     · You have symptoms for more than 1 week. Where can you learn more? Go to http://feliz-arlene.info/. Enter U079 in the search box to learn more about \"Tick Bite: Care Instructions. \"  Current as of: September 23, 2018  Content Version: 11.9  © 1165-8060 [employer]. Care instructions adapted under license by [employer] (which disclaims liability or warranty for this information). If you have questions about a medical condition or this instruction, always ask your healthcare professional. [place] [address] any warranty or liability for your use of this information. Spider Bite or Scorpion Sting: Care Instructions  Your Care Instructions    Spider bites and scorpion stings often cause minor swelling, redness, pain, and itching. These mild symptoms are common and may last from a few hours to a few days. Some people have more severe reactions. Home treatment is often all that you need to relieve symptoms. Follow-up care is a key part of your treatment and safety. Be sure to make and go to all appointments, and call your doctor if you are having problems. It's also a good idea to know your test results and keep a list of the medicines you take. How can you care for yourself at home? · Put ice or a cold pack on the area for 10 to 20 minutes at a time. Put a thin cloth between the ice and your skin. · Try an over-the-counter medicine for itching, redness, swelling, and pain. Read and follow all instructions on the label.   ? Take an antihistamine, such as diphenhydramine (Benadryl) or loratadine (Claritin). ? Put a hydrocortisone 1% cream or calamine lotion on the skin. · Don't scratch or rub the skin around the area. When should you call for help? Call 911 anytime you think you may need emergency care. For example, call if:    · You passed out (lost consciousness).     · You have a seizure.     · You have trouble breathing.    Call your doctor now or seek immediate medical care if:    · You have signs of infection, such as:  ? Increased pain, swelling, warmth, or redness around the bite or sting. ? Red streaks leading from the area. ? Pus draining from the area. ? A fever.     · You get a blister or sore at the bite or sting area, or the area turns purple.    Watch closely for changes in your health, and be sure to contact your doctor if:    · You have pain or burning at the area after 2 days of home treatment.     · You have symptoms for more than 1 week. Where can you learn more? Go to http://feliz-arlene.info/. Enter P595 in the search box to learn more about \"Spider Bite or Scorpion Sting: Care Instructions. \"  Current as of: September 23, 2018  Content Version: 11.9  © 2097-7424 [employer]. Care instructions adapted under license by [employer] (which disclaims liability or warranty for this information). If you have questions about a medical condition or this instruction, always ask your healthcare professional. [place] [address] any warranty or liability for your use of this information.

## 2019-06-20 ENCOUNTER — TELEPHONE (OUTPATIENT)
Dept: INTERNAL MEDICINE CLINIC | Age: 79
End: 2019-06-20

## 2019-06-20 NOTE — TELEPHONE ENCOUNTER
Pt has a tick bite and needs an appt as soon as possible. Please call as soon as you can.   #[phone]

## 2019-06-20 NOTE — TELEPHONE ENCOUNTER
MD Chikis Wolfe Child, LPN   Caller: Unspecified (Today,  9:16 AM)             Ok to wait till tomorrow

## 2019-06-20 NOTE — TELEPHONE ENCOUNTER
Called, spoke to pt. Two pt identifiers confirmed. Pt informed per Dr. Roderick Boyd that pt can wait until tomorrow. Pt offered and accepted appt for 6/21/19 0800. Pt verbalized understanding of information discussed w/ no further questions at this time.

## 2019-06-20 NOTE — TELEPHONE ENCOUNTER
Called, spoke to pt. Two pt identifiers confirmed. Pt c/o of both a spider and a tick bite x1wk. Pt went to [facility] 19-given doxycycline. Pt states that tick bite area has gotten slightly bigger with a [place] around the bite but not as far as a bull's eye. H/o Lyme's disease around 10-12yrs ago. Pt was only given 2 tabs of doxycycline. Pt asking if she should go back to  since there is no office availability or if pt is okay to wait until 19. Pt informed Dr. Lisette Doyle (on-call) will be notified. Pt verbalized understanding of information discussed w/ no further questions at this time.

## 2019-06-21 ENCOUNTER — OFFICE VISIT (OUTPATIENT)
Dept: INTERNAL MEDICINE CLINIC | Age: 79
End: 2019-06-21

## 2019-06-21 VITALS
OXYGEN SATURATION: 100 % | DIASTOLIC BLOOD PRESSURE: 65 MMHG | WEIGHT: 194 LBS | SYSTOLIC BLOOD PRESSURE: 101 MMHG | TEMPERATURE: 97.9 F | HEIGHT: 65 IN | BODY MASS INDEX: 32.32 KG/M2 | RESPIRATION RATE: 16 BRPM | HEART RATE: 75 BPM

## 2019-06-21 DIAGNOSIS — I10 ESSENTIAL HYPERTENSION: Primary | ICD-10-CM
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
## 2019-06-21 DIAGNOSIS — E66.01 MORBID OBESITY (HCC): ICD-10-CM

## 2019-06-21 RX ORDER — DOXYCYCLINE 100 MG/1
100 TABLET ORAL 2 TIMES DAILY
Qty: 42 TAB | Refills: 0 | Status: SHIPPED | OUTPATIENT
Start: 2019-06-21 | End: 2019-07-12

## 2019-06-21 NOTE — PROGRESS NOTES
HISTORY OF PRESENT ILLNESS  Tamar Urias is a 70 y.o. female. HPI   Last here 4/17/19. Pt. Is here for acute visit.      Pt has a tick bite on her left foot x one week  Stated she swatted a black spider and noticed a red bite on her shoulder   Last Thursday she saw a small tick on her ankle and removed it   She saw a doctor for the spider bite on her shoulder but advised to care for her tick bite   Was prescribed 200 mg doxycycline for her tick bite   However on Wednesday the tick bite started enlarging  Prescribed 21 day doxycycline dose      Pt also wonders about her weight loss   She feels her clothes size has decreased  Wt is down 7 lbs x lov   Discussed wl is good and advised to keep healthy diet    Discussed keeping her cancer screening up to date   Pt denies decreased appetite, says she eats well  Reviewed CXR  Will get another mammo this summer   Discussed if rapid wl continues, will get consult from GI     Pt also c/o fog in her eyes   Discussed seeing ophthalmology    Patient Active Problem List    Diagnosis Date Noted    Irregular heart beat 10/31/2017    Pericardial effusion with cardiac tamponade 03/18/2017    S/P ablation of atrial fibrillation 03/17/2017    Acquired hypothyroidism 12/14/2015    ACP (advance care planning) 12/14/2015    HTN (hypertension) 07/21/2015    Thyroid activity decreased 04/14/2015    Atrial fibrillation ([address] 03/16/2015    A-fib ([address] 03/10/2015    Angina, class III ([address] 01/27/2015    Morbid obesity ([facility]     Hyperlipidemia 07/14/2014    Hypokalemia 07/14/2014    Encounter for screening colonoscopy 08/21/2013    History of rectal bleeding 08/21/2013    Postmenopausal bleeding 10/18/2011    Endometrial polyp 10/18/2011     Current Outpatient Medications   Medication Sig Dispense Refill    rosuvastatin (CRESTOR) 20 mg tablet TAKE 1 TABLET BY MOUTH EVERY DAY 90 Tab 1    losartan (COZAAR) 25 mg tablet TAKE ONE TABLET BY MOUTH DAILY 90 Tab 1    cloNIDine HCl (CATAPRES) 0.2 mg tablet TAKE 1 TAB BY MOUTH TWO (2) TIMES A DAY. 180 Tab 1    potassium chloride SR (KLOR-CON 10) 10 mEq tablet TAKE 2 TABS BY MOUTH TWO (2) TIMES A DAY. 360 Tab 1    hydroCHLOROthiazide (HYDRODIURIL) 25 mg tablet Take 1 Tab by mouth daily. 90 Tab 1    SYNTHROID 75 mcg tablet Take 1 Tab by mouth Daily (before breakfast). 90 Tab 1    ELIQUIS 5 mg tablet TAKE 1 TAB BY MOUTH TWO (2) TIMES A DAY. 60 Tab 6    verapamil ER (CALAN-SR) 120 mg tablet TAKE 1 TABLET BY MOUTH EVERY MORNING FOR BLOOD PRESSURE 90 Tab 2    CALCIUM CARBONATE/VITAMIN D3 (CALTRATE 600 + D PO) Take 1 Tab by mouth daily.  MULTIVITAMIN W-MINERALS/LUTEIN (CENTRUM SILVER PO) Take 1 Tab by mouth daily (after lunch).  varicella-zoster recombinant, PF, (SHINGRIX, PF,) 50 mcg/0.5 mL susr injection 0.5mL by IntraMUSCular route once now and then repeat in 2-6 months 0.5 mL 1    nystatin (MYCOSTATIN) topical cream Apply  to affected area two (2) times a day.  30 g 1     Past Surgical History:   Procedure Laterality Date    BREAST SURGERY PROCEDURE UNLISTED  1982    breast bx rt    CARDIAC SURG PROCEDURE UNLIST  01/2015    cardiac catheterization, no intervention, medical management    HX GYN      myomectomy on uterus    HX GYN  2011    hystereoscopy D&C    HX LYMPH NODE DISSECTION      biopsy    HX OTHER SURGICAL      lymph node bx      Lab Results   Component Value Date/Time    WBC 4.2 10/03/2018 09:24 AM    HGB 12.6 10/03/2018 09:24 AM    HCT 36.8 10/03/2018 09:24 AM    PLATELET 658 92/75/6640 09:24 AM    MCV 93 10/03/2018 09:24 AM     Lab Results   Component Value Date/Time    Cholesterol, total 129 04/10/2019 08:34 AM    HDL Cholesterol 49 04/10/2019 08:34 AM    LDL, calculated 58 04/10/2019 08:34 AM    Triglyceride 112 04/10/2019 08:34 AM     Lab Results   Component Value Date/Time    GFR est non-AA 59 (L) 04/10/2019 08:34 AM    GFR est AA 68 04/10/2019 08:34 AM    Creatinine 0.94 04/10/2019 08:34 AM    BUN 14 04/10/2019 08:34 AM    Sodium 145 (H) 04/10/2019 08:34 AM    Potassium 3.9 04/10/2019 08:34 AM    Chloride 104 04/10/2019 08:34 AM    CO2 26 04/10/2019 08:34 AM    Magnesium 2.2 08/17/2017 10:11 AM        Review of Systems   Respiratory: Negative for shortness of breath. Cardiovascular: Negative for chest pain. Physical Exam   Constitutional: She is oriented to person, place, and time. She appears well-developed and well-nourished. No distress. HENT:   Head: Normocephalic and atraumatic. Mouth/Throat: Oropharynx is clear and moist. No oropharyngeal exudate. Eyes: Conjunctivae and EOM are normal. Right eye exhibits no discharge. Left eye exhibits no discharge. Neck: Normal range of motion. Neck supple. Cardiovascular: Normal rate and regular rhythm. Exam reveals no gallop and no friction rub. Murmur (2/6) heard. Pulmonary/Chest: Effort normal and breath sounds normal. No respiratory distress. She has no wheezes. She has no rales. She exhibits no tenderness. Musculoskeletal: Normal range of motion. She exhibits no edema, tenderness or deformity. Lymphadenopathy:     She has no cervical adenopathy. Neurological: She is alert and oriented to person, place, and time. Coordination normal.   Skin: Skin is warm and dry. No rash noted. She is not diaphoretic. No erythema. No pallor. 2 mm papule on her left shoulder   milan sized macule on L ankle with central clearing and a darker red border    Psychiatric: She has a normal mood and affect. Her behavior is normal.       ASSESSMENT and PLAN    ICD-10-CM ICD-9-CM    1. Essential hypertension    Controlled on clonidine, HCTZ, verapamil, and losartan, BP on lower side today, generally runs higher than this, asymptomatic, no change to dose  I10 401.9    2. Tick bite, initial encounter    concerned for bull eyes rash, will treat for lyme with 21 day doxycycline  W57. XXXA 919.4      E906.4     3.  Obesity - Wt improved from lov, pt feels fine but is worried she is losing weight w/o doing enough to lose this weight, labs are utd and nl, chest x ray completed lov and nl, colo utd, mammo due this summer, denies decreased appetite or any other sxs, if wt continues to decline w/o reason will perform GI eval next, discussed this with pt. Dep screen neg  Scribed by Alex Macedo of [address], as dictated by Dr. Letty العلي. Current diagnosis and concerns discussed with pt at length. Pt understands risks and benefits or current treatment plan and medications, and accepts the treatment and medication with any possible risks. Pt asks appropriate questions, which were answered. Pt was instructed to call with any concerns or problems. I have reviewed the note documented by the scribe. The services provided are my own.   The documentation is accurate

## 2019-06-21 NOTE — PATIENT INSTRUCTIONS
Office Policies Phone calls/patient messages: Please allow up to 24 hours for someone in the office to contact you about your call or message. Be mindful your provider may be out of the office or your message may require further review. We encourage you to use [employer] for your messages as this is a faster, more efficient way to communicate with our office Medication Refills: 
         
Prescription medications require 48-72 business hours to process. We encourage you to use [employer] for your refills. For controlled medications: Please allow 72 business hours to process. Certain medications may require you to  a written prescription at our office. NO narcotic/controlled medications will be prescribed after 4pm Monday through Friday or on weekends Form/Paperwork Completion: 
         
Please note a $25 fee may incur for all paperwork for completed by our providers. We ask that you allow 7-10 business days. Pre-payment is due prior to picking up/faxing the completed form. You may also download your forms to [employer] to have your doctor print off.

## 2019-06-21 NOTE — PROGRESS NOTES
Chief Complaint   Patient presents with    Tick Removal     tick bite left top part of ankle  red in color x 7-8 days

## 2019-06-27 RX ORDER — LEVOTHYROXINE SODIUM 75 UG/1
TABLET ORAL
Qty: 90 TAB | Refills: 0 | Status: SHIPPED | OUTPATIENT
Start: 2019-06-27 | End: 2019-09-17

## 2019-07-07 DIAGNOSIS — I10 ESSENTIAL HYPERTENSION: Chronic | ICD-10-CM

## 2019-07-08 RX ORDER — VERAPAMIL HYDROCHLORIDE 120 MG/1
TABLET, FILM COATED, EXTENDED RELEASE ORAL
Qty: 90 TAB | Refills: 1 | Status: SHIPPED | OUTPATIENT
Start: 2019-07-08 | End: 2019-09-17

## 2019-07-08 RX ORDER — HYDROCHLOROTHIAZIDE 25 MG/1
TABLET ORAL
Qty: 90 TAB | Refills: 0 | Status: SHIPPED | OUTPATIENT
Start: 2019-07-08 | End: 2019-09-17

## 2019-07-15 RX ORDER — APIXABAN 5 MG/1
TABLET, FILM COATED ORAL
Qty: 60 TAB | Refills: 6 | Status: SHIPPED | OUTPATIENT
Start: 2019-07-15 | End: 2020-02-10

## 2019-08-14 ENCOUNTER — TELEPHONE (OUTPATIENT)
Dept: INTERNAL MEDICINE CLINIC | Age: 79
End: 2019-08-14

## 2019-08-14 NOTE — TELEPHONE ENCOUNTER
#[phone] pt states she is over the lyme disease and is still steadily  loosing weight. Pt needs to be seen as soon as possible.

## 2019-08-15 ENCOUNTER — HOSPITAL ENCOUNTER (OUTPATIENT)
Dept: LAB | Age: 79
Discharge: HOME | End: 2019-08-15
Payer: MEDICARE

## 2019-08-15 ENCOUNTER — TELEPHONE (OUTPATIENT)
Dept: INTERNAL MEDICINE CLINIC | Age: 79
End: 2019-08-15

## 2019-08-15 ENCOUNTER — OFFICE VISIT (OUTPATIENT)
Dept: INTERNAL MEDICINE CLINIC | Age: 79
End: 2019-08-15

## 2019-08-15 VITALS
TEMPERATURE: 99.1 F | BODY MASS INDEX: 31.96 KG/M2 | HEART RATE: 82 BPM | RESPIRATION RATE: 16 BRPM | OXYGEN SATURATION: 98 % | HEIGHT: 65 IN | WEIGHT: 191.8 LBS | SYSTOLIC BLOOD PRESSURE: 118 MMHG | DIASTOLIC BLOOD PRESSURE: 74 MMHG

## 2019-08-15 DIAGNOSIS — R53.1 WEAKNESS: ICD-10-CM

## 2019-08-15 DIAGNOSIS — R63.4 UNINTENDED WEIGHT LOSS: Primary | ICD-10-CM

## 2019-08-15 DIAGNOSIS — H53.9 VISUAL DISTURBANCE: ICD-10-CM

## 2019-08-15 DIAGNOSIS — R53.83 MALAISE AND FATIGUE: ICD-10-CM

## 2019-08-15 DIAGNOSIS — R68.89 OTHER GENERAL SYMPTOMS AND SIGNS: ICD-10-CM

## 2019-08-15 DIAGNOSIS — R53.81 MALAISE AND FATIGUE: ICD-10-CM

## 2019-08-15 PROCEDURE — 36415 COLL VENOUS BLD VENIPUNCTURE: CPT

## 2019-08-15 PROCEDURE — 82607 VITAMIN B-12: CPT

## 2019-08-15 PROCEDURE — 80053 COMPREHEN METABOLIC PANEL: CPT

## 2019-08-15 PROCEDURE — 85651 RBC SED RATE NONAUTOMATED: CPT

## 2019-08-15 PROCEDURE — 85025 COMPLETE CBC W/AUTO DIFF WBC: CPT

## 2019-08-15 NOTE — PROGRESS NOTES
1. Have you been to the ER, urgent care clinic since your last visit? Hospitalized since your last visit? no    2. Have you seen or consulted any other health care providers outside of the [address] [place] [address] since your last visit? Include any pap smears or colon screening.  no

## 2019-08-15 NOTE — PROGRESS NOTES
SUBJECTIVE  Ms. Ashkan Vidales is a patient of Diana Ordonez MD who presents today acutely for     Chief Complaint   Patient presents with    Weight Loss     pt here today concnerned of weight loss and muscle aches ; pt states that she has had weight loss x 1 year      She has c/o fatigue and malaise. +Generalized weakness. +Muscle aches. Dr. Clarice Seals checked CPK last visit, and this was normal.     Some fullness in head. Weight loss: She was 230 lb in 2017. Wt Readings from Last 3 Encounters:   08/15/19 191 lb 12.8 oz (87 kg)   06/21/19 194 lb (88 kg)   06/16/19 196 lb (88.9 kg)     She has visual disturbances, zig zag lines. OBJECTIVE  Visit Vitals  /74 (BP 1 Location: Left arm, BP Patient Position: Sitting)   Pulse 82   Temp 99.1 °F (37.3 °C) (Oral)   Resp 16   Ht 5' 5\" (1.651 m)   Wt 191 lb 12.8 oz (87 kg)   SpO2 98%   BMI 31.92 kg/m²     Gen: Pleasant 70 y.o.  female in NAD.   HEENT: PERRLA. EOMI. OP moist and pink.  Neck: Supple.  No LAD.  HEART: RRR, No M/G/R.   LUNGS: CTAB No W/R.   ABDOMEN: S, NT, ND, BS+.   EXTREMITIES: Warm. No C/C/E. MUSCULOSKELETAL: Normal ROM, muscle strength 5/5 all groups. NEURO: Alert and oriented x 3.  Cranial nerves grossly intact.  No focal sensory or motor deficits noted. SKIN: Warm. Dry. No rashes or other lesions noted. ASSESSMENT / PLAN    ICD-10-CM ICD-9-CM    1. Unintended weight loss R63.4 783.21 CT ABD PELV W WO CONT      CT HEAD W WO CONT      VITAMIN B12      CBC WITH AUTOMATED DIFF   2. Malaise and fatigue R53.81 780.79 CT ABD PELV W WO CONT    R53.83  CT HEAD W WO CONT      METABOLIC PANEL, COMPREHENSIVE      SED RATE (ESR)      VITAMIN B12      CBC WITH AUTOMATED DIFF   3. Visual disturbance H53.9 368.9 CT HEAD W WO CONT      VITAMIN B12   4. Weakness  R53.1 780.79 CT HEAD W WO CONT      VITAMIN B12   5.  Other general symptoms and signs  R68.89 780.99 VITAMIN B12      CBC WITH AUTOMATED DIFF       I have reviewed with the patient details of the assessment and plan and all questions were answered. Relevant patient education was performed. Follow-up and Dispositions    · Return if symptoms worsen or fail to improve.

## 2019-08-15 NOTE — PATIENT INSTRUCTIONS
Office Policies    Phone calls/patient messages:            Please allow up to 24 hours for someone in the office to contact you about your call or message. Be mindful your provider may be out of the office or your message may require further review. We encourage you to use [employer] for your messages as this is a faster, more efficient way to communicate with our office                         Medication Refills:            Prescription medications require 48-72 business hours to process. We encourage you to use [employer] for your refills. For controlled medications: Please allow 72 business hours to process. Certain medications may require you to  a written prescription at our office. NO narcotic/controlled medications will be prescribed after 4pm Monday through Friday or on weekends              Form/Paperwork Completion:            Please note a $25 fee may incur for all paperwork for completed by our providers. We ask that you allow 7-10 business days. Pre-payment is due prior to picking up/faxing the completed form. You may also download your forms to [employer] to have your doctor print off.

## 2019-08-15 NOTE — TELEPHONE ENCOUNTER
Spoke to pt. Pt is having continued weight lose and body aches. Offered pt appointment today with Fernanda Paniagua, declined. Offered appointment with Elizabeth Gee tomorrow, declined. Pt accepted appointment with Dr. Amado Lo for 8/21/19 at [address] [place] [address]. Pt was told to call back if she changes her mind and wants sooner appointment with different provider. No further questions at time of call.

## 2019-08-15 NOTE — TELEPHONE ENCOUNTER
Pt called back. Wants to see Dr. Terra Johnson today instead.   Scheduled for 1115 today with Terra Johnson

## 2019-08-16 LAB
ALBUMIN SERPL-MCNC: 4.5 G/DL (ref 3.5–4.8)
ALBUMIN/GLOB SERPL: 1.8 {RATIO} (ref 1.2–2.2)
ALP SERPL-CCNC: 67 IU/L (ref 39–117)
ALT SERPL-CCNC: 14 IU/L (ref 0–32)
AST SERPL-CCNC: 12 IU/L (ref 0–40)
BASOPHILS # BLD AUTO: 0 X10E3/UL (ref 0–0.2)
BASOPHILS NFR BLD AUTO: 0 %
BILIRUB SERPL-MCNC: 0.2 MG/DL (ref 0–1.2)
BUN SERPL-MCNC: 14 MG/DL (ref 8–27)
BUN/CREAT SERPL: 16 (ref 12–28)
CALCIUM SERPL-MCNC: 9.7 MG/DL (ref 8.7–10.3)
CHLORIDE SERPL-SCNC: 102 MMOL/L (ref 96–106)
CO2 SERPL-SCNC: 23 MMOL/L (ref 20–29)
CREAT SERPL-MCNC: 0.9 MG/DL (ref 0.57–1)
EOSINOPHIL # BLD AUTO: 0.2 X10E3/UL (ref 0–0.4)
EOSINOPHIL NFR BLD AUTO: 4 %
ERYTHROCYTE [DISTWIDTH] IN BLOOD BY AUTOMATED COUNT: 13.6 % (ref 12.3–15.4)
ERYTHROCYTE [SEDIMENTATION RATE] IN BLOOD BY WESTERGREN METHOD: 32 MM/HR (ref 0–40)
GLOBULIN SER CALC-MCNC: 2.5 G/DL (ref 1.5–4.5)
GLUCOSE SERPL-MCNC: 105 MG/DL (ref 65–99)
HCT VFR BLD AUTO: 38.9 % (ref 34–46.6)
HGB BLD-MCNC: 12.9 G/DL (ref 11.1–15.9)
IMM GRANULOCYTES # BLD AUTO: 0 X10E3/UL (ref 0–0.1)
IMM GRANULOCYTES NFR BLD AUTO: 0 %
LYMPHOCYTES # BLD AUTO: 1 X10E3/UL (ref 0.7–3.1)
LYMPHOCYTES NFR BLD AUTO: 23 %
MCH RBC QN AUTO: 31.5 PG (ref 26.6–33)
MCHC RBC AUTO-ENTMCNC: 33.2 G/DL (ref 31.5–35.7)
MCV RBC AUTO: 95 FL (ref 79–97)
MONOCYTES # BLD AUTO: 0.4 X10E3/UL (ref 0.1–0.9)
MONOCYTES NFR BLD AUTO: 8 %
NEUTROPHILS # BLD AUTO: 2.9 X10E3/UL (ref 1.4–7)
NEUTROPHILS NFR BLD AUTO: 65 %
PLATELET # BLD AUTO: 195 X10E3/UL (ref 150–450)
POTASSIUM SERPL-SCNC: 4.2 MMOL/L (ref 3.5–5.2)
PROT SERPL-MCNC: 7 G/DL (ref 6–8.5)
RBC # BLD AUTO: 4.1 X10E6/UL (ref 3.77–5.28)
SODIUM SERPL-SCNC: 139 MMOL/L (ref 134–144)
VIT B12 SERPL-MCNC: 708 PG/ML (ref 232–1245)
WBC # BLD AUTO: 4.5 X10E3/UL (ref 3.4–10.8)

## 2019-08-16 RX ORDER — ROSUVASTATIN CALCIUM 20 MG/1
TABLET, COATED ORAL
Qty: 90 TAB | Refills: 1 | Status: SHIPPED | OUTPATIENT
Start: 2019-08-16 | End: 2019-09-17

## 2019-08-23 ENCOUNTER — HOSPITAL ENCOUNTER (OUTPATIENT)
Dept: CT IMAGING | Age: 79
Discharge: HOME | End: 2019-08-23
Attending: INTERNAL MEDICINE
Payer: MEDICARE

## 2019-08-23 ENCOUNTER — APPOINTMENT (OUTPATIENT)
Dept: CT IMAGING | Age: 79
End: 2019-08-23
Attending: INTERNAL MEDICINE
Payer: MEDICARE

## 2019-08-23 DIAGNOSIS — R53.1 WEAKNESS: ICD-10-CM

## 2019-08-23 DIAGNOSIS — R63.4 UNINTENDED WEIGHT LOSS: ICD-10-CM

## 2019-08-23 DIAGNOSIS — R53.81 MALAISE AND FATIGUE: ICD-10-CM

## 2019-08-23 DIAGNOSIS — R53.83 MALAISE AND FATIGUE: ICD-10-CM

## 2019-08-23 DIAGNOSIS — H53.9 VISUAL DISTURBANCE: ICD-10-CM

## 2019-08-23 PROCEDURE — 70470 CT HEAD/BRAIN W/O & W/DYE: CPT

## 2019-08-23 PROCEDURE — 74177 CT ABD & PELVIS W/CONTRAST: CPT

## 2019-08-23 PROCEDURE — 74011636320 HC RX REV CODE- 636/320: Performed by: INTERNAL MEDICINE

## 2019-08-23 PROCEDURE — 74011000255 HC RX REV CODE- 255: Performed by: INTERNAL MEDICINE

## 2019-08-23 RX ORDER — BARIUM SULFATE 20 MG/ML
900 SUSPENSION ORAL
Status: COMPLETED | OUTPATIENT
Start: 2019-08-23 | End: 2019-08-23

## 2019-08-23 RX ORDER — SODIUM CHLORIDE 0.9 % (FLUSH) 0.9 %
10 SYRINGE (ML) INJECTION
Status: COMPLETED | OUTPATIENT
Start: 2019-08-23 | End: 2019-08-23

## 2019-08-23 RX ADMIN — BARIUM SULFATE 450 ML: 21 SUSPENSION ORAL at 14:20

## 2019-08-23 RX ADMIN — Medication 10 ML: at 14:20

## 2019-08-23 RX ADMIN — IOPAMIDOL 100 ML: 755 INJECTION, SOLUTION INTRAVENOUS at 14:18

## 2019-08-28 ENCOUNTER — TELEPHONE (OUTPATIENT)
Dept: INTERNAL MEDICINE CLINIC | Age: 79
End: 2019-08-28

## 2019-08-28 DIAGNOSIS — K86.2 CYSTIC MASS OF PANCREAS: ICD-10-CM

## 2019-08-28 DIAGNOSIS — R63.4 UNINTENDED WEIGHT LOSS: Primary | ICD-10-CM

## 2019-08-28 NOTE — TELEPHONE ENCOUNTER
Called, spoke to pt. Two pt identifiers confirmed. Pt informed per Dr. González Drew of lab results. Pt inquiring on both CT scans from 8/23/19. Pt informed of CT Head-IMPRESSION: No acute intracranial hemorrhage, enhancing mass or infarct. Pt informed that LPN RR will see what Dr. González Drew has result-nails for the CT ABD and return call to pt. Pt verbalized understanding of information discussed w/ no further questions at this time.

## 2019-08-28 NOTE — TELEPHONE ENCOUNTER
K[place]i [place], [address] [facility]             General Message/Vendor Calls     Pt is requesting to discuss your lab results.      Callback required     Best contact number(s): [phone] or [phone]        [facility]

## 2019-08-29 NOTE — TELEPHONE ENCOUNTER
----- Message from Ella Bailey MD sent at 8/26/2019  5:15 PM EDT -----  Let's refer to GI, Dr. Leo Pryor, for further evaluation, possibly ERCP and EUS.  BJF

## 2019-09-12 NOTE — TELEPHONE ENCOUNTER
Rodriguez, [address] [facility]             General Message/Vendor Calls     Caller's first and last name:       Reason for call:       Callback required yes/no and why: yes       Best contact number(s): [phone]       Details to clarify the request: Patient is requesting an appointment to discuss test results.    Wilian Haro     Message received & copied from [facility]

## 2019-09-16 ENCOUNTER — TELEPHONE (OUTPATIENT)
Dept: INTERNAL MEDICINE CLINIC | Age: 79
End: 2019-09-16

## 2019-09-16 NOTE — PROGRESS NOTES
HISTORY OF PRESENT ILLNESS  Goyo Lara is a 70 y.o. female. HPI   Last here 8/15/19. Pt is here for acute/chronic conditions. BP today 147/79   No home BP readings, 118/74 at Dr Gonzalez Seen, pt reports they are a little higher at home sbp closer to 120-130   Of note, her home cuff read higher than our cuff in the past  Continues on clonidine 0.2mg BID, HCTZ 25mg, verapamil 120mg and losartan 25mg daily   Pt took these meds today but is anxious and worried  Recall she had a cough on lisinopril        Wt today is 191-- down 10 lbs x lov     Pt is concerned about her unintended wt loss   Started w/u lov, wt again down today   Pt saw Dr Gonzalez Seen for this 8/15/19   She was concerned about feeling funny in her head and having random muscle pains   Pt denies decreased appetite  Reviewed CT abd 8/23/19: 1.  15 mm pancreatic head hypodensity with dependent calcification, unchanged  since March 2017. This in addition to a 6 mm hypodensity in the pancreatic neck  may represent cystic neoplasm such as IPMN or mucinous cystic neoplasm. 2.  Scattered hepatic hypodensities reflecting cysts. 3.  No abdominopelvic lymphadenopathy. Reviewed head CT 8/23/19: No acute intracranial hemorrhage, enhancing mass or infarct.   Pt had a chest CT in 2017 however calcification was not mentioned  Dr carrillo recommended her seeing GI   I agreed with this recommendation   Pt has an appt with Dr Margarito Rodriguez scheduled for 10/14 or 10/16   Will try to help find a sooner appt   Will get carotid US   Discussed an ESRP     She also reports frequent urination recently maybe pain for several weeks  Feels her urine is just not right  Pt would like a UA   Will check today   Blood in the urine, will send for culture     Reviewed labs 8/19         Pt had a cardiac ablation for her afib per Dr. Esthela Huntley (cardio) on 3/17/17   Pt will only f/u with this physician prn       Pt saw Dr. Doni Pardo (cardio) for f/u after a fib  Last visit was 1/24/19  Pt was taken off of sotalol  Feels better more energy with this  Pt continues on eliquis 5mg bid for afib    Pt denies any bleeding/falls on these meds 9/19       Pt saw Dr. Cosme Meza (cardio) for foot circulation  Last visit was 8/1/17   Recall has chronic sx of leg tightness  Pt will only f/u with this physician prn      Pt saw Dr. Mendoza Brooke (uro-gyn) for urinary sx  Last visit was 11/17  Pt was told she had lichen sclerosus   Now resolved      Pt saw Dr Renetta Newman (sleep)  Last visit was 7/3/18  Reviewed telephone note 9/18: no significant sleep disordered breathing, overall AHI of 0.6/h  Pt had a poor experience and was told she has no sleep apnea     Pt had a cyst on R breast removed by Dr Kerrie Knott (surg) in the past      Continues crestor 20mg daily for cholesterol  Will stop this for 2 weeks to see if this is cause of cramps       Continues klor-con 20meqs BID for her potassium     Continues on synthroid 75mcg daily      Pt previously c/o cramps in her toes/tightness in lower leg when she gets up  She c/o this again today   This goes away--recurrent issue, not new  Had pad eval and labs done  lov discussed tonic water   Pt states this was too bitter for her   States she is still having aches and pains   Pt states she tried to step down on her foot and her foot felt light   It felt like it couldn't bear her wt and she was worried she would fall   This happened when she was driving and got out of car to go shopping   It went away but it has happened a couple of times   Reviewed labs for 4/19   Will get XR of spine      Pt c/o zig zag jumping pattern in her eyes   Discussed this with optho who recommended cataract surg   She also discussed her wt loss sxs with him who recommended she sees me      Depression screen reviewed and positive (+1).     Pt is anxious today about her unintended wt loss     ACP not on file.  SDM is her sister, Maame Covington.   Provided information      Recall She had a cervical polyp, was having vaginal bleeding, this was removed by julio césar, vaginal bleeding has since resolved.        PREVENTIVE:    Colonoscopy: 8/21/13, Dr. Nadeem Sweet, repeat 10 years   Pap: Usually Dr. James Darbyeper, scheduled for 10/19   Mammogram: summer 2018, [facility], will get report for review, scheduled for 10/19   DEXA: 8/08/16, nl, due now   Tdap: 4/14/2015  Pneumovax: 11/19/2013  Uvpurfl37: 4/05/2017  Zostavax: declines  Shingrix: ordered 04/17/19, h/o shingles  Flu shot: 09/17/19   A1C: , 12/15 5.9, 4/16 5.8, 8/16 5.9, 10/16 5.8, 4/17 5.9, 10/17 POC 5.6, 4/18 5.6, 10/18 5.6, 4/19 5.6  Eye exam: Dr. Daksha Street, 8/19, cataract   Lipids: 4/19 LDL 58       Patient Active Problem List    Diagnosis Date Noted    Irregular heart beat 10/31/2017    Pericardial effusion with cardiac tamponade 03/18/2017    S/P ablation of atrial fibrillation 03/17/2017    Acquired hypothyroidism 12/14/2015    ACP (advance care planning) 12/14/2015    HTN (hypertension) 07/21/2015    Thyroid activity decreased 04/14/2015    Atrial fibrillation ([facility] 03/16/2015    A-fib ([facility] 03/10/2015    Morbid obesity ([facility]     Hyperlipidemia 07/14/2014    Hypokalemia 07/14/2014    Encounter for screening colonoscopy 08/21/2013    History of rectal bleeding 08/21/2013    Postmenopausal bleeding 10/18/2011    Endometrial polyp 10/18/2011     Current Outpatient Medications   Medication Sig Dispense Refill    rosuvastatin (CRESTOR) 20 mg tablet TAKE 1 TABLET BY MOUTH EVERY DAY 90 Tab 1    ELIQUIS 5 mg tablet TAKE 1 TABLET BY MOUTH TWICE A DAY 60 Tab 6    verapamil ER (CALAN-SR) 120 mg tablet TAKE 1 TABLET BY MOUTH EVERY MORNING FOR BLOOD PRESSURE 90 Tab 1    hydroCHLOROthiazide (HYDRODIURIL) 25 mg tablet TAKE 1 TABLET BY MOUTH EVERY DAY 90 Tab 0    SYNTHROID 75 mcg tablet TAKE 1 TAB BY MOUTH DAILY (BEFORE BREAKFAST).  90 Tab 0    rosuvastatin (CRESTOR) 20 mg tablet TAKE 1 TABLET BY MOUTH EVERY DAY 90 Tab 1    losartan (COZAAR) 25 mg tablet TAKE ONE TABLET BY MOUTH DAILY 90 Tab 1    cloNIDine HCl (CATAPRES) 0.2 mg tablet TAKE 1 TAB BY MOUTH TWO (2) TIMES A DAY. 180 Tab 1    potassium chloride SR (KLOR-CON 10) 10 mEq tablet TAKE 2 TABS BY MOUTH TWO (2) TIMES A DAY. 360 Tab 1    hydroCHLOROthiazide (HYDRODIURIL) 25 mg tablet Take 1 Tab by mouth daily. 90 Tab 1    SYNTHROID 75 mcg tablet Take 1 Tab by mouth Daily (before breakfast). 90 Tab 1    varicella-zoster recombinant, PF, (SHINGRIX, PF,) 50 mcg/0.5 mL susr injection 0.5mL by IntraMUSCular route once now and then repeat in 2-6 months 0.5 mL 1    nystatin (MYCOSTATIN) topical cream Apply  to affected area two (2) times a day. 30 g 1    verapamil ER (CALAN-SR) 120 mg tablet TAKE 1 TABLET BY MOUTH EVERY MORNING FOR BLOOD PRESSURE 90 Tab 2    CALCIUM CARBONATE/VITAMIN D3 (CALTRATE 600 + D PO) Take 1 Tab by mouth daily.  MULTIVITAMIN W-MINERALS/LUTEIN (CENTRUM SILVER PO) Take 1 Tab by mouth daily (after lunch).        Past Surgical History:   Procedure Laterality Date    BREAST SURGERY PROCEDURE UNLISTED  1982    breast bx rt    CARDIAC SURG PROCEDURE UNLIST  01/2015    cardiac catheterization, no intervention, medical management    HX GYN      myomectomy on uterus    HX GYN  2011    hystereoscopy D&C    HX LYMPH NODE DISSECTION      biopsy    HX OTHER SURGICAL      lymph node bx      Lab Results   Component Value Date/Time    WBC 4.5 08/15/2019 11:54 AM    HGB 12.9 08/15/2019 11:54 AM    HCT 38.9 08/15/2019 11:54 AM    PLATELET 621 29/11/0451 11:54 AM    MCV 95 08/15/2019 11:54 AM     Lab Results   Component Value Date/Time    Cholesterol, total 129 04/10/2019 08:34 AM    HDL Cholesterol 49 04/10/2019 08:34 AM    LDL, calculated 58 04/10/2019 08:34 AM    Triglyceride 112 04/10/2019 08:34 AM     Lab Results   Component Value Date/Time    GFR est non-AA 62 08/15/2019 11:54 AM    GFR est AA 71 08/15/2019 11:54 AM    Creatinine 0.90 08/15/2019 11:54 AM    BUN 14 08/15/2019 11:54 AM    Sodium 139 08/15/2019 11:54 AM    Potassium 4.2 08/15/2019 11:54 AM    Chloride 102 08/15/2019 11:54 AM    CO2 23 08/15/2019 11:54 AM    Magnesium 2.2 08/17/2017 10:11 AM        Review of Systems   Constitutional: Positive for malaise/fatigue and weight loss. Negative for chills and fever. HENT: Negative for hearing loss and tinnitus. Eyes: Negative for discharge and redness. Respiratory: Negative for shortness of breath and wheezing. Cardiovascular: Negative for chest pain, palpitations and leg swelling. Gastrointestinal: Negative for nausea and vomiting. Genitourinary: Positive for frequency and urgency. Negative for dysuria. Musculoskeletal: Positive for back pain and myalgias. Negative for falls. Skin: Negative for itching and rash. Neurological: Negative for dizziness and loss of consciousness. Psychiatric/Behavioral: Negative for depression. The patient is nervous/anxious. Physical Exam   Constitutional: She is oriented to person, place, and time. She appears well-developed and well-nourished. No distress. HENT:   Head: Normocephalic and atraumatic. Mouth/Throat: Oropharynx is clear and moist. No oropharyngeal exudate. Eyes: Conjunctivae and EOM are normal. Right eye exhibits no discharge. Left eye exhibits no discharge. Neck: Normal range of motion. Neck supple. No carotid bruits    Cardiovascular: Normal rate and regular rhythm. Exam reveals no gallop and no friction rub. Murmur (1/6) heard. Pulmonary/Chest: Effort normal and breath sounds normal. No respiratory distress. She has no wheezes. She has no rales. She exhibits no tenderness. Abdominal: Soft. She exhibits no distension. There is no tenderness. There is no rebound and no guarding. Musculoskeletal: She exhibits no edema, tenderness or deformity. Lymphadenopathy:     She has no cervical adenopathy. Neurological: She is alert and oriented to person, place, and time. Coordination normal.   Skin: Skin is warm and dry.  No rash noted. She is not diaphoretic. No erythema. No pallor. Psychiatric: She has a normal mood and affect. Her behavior is normal.       ASSESSMENT and PLAN    ICD-10-CM ICD-9-CM    1. Encounter for immunization Z23 V03.89 INFLUENZA VACCINE INACTIVATED (IIV), SUBUNIT, ADJUVANTED, IM      ADMIN INFLUENZA VIRUS VAC   2. Weight loss            Pt has been complaining of wt loss for several months began eval lov with cxr ordered basic labs worked on getting cancer screening utd of note mammo and pelvic scheduled for 10/19 colo utd since then pt saw dr carrillo CT of head and abd were ordered, head ct nl, ct abd showed a cystic area of pancreas need to rule out neoplasm she is currently scheduled to see dr Mitali Ohara in mid oct  Will work to move up eval     Rescheduled for today with dr escamilla     R63.4 783.21    3. Persistent atrial fibrillation (HCC)                Remains on eliquis 5 mg BID to prevent stroke has had ablation and no longer on sotolol follows with dr Luis Angel Melton and joel utd  I48.1 427.31    4. Essential hypertension              borderline Controlled on clonidine hctz verapamil nl better than this pt is feeling anxious about wt loss and health will see her back in 1 mo and will further assess Bp at that time  I10 401.9    5. Mixed hyperlipidemia          Controlled on crestor 20 mg daily pt does c/o generalized aches and myalgias she was on pravachol in the past crestor may be playing a role here will have her due a 2 wk trial off of crestor and see if sxs improved  E78.2 272.2    6. Hypokalemia                On klor con check level  D34.0 665.2 METABOLIC PANEL, BASIC   7. Morbid obesity ([address]            Wt significant improved but she is losing wt without trying so we are pursing wt loss eval see above  E66.01 278.01    8. Acquired hypothyroidism          Repeat tsh on synthroid 75 mcgs may need to adjust dosing  E03.9 244.9 TSH 3RD GENERATION   9.  Bilateral carotid bruits            Check carotid us  R09.89 785.9 DUPLEX CAROTID BILATERAL   10. Sciatica, right side            Check l spine plain film for further eval  M54.31 724.3 XR SPINE LUMB 2 OR 3 V   11. Visual changes          Saw optha already had negative ct getting carotid duplex  H53.9 368.9    12. Urinary frequency                UA showed blood no abx needed check urine culture will tx if culture positive will repeat urine at f/u to ensure blood has cleared otherwise will need uro eval for microscopic hematuria  R35.0 788.41 AMB POC URINALYSIS DIP STICK AUTO W/O MICRO      CULTURE, URINE      Depression screen reviewed and positive (+1). Pt is anxious about her wt loss. Scribed by Nilson Million of [address], as dictated by Dr. Julisa Kauffman. Current diagnosis and concerns discussed with pt at length. Pt understands risks and benefits or current treatment plan and medications, and accepts the treatment and medication with any possible risks. Pt asks appropriate questions, which were answered. Pt was instructed to call with any concerns or problems. I have reviewed the note documented by the scribe. The services provided are my own.   The documentation is accurate

## 2019-09-16 NOTE — TELEPHONE ENCOUNTER
----- Message from Yumiko Nunez Page sent at 9/16/2019  9:48 AM EDT -----  Regarding: Dr. Lyn Reilly  Appointment not available    Caller's first and last name and relationship to patient (if not the patient):     Best contact number:  [phone]    Preferred date and time: This week, anytime    Scheduled appointment date and time:  10/16/19 at  11:30 AM    Reason for appointment:  Not feeling well    Details to clarify the request:  Pt states they have left message and no return call. Pt is requesting a return call from nurse.      Jonny Howard

## 2019-09-16 NOTE — TELEPHONE ENCOUNTER
Called, spoke to pt. Two pt identifiers confirmed. Pt states that she is not feeling well. Pt states some muscle pains and weakness in her leg. Pt offered and accepted appt for 9/17/19 0815. Pt verbalized understanding of information discussed w/ no further questions at this time.

## 2019-09-17 ENCOUNTER — OFFICE VISIT (OUTPATIENT)
Dept: INTERNAL MEDICINE CLINIC | Age: 79
End: 2019-09-17

## 2019-09-17 ENCOUNTER — HOSPITAL ENCOUNTER (OUTPATIENT)
Dept: LAB | Age: 79
Discharge: HOME | End: 2019-09-17
Payer: MEDICARE

## 2019-09-17 ENCOUNTER — TELEPHONE (OUTPATIENT)
Dept: INTERNAL MEDICINE CLINIC | Age: 79
End: 2019-09-17

## 2019-09-17 VITALS
RESPIRATION RATE: 16 BRPM | DIASTOLIC BLOOD PRESSURE: 79 MMHG | WEIGHT: 188 LBS | BODY MASS INDEX: 31.32 KG/M2 | HEART RATE: 71 BPM | OXYGEN SATURATION: 98 % | HEIGHT: 65 IN | TEMPERATURE: 98 F | SYSTOLIC BLOOD PRESSURE: 147 MMHG

## 2019-09-17 DIAGNOSIS — H53.9 VISUAL CHANGES: ICD-10-CM

## 2019-09-17 DIAGNOSIS — R09.89 BILATERAL CAROTID BRUITS: ICD-10-CM

## 2019-09-17 DIAGNOSIS — R35.0 URINARY FREQUENCY: ICD-10-CM

## 2019-09-17 DIAGNOSIS — M54.30 SCIATICA, UNSPECIFIED LATERALITY: Primary | ICD-10-CM

## 2019-09-17 DIAGNOSIS — Z23 ENCOUNTER FOR IMMUNIZATION: Primary | ICD-10-CM

## 2019-09-17 DIAGNOSIS — E66.01 MORBID OBESITY (HCC): ICD-10-CM

## 2019-09-17 DIAGNOSIS — I48.19 PERSISTENT ATRIAL FIBRILLATION (HCC): ICD-10-CM

## 2019-09-17 DIAGNOSIS — E03.9 ACQUIRED HYPOTHYROIDISM: ICD-10-CM

## 2019-09-17 DIAGNOSIS — E78.2 MIXED HYPERLIPIDEMIA: ICD-10-CM

## 2019-09-17 DIAGNOSIS — M54.31 SCIATICA, RIGHT SIDE: ICD-10-CM

## 2019-09-17 DIAGNOSIS — E87.6 HYPOKALEMIA: ICD-10-CM

## 2019-09-17 DIAGNOSIS — R63.4 WEIGHT LOSS: ICD-10-CM

## 2019-09-17 DIAGNOSIS — I10 ESSENTIAL HYPERTENSION: ICD-10-CM

## 2019-09-17 PROBLEM — I49.9 IRREGULAR HEART BEAT: Status: RESOLVED | Noted: 2017-10-31 | Resolved: 2019-09-17

## 2019-09-17 LAB
BILIRUB UR QL STRIP: NEGATIVE
GLUCOSE UR-MCNC: NEGATIVE MG/DL
KETONES P FAST UR STRIP-MCNC: NEGATIVE MG/DL
PH UR STRIP: 7 [PH] (ref 4.6–8)
PROT UR QL STRIP: NEGATIVE
SP GR UR STRIP: 1.02 (ref 1–1.03)
UA UROBILINOGEN AMB POC: NORMAL (ref 0.2–1)
URINALYSIS CLARITY POC: CLEAR
URINALYSIS COLOR POC: YELLOW
URINE BLOOD POC: NORMAL
URINE LEUKOCYTES POC: NORMAL
URINE NITRITES POC: NEGATIVE

## 2019-09-17 PROCEDURE — 36415 COLL VENOUS BLD VENIPUNCTURE: CPT

## 2019-09-17 PROCEDURE — 80048 BASIC METABOLIC PNL TOTAL CA: CPT

## 2019-09-17 PROCEDURE — 87086 URINE CULTURE/COLONY COUNT: CPT

## 2019-09-17 PROCEDURE — 84443 ASSAY THYROID STIM HORMONE: CPT

## 2019-09-17 NOTE — PATIENT INSTRUCTIONS
Schedule bone density at dr Cheryl Singer office    Lab today     Carotid ultrasound    2 weeks off the crestor to see if cramps resolve

## 2019-09-17 NOTE — TELEPHONE ENCOUNTER
Called [facility] office, unable to move October appointment up. No soon appointments other than [address] location. Pt declined going to [address] [place] [address] location, pt also declined seeing NP. Pt requested to see [facility] or [facility] only. [address] office, nurse was able to make appointment for today 9/17/19 at [facility].  Pt accepted appointment. CT results and demographics faxed to [facility] nurse. FAX: [phone]. Confirmation received. Called [facility] office and canceled October appointment.  Per pt request.

## 2019-09-18 LAB
BACTERIA UR CULT: NO GROWTH
BUN SERPL-MCNC: 15 MG/DL (ref 8–27)
BUN/CREAT SERPL: 15 (ref 12–28)
CALCIUM SERPL-MCNC: 10 MG/DL (ref 8.7–10.3)
CHLORIDE SERPL-SCNC: 101 MMOL/L (ref 96–106)
CO2 SERPL-SCNC: 25 MMOL/L (ref 20–29)
CREAT SERPL-MCNC: 0.97 MG/DL (ref 0.57–1)
GLUCOSE SERPL-MCNC: 100 MG/DL (ref 65–99)
POTASSIUM SERPL-SCNC: 3.6 MMOL/L (ref 3.5–5.2)
SODIUM SERPL-SCNC: 141 MMOL/L (ref 134–144)
TSH SERPL DL<=0.005 MIU/L-ACNC: 0.59 UIU/ML (ref 0.45–4.5)

## 2019-09-24 ENCOUNTER — HOSPITAL ENCOUNTER (OUTPATIENT)
Dept: VASCULAR SURGERY | Age: 79
Discharge: HOME | End: 2019-09-24
Attending: INTERNAL MEDICINE
Payer: MEDICARE

## 2019-09-24 DIAGNOSIS — R09.89 BILATERAL CAROTID BRUITS: ICD-10-CM

## 2019-09-24 LAB
LEFT CCA DIST DIAS: 15.5 CM/S
LEFT CCA DIST SYS: 59.4 CM/S
LEFT CCA PROX DIAS: 16.6 CM/S
LEFT CCA PROX SYS: 85.2 CM/S
LEFT ECA DIAS: 6.3 CM/S
LEFT ECA SYS: 72.5 CM/S
LEFT ICA DIST DIAS: 14.5 CM/S
LEFT ICA DIST SYS: 51 CM/S
LEFT ICA MID DIAS: 12.7 CM/S
LEFT ICA MID SYS: 47.4 CM/S
LEFT ICA PROX DIAS: 17.6 CM/S
LEFT ICA PROX SYS: 51.5 CM/S
LEFT ICA/CCA SYS: 0.87
LEFT SUBCLAVIAN DIAS: 0 CM/S
LEFT SUBCLAVIAN SYS: 73.4 CM/S
LEFT VERTEBRAL DIAS: 6.33 CM/S
LEFT VERTEBRAL SYS: 29.1 CM/S
RIGHT CCA DIST DIAS: 16.2 CM/S
RIGHT CCA DIST SYS: 60.6 CM/S
RIGHT CCA PROX DIAS: 12.4 CM/S
RIGHT CCA PROX SYS: 56.1 CM/S
RIGHT ECA DIAS: 4.06 CM/S
RIGHT ECA SYS: 48 CM/S
RIGHT ICA DIST DIAS: 14 CM/S
RIGHT ICA DIST SYS: 55.2 CM/S
RIGHT ICA MID DIAS: 15.7 CM/S
RIGHT ICA MID SYS: 57.6 CM/S
RIGHT ICA PROX DIAS: 10.1 CM/S
RIGHT ICA PROX SYS: 44.8 CM/S
RIGHT ICA/CCA SYS: 1
RIGHT SUBCLAVIAN DIAS: 0 CM/S
RIGHT SUBCLAVIAN SYS: 62.5 CM/S
RIGHT VERTEBRAL DIAS: 11.25 CM/S
RIGHT VERTEBRAL SYS: 54 CM/S

## 2019-09-24 PROCEDURE — 93880 EXTRACRANIAL BILAT STUDY: CPT

## 2019-09-30 RX ORDER — LEVOTHYROXINE SODIUM 75 UG/1
TABLET ORAL
Qty: 90 TAB | Refills: 0 | Status: SHIPPED | OUTPATIENT
Start: 2019-09-30 | End: 2019-10-22

## 2019-10-02 ENCOUNTER — CLINICAL SUPPORT (OUTPATIENT)
Dept: INTERNAL MEDICINE CLINIC | Age: 79
End: 2019-10-02

## 2019-10-02 VITALS — SYSTOLIC BLOOD PRESSURE: 120 MMHG | DIASTOLIC BLOOD PRESSURE: 71 MMHG | HEART RATE: 83 BPM

## 2019-10-02 DIAGNOSIS — Z01.30 BP CHECK: Primary | ICD-10-CM

## 2019-10-03 RX ORDER — HYDROCHLOROTHIAZIDE 25 MG/1
TABLET ORAL
Qty: 90 TAB | Refills: 0 | Status: SHIPPED | OUTPATIENT
Start: 2019-10-03 | End: 2019-10-22

## 2019-10-05 RX ORDER — CLONIDINE HYDROCHLORIDE 0.2 MG/1
TABLET ORAL
Qty: 180 TAB | Refills: 1 | Status: SHIPPED | OUTPATIENT
Start: 2019-10-05 | End: 2019-10-22

## 2019-10-06 RX ORDER — LOSARTAN POTASSIUM 25 MG/1
TABLET ORAL
Qty: 90 TAB | Refills: 1 | Status: SHIPPED | OUTPATIENT
Start: 2019-10-06 | End: 2019-10-22

## 2019-10-14 ENCOUNTER — TELEPHONE (OUTPATIENT)
Dept: INTERNAL MEDICINE CLINIC | Age: 79
End: 2019-10-14

## 2019-10-14 ENCOUNTER — TELEPHONE (OUTPATIENT)
Dept: CARDIOLOGY CLINIC | Age: 79
End: 2019-10-14

## 2019-10-14 NOTE — TELEPHONE ENCOUNTER
#[phone] pt states she doesn't have all procedures done and can't see Dr. Irma Weston until these are done. We are canceling Wed. Appt. And procedure is for 10-24-19. Pt needs to be scheduled after that time and soon after.

## 2019-10-14 NOTE — TELEPHONE ENCOUNTER
Pt is having an EUS on 10/24/19, Dr. Margarita Kim would like to know how long she can be off Eliquis.       Del Ly RN

## 2019-10-22 ENCOUNTER — TELEPHONE (OUTPATIENT)
Dept: INTERNAL MEDICINE CLINIC | Age: 79
End: 2019-10-22

## 2019-10-22 ENCOUNTER — OFFICE VISIT (OUTPATIENT)
Dept: INTERNAL MEDICINE CLINIC | Age: 79
End: 2019-10-22

## 2019-10-22 VITALS
DIASTOLIC BLOOD PRESSURE: 77 MMHG | HEIGHT: 65 IN | TEMPERATURE: 98.2 F | SYSTOLIC BLOOD PRESSURE: 135 MMHG | HEART RATE: 84 BPM | OXYGEN SATURATION: 99 % | RESPIRATION RATE: 16 BRPM | BODY MASS INDEX: 30.82 KG/M2 | WEIGHT: 185 LBS

## 2019-10-22 DIAGNOSIS — I10 ESSENTIAL HYPERTENSION: Primary | ICD-10-CM

## 2019-10-22 DIAGNOSIS — I48.0 PAROXYSMAL ATRIAL FIBRILLATION (HCC): ICD-10-CM

## 2019-10-22 DIAGNOSIS — E03.9 ACQUIRED HYPOTHYROIDISM: ICD-10-CM

## 2019-10-22 DIAGNOSIS — R73.01 IFG (IMPAIRED FASTING GLUCOSE): ICD-10-CM

## 2019-10-22 DIAGNOSIS — E78.2 MIXED HYPERLIPIDEMIA: ICD-10-CM

## 2019-10-22 DIAGNOSIS — T14.8XXA BLISTER: ICD-10-CM

## 2019-10-22 DIAGNOSIS — E66.01 MORBID OBESITY (HCC): ICD-10-CM

## 2019-10-22 NOTE — PROGRESS NOTES
HISTORY OF PRESENT ILLNESS  Remonia Goldberg is a 70 y.o. female. HPI   Last here 9/17/19. Pt is here for acute/chronic conditions.     BP today is 135/77  BP was 120/71 at nurse visit   No home readings  Continues on clonidine 0.2mg BID, HCTZ 25mg, verapamil 120mg and losartan 25mg daily   Pt is pretty sure she thinks she took these meds today   Recall she had a cough on lisinopril        Wt today is 185 lbs-- down 6 lbs x lov   Pt is concerned about her unintended wt loss   Started w/u lov, wt again down today     Pt is following with Dr Paco Zamuido   Last visit was 9/17/19   Pt will be getting an EGD and EUS this week 10/19   Pt has stopped eliquis   Reviewed carotid US 9/24/19: There is mild stenosis in the right ICA (<50%). · The right vertebral is antegrade. · There is mild stenosis in the left ICA (<50%). · The left vertebral is antegrade.        Reviewed labs 9/19   Pt has orders in from 4/19, will give new lab slip         Pt had a cardiac ablation for her afib per Dr. Arsalan Chau (cardio) on 3/17/17   Pt will only f/u with this physician prn       Pt saw Dr. Paulo Ram (cardio) for f/u after a fib  Last visit was 1/24/19  Pt was taken off of sotalol  Feels better more energy with this  Pt is currently holding her  eliquis 5mg bid since this am for afib  due to upcoming EGD and EUS   Pt denies any bleeding/falls on these meds 10/19         Pt saw Dr. Jacquelyn Hastings (cardio) for foot circulation  Last visit was 8/1/17   Recall has chronic sx of leg tightness  Pt will only f/u with this physician prn      Pt saw Dr. Carmen Viveros (uro-gyn) for urinary sx  Last visit was 11/17  Pt was told she had lichen sclerosus   Now resolved      Pt saw Dr Alvina Santiago (sleep)  Last visit was 7/3/18  Reviewed telephone note 9/18: no significant sleep disordered breathing, overall AHI of 0.6/h  Pt had a poor experience and was told she has no sleep apnea     Pt had a cyst on R breast removed by Dr Fernando Solano (surg) in the past      Continues crestor 20mg daily for cholesterol  Will stop this for 2 weeks to see if this is cause of cramps       Continues klor-con 20meqs BID for her potassium     Continues on synthroid 75mcg daily      Pt previously c/o cramps in her toes/tightness in lower leg when she gets up  Overall still comes and goes   Reviewed XR L spine 9/19: DJD as above  States pain is better now overall   Pt stopped crestor for 2 weeks which did not seem to help much with cramps  Now back on crestor       Pt c/o blister on her toe x 4 days   States it started clear and then started to spread and became more red   Pt states she got the blister from her shoes   Discussed being on eliquis is likely contributing   Discussed this will pop or deflate on its own   No needs for abx   Discussed making sure nothing rubs against blister        ACP not on file.  SDM is her sister, Khang Grayson.   Provided information      Recall She had a cervical polyp, was having vaginal bleeding, this was removed by galgano, vaginal bleeding has since resolved.        PREVENTIVE:    Colonoscopy: 8/21/13, Dr. Nataly Mcguire, repeat 10 years   Pap: Usually Dr. Pennington Host 10/19   [place]: 10/19 osteopenia   Tdap: 4/14/2015  Pneumovax: 11/19/2013  [id number]: 4/05/2017  Zostavax: declines  Shingrix: ordered 04/17/19, h/o shingles  Flu shot: 09/17/19   A1C:  8/16 5.9, 10/16 5.8, 4/17 5.9, 10/17 POC 5.6, 4/18 5.6, 10/18 5.6, 4/19 5.6  Eye exam: Dr. Vicenta Marks, 8/19, cataract   Lipids: 4/19 LDL 58    Patient Active Problem List    Diagnosis Date Noted    Pericardial effusion with cardiac tamponade 03/18/2017    S/P ablation of atrial fibrillation 03/17/2017    Acquired hypothyroidism 12/14/2015    HTN (hypertension) 07/21/2015    Thyroid activity decreased 04/14/2015    Atrial fibrillation ([facility] [address] 03/16/2015    Morbid obesity ([facility] [address]     Hyperlipidemia 07/14/2014    Hypokalemia 07/14/2014    Postmenopausal bleeding 10/18/2011     Current Outpatient Medications   Medication Sig Dispense Refill    losartan (COZAAR) 25 mg tablet TAKE 1 TABLET BY MOUTH EVERY DAY 90 Tab 1    cloNIDine HCl (CATAPRES) 0.2 mg tablet TAKE 1 TABLET BY MOUTH TWICE A  Tab 1    hydroCHLOROthiazide (HYDRODIURIL) 25 mg tablet TAKE 1 TABLET BY MOUTH EVERY DAY 90 Tab 0    SYNTHROID 75 mcg tablet TAKE 1 TAB BY MOUTH DAILY (BEFORE BREAKFAST). 90 Tab 0    ELIQUIS 5 mg tablet TAKE 1 TABLET BY MOUTH TWICE A DAY 60 Tab 6    rosuvastatin (CRESTOR) 20 mg tablet TAKE 1 TABLET BY MOUTH EVERY DAY 90 Tab 1    losartan (COZAAR) 25 mg tablet TAKE ONE TABLET BY MOUTH DAILY 90 Tab 1    cloNIDine HCl (CATAPRES) 0.2 mg tablet TAKE 1 TAB BY MOUTH TWO (2) TIMES A DAY. 180 Tab 1    potassium chloride SR (KLOR-CON 10) 10 mEq tablet TAKE 2 TABS BY MOUTH TWO (2) TIMES A DAY. 360 Tab 1    hydroCHLOROthiazide (HYDRODIURIL) 25 mg tablet Take 1 Tab by mouth daily. 90 Tab 1    SYNTHROID 75 mcg tablet Take 1 Tab by mouth Daily (before breakfast). 90 Tab 1    nystatin (MYCOSTATIN) topical cream Apply  to affected area two (2) times a day. 30 g 1    verapamil ER (CALAN-SR) 120 mg tablet TAKE 1 TABLET BY MOUTH EVERY MORNING FOR BLOOD PRESSURE 90 Tab 2    CALCIUM CARBONATE/VITAMIN D3 (CALTRATE 600 + D PO) Take 1 Tab by mouth daily.  MULTIVITAMIN W-MINERALS/LUTEIN (CENTRUM SILVER PO) Take 1 Tab by mouth daily (after lunch).        Past Surgical History:   Procedure Laterality Date    BREAST SURGERY PROCEDURE UNLISTED  1982    breast bx rt    CARDIAC SURG PROCEDURE UNLIST  01/2015    cardiac catheterization, no intervention, medical management    HX GYN      myomectomy on uterus    HX GYN  2011    hystereoscopy D&C    HX LYMPH NODE DISSECTION      biopsy    HX OTHER SURGICAL      lymph node bx      Lab Results   Component Value Date/Time    WBC 4.5 08/15/2019 11:54 AM    HGB 12.9 08/15/2019 11:54 AM    HCT 38.9 08/15/2019 11:54 AM    PLATELET 967 70/20/8371 11:54 AM    MCV 95 08/15/2019 11:54 AM Lab Results   Component Value Date/Time    Cholesterol, total 129 04/10/2019 08:34 AM    HDL Cholesterol 49 04/10/2019 08:34 AM    LDL, calculated 58 04/10/2019 08:34 AM    Triglyceride 112 04/10/2019 08:34 AM     Lab Results   Component Value Date/Time    GFR est non-AA 57 (L) 09/17/2019 08:44 AM    GFR est AA 65 09/17/2019 08:44 AM    Creatinine 0.97 09/17/2019 08:44 AM    BUN 15 09/17/2019 08:44 AM    Sodium 141 09/17/2019 08:44 AM    Potassium 3.6 09/17/2019 08:44 AM    Chloride 101 09/17/2019 08:44 AM    CO2 25 09/17/2019 08:44 AM    Magnesium 2.2 08/17/2017 10:11 AM        Review of Systems   Respiratory: Negative for shortness of breath. Cardiovascular: Negative for chest pain. Physical Exam   Constitutional: She is oriented to person, place, and time. She appears well-developed and well-nourished. No distress. HENT:   Head: Normocephalic and atraumatic. Mouth/Throat: Oropharynx is clear and moist. No oropharyngeal exudate. Eyes: Conjunctivae and EOM are normal. Right eye exhibits no discharge. Left eye exhibits no discharge. Neck: Normal range of motion. Neck supple. Cardiovascular: Normal rate, regular rhythm and normal heart sounds. Exam reveals no gallop and no friction rub. No murmur heard. Pulmonary/Chest: Effort normal and breath sounds normal. No respiratory distress. She has no wheezes. She has no rales. She exhibits no tenderness. Musculoskeletal: Normal range of motion. She exhibits no edema, tenderness or deformity. Lymphadenopathy:     She has no cervical adenopathy. Neurological: She is alert and oriented to person, place, and time. Coordination normal.   Skin: Skin is warm and dry. No rash noted. She is not diaphoretic. No erythema. No pallor. Large blister on R foot    Psychiatric: She has a normal mood and affect. Her behavior is normal.       ASSESSMENT and PLAN    ICD-10-CM ICD-9-CM    1.  Essential hypertension                  Bp adequately controlled on clonidine bid hctz verapamil and losartan continue no change to dose  J59 741.1 METABOLIC PANEL, COMPREHENSIVE      CBC W/O DIFF      TSH 3RD GENERATION      HEMOGLOBIN A1C WITH EAG   2. Morbid obesity ([address]                Wt has fallen over last few months wt is stil higher than ideal however she has lost a significant amount of wt and we are unclear why she is undergoing GI eval with dr Sherman Carballo and has egd and eus pending for Thursday  K45.10 267.38 METABOLIC PANEL, COMPREHENSIVE      CBC W/O DIFF      TSH 3RD GENERATION      HEMOGLOBIN A1C WITH EAG   3. Mixed hyperlipidemia                  Tried 2 weeks off crestor to see if this was causing cramps cramps did not improve so she has resumed this  Q39.2 048.7 METABOLIC PANEL, COMPREHENSIVE      CBC W/O DIFF      TSH 3RD GENERATION      HEMOGLOBIN A1C WITH EAG   4. Paroxysmal atrial fibrillation (HCC)                  Currently on eliquis for anti coag has stopped taking it today due to procedure and will start taking it after procedure  I13.8 548.17 METABOLIC PANEL, COMPREHENSIVE      CBC W/O DIFF      TSH 3RD GENERATION      HEMOGLOBIN A1C WITH EAG   5. Acquired hypothyroidism                  At goal on synthroid 75 mcgs daily but runner towards faster side may need to adjust this due to wt loss will repeat tsh prior to f/u  L70.6 700.3 METABOLIC PANEL, COMPREHENSIVE      CBC W/O DIFF      TSH 3RD GENERATION      HEMOGLOBIN A1C WITH EAG   6. Blister                    Discussed benign no signs of infection no abx needed can continue with warm soaks no additional intervention  T14. 8XXA 164.6 METABOLIC PANEL, COMPREHENSIVE      CBC W/O DIFF      TSH 3RD GENERATION      HEMOGLOBIN A1C WITH EAG   7. IFG (impaired fasting glucose)          Will check a1c prior to f/u    B72.12 355.21 METABOLIC PANEL, COMPREHENSIVE      CBC W/O DIFF      TSH 3RD GENERATION      HEMOGLOBIN A1C WITH EAG          Depression screen reviewed and negative.        Scribed by Nancy Flores of Joaquina Christensen, as dictated by Dr. Danita Matthews. Current diagnosis and concerns discussed with pt at length. Pt understands risks and benefits or current treatment plan and medications, and accepts the treatment and medication with any possible risks. Pt asks appropriate questions, which were answered. Pt was instructed to call with any concerns or problems. I have reviewed the note documented by the scribe. The services provided are my own.   The documentation is accurate

## 2019-10-22 NOTE — TELEPHONE ENCOUNTER
Patient states she needs a call back to get an Acute appt today or tomorrow with Dr. Jacqueline Kennedy for a blister that looks possibly infected, red & irritated. Please call.  Thank you

## 2019-10-22 NOTE — TELEPHONE ENCOUNTER
Called, spoke to pt. Two pt identifiers confirmed. Pt offered and accepted appt for 10/22/19 1315. Pt verbalized understanding of information discussed w/ no further questions at this time.

## 2019-10-23 ENCOUNTER — ANESTHESIA EVENT (OUTPATIENT)
Dept: ENDOSCOPY | Age: 79
End: 2019-10-23
Payer: MEDICARE

## 2019-10-23 NOTE — ANESTHESIA PREPROCEDURE EVALUATION
Anesthetic History   No history of anesthetic complications            Review of Systems / Medical History  Patient summary reviewed, nursing notes reviewed and pertinent labs reviewed    Pulmonary  Within defined limits                 Neuro/Psych   Within defined limits           Cardiovascular    Hypertension: well controlled        Dysrhythmias : atrial fibrillation  CAD and hyperlipidemia    Exercise tolerance: <4 METS  Comments: 60% EF by ECHO in 2017  S/P afib ablation complicated by a  Pericardial effusion with cardiac tamponade   GI/Hepatic/Renal               Comments: Diverticulosis  H/O Rectal Bleeding Endo/Other      Hypothyroidism: well controlled  Obesity and arthritis     Other Findings   Comments: Diverticulosis          Physical Exam    Airway  Mallampati: II  TM Distance: > 6 cm  Neck ROM: decreased range of motion   Mouth opening: Normal     Cardiovascular  Regular rate and rhythm,  S1 and S2 normal,  no murmur, click, rub, or gallop  Rhythm: regular  Rate: normal         Dental    Dentition: Caps/crowns, Upper dentition intact and Lower dentition intact  Comments: Several missing molars, no loose teeth.    Pulmonary  Breath sounds clear to auscultation               Abdominal  GI exam deferred       Other Findings            Anesthetic Plan    ASA: 3  Anesthesia type: general and total IV anesthesia          Induction: Intravenous  Anesthetic plan and risks discussed with: Patient

## 2019-10-24 ENCOUNTER — ANESTHESIA (OUTPATIENT)
Dept: ENDOSCOPY | Age: 79
End: 2019-10-24
Payer: MEDICARE

## 2019-10-24 ENCOUNTER — HOSPITAL ENCOUNTER (OUTPATIENT)
Age: 79
Setting detail: OUTPATIENT SURGERY
Discharge: HOME | End: 2019-10-24
Attending: INTERNAL MEDICINE | Admitting: INTERNAL MEDICINE
Payer: MEDICARE

## 2019-10-24 VITALS
SYSTOLIC BLOOD PRESSURE: 148 MMHG | BODY MASS INDEX: 31.64 KG/M2 | HEIGHT: 64 IN | DIASTOLIC BLOOD PRESSURE: 74 MMHG | TEMPERATURE: 97.6 F | WEIGHT: 185.31 LBS | RESPIRATION RATE: 21 BRPM | OXYGEN SATURATION: 100 % | HEART RATE: 67 BPM

## 2019-10-24 PROCEDURE — 74011250636 HC RX REV CODE- 250/636: Performed by: NURSE ANESTHETIST, CERTIFIED REGISTERED

## 2019-10-24 PROCEDURE — 77030018846 HC SOL IRR STRL H20 ICUM -A: Performed by: INTERNAL MEDICINE

## 2019-10-24 PROCEDURE — 88305 TISSUE EXAM BY PATHOLOGIST: CPT

## 2019-10-24 PROCEDURE — 74011250636 HC RX REV CODE- 250/636: Performed by: INTERNAL MEDICINE

## 2019-10-24 PROCEDURE — 76040000007: Performed by: INTERNAL MEDICINE

## 2019-10-24 PROCEDURE — 77030022853 HC NDL ASPIR ULTRSND BSC -C: Performed by: INTERNAL MEDICINE

## 2019-10-24 PROCEDURE — 77030003406 HC NDL ASPIR BIOP OCOA -C: Performed by: INTERNAL MEDICINE

## 2019-10-24 PROCEDURE — 74011000250 HC RX REV CODE- 250: Performed by: NURSE ANESTHETIST, CERTIFIED REGISTERED

## 2019-10-24 PROCEDURE — 82378 CARCINOEMBRYONIC ANTIGEN: CPT

## 2019-10-24 PROCEDURE — 77030019988 HC FCPS ENDOSC DISP BSC -B: Performed by: INTERNAL MEDICINE

## 2019-10-24 PROCEDURE — 77030019957 HC CUF BLN GASTSCP OCOA -B: Performed by: INTERNAL MEDICINE

## 2019-10-24 PROCEDURE — 76060000032 HC ANESTHESIA 0.5 TO 1 HR: Performed by: INTERNAL MEDICINE

## 2019-10-24 RX ORDER — SODIUM CHLORIDE 0.9 % (FLUSH) 0.9 %
5-40 SYRINGE (ML) INJECTION AS NEEDED
Status: DISCONTINUED | OUTPATIENT
Start: 2019-10-24 | End: 2019-10-24 | Stop reason: HOSPADM

## 2019-10-24 RX ORDER — EPINEPHRINE 0.1 MG/ML
1 INJECTION INTRACARDIAC; INTRAVENOUS
Status: DISCONTINUED | OUTPATIENT
Start: 2019-10-24 | End: 2019-10-24 | Stop reason: HOSPADM

## 2019-10-24 RX ORDER — ATROPINE SULFATE 0.1 MG/ML
0.5 INJECTION INTRAVENOUS
Status: COMPLETED | OUTPATIENT
Start: 2019-10-24 | End: 2019-10-24

## 2019-10-24 RX ORDER — MIDAZOLAM HYDROCHLORIDE 1 MG/ML
.25-5 INJECTION, SOLUTION INTRAMUSCULAR; INTRAVENOUS
Status: DISCONTINUED | OUTPATIENT
Start: 2019-10-24 | End: 2019-10-24 | Stop reason: HOSPADM

## 2019-10-24 RX ORDER — NALOXONE HYDROCHLORIDE 0.4 MG/ML
0.4 INJECTION, SOLUTION INTRAMUSCULAR; INTRAVENOUS; SUBCUTANEOUS
Status: DISCONTINUED | OUTPATIENT
Start: 2019-10-24 | End: 2019-10-24 | Stop reason: HOSPADM

## 2019-10-24 RX ORDER — PROPOFOL 10 MG/ML
INJECTION, EMULSION INTRAVENOUS AS NEEDED
Status: DISCONTINUED | OUTPATIENT
Start: 2019-10-24 | End: 2019-10-24 | Stop reason: HOSPADM

## 2019-10-24 RX ORDER — SODIUM CHLORIDE 0.9 % (FLUSH) 0.9 %
5-40 SYRINGE (ML) INJECTION EVERY 8 HOURS
Status: DISCONTINUED | OUTPATIENT
Start: 2019-10-24 | End: 2019-10-24 | Stop reason: HOSPADM

## 2019-10-24 RX ORDER — FLUMAZENIL 0.1 MG/ML
0.2 INJECTION INTRAVENOUS
Status: DISCONTINUED | OUTPATIENT
Start: 2019-10-24 | End: 2019-10-24 | Stop reason: HOSPADM

## 2019-10-24 RX ORDER — LEVOFLOXACIN 5 MG/ML
500 INJECTION, SOLUTION INTRAVENOUS ONCE
Status: COMPLETED | OUTPATIENT
Start: 2019-10-24 | End: 2019-10-24

## 2019-10-24 RX ORDER — PROPOFOL 10 MG/ML
INJECTION, EMULSION INTRAVENOUS
Status: DISCONTINUED | OUTPATIENT
Start: 2019-10-24 | End: 2019-10-24 | Stop reason: HOSPADM

## 2019-10-24 RX ORDER — SODIUM CHLORIDE 9 MG/ML
75 INJECTION, SOLUTION INTRAVENOUS CONTINUOUS
Status: DISCONTINUED | OUTPATIENT
Start: 2019-10-24 | End: 2019-10-24 | Stop reason: HOSPADM

## 2019-10-24 RX ORDER — FENTANYL CITRATE 50 UG/ML
25 INJECTION, SOLUTION INTRAMUSCULAR; INTRAVENOUS
Status: DISCONTINUED | OUTPATIENT
Start: 2019-10-24 | End: 2019-10-24 | Stop reason: HOSPADM

## 2019-10-24 RX ORDER — PHENYLEPHRINE HCL IN 0.9% NACL 0.4MG/10ML
SYRINGE (ML) INTRAVENOUS AS NEEDED
Status: DISCONTINUED | OUTPATIENT
Start: 2019-10-24 | End: 2019-10-24 | Stop reason: HOSPADM

## 2019-10-24 RX ORDER — DEXTROMETHORPHAN/PSEUDOEPHED 2.5-7.5/.8
1.2 DROPS ORAL
Status: DISCONTINUED | OUTPATIENT
Start: 2019-10-24 | End: 2019-10-24 | Stop reason: HOSPADM

## 2019-10-24 RX ORDER — LEVOFLOXACIN 500 MG/1
500 TABLET, FILM COATED ORAL DAILY
Qty: 2 TAB | Refills: 0 | Status: SHIPPED | OUTPATIENT
Start: 2019-10-25 | End: 2019-10-27

## 2019-10-24 RX ORDER — LIDOCAINE HYDROCHLORIDE 20 MG/ML
INJECTION, SOLUTION EPIDURAL; INFILTRATION; INTRACAUDAL; PERINEURAL AS NEEDED
Status: DISCONTINUED | OUTPATIENT
Start: 2019-10-24 | End: 2019-10-24 | Stop reason: HOSPADM

## 2019-10-24 RX ADMIN — PROPOFOL 40 MG: 10 INJECTION, EMULSION INTRAVENOUS at 12:35

## 2019-10-24 RX ADMIN — PROPOFOL 40 MG: 10 INJECTION, EMULSION INTRAVENOUS at 12:26

## 2019-10-24 RX ADMIN — LIDOCAINE HYDROCHLORIDE 80 MG: 20 INJECTION, SOLUTION EPIDURAL; INFILTRATION; INTRACAUDAL; PERINEURAL at 11:59

## 2019-10-24 RX ADMIN — LEVOFLOXACIN 500 MG: 5 INJECTION, SOLUTION INTRAVENOUS at 12:51

## 2019-10-24 RX ADMIN — SODIUM CHLORIDE: 900 INJECTION, SOLUTION INTRAVENOUS at 11:36

## 2019-10-24 RX ADMIN — PROPOFOL 100 MCG/KG/MIN: 10 INJECTION, EMULSION INTRAVENOUS at 11:59

## 2019-10-24 RX ADMIN — ATROPINE SULFATE 0.4 MG: 0.1 INJECTION, SOLUTION ENDOTRACHEAL; INTRAMUSCULAR; INTRAVENOUS; SUBCUTANEOUS at 12:21

## 2019-10-24 RX ADMIN — Medication 20 MCG: at 12:12

## 2019-10-24 RX ADMIN — PROPOFOL 40 MG: 10 INJECTION, EMULSION INTRAVENOUS at 12:05

## 2019-10-24 RX ADMIN — PROPOFOL 40 MG: 10 INJECTION, EMULSION INTRAVENOUS at 11:59

## 2019-10-24 NOTE — DISCHARGE INSTRUCTIONS
[facility]  [id number]  1940    EGD/EUS-FNA DISCHARGE INSTRUCTIONS  Discomfort:  Sore throat- throat lozenges or warm salt water gargle  redness at IV site- apply warm compress to area; if redness or soreness persist- contact your physician  Gaseous discomfort- walking, belching will help relieve any discomfort  You may not operate a vehicle for 12 hours  You may not engage in an occupation involving machinery or appliances for rest of today  You may not drink alcoholic beverages for at least 12 hours  Avoid making any critical decisions for at least 24 hour  DIET  You may have minimal sips at this time-- do not eat or drink for two hours. You may eat and drink after 115pm today  You may resume your regular diet - however -  remember your colon is empty and a heavy meal will produce gas. Avoid these foods:  vegetables, fried / greasy foods, carbonated drinks    MEDICATIONS:        ACTIVITY  You may resume your normal daily activities until tomorrow AM;  Spend the remainder of the day resting -  avoid any strenuous activity. CALL M.D. ANY SIGN OF   Increasing pain, nausea, vomiting  Abdominal distension (swelling)  New increased bleeding (oral or rectal)  Fever (chills)  Pain in chest area  Bloody discharge from nose or mouth  Shortness of breath    IMPRESSION:  -Normal esophagus  -Normal stomach; biopsied to exclude inflammation  -Normal duodenum  -Normal ampullary mound and papillary orifice (as seen with linear echoendoscope) withouot fishmouth appearance or mucin droplet  -Tubular cystic structure measuring 17mm, adjacent to normal sized common bile duct, containing two round 6-10mm structures that appear mobile, with one of them containing multiple peripheral calcifications. Fine needle aspirate 2cc clear fluid was performed of the pancreatic head cyst using a 25 gauge needle with 1 pass with preliminary results suggesting pending.   The needle was passed into the filling defects in the cyst  -Pancreatic Duct is normal findings  -Gallbladder is normal without stones  -Normal common bile duct without obstruction  -No adenopathy    Follow-up Instructions:  -Call Dr. Enid Cespedes for the results of procedure / biopsy in 7-10 days  -Telephone # [phone]  -You may resume your Eliquis tomorrow  -Await fluid analysis and biopsy results.    -Will consider utility of MRI/MRCP to evaluate further as needed dependent on pending results  Take Levaquin for two more days beginning tomorrow    Jennifer Bolaños MD

## 2019-10-24 NOTE — ROUTINE PROCESS
Hermelinda Mike 1940 
[id number] Situation: 
Verbal report received from: Devante Diaz Procedure: Procedure(s): ENDOSCOPIC ULTRASOUND (EUS) ESOPHAGOGASTRODUODENOSCOPY (EGD) ESOPHAGOGASTRODUODENAL (EGD) BIOPSY Background: 
 
Preoperative diagnosis: PANCREATIC CYST  
UNINTENTIONAL WEIGHT LOSS Postoperative diagnosis: Pancreatic head cyst 
 
:  Dr. Vika Devine Assistant(s): Endoscopy RN-1: Julienne Hoyt RN Endoscopy RN-2: Aguila Ortega RN Specimens:  
ID Type Source Tests Collected by Time Destination 1 : Gastric BX Preservative Gastric  Zigmund MD Khalida 10/24/2019 1211 Pathology H. Pylori  no Assessment: 
Intra-procedure medications Anesthesia gave intra-procedure sedation and medications, see anesthesia flow sheet yes Intravenous fluids: NS@ Nurys Screws Vital signs stable Abdominal assessment: round and soft Recommendation: 
Discharge patient per MD order. Family or Vonzell Abdirahman Sister Permission to share finding with family or friend yes

## 2019-10-24 NOTE — PROCEDURES
NAME:  Papa Braun   :   1940   MRN:   [id number]     SANDEEP HOWARD [facility]    Date/Time:  10/24/2019   Procedure Type: EUS-FNA, EGD with biopsy    Indications: Abnormal GI CT with evidence of pancreatic cyst; Abnormal weight loss    Pre-operative Diagnosis: see indication above    Post-operative Diagnosis:  See findings below    : Thalia Street MD  Referring Provider: April Walker MD    Procedure Details:    Exam:  Airway: clear, no airway problems anticipated  Heart: RRR, without gallops or rubs  Lungs: clear bilaterally without wheezes, crackles, or rhonchi  Abdomen: soft, nontender, nondistended, bowel sounds present  Mental Status: awake, alert and oriented to person, place and time     Anethesia/Sedation:  MAC anesthesia Propofol 507mg IV    Procedure Details   After infom consent was obtained for the procedure, with all risks and benefits of procedure explained the patient was taken to the endoscopy suite and placed in the left lateral decubitus position. Following sequential administration of sedation as per above, the endoscope, followed later by the radial and linear echoendoscope, was inserted into the mouth and advanced under direct vision to third portion of the duodenum. A careful inspection was made as the gastroscope was withdrawn, including a retroflexed view of the proximal stomach; findings and interventions are described below.    Findings:     Endoscopic:  -Normal esophagus  -Normal stomach; biopsied to exclude inflammation  -Normal duodenum  -Normal ampullary mound and papillary orifice (as seen with linear echoendoscope) withouot fishmouth appearance or mucin droplet    Ultrasound:   Esophagus: normal findings   Stomach: normal findings   Pancreas:     Areas examined: the entire gland    Parenchyma: -tubular cystic structure measuring 17mm, adjacent to normal sized common bile duct, containing two round 6-10mm structures that appear mobile, with one of them containing multiple peripheral calcifications. The needle was passed into the filling defects in the cyst    Pancreatic Duct: normal findings   Liver:     Parenchyma: normal    Gallbladder: normal, without stones    Bile Duct: appears normal without obstruction   Lymph Node: no adenopathy        Specimen Removed:  #1 gastric; 2cc fluid sent for CEA, amylase, mucin-staining  Complications: None. EBL:  None. Interventions: Fine needle aspirate 2cc clear fluid was performed of the pancreatic head cyst using a 25 gauge needle with 1 pass with preliminary results suggesting pending. Impressions:  -Normal esophagus  -Normal stomach; biopsied to exclude inflammation  -Normal duodenum  -Normal ampullary mound and papillary orifice (as seen with linear echoendoscope) withouot fishmouth appearance or mucin droplet  -Tubular cystic structure measuring 17mm, adjacent to normal sized common bile duct, containing two round 6-10mm structures that appear mobile, with one of them containing multiple peripheral calcifications. Fine needle aspirate 2cc clear fluid was performed of the pancreatic head cyst using a 25 gauge needle with 1 pass with preliminary results suggesting pending. The needle was passed into the filling defects in the cyst  -Pancreatic Duct is normal findings  -Gallbladder is normal without stones  -Normal common bile duct without obstruction  -No adenopathy    Recommendations: Await fluid analysis and biopsy results.   Will consider utility of MRI/MRCP to evaluate further as needed   Janelle Locke MD

## 2019-10-24 NOTE — H&P
Gastroenterology Outpatient History and Physical    Patient: Rolo Lovell    Physician: Ange Baker MD    Chief Complaint: abnl GI CT with pancreatic lesion with weight loss noted over the last year  History of Present Illness: 70yo F with abnl GI CT with pancreatic lesion with weight loss noted over the last year    History:  Past Medical History:   Diagnosis Date    Arrhythmia     afib    Arthritis     right knee, left shoulder    Diverticulosis     Frequency of urination     High cholesterol     Hypertension     Morbid obesity ([address]     Thyroid disease     hypothyroid    Unspecified adverse effect of anesthesia     low BP      Past Surgical History:   Procedure Laterality Date    BREAST SURGERY PROCEDURE UNLISTED  1982    breast bx rt    CARDIAC SURG PROCEDURE UNLIST  01/2015    cardiac catheterization, no intervention, medical management    CARDIAC SURG PROCEDURE UNLIST  03/2017    ablation    HX GYN      myomectomy on uterus    HX GYN  2011    hystereoscopy D&C    HX LYMPH NODE DISSECTION      biopsy    HX OTHER SURGICAL      lymph node bx      Social History     Socioeconomic History    Marital status:      Spouse name: Not on file    Number of children: Not on file    Years of education: Not on file    Highest education level: Not on file   Tobacco Use    Smoking status: Never Smoker    Smokeless tobacco: Never Used   Substance and Sexual Activity    Alcohol use: No    Drug use: No      Family History   Problem Relation Age of Onset    Alzheimer Mother     Heart Disease Mother     Heart Disease Father     Hypertension Father     Cancer Paternal Aunt     Diabetes Paternal Grandmother       Patient Active Problem List   Diagnosis Code    Postmenopausal bleeding N95.0    Hyperlipidemia E78.5    Hypokalemia E87.6    Morbid obesity ([address] E66.01    Atrial fibrillation ([address] I48.91    Thyroid activity decreased E03.9    HTN (hypertension) I10    Acquired hypothyroidism E03.9    S/P ablation of atrial fibrillation Z98.890, Z86.79    Pericardial effusion with cardiac tamponade I31.3, I31.4       Allergies: Allergies   Allergen Reactions    Adhesive Hives    Amoxicillin Unknown (comments)    Lipitor [Atorvastatin] Myalgia     Gets the flu     Medications:   Prior to Admission medications    Medication Sig Start Date End Date Taking? Authorizing Provider   rosuvastatin (CRESTOR) 20 mg tablet TAKE 1 TABLET BY MOUTH EVERY DAY 4/17/19  Yes Kervin Maldonado MD   losartan (COZAAR) 25 mg tablet TAKE ONE TABLET BY MOUTH DAILY 4/17/19  Yes Kervin Maldonado MD   cloNIDine HCl (CATAPRES) 0.2 mg tablet TAKE 1 TAB BY MOUTH TWO (2) TIMES A DAY. 4/17/19  Yes Kervin Maldonado MD   potassium chloride SR (KLOR-CON 10) 10 mEq tablet TAKE 2 TABS BY MOUTH TWO (2) TIMES A DAY. 4/17/19  Yes Kervin Maldonado MD   hydroCHLOROthiazide (HYDRODIURIL) 25 mg tablet Take 1 Tab by mouth daily. 4/17/19  Yes Kervin Maldonado MD   SYNTHROID 75 mcg tablet Take 1 Tab by mouth Daily (before breakfast). 4/17/19  Yes Kervin Maldonado MD   verapamil ER (CALAN-SR) 120 mg tablet TAKE 1 TABLET BY MOUTH EVERY MORNING FOR BLOOD PRESSURE 10/18/16  Yes Kervin Maldonado MD   CALCIUM CARBONATE/VITAMIN D3 (CALTRATE 600 + D PO) Take 1 Tab by mouth daily. Yes Provider, Historical   MULTIVITAMIN W-MINERALS/LUTEIN (CENTRUM SILVER PO) Take 1 Tab by mouth daily (after lunch). Yes Provider, Historical   ELIQUIS 5 mg tablet TAKE 1 TABLET BY MOUTH TWICE A DAY 7/15/19   Maria Dolores Kovacs, ANP     Physical Exam:   Vital Signs: Blood pressure 109/63, pulse 65, temperature 98 °F (36.7 °C), resp. rate 20, height 5' 4\" (1.626 m), weight 84.1 kg (185 lb 5 oz), SpO2 98 %, not currently breastfeeding.   General: well developed, well nourished   HEENT: unremarkable   Heart: regular rhythm no mumur    Lungs: clear   Abdominal:  benign   Neurological: unremarkable   Extremities: no edema     Findings/Diagnosis: abnl GI CT with pancreatic lesion, off Eliquis now x 3d  Plan of Care/Planned Procedure: EGD/EUS-FNA with conscious/deep sedation    Signed:  Nova Cody MD 10/24/2019

## 2019-10-24 NOTE — PERIOP NOTES
Endoscope was pre-cleaned at the bedside immediately following procedure by Letty Downey    Anesthesia reports 507mg Propofol,  and 800mL NS given during procedure. Received report from anesthesia staff on vital signs and status of patient. Elisabeth Edmondson

## 2019-10-24 NOTE — ANESTHESIA POSTPROCEDURE EVALUATION
Procedure(s):  ENDOSCOPIC ULTRASOUND (EUS)  ESOPHAGOGASTRODUODENOSCOPY (EGD)  ESOPHAGOGASTRODUODENAL (EGD) BIOPSY. general, total IV anesthesia    Anesthesia Post Evaluation        Patient location during evaluation: PACU  Note status: Adequate. Level of consciousness: responsive to verbal stimuli and sleepy but conscious  Pain management: satisfactory to patient  Airway patency: patent  Anesthetic complications: no  Cardiovascular status: acceptable  Respiratory status: acceptable  Hydration status: acceptable  Comments: +Post-Anesthesia Evaluation and Assessment    Patient: Rolo Lovell MRN: [id number]  SSN: [id number]   YOB: 1940  Age: 70 y.o. Sex: female      Cardiovascular Function/Vital Signs    BP (!) 139/95   Pulse 65   Temp 36.4 °C (97.6 °F)   Resp 12   Ht 5' 4\" (1.626 m)   Wt 84.1 kg (185 lb 5 oz)   SpO2 98%   Breastfeeding? No   BMI 31.81 kg/m²     Patient is status post Procedure(s):  ENDOSCOPIC ULTRASOUND (EUS)  ESOPHAGOGASTRODUODENOSCOPY (EGD)  ESOPHAGOGASTRODUODENAL (EGD) BIOPSY. Nausea/Vomiting: Controlled. Postoperative hydration reviewed and adequate. Pain:  Pain Scale 1: Numeric (0 - 10) (10/24/19 1304)  Pain Intensity 1: 0 (10/24/19 1304)   Managed. Neurological Status: At baseline. Mental Status and Level of Consciousness: Arousable. Pulmonary Status:   O2 Device: Room air (10/24/19 1304)   Adequate oxygenation and airway patent. Complications related to anesthesia: None    Post-anesthesia assessment completed. No concerns. Signed By: Meg Houser MD    10/24/2019  Post anesthesia nausea and vomiting:  controlled      Vitals Value Taken Time   /76 10/24/2019  1:07 PM   Temp 36.4 °C (97.6 °F) 10/24/2019  1:04 PM   Pulse 69 10/24/2019  1:08 PM   Resp 12 10/24/2019  1:08 PM   SpO2 99 % 10/24/2019  1:08 PM   Vitals shown include unvalidated device data.

## 2019-10-28 LAB
CEA FLD-MCNC: 94.6 NG/ML
SPECIMEN SOURCE FLD: NORMAL

## 2019-10-28 NOTE — PROGRESS NOTES
Spoke to Maureen Plasencia at [facility] at [facility] to inform that Dr. Verna Echevarria had NOT ordered the CEA, Fluid lab in pt's chart. Maureen Plasencia states that on the lab side, it Does show that Dr. Halie Lock was the ordering provider, but [facility] shows that Dr. Verna Echevarria was the authorizing provider. Maureen Plasencia states that results were sent to Dr. Halie Lock. Will contact IT Dept to inquire on fixing the issue.

## 2019-10-30 NOTE — PROGRESS NOTES
Spoke to Glen Mcelroy at Dr. Kaden Peters office. Glen Mcelroy informed that the CEA Fluid was ordered under Dr. Arthur Goyal name as seen in [address] [place] [address]. Glen Mcelroy states that Dr. Young Arteaga has received the result from the lab for pt's CEA and has resulted it this morning as normal for the pt. Glen Mcelroy states their office will notify the pt.

## 2019-11-08 ENCOUNTER — TELEPHONE (OUTPATIENT)
Dept: CARDIOLOGY CLINIC | Age: 79
End: 2019-11-08

## 2019-11-08 ENCOUNTER — TELEPHONE (OUTPATIENT)
Dept: INTERNAL MEDICINE CLINIC | Age: 79
End: 2019-11-08

## 2019-11-08 ENCOUNTER — HOSPITAL ENCOUNTER (EMERGENCY)
Age: 79
Discharge: HOME | End: 2019-11-08
Attending: EMERGENCY MEDICINE | Admitting: EMERGENCY MEDICINE
Payer: MEDICARE

## 2019-11-08 VITALS
BODY MASS INDEX: 31.58 KG/M2 | TEMPERATURE: 98.4 F | RESPIRATION RATE: 23 BRPM | SYSTOLIC BLOOD PRESSURE: 123 MMHG | HEART RATE: 80 BPM | WEIGHT: 185 LBS | HEIGHT: 64 IN | DIASTOLIC BLOOD PRESSURE: 71 MMHG | OXYGEN SATURATION: 99 %

## 2019-11-08 DIAGNOSIS — I10 ESSENTIAL HYPERTENSION: Primary | ICD-10-CM

## 2019-11-08 DIAGNOSIS — R00.2 PALPITATIONS: ICD-10-CM

## 2019-11-08 LAB
ALBUMIN SERPL-MCNC: 3.8 G/DL (ref 3.5–5)
ALBUMIN/GLOB SERPL: 1 {RATIO} (ref 1.1–2.2)
ALP SERPL-CCNC: 74 U/L (ref 45–117)
ALT SERPL-CCNC: 22 U/L (ref 12–78)
ANION GAP SERPL CALC-SCNC: 5 MMOL/L (ref 5–15)
AST SERPL-CCNC: 19 U/L (ref 15–37)
BASOPHILS # BLD: 0 K/UL (ref 0–0.1)
BASOPHILS NFR BLD: 1 % (ref 0–1)
BILIRUB SERPL-MCNC: 0.2 MG/DL (ref 0.2–1)
BUN SERPL-MCNC: 15 MG/DL (ref 6–20)
BUN/CREAT SERPL: 16 (ref 12–20)
CALCIUM SERPL-MCNC: 9.4 MG/DL (ref 8.5–10.1)
CHLORIDE SERPL-SCNC: 106 MMOL/L (ref 97–108)
CO2 SERPL-SCNC: 28 MMOL/L (ref 21–32)
COMMENT, HOLDF: NORMAL
CREAT SERPL-MCNC: 0.95 MG/DL (ref 0.55–1.02)
DIFFERENTIAL METHOD BLD: NORMAL
EOSINOPHIL # BLD: 0.1 K/UL (ref 0–0.4)
EOSINOPHIL NFR BLD: 2 % (ref 0–7)
ERYTHROCYTE [DISTWIDTH] IN BLOOD BY AUTOMATED COUNT: 12.3 % (ref 11.5–14.5)
GLOBULIN SER CALC-MCNC: 3.9 G/DL (ref 2–4)
GLUCOSE SERPL-MCNC: 95 MG/DL (ref 65–100)
HCT VFR BLD AUTO: 39.7 % (ref 35–47)
HGB BLD-MCNC: 13.3 G/DL (ref 11.5–16)
IMM GRANULOCYTES # BLD AUTO: 0 K/UL (ref 0–0.04)
IMM GRANULOCYTES NFR BLD AUTO: 0 % (ref 0–0.5)
LYMPHOCYTES # BLD: 1.4 K/UL (ref 0.8–3.5)
LYMPHOCYTES NFR BLD: 25 % (ref 12–49)
MAGNESIUM SERPL-MCNC: 2 MG/DL (ref 1.6–2.4)
MCH RBC QN AUTO: 31.1 PG (ref 26–34)
MCHC RBC AUTO-ENTMCNC: 33.5 G/DL (ref 30–36.5)
MCV RBC AUTO: 93 FL (ref 80–99)
MONOCYTES # BLD: 0.5 K/UL (ref 0–1)
MONOCYTES NFR BLD: 8 % (ref 5–13)
NEUTS SEG # BLD: 3.6 K/UL (ref 1.8–8)
NEUTS SEG NFR BLD: 64 % (ref 32–75)
NRBC # BLD: 0 K/UL (ref 0–0.01)
NRBC BLD-RTO: 0 PER 100 WBC
PLATELET # BLD AUTO: 185 K/UL (ref 150–400)
PMV BLD AUTO: 10.4 FL (ref 8.9–12.9)
POTASSIUM SERPL-SCNC: 3.5 MMOL/L (ref 3.5–5.1)
PROT SERPL-MCNC: 7.7 G/DL (ref 6.4–8.2)
RBC # BLD AUTO: 4.27 M/UL (ref 3.8–5.2)
SAMPLES BEING HELD,HOLD: NORMAL
SODIUM SERPL-SCNC: 139 MMOL/L (ref 136–145)
TROPONIN I SERPL-MCNC: <0.05 NG/ML
WBC # BLD AUTO: 5.6 K/UL (ref 3.6–11)

## 2019-11-08 PROCEDURE — 74011250637 HC RX REV CODE- 250/637: Performed by: EMERGENCY MEDICINE

## 2019-11-08 PROCEDURE — 83735 ASSAY OF MAGNESIUM: CPT

## 2019-11-08 PROCEDURE — 99285 EMERGENCY DEPT VISIT HI MDM: CPT

## 2019-11-08 PROCEDURE — 80053 COMPREHEN METABOLIC PANEL: CPT

## 2019-11-08 PROCEDURE — 93005 ELECTROCARDIOGRAM TRACING: CPT

## 2019-11-08 PROCEDURE — 36415 COLL VENOUS BLD VENIPUNCTURE: CPT

## 2019-11-08 PROCEDURE — 84484 ASSAY OF TROPONIN QUANT: CPT

## 2019-11-08 PROCEDURE — 85025 COMPLETE CBC W/AUTO DIFF WBC: CPT

## 2019-11-08 PROCEDURE — 74011250636 HC RX REV CODE- 250/636: Performed by: EMERGENCY MEDICINE

## 2019-11-08 PROCEDURE — 96360 HYDRATION IV INFUSION INIT: CPT

## 2019-11-08 RX ORDER — ACETAMINOPHEN 500 MG
1000 TABLET ORAL
Status: COMPLETED | OUTPATIENT
Start: 2019-11-08 | End: 2019-11-08

## 2019-11-08 RX ORDER — CLONIDINE HYDROCHLORIDE 0.1 MG/1
0.1 TABLET ORAL
Status: DISCONTINUED | OUTPATIENT
Start: 2019-11-08 | End: 2019-11-08

## 2019-11-08 RX ADMIN — SODIUM CHLORIDE 500 ML: 900 INJECTION, SOLUTION INTRAVENOUS at 21:37

## 2019-11-08 RX ADMIN — ACETAMINOPHEN 1000 MG: 500 TABLET ORAL at 21:36

## 2019-11-08 NOTE — TELEPHONE ENCOUNTER
Received triage call from pt. Pt stated that her pulse is running high at around 102-110, BP within normal limits. Pt stated she called her cardiologist's office, Dr. Christel Farias, and spoke with his nurse. Per pt, pt was told to contact PCP since PCP prescribes HTN medications. Pt stated she was also told to call back to Dr. Christel Farias if PCP was not able to help. Notified pt Dr. Jacqueline Kennedy is out of the office but a message will be sent to her. Advised pt to call Dr. Craline Parker nurse back for further instructions until we can discuss with Dr. Jacqueline Kennedy. Pt verbalized understanding of information discussed w/ no further questions at this time.

## 2019-11-08 NOTE — TELEPHONE ENCOUNTER
Pt states her face is flushed today. Pulse is ranging from 100-109 BPM, BP is 157/70. Spoke with Josie Venegas NP who advised for pt to double her verapamil. Pt verbalized understanding of information discussed w/ no further questions at this time.       Mariano Sim RN

## 2019-11-09 LAB
ATRIAL RATE: 79 BPM
CALCULATED P AXIS, ECG09: 41 DEGREES
CALCULATED R AXIS, ECG10: -19 DEGREES
CALCULATED T AXIS, ECG11: 28 DEGREES
DIAGNOSIS, 93000: NORMAL
P-R INTERVAL, ECG05: 146 MS
Q-T INTERVAL, ECG07: 388 MS
QRS DURATION, ECG06: 84 MS
QTC CALCULATION (BEZET), ECG08: 444 MS
VENTRICULAR RATE, ECG03: 79 BPM

## 2019-11-09 NOTE — ED NOTES
Patient presents with EMS for elevated HR and BP. Patient states her BP had been elevated all day where she kept calling her PCP and they advised her to take an extra BP pill today and after she started to feel like her heart was racing where she drove to the fire station and was found to be hypertensive.

## 2019-11-09 NOTE — ED NOTES
Discharge instructions given to patient by Dr. Kathy Nicholas. Verbalized understanding of instructions. Patient discharged without difficulty. Patient discharged in stable condition ambulatory accompanied by friend.

## 2019-11-09 NOTE — ED PROVIDER NOTES
EMERGENCY DEPARTMENT HISTORY AND PHYSICAL EXAM      Date: 2019  Patient Name: Latrell Rico  Patient Age and Sex: 70 y.o. female     History of Presenting Illness     Chief Complaint   Patient presents with    Hypertension     with associated heart palpatations       History Provided By: Patient    HPI: Latrell Rico is a 43-year-old female with a history of atrial fibrillation on Eliquis, hypertension, presenting for high blood pressure and palpitations. Patient states that earlier today she noticed that her heart was racing and measured at home the heart rate was 109. Also checked her blood pressure at that time and it was 160s. Went to a  came back after blood pressure again and it was 150s later on it was 140s. Then started to have a slight headache and felt like she was having palpitations again so she went to the fire station where they checked her blood pressure and systolic was in the 388S. The heart rate at that time was normal however. Patient states that she is on for blood pressure medications as well as oral potassium pills. Patient denies any lightheadedness, chest pain, shortness of breath, confusion, cough, abdominal pain, nausea or vomiting. It does not feel like she has palpitations at this time. Is having a mild headache. There are no other complaints, changes, or physical findings at this time. PCP: Pasquale Cotter MD    No current facility-administered medications on file prior to encounter. Current Outpatient Medications on File Prior to Encounter   Medication Sig Dispense Refill    ELIQUIS 5 mg tablet TAKE 1 TABLET BY MOUTH TWICE A DAY 60 Tab 6    rosuvastatin (CRESTOR) 20 mg tablet TAKE 1 TABLET BY MOUTH EVERY DAY 90 Tab 1    losartan (COZAAR) 25 mg tablet TAKE ONE TABLET BY MOUTH DAILY 90 Tab 1    cloNIDine HCl (CATAPRES) 0.2 mg tablet TAKE 1 TAB BY MOUTH TWO (2) TIMES A DAY.  180 Tab 1    potassium chloride SR (KLOR-CON 10) 10 mEq tablet TAKE 2 TABS BY MOUTH TWO (2) TIMES A DAY. 360 Tab 1    hydroCHLOROthiazide (HYDRODIURIL) 25 mg tablet Take 1 Tab by mouth daily. 90 Tab 1    SYNTHROID 75 mcg tablet Take 1 Tab by mouth Daily (before breakfast). 90 Tab 1    verapamil ER (CALAN-SR) 120 mg tablet TAKE 1 TABLET BY MOUTH EVERY MORNING FOR BLOOD PRESSURE 90 Tab 2    CALCIUM CARBONATE/VITAMIN D3 (CALTRATE 600 + D PO) Take 1 Tab by mouth daily.  MULTIVITAMIN W-MINERALS/LUTEIN (CENTRUM SILVER PO) Take 1 Tab by mouth daily (after lunch). Past History     Past Medical History:  Past Medical History:   Diagnosis Date    Arrhythmia     afib    Arthritis     right knee, left shoulder    Diverticulosis     Frequency of urination     High cholesterol     Hypertension     Morbid obesity ([address]     Thyroid disease     hypothyroid    Unspecified adverse effect of anesthesia     low BP       Past Surgical History:  Past Surgical History:   Procedure Laterality Date    BREAST SURGERY PROCEDURE UNLISTED  1982    breast bx rt    CARDIAC SURG PROCEDURE UNLIST  01/2015    cardiac catheterization, no intervention, medical management    CARDIAC SURG PROCEDURE UNLIST  03/2017    ablation    HX GYN      myomectomy on uterus    HX GYN  2011    hystereoscopy D&C    HX LYMPH NODE DISSECTION      biopsy    HX OTHER SURGICAL      lymph node bx    UPPER GI ENDOSCOPY,BIOPSY  10/24/2019         UPPER GI ENDOSCOPY,FN NEEDLE BX,GUIDED  10/24/2019            Family History:  Family History   Problem Relation Age of Onset    Alzheimer Mother     Heart Disease Mother     Heart Disease Father     Hypertension Father     Cancer Paternal Aunt     Diabetes Paternal Grandmother        Social History:  Social History     Tobacco Use    Smoking status: Never Smoker    Smokeless tobacco: Never Used   Substance Use Topics    Alcohol use: No    Drug use: No       Allergies:   Allergies   Allergen Reactions    Adhesive Hives    Amoxicillin Unknown (comments)    Lipitor [Atorvastatin] Myalgia     Gets the flu         Review of Systems   Review of Systems   Constitutional: Negative for chills and fever. Respiratory: Negative for cough and shortness of breath. Cardiovascular: Positive for palpitations. Negative for chest pain. Gastrointestinal: Negative for abdominal pain, constipation, diarrhea, nausea and vomiting. Neurological: Positive for headaches. Negative for weakness and numbness. All other systems reviewed and are negative. Physical Exam   Physical Exam   Constitutional: She is oriented to person, place, and time. She appears well-developed and well-nourished. Patient is very sweet, smiling and joking around with me   HENT:   Head: Normocephalic and atraumatic. Eyes: Conjunctivae and EOM are normal.   Neck: Normal range of motion. Neck supple. Cardiovascular: Normal rate and regular rhythm. Patient is in sinus rhythm with a heart rate of around 86 on the monitor. Blood pressure in the 150s. Pulmonary/Chest: Effort normal and breath sounds normal. No respiratory distress. Abdominal: Soft. She exhibits no distension. There is no tenderness. Musculoskeletal: Normal range of motion. Neurological: She is alert and oriented to person, place, and time. Skin: Skin is warm and dry. Psychiatric: She has a normal mood and affect. Nursing note and vitals reviewed.        Diagnostic Study Results     Labs -     Recent Results (from the past 12 hour(s))   EKG, 12 LEAD, INITIAL    Collection Time: 11/08/19  8:03 PM   Result Value Ref Range    Ventricular Rate 79 BPM    Atrial Rate 79 BPM    P-R Interval 146 ms    QRS Duration 84 ms    Q-T Interval 388 ms    QTC Calculation (Bezet) 444 ms    Calculated P Axis 41 degrees    Calculated R Axis -19 degrees    Calculated T Axis 28 degrees    Diagnosis       Normal sinus rhythm  Normal ECG  When compared with ECG of 17-AUG-2017 09:39,  No significant change was found     CBC WITH AUTOMATED DIFF Collection Time: 11/08/19  8:12 PM   Result Value Ref Range    WBC 5.6 3.6 - 11.0 K/uL    RBC 4.27 3.80 - 5.20 M/uL    HGB 13.3 11.5 - 16.0 g/dL    HCT 39.7 35.0 - 47.0 %    MCV 93.0 80.0 - 99.0 FL    MCH 31.1 26.0 - 34.0 PG    MCHC 33.5 30.0 - 36.5 g/dL    RDW 12.3 11.5 - 14.5 %    PLATELET 512 039 - 485 K/uL    MPV 10.4 8.9 - 12.9 FL    NRBC 0.0 0  WBC    ABSOLUTE NRBC 0.00 0.00 - 0.01 K/uL    NEUTROPHILS 64 32 - 75 %    LYMPHOCYTES 25 12 - 49 %    MONOCYTES 8 5 - 13 %    EOSINOPHILS 2 0 - 7 %    BASOPHILS 1 0 - 1 %    IMMATURE GRANULOCYTES 0 0.0 - 0.5 %    ABS. NEUTROPHILS 3.6 1.8 - 8.0 K/UL    ABS. LYMPHOCYTES 1.4 0.8 - 3.5 K/UL    ABS. MONOCYTES 0.5 0.0 - 1.0 K/UL    ABS. EOSINOPHILS 0.1 0.0 - 0.4 K/UL    ABS. BASOPHILS 0.0 0.0 - 0.1 K/UL    ABS. IMM. GRANS. 0.0 0.00 - 0.04 K/UL    DF AUTOMATED     METABOLIC PANEL, COMPREHENSIVE    Collection Time: 11/08/19  8:12 PM   Result Value Ref Range    Sodium 139 136 - 145 mmol/L    Potassium 3.5 3.5 - 5.1 mmol/L    Chloride 106 97 - 108 mmol/L    CO2 28 21 - 32 mmol/L    Anion gap 5 5 - 15 mmol/L    Glucose 95 65 - 100 mg/dL    BUN 15 6 - 20 MG/DL    Creatinine 0.95 0.55 - 1.02 MG/DL    BUN/Creatinine ratio 16 12 - 20      GFR est AA >60 >60 ml/min/1.73m2    GFR est non-AA 57 (L) >60 ml/min/1.73m2    Calcium 9.4 8.5 - 10.1 MG/DL    Bilirubin, total 0.2 0.2 - 1.0 MG/DL    ALT (SGPT) 22 12 - 78 U/L    AST (SGOT) 19 15 - 37 U/L    Alk.  phosphatase 74 45 - 117 U/L    Protein, total 7.7 6.4 - 8.2 g/dL    Albumin 3.8 3.5 - 5.0 g/dL    Globulin 3.9 2.0 - 4.0 g/dL    A-G Ratio 1.0 (L) 1.1 - 2.2     TROPONIN I    Collection Time: 11/08/19  8:12 PM   Result Value Ref Range    Troponin-I, Qt. <0.05 <0.05 ng/mL   SAMPLES BEING HELD    Collection Time: 11/08/19  8:12 PM   Result Value Ref Range    SAMPLES BEING HELD  1 YING     COMMENT        Add-on orders for these samples will be processed based on acceptable specimen integrity and analyte stability, which may vary by analyte. MAGNESIUM    Collection Time: 11/08/19  8:12 PM   Result Value Ref Range    Magnesium 2.0 1.6 - 2.4 mg/dL       Radiologic Studies -   No orders to display     CT Results  (Last 48 hours)    None        CXR Results  (Last 48 hours)    None            Medical Decision Making   I am the first provider for this patient. I reviewed the vital signs, available nursing notes, past medical history, past surgical history, family history and social history. Vital Signs-Reviewed the patient's vital signs. Patient Vitals for the past 12 hrs:   Temp Pulse Resp BP SpO2   11/08/19 2140  80 23 123/71 99 %   11/08/19 2120  80 16 150/81 99 %   11/08/19 2100  75 15 139/71 100 %   11/08/19 2008 98.4 °F (36.9 °C) 83 22 151/75 93 %       Records Reviewed: Nursing Notes and Old Medical Records    Provider Notes (Medical Decision Making):   Patient presenting with a brief episode of palpitations with high blood pressure. Right now blood pressure slightly elevated at 151/75. Given her palpitations, and history of A. fib, might have had paroxysmal A. fib. Patient already on Eliquis. We will keep her on the monitor and get lab work to evaluate her electrolytes including potassium and magnesium. ED Course:   Initial assessment performed. The patients presenting problems have been discussed, and they are in agreement with the care plan formulated and outlined with them. I have encouraged them to ask questions as they arise throughout their visit. ED Course as of Nov 08 2149 Fri Nov 08, 2019 2010 EKG interpreted by me. Shows normal sinus rhythm with a heart rate of 79. No ST elevations or depressions concerning for ischemia. Normal intervals. [JS]   2126 Pt starting to have slight headache. Normal neuro exam.     [JS]      ED Course User Index  [JS] Nivia Vital MD     Critical Care Time:   0    Disposition:  Discharge Note:  The patient has been re-evaluated and is ready for discharge.  Reviewed available results with patient. Counseled patient on diagnosis and care plan. Patient has expressed understanding, and all questions have been answered. Patient agrees with plan and agrees to follow up as recommended, or to return to the ED if their symptoms worsen. Discharge instructions have been provided and explained to the patient, along with reasons to return to the ED. PLAN:  Current Discharge Medication List        2. Follow-up Information     Follow up With Specialties Details Why Scar Cuba MD Cardiology, [facility], Internal Medicine  for holter monitor as needed [address] [place] [address]  [phone]          3. Return to ED if worse     Diagnosis     Clinical Impression:   1. Essential hypertension    2. Palpitations        Attestations:    Nati Stout M.D. Please note that this dictation was completed with [employer], the computer voice recognition software. Quite often unanticipated grammatical, syntax, homophones, and other interpretive errors are inadvertently transcribed by the computer software. Please disregard these errors. Please excuse any errors that have escaped final proofreading. Thank you.

## 2019-11-11 ENCOUNTER — TELEPHONE (OUTPATIENT)
Dept: INTERNAL MEDICINE CLINIC | Age: 79
End: 2019-11-11

## 2019-11-11 NOTE — TELEPHONE ENCOUNTER
----- Message from María Brian sent at 11/11/2019  9:53 AM EST -----  Regarding: Dr. Medardo Le  Appointment not available    Caller's first and last name and relationship to patient (if not the patient):      Best contact number: [phone] or [phone]      Preferred date and time:as soon as possible       Scheduled appointment date and time: Pt is scheduled for 11/26/19 for something else. Reason for appointment:f/up from [id number] seen on 11/8/19 for blood pressure and high pulse rate      Details to clarify the request: Pt stated that she needs to come in as soon as possible to have her blood pressure medication adjusted.         Neeraj Haney      Copy/paste jordy

## 2019-11-12 ENCOUNTER — OFFICE VISIT (OUTPATIENT)
Dept: INTERNAL MEDICINE CLINIC | Age: 79
End: 2019-11-12

## 2019-11-12 VITALS
WEIGHT: 188 LBS | HEART RATE: 86 BPM | BODY MASS INDEX: 31.32 KG/M2 | RESPIRATION RATE: 16 BRPM | TEMPERATURE: 97.8 F | SYSTOLIC BLOOD PRESSURE: 121 MMHG | HEIGHT: 65 IN | OXYGEN SATURATION: 99 % | DIASTOLIC BLOOD PRESSURE: 71 MMHG

## 2019-11-12 DIAGNOSIS — E66.01 MORBID OBESITY (HCC): ICD-10-CM

## 2019-11-12 DIAGNOSIS — R63.4 WEIGHT LOSS: ICD-10-CM

## 2019-11-12 DIAGNOSIS — T14.8XXA BLISTER: ICD-10-CM

## 2019-11-12 DIAGNOSIS — I48.0 PAROXYSMAL ATRIAL FIBRILLATION (HCC): ICD-10-CM

## 2019-11-12 DIAGNOSIS — E87.6 HYPOKALEMIA: ICD-10-CM

## 2019-11-12 DIAGNOSIS — E03.9 ACQUIRED HYPOTHYROIDISM: ICD-10-CM

## 2019-11-12 DIAGNOSIS — E78.2 MIXED HYPERLIPIDEMIA: ICD-10-CM

## 2019-11-12 DIAGNOSIS — I10 ESSENTIAL HYPERTENSION: ICD-10-CM

## 2019-11-12 DIAGNOSIS — I10 ESSENTIAL HYPERTENSION: Primary | ICD-10-CM

## 2019-11-12 DIAGNOSIS — R73.01 IFG (IMPAIRED FASTING GLUCOSE): ICD-10-CM

## 2019-11-12 DIAGNOSIS — R00.2 PALPITATION: ICD-10-CM

## 2019-11-12 NOTE — TELEPHONE ENCOUNTER
Called, spoke to pt. Two pt identifiers confirmed. Pt offered and accepted appt for 11/12/19 1200. Pt verbalized understanding of information discussed w/ no further questions at this time.

## 2019-11-12 NOTE — PROGRESS NOTES
HISTORY OF PRESENT ILLNESS  Natasha Anderson is a 70 y.o. female. HPI   Last here 10/22/19. Pt is here for acute/chronic conditions.     Pt c/o heart racing x Friday   Pt states she notices her face gets red when this happens   States the racing sensation would come and go   Called Dr Idris Mohan who's NP told her to take 2nd verapamil   Checked her BP and HR, hr was 109  Had her BP checked [place] the fire station who told her to go to ER   Went to ER for this   EKG in ER was NSR, repeat /71   Reviewed labs from ER   States extra verapamil slowed HR down   BP at home 140s-160/80s, HR has been in high 90s   Pt was prev on sotalol   Not in afib today   Discussed changing verapamil to dilt   Discussed repeat BP and HR was good so there is no need to change verapamil   Pt wonders if she should take verapamil, she felt like her heart slowed down and her face was not red with this  Discussed she can continue on 2 verapamil, discussed to decrease back to 1 if she feels lightheaded          BP today is 168/77 HR 98-- will get repeat 121/71, HR 86   Brought home cuff in today to be checked for accuracy   Continues on clonidine 0.2mg BID, HCTZ 25mg, and  losartan 25mg daily   Took 2 verapamil 120 mg every day since Friday except today   Took these meds this AM   Recall she had a cough on lisinopril        Wt today is 188 lbs-- up 5 lbs x lov  Pt was prev concerned about wt loss however wt has improved since lov      Pt is following with Dr Eliz Brandt (GI)   Pt had an EGD and EUD 10/24/19  Reviewed EGD and EUD 10/24/19: -Normal esophagus  -Normal stomach; biopsied to exclude inflammation  -Normal duodenum  -Normal ampullary mound and papillary orifice (as seen with linear echoendoscope) withouot fishmouth appearance or mucin droplet  -Tubular cystic structure measuring 17mm, adjacent to normal sized common bile duct, containing two round 6-10mm structures that appear mobile, with one of them containing multiple peripheral calcifications. Fine needle aspirate 2cc clear fluid was performed of the pancreatic head cyst using a 25 gauge needle with 1 pass with preliminary results suggesting pending. The needle was passed into the filling defects in the cyst  -Pancreatic Duct is normal findings  -Gallbladder is normal without stones  -Normal common bile duct without obstruction  -No adenopathy  Recommendations: Await fluid analysis and biopsy results.   Will consider utility of MRI/MRCP to evaluate further as needed   Pt states she has had f/u since then with him- per pt dr escamilla advised to have a repeat colo   Advised to get colo and repeat endoscopy at the same time         Pt had a cardiac ablation for her afib per Dr. Candido Baez (cardio) on 3/17/17   Pt will only f/u with this physician prn       Pt saw Dr. Amanda Jaimes (cardio) for f/u after a fib  Last visit was 1/24/19  Pt was taken off of sotalol  Feels better more energy with this  Continues on eliquis   Pt denies any bleeding/falls on these meds 11/19   Pt wonders if she should go back to Dr Amanda Jaimes for a heart monitor   Discussed to 1st try 2 verapamil per day and call his office for progressive sxs         Pt saw Dr. Adriano Carreno (cardio) for foot circulation  Last visit was 8/1/17   Recall has chronic sx of leg tightness  Pt will only f/u with this physician prn      Pt saw Dr. Emilie Roth (uro-gyn) for urinary sx  Last visit was 11/17  Pt was told she had lichen sclerosus   Now resolved      Pt saw Dr Keiko Bain (sleep)  Last visit was 7/3/18  Recall telephone note 9/18: no significant sleep disordered breathing, overall AHI of 0.6/h  Pt had a poor experience and was told she has no sleep apnea     Pt had a cyst on R breast removed by Dr Francoise Sebastian (surg) in the past      Continues crestor 20mg daily for cholesterol  Will stop this for 2 weeks to see if this is cause of cramps       Continues klor-con 20meqs BID for her potassium     Continues on synthroid 75mcg daily      Continues on crestor       Lov, Pt c/o blister on her toe x 4 days   Continues to soak this   States it is healing but still red         ACP not on file.  SDM is her sister, Doni Gillis. Provided information      Recall She had a cervical polyp, was having vaginal bleeding, this was removed by julio césar, vaginal bleeding has since resolved.        PREVENTIVE:    Colonoscopy: 8/21/13, Dr. Delfina Francois, repeat 10 years   Pap: Usually Dr. Brooke Vaca 10/19   Mammogram: 10/19  [address]: 10/19 osteopenia   Tdap: 4/14/2015  Pneumovax: 11/19/2013  [id number]: 4/05/2017  Zostavax: declines  Shingrix: ordered 04/17/19, h/o shingles  Flu shot: 09/17/19   A1C:  8/16 5.9, 10/16 5.8, 4/17 5.9, 10/17 POC 5.6, 4/18 5.6, 10/18 5.6, 4/19 5.6  Eye exam: Dr. Shalom Ortega, cataract   Lipids: 4/19 LDL 62       Patient Active Problem List    Diagnosis Date Noted    Pericardial effusion with cardiac tamponade 03/18/2017    S/P ablation of atrial fibrillation 03/17/2017    Acquired hypothyroidism 12/14/2015    HTN (hypertension) 07/21/2015    Thyroid activity decreased 04/14/2015    Atrial fibrillation ([address] 03/16/2015    Morbid obesity ([address]     Hyperlipidemia 07/14/2014    Hypokalemia 07/14/2014    Postmenopausal bleeding 10/18/2011     Current Outpatient Medications   Medication Sig Dispense Refill    ELIQUIS 5 mg tablet TAKE 1 TABLET BY MOUTH TWICE A DAY 60 Tab 6    rosuvastatin (CRESTOR) 20 mg tablet TAKE 1 TABLET BY MOUTH EVERY DAY 90 Tab 1    losartan (COZAAR) 25 mg tablet TAKE ONE TABLET BY MOUTH DAILY 90 Tab 1    cloNIDine HCl (CATAPRES) 0.2 mg tablet TAKE 1 TAB BY MOUTH TWO (2) TIMES A DAY. 180 Tab 1    potassium chloride SR (KLOR-CON 10) 10 mEq tablet TAKE 2 TABS BY MOUTH TWO (2) TIMES A DAY. 360 Tab 1    hydroCHLOROthiazide (HYDRODIURIL) 25 mg tablet Take 1 Tab by mouth daily. 90 Tab 1    SYNTHROID 75 mcg tablet Take 1 Tab by mouth Daily (before breakfast).  90 Tab 1    verapamil ER (CALAN-SR) 120 mg tablet TAKE 1 TABLET BY MOUTH EVERY MORNING FOR BLOOD PRESSURE 90 Tab 2    CALCIUM CARBONATE/VITAMIN D3 (CALTRATE 600 + D PO) Take 1 Tab by mouth daily.  MULTIVITAMIN W-MINERALS/LUTEIN (CENTRUM SILVER PO) Take 1 Tab by mouth daily (after lunch). Past Surgical History:   Procedure Laterality Date    BREAST SURGERY PROCEDURE UNLISTED  1982    breast bx rt    CARDIAC SURG PROCEDURE UNLIST  01/2015    cardiac catheterization, no intervention, medical management    CARDIAC SURG PROCEDURE UNLIST  03/2017    ablation    HX GYN      myomectomy on uterus    HX GYN  2011    hystereoscopy D&C    HX LYMPH NODE DISSECTION      biopsy    HX OTHER SURGICAL      lymph node bx    UPPER GI ENDOSCOPY,BIOPSY  10/24/2019         UPPER GI ENDOSCOPY,FN NEEDLE BX,GUIDED  10/24/2019           Lab Results   Component Value Date/Time    WBC 5.6 11/08/2019 08:12 PM    HGB 13.3 11/08/2019 08:12 PM    HCT 39.7 11/08/2019 08:12 PM    PLATELET 332 01/36/9268 08:12 PM    MCV 93.0 11/08/2019 08:12 PM     Lab Results   Component Value Date/Time    Cholesterol, total 129 04/10/2019 08:34 AM    HDL Cholesterol 49 04/10/2019 08:34 AM    LDL, calculated 58 04/10/2019 08:34 AM    Triglyceride 112 04/10/2019 08:34 AM     Lab Results   Component Value Date/Time    GFR est non-AA 57 (L) 11/08/2019 08:12 PM    GFR est AA >60 11/08/2019 08:12 PM    Creatinine 0.95 11/08/2019 08:12 PM    BUN 15 11/08/2019 08:12 PM    Sodium 139 11/08/2019 08:12 PM    Potassium 3.5 11/08/2019 08:12 PM    Chloride 106 11/08/2019 08:12 PM    CO2 28 11/08/2019 08:12 PM    Magnesium 2.0 11/08/2019 08:12 PM        Review of Systems   Respiratory: Negative for shortness of breath. Cardiovascular: Negative for chest pain. Physical Exam   Constitutional: She is oriented to person, place, and time. She appears well-developed and well-nourished. No distress. HENT:   Head: Normocephalic and atraumatic. Mouth/Throat: No oropharyngeal exudate.    Eyes: Conjunctivae and EOM are normal. Right eye exhibits no discharge. Left eye exhibits no discharge. Neck: Normal range of motion. Neck supple. Cardiovascular: Normal rate, regular rhythm and intact distal pulses. Exam reveals no gallop and no friction rub. Murmur (1/6) heard. Pulmonary/Chest: Effort normal and breath sounds normal. No respiratory distress. She has no wheezes. She has no rales. She exhibits no tenderness. Musculoskeletal: Normal range of motion. She exhibits no edema, tenderness or deformity. Lymphadenopathy:     She has no cervical adenopathy. Neurological: She is alert and oriented to person, place, and time. Coordination normal.   Skin: Skin is warm and dry. No rash noted. She is not diaphoretic. No erythema. No pallor. Blister on R big toe    Psychiatric: She has a normal mood and affect. Her behavior is normal.       ASSESSMENT and PLAN    ICD-10-CM ICD-9-CM    1. Essential hypertension                  Initially bp elevated repeat improved hr has been running a bit on faster side prev on verapamil 240 mg daily when she increased this over the weekend this seemed to improve sxs bp and heart rate will go back to 240 mg will continue clonidine, hctz and losartan unchanged pt will monitor bp at home she will contact us if she has lightheadedness or dizziness  I10 401.9    2. Morbid obesity ([address]                Wt has actually dropped over last year she is under eval for wt loss of unknown etiology wt is stable from lov workup so far has been unremarkable pt will contact dr escamilla regarding getting a colo which he recommended will get last francine notes for review  E66.01 278.01    3. Paroxysmal atrial fibrillation (HCC)            Pt is in nsr today ekg completed in er a few days ago prev was on sotalol but doing well w/o it hr running on higher side recently so will increase verapamil continues on eliquis for stroke prevention reports compliance with this  I48.0 427.31    4.  Hypokalemia                At goal on klor con  E87.6 276.8    5. Mixed hyperlipidemia                Controlled on crestor ultimately was not found to cause cramps she did trial off this med  E78.2 272.2    6. Acquired hypothyroidism              Will repeat thyroid labs prior to f/u she has order  E03.9 244.9    7. Palpitation        See above  R00.2 785.1    8. Weight loss        See above  R63.4 783.21       Depression screen reviewed and negative. Scribed by Gama Mejia of [address] [place] [address] [address], as dictated by Dr. Todd Harrison. Current diagnosis and concerns discussed with pt at length. Pt understands risks and benefits or current treatment plan and medications, and accepts the treatment and medication with any possible risks. Pt asks appropriate questions, which were answered. Pt was instructed to call with any concerns or problems. I have reviewed the note documented by the scribe. The services provided are my own.   The documentation is accurate

## 2019-11-20 ENCOUNTER — HOSPITAL ENCOUNTER (OUTPATIENT)
Dept: LAB | Age: 79
Discharge: HOME | End: 2019-11-20
Payer: MEDICARE

## 2019-11-20 PROCEDURE — 80053 COMPREHEN METABOLIC PANEL: CPT

## 2019-11-20 PROCEDURE — 84443 ASSAY THYROID STIM HORMONE: CPT

## 2019-11-20 PROCEDURE — 85027 COMPLETE CBC AUTOMATED: CPT

## 2019-11-20 PROCEDURE — 83036 HEMOGLOBIN GLYCOSYLATED A1C: CPT

## 2019-11-20 PROCEDURE — 36415 COLL VENOUS BLD VENIPUNCTURE: CPT

## 2019-11-21 LAB
ALBUMIN SERPL-MCNC: 4.3 G/DL (ref 3.5–4.8)
ALBUMIN/GLOB SERPL: 1.7 {RATIO} (ref 1.2–2.2)
ALP SERPL-CCNC: 66 IU/L (ref 39–117)
ALT SERPL-CCNC: 11 IU/L (ref 0–32)
AST SERPL-CCNC: 13 IU/L (ref 0–40)
BILIRUB SERPL-MCNC: 0.3 MG/DL (ref 0–1.2)
BUN SERPL-MCNC: 15 MG/DL (ref 8–27)
BUN/CREAT SERPL: 17 (ref 12–28)
CALCIUM SERPL-MCNC: 9.7 MG/DL (ref 8.7–10.3)
CHLORIDE SERPL-SCNC: 102 MMOL/L (ref 96–106)
CO2 SERPL-SCNC: 26 MMOL/L (ref 20–29)
CREAT SERPL-MCNC: 0.9 MG/DL (ref 0.57–1)
ERYTHROCYTE [DISTWIDTH] IN BLOOD BY AUTOMATED COUNT: 12.8 % (ref 12.3–15.4)
EST. AVERAGE GLUCOSE BLD GHB EST-MCNC: 111 MG/DL
GLOBULIN SER CALC-MCNC: 2.5 G/DL (ref 1.5–4.5)
GLUCOSE SERPL-MCNC: 95 MG/DL (ref 65–99)
HBA1C MFR BLD: 5.5 % (ref 4.8–5.6)
HCT VFR BLD AUTO: 37.7 % (ref 34–46.6)
HGB BLD-MCNC: 12.5 G/DL (ref 11.1–15.9)
MCH RBC QN AUTO: 31.5 PG (ref 26.6–33)
MCHC RBC AUTO-ENTMCNC: 33.2 G/DL (ref 31.5–35.7)
MCV RBC AUTO: 95 FL (ref 79–97)
PLATELET # BLD AUTO: 180 X10E3/UL (ref 150–450)
POTASSIUM SERPL-SCNC: 3.8 MMOL/L (ref 3.5–5.2)
PROT SERPL-MCNC: 6.8 G/DL (ref 6–8.5)
RBC # BLD AUTO: 3.97 X10E6/UL (ref 3.77–5.28)
SODIUM SERPL-SCNC: 142 MMOL/L (ref 134–144)
TSH SERPL DL<=0.005 MIU/L-ACNC: 1.21 UIU/ML (ref 0.45–4.5)
WBC # BLD AUTO: 3.9 X10E3/UL (ref 3.4–10.8)

## 2019-11-26 ENCOUNTER — OFFICE VISIT (OUTPATIENT)
Dept: INTERNAL MEDICINE CLINIC | Age: 79
End: 2019-11-26

## 2019-11-26 VITALS
BODY MASS INDEX: 31.16 KG/M2 | OXYGEN SATURATION: 98 % | HEIGHT: 65 IN | DIASTOLIC BLOOD PRESSURE: 65 MMHG | TEMPERATURE: 97.8 F | SYSTOLIC BLOOD PRESSURE: 121 MMHG | WEIGHT: 187 LBS | RESPIRATION RATE: 16 BRPM | HEART RATE: 69 BPM

## 2019-11-26 DIAGNOSIS — I10 ESSENTIAL HYPERTENSION: Primary | ICD-10-CM

## 2019-11-26 DIAGNOSIS — E87.6 HYPOKALEMIA: ICD-10-CM

## 2019-11-26 DIAGNOSIS — G62.9 NEUROPATHY: ICD-10-CM

## 2019-11-26 DIAGNOSIS — G25.81 RLS (RESTLESS LEGS SYNDROME): ICD-10-CM

## 2019-11-26 DIAGNOSIS — R09.81 HEAD CONGESTION: ICD-10-CM

## 2019-11-26 DIAGNOSIS — I48.0 PAROXYSMAL ATRIAL FIBRILLATION (HCC): ICD-10-CM

## 2019-11-26 DIAGNOSIS — E66.01 MORBID OBESITY (HCC): ICD-10-CM

## 2019-11-26 DIAGNOSIS — E03.9 ACQUIRED HYPOTHYROIDISM: ICD-10-CM

## 2019-11-26 RX ORDER — VERAPAMIL HYDROCHLORIDE 240 MG/1
240 TABLET, FILM COATED, EXTENDED RELEASE ORAL
Qty: 90 TAB | Refills: 1 | Status: SHIPPED | OUTPATIENT
Start: 2019-11-26 | End: 2020-01-22 | Stop reason: SDUPTHER

## 2019-11-26 NOTE — PROGRESS NOTES
HISTORY OF PRESENT ILLNESS  Ryan Pool is a 70 y.o. female.   HPI   Last here 11/12/19   Pt is here for chronic conditions.        BP today is controlled   BP at home 130-140s/70s-80s   Continues on clonidine 0.2mg BID, HCTZ 25mg, and  losartan 25mg daily   She is still taking 2 verapamil per day, 240 mg per day   States the heart racing from lov has gotten better   States she is still getting flushing in her face but better from before   Took these meds this AM   Recall she had a cough on lisinopril        Wt today is 187 lbs-- stable x lov   Pt was prev concerned about wt loss however wt has improved since     Reviewed labs 11/19      Pt is following with Dr Chris Bethea (GI)   Pt had an EGD and EUD 10/24/19  Pt states she has had f/u since then with him- per pt dr escamilla advised to have a repeat colo   However pt did not f/u with dr escamilla about this, advised her to call and see if they  Recommend colo           Pt had a cardiac ablation for her afib per Dr. Mani Espinoza (cardio) on 3/17/17   Pt will only f/u with this physician prn       Pt saw Dr. Shari Oneill (cardio) for f/u after a fib  Last visit was 1/24/19  Pt was taken off of sotalol  Feels better more energy with this  Continues on eliquis bid  Pt denies any bleeding/falls on these meds 11/19         Pt saw Dr. Steafno Alcantar (cardio) for foot circulation  Last visit was 8/1/17   Recall has chronic sx of leg tightness  Pt will only f/u with this physician prn      Pt saw Dr. Keya Baker (uro-gyn) for urinary sx  Last visit was 11/17  Pt was told she had lichen sclerosus   Now resolved      Pt saw Dr Kenzie Nieves (sleep)  Last visit was 7/3/18  Recall telephone note 9/18: no significant sleep disordered breathing, overall AHI of 0.6/h  Pt had a poor experience and was told she has no sleep apnea     Pt had a cyst on R breast removed by Dr Ingrid Page (surg) in the past      Continues crestor 20mg daily for cholesterol  Will stop this for 2 weeks to see if this is cause of cramps       Continues klor-con 20meqs BID for her potassium     Continues on synthroid 75mcg daily      Continues on crestor       Pt c/o muscle tightness   States this moves around, yesterday she felt this in both feet   States this felt like someone was pressing on her feet   Had this in her knees the other day   Discussed this could be RLS   Discussed if its not bothering her much it is best to try more meds   Discussed her labs were good   Reviewed XR L spine 9/19: DJD as above. Discussed she could see neuro if she would like     Pt c/o fullness in her head and soreness   Discussed soreness is likely related to a skin sensitivity  Advised to use flonase for fullness in head    This was also evaluated over the summer she had a head ct completed in 8/19 that was nl   States that she her hair is thinning   Discussed this is nl with age    discussed this is also nl after being more stressed      ACP not on file.  SDM is her sister, Zeynep Solomon.   Provided information      Recall She had a cervical polyp, was having vaginal bleeding, this was removed by julio césar, vaginal bleeding has since resolved.        PREVENTIVE:    Colonoscopy: 8/21/13, Dr. John Antoine, repeat 10 years   Pap: Usually Dr. Dione Pitts 10/19   [address]: 10/19 osteopenia   Tdap: 4/14/2015  Pneumovax: 11/19/2013  [id number]: 4/05/2017  Zostavax: declines  Shingrix: ordered 04/17/19, h/o shingles  Flu shot: 09/17/19   A1C:  8/16 5.9, 10/16 5.8, 4/17 5.9, 10/17 POC 5.6, 4/18 5.6, 10/18 5.6, 4/19 5.6 11/19 5.5   Eye exam: Dr. Pimentel Room, cataract   Lipids: 4/19 LDL 58    Patient Active Problem List    Diagnosis Date Noted    Pericardial effusion with cardiac tamponade 03/18/2017    S/P ablation of atrial fibrillation 03/17/2017    Acquired hypothyroidism 12/14/2015    HTN (hypertension) 07/21/2015    Thyroid activity decreased 04/14/2015    Atrial fibrillation [address] 03/16/2015    Morbid obesity ([address]     Hyperlipidemia 07/14/2014    Hypokalemia 07/14/2014    Postmenopausal bleeding 10/18/2011     Current Outpatient Medications   Medication Sig Dispense Refill    ELIQUIS 5 mg tablet TAKE 1 TABLET BY MOUTH TWICE A DAY 60 Tab 6    rosuvastatin (CRESTOR) 20 mg tablet TAKE 1 TABLET BY MOUTH EVERY DAY 90 Tab 1    losartan (COZAAR) 25 mg tablet TAKE ONE TABLET BY MOUTH DAILY 90 Tab 1    cloNIDine HCl (CATAPRES) 0.2 mg tablet TAKE 1 TAB BY MOUTH TWO (2) TIMES A DAY. 180 Tab 1    potassium chloride SR (KLOR-CON 10) 10 mEq tablet TAKE 2 TABS BY MOUTH TWO (2) TIMES A DAY. 360 Tab 1    hydroCHLOROthiazide (HYDRODIURIL) 25 mg tablet Take 1 Tab by mouth daily. 90 Tab 1    SYNTHROID 75 mcg tablet Take 1 Tab by mouth Daily (before breakfast). 90 Tab 1    verapamil ER (CALAN-SR) 120 mg tablet TAKE 1 TABLET BY MOUTH EVERY MORNING FOR BLOOD PRESSURE 90 Tab 2    CALCIUM CARBONATE/VITAMIN D3 (CALTRATE 600 + D PO) Take 1 Tab by mouth daily.  MULTIVITAMIN W-MINERALS/LUTEIN (CENTRUM SILVER PO) Take 1 Tab by mouth daily (after lunch).        Past Surgical History:   Procedure Laterality Date    BREAST SURGERY PROCEDURE UNLISTED  1982    breast bx rt    CARDIAC SURG PROCEDURE UNLIST  01/2015    cardiac catheterization, no intervention, medical management    CARDIAC SURG PROCEDURE UNLIST  03/2017    ablation    HX GYN      myomectomy on uterus    HX GYN  2011    hystereoscopy D&C    HX LYMPH NODE DISSECTION      biopsy    HX OTHER SURGICAL      lymph node bx    UPPER GI ENDOSCOPY,BIOPSY  10/24/2019         UPPER GI ENDOSCOPY,FN NEEDLE BX,GUIDED  10/24/2019           Lab Results   Component Value Date/Time    WBC 3.9 11/20/2019 08:43 AM    HGB 12.5 11/20/2019 08:43 AM    HCT 37.7 11/20/2019 08:43 AM    PLATELET 359 55/07/3817 08:43 AM    MCV 95 11/20/2019 08:43 AM     Lab Results   Component Value Date/Time    Cholesterol, total 129 04/10/2019 08:34 AM    HDL Cholesterol 49 04/10/2019 08:34 AM    LDL, calculated 58 04/10/2019 08:34 AM    Triglyceride 112 04/10/2019 08:34 AM     Lab Results   Component Value Date/Time    GFR est non-AA 62 11/20/2019 08:43 AM    GFR est AA 71 11/20/2019 08:43 AM    Creatinine 0.90 11/20/2019 08:43 AM    BUN 15 11/20/2019 08:43 AM    Sodium 142 11/20/2019 08:43 AM    Potassium 3.8 11/20/2019 08:43 AM    Chloride 102 11/20/2019 08:43 AM    CO2 26 11/20/2019 08:43 AM    Magnesium 2.0 11/08/2019 08:12 PM        Review of Systems   Respiratory: Negative for shortness of breath. Cardiovascular: Negative for chest pain. Physical Exam  Constitutional:       General: She is not in acute distress. Appearance: She is well-developed. She is not diaphoretic. HENT:      Head: Normocephalic and atraumatic. Mouth/Throat:      Pharynx: No oropharyngeal exudate or posterior oropharyngeal erythema. Eyes:      Conjunctiva/sclera: Conjunctivae normal.   Neck:      Musculoskeletal: Normal range of motion and neck supple. Cardiovascular:      Rate and Rhythm: Normal rate and regular rhythm. Heart sounds: Murmur (1/6) present. No friction rub. No gallop. Pulmonary:      Effort: Pulmonary effort is normal. No respiratory distress. Breath sounds: Normal breath sounds. No wheezing or rales. Chest:      Chest wall: No tenderness. Musculoskeletal: Normal range of motion. General: No tenderness or deformity. Lymphadenopathy:      Cervical: No cervical adenopathy. Skin:     General: Skin is warm and dry. Coloration: Skin is not pale. Findings: No erythema or rash. Comments: Healing blister on R great toe    Neurological:      Mental Status: She is alert and oriented to person, place, and time. Coordination: Coordination normal.   Psychiatric:         Mood and Affect: Mood normal.         Behavior: Behavior normal.         ASSESSMENT and PLAN    ICD-10-CM ICD-9-CM    1.  Essential hypertension                    Adequately controlled on clonidine hctz verapamil 240 mg per day and losartan daily continue  I10 401.9    2. Morbid obesity ([address]                Wt is stable from lov pt has lost a fair amount of wt over last year which is positive as she needs to lose additional wt but she is concerned as she feels this is unintentional she has had extensive eval of this to include CT abd, endoscopy with EUS with dr esacmilla in addition to extensive lab workup she reports that dr escamilla discussed colo next and I advised her to contact office in regards to this    E66.01 278.01    3. Paroxysmal atrial fibrillation (HCC)                Continues on eliquis  for anti coag  I48.0 427.31    4. Hypokalemia          Controlled on klor con on recent labs  E87.6 276.8    5. Acquired hypothyroidism              At goal on synthroid 75 mcgs  E03.9 244.9    6. RLS (restless legs syndrome)                    Pt with sxs c/w neuropathy vs rls discussed this at great length discussed tx options she is not interested in meds we also had l spine plain film completed in the past for possible sciatic sxs which showed mild djd discussed she could see neuro for additional eval given sxs are mild she does not need to pursuer this she prefers this eval placed referral  G25.81 333.94 REFERRAL TO NEUROLOGY   7. Neuropathy                    See above  G62.9 355.9 REFERRAL TO NEUROLOGY      8. Head congestion- discussed flonase had head ct in august cant have mri due to bb in her leg       Depression screen reviewed and negative. Scribed by [facility] of [address], as dictated by Dr. Sumeet Delaney. Current diagnosis and concerns discussed with pt at length. Pt understands risks and benefits or current treatment plan and medications, and accepts the treatment and medication with any possible risks. Pt asks appropriate questions, which were answered. Pt was instructed to call with any concerns or problems. I have reviewed the note documented by the scribe.   The services provided are my own.   The documentation is accurate

## 2019-12-10 ENCOUNTER — TELEPHONE (OUTPATIENT)
Dept: NEUROLOGY | Age: 79
End: 2019-12-10

## 2019-12-23 RX ORDER — LEVOTHYROXINE SODIUM 75 UG/1
75 TABLET ORAL
Qty: 90 TAB | Refills: 0 | Status: SHIPPED | OUTPATIENT
Start: 2019-12-23 | End: 2020-03-24

## 2020-01-16 ENCOUNTER — HOSPITAL ENCOUNTER (EMERGENCY)
Age: 80
Discharge: HOME | End: 2020-01-17
Attending: EMERGENCY MEDICINE
Payer: MEDICARE

## 2020-01-16 DIAGNOSIS — R00.2 PALPITATIONS: Primary | ICD-10-CM

## 2020-01-16 DIAGNOSIS — I48.0 PAROXYSMAL ATRIAL FIBRILLATION (HCC): ICD-10-CM

## 2020-01-16 LAB
COMMENT, HOLDF: NORMAL
SAMPLES BEING HELD,HOLD: NORMAL

## 2020-01-16 PROCEDURE — 93005 ELECTROCARDIOGRAM TRACING: CPT

## 2020-01-16 PROCEDURE — 80053 COMPREHEN METABOLIC PANEL: CPT

## 2020-01-16 PROCEDURE — 84484 ASSAY OF TROPONIN QUANT: CPT

## 2020-01-16 PROCEDURE — 83735 ASSAY OF MAGNESIUM: CPT

## 2020-01-16 PROCEDURE — 99284 EMERGENCY DEPT VISIT MOD MDM: CPT

## 2020-01-16 PROCEDURE — 36415 COLL VENOUS BLD VENIPUNCTURE: CPT

## 2020-01-16 PROCEDURE — 85025 COMPLETE CBC W/AUTO DIFF WBC: CPT

## 2020-01-17 ENCOUNTER — TELEPHONE (OUTPATIENT)
Dept: INTERNAL MEDICINE CLINIC | Age: 80
End: 2020-01-17

## 2020-01-17 VITALS
HEART RATE: 82 BPM | DIASTOLIC BLOOD PRESSURE: 78 MMHG | RESPIRATION RATE: 17 BRPM | OXYGEN SATURATION: 99 % | HEIGHT: 65 IN | WEIGHT: 187.39 LBS | BODY MASS INDEX: 31.22 KG/M2 | SYSTOLIC BLOOD PRESSURE: 142 MMHG | TEMPERATURE: 97.9 F

## 2020-01-17 LAB
ALBUMIN SERPL-MCNC: 3.9 G/DL (ref 3.5–5)
ALBUMIN/GLOB SERPL: 1.1 {RATIO} (ref 1.1–2.2)
ALP SERPL-CCNC: 72 U/L (ref 45–117)
ALT SERPL-CCNC: 23 U/L (ref 12–78)
ANION GAP SERPL CALC-SCNC: 4 MMOL/L (ref 5–15)
AST SERPL-CCNC: 14 U/L (ref 15–37)
ATRIAL RATE: 79 BPM
BASOPHILS # BLD: 0.1 K/UL (ref 0–0.1)
BASOPHILS NFR BLD: 1 % (ref 0–1)
BILIRUB SERPL-MCNC: 0.3 MG/DL (ref 0.2–1)
BUN SERPL-MCNC: 17 MG/DL (ref 6–20)
BUN/CREAT SERPL: 17 (ref 12–20)
CALCIUM SERPL-MCNC: 9.5 MG/DL (ref 8.5–10.1)
CALCULATED P AXIS, ECG09: 51 DEGREES
CALCULATED R AXIS, ECG10: -24 DEGREES
CALCULATED T AXIS, ECG11: 20 DEGREES
CHLORIDE SERPL-SCNC: 105 MMOL/L (ref 97–108)
CO2 SERPL-SCNC: 31 MMOL/L (ref 21–32)
CREAT SERPL-MCNC: 0.99 MG/DL (ref 0.55–1.02)
DIAGNOSIS, 93000: NORMAL
DIFFERENTIAL METHOD BLD: NORMAL
EOSINOPHIL # BLD: 0.3 K/UL (ref 0–0.4)
EOSINOPHIL NFR BLD: 5 % (ref 0–7)
ERYTHROCYTE [DISTWIDTH] IN BLOOD BY AUTOMATED COUNT: 12 % (ref 11.5–14.5)
GLOBULIN SER CALC-MCNC: 3.7 G/DL (ref 2–4)
GLUCOSE SERPL-MCNC: 100 MG/DL (ref 65–100)
HCT VFR BLD AUTO: 38.5 % (ref 35–47)
HGB BLD-MCNC: 12.9 G/DL (ref 11.5–16)
IMM GRANULOCYTES # BLD AUTO: 0 K/UL (ref 0–0.04)
IMM GRANULOCYTES NFR BLD AUTO: 0 % (ref 0–0.5)
LYMPHOCYTES # BLD: 1.1 K/UL (ref 0.8–3.5)
LYMPHOCYTES NFR BLD: 19 % (ref 12–49)
MAGNESIUM SERPL-MCNC: 2.2 MG/DL (ref 1.6–2.4)
MCH RBC QN AUTO: 30.9 PG (ref 26–34)
MCHC RBC AUTO-ENTMCNC: 33.5 G/DL (ref 30–36.5)
MCV RBC AUTO: 92.3 FL (ref 80–99)
MONOCYTES # BLD: 0.5 K/UL (ref 0–1)
MONOCYTES NFR BLD: 9 % (ref 5–13)
NEUTS SEG # BLD: 3.8 K/UL (ref 1.8–8)
NEUTS SEG NFR BLD: 66 % (ref 32–75)
NRBC # BLD: 0 K/UL (ref 0–0.01)
NRBC BLD-RTO: 0 PER 100 WBC
P-R INTERVAL, ECG05: 166 MS
PLATELET # BLD AUTO: 180 K/UL (ref 150–400)
PMV BLD AUTO: 10.3 FL (ref 8.9–12.9)
POTASSIUM SERPL-SCNC: 3.5 MMOL/L (ref 3.5–5.1)
PROT SERPL-MCNC: 7.6 G/DL (ref 6.4–8.2)
Q-T INTERVAL, ECG07: 370 MS
QRS DURATION, ECG06: 76 MS
QTC CALCULATION (BEZET), ECG08: 424 MS
RBC # BLD AUTO: 4.17 M/UL (ref 3.8–5.2)
SODIUM SERPL-SCNC: 140 MMOL/L (ref 136–145)
TROPONIN I SERPL-MCNC: <0.05 NG/ML
VENTRICULAR RATE, ECG03: 79 BPM
WBC # BLD AUTO: 5.7 K/UL (ref 3.6–11)

## 2020-01-17 NOTE — ED PROVIDER NOTES
EMERGENCY DEPARTMENT HISTORY AND PHYSICAL EXAM     ----------------------------------------------------------------------------  Please note that this dictation was completed with [employer], the computer voice recognition software. Quite often unanticipated grammatical, syntax, homophones, and other interpretive errors are inadvertently transcribed by the computer software. Please disregard these errors. Please excuse any errors that have escaped final proofreading  ----------------------------------------------------------------------------      Date: 1/16/2020  Patient Name: Cuba Pereira    History of Presenting Illness     Chief Complaint   Patient presents with    Palpitations     States her heart is beating out of her chest. Hx of afib. Had an ablation in 2017. Rate controled in triage    Jaw Pain     Intermittent left jaw pain tonight       History Provided By:  Patient and family    HPI: Cuba Pereira is a 70 y.o. female, with significant pmhx of paroxysmal afib, HTN,  who presents ambulatory to the ED with c/o palpitations that started 8:30 PM while working on the computer. Notes that she felt \"her heart was going to beat out of her chest.\"  And had shaking sensation all over. Notes that her symptoms are now improved at time of evaluation. Notes that she took her blood pressure which was normal but that she had a heart rate of 1 13-1 17. Took her normal 19 medications including her clonidine, Eliquis, Crestor and potassium supplement between 730 and 10 PM.  Notes that she has history of atrial fibrillation but that she does not live in it chronically. Is unsure as to whether or not she was having an episode of A. fib at this time. Patient is followed by Dr. Anell Kayser of [facility]. Patient specifically denies any associated fevers, chills, nausea, vomiting, diarrhea, abd pain, urinary sxs, changes in BM, or headache.      Social Hx: denies tobacco  denies EtOH , denies Illicit Drugs    There are no other complaints, changes, or physical findings at this time. PCP: Jaclyn Hernández MD    Allergies   Allergen Reactions    Adhesive Hives    Amoxicillin Unknown (comments)    Lipitor [Atorvastatin] Myalgia     Gets the flu       Current Outpatient Medications   Medication Sig Dispense Refill    hydroCHLOROthiazide (HYDRODIURIL) 25 mg tablet TAKE 1 TABLET BY MOUTH EVERY DAY 90 Tab 0    SYNTHROID 75 mcg tablet TAKE 1 TAB BY MOUTH DAILY (BEFORE BREAKFAST). 90 Tab 0    verapamil ER (CALAN-SR) 240 mg CR tablet Take 1 Tab by mouth nightly. TAKE 1 TABLET BY MOUTH EVERY MORNING FOR BLOOD PRESSURE 90 Tab 1    ELIQUIS 5 mg tablet TAKE 1 TABLET BY MOUTH TWICE A DAY 60 Tab 6    rosuvastatin (CRESTOR) 20 mg tablet TAKE 1 TABLET BY MOUTH EVERY DAY 90 Tab 1    losartan (COZAAR) 25 mg tablet TAKE ONE TABLET BY MOUTH DAILY 90 Tab 1    cloNIDine HCl (CATAPRES) 0.2 mg tablet TAKE 1 TAB BY MOUTH TWO (2) TIMES A DAY. 180 Tab 1    potassium chloride SR (KLOR-CON 10) 10 mEq tablet TAKE 2 TABS BY MOUTH TWO (2) TIMES A DAY. 360 Tab 1    CALCIUM CARBONATE/VITAMIN D3 (CALTRATE 600 + D PO) Take 1 Tab by mouth daily.  MULTIVITAMIN W-MINERALS/LUTEIN (CENTRUM SILVER PO) Take 1 Tab by mouth daily (after lunch).          Past History     Past Medical History:  Past Medical History:   Diagnosis Date    Arrhythmia     afib    Arthritis     right knee, left shoulder    Diverticulosis     Frequency of urination     High cholesterol     Hypertension     Morbid obesity ([address]     Thyroid disease     hypothyroid    Unspecified adverse effect of anesthesia     low BP       Past Surgical History:  Past Surgical History:   Procedure Laterality Date    BREAST SURGERY PROCEDURE UNLISTED  1982    breast bx rt    CARDIAC SURG PROCEDURE UNLIST  01/2015    cardiac catheterization, no intervention, medical management    CARDIAC SURG PROCEDURE UNLIST  03/2017    ablation    HX GYN      myomectomy on uterus    HX GYN  2011 hystereoscopy D&C    HX LYMPH NODE DISSECTION      biopsy    HX OTHER SURGICAL      lymph node bx    UPPER GI ENDOSCOPY,BIOPSY  10/24/2019         UPPER GI ENDOSCOPY,FN NEEDLE BX,GUIDED  10/24/2019            Family History:  Family History   Problem Relation Age of Onset    Alzheimer Mother     Heart Disease Mother     Heart Disease Father     Hypertension Father     Cancer Paternal Aunt     Diabetes Paternal Grandmother        Social History:  Social History     Tobacco Use    Smoking status: Never Smoker    Smokeless tobacco: Never Used   Substance Use Topics    Alcohol use: No    Drug use: No       Allergies: Allergies   Allergen Reactions    Adhesive Hives    Amoxicillin Unknown (comments)    Lipitor [Atorvastatin] Myalgia     Gets the flu         Review of Systems   Review of Systems   Constitutional: Negative. Negative for fever. Eyes: Negative. Respiratory: Positive for chest tightness. Negative for shortness of breath. Cardiovascular: Positive for palpitations. Negative for chest pain. Gastrointestinal: Negative for abdominal pain, nausea and vomiting. Endocrine: Negative. Genitourinary: Negative. Negative for difficulty urinating, dysuria and hematuria. Musculoskeletal: Negative. Skin: Negative. Neurological: Negative. Psychiatric/Behavioral: Negative for suicidal ideas. All other systems reviewed and are negative. Physical Exam   Physical Exam  Vitals signs and nursing note reviewed. Constitutional:       General: She is not in acute distress. Appearance: She is well-developed. She is not diaphoretic. HENT:      Head: Normocephalic and atraumatic. Nose: Nose normal.   Eyes:      General: No scleral icterus. Conjunctiva/sclera: Conjunctivae normal.   Neck:      Musculoskeletal: Normal range of motion. Trachea: No tracheal deviation. Cardiovascular:      Rate and Rhythm: Normal rate and regular rhythm.       Heart sounds: Normal heart sounds. No murmur. No friction rub. Pulmonary:      Effort: Pulmonary effort is normal. No respiratory distress. Breath sounds: Normal breath sounds. No stridor. No wheezing or rales. Abdominal:      General: Bowel sounds are normal. There is no distension. Palpations: Abdomen is soft. Tenderness: There is no tenderness. There is no rebound. Musculoskeletal: Normal range of motion. General: No tenderness. Skin:     General: Skin is warm and dry. Findings: No rash. Neurological:      Mental Status: She is alert and oriented to person, place, and time. Cranial Nerves: No cranial nerve deficit. Psychiatric:         Speech: Speech normal.         Behavior: Behavior normal.         Thought Content:  Thought content normal.         Judgment: Judgment normal.           Diagnostic Study Results     Labs -     Recent Results (from the past 12 hour(s))   EKG, 12 LEAD, INITIAL    Collection Time: 01/16/20 11:12 PM   Result Value Ref Range    Ventricular Rate 79 BPM    Atrial Rate 79 BPM    P-R Interval 166 ms    QRS Duration 76 ms    Q-T Interval 370 ms    QTC Calculation (Bezet) 424 ms    Calculated P Axis 51 degrees    Calculated R Axis -24 degrees    Calculated T Axis 20 degrees    Diagnosis       Normal sinus rhythm  Minimal voltage criteria for LVH, may be normal variant  Inferior infarct , age undetermined  Anterior infarct , age undetermined  When compared with ECG of 08-NOV-2019 20:03,  Anterior infarct is now present  T wave inversion now evident in Anterior leads     CBC WITH AUTOMATED DIFF    Collection Time: 01/16/20 11:36 PM   Result Value Ref Range    WBC 5.7 3.6 - 11.0 K/uL    RBC 4.17 3.80 - 5.20 M/uL    HGB 12.9 11.5 - 16.0 g/dL    HCT 38.5 35.0 - 47.0 %    MCV 92.3 80.0 - 99.0 FL    MCH 30.9 26.0 - 34.0 PG    MCHC 33.5 30.0 - 36.5 g/dL    RDW 12.0 11.5 - 14.5 %    PLATELET 030 028 - 997 K/uL    MPV 10.3 8.9 - 12.9 FL    NRBC 0.0 0  WBC ABSOLUTE NRBC 0.00 0.00 - 0.01 K/uL    NEUTROPHILS 66 32 - 75 %    LYMPHOCYTES 19 12 - 49 %    MONOCYTES 9 5 - 13 %    EOSINOPHILS 5 0 - 7 %    BASOPHILS 1 0 - 1 %    IMMATURE GRANULOCYTES 0 0.0 - 0.5 %    ABS. NEUTROPHILS 3.8 1.8 - 8.0 K/UL    ABS. LYMPHOCYTES 1.1 0.8 - 3.5 K/UL    ABS. MONOCYTES 0.5 0.0 - 1.0 K/UL    ABS. EOSINOPHILS 0.3 0.0 - 0.4 K/UL    ABS. BASOPHILS 0.1 0.0 - 0.1 K/UL    ABS. IMM. GRANS. 0.0 0.00 - 0.04 K/UL    DF AUTOMATED     METABOLIC PANEL, COMPREHENSIVE    Collection Time: 01/16/20 11:36 PM   Result Value Ref Range    Sodium 140 136 - 145 mmol/L    Potassium 3.5 3.5 - 5.1 mmol/L    Chloride 105 97 - 108 mmol/L    CO2 31 21 - 32 mmol/L    Anion gap 4 (L) 5 - 15 mmol/L    Glucose 100 65 - 100 mg/dL    BUN 17 6 - 20 MG/DL    Creatinine 0.99 0.55 - 1.02 MG/DL    BUN/Creatinine ratio 17 12 - 20      GFR est AA >60 >60 ml/min/1.73m2    GFR est non-AA 54 (L) >60 ml/min/1.73m2    Calcium 9.5 8.5 - 10.1 MG/DL    Bilirubin, total 0.3 0.2 - 1.0 MG/DL    ALT (SGPT) 23 12 - 78 U/L    AST (SGOT) 14 (L) 15 - 37 U/L    Alk. phosphatase 72 45 - 117 U/L    Protein, total 7.6 6.4 - 8.2 g/dL    Albumin 3.9 3.5 - 5.0 g/dL    Globulin 3.7 2.0 - 4.0 g/dL    A-G Ratio 1.1 1.1 - 2.2     TROPONIN I    Collection Time: 01/16/20 11:36 PM   Result Value Ref Range    Troponin-I, Qt. <0.05 <0.05 ng/mL   MAGNESIUM    Collection Time: 01/16/20 11:36 PM   Result Value Ref Range    Magnesium 2.2 1.6 - 2.4 mg/dL   SAMPLES BEING HELD    Collection Time: 01/16/20 11:36 PM   Result Value Ref Range    SAMPLES BEING HELD SST     COMMENT        Add-on orders for these samples will be processed based on acceptable specimen integrity and analyte stability, which may vary by analyte. Radiologic Studies -   No orders to display     CT Results  (Last 48 hours)    None        CXR Results  (Last 48 hours)    None            Medical Decision Making   I am the first provider for this patient.     I reviewed the vital signs, available nursing notes, past medical history, past surgical history, family history and social history. Vital Signs-Reviewed the patient's vital signs. Patient Vitals for the past 12 hrs:   Temp Pulse Resp BP SpO2   01/17/20 0003  82 17 142/78 99 %   01/16/20 2359     100 %   01/16/20 2303 97.9 °F (36.6 °C) 83 16 (!) 176/108 100 %       Pulse Oximetry Analysis - 100% on RA    Cardiac Monitor:   Rate: 79 bpm  Rhythm: Sinus rhythm    Records Reviewed: Nursing Notes, Old Medical Records, Previous electrocardiograms, Previous Radiology Studies and Previous Laboratory Studies    Provider Notes (Medical Decision Making):     DDX:  Paroxysmal atrial fibrillation, electrolyte abnormality, dehydration, kidney dysfunction    Plan:  EKG, labs, magnesium, troponin    Impression:  palpitations    ED Course:   Initial assessment performed. The patients presenting problems have been discussed, and they are in agreement with the care plan formulated and outlined with them. I have encouraged them to ask questions as they arise throughout their visit. I reviewed our electronic medical record system for any past medical records that were available that may contribute to the patients current condition, the nursing notes and and vital signs from today's visit    Nursing notes will be reviewed as they become available in realtime while the pt has been in the ED. Yanick Jones MD    HYPERTENSION COUNSELING:  Patient made aware of their elevated blood pressure and is instructed to follow up this week with their Primary Care or [address] [id number] for a recheck (should they be discharged.) Patient is counseled regarding consequences of chronic, uncontrolled hypertension including kidney disease, heart disease, stroke or even death.  Patient states their understanding    EKG interpretation 2312: NSR, nl Axis, rate 79; , QRS 76, QTc 424; no acute ischemia; interpreted by Yanick Jones MD    I personally reviewed pt's imaging. Official read by radiology noted above. Yanick Jones MD      Progress note:  Pt noted to be feeling better, ready for discharge. Discussed lab and imaging findings with pt, specifically noting normal trop, no recurrence of afib/arrhythmia while in ed. Pt will follow up with Dr. Guillermina Barnett as instructed. All questions have been answered, pt voiced understanding and agreement with plan. Specific return precautions provided in addition to instructions for pt to return to the ED immediately should sx worsen at any time. Yanick Jones MD           Critical Care Time:     none      Diagnosis     Clinical Impression:   1. Palpitations    2. Paroxysmal atrial fibrillation (HCC)        PLAN:  1. Discharge Medication List as of 1/17/2020 12:54 AM        2. Follow-up Information     Follow up With Specialties Details Why Karis Jiang MD Internal Medicine Schedule an appointment as soon as possible for a visit in 2 days  [address] [facility] [address] [place] [address]      Becky Gómez MD Cardiology, [address] [place] [address] Vascular Surgery, Internal Medicine Schedule an appointment as soon as possible for a visit in 2 days  [address]  [phone]          Return to ED if worse     Disposition:  The patient's results have been reviewed with family and/or caregiver. They verbally convey their understanding and agreement of the patient's signs, symptoms, diagnosis, treatment and prognosis and additionally agree to follow up as recommended in the discharge instructions or to return to the Emergency Room should the patient's condition change prior to their follow-up appointment. The family and/or caregiver verbally agrees with the care-plan and all of their questions have been answered.  The discharge instructions have also been provided to the them with educational information regarding the patient's diagnosis as well a list of reasons why the patient would want to return to the ER prior to their follow-up appointment should their condition change. Paris El MD            This note will not be viewable in [address]

## 2020-01-17 NOTE — DISCHARGE INSTRUCTIONS
Patient Education        Atrial Fibrillation: Care Instructions  Your Care Instructions    Atrial fibrillation is an irregular and often fast heartbeat. Treating this condition is important for several reasons. It can cause blood clots, which can travel from your heart to your brain and cause a stroke. If you have a fast heartbeat, you may feel lightheaded, dizzy, and weak. An irregular heartbeat can also increase your risk for heart failure. Atrial fibrillation is often the result of another heart condition, such as high blood pressure or coronary artery disease. Making changes to improve your heart condition will help you stay healthy and active. Follow-up care is a key part of your treatment and safety. Be sure to make and go to all appointments, and call your doctor if you are having problems. It's also a good idea to know your test results and keep a list of the medicines you take. How can you care for yourself at home? Medicines    · Take your medicines exactly as prescribed. Call your doctor if you think you are having a problem with your medicine. You will get more details on the specific medicines your doctor prescribes.     · If your doctor has given you a blood thinner to prevent a stroke, be sure you get instructions about how to take your medicine safely. Blood thinners can cause serious bleeding problems.     · Do not take any vitamins, over-the-counter drugs, or herbal products without talking to your doctor first.    Lifestyle changes    · Do not smoke. Smoking can increase your chance of a stroke and heart attack. If you need help quitting, talk to your doctor about stop-smoking programs and medicines. These can increase your chances of quitting for good.     · Eat a heart-healthy diet.     · Stay at a healthy weight. Lose weight if you need to.     · Limit alcohol to 2 drinks a day for men and 1 drink a day for women. Too much alcohol can cause health problems.     · Avoid colds and flu.  Get a pneumococcal vaccine shot. If you have had one before, ask your doctor whether you need another dose. Get a flu shot every year. If you must be around people with colds or flu, wash your hands often. Activity    · If your doctor recommends it, get more exercise. Walking is a good choice. Bit by bit, increase the amount you walk every day. Try for at least 30 minutes on most days of the week. You also may want to swim, bike, or do other activities. Your doctor may suggest that you join a cardiac rehabilitation program so that you can have help increasing your physical activity safely.     · Start light exercise if your doctor says it is okay. Even a small amount will help you get stronger, have more energy, and manage stress. Walking is an easy way to get exercise. Start out by walking a little more than you did in the hospital. Gradually increase the amount you walk.     · When you exercise, watch for signs that your heart is working too hard. You are pushing too hard if you cannot talk while you are exercising. If you become short of breath or dizzy or have chest pain, sit down and rest immediately.     · Check your pulse regularly. Place two fingers on the artery at the palm side of your wrist, in line with your thumb. If your heartbeat seems uneven or fast, talk to your doctor. When should you call for help? Call 911 anytime you think you may need emergency care. For example, call if:    · You have symptoms of a heart attack. These may include:  ? Chest pain or pressure, or a strange feeling in the chest.  ? Sweating. ? Shortness of breath. ? Nausea or vomiting. ? Pain, pressure, or a strange feeling in the back, neck, jaw, or upper belly or in one or both shoulders or arms. ? Lightheadedness or sudden weakness. ? A fast or irregular heartbeat. After you call 911, the  may tell you to chew 1 adult-strength or 2 to 4 low-dose aspirin. Wait for an ambulance.  Do not try to drive yourself.     · You have symptoms of a stroke. These may include:  ? Sudden numbness, tingling, weakness, or loss of movement in your face, arm, or leg, especially on only one side of your body. ? Sudden vision changes. ? Sudden trouble speaking. ? Sudden confusion or trouble understanding simple statements. ? Sudden problems with walking or balance. ? A sudden, severe headache that is different from past headaches.     · You passed out (lost consciousness).    Call your doctor now or seek immediate medical care if:    · You have new or increased shortness of breath.     · You feel dizzy or lightheaded, or you feel like you may faint.     · Your heart rate becomes irregular.     · You can feel your heart flutter in your chest or skip heartbeats. Tell your doctor if these symptoms are new or worse.    Watch closely for changes in your health, and be sure to contact your doctor if you have any problems. Where can you learn more? Go to http://feliz-arlene.info/. Enter U020 in the search box to learn more about \"Atrial Fibrillation: Care Instructions. \"  Current as of: April 9, 2019  Content Version: 12.2  © 8404-1231 [employer]. Care instructions adapted under license by [employer] (which disclaims liability or warranty for this information). If you have questions about a medical condition or this instruction, always ask your healthcare professional. [address] any warranty or liability for your use of this information. Patient Education        Cardiac Arrhythmia: Care Instructions  Your Care Instructions    A cardiac arrhythmia is a change in the normal rhythm of the heart. Your heart may beat too fast or too slow or beat with an irregular or skipping rhythm. A change in the heart's rhythm may feel like a really strong heartbeat or a fluttering in your chest. A severe heart rhythm problem can keep the body from getting the blood it needs.  This can result in shortness of breath, lightheadedness, and fainting. You may take medicine to treat your condition. Your doctor may recommend a pacemaker or recommend catheter ablation to destroy small parts of the heart that are causing a rhythm problem. Another possible treatment is an implantable cardioverter-defibrillator (ICD). An ICD is a device that gives the heart a shock to return the heart to a normal rhythm. Follow-up care is a key part of your treatment and safety. Be sure to make and go to all appointments, and call your doctor if you are having problems. It's also a good idea to know your test results and keep a list of the medicines you take. How can you care for yourself at home?  [facility]    · Be safe with medicines. Take your medicines exactly as prescribed. Call your doctor if you think you are having a problem with your medicine. You will get more details on the specific medicines your doctor prescribes.     · If you received a pacemaker or an ICD, you will get a fact sheet about it.     · Wear medical alert jewelry that says you have an abnormal heart rhythm. You can buy this at most SET.    Lifestyle changes    · Eat a heart-healthy diet.     · Stay at a healthy weight. Lose weight if you need to.     · Avoid nicotine, too much alcohol, and illegal drugs (meth, speed, and cocaine). Also, get enough sleep and do not overeat.     · Ask your doctor whether you can take over-the-counter medicines (such as decongestants). These can make your heart beat fast.     · Talk to your doctor about any limits to activities, such as driving, or tasks where you use power tools or ladders. Activity    · Start light exercise if your doctor says you can. Even a small amount will help you get stronger, have more energy, and manage your stress.     · Get regular exercise. Try for 30 minutes on most days of the week. Ask your doctor what level of exercise is safe for you.  If activity is not likely to cause health problems, you probably do not have limits on the type or level of activity that you can do. You may want to walk, swim, bike, or do other activities.     · When you exercise, watch for signs that your heart is working too hard. You are pushing too hard if you cannot talk while you exercise. If you become short of breath or dizzy or have chest pain, sit down and rest.     · Check your pulse daily. Place two fingers on the artery at the palm side of your wrist, in line with your thumb. If your heartbeat seems uneven, talk to your doctor. When should you call for help? Call 911 anytime you think you may need emergency care. For example, call if:    · You have symptoms of sudden heart failure. These may include:  ? Severe trouble breathing. ? A fast or irregular heartbeat. ? Coughing up pink, foamy mucus. ? You passed out.     · You have signs of a stroke. These include:  ? Sudden numbness, paralysis, or weakness in your face, arm, or leg, especially on only one side of your body. ? New problems with walking or balance. ? Sudden vision changes. ? Drooling or slurred speech. ? New problems speaking or understanding simple statements, or feeling confused. ? A sudden, severe headache that is different from past headaches.    Call your doctor now or seek immediate medical care if:    · You have new or changed symptoms of heart failure, such as:  ? New or increased shortness of breath. ? New or worse swelling in your legs, ankles, or feet. ? Sudden weight gain, such as more than 2 to 3 pounds in a day or 5 pounds in a week. (Your doctor may suggest a different range of weight gain.)  ? Feeling dizzy or lightheaded or like you may faint. ? Feeling so tired or weak that you cannot do your usual activities. ? Not sleeping well. Shortness of breath wakes you at night.  You need extra pillows to prop yourself up to breathe easier.    Watch closely for changes in your health, and be sure to contact your doctor if:    · You do not get better as expected. Where can you learn more? Go to http://feliz-arlene.info/. Enter F309 in the search box to learn more about \"Cardiac Arrhythmia: Care Instructions. \"  Current as of: April 9, 2019  Content Version: 12.2  © 8585-1719 [employer]. Care instructions adapted under license by "[employer] (which disclaims liability or warranty for this information). If you have questions about a medical condition or this instruction, always ask your healthcare professional. [address] any warranty or liability for your use of this information.

## 2020-01-17 NOTE — TELEPHONE ENCOUNTER
Patient states she needs a call back to be advised what to do if anything about Spot that her IV was inserted at her ER visit last night on 1/16/20. Patient states \"It's Swollen/raised & needs to know if she needs to do anything to get it to go down\". Please call to advise.  Thank you

## 2020-01-20 NOTE — TELEPHONE ENCOUNTER
Called, left vm for pt to return call to office. Home. Left vm for pt on mobile number that per Dr. Lauren Israel, pt can use heat compress.  Callback if sx persist.

## 2020-01-20 NOTE — TELEPHONE ENCOUNTER
Called, spoke to pt. Two pt identifiers confirmed. Pt states that there was some swelling at IV site from the ED. States there is a knot at IV site currently being there with a deep-blue bruise that is a couple inches in length--pt on eliquis. Swelling little better. Pt used cold compress once and then warm compress. Pt states the cold compress seemed to help more than the warm. Site slightly warm but not hot. No redness. No pain. Inside R of elbow. Pt asking what she should do. Pt informed Dr. Lauren Israel will be notified. Pt verbalized understanding of information discussed w/ no further questions at this time.

## 2020-01-22 ENCOUNTER — OFFICE VISIT (OUTPATIENT)
Dept: INTERNAL MEDICINE CLINIC | Age: 80
End: 2020-01-22

## 2020-01-22 VITALS
OXYGEN SATURATION: 100 % | HEIGHT: 65 IN | SYSTOLIC BLOOD PRESSURE: 124 MMHG | HEART RATE: 70 BPM | DIASTOLIC BLOOD PRESSURE: 72 MMHG | RESPIRATION RATE: 16 BRPM | BODY MASS INDEX: 30.99 KG/M2 | WEIGHT: 186 LBS | TEMPERATURE: 97.7 F

## 2020-01-22 DIAGNOSIS — I10 ESSENTIAL HYPERTENSION: Primary | ICD-10-CM

## 2020-01-22 DIAGNOSIS — M79.671 PAIN IN BOTH FEET: ICD-10-CM

## 2020-01-22 DIAGNOSIS — R58 ECCHYMOSIS: ICD-10-CM

## 2020-01-22 DIAGNOSIS — E87.6 HYPOKALEMIA: ICD-10-CM

## 2020-01-22 DIAGNOSIS — M79.672 PAIN IN BOTH FEET: ICD-10-CM

## 2020-01-22 DIAGNOSIS — E66.01 MORBID OBESITY (HCC): ICD-10-CM

## 2020-01-22 DIAGNOSIS — I48.0 PAROXYSMAL ATRIAL FIBRILLATION (HCC): ICD-10-CM

## 2020-01-22 RX ORDER — VERAPAMIL HYDROCHLORIDE 240 MG/1
240 TABLET, FILM COATED, EXTENDED RELEASE ORAL
Qty: 90 TAB | Refills: 1 | Status: SHIPPED | OUTPATIENT
Start: 2020-01-22 | End: 2020-05-26

## 2020-01-22 NOTE — PROGRESS NOTES
HISTORY OF PRESENT ILLNESS  Dennise Gauthier is a 70 y.o. female.   HPI   Last here 11/26/19  Pt is here for chronic conditions.        BP today is 124/72, HR 70   BP at home 130-140s/70s-80s   Continues on clonidine 0.2mg BID, HCTZ 25mg, and  losartan 25mg daily  Continues on verapamil  240 mg per day   Took these meds this AM   Recall she had a cough on lisinopril       Pt went to the ER 1/16/20 because she felt like her heart was racing   She took her BP which was normal but her HR was 117   Reviewed ekg 1/20: normal   Reviewed labs from ER   States this has mostly resolved but had an episode of this again on Saturday, took her HR which was 99        Wt today is 186 lbs-- stable x lov   Pt was prev concerned about wt loss however wt has improved since   Discussed diet and wt loss         Pt is following with Dr Enrique Goldberg)   Pt had an EGD and EUD 10/24/19  Pt states she has had f/u since then with him- per pt dr escamilla advised to have a repeat colo   However pt did not f/u with dr escamilla about this, advised her to call and see if they  Recommend colo        Pt had a cardiac ablation for her afib per Dr. Jenny Murrell (cardio) on 3/17/17   Pt will only f/u with this physician prn       Pt saw Dr. Anita Flanagan (cardio) for f/u after a fib  Last visit was 1/24/19  Has f/u scheduled 1/28/20   Pt was taken off of sotalol  Feels better more energy with this  Continues on eliquis bid  Pt denies any bleeding/falls on these meds 1/20         Pt saw Dr. Allison Murphy (cardio) for foot circulation  Last visit was 8/1/17   Recall has chronic sx of leg tightness  Pt will only f/u with this physician prn      Pt saw Dr. Lowell Avendano (uro-gyn) for urinary sx  Last visit was 11/17  Pt was told she had lichen sclerosus   Now resolved      Pt saw Dr Danielle Centeno (sleep)  Last visit was 7/3/18  Recall telephone note 9/18: no significant sleep disordered breathing, overall AHI of 0.6/h  Pt had a poor experience and was told she has no sleep apnea     Pt had a cyst on R breast removed by Dr Jo Ann Barriga (surg) in the past      Continues crestor 20mg daily for cholesterol      Continues klor-con 20meqs BID for her potassium     Continues on synthroid 75mcg daily      Continues on crestor     Pt followed with Dr Kimmie Barclay (podiatry)   She would like to f/u with this physician for 2 corns on her foot     Pt again c/o moving joint pain   She states this moves around to her breasts and her shoulders and legs   Discussed this could be arthritis or fibromyalgia   Discussed breast pain is typical benign      ACP not on file.  SDM is her sister, Rom Carlson. Provided information      Recall She had a cervical polyp, was having vaginal bleeding, this was removed by julio césar, vaginal bleeding has since resolved.        PREVENTIVE:    Colonoscopy: 8/21/13, Dr. Toro Valdes, repeat 10 years   Pap: Usually Dr. Katya Cam 10/19   Mammogram: 10/19  [address]: 10/19 osteopenia   Tdap: 4/14/2015  Pneumovax: 11/19/2013  Mqwhide60: 4/05/2017  Zostavax: declines  Shingrix: ordered 04/17/19, h/o shingles  Flu shot: 09/17/19   A1C:  8/16 5.9, 10/16 5.8, 4/17 5.9, 10/17 POC 5.6, 4/18 5.6, 10/18 5.6, 4/19 5.6 11/19 5.5   Eye exam: Dr. Phong Colindres, cataract   Lipids: 4/19 LDL 62       Patient Active Problem List    Diagnosis Date Noted    Pericardial effusion with cardiac tamponade 03/18/2017    S/P ablation of atrial fibrillation 03/17/2017    Acquired hypothyroidism 12/14/2015    HTN (hypertension) 07/21/2015    Thyroid activity decreased 04/14/2015    Atrial fibrillation ([address] 03/16/2015    Morbid obesity [facility] [address]     Hyperlipidemia 07/14/2014    Hypokalemia 07/14/2014    Postmenopausal bleeding 10/18/2011     Current Outpatient Medications   Medication Sig Dispense Refill    hydroCHLOROthiazide (HYDRODIURIL) 25 mg tablet TAKE 1 TABLET BY MOUTH EVERY DAY 90 Tab 0    SYNTHROID 75 mcg tablet TAKE 1 TAB BY MOUTH DAILY (BEFORE BREAKFAST).  90 Tab 0    verapamil ER (CALAN-SR) 240 mg CR tablet Take 1 Tab by mouth nightly. TAKE 1 TABLET BY MOUTH EVERY MORNING FOR BLOOD PRESSURE 90 Tab 1    ELIQUIS 5 mg tablet TAKE 1 TABLET BY MOUTH TWICE A DAY 60 Tab 6    rosuvastatin (CRESTOR) 20 mg tablet TAKE 1 TABLET BY MOUTH EVERY DAY 90 Tab 1    losartan (COZAAR) 25 mg tablet TAKE ONE TABLET BY MOUTH DAILY 90 Tab 1    cloNIDine HCl (CATAPRES) 0.2 mg tablet TAKE 1 TAB BY MOUTH TWO (2) TIMES A DAY. 180 Tab 1    potassium chloride SR (KLOR-CON 10) 10 mEq tablet TAKE 2 TABS BY MOUTH TWO (2) TIMES A DAY. 360 Tab 1    CALCIUM CARBONATE/VITAMIN D3 (CALTRATE 600 + D PO) Take 1 Tab by mouth daily.  MULTIVITAMIN W-MINERALS/LUTEIN (CENTRUM SILVER PO) Take 1 Tab by mouth daily (after lunch).        Past Surgical History:   Procedure Laterality Date    BREAST SURGERY PROCEDURE UNLISTED  1982    breast bx rt    CARDIAC SURG PROCEDURE UNLIST  01/2015    cardiac catheterization, no intervention, medical management    CARDIAC SURG PROCEDURE UNLIST  03/2017    ablation    HX GYN      myomectomy on uterus    HX GYN  2011    hystereoscopy D&C    HX LYMPH NODE DISSECTION      biopsy    HX OTHER SURGICAL      lymph node bx    UPPER GI ENDOSCOPY,BIOPSY  10/24/2019         UPPER GI ENDOSCOPY,FN NEEDLE BX,GUIDED  10/24/2019           Lab Results   Component Value Date/Time    WBC 5.7 01/16/2020 11:36 PM    HGB 12.9 01/16/2020 11:36 PM    HCT 38.5 01/16/2020 11:36 PM    PLATELET 095 90/41/5905 11:36 PM    MCV 92.3 01/16/2020 11:36 PM     Lab Results   Component Value Date/Time    Cholesterol, total 129 04/10/2019 08:34 AM    HDL Cholesterol 49 04/10/2019 08:34 AM    LDL, calculated 58 04/10/2019 08:34 AM    Triglyceride 112 04/10/2019 08:34 AM     Lab Results   Component Value Date/Time    GFR est non-AA 54 (L) 01/16/2020 11:36 PM    GFR est AA >60 01/16/2020 11:36 PM    Creatinine 0.99 01/16/2020 11:36 PM    BUN 17 01/16/2020 11:36 PM    Sodium 140 01/16/2020 11:36 PM    Potassium 3.5 01/16/2020 11:36 PM    Chloride 105 01/16/2020 11:36 PM    CO2 31 01/16/2020 11:36 PM    Magnesium 2.2 01/16/2020 11:36 PM        Review of Systems   Respiratory: Negative for shortness of breath. Cardiovascular: Negative for chest pain. Physical Exam  Constitutional:       General: She is not in acute distress. Appearance: She is well-developed. She is not diaphoretic. HENT:      Head: Normocephalic and atraumatic. Eyes:      General:         Right eye: No discharge. Conjunctiva/sclera: Conjunctivae normal.   Neck:      Musculoskeletal: Normal range of motion and neck supple. Cardiovascular:      Rate and Rhythm: Normal rate and regular rhythm. Heart sounds: Murmur (2/6 ) present. No friction rub. No gallop. Pulmonary:      Effort: Pulmonary effort is normal. No respiratory distress. Breath sounds: Normal breath sounds. No wheezing or rales. Chest:      Chest wall: No tenderness. Musculoskeletal: Normal range of motion. General: No tenderness or deformity. Lymphadenopathy:      Cervical: No cervical adenopathy. Skin:     General: Skin is warm and dry. Coloration: Skin is not pale. Findings: No erythema or rash. Comments: Soft echomotic area on L forearm 4 in in diameter    Neurological:      Mental Status: She is alert and oriented to person, place, and time. Coordination: Coordination normal.   Psychiatric:         Mood and Affect: Mood normal.         Behavior: Behavior normal.         ASSESSMENT and PLAN    ICD-10-CM ICD-9-CM    1. Essential hypertension                    Well controlled on clonidine verapamil hctz and losartan  I10 401.9 verapamil ER (CALAN-SR) 240 mg CR tablet   2.  Morbid obesity ([address]                Wt stable from lov discussed she is still in obese range however if she is able to lost another 10 lbs she would go back into overwt category also of note she was concerned about unintentional wt loss however wt has been stable for the past 3 mos and she has gone extensive eval  E66.01 278.01    3. Paroxysmal atrial fibrillation (HCC)                    Pt remains in nsr and rate controlled she is anti coag with eliquis she had an episode of palpitations  resulting in ER eval ekg and labs unremarkable sxs resolved prior to er visit she anu be seeing joel next week may get event monitor at that time for eval I suspect anxiety is contributing to sxs no meds adjustment needed  I48.0 427.31    4. Ecchymosis                Healing nicely discussed this is nl after IV no additional tx or intervention needed  R58 459.89    5. Hypokalemia                  Normal on klor con in ER  E87.6 276.8    6. Pain in both feet                  Would like to see podiatry placed referral of note pt has migratory pains that are transient in different areas she may have some fibromyalgia contributing to this no meds needed  M79.671 729.5 REFERRAL TO PODIATRY    M79.672              Scribed by Karey Soto of [address] [place] [address] [address], as dictated by Dr. Raquel Gunter. Current diagnosis and concerns discussed with pt at length. Pt understands risks and benefits or current treatment plan and medications, and accepts the treatment and medication with any possible risks. Pt asks appropriate questions, which were answered. Pt was instructed to call with any concerns or problems. I have reviewed the note documented by the scribe. The services provided are my own.   The documentation is accurate

## 2020-01-28 ENCOUNTER — OFFICE VISIT (OUTPATIENT)
Dept: CARDIOLOGY CLINIC | Age: 80
End: 2020-01-28

## 2020-01-28 VITALS
WEIGHT: 187.5 LBS | HEIGHT: 65 IN | HEART RATE: 72 BPM | RESPIRATION RATE: 18 BRPM | SYSTOLIC BLOOD PRESSURE: 136 MMHG | DIASTOLIC BLOOD PRESSURE: 70 MMHG | BODY MASS INDEX: 31.24 KG/M2

## 2020-01-28 DIAGNOSIS — E78.2 MIXED HYPERLIPIDEMIA: ICD-10-CM

## 2020-01-28 DIAGNOSIS — Z86.79 HISTORY OF IRREGULAR HEARTBEAT: Primary | ICD-10-CM

## 2020-01-28 DIAGNOSIS — I10 ESSENTIAL HYPERTENSION: ICD-10-CM

## 2020-01-28 DIAGNOSIS — I48.0 PAROXYSMAL ATRIAL FIBRILLATION (HCC): ICD-10-CM

## 2020-01-28 NOTE — PROGRESS NOTES
Subjective:      Yessi Masterson is a 70 y.o. female is here for EP consult. Ms Micaela Shultz presented to the ER earlier this month with palpitations and a hr in the 120s. She was found to be in sinus. She stated that the symptoms lasted an hour or so.      Patient Active Problem List    Diagnosis Date Noted    Pericardial effusion with cardiac tamponade 03/18/2017    S/P ablation of atrial fibrillation 03/17/2017    Acquired hypothyroidism 12/14/2015    HTN (hypertension) 07/21/2015    Thyroid activity decreased 04/14/2015    Atrial fibrillation ([address] 03/16/2015    Morbid obesity ([address]     Hyperlipidemia 07/14/2014    Hypokalemia 07/14/2014    Postmenopausal bleeding 10/18/2011      Marta Lacy MD  Past Medical History:   Diagnosis Date    Arrhythmia     afib    Arthritis     right knee, left shoulder    Diverticulosis     Frequency of urination     High cholesterol     Hypertension     Morbid obesity ([address]     Thyroid disease     hypothyroid    Unspecified adverse effect of anesthesia     low BP      Past Surgical History:   Procedure Laterality Date    BREAST SURGERY PROCEDURE UNLISTED  1982    breast bx rt    CARDIAC SURG PROCEDURE UNLIST  01/2015    cardiac catheterization, no intervention, medical management    CARDIAC SURG PROCEDURE UNLIST  03/2017    ablation    HX GYN      myomectomy on uterus    HX GYN  2011    hystereoscopy D&C    HX LYMPH NODE DISSECTION      biopsy    HX OTHER SURGICAL      lymph node bx    UPPER GI ENDOSCOPY,BIOPSY  10/24/2019         UPPER GI ENDOSCOPY,FN NEEDLE BX,GUIDED  10/24/2019          Allergies   Allergen Reactions    Adhesive Hives    Amoxicillin Unknown (comments)    Lipitor [Atorvastatin] Myalgia     Gets the flu      Family History   Problem Relation Age of Onset    Alzheimer Mother     Heart Disease Mother     Heart Disease Father     Hypertension Father     Cancer Paternal Aunt     Diabetes Paternal Grandmother     negative for cardiac disease  Social History     Socioeconomic History    Marital status:      Spouse name: Not on file    Number of children: Not on file    Years of education: Not on file    Highest education level: Not on file   Tobacco Use    Smoking status: Never Smoker    Smokeless tobacco: Never Used   Substance and Sexual Activity    Alcohol use: No    Drug use: No     Current Outpatient Medications   Medication Sig    verapamil ER (CALAN-SR) 240 mg CR tablet Take 1 Tab by mouth nightly. TAKE 1 TABLET BY MOUTH EVERY MORNING FOR BLOOD PRESSURE    hydroCHLOROthiazide (HYDRODIURIL) 25 mg tablet TAKE 1 TABLET BY MOUTH EVERY DAY    SYNTHROID 75 mcg tablet TAKE 1 TAB BY MOUTH DAILY (BEFORE BREAKFAST).  ELIQUIS 5 mg tablet TAKE 1 TABLET BY MOUTH TWICE A DAY    rosuvastatin (CRESTOR) 20 mg tablet TAKE 1 TABLET BY MOUTH EVERY DAY    losartan (COZAAR) 25 mg tablet TAKE ONE TABLET BY MOUTH DAILY    cloNIDine HCl (CATAPRES) 0.2 mg tablet TAKE 1 TAB BY MOUTH TWO (2) TIMES A DAY.  potassium chloride SR (KLOR-CON 10) 10 mEq tablet TAKE 2 TABS BY MOUTH TWO (2) TIMES A DAY.  CALCIUM CARBONATE/VITAMIN D3 (CALTRATE 600 + D PO) Take 1 Tab by mouth daily.  MULTIVITAMIN W-MINERALS/LUTEIN (CENTRUM SILVER PO) Take 1 Tab by mouth daily (after lunch). No current facility-administered medications for this visit. Vitals:    01/28/20 1013   BP: 136/70   Pulse: 72   Resp: 18   Weight: 187 lb 8 oz (85 kg)   Height: 5' 5\" (1.651 m)       I have reviewed the nurses notes, vitals, problem list, allergy list, medical history, family, social history and medications. Review of Symptoms:    General: Pt denies excessive weight gain or loss. Pt is able to conduct ADL's  HEENT: Denies blurred vision, headaches, epistaxis and difficulty swallowing. Respiratory: Denies shortness of breath, ANGUIANO, wheezing or stridor.   Cardiovascular: +palpitations, Denies precordial pain, edema or PND  Gastrointestinal: Denies poor appetite, indigestion, abdominal pain or blood in stool  Urinary: Denies dysuria, pyuria  Musculoskeletal: Denies pain or swelling from muscles or joints  Neurologic: Denies tremor, paresthesias, or sensory motor disturbance  Skin: Denies rash, itching or texture change. Psych: Denies depression      Physical Exam:      General: Well developed, in no acute distress. HEENT: Eyes - PERRL, no jvd  Heart:  Normal S1/S2 negative S3 or S4. Regular, no murmur, gallop or rub.   Respiratory: Clear bilaterally x 4, no wheezing or rales  Abdomen:   Soft, non-tender, bowel sounds are active.   Extremities:  No edema, normal cap refill, no cyanosis. Musculoskeletal: No clubbing  Neuro: A&Ox3, speech clear, gait stable. Skin: Skin color is normal. No rashes or lesions.  Non diaphoretic  Vascular: 2+ pulses symmetric in all extremities    Cardiographics    Ekg: nsr    Results for orders placed or performed during the hospital encounter of 01/16/20   EKG, 12 LEAD, INITIAL   Result Value Ref Range    Ventricular Rate 79 BPM    Atrial Rate 79 BPM    P-R Interval 166 ms    QRS Duration 76 ms    Q-T Interval 370 ms    QTC Calculation (Bezet) 424 ms    Calculated P Axis 51 degrees    Calculated R Axis -24 degrees    Calculated T Axis 20 degrees    Diagnosis       Normal sinus rhythm  Minimal voltage criteria for LVH, may be normal variant  Poor R-wave Progression (consider lead placement or loss of anterior forces)  Nonspecific ST and T wave abnormality    Confirmed by Madison Malik MD, Danny Cheung ([id number]) on 1/17/2020 7:59:54 AM     Results for orders placed or performed in visit on 10/24/17   CARDIAC HOLTER MONITOR, 24 HOURS    Narrative    ECG Monitor/24 hours, Complete    Reason for Holter Monitor   PAF    Heartbeat    Slowest 51  Average 67  Fastest  102      Results:   Underlying Rhythm: Normal sinus rhythm      Atrial Arrhythmias: premature atrial contractions; occasional, atrial couplets and atrial triplets            AV Conduction: normal    Ventricular Arrhythmias: premature ventricular contractions; occasional     ST Segment Analysis:normal     Symptom Correlation:  none    Comment:   Sinus rhythm throughout with occasional atrial ectopy     Felipe Jimenez MD, [facility]            Lab Results   Component Value Date/Time    WBC 5.7 01/16/2020 11:36 PM    HGB 12.9 01/16/2020 11:36 PM    HCT 38.5 01/16/2020 11:36 PM    PLATELET 829 28/44/5789 11:36 PM    MCV 92.3 01/16/2020 11:36 PM      Lab Results   Component Value Date/Time    Sodium 140 01/16/2020 11:36 PM    Potassium 3.5 01/16/2020 11:36 PM    Chloride 105 01/16/2020 11:36 PM    CO2 31 01/16/2020 11:36 PM    Anion gap 4 (L) 01/16/2020 11:36 PM    Glucose 100 01/16/2020 11:36 PM    BUN 17 01/16/2020 11:36 PM    Creatinine 0.99 01/16/2020 11:36 PM    BUN/Creatinine ratio 17 01/16/2020 11:36 PM    GFR est AA >60 01/16/2020 11:36 PM    GFR est non-AA 54 (L) 01/16/2020 11:36 PM    Calcium 9.5 01/16/2020 11:36 PM    Bilirubin, total 0.3 01/16/2020 11:36 PM    AST (SGOT) 14 (L) 01/16/2020 11:36 PM    Alk. phosphatase 72 01/16/2020 11:36 PM    Protein, total 7.6 01/16/2020 11:36 PM    Albumin 3.9 01/16/2020 11:36 PM    Globulin 3.7 01/16/2020 11:36 PM    A-G Ratio 1.1 01/16/2020 11:36 PM    ALT (SGPT) 23 01/16/2020 11:36 PM         Assessment:     Assessment:        ICD-10-CM ICD-9-CM    1. History of irregular heartbeat Z86.79 V12.59 AMB POC EKG ROUTINE W/ 12 LEADS, INTER & REP   2. Essential hypertension I10 401.9    3. Paroxysmal atrial fibrillation (HCC) I48.0 427.31    4. Mixed hyperlipidemia E78.2 272.2      Orders Placed This Encounter    AMB POC EKG ROUTINE W/ 12 LEADS, INTER & REP     Order Specific Question:   Reason for Exam:     Answer:   routine        Plan:   Ms Keyur Bennett is in sinus. She is not on any aads - had fatigue with sotalol. She is on med rx for htn and hyperlipidemia. Cont oac for hx of PAF sp abl in 2017.  If she has a recurrence of symptoms, we will obtain an event monitor. F/u in one year    Continue medical management for af, htn and hyperlipidemia. Thank you for allowing me to participate in Zac Calderon 's care.     Veronica Draper MD, Allison Long

## 2020-01-28 NOTE — PROGRESS NOTES
1. Have you been to the ER, urgent care clinic since your last visit? Hospitalized since your last visit? 1-16-20 ED [facility] ER, palps. 2. Have you seen or consulted any other health care providers outside of the [facility] since your last visit? Include any pap smears or colon screening.  No

## 2020-01-29 NOTE — ED NOTES
Patient assisted by tech to bathroom at this time
Patient walked back from bathroom at this time with no assistance
Patient was provided with discharge instructions. Instructions and any medications were reviewed with the patient &/or family by the provider. Questions and concerns addressed by the provider. Patient was wheeled out of the ED and was escorted by her friend.
Provider at the bedside talking with patient/family.
Provider at the bedside talking with patient/family.
Registration at the bedside talking to the patient/family at this time.
Walked blood specimens down to the lab at this time.
sudden onset

## 2020-02-10 RX ORDER — APIXABAN 5 MG/1
TABLET, FILM COATED ORAL
Qty: 60 TAB | Refills: 6 | Status: SHIPPED | OUTPATIENT
Start: 2020-02-10 | End: 2020-09-08

## 2020-03-03 NOTE — PROGRESS NOTES
Problem: Mobility Impaired (Adult and Pediatric)  Goal: *Acute Goals and Plan of Care (Insert Text)  Physical Therapy Goals  Initiated 3/20/2017  1. Patient will move from supine to sit and sit to supine in bed with independence within 7 day(s). 2. Patient will transfer from bed to chair and chair to bed with modified independence using the least restrictive device within 7 day(s). 3. Patient will perform sit to stand with modified independence within 7 day(s). 4. Patient will ambulate with modified independence for 150 feet with the least restrictive device within 7 day(s). 5. Patient will ascend/descend 3 stairs with single handrail(s) with supervision/set-up within 7 day(s). PHYSICAL THERAPY TREATMENT  Patient: Sam Morfin (36 y.o. female)  Date: 3/22/2017  Diagnosis: a fib, Pericardial effusion with cardiac tamponade,   5 Days Post-Op     Precautions: Fall  Chart, physical therapy assessment, plan of care and goals were reviewed. ASSESSMENT: pt continues to do well but still feels weak and is more comfortable with RW, no LOB or SOB, needed several standing rest breaks, good motivation, vc's for safety and proper RW use. Progression toward goals:  [ ]      Improving appropriately and progressing toward goals  [X]      Improving slowly and progressing toward goals  [ ]      Not making progress toward goals and plan of care will be adjusted       PLAN:  Patient continues to benefit from skilled intervention to address the above impairments. Continue treatment per established plan of care.   Discharge Recommendations:  Ihsan Watson  Further Equipment Recommendations for Discharge:  rolling walker       OBJECTIVE DATA SUMMARY:      Critical Behavior:  Neurologic State: Alert  Orientation Level: Oriented X4  Cognition: Appropriate decision making, Appropriate for age attention/concentration, Appropriate safety awareness, Follows commands  Safety/Judgement: Awareness of environment, Good awareness of safety precautions, Insight into deficits      Functional Mobility Training:  Bed Mobility: pt sitting in chair on arrival     Transfers:  Sit to Stand: Stand-by asssistance  Stand to Sit: Stand-by asssistance  Interventions: Verbal cues  Level of Assistance: Stand-by asssistance      Balance:  Sitting: Intact; Without support  Standing: Intact; With support  Standing - Static: Good;Constant support  Standing - Dynamic : Good      Ambulation/Gait Training:  Distance (ft): 160 Feet (ft)  Assistive Device: Walker, rolling;Gait belt  Ambulation - Level of Assistance: Contact guard assistance  Gait Abnormalities: Decreased step clearance  Right Side Weight Bearing: Full  Left Side Weight Bearing: Full  Base of Support: Widened  Stance:  (equal)  Speed/Letty: Pace decreased (<100 feet/min)  Step Length: Left shortened;Right shortened     Pain:  Pain Scale 1: Numeric (0 - 10)  Pain Intensity 1: 0     Activity Tolerance: fair     After treatment:   [X] Patient left in no apparent distress sitting up in chair  [ ] Patient left in no apparent distress in bed  [X] Call bell left within reach  [X] Nursing notified  [ ] Caregiver present  [ ] Bed alarm activated      COMMUNICATION/COLLABORATION:   The patients plan of care was discussed with: Registered Nurse     Kinza Tovar PTA   Time Calculation: 20 mins 1.65

## 2020-03-11 ENCOUNTER — OFFICE VISIT (OUTPATIENT)
Dept: NEUROLOGY | Age: 80
End: 2020-03-11

## 2020-03-11 VITALS
WEIGHT: 186 LBS | BODY MASS INDEX: 30.99 KG/M2 | HEIGHT: 65 IN | OXYGEN SATURATION: 98 % | HEART RATE: 76 BPM | SYSTOLIC BLOOD PRESSURE: 125 MMHG | DIASTOLIC BLOOD PRESSURE: 65 MMHG

## 2020-03-11 DIAGNOSIS — R51.9 NEW ONSET HEADACHE: Primary | ICD-10-CM

## 2020-03-11 DIAGNOSIS — G62.9 NEUROPATHY: ICD-10-CM

## 2020-03-11 NOTE — PATIENT INSTRUCTIONS

## 2020-03-11 NOTE — PROGRESS NOTES
Neurology Note    Chief Complaint   Patient presents with    New Patient     muscular pain, shoulders pain       HPI/Subjective  Olga Alarcon is a 68 y.o. female who presented to the neurology office for management of RLS. She started having symptoms started in 2019. She does get tightness and numbness of sometimes the left leg and sometimes the right leg. It is off and on. It can stay for a few minutes or some hours. It can be any time of the day. It is a sensation of tightness. No back pain. No shooting pain down the leg. No DM. No weakness in the legs. It is knee down. Patient is also having intermittent head tenderness and vision changes. She sees zigzag lines. Current Outpatient Medications   Medication Sig    ELIQUIS 5 mg tablet TAKE 1 TABLET BY MOUTH TWICE A DAY    verapamil ER (CALAN-SR) 240 mg CR tablet Take 1 Tab by mouth nightly. TAKE 1 TABLET BY MOUTH EVERY MORNING FOR BLOOD PRESSURE    hydroCHLOROthiazide (HYDRODIURIL) 25 mg tablet TAKE 1 TABLET BY MOUTH EVERY DAY    SYNTHROID 75 mcg tablet TAKE 1 TAB BY MOUTH DAILY (BEFORE BREAKFAST).  rosuvastatin (CRESTOR) 20 mg tablet TAKE 1 TABLET BY MOUTH EVERY DAY    losartan (COZAAR) 25 mg tablet TAKE ONE TABLET BY MOUTH DAILY    cloNIDine HCl (CATAPRES) 0.2 mg tablet TAKE 1 TAB BY MOUTH TWO (2) TIMES A DAY.  potassium chloride SR (KLOR-CON 10) 10 mEq tablet TAKE 2 TABS BY MOUTH TWO (2) TIMES A DAY.  CALCIUM CARBONATE/VITAMIN D3 (CALTRATE 600 + D PO) Take 1 Tab by mouth daily.  MULTIVITAMIN W-MINERALS/LUTEIN (CENTRUM SILVER PO) Take 1 Tab by mouth daily (after lunch). No current facility-administered medications for this visit.       Allergies   Allergen Reactions    Adhesive Hives    Amoxicillin Unknown (comments)    Lipitor [Atorvastatin] Myalgia     Gets the flu     Past Medical History:   Diagnosis Date    Arrhythmia     afib    Arthritis     right knee, left shoulder    Diverticulosis     Frequency of urination     High cholesterol     Hypertension     Morbid [address]     Thyroid disease     hypothyroid    Unspecified adverse effect of anesthesia     low BP     Past Surgical History:   Procedure Laterality Date    BREAST SURGERY PROCEDURE UNLISTED  1982    breast bx rt    CARDIAC SURG PROCEDURE UNLIST  01/2015    cardiac catheterization, no intervention, medical management    CARDIAC SURG PROCEDURE UNLIST  03/2017    ablation    HX GYN      myomectomy on uterus    HX GYN  2011    hystereoscopy D&C    HX LYMPH NODE DISSECTION      biopsy    HX OTHER SURGICAL      lymph node bx    UPPER GI ENDOSCOPY,BIOPSY  10/24/2019         UPPER GI ENDOSCOPY,FN NEEDLE BX,GUIDED  10/24/2019          Family History   Problem Relation Age of Onset    Alzheimer Mother     Heart Disease Mother     Heart Disease Father     Hypertension Father     Cancer Paternal Aunt     Diabetes Paternal Grandmother      Social History     Tobacco Use    Smoking status: Never Smoker    Smokeless tobacco: Never Used   Substance Use Topics    Alcohol use: No    Drug use: No       REVIEW OF SYSTEMS:   A ten system review of constitutional, cardiovascular, respiratory, musculoskeletal, endocrine, skin, SHEENT, genitourinary, psychiatric and neurologic systems was obtained and is unremarkable with the exception of chest pain, fatigue, incontinence, joint pain, memory loss, muscle pain, muscle weakness, neuropathy, skipped beat, snoring, visual disturbance and weight change    EXAMINATION:   Visit Vitals  /65   Pulse 76   Ht 5' 5\" (1.651 m)   Wt 186 lb (84.4 kg)   SpO2 98%   BMI 30.95 kg/m²        General:   General appearance: Pt is in no acute distress   Distal pulses are preserved  Fundoscopic Exam: Normal    Neurological Examination:   Mental Status: AAO x3. Speech is fluent. Follows commands, has normal fund of knowledge, attention, short term recall, comprehension and insight. Cranial Nerves: Visual fields are full.  PERRL, Extraocular movements are full. Facial sensation intact. Facial movement intact. Hearing intact to conversation. Palate elevates symmetrically. Shoulder shrug symmetric. Tongue midline. Motor: Strength is 5/5 in all 4 ext. No atrophy. Tone: Normal    Sensation: Decreased pinprick and vibration sensation distally in the lower extremities    Reflexes: DTRs 2+ throughout except absent at ankles. Coordination/Cerebellar: Intact to finger-nose-finger     Gait: Casual gait is normal.     Skin: No significant bruising or lacerations. Laboratory review:   Results for orders placed or performed during the hospital encounter of 01/16/20   CBC WITH AUTOMATED DIFF   Result Value Ref Range    WBC 5.7 3.6 - 11.0 K/uL    RBC 4.17 3.80 - 5.20 M/uL    HGB 12.9 11.5 - 16.0 g/dL    HCT 38.5 35.0 - 47.0 %    MCV 92.3 80.0 - 99.0 FL    MCH 30.9 26.0 - 34.0 PG    MCHC 33.5 30.0 - 36.5 g/dL    RDW 12.0 11.5 - 14.5 %    PLATELET 513 526 - 826 K/uL    MPV 10.3 8.9 - 12.9 FL    NRBC 0.0 0  WBC    ABSOLUTE NRBC 0.00 0.00 - 0.01 K/uL    NEUTROPHILS 66 32 - 75 %    LYMPHOCYTES 19 12 - 49 %    MONOCYTES 9 5 - 13 %    EOSINOPHILS 5 0 - 7 %    BASOPHILS 1 0 - 1 %    IMMATURE GRANULOCYTES 0 0.0 - 0.5 %    ABS. NEUTROPHILS 3.8 1.8 - 8.0 K/UL    ABS. LYMPHOCYTES 1.1 0.8 - 3.5 K/UL    ABS. MONOCYTES 0.5 0.0 - 1.0 K/UL    ABS. EOSINOPHILS 0.3 0.0 - 0.4 K/UL    ABS. BASOPHILS 0.1 0.0 - 0.1 K/UL    ABS. IMM.  GRANS. 0.0 0.00 - 0.04 K/UL    DF AUTOMATED     METABOLIC PANEL, COMPREHENSIVE   Result Value Ref Range    Sodium 140 136 - 145 mmol/L    Potassium 3.5 3.5 - 5.1 mmol/L    Chloride 105 97 - 108 mmol/L    CO2 31 21 - 32 mmol/L    Anion gap 4 (L) 5 - 15 mmol/L    Glucose 100 65 - 100 mg/dL    BUN 17 6 - 20 MG/DL    Creatinine 0.99 0.55 - 1.02 MG/DL    BUN/Creatinine ratio 17 12 - 20      GFR est AA >60 >60 ml/min/1.73m2    GFR est non-AA 54 (L) >60 ml/min/1.73m2    Calcium 9.5 8.5 - 10.1 MG/DL    Bilirubin, total 0.3 0.2 - 1.0 MG/DL    ALT (SGPT) 23 12 - 78 U/L    AST (SGOT) 14 (L) 15 - 37 U/L    Alk. phosphatase 72 45 - 117 U/L    Protein, total 7.6 6.4 - 8.2 g/dL    Albumin 3.9 3.5 - 5.0 g/dL    Globulin 3.7 2.0 - 4.0 g/dL    A-G Ratio 1.1 1.1 - 2.2     TROPONIN I   Result Value Ref Range    Troponin-I, Qt. <0.05 <0.05 ng/mL   MAGNESIUM   Result Value Ref Range    Magnesium 2.2 1.6 - 2.4 mg/dL   SAMPLES BEING HELD   Result Value Ref Range    SAMPLES BEING HELD SST     COMMENT        Add-on orders for these samples will be processed based on acceptable specimen integrity and analyte stability, which may vary by analyte. EKG, 12 LEAD, INITIAL   Result Value Ref Range    Ventricular Rate 79 BPM    Atrial Rate 79 BPM    P-R Interval 166 ms    QRS Duration 76 ms    Q-T Interval 370 ms    QTC Calculation (Bezet) 424 ms    Calculated P Axis 51 degrees    Calculated R Axis -24 degrees    Calculated T Axis 20 degrees    Diagnosis       Normal sinus rhythm  Minimal voltage criteria for LVH, may be normal variant  Poor R-wave Progression (consider lead placement or loss of anterior forces)  Nonspecific ST and T wave abnormality    Confirmed by Jacque Ruiz MD, Papa Guan ([id number]) on 1/17/2020 7:59:54 AM         Imaging review:  None    Documentation review:  None    Assessment/Plan:   Tiara Romero is a 68 y.o. female who presented to the neurology office for management of paresthesia distally in the lower extremities. Sometimes it can be on the left and sometimes on the right. On examination patient does have decreased pinprick sensation distally in the lower extremities. The patient does also have decreased vibration sensation. I suspect that she does have peripheral neuropathy. We will proceed with EMG/nerve conduction study. The patient is also complaining of intermittent headaches and does have visual auras of zigzag lines. She does have possibly retinal migraines.   Since he is a new onset headaches, will proceed with CT scan of the head.    The patient does not want to start any new medication. I did offer gabapentin 100 mg p.o. nightly. Follow-up in 3 months    3 most recent PHQ Screens 1/22/2020   Little interest or pleasure in doing things Not at all   Feeling down, depressed, irritable, or hopeless Not at all   Total Score PHQ 2 0     Primary care to address possible depression if PHQ-9 score is more than 9. ICD-10-CM ICD-9-CM    1. New onset headache R51 784.0 CT HEAD WO CONT   2. Neuropathy G62.9 355.9 EMG LIMITED      Thank you for allowing me to participate in the care of Ms. Bird. Please feel free to contact me if you have any questions. Electronically signed. Analisa Rock MD  Neurologist    CC: Maynor Torrez MD  Fax: [phone]    This note was created using voice recognition software. Despite editing, there may be syntax errors.

## 2020-03-11 NOTE — LETTER
3/11/20 Patient: Sam Morfin YOB: 1940 Date of Visit: 3/11/2020 Damaris Thrasher MD 
[address] [facility] [address] [address] VIA In Basket Dear Damaris Thrasher MD, Thank you for referring Ms. Edward Aponte to [address] [place] [address] for evaluation. My notes for this consultation are attached. If you have questions, please do not hesitate to call me. I look forward to following your patient along with you. Sincerely, Andre Warren MD

## 2020-03-24 RX ORDER — LEVOTHYROXINE SODIUM 75 UG/1
TABLET ORAL
Qty: 90 TAB | Refills: 0 | Status: SHIPPED | OUTPATIENT
Start: 2020-03-24 | End: 2020-06-21

## 2020-03-31 ENCOUNTER — TELEPHONE (OUTPATIENT)
Dept: INTERNAL MEDICINE CLINIC | Age: 80
End: 2020-03-31

## 2020-03-31 DIAGNOSIS — E11.49 TYPE II OR UNSPECIFIED TYPE DIABETES MELLITUS WITH NEUROLOGICAL MANIFESTATIONS, UNCONTROLLED(250.62) (HCC): ICD-10-CM

## 2020-03-31 DIAGNOSIS — E03.9 HYPOTHYROIDISM, UNSPECIFIED TYPE: Primary | ICD-10-CM

## 2020-03-31 RX ORDER — HYDROCHLOROTHIAZIDE 25 MG/1
TABLET ORAL
Qty: 90 TAB | Refills: 0 | Status: SHIPPED | OUTPATIENT
Start: 2020-03-31 | End: 2020-06-27

## 2020-03-31 NOTE — TELEPHONE ENCOUNTER
Phone Number: [phone]             Caller's first and last name:   Reason for call:  She would like her routine lab orders mailed  to her home address and for them to also include her thyroid panels, patient would also like to be put on a cancellation list for a physical for after April 17th   Callback required yes/no and why: Yes   Best contact number(s): Home#[phone] [facility]#[phone]   Details to clarify the request: n/a     Copy/Paste  ENVERA

## 2020-03-31 NOTE — TELEPHONE ENCOUNTER
Name and  confirmed. Called pt and informed her that the lab orders were put in and would mailed to her house. Pt wanted to know about her upcoming appointment and informed pt that the office is doing 100% virtual visits and that her appointment will be changed to a virtual appointment, pt verbally understood.

## 2020-04-06 ENCOUNTER — TELEPHONE (OUTPATIENT)
Dept: INTERNAL MEDICINE CLINIC | Age: 80
End: 2020-04-06

## 2020-04-07 ENCOUNTER — HOSPITAL ENCOUNTER (OUTPATIENT)
Dept: CT IMAGING | Age: 80
Discharge: HOME | End: 2020-04-07
Attending: PSYCHIATRY & NEUROLOGY
Payer: MEDICARE

## 2020-04-07 ENCOUNTER — HOSPITAL ENCOUNTER (OUTPATIENT)
Dept: LAB | Age: 80
Discharge: HOME | End: 2020-04-07
Payer: MEDICARE

## 2020-04-07 DIAGNOSIS — R51.9 NEW ONSET HEADACHE: ICD-10-CM

## 2020-04-07 PROCEDURE — 36415 COLL VENOUS BLD VENIPUNCTURE: CPT

## 2020-04-07 PROCEDURE — 83036 HEMOGLOBIN GLYCOSYLATED A1C: CPT

## 2020-04-07 PROCEDURE — 80053 COMPREHEN METABOLIC PANEL: CPT

## 2020-04-07 PROCEDURE — 84443 ASSAY THYROID STIM HORMONE: CPT

## 2020-04-07 PROCEDURE — 80061 LIPID PANEL: CPT

## 2020-04-07 PROCEDURE — 70450 CT HEAD/BRAIN W/O DYE: CPT

## 2020-04-08 LAB
ALBUMIN SERPL-MCNC: 4.7 G/DL (ref 3.7–4.7)
ALBUMIN/GLOB SERPL: 1.8 {RATIO} (ref 1.2–2.2)
ALP SERPL-CCNC: 66 IU/L (ref 39–117)
ALT SERPL-CCNC: 21 IU/L (ref 0–32)
AST SERPL-CCNC: 15 IU/L (ref 0–40)
BILIRUB SERPL-MCNC: 0.3 MG/DL (ref 0–1.2)
BUN SERPL-MCNC: 16 MG/DL (ref 8–27)
BUN/CREAT SERPL: 21 (ref 12–28)
CALCIUM SERPL-MCNC: 10.1 MG/DL (ref 8.7–10.3)
CHLORIDE SERPL-SCNC: 98 MMOL/L (ref 96–106)
CHOLEST SERPL-MCNC: 144 MG/DL (ref 100–199)
CO2 SERPL-SCNC: 26 MMOL/L (ref 20–29)
CREAT SERPL-MCNC: 0.78 MG/DL (ref 0.57–1)
EST. AVERAGE GLUCOSE BLD GHB EST-MCNC: 111 MG/DL
GLOBULIN SER CALC-MCNC: 2.6 G/DL (ref 1.5–4.5)
GLUCOSE SERPL-MCNC: 97 MG/DL (ref 65–99)
HBA1C MFR BLD: 5.5 % (ref 4.8–5.6)
HDLC SERPL-MCNC: 58 MG/DL
LDLC SERPL CALC-MCNC: 69 MG/DL (ref 0–99)
POTASSIUM SERPL-SCNC: 3.9 MMOL/L (ref 3.5–5.2)
PROT SERPL-MCNC: 7.3 G/DL (ref 6–8.5)
SODIUM SERPL-SCNC: 138 MMOL/L (ref 134–144)
T4 FREE SERPL-MCNC: 1.39 NG/DL (ref 0.82–1.77)
TRIGL SERPL-MCNC: 86 MG/DL (ref 0–149)
TSH SERPL DL<=0.005 MIU/L-ACNC: 0.72 UIU/ML (ref 0.45–4.5)
VLDLC SERPL CALC-MCNC: 17 MG/DL (ref 5–40)

## 2020-04-08 RX ORDER — LOSARTAN POTASSIUM 25 MG/1
TABLET ORAL
Qty: 90 TAB | Refills: 1 | Status: SHIPPED | OUTPATIENT
Start: 2020-04-08 | End: 2020-09-30

## 2020-04-08 RX ORDER — POTASSIUM CHLORIDE 750 MG/1
TABLET, FILM COATED, EXTENDED RELEASE ORAL
Qty: 360 TAB | Refills: 1 | Status: SHIPPED | OUTPATIENT
Start: 2020-04-08 | End: 2020-09-30

## 2020-04-08 RX ORDER — CLONIDINE HYDROCHLORIDE 0.2 MG/1
TABLET ORAL
Qty: 180 TAB | Refills: 1 | Status: SHIPPED | OUTPATIENT
Start: 2020-04-08 | End: 2020-09-30

## 2020-04-13 NOTE — PROGRESS NOTES
HISTORY OF PRESENT ILLNESS  Kiera Subramanian is a 68 y.o. female. HPI   Last here 1/22/20  Pt is here for chronic conditions. This is an established visit completed with telemedicine was completed with video assist  the patient acknowledges and agrees to this method of visitation       She reports occasional fast heart rate and high bp  Generally at night when getting ready for bed  Can happen in the afternoon as well  Does not happen every day just every 3-4 days   She saw katt in 1/20  Was to call him and get an event monitor if persistent sx but states unable to get thru to his office       BP today is 124/72, HR 70   BP at home 175/87, 79hr,  149/76, 120/71 hr 75  165/80, hr 96,  bp 118/67, hr 67    Continues on clonidine 0.2mg BID, HCTZ 25mg, and  losartan 25mg daily  Continues on verapamil  240 mg per day   Took these meds this AM   Recall she had a cough on lisinopril                Wt today is 186 lbs-- stable x lov   Pt was prev concerned about wt loss however wt has improved since   Discussed diet and wt loss         Pt is following with Dr Mariano Rosas)   Pt had an EGD and EUD 10/24/19  Pt states she has had f/u since then with him- per pt dr escamilla advised to have a repeat colo   However pt did not f/u with dr escamilla about this, advised her to call and see if they  Recommend colo         Pt had a cardiac ablation for her afib per Dr. Clark Hinojosa (cardio) on 3/17/17   Pt will only f/u with this physician prn       Pt saw Dr. Velma Rodriguez (cardio) for f/u after a fib  Pt was taken off of sotalol  Feels better more energy with this  Continues on eliquis bid  Pt denies any bleeding/falls on these meds 1/20   Last visit 1/28/20:   Dayan Hightower is in sinus. She is not on any aads - had fatigue with sotalol. She is on med rx for htn and hyperlipidemia. Cont oac for hx of PAF sp abl in 2017. If she has a recurrence of symptoms, we will obtain an event monitor.  F/u in one year     Continue medical management for af, htn and hyperlipidemia.         Pt saw Dr. Arleen Adam (Neuro). bp great that day 125/65  Last visit 03/11/20  Assessment/Plan:   Ollie Castleman is a 68 y.o. female who presented to the neurology office for management of paresthesia distally in the lower extremities. Sometimes it can be on the left and sometimes on the right. On examination patient does have decreased pinprick sensation distally in the lower extremities. The patient does also have decreased vibration sensation. I suspect that she does have peripheral neuropathy. We will proceed with EMG/nerve conduction study.     The patient is also complaining of intermittent headaches and does have visual auras of zigzag lines. She does have possibly retinal migraines. Since he is a new onset headaches, will proceed with CT scan of the head.     The patient does not want to start any new medication.   I did offer gabapentin 100 mg p.o. nightly.     Follow-up in 3 months       Had ct head complete 4/20 reviewed  She is working on getting emg set up , this was canceled d/t ingram    Pt saw Dr. Renaldo Bowman (cardio) for foot circulation  Last visit was 8/1/17   Recall has chronic sx of leg tightness  Pt will only f/u with this physician prn      Pt saw Dr. Bailey Omer (uro-gyn) for urinary sx  Last visit was 11/17  Pt was told she had lichen sclerosus   Now resolved      Pt saw Dr Janie Saunders (sleep)  Last visit was 7/3/18  Recall telephone note 9/18: no significant sleep disordered breathing, overall AHI of 0.6/h  Pt had a poor experience and was told she has no sleep apnea     Pt had a cyst on R breast removed by Dr Shannan Bonilla (surg) in the past      Continues crestor 20mg daily for cholesterol at goal 4/20      Continues klor-con 20meqs BID for her potassium at goal 4/20     Continues on synthroid 75mcg daily  4/20          Pt followed with Dr Litzy De La Garza (podiatry)   She would like to f/u with this physician for 2 corns on her foot      Pt again c/o moving joint pain   She states this moves around to her breasts and her shoulders and legs   Nerve tingling etc, had some head pressure w this as well, saw randy--see above     ACP not on file.  SDM is her sister, Tevin Mcgovern. Provided information      Recall She had a cervical polyp, was having vaginal bleeding, this was removed by julio césar, vaginal bleeding has since resolved.        PREVENTIVE:    Colonoscopy: 8/21/13, Dr. Todd Guzman, repeat 10 years   Pap: Usually Dr. Blank Omer 10/19   Mammogram: 10/19  [address]: 10/19 osteopenia   Tdap: 4/14/2015  Pneumovax: 11/19/2013  [address]: 4/05/2017  Zostavax: declines  Shingrix: ordered 04/17/19, h/o shingles  Flu shot: 09/17/19   A1C:  8/16 5.9, 10/16 5.8, 4/17 5.9, 10/17 POC 5.6, 4/18 5.6, 10/18 5.6, 4/19 5.6 11/19 5.5  4/20 5.5  Eye exam: Dr. Sherita Elizondo, cataract   Lipids: 4/20    Patient Active Problem List   Diagnosis Code    Postmenopausal bleeding N95.0    Hyperlipidemia E78.5    Hypokalemia E87.6    Morbid obesity ([address] E66.01    Atrial fibrillation ([address] I48.91    Thyroid activity decreased E03.9    HTN (hypertension) I10    Acquired hypothyroidism E03.9    S/P ablation of atrial fibrillation Z98.890, Z86.79    Pericardial effusion with cardiac tamponade I31.3, I31.4     Current Outpatient Medications   Medication Sig Dispense Refill    losartan (COZAAR) 25 mg tablet TAKE 1 TABLET BY MOUTH EVERY DAY 90 Tab 1    cloNIDine HCL (CATAPRES) 0.2 mg tablet TAKE 1 TABLET BY MOUTH TWICE A  Tab 1    potassium chloride SR (KLOR-CON 10) 10 mEq tablet TAKE 2 TABS BY MOUTH TWO (2) TIMES A DAY. 360 Tab 1    hydroCHLOROthiazide (HYDRODIURIL) 25 mg tablet TAKE 1 TABLET BY MOUTH EVERY DAY 90 Tab 0    Synthroid 75 mcg tablet TAKE 1 TABLET BY MOUTH EVERY DAY BEFORE BREAKFAST 90 Tab 0    ELIQUIS 5 mg tablet TAKE 1 TABLET BY MOUTH TWICE A DAY 60 Tab 6    verapamil ER (CALAN-SR) 240 mg CR tablet Take 1 Tab by mouth nightly.  TAKE 1 TABLET BY MOUTH EVERY MORNING FOR BLOOD PRESSURE 90 Tab 1    rosuvastatin (CRESTOR) 20 mg tablet TAKE 1 TABLET BY MOUTH EVERY DAY 90 Tab 1    CALCIUM CARBONATE/VITAMIN D3 (CALTRATE 600 + D PO) Take 1 Tab by mouth daily.  MULTIVITAMIN W-MINERALS/LUTEIN (CENTRUM SILVER PO) Take 1 Tab by mouth daily (after lunch). Lab Results   Component Value Date/Time    Cholesterol, total 144 04/07/2020 12:28 PM    HDL Cholesterol 58 04/07/2020 12:28 PM    LDL, calculated 69 04/07/2020 12:28 PM    Triglyceride 86 04/07/2020 12:28 PM     Lab Results   Component Value Date/Time    GFR est non-AA 73 04/07/2020 12:28 PM    GFR est AA 84 04/07/2020 12:28 PM    Creatinine 0.78 04/07/2020 12:28 PM    BUN 16 04/07/2020 12:28 PM    Sodium 138 04/07/2020 12:28 PM    Potassium 3.9 04/07/2020 12:28 PM    Chloride 98 04/07/2020 12:28 PM    CO2 26 04/07/2020 12:28 PM    Magnesium 2.2 01/16/2020 11:36 PM        Review of Systems   Respiratory: Negative for shortness of breath. Cardiovascular: Negative for chest pain. Physical Exam  Constitutional:       General: She is not in acute distress. Appearance: Normal appearance. She is not ill-appearing, toxic-appearing or diaphoretic. HENT:      Head: Normocephalic and atraumatic. Eyes:      General:         Right eye: No discharge. Left eye: No discharge. Conjunctiva/sclera: Conjunctivae normal.   Neurological:      General: No focal deficit present. Mental Status: She is alert and oriented to person, place, and time. Psychiatric:         Mood and Affect: Mood normal.         Behavior: Behavior normal.         ASSESSMENT and PLAN    ICD-10-CM ICD-9-CM    1. Essential hypertension    Continues on clonidine 0.2mg BID, HCTZ 25mg, and  losartan 25mg daily  Continues on verapamil  240 mg per day    Overall  well controlled  I suspect that her anxiety is driving sporadic elevation of her bp at home    Has been well controlled at ov    I10 401.9    2.  Acquired hypothyroidism--at goal on synthroid 75mcg daily cntonue E03.9 244.9    3. Mixed hyperlipidemia--controleld on crestor continue E78.2 272.2    4. Paroxysmal atrial fibrillation (HCC)    Will contact katt about event monitor pt is having recurrent sx    A suspect underlying anxiety is likely playing a role--will tx see below    eliquis for anticoag I48.0 427.31    5. Morbid obesity (HCC)--wt has been stable     She initially was worried about wt loss    Had extensive eval     Discussed avoiding wt gain  E66.01 278.01    6. Hypokalemia--at goal on klor con  E87.6 276.8    7. Neuropathy--will be getting emg w dr Yesica Olson    Head ct unremarkable     Discussed gabapentin again  Discussed this could improve her sx, she would like to hold off for now G62.9 355.9    8. Medicare annual wellness visit, subsequent Z00.00 V70.0      9 anxiety --discussed  This at great length and will start zoloft 25mg daily       This is the Subsequent Medicare Annual Wellness Exam, performed 12 months or more after the Initial AWV or the last Subsequent AWV    Consent: Martha Saenz, who was seen by synchronous (real-time) audio-video technology, and/or her healthcare decision maker, is aware that this patient-initiated, Telehealth encounter on 4/14/2020 is a billable service. While AWVs are fully covered by Medicare, any services rendered on this date that are not included in an AWV are subject to additional billing, with coverage as determined by her insurance carrier. She is aware that she may receive a bill for any such additional services and has provided verbal consent to proceed: Yes. I have reviewed the patient's medical history in detail and updated the computerized patient record.      History     Patient Active Problem List   Diagnosis Code    Postmenopausal bleeding N95.0    Hyperlipidemia E78.5    Hypokalemia E87.6    Morbid obesity ([address] E66.01    Atrial fibrillation ([address] I48.91    HTN (hypertension) I10    Acquired hypothyroidism E03.9  S/P ablation of atrial fibrillation Z98.890, Z86.79    Pericardial effusion with cardiac tamponade I31.3, I31.4     Past Medical History:   Diagnosis Date    Arrhythmia     afib    Arthritis     right knee, left shoulder    Diverticulosis     Frequency of urination     High cholesterol     Hypertension     Morbid obesity ([address]     Thyroid disease     hypothyroid    Unspecified adverse effect of anesthesia     low BP      Past Surgical History:   Procedure Laterality Date    BREAST SURGERY PROCEDURE UNLISTED  1982    breast bx rt    CARDIAC SURG PROCEDURE UNLIST  01/2015    cardiac catheterization, no intervention, medical management    CARDIAC SURG PROCEDURE UNLIST  03/2017    ablation    HX GYN      myomectomy on uterus    HX GYN  2011    hystereoscopy D&C    HX LYMPH NODE DISSECTION      biopsy    HX OTHER SURGICAL      lymph node bx    UPPER GI ENDOSCOPY,BIOPSY  10/24/2019         UPPER GI ENDOSCOPY,FN NEEDLE BX,GUIDED  10/24/2019          Current Outpatient Medications   Medication Sig Dispense Refill    varicella-zoster recombinant, PF, (Shingrix, PF,) 50 mcg/0.5 mL susr injection 0.5mL by IntraMUSCular route once now and then repeat in 2-6 months 0.5 mL 1    losartan (COZAAR) 25 mg tablet TAKE 1 TABLET BY MOUTH EVERY DAY 90 Tab 1    cloNIDine HCL (CATAPRES) 0.2 mg tablet TAKE 1 TABLET BY MOUTH TWICE A  Tab 1    potassium chloride SR (KLOR-CON 10) 10 mEq tablet TAKE 2 TABS BY MOUTH TWO (2) TIMES A DAY. 360 Tab 1    hydroCHLOROthiazide (HYDRODIURIL) 25 mg tablet TAKE 1 TABLET BY MOUTH EVERY DAY 90 Tab 0    Synthroid 75 mcg tablet TAKE 1 TABLET BY MOUTH EVERY DAY BEFORE BREAKFAST 90 Tab 0    ELIQUIS 5 mg tablet TAKE 1 TABLET BY MOUTH TWICE A DAY 60 Tab 6    verapamil ER (CALAN-SR) 240 mg CR tablet Take 1 Tab by mouth nightly.  TAKE 1 TABLET BY MOUTH EVERY MORNING FOR BLOOD PRESSURE 90 Tab 1    rosuvastatin (CRESTOR) 20 mg tablet TAKE 1 TABLET BY MOUTH EVERY DAY 90 Tab 1    CALCIUM CARBONATE/VITAMIN D3 (CALTRATE 600 + D PO) Take 1 Tab by mouth daily.  MULTIVITAMIN W-MINERALS/LUTEIN (CENTRUM SILVER PO) Take 1 Tab by mouth daily (after lunch). Allergies   Allergen Reactions    Adhesive Hives    Amoxicillin Unknown (comments)    Lipitor [Atorvastatin] Myalgia     Gets the flu       Family History   Problem Relation Age of Onset    Alzheimer Mother     Heart Disease Mother     Heart Disease Father     Hypertension Father     Cancer Paternal Aunt     Diabetes Paternal Grandmother      Social History     Tobacco Use    Smoking status: Never Smoker    Smokeless tobacco: Never Used   Substance Use Topics    Alcohol use: No       Depression Risk Factor Screening:     3 most recent PHQ Screens 4/14/2020   Little interest or pleasure in doing things Not at all   Feeling down, depressed, irritable, or hopeless Not at all   Total Score PHQ 2 0       Alcohol Risk Factor Screening:   Do you average 1 drink per night or more than 7 drinks a week:  No    On any one occasion in the past three months have you have had more than 3 drinks containing alcohol:  No      Functional Ability and Level of Safety:   Hearing: Hearing is good. Activities of Daily Living: The home contains: handrails and grab bars  Patient does total self care    Ambulation: with mild difficulty    Fall Risk:  Fall Risk Assessment, last 12 mths 4/14/2020   Able to walk? Yes   Fall in past 12 months?  No   Fall with injury? -   Number of falls in past 12 months -   Fall Risk Score -       Abuse Screen:  Patient is not abused  Lives alone  safe  Cognitive Screening   Has your family/caregiver stated any concerns about your memory: no  Cognitive Screening: Normal - Mini Cog Test    Patient Care Team   Patient Care Team:  Mathew Vela MD as PCP - General (Internal Medicine)  Mathew Vela MD as PCP - [facility] Empaneled Provider  Dulce Covarrubias MD (Colon and Rectal Surgery)  Verena Whitney MD (Ophthalmology)  Melissa Leal MD (Obstetrics & Gynecology)  Ruth Quintana MD (Gynecology)  Alanis Barrow MD (Cardiology)  Benson Cerna MD as Physician (Cardiology)  Chanda Pozo MD (Cardiology)  Janette No DPM (Podiatry)  Ange Pittman MD as Surgeon (General Surgery)  Tabby Das MD as Physician (Sleep Medicine)  Malaika Rodriguez MD (Gastroenterology)  Joseph Friend MD (Gastroenterology)  Bruce Castellanos MD (Neurology)   updated    Assessment/Plan   Education and counseling provided:  Are appropriate based on today's review and evaluation  End-of-Life planning (with patient's consent)  Screening Mammography  Screening Pap and pelvic (covered once every 2 years)  Cardiovascular screening blood test  Bone mass measurement (DEXA)  Screening for glaucoma  Diabetes screening test    Diagnoses and all orders for this visit:    1. Essential hypertension    2. Acquired hypothyroidism    3. Mixed hyperlipidemia    4. Paroxysmal atrial fibrillation (HCC)    5. Morbid obesity ([address]    6. Hypokalemia    7. Neuropathy    8. Medicare annual wellness visit, subsequent    Other orders  -     varicella-zoster recombinant, PF, (Shingrix, PF,) 50 mcg/0.5 mL susr injection; 0.5mL by IntraMUSCular route once now and then repeat in 2-6 months        Health Maintenance Due   Topic Date Due    Shingrix Vaccine Age 49> (1 of 2) 02/26/1992    GLAUCOMA SCREENING Q2Y  08/01/2018    Medicare Yearly Exam  04/17/2020         ACP not on file.  SDM is her sister, Christy Vital.   Provided information --mail to [facility]       Colonoscopy: 8/21/13, Dr. Bandar Lisa, repeat 10 years   Pap: Usually Dr. Zarate Nip 10/19 q2yr  [address]: 10/19 osteopenia  q2yr    Tdap: 4/14/2015  Pneumovax: 11/19/2013  [address]: 4/05/2017  Zostavax: declines  Shingrix: ordered 04/17/19, h/o shingles  Flu shot: 09/17/19     Eye exam: Dr. Stacie Mendoza, cataract  annual     A1C:  4/20 5.5 q 6mo  Lipids: 4/20 annual                       Roque Bain is a 68 y.o. female being evaluated by a video visit encounter for concerns as above. A caregiver was present when appropriate. Due to this being a TeleHealth encounter (During RSBNR-17 public health emergency), evaluation of the following organ systems was limited: Vitals/Constitutional/EENT/Resp/CV/GI//MS/Neuro/Skin/Heme-Lymph-Imm. Pursuant to the emergency declaration under the [facility] waiver authority and the [employer] Act, this Virtual  Visit was conducted, with patient's (and/or legal guardian's) consent, to reduce the patient's risk of exposure to COVID-19 and provide necessary medical care. Services were provided through a video synchronous discussion virtually to substitute for in-person clinic visit. Patient and provider were located at their individual homes.     Evan Singleton MD

## 2020-04-14 ENCOUNTER — VIRTUAL VISIT (OUTPATIENT)
Dept: INTERNAL MEDICINE CLINIC | Age: 80
End: 2020-04-14

## 2020-04-14 DIAGNOSIS — Z00.00 MEDICARE ANNUAL WELLNESS VISIT, SUBSEQUENT: ICD-10-CM

## 2020-04-14 DIAGNOSIS — G62.9 NEUROPATHY: ICD-10-CM

## 2020-04-14 DIAGNOSIS — E78.2 MIXED HYPERLIPIDEMIA: ICD-10-CM

## 2020-04-14 DIAGNOSIS — F41.9 ANXIETY: ICD-10-CM

## 2020-04-14 DIAGNOSIS — E87.6 HYPOKALEMIA: ICD-10-CM

## 2020-04-14 DIAGNOSIS — I10 ESSENTIAL HYPERTENSION: Primary | ICD-10-CM

## 2020-04-14 DIAGNOSIS — E03.9 ACQUIRED HYPOTHYROIDISM: ICD-10-CM

## 2020-04-14 DIAGNOSIS — I48.0 PAROXYSMAL ATRIAL FIBRILLATION (HCC): ICD-10-CM

## 2020-04-14 DIAGNOSIS — E66.01 MORBID OBESITY (HCC): ICD-10-CM

## 2020-04-14 RX ORDER — SERTRALINE HYDROCHLORIDE 25 MG/1
25 TABLET, FILM COATED ORAL DAILY
Qty: 30 TAB | Refills: 1 | Status: SHIPPED | OUTPATIENT
Start: 2020-04-14 | End: 2020-05-08 | Stop reason: SDUPTHER

## 2020-04-14 RX ORDER — ZOSTER VACCINE RECOMBINANT, ADJUVANTED 50 MCG/0.5
KIT INTRAMUSCULAR
Qty: 0.5 ML | Refills: 1 | Status: SHIPPED | OUTPATIENT
Start: 2020-04-14 | End: 2021-03-09

## 2020-04-14 NOTE — PATIENT INSTRUCTIONS
Medicare Wellness Visit, Female The best way to live healthy is to have a lifestyle where you eat a well-balanced diet, exercise regularly, limit alcohol use, and quit all forms of tobacco/nicotine, if applicable. Regular preventive services are another way to keep healthy. Preventive services (vaccines, screening tests, monitoring & exams) can help personalize your care plan, which helps you manage your own care. Screening tests can find health problems at the earliest stages, when they are easiest to treat. [facility] follows the current, evidence-based guidelines published by the [facility] Geovanny Chapman ([facility]) when recommending preventive services for our patients. Because we follow these guidelines, sometimes recommendations change over time as research supports it. (For example, mammograms used to be recommended annually. Even though Medicare will still pay for an annual mammogram, the newer guidelines recommend a mammogram every two years for women of average risk). Of course, you and your doctor may decide to screen more often for some diseases, based on your risk and your co-morbidities (chronic disease you are already diagnosed with). Preventive services for you include: - Medicare offers their members a free annual wellness visit, which is time for you and your primary care provider to discuss and plan for your preventive service needs. Take advantage of this benefit every year! 
-All adults over the age of 72 should receive the recommended pneumonia vaccines. Current USPSTF guidelines recommend a series of two vaccines for the best pneumonia protection.  
-All adults should have a flu vaccine yearly and a tetanus vaccine every 10 years.  
-All adults age 48 and older should receive the shingles vaccines (series of two vaccines). -All adults age 38-68 who are overweight should have a diabetes screening test once every three years. -All adults born between [birth year] and 1965 should be screened once for Hepatitis C. 
-Other screening tests and preventive services for persons with diabetes include: an eye exam to screen for diabetic retinopathy, a kidney function test, a foot exam, and stricter control over your cholesterol.  
-Cardiovascular screening for adults with routine risk involves an electrocardiogram (ECG) at intervals determined by your doctor.  
-Colorectal cancer screenings should be done for adults age 54-65 with no increased risk factors for colorectal cancer. There are a number of acceptable methods of screening for this type of cancer. Each test has its own benefits and drawbacks. Discuss with your doctor what is most appropriate for you during your annual wellness visit. The different tests include: colonoscopy (considered the best screening method), a fecal occult blood test, a fecal DNA test, and sigmoidoscopy. 
 
-A bone mass density test is recommended when a woman turns 65 to screen for osteoporosis. This test is only recommended one time, as a screening. Some providers will use this same test as a disease monitoring tool if you already have osteoporosis. -Breast cancer screenings are recommended every other year for women of normal risk, age 54-69. 
-Cervical cancer screenings for women over age 72 are only recommended with certain risk factors. Here is a list of your current Health Maintenance items (your personalized list of preventive services) with a due date: 
Health Maintenance Due Topic Date Due  Shingles Vaccine (1 of 2) 02/26/1992  Glaucoma Screening   08/01/2018 [address] [place] [address] Annual Well Visit  04/17/2020

## 2020-04-20 RX ORDER — ROSUVASTATIN CALCIUM 20 MG/1
TABLET, COATED ORAL
Qty: 90 TAB | Refills: 1 | Status: SHIPPED | OUTPATIENT
Start: 2020-04-20 | End: 2020-10-14 | Stop reason: SDUPTHER

## 2020-05-08 DIAGNOSIS — F41.9 ANXIETY: Primary | ICD-10-CM

## 2020-05-08 RX ORDER — SERTRALINE HYDROCHLORIDE 25 MG/1
25 TABLET, FILM COATED ORAL DAILY
Qty: 90 TAB | Refills: 1 | Status: SHIPPED | OUTPATIENT
Start: 2020-05-08 | End: 2020-11-02 | Stop reason: SDUPTHER

## 2020-05-08 NOTE — TELEPHONE ENCOUNTER
CP: Serafin Church MD    Last appt: 4/14/2020  Future Appointments   Date Time Provider Marilin Ventura   6/4/2020 11:00 AM Wilfredo Thrasher  [facility]   6/11/2020 12:15 PM Serafin Church MD [address]   7/28/2020 11:40 AM Wilfredo Thrasher MD [address] [place] [address]   1/28/2021 11:15 AM Silver Griffith MD [facility]       Requested Prescriptions     Pending Prescriptions Disp Refills    sertraline (ZOLOFT) 25 mg tablet 90 Tab 1     Sig: Take 1 Tab by mouth daily.

## 2020-05-26 DIAGNOSIS — I10 ESSENTIAL HYPERTENSION: ICD-10-CM

## 2020-05-26 RX ORDER — VERAPAMIL HYDROCHLORIDE 240 MG/1
240 TABLET, FILM COATED, EXTENDED RELEASE ORAL
Qty: 90 TAB | Refills: 1 | Status: SHIPPED | OUTPATIENT
Start: 2020-05-26 | End: 2020-11-24 | Stop reason: SDUPTHER

## 2020-05-28 ENCOUNTER — HOSPITAL ENCOUNTER (OUTPATIENT)
Dept: CT IMAGING | Age: 80
Discharge: HOME | End: 2020-05-28
Payer: MEDICARE

## 2020-05-28 DIAGNOSIS — K86.2 PANCREATIC CYST: ICD-10-CM

## 2020-05-28 PROCEDURE — 74170 CT ABD WO CNTRST FLWD CNTRST: CPT

## 2020-05-28 PROCEDURE — 74011636320 HC RX REV CODE- 636/320

## 2020-05-28 RX ORDER — SODIUM CHLORIDE 0.9 % (FLUSH) 0.9 %
10 SYRINGE (ML) INJECTION
Status: COMPLETED | OUTPATIENT
Start: 2020-05-28 | End: 2020-05-28

## 2020-05-28 RX ADMIN — Medication 10 ML: at 14:10

## 2020-05-28 RX ADMIN — IOPAMIDOL 100 ML: 755 INJECTION, SOLUTION INTRAVENOUS at 14:10

## 2020-06-04 ENCOUNTER — OFFICE VISIT (OUTPATIENT)
Dept: NEUROLOGY | Age: 80
End: 2020-06-04

## 2020-06-04 VITALS
SYSTOLIC BLOOD PRESSURE: 131 MMHG | DIASTOLIC BLOOD PRESSURE: 74 MMHG | OXYGEN SATURATION: 97 % | TEMPERATURE: 98.2 F | HEART RATE: 79 BPM

## 2020-06-04 DIAGNOSIS — G62.9 NEUROPATHY: Primary | ICD-10-CM

## 2020-06-04 NOTE — PROCEDURES
ELECTRODIAGNOSTIC REPORT      Test Date:  2020    Patient: Beatrice Johnson : 1940 Physician: Mary Davenport M.D.   ID#: [id number] SEX: Female Ref. Phys: Mary Davenport M.D. Patient History / Exam:  Ignacia Mack 68 y.o. female Presents with leg numbness. Query peripheral neuropathy      EMG & NCV Findings:  Evaluation of the right Fibular motor nerve showed normal distal onset latency (3.8 ms), reduced amplitude (0.7 mV), normal conduction velocity (B Fib-Ankle, 38 m/s), and normal conduction velocity (Poplt-B Fib, 59 m/s). The right Fibular TA motor nerve showed normal distal onset latency (3.8 ms), reduced amplitude (1.6 mV), and normal conduction velocity (Poplit-Fib Head, 67 m/s). The right tibial motor nerve showed normal distal onset latency (4.6 ms), normal amplitude (1.1 mV), and decreased conduction velocity (Knee-Ankle, 38 m/s). The right ulnar motor nerve showed normal distal onset latency (3.0 ms), normal amplitude (8.1 mV), decreased conduction velocity (Wrist-Abd Dig Minimi, 27 m/s), normal conduction velocity (B Elbow-Wrist, 57 m/s), and normal conduction velocity (A Elbow-B Elbow, 59 m/s). The right radial sensory nerve showed normal distal peak latency (2.1 ms) and normal amplitude (17.5 µV). The right Sup Fibular sensory nerve showed no response (Lower leg). The right sural sensory nerve showed normal distal peak latency (3.0 ms) and reduced amplitude (1.8 µV). Needle evaluation of the right anterior tibialis muscle showed diminished recruitment, Incr Duration, and increased motor unit amplitude. All remaining muscles (as indicated in the following table) showed no evidence of electrical instability. Impression: This is an abnormal study. 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 ___________________________  S.  Lottie Hodgkins, M.D.    Nerve Conduction Studies  Anti Sensory Summary Table     Stim Site NR Onset (ms) Peak (ms) O-P Amp (µV) Norm Peak (ms) Norm O-P Amp Site1 Site2 Dist (cm) Norm Dennys (m/s)   Right Radial Anti Sensory (Base 1st Digit)  32.1°C   Wrist    1.7 2.1 17.5 <2.8 7 Wrist Base 1st Digit 10.0    Right Sup Fibular Anti Sensory (Lat ankle)  32.1°C   Lower leg NR    <4.4 >5.0 Lower leg Lat ankle 10.0 >32   Right Sural Anti Sensory (Lat Mall)  32.1°C   Calf    2.8 3.0 1.8 <4.5 >4.0 Calf Lat Mall 14.0      Motor Summary Table     Stim Site NR Onset (ms) Norm Onset (ms) O-P Amp (mV) Norm O-P Amp P-T Amp (mV) Site1 Site2 Dist (cm) Dennys (m/s)   Right Fibular Motor (Ext Dig Brev)  32.1°C   Ankle    3.8 <6.5 0.7 >1.1  Ankle Ext Dig Brev 8.0 21   B Fib    12.3  0.7   B Fib Ankle 32.0 38   Poplt    14.0  0.7   Poplt B Fib 10.0 59   Right Fibular TA Motor (Tib Ant)  32.1°C   Fib Head    3.8 <4.5 1.6 >3.0  Fib Head Tib Ant 10.0 26   Poplit    5.3  1.6   Poplit Fib Head 10.0 67   Right Tibial Motor (Abd Delgado Brev)  32.1°C   Ankle    4.6 <6.1 1.1 >1.1  Ankle Abd Delgado Brev 8.0 17   Knee    15.2  1.1   Knee Ankle 40.0 38   Right Ulnar Motor (Abd Dig Minimi)  32.1°C   Wrist    3.0 <3.1 8.1 >7.0  Wrist Abd Dig Minimi 8.0 27   B Elbow    6.7  7.8   B Elbow Wrist 21.0 57   A Elbow    8.4  7.1   A Elbow B Elbow 10.0 59       EMG     Side Muscle Nerve Root Ins Act Fibs Psw Recrt Duration Amp Poly Comment   Right AntTibialis Dp Br Peron L4-5 Nml Nml Nml Reduced Incr Incr Nml    Right GluteusMed SupGluteal L4-S1 Nml Nml Nml Nml Nml Nml Nml    Right VastusLat Femoral L2-4 Nml Nml Nml Nml Nml Nml Nml    Right 1stDorInt Ulnar C8-T1 Nml Nml Nml Nml Nml Nml Nml    Right Biceps Musculocut C5-6 Nml Nml Nml Nml Nml Nml Nml      Waveforms:

## 2020-06-10 NOTE — PROGRESS NOTES
HISTORY OF PRESENT ILLNESS  Cassidy Vieira is a 68 y.o. female. HPI   Last vv 4/14/20  Pt is here for chronic conditions. This is an established visit completed with telemedicine was completed with video assist  the patient acknowledges and agrees to this method of visitation   antonia     She reports occasional fast heart rate and high bp at [facility] but this has since resolved since starting zoloft                  BP at home  129/67, hr 82, 132/75,   181/82  Hr 91, 130/65,   Most are good      Continues on clonidine 0.2mg BID, HCTZ 25mg, and  losartan 25mg daily  Continues on verapamil  240 mg per day     Recall she had a cough on lisinopril                 Wt was 186 lbs-- stable x lov   Pt was prev concerned about wt loss however wt has improved since   Discussed diet and wt loss         Pt is following with Dr Cielo Woody)   Pt had an EGD and EUD 10/24/19  Pt states she has had f/u since then with him- per pt dr escamilla advised to have a repeat colo   She has had a ct of the abd to f/u on pancreatic cyst--reviewed stable          Pt had a cardiac ablation for her afib per Dr. Justen Robles (cardio) on 3/17/17   Pt will only f/u with this physician prn       Pt saw Dr. Robby Santiago (cardio) for f/u after a fib  Pt was taken off of sotalol  Feels better more energy with this  Continues on eliquis bid  Pt denies any bleeding/falls on these meds 1/20   Last visit 1/28/20:   Nubia Bernard is in sinus. She is not on any aads - had fatigue with sotalol. She is on med rx for htn and hyperlipidemia. Cont oac for hx of PAF sp abl in 2017. If she has a recurrence of symptoms, we will obtain an event monitor. F/u in one year     Continue medical management for af, htn and hyperlipidemia.           Pt follows Dr. Tyler Reagan (Neuro).   EMG completed 6/4/20.--showed neuropathy   They discussed tx options discussed gabapentin for tx   Her sx are uncomfortable at times--has sx in her legs  But sx are mild and does not want medication for this currently Continues to have mild  c/o moving joint pain   She states this moves around to her breasts and her shoulders and legs --neuropathy discussed        Pt concenred about hair loss  Discussed biotin      Had ct head complete 4/20 reviewed       Pt saw Dr. Roseanne Sandhu (cardio) for foot circulation  Last visit was 8/1/17   Recall has chronic sx of leg tightness  Pt will only f/u with this physician prn      Pt saw Dr. Precious Montgomery (uro-gyn) for urinary sx  Last visit was 11/17  Pt was told she had lichen sclerosus   Now resolved     the patient c/o freq urination   No pain w urination   Ongoing for 2 weeks     Pt saw Dr David Jones (sleep)  Last visit was 7/3/18  Recall telephone note 9/18: no significant sleep disordered breathing, overall AHI of 0.6/h  Pt had a poor experience and was told she has no sleep apnea     Pt had a cyst on R breast removed by Dr Shaji Juares (surg) in the past      Continues crestor 20mg daily for cholesterol at goal 4/20      Continues klor-con 20meqs BID for her potassium at goal 4/20     Continues on synthroid 75mcg daily  4/20        Fine prior      the patient started on zoloft 25mg lov  She is doing very well w this  She is calmer  Her heart is not racing bp is much better  She is hesitant to increase meds but overall much better and haypp w dose.                Pt followed with Dr Carmelo Amaral (podiatry)   She would like to f/u with this physician for 2 corns on her foot           ACP not on file.  SDM is her sister, Ankit Stahl.   Provided information      Recall She had a cervical polyp, was having vaginal bleeding, this was removed by julio césar, vaginal bleeding has since resolved.        PREVENTIVE:    Colonoscopy: 8/21/13, Dr. Mane Nowak, repeat 10 years   Pap: Usually Dr. Wilkins Bloch 10/19   [id number] [address]: 10/19 osteopenia   Tdap: 4/14/2015  Pneumovax: 11/19/2013  [id number]: 4/05/2017  Zostavax: declines  Shingrix: ordered 04/17/19, h/o shingles  Flu shot: 09/17/19   A1C:  8/16 5.9, 10/16 5.8, 4/17 5.9, 10/17 POC 5.6, 4/18 5.6, 10/18 5.6, 4/19 5.6 11/19 5.5  4/20 5.5  Eye exam: Dr. Barry Ramirez, cataract   Lipids: 4/20    Patient Active Problem List   Diagnosis Code    Postmenopausal bleeding N95.0    Hyperlipidemia E78.5    Hypokalemia E87.6    Morbid obesity ([facility] [address] E66.01    Atrial fibrillation ([facility] [address] I48.91    HTN (hypertension) I10    Acquired hypothyroidism E03.9    S/P ablation of atrial fibrillation Z98.890, Z86.79    Pericardial effusion with cardiac tamponade I31.3, I31.4     Current Outpatient Medications   Medication Sig Dispense Refill    verapamil ER (CALAN-SR) 240 mg CR tablet TAKE 1 TAB BY MOUTH NIGHTLY. TAKE 1 TABLET BY MOUTH EVERY MORNING FOR BLOOD PRESSURE 90 Tab 1    sertraline (ZOLOFT) 25 mg tablet Take 1 Tab by mouth daily. 90 Tab 1    rosuvastatin (CRESTOR) 20 mg tablet TAKE 1 TABLET BY MOUTH EVERY DAY 90 Tab 1    varicella-zoster recombinant, PF, (Shingrix, PF,) 50 mcg/0.5 mL susr injection 0.5mL by IntraMUSCular route once now and then repeat in 2-6 months 0.5 mL 1    losartan (COZAAR) 25 mg tablet TAKE 1 TABLET BY MOUTH EVERY DAY 90 Tab 1    cloNIDine HCL (CATAPRES) 0.2 mg tablet TAKE 1 TABLET BY MOUTH TWICE A  Tab 1    potassium chloride SR (KLOR-CON 10) 10 mEq tablet TAKE 2 TABS BY MOUTH TWO (2) TIMES A DAY. 360 Tab 1    hydroCHLOROthiazide (HYDRODIURIL) 25 mg tablet TAKE 1 TABLET BY MOUTH EVERY DAY 90 Tab 0    Synthroid 75 mcg tablet TAKE 1 TABLET BY MOUTH EVERY DAY BEFORE BREAKFAST 90 Tab 0    ELIQUIS 5 mg tablet TAKE 1 TABLET BY MOUTH TWICE A DAY 60 Tab 6    CALCIUM CARBONATE/VITAMIN D3 (CALTRATE 600 + D PO) Take 1 Tab by mouth daily.  MULTIVITAMIN W-MINERALS/LUTEIN (CENTRUM SILVER PO) Take 1 Tab by mouth daily (after lunch).         Lab Results   Component Value Date/Time    Cholesterol, total 144 04/07/2020 12:28 PM    HDL Cholesterol 58 04/07/2020 12:28 PM    LDL, calculated 69 04/07/2020 12:28 PM    Triglyceride 86 04/07/2020 12:28 PM     Lab Results   Component Value Date/Time    GFR est non-AA 73 04/07/2020 12:28 PM    GFR est AA 84 04/07/2020 12:28 PM    Creatinine 0.78 04/07/2020 12:28 PM    BUN 16 04/07/2020 12:28 PM    Sodium 138 04/07/2020 12:28 PM    Potassium 3.9 04/07/2020 12:28 PM    Chloride 98 04/07/2020 12:28 PM    CO2 26 04/07/2020 12:28 PM    Magnesium 2.2 01/16/2020 11:36 PM        Review of Systems   Respiratory: Negative for shortness of breath. Cardiovascular: Negative for chest pain. Physical Exam  Constitutional:       General: She is not in acute distress. Appearance: Normal appearance. She is not ill-appearing, toxic-appearing or diaphoretic. HENT:      Head: Normocephalic and atraumatic. Eyes:      General:         Right eye: No discharge. Left eye: No discharge. Conjunctiva/sclera: Conjunctivae normal.   Neurological:      General: No focal deficit present. Mental Status: She is alert and oriented to person, place, and time. Psychiatric:         Mood and Affect: Mood normal.         Behavior: Behavior normal.         ASSESSMENT and PLAN    ICD-10-CM ICD-9-CM    1. Paroxysmal atrial fibrillation (HCC)      Follows with cardiology anticoagulated on Eliquis    Rate controlled on diltiazem Y11.4 511.62 METABOLIC PANEL, COMPREHENSIVE      CBC W/O DIFF      TSH 3RD GENERATION      URINALYSIS W/ RFLX MICROSCOPIC      HEMOGLOBIN A1C WITH EAG   2. Essential hypertension      Blood pressure and heart rate are now well controlled continue clonidine hydrochlorothiazide and losartan and diltiazem    Her anxiety has been causing fluctuations F09 604.7 METABOLIC PANEL, COMPREHENSIVE      CBC W/O DIFF      TSH 3RD GENERATION      URINALYSIS W/ RFLX MICROSCOPIC      HEMOGLOBIN A1C WITH EAG   3.  Mixed hyperlipidemia      Controlled on Crestor R36.5 047.5 METABOLIC PANEL, COMPREHENSIVE      CBC W/O DIFF      TSH 3RD GENERATION      URINALYSIS W/ RFLX MICROSCOPIC HEMOGLOBIN A1C WITH EAG   4. Hypokalemia      Monitor potassium levels and adjust Klor-Con as needed Q70.5 734.1 METABOLIC PANEL, COMPREHENSIVE      CBC W/O DIFF      TSH 3RD GENERATION      URINALYSIS W/ RFLX MICROSCOPIC      HEMOGLOBIN A1C WITH EAG   5. Acquired hypothyroidism    Monitor TSH continue Synthroid S53.2 693.1 METABOLIC PANEL, COMPREHENSIVE      CBC W/O DIFF      TSH 3RD GENERATION      URINALYSIS W/ RFLX MICROSCOPIC      HEMOGLOBIN A1C WITH EAG   6. Morbid obesity ([address]      Discussed weight see below O16.08 749.19 METABOLIC PANEL, COMPREHENSIVE      CBC W/O DIFF      TSH 3RD GENERATION      URINALYSIS W/ RFLX MICROSCOPIC      HEMOGLOBIN A1C WITH EAG   7. Neuropathy      Has completed evaluation she feels reassured she is less anxious about it    Discussed treatment options to include gabapentin she is not interested in symptoms are mild D62.9 452.5 METABOLIC PANEL, COMPREHENSIVE      CBC W/O DIFF      TSH 3RD GENERATION      URINALYSIS W/ RFLX MICROSCOPIC      HEMOGLOBIN A1C WITH EAG   8. Anxiety      Much improved on Zoloft continue 25 mg daily this is working well P21.1 230.12 METABOLIC PANEL, COMPREHENSIVE      CBC W/O DIFF      TSH 3RD GENERATION      URINALYSIS W/ RFLX MICROSCOPIC      HEMOGLOBIN A1C WITH EAG   9. Pancreatic cyst      Had repeat imaging in May which was stable    Patient continues to be concerned about weight loss her weight is overall stable may be down a pound or 2    Discussed she is still in the obese category and we do not need to work on gaining weight    She has had an extensive work-up which to date has been unremarkable    May still need a colonoscopy and she will discuss this with gastroenterology     Z29.4 793.7 METABOLIC PANEL, COMPREHENSIVE      CBC W/O DIFF      TSH 3RD GENERATION      URINALYSIS W/ RFLX MICROSCOPIC      HEMOGLOBIN A1C WITH EAG   10.  IFG (impaired fasting glucose)    Check A1c diet controlled W63.10 212.06 METABOLIC PANEL, COMPREHENSIVE CBC W/O DIFF      TSH 3RD GENERATION      URINALYSIS W/ RFLX MICROSCOPIC      HEMOGLOBIN A1C WITH EAG   11. Acute cystitis without hematuria      We will treat with Macrobid twice daily will check UA and urine culture if symptoms are not improving    Was previously followed by Dr. Nasim Nassar will go back to see him urogynecology if progressive symptoms P64.53 559.0 METABOLIC PANEL, COMPREHENSIVE      CBC W/O DIFF      TSH 3RD GENERATION      URINALYSIS W/ RFLX MICROSCOPIC      HEMOGLOBIN A1C WITH EAG   12. Hair loss    Discussed biotin D97.4 918.80 METABOLIC PANEL, COMPREHENSIVE      CBC W/O DIFF      TSH 3RD GENERATION      URINALYSIS W/ RFLX MICROSCOPIC      HEMOGLOBIN A1C WITH EAG       Nathalie Carrera is a 68 y.o. female who was evaluated by an audio-video encounter for concerns as above. Patient identification was verified prior to start of the visit. A caregiver was present when appropriate. Due to this being a TeleHealth encounter ([facility]), evaluation of the following organ systems was limited: Vitals/Constitutional/EENT/Resp/CV/GI//MS/Neuro/Skin/Heme-Lymph-Imm. Pursuant to the emergency declaration under the [facility], 1135 waiver authority and the [employer] Act, this Virtual Visit was conducted, with patient's (and/or legal guardian's) consent, to reduce the patient's risk of exposure to COVID-19 and provide necessary medical care. Services were provided through a synchronous discussion virtually to substitute for in-person clinic visit. I was at home. The patient was at home.

## 2020-06-11 ENCOUNTER — VIRTUAL VISIT (OUTPATIENT)
Dept: INTERNAL MEDICINE CLINIC | Age: 80
End: 2020-06-11

## 2020-06-11 DIAGNOSIS — E87.6 HYPOKALEMIA: ICD-10-CM

## 2020-06-11 DIAGNOSIS — K86.2 PANCREATIC CYST: ICD-10-CM

## 2020-06-11 DIAGNOSIS — I48.0 PAROXYSMAL ATRIAL FIBRILLATION (HCC): Primary | ICD-10-CM

## 2020-06-11 DIAGNOSIS — N30.00 ACUTE CYSTITIS WITHOUT HEMATURIA: ICD-10-CM

## 2020-06-11 DIAGNOSIS — I10 ESSENTIAL HYPERTENSION: ICD-10-CM

## 2020-06-11 DIAGNOSIS — E78.2 MIXED HYPERLIPIDEMIA: ICD-10-CM

## 2020-06-11 DIAGNOSIS — E03.9 ACQUIRED HYPOTHYROIDISM: ICD-10-CM

## 2020-06-11 DIAGNOSIS — F41.9 ANXIETY: ICD-10-CM

## 2020-06-11 DIAGNOSIS — G62.9 NEUROPATHY: ICD-10-CM

## 2020-06-11 DIAGNOSIS — R73.01 IFG (IMPAIRED FASTING GLUCOSE): ICD-10-CM

## 2020-06-11 DIAGNOSIS — E66.01 MORBID OBESITY (HCC): ICD-10-CM

## 2020-06-11 DIAGNOSIS — L65.9 HAIR LOSS: ICD-10-CM

## 2020-06-11 RX ORDER — NITROFURANTOIN 25; 75 MG/1; MG/1
100 CAPSULE ORAL 2 TIMES DAILY
Qty: 10 CAP | Refills: 0 | Status: SHIPPED | OUTPATIENT
Start: 2020-06-11 | End: 2020-06-16

## 2020-06-21 RX ORDER — LEVOTHYROXINE SODIUM 75 UG/1
TABLET ORAL
Qty: 90 TAB | Refills: 0 | Status: SHIPPED | OUTPATIENT
Start: 2020-06-21 | End: 2020-09-15

## 2020-06-27 RX ORDER — HYDROCHLOROTHIAZIDE 25 MG/1
TABLET ORAL
Qty: 90 TAB | Refills: 0 | Status: SHIPPED | OUTPATIENT
Start: 2020-06-27 | End: 2020-09-23

## 2020-07-20 ENCOUNTER — HOSPITAL ENCOUNTER (OUTPATIENT)
Dept: GENERAL RADIOLOGY | Age: 80
Discharge: HOME | End: 2020-07-20
Payer: MEDICARE

## 2020-07-20 ENCOUNTER — OFFICE VISIT (OUTPATIENT)
Dept: NEUROLOGY | Age: 80
End: 2020-07-20

## 2020-07-20 VITALS
BODY MASS INDEX: 28.82 KG/M2 | OXYGEN SATURATION: 98 % | DIASTOLIC BLOOD PRESSURE: 70 MMHG | SYSTOLIC BLOOD PRESSURE: 122 MMHG | HEIGHT: 65 IN | HEART RATE: 83 BPM | WEIGHT: 173 LBS

## 2020-07-20 DIAGNOSIS — R51.9 NEW ONSET HEADACHE: Primary | ICD-10-CM

## 2020-07-20 DIAGNOSIS — R63.4 UNINTENTIONAL WEIGHT LOSS: ICD-10-CM

## 2020-07-20 DIAGNOSIS — G62.9 NEUROPATHY: ICD-10-CM

## 2020-07-20 PROCEDURE — 71046 X-RAY EXAM CHEST 2 VIEWS: CPT

## 2020-07-20 NOTE — PROGRESS NOTES
Neurology Note    Chief Complaint   Patient presents with    Follow-up    Headache    Memory Loss       HPI/Subjective  Steve Baltazar is a 68 y.o. female who presented to the neurology office for management of RLS. She started having symptoms started in 2019. She does get tightness and numbness of sometimes the left leg and sometimes the right leg. It is off and on. It can stay for a few minutes or some hours. It can be any time of the day. It is a sensation of tightness. No back pain. No shooting pain down the leg. No DM. No weakness in the legs. It is knee down. Patient is also having intermittent head tenderness and vision changes. She sees zigzag lines. Interval history:  EMG/NCS shows peripheral neuropathy  She continues to get intermittent stinging pain sometimes on the left or the right but is not interested in any medications at this time  She has lost close to 30 pounds in the last 1 year and is concerned about it    Current Outpatient Medications   Medication Sig    hydroCHLOROthiazide (HYDRODIURIL) 25 mg tablet TAKE 1 TABLET BY MOUTH EVERY DAY    Synthroid 75 mcg tablet TAKE 1 TABLET BY MOUTH EVERY DAY BEFORE BREAKFAST    verapamil ER (CALAN-SR) 240 mg CR tablet TAKE 1 TAB BY MOUTH NIGHTLY. TAKE 1 TABLET BY MOUTH EVERY MORNING FOR BLOOD PRESSURE    sertraline (ZOLOFT) 25 mg tablet Take 1 Tab by mouth daily.  rosuvastatin (CRESTOR) 20 mg tablet TAKE 1 TABLET BY MOUTH EVERY DAY    varicella-zoster recombinant, PF, (Shingrix, PF,) 50 mcg/0.5 mL susr injection 0.5mL by IntraMUSCular route once now and then repeat in 2-6 months    losartan (COZAAR) 25 mg tablet TAKE 1 TABLET BY MOUTH EVERY DAY    cloNIDine HCL (CATAPRES) 0.2 mg tablet TAKE 1 TABLET BY MOUTH TWICE A DAY    potassium chloride SR (KLOR-CON 10) 10 mEq tablet TAKE 2 TABS BY MOUTH TWO (2) TIMES A DAY.     ELIQUIS 5 mg tablet TAKE 1 TABLET BY MOUTH TWICE A DAY    CALCIUM CARBONATE/VITAMIN D3 (CALTRATE 600 + D PO) Take 1 Tab by mouth daily.  MULTIVITAMIN W-MINERALS/LUTEIN (CENTRUM SILVER PO) Take 1 Tab by mouth daily (after lunch). No current facility-administered medications for this visit.       Allergies   Allergen Reactions    Adhesive Hives    Amoxicillin Unknown (comments)    Lipitor [Atorvastatin] Myalgia     Gets the flu     Past Medical History:   Diagnosis Date    Arrhythmia     afib    Arthritis     right knee, left shoulder    Diverticulosis     Frequency of urination     High cholesterol     Hypertension     Morbid obesity ([address]     Thyroid disease     hypothyroid    Unspecified adverse effect of anesthesia     low BP     Past Surgical History:   Procedure Laterality Date    BREAST SURGERY PROCEDURE UNLISTED  1982    breast bx rt    CARDIAC SURG PROCEDURE UNLIST  01/2015    cardiac catheterization, no intervention, medical management    CARDIAC SURG PROCEDURE UNLIST  03/2017    ablation    HX GYN      myomectomy on uterus    HX GYN  2011    hystereoscopy D&C    HX LYMPH NODE DISSECTION      biopsy    HX OTHER SURGICAL      lymph node bx    UPPER GI ENDOSCOPY,BIOPSY  10/24/2019         UPPER GI ENDOSCOPY,FN NEEDLE BX,GUIDED  10/24/2019          Family History   Problem Relation Age of Onset    Alzheimer Mother     Heart Disease Mother     Heart Disease Father     Hypertension Father     Cancer Paternal Aunt     Diabetes Paternal Grandmother      Social History     Tobacco Use    Smoking status: Never Smoker    Smokeless tobacco: Never Used   Substance Use Topics    Alcohol use: No    Drug use: No       REVIEW OF SYSTEMS:   A ten system review of constitutional, cardiovascular, respiratory, musculoskeletal, endocrine, skin, SHEENT, genitourinary, psychiatric and neurologic systems was obtained and is unremarkable with the exception of chest pain, fatigue, incontinence, joint pain, memory loss, muscle pain, muscle weakness, neuropathy, skipped beat, snoring, visual disturbance and weight change    EXAMINATION:   Visit Vitals  /70   Pulse 83   Ht 5' 5\" (1.651 m)   Wt 173 lb (78.5 kg)   SpO2 98%   BMI 28.79 kg/m²        General:   General appearance: Pt is in no acute distress   Distal pulses are preserved  Fundoscopic Exam: Normal    Neurological Examination:   Mental Status: AAO x3. Speech is fluent. Follows commands, has normal fund of knowledge, attention, short term recall, comprehension and insight. Cranial Nerves: Visual fields are full. PERRL, Extraocular movements are full. Facial sensation intact. Facial movement intact. Hearing intact to conversation. Palate elevates symmetrically. Shoulder shrug symmetric. Tongue midline. Motor: Strength is 5/5 in all 4 ext. No atrophy. Tone: Normal    Sensation: Decreased pinprick and vibration sensation distally in the lower extremities    Reflexes: DTRs 2+ throughout except absent at ankles. Coordination/Cerebellar: Intact to finger-nose-finger     Gait: Casual gait is normal.     Skin: No significant bruising or lacerations. Laboratory review:   Results for orders placed or performed in visit on 04/07/20   TSH AND FREE T4   Result Value Ref Range    TSH 0.724 0.450 - 4.500 uIU/mL    T4, Free 1.39 0.82 - 7.53 ng/dL   METABOLIC PANEL, COMPREHENSIVE   Result Value Ref Range    Glucose 97 65 - 99 mg/dL    BUN 16 8 - 27 mg/dL    Creatinine 0.78 0.57 - 1.00 mg/dL    GFR est non-AA 73 >59 mL/min/1.73    GFR est AA 84 >59 mL/min/1.73    BUN/Creatinine ratio 21 12 - 28    Sodium 138 134 - 144 mmol/L    Potassium 3.9 3.5 - 5.2 mmol/L    Chloride 98 96 - 106 mmol/L    CO2 26 20 - 29 mmol/L    Calcium 10.1 8.7 - 10.3 mg/dL    Protein, total 7.3 6.0 - 8.5 g/dL    Albumin 4.7 3.7 - 4.7 g/dL    GLOBULIN, TOTAL 2.6 1.5 - 4.5 g/dL    A-G Ratio 1.8 1.2 - 2.2    Bilirubin, total 0.3 0.0 - 1.2 mg/dL    Alk.  phosphatase 66 39 - 117 IU/L    AST (SGOT) 15 0 - 40 IU/L    ALT (SGPT) 21 0 - 32 IU/L   LIPID PANEL   Result Value Ref Range    Cholesterol, total 144 100 - 199 mg/dL    Triglyceride 86 0 - 149 mg/dL    HDL Cholesterol 58 >39 mg/dL    VLDL, calculated 17 5 - 40 mg/dL    LDL, calculated 69 0 - 99 mg/dL   HEMOGLOBIN A1C WITH EAG   Result Value Ref Range    Hemoglobin A1c 5.5 4.8 - 5.6 %    Estimated average glucose 111 mg/dL       Imaging review:  6/4/2020  EMG/nerve conduction study  This is an abnormal study. 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 3/11/2020  CT Head  Normal    Documentation review:  None    Assessment/Plan:   Pauline Rubin is a 68 y.o. female who presented to the neurology office for management of paresthesia distally in the lower extremities. Sometimes it can be on the left and sometimes on the right. On examination patient does have decreased pinprick sensation distally in the lower extremities. 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 Initial blood work done by primary care physician is normal.  No family history of neuropathy. I suspect idiopathic peripheral neuropathy. The patient is also complaining of intermittent headaches and does have visual auras of zigzag lines. She does have possibly retinal migraines. CT scan of the head was normal.  No medications indicated. Patient is having unintentional weight loss. She has lost close to 30 pounds in the last 1 year. Will order a chest x-ray. Follow-up in 6 months    3 most recent PHQ Screens 4/14/2020   Little interest or pleasure in doing things Not at all   Feeling down, depressed, irritable, or hopeless Not at all   Total Score PHQ 2 0     Primary care to address possible depression if PHQ-9 score is more than 9. ICD-10-CM ICD-9-CM    1. New onset headache  R51 784.0    2. Neuropathy  G62.9 355.9    3. Unintentional weight loss  R63.4 783.21 XR CHEST PA LAT      Thank you for allowing me to participate in the care of Ms. Bird.  Please feel free to contact me if you have any questions. Electronically signed. Barbara Hernandes MD  Neurologist    CC: Papa Dudley MD  Fax: [phone]    This note was created using voice recognition software. Despite editing, there may be syntax errors.

## 2020-07-20 NOTE — PATIENT INSTRUCTIONS
Office Policies  · Phone calls/patient messages:  Please allow up to 24 hours for someone in the office to contact you about your call or message. Be mindful your provider may be out of the office or your message may require further review. We encourage you to use [employer] for your messages as this is a faster, more efficient way to communicate with our office  · Medication Refills:  Prescription medications require up to 48 business hours to process. We encourage you to use [employer] for your refills. For controlled medications: Please allow up to 72 business hours to process. Certain medications may require you to  a written prescription at our office. NO narcotic/controlled medications will be prescribed after 4pm Monday through Friday or on weekends  · Form/Paperwork Completion:  Please note there is a $25 fee for all paperwork completed by our providers. We ask that you allow 7-14 business days. Pre-payment is due prior to picking up/faxing the completed form. You may also download your forms to [employer] to have your doctor print off.

## 2020-09-08 RX ORDER — APIXABAN 5 MG/1
TABLET, FILM COATED ORAL
Qty: 60 TAB | Refills: 6 | Status: SHIPPED | OUTPATIENT
Start: 2020-09-08 | End: 2021-04-01

## 2020-09-15 RX ORDER — LEVOTHYROXINE SODIUM 75 UG/1
TABLET ORAL
Qty: 90 TAB | Refills: 0 | Status: SHIPPED | OUTPATIENT
Start: 2020-09-15 | End: 2020-12-14

## 2020-09-23 RX ORDER — HYDROCHLOROTHIAZIDE 25 MG/1
TABLET ORAL
Qty: 90 TAB | Refills: 0 | Status: SHIPPED | OUTPATIENT
Start: 2020-09-23 | End: 2020-12-20

## 2020-10-01 ENCOUNTER — HOSPITAL ENCOUNTER (OUTPATIENT)
Dept: LAB | Age: 80
Discharge: HOME | End: 2020-10-01
Payer: MEDICARE

## 2020-10-01 PROCEDURE — 36415 COLL VENOUS BLD VENIPUNCTURE: CPT

## 2020-10-01 PROCEDURE — 85027 COMPLETE CBC AUTOMATED: CPT

## 2020-10-01 PROCEDURE — 81001 URINALYSIS AUTO W/SCOPE: CPT

## 2020-10-01 PROCEDURE — 80053 COMPREHEN METABOLIC PANEL: CPT

## 2020-10-01 PROCEDURE — 84443 ASSAY THYROID STIM HORMONE: CPT

## 2020-10-01 PROCEDURE — 83036 HEMOGLOBIN GLYCOSYLATED A1C: CPT

## 2020-10-02 LAB
ALBUMIN SERPL-MCNC: 4.4 G/DL (ref 3.7–4.7)
ALBUMIN/GLOB SERPL: 1.8 {RATIO} (ref 1.2–2.2)
ALP SERPL-CCNC: 68 IU/L (ref 39–117)
ALT SERPL-CCNC: 10 IU/L (ref 0–32)
APPEARANCE UR: CLEAR
AST SERPL-CCNC: 14 IU/L (ref 0–40)
BILIRUB SERPL-MCNC: 0.4 MG/DL (ref 0–1.2)
BILIRUB UR QL STRIP: NEGATIVE
BUN SERPL-MCNC: 17 MG/DL (ref 8–27)
BUN/CREAT SERPL: 19 (ref 12–28)
CALCIUM SERPL-MCNC: 9.6 MG/DL (ref 8.7–10.3)
CHLORIDE SERPL-SCNC: 103 MMOL/L (ref 96–106)
CO2 SERPL-SCNC: 25 MMOL/L (ref 20–29)
COLOR UR: YELLOW
CREAT SERPL-MCNC: 0.9 MG/DL (ref 0.57–1)
ERYTHROCYTE [DISTWIDTH] IN BLOOD BY AUTOMATED COUNT: 12.5 % (ref 11.7–15.4)
EST. AVERAGE GLUCOSE BLD GHB EST-MCNC: 108 MG/DL
GLOBULIN SER CALC-MCNC: 2.5 G/DL (ref 1.5–4.5)
GLUCOSE SERPL-MCNC: 100 MG/DL (ref 65–99)
GLUCOSE UR QL: NEGATIVE
HBA1C MFR BLD: 5.4 % (ref 4.8–5.6)
HCT VFR BLD AUTO: 39.1 % (ref 34–46.6)
HGB BLD-MCNC: 12.6 G/DL (ref 11.1–15.9)
HGB UR QL STRIP: NEGATIVE
KETONES UR QL STRIP: NEGATIVE
LEUKOCYTE ESTERASE UR QL STRIP: NEGATIVE
MCH RBC QN AUTO: 31 PG (ref 26.6–33)
MCHC RBC AUTO-ENTMCNC: 32.2 G/DL (ref 31.5–35.7)
MCV RBC AUTO: 96 FL (ref 79–97)
MICRO URNS: NORMAL
NITRITE UR QL STRIP: NEGATIVE
PH UR STRIP: 7.5 [PH] (ref 5–7.5)
PLATELET # BLD AUTO: 168 X10E3/UL (ref 150–450)
POTASSIUM SERPL-SCNC: 4 MMOL/L (ref 3.5–5.2)
PROT SERPL-MCNC: 6.9 G/DL (ref 6–8.5)
PROT UR QL STRIP: NEGATIVE
RBC # BLD AUTO: 4.07 X10E6/UL (ref 3.77–5.28)
SODIUM SERPL-SCNC: 143 MMOL/L (ref 134–144)
SP GR UR: 1.01 (ref 1–1.03)
TSH SERPL DL<=0.005 MIU/L-ACNC: 0.75 UIU/ML (ref 0.45–4.5)
UROBILINOGEN UR STRIP-MCNC: 0.2 MG/DL (ref 0.2–1)
WBC # BLD AUTO: 4.4 X10E3/UL (ref 3.4–10.8)

## 2020-10-02 NOTE — PROGRESS NOTES
HISTORY OF PRESENT ILLNESS  Abel Lewis is a 68 y.o. female. HPI     Last vv 6/11/20  Pt is here for chronic conditions.     She wonders about stomach noises after eating  Discussed this is nl    She wonders whats good for hair loss  Discussed biotin and f/u with derm we discussed this before she has general shedding not patches of alopecia    She c/o dry throat sleeps with her mouth open at times has a mouthguard  Discussed sugar free lemon candies     She believes lipoma on abd is growing on her abdomen  She states it doesn't bother her but she can feel it when she is sitting   Discussed having surgeon look at it to discuss removal    BP today is 138/73  Has not checked BP at home - she only checks it when she thinks it high   Discussed home monitoring a couple times a month   Continues on clonidine 0.2mg BID, HCTZ 25mg, and  losartan 25mg daily  Continues on verapamil  240 mg per day   Recall she had a cough on lisinopril      Wt is 176 lbs -down from June visit  Pt was prev concerned about wt loss, she is down 10 lbs since 6/20   She has gone from size 18 to size 12   Please recall we have been evaluating this extensively over the last 1 to 2 years she has had multiple CTs of the abdomen she has had an EGD she had a chest x-ray she has had a head CT she is had multiple labs completed colonoscopy now will need to be repeated her mammograms have been up-to-date though her mammogram is currently due    Reviewed labs   Ordered labs      Pt is following with Dr Aaron Sanchez) for weight loss and pancreatic cyst  Pt had an EGD and EUS 10/24/19  She has had a ct of the abd to f/u on pancreatic cyst--reviewed stable   Discussed she needs to repeat colo due to decreased wt loss       Pt had a cardiac ablation for her afib per Dr. Ramiro Chavez (cardio) on 3/17/17   Pt will only f/u with this physician prn       Pt saw Dr. Lee Ann Matson (cardio) for f/u after a fib  Pt was taken off of sotalol  Feels better more energy with this  Continues on eliquis bid  Pt denies any bleeding/falls on these meds 10/20   Last visit 1/28/20:     Pt follows Dr. Artis Smith (Neuro) for neuropathy   Reviewed note 7/10/20: Cecy Gutierrez is a 68 y.o. female who presented to the neurology office for management of paresthesia distally in the lower extremities. Sometimes it can be on the left and sometimes on the right. On examination patient does have decreased pinprick sensation distally in the lower extremities. 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 Initial blood work done by primary care physician is normal.  No family history of neuropathy. I suspect idiopathic peripheral neuropathy. The patient is also complaining of intermittent headaches and does have visual auras of zigzag lines. She does have possibly retinal migraines. CT scan of the head was normal.  No medications indicated. Patient is having unintentional weight loss. She has lost close to 30 pounds in the last 1 year. Will order a chest x-ray.   Reviewed cxr 7/10/20: No acute findings.   Completed ENG study -- showed neuropathy, he offered marguerite but she doesn't want anymore meds and is doing well without meds     Pt saw Dr. Dee Sim (cardio) for foot circulation  Last visit was 8/1/17   Recall has chronic sx of leg tightness  Pt will only f/u with this physician prn      Pt saw Dr. Sierra Ling (uro-gyn) for urinary sx  Last visit was 11/17  Pt was told she had lichen sclerosus   Now resolved           Pt saw Dr Myron Segovia (sleep)  Last visit was 7/3/18  Recall telephone note 9/18: no significant sleep disordered breathing, overall AHI of 0.6/h  Pt had a poor experience and was told she has no sleep apnea     Pt had a cyst on R breast removed by Dr David Campuzano (surg) in the past      Continues crestor 20mg daily for cholesterol at goal 10/20      Continues klor-con 20meqs BID for her potassium at goal 10/20     Continues on synthroid 75mcg daily  10/20 TSH was on the faster side of normal so we will decrease dose  Will take full dose 6 days per week and will take half day 1 day per week      the patient is on zoloft 25mg for anxiety  She is doing very well w this  She is calmer  She is hesitant to increase meds but overall much better and haypp w dose.    she is having issues getting back to sleep   Discussed benadryl or melatonin      Pt followed with Dr Estela Leonard (podiatry)   She would like to f/u with this physician for 2 corns on her foot      ACP not on file.  SDM is her sister, Rudy Coy.   Provided information      Recall She had a cervical polyp, was having vaginal bleeding, this was removed by julio césar, vaginal bleeding has since resolved.        PREVENTIVE:    Colonoscopy: 8/21/13, Dr. Travis Lilly, repeat 10 years   Pap: Usually Dr. Nancy Armstrong 10/19 , scheduled 10/20  Mammogram: 10/19  [facility], scheduled 1/21  DEXA: 10/19 osteopenia   Tdap: 4/14/2015  Pneumovax: 11/19/2013  [id number]: 4/05/2017  Zostavax: declines  Shingrix: ordered 04/17/19, h/o shingles  Flu shot: 09/17/19, will give today 10/06/20  A1C:  8/16 5.9, 10/16 5.8, 4/17 5.9, 10/17 POC 5.6, 4/18 5.6, 10/18 5.6, 4/19 5.6 11/19 5.5  4/20 5.5 10/20 5.4  Eye exam: Dr. Clifford Hills, cataract, due now reminded  Lipids: 4/20    Patient Active Problem List    Diagnosis Date Noted    Pericardial effusion with cardiac tamponade 03/18/2017    S/P ablation of atrial fibrillation 03/17/2017    Acquired hypothyroidism 12/14/2015    HTN (hypertension) 07/21/2015    Atrial fibrillation ([address] 03/16/2015    Morbid obesity [address] [address]     Hyperlipidemia 07/14/2014    Hypokalemia 07/14/2014    Postmenopausal bleeding 10/18/2011     Current Outpatient Medications   Medication Sig Dispense Refill    potassium chloride SR (KLOR-CON 10) 10 mEq tablet Take 2 tablets in morning and 2 tablets in the evening 360 Tab 1    losartan (COZAAR) 25 mg tablet TAKE 1 TABLET BY MOUTH EVERY DAY 90 Tab 1    cloNIDine HCL (CATAPRES) 0.2 mg tablet TAKE 1 TABLET BY MOUTH TWICE A  Tab 1    hydroCHLOROthiazide (HYDRODIURIL) 25 mg tablet TAKE 1 TABLET BY MOUTH EVERY DAY 90 Tab 0    Synthroid 75 mcg tablet TAKE 1 TABLET BY MOUTH EVERY DAY BEFORE BREAKFAST 90 Tab 0    Eliquis 5 mg tablet TAKE 1 TABLET BY MOUTH TWICE A DAY 60 Tab 6    verapamil ER (CALAN-SR) 240 mg CR tablet TAKE 1 TAB BY MOUTH NIGHTLY. TAKE 1 TABLET BY MOUTH EVERY MORNING FOR BLOOD PRESSURE 90 Tab 1    sertraline (ZOLOFT) 25 mg tablet Take 1 Tab by mouth daily. 90 Tab 1    rosuvastatin (CRESTOR) 20 mg tablet TAKE 1 TABLET BY MOUTH EVERY DAY 90 Tab 1    CALCIUM CARBONATE/VITAMIN D3 (CALTRATE 600 + D PO) Take 1 Tab by mouth daily.  MULTIVITAMIN W-MINERALS/LUTEIN (CENTRUM SILVER PO) Take 1 Tab by mouth daily (after lunch).       varicella-zoster recombinant, PF, (Shingrix, PF,) 50 mcg/0.5 mL susr injection 0.5mL by IntraMUSCular route once now and then repeat in 2-6 months 0.5 mL 1     Past Surgical History:   Procedure Laterality Date    BREAST SURGERY PROCEDURE UNLISTED  1982    breast bx rt    CARDIAC SURG PROCEDURE UNLIST  01/2015    cardiac catheterization, no intervention, medical management    CARDIAC SURG PROCEDURE UNLIST  03/2017    ablation    HX GYN      myomectomy on uterus    HX GYN  2011    hystereoscopy D&C    HX LYMPH NODE DISSECTION      biopsy    HX OTHER SURGICAL      lymph node bx    UPPER GI ENDOSCOPY,BIOPSY  10/24/2019         UPPER GI ENDOSCOPY,FN NEEDLE BX,GUIDED  10/24/2019           Lab Results   Component Value Date/Time    WBC 4.4 10/01/2020 08:45 AM    HGB 12.6 10/01/2020 08:45 AM    HCT 39.1 10/01/2020 08:45 AM    PLATELET 644 97/73/0571 08:45 AM    MCV 96 10/01/2020 08:45 AM     Lab Results   Component Value Date/Time    Cholesterol, total 144 04/07/2020 12:28 PM    HDL Cholesterol 58 04/07/2020 12:28 PM    LDL, calculated 69 04/07/2020 12:28 PM    Triglyceride 86 04/07/2020 12:28 PM     Lab Results   Component Value Date/Time GFR est non-AA 61 10/01/2020 08:45 AM    GFR est AA 71 10/01/2020 08:45 AM    Creatinine 0.90 10/01/2020 08:45 AM    BUN 17 10/01/2020 08:45 AM    Sodium 143 10/01/2020 08:45 AM    Potassium 4.0 10/01/2020 08:45 AM    Chloride 103 10/01/2020 08:45 AM    CO2 25 10/01/2020 08:45 AM    Magnesium 2.2 01/16/2020 11:36 PM        Review of Systems   Respiratory: Negative for shortness of breath. Cardiovascular: Negative for chest pain. Physical Exam  Constitutional:       General: She is not in acute distress. Appearance: Normal appearance. She is not ill-appearing, toxic-appearing or diaphoretic. HENT:      Head: Normocephalic and atraumatic. Right Ear: External ear normal.      Left Ear: External ear normal.   Eyes:      General:         Right eye: No discharge. Left eye: No discharge. Conjunctiva/sclera: Conjunctivae normal.      Pupils: Pupils are equal, round, and reactive to light. Neck:      Musculoskeletal: Normal range of motion and neck supple. Cardiovascular:      Rate and Rhythm: Normal rate and regular rhythm. Pulses: Normal pulses. Heart sounds: Murmur (2/6) present. No friction rub. No gallop. Pulmonary:      Effort: No respiratory distress. Breath sounds: Normal breath sounds. No wheezing or rales. Chest:      Chest wall: No tenderness. Abdominal:      General: Abdomen is flat. There is no distension. Palpations: Abdomen is soft. There is no mass. Tenderness: There is no abdominal tenderness. There is no guarding or rebound. Hernia: No hernia is present. Comments: Lipoma on abd   Musculoskeletal: Normal range of motion. Right lower leg: No edema. Left lower leg: No edema. Skin:     General: Skin is warm and dry. Neurological:      Mental Status: She is alert and oriented to person, place, and time. Mental status is at baseline.       Coordination: Coordination normal.      Gait: Gait normal.   Psychiatric: Mood and Affect: Mood normal.         Behavior: Behavior normal.         ASSESSMENT and PLAN    ICD-10-CM ICD-9-CM    1. Essential hypertension       Controlled on clonidine 0.2 mg twice daily hydrochlorothiazide 25 losartan 25 and verapamil 240 mg continue no change to dose     F59 717.3 METABOLIC PANEL, COMPREHENSIVE      TSH 3RD GENERATION      CBC W/O DIFF   2. Acquired hypothyroidism  D81.4 757.9 METABOLIC PANEL, COMPREHENSIVE   TSH close to suppressed since she has lost some weight will adjust her Synthroid she is currently taking 75 mcg daily will decrease this to 75 mcg 6 days/week    Take 1/2 tablet the seventh day we will repeat TSH prior to follow-up   TSH 3RD GENERATION      CBC W/O DIFF   3. Mixed hyperlipidemia  J65.5 767.1 METABOLIC PANEL, COMPREHENSIVE   Controlled on Crestor   TSH 3RD GENERATION      CBC W/O DIFF   4. Morbid obesity ([address]  J26.20 081.34 METABOLIC PANEL, COMPREHENSIVE   Weight improved and she still has more weight to lose however she feels that she is losing weight without intention so we have been evaluating this further see below   TSH 3RD GENERATION      CBC W/O DIFF   5. Paroxysmal atrial fibrillation (HCC)  I00.7 086.10 METABOLIC PANEL, COMPREHENSIVE   Follows with cardiology routinely continues on Eliquis for anticoagulation she is rate controlled on verapamil   TSH 3RD GENERATION      CBC W/O DIFF   6. Hypokalemia  K54.3 941.3 METABOLIC PANEL, COMPREHENSIVE   On Klor-Con check level adjust dose as needed   TSH 3RD GENERATION      CBC W/O DIFF   7. Anxiety  A09.5 766.33 METABOLIC PANEL, COMPREHENSIVE   Much improved with Zoloft 25 mg   TSH 3RD GENERATION      CBC W/O DIFF   8. Primary insomnia  O60.80 001.54 METABOLIC PANEL, COMPREHENSIVE   Has difficulty falling back asleep when she wakes up after urinating at night    Discussed sleep hygiene    Discussed melatonin   TSH 3RD GENERATION      CBC W/O DIFF   9.  Weight loss  I49.5 639.88 METABOLIC PANEL, COMPREHENSIVE   Patient has lost weight over the last year or 2 she does need to lose weight but she feels that she is not intentionally losing weight    She has had multiple CTs of the abdomen for evaluation    She has seen gastroenterology and has had EGD and EUS completed as well    She is not yet repeated colonoscopy discussed that this needs to be done    Her mammogram was done 1 year ago and is due and has been scheduled for repeat    Labs have been unrevealing    She is also had a head CT    She is recently had a chest x-ray in July    If persistent weight loss after colonoscopy has been updated to consider doing a CT of the chest next   TSH 3RD GENERATION      CBC W/O DIFF   10. Neuropathy  X60.2 640.8 METABOLIC PANEL, COMPREHENSIVE      TSH 3RD GENERATION   Evaluated by neurology she is not interested medication   CBC W/O DIFF   11. Pancreatic cyst  J65.9 120.7 METABOLIC PANEL, COMPREHENSIVE      TSH 3RD GENERATION   She will follow-up with her gastroenterologist about this   CBC W/O DIFF   12. Lipoma of torso feels that this abdominal mass has been enlarging will refer to surgery for excision given that she has had weight loss D17.1 214.1 REFERRAL TO GENERAL SURGERY      Depression screen reviewed and negative      Scribed by Marisa Fuentes of [facility], as dictated by Dr. Nicol Miller. Current diagnosis and concerns discussed with pt at length. Pt understands risks and benefits or current treatment plan and medications, and accepts the treatment and medication with any possible risks. Pt asks appropriate questions, which were answered. Pt was instructed to call with any concerns or problems. I have reviewed the note documented by the scribe. The services provided are my own.   The documentation is accurate

## 2020-10-06 ENCOUNTER — OFFICE VISIT (OUTPATIENT)
Dept: INTERNAL MEDICINE CLINIC | Age: 80
End: 2020-10-06
Payer: MEDICARE

## 2020-10-06 VITALS
WEIGHT: 176.6 LBS | OXYGEN SATURATION: 97 % | BODY MASS INDEX: 29.42 KG/M2 | TEMPERATURE: 97.6 F | SYSTOLIC BLOOD PRESSURE: 138 MMHG | DIASTOLIC BLOOD PRESSURE: 73 MMHG | RESPIRATION RATE: 16 BRPM | HEIGHT: 65 IN | HEART RATE: 71 BPM

## 2020-10-06 DIAGNOSIS — F51.01 PRIMARY INSOMNIA: ICD-10-CM

## 2020-10-06 DIAGNOSIS — K86.2 PANCREATIC CYST: ICD-10-CM

## 2020-10-06 DIAGNOSIS — I48.0 PAROXYSMAL ATRIAL FIBRILLATION (HCC): ICD-10-CM

## 2020-10-06 DIAGNOSIS — Z23 NEEDS FLU SHOT: ICD-10-CM

## 2020-10-06 DIAGNOSIS — G62.9 NEUROPATHY: ICD-10-CM

## 2020-10-06 DIAGNOSIS — E66.01 MORBID OBESITY (HCC): ICD-10-CM

## 2020-10-06 DIAGNOSIS — E03.9 ACQUIRED HYPOTHYROIDISM: ICD-10-CM

## 2020-10-06 DIAGNOSIS — E87.6 HYPOKALEMIA: ICD-10-CM

## 2020-10-06 DIAGNOSIS — I10 ESSENTIAL HYPERTENSION: Primary | ICD-10-CM

## 2020-10-06 DIAGNOSIS — R63.4 WEIGHT LOSS: ICD-10-CM

## 2020-10-06 DIAGNOSIS — F41.9 ANXIETY: ICD-10-CM

## 2020-10-06 DIAGNOSIS — E78.2 MIXED HYPERLIPIDEMIA: ICD-10-CM

## 2020-10-06 DIAGNOSIS — D17.1 LIPOMA OF TORSO: ICD-10-CM

## 2020-10-06 PROCEDURE — G8752 SYS BP LESS 140: HCPCS | Performed by: INTERNAL MEDICINE

## 2020-10-06 PROCEDURE — G8399 PT W/DXA RESULTS DOCUMENT: HCPCS | Performed by: INTERNAL MEDICINE

## 2020-10-06 PROCEDURE — G0463 HOSPITAL OUTPT CLINIC VISIT: HCPCS | Performed by: INTERNAL MEDICINE

## 2020-10-06 PROCEDURE — G8754 DIAS BP LESS 90: HCPCS | Performed by: INTERNAL MEDICINE

## 2020-10-06 PROCEDURE — G8427 DOCREV CUR MEDS BY ELIG CLIN: HCPCS | Performed by: INTERNAL MEDICINE

## 2020-10-06 PROCEDURE — G8417 CALC BMI ABV UP PARAM F/U: HCPCS | Performed by: INTERNAL MEDICINE

## 2020-10-06 PROCEDURE — 90694 VACC AIIV4 NO PRSRV 0.5ML IM: CPT | Performed by: INTERNAL MEDICINE

## 2020-10-06 PROCEDURE — 1101F PT FALLS ASSESS-DOCD LE1/YR: CPT | Performed by: INTERNAL MEDICINE

## 2020-10-06 PROCEDURE — G8536 NO DOC ELDER MAL SCRN: HCPCS | Performed by: INTERNAL MEDICINE

## 2020-10-06 PROCEDURE — 1090F PRES/ABSN URINE INCON ASSESS: CPT | Performed by: INTERNAL MEDICINE

## 2020-10-06 PROCEDURE — 99214 OFFICE O/P EST MOD 30 MIN: CPT | Performed by: INTERNAL MEDICINE

## 2020-10-06 PROCEDURE — G8510 SCR DEP NEG, NO PLAN REQD: HCPCS | Performed by: INTERNAL MEDICINE

## 2020-10-06 RX ORDER — LOSARTAN POTASSIUM 25 MG/1
TABLET ORAL
Qty: 90 TAB | Refills: 1 | Status: SHIPPED | OUTPATIENT
Start: 2020-10-06 | End: 2020-10-25

## 2020-10-06 RX ORDER — POTASSIUM CHLORIDE 750 MG/1
TABLET, FILM COATED, EXTENDED RELEASE ORAL
Qty: 360 TAB | Refills: 1 | Status: SHIPPED | OUTPATIENT
Start: 2020-10-06 | End: 2021-04-04

## 2020-10-06 RX ORDER — CLONIDINE HYDROCHLORIDE 0.2 MG/1
TABLET ORAL
Qty: 180 TAB | Refills: 1 | Status: SHIPPED | OUTPATIENT
Start: 2020-10-06 | End: 2020-10-25

## 2020-10-07 DIAGNOSIS — E66.01 MORBID OBESITY (HCC): ICD-10-CM

## 2020-10-07 DIAGNOSIS — I48.0 PAROXYSMAL ATRIAL FIBRILLATION (HCC): ICD-10-CM

## 2020-10-07 DIAGNOSIS — E78.2 MIXED HYPERLIPIDEMIA: ICD-10-CM

## 2020-10-07 DIAGNOSIS — F41.9 ANXIETY: ICD-10-CM

## 2020-10-07 DIAGNOSIS — R63.4 WEIGHT LOSS: ICD-10-CM

## 2020-10-07 DIAGNOSIS — K86.2 PANCREATIC CYST: ICD-10-CM

## 2020-10-07 DIAGNOSIS — G62.9 NEUROPATHY: ICD-10-CM

## 2020-10-07 DIAGNOSIS — E03.9 ACQUIRED HYPOTHYROIDISM: ICD-10-CM

## 2020-10-07 DIAGNOSIS — E87.6 HYPOKALEMIA: ICD-10-CM

## 2020-10-07 DIAGNOSIS — F51.01 PRIMARY INSOMNIA: ICD-10-CM

## 2020-10-07 DIAGNOSIS — I10 ESSENTIAL HYPERTENSION: ICD-10-CM

## 2020-10-14 RX ORDER — ROSUVASTATIN CALCIUM 20 MG/1
TABLET, COATED ORAL
Qty: 90 TAB | Refills: 1 | Status: SHIPPED | OUTPATIENT
Start: 2020-10-14 | End: 2021-04-13

## 2020-10-14 NOTE — TELEPHONE ENCOUNTER
Future Appointments:  Future Appointments   Date Time Provider Marilin Ventura   1/5/2021 11:45 AM Tariq Esquivel MD [facility] BS AMB   1/21/2021 11:00 AM MD STAS Lopez BS AMB   1/28/2021 11:15 AM Vika Myers MD [facility] BS AMB        Last Appointment With Me:  10/6/2020     Requested Prescriptions     Pending Prescriptions Disp Refills    rosuvastatin (CRESTOR) 20 mg tablet 90 Tab 1     Sig: TAKE 1 TABLET BY MOUTH EVERY DAY

## 2020-10-25 RX ORDER — CLONIDINE HYDROCHLORIDE 0.2 MG/1
TABLET ORAL
Qty: 60 TAB | Refills: 0 | Status: SHIPPED | OUTPATIENT
Start: 2020-10-25 | End: 2021-05-02

## 2020-10-25 RX ORDER — LOSARTAN POTASSIUM 25 MG/1
TABLET ORAL
Qty: 30 TAB | Refills: 0 | Status: SHIPPED | OUTPATIENT
Start: 2020-10-25 | End: 2021-03-09

## 2020-11-02 DIAGNOSIS — F41.9 ANXIETY: ICD-10-CM

## 2020-11-02 RX ORDER — SERTRALINE HYDROCHLORIDE 25 MG/1
25 TABLET, FILM COATED ORAL DAILY
Qty: 90 TAB | Refills: 1 | Status: SHIPPED | OUTPATIENT
Start: 2020-11-02 | End: 2021-01-20

## 2020-11-02 NOTE — TELEPHONE ENCOUNTER
Future Appointments:  Future Appointments   Date Time Provider Marilin Ventura   1/5/2021 11:45 AM Omi Gallagher MD [facility] BS AMB   1/21/2021 11:00 AM Chula Renteria MD [facility] BS AMB   1/28/2021 11:15 AM Tyesha Nguyễn MD [facility] BS AMB        Last Appointment With Me:  10/6/2020     Requested Prescriptions     Pending Prescriptions Disp Refills    sertraline (ZOLOFT) 25 mg tablet 90 Tab 1     Sig: Take 1 Tab by mouth daily.

## 2020-11-12 ENCOUNTER — OFFICE VISIT (OUTPATIENT)
Dept: SURGERY | Age: 80
End: 2020-11-12
Payer: MEDICARE

## 2020-11-12 VITALS
BODY MASS INDEX: 29.99 KG/M2 | SYSTOLIC BLOOD PRESSURE: 156 MMHG | TEMPERATURE: 97.2 F | WEIGHT: 180 LBS | HEIGHT: 65 IN | RESPIRATION RATE: 16 BRPM | OXYGEN SATURATION: 99 % | HEART RATE: 69 BPM | DIASTOLIC BLOOD PRESSURE: 77 MMHG

## 2020-11-12 DIAGNOSIS — E66.9 OBESITY, CLASS I, BMI 30-34.9: ICD-10-CM

## 2020-11-12 DIAGNOSIS — D17.9 LIPOMA, UNSPECIFIED SITE: Primary | ICD-10-CM

## 2020-11-12 PROCEDURE — G8427 DOCREV CUR MEDS BY ELIG CLIN: HCPCS | Performed by: SURGERY

## 2020-11-12 PROCEDURE — G8399 PT W/DXA RESULTS DOCUMENT: HCPCS | Performed by: SURGERY

## 2020-11-12 PROCEDURE — G8754 DIAS BP LESS 90: HCPCS | Performed by: SURGERY

## 2020-11-12 PROCEDURE — G8753 SYS BP > OR = 140: HCPCS | Performed by: SURGERY

## 2020-11-12 PROCEDURE — 1090F PRES/ABSN URINE INCON ASSESS: CPT | Performed by: SURGERY

## 2020-11-12 PROCEDURE — 1101F PT FALLS ASSESS-DOCD LE1/YR: CPT | Performed by: SURGERY

## 2020-11-12 PROCEDURE — 99214 OFFICE O/P EST MOD 30 MIN: CPT | Performed by: SURGERY

## 2020-11-12 PROCEDURE — G8417 CALC BMI ABV UP PARAM F/U: HCPCS | Performed by: SURGERY

## 2020-11-12 PROCEDURE — G8536 NO DOC ELDER MAL SCRN: HCPCS | Performed by: SURGERY

## 2020-11-12 PROCEDURE — G8432 DEP SCR NOT DOC, RNG: HCPCS | Performed by: SURGERY

## 2020-11-12 NOTE — PATIENT INSTRUCTIONS
Lipoma: Care Instructions Your Care Instructions A lipoma is a growth of fat just below the skin. It may feel soft and rubbery. Lipomas can occur anywhere on the body. But they are most common on the torso, neck, upper thighs, upper arms, and armpits. A lipoma does not turn into cancer. Lipomas usually are not treated, because most of them don't hurt or cause problems. But your doctor may remove a lipoma if it is painful, gets infected, or bothers you. Follow-up care is a key part of your treatment and safety. Be sure to make and go to all appointments, and call your doctor if you are having problems. It's also a good idea to know your test results and keep a list of the medicines you take. How can you care for yourself at home? · A lipoma usually needs no care at home unless your doctor made a cut (incision) to remove it. · If your doctor told you how to care for your incision, follow your doctor's instructions. If you did not get instructions, follow this general advice: 
? Wash around the incision with clean water 2 times a day. Don't use hydrogen peroxide or alcohol. These can slow healing. ? You may cover the incision with a thin layer of petroleum jelly, such as Vaseline, and a nonstick bandage. ? Apply more petroleum jelly and replace the bandage as needed. When should you call for help? Call your doctor now or seek immediate medical care if: 
  · You have signs of infection, such as: 
? Increased pain, swelling, warmth, or redness. ? Red streaks leading from the lipoma. ? Pus draining from the lipoma. ? A fever. Watch closely for changes in your health, and be sure to contact your doctor if: 
  · The lipoma is growing or changing.  
  · You do not get better as expected. Where can you learn more? Go to http://www.gray.com/ Enter A104 in the search box to learn more about \"Lipoma: Care Instructions. \" 
 Current as of: July 2, 2020               Content Version: 12.6 © 8956-3796 [employer]. Care instructions adapted under license by [employer] (which disclaims liability or warranty for this information). If you have questions about a medical condition or this instruction, always ask your healthcare professional. [address] any warranty or liability for your use of this information.

## 2020-11-12 NOTE — PROGRESS NOTES
HISTORY OF PRESENT ILLNESS  Ras Fernandez is a 68 y.o. female. Saw Dr Myra Fraga in 2018 for a breast abscess    Here today for an abdominal mass  Not visible on CT from 5/2020     No pain  Maybe got a little bigger      ____________________________________________________________________________  Patient presents with:  Breast Mass: Seen at the request of Dr Kylie Valderrama for evaluation of lipoma    BP (!) 156/77 (BP 1 Location: Left arm, BP Patient Position: Sitting)   Pulse 69   Temp 97.2 °F (36.2 °C) (Temporal)   Resp 16   Ht 5' 5\" (1.651 m)   Wt 81.6 kg (180 lb)   SpO2 99%   BMI 29.95 kg/m²   Past Medical History:  No date: Arrhythmia      Comment:  afib  No date: Arthritis      Comment:  right knee, left shoulder  No date: Diverticulosis  No date: Frequency of urination  No date: High cholesterol  No date: Hypertension  No date:  Morbid obesity ([address]  No date: Thyroid disease      Comment:  hypothyroid  No date: Unspecified adverse effect of anesthesia      Comment:  low BP  Past Surgical History:  1982: BREAST SURGERY PROCEDURE UNLISTED      Comment:  breast bx rt  01/2015: CARDIAC SURG PROCEDURE UNLIST      Comment:  cardiac catheterization, no intervention, medical                management  03/2017: CARDIAC SURG PROCEDURE UNLIST      Comment:  ablation  No date: HX GYN      Comment:  myomectomy on uterus  2011: HX GYN      Comment:  hystereoscopy D&C  No date: HX LYMPH NODE DISSECTION      Comment:  biopsy  No date: HX OTHER SURGICAL      Comment:  lymph node bx  10/24/2019: UPPER GI ENDOSCOPY,BIOPSY      Comment:     10/24/2019: UPPER GI ENDOSCOPY,FN NEEDLE BX,GUIDED      Comment:     Social History    Socioeconomic History      Marital status:       Spouse name: Not on file      Number of children: Not on file      Years of education: Not on file      Highest education level: Not on file    Tobacco Use      Smoking status: Never Smoker      Smokeless tobacco: Never Used    Substance and Sexual Activity      Alcohol use: No      Drug use: No    Review of patient's family history indicates:  Problem: Alzheimer      Relation: Mother          Age of Onset: (Not Specified)  Problem: Heart Disease      Relation: Mother          Age of Onset: (Not Specified)  Problem: Heart Disease      Relation: Father          Age of Onset: (Not Specified)  Problem: Hypertension      Relation: Father          Age of Onset: (Not Specified)  Problem: Cancer      Relation: Paternal [de-identified]          Age of Onset: (Not Specified)  Problem: Diabetes      Relation: Paternal Grandmother          Age of Onset: (Not Specified)    Current Outpatient Medications:  sertraline (ZOLOFT) 25 mg tablet, Take 1 Tab by mouth daily. losartan (COZAAR) 25 mg tablet, TAKE 1 TABLET BY MOUTH EVERY DAY  cloNIDine HCL (CATAPRES) 0.2 mg tablet, TAKE 1 TABLET BY MOUTH TWICE A DAY  rosuvastatin (CRESTOR) 20 mg tablet, TAKE 1 TABLET BY MOUTH EVERY DAY  potassium chloride SR (KLOR-CON 10) 10 mEq tablet, Take 2 tablets in morning and 2 tablets in the evening  hydroCHLOROthiazide (HYDRODIURIL) 25 mg tablet, TAKE 1 TABLET BY MOUTH EVERY DAY  Synthroid 75 mcg tablet, TAKE 1 TABLET BY MOUTH EVERY DAY BEFORE BREAKFAST  Eliquis 5 mg tablet, TAKE 1 TABLET BY MOUTH TWICE A DAY  verapamil ER (CALAN-SR) 240 mg CR tablet, TAKE 1 TAB BY MOUTH NIGHTLY. TAKE 1 TABLET BY MOUTH EVERY MORNING FOR BLOOD PRESSURE  varicella-zoster recombinant, PF, (Shingrix, PF,) 50 mcg/0.5 mL susr injection, 0.5mL by IntraMUSCular route once now and then repeat in 2-6 months  CALCIUM CARBONATE/VITAMIN D3 (CALTRATE 600 + D PO), Take 1 Tab by mouth daily. MULTIVITAMIN W-MINERALS/LUTEIN (CENTRUM SILVER PO), Take 1 Tab by mouth daily (after lunch). No current facility-administered medications for this visit.      Allergies:  -- Adhesive -- Hives   -- Amoxicillin -- Unknown (comments)   -- Lipitor (Atorvastatin) -- Myalgia    --  Gets the flu  _____________________________________________________________________________      Skin Problem   The history is provided by the patient. This is a chronic problem. The current episode started more than 1 week ago. The problem occurs constantly. The problem has not changed since onset. Pertinent negatives include no chest pain, no abdominal pain, no headaches and no shortness of breath. Nothing aggravates the symptoms. Nothing relieves the symptoms. The treatment provided no relief. Review of Systems   Constitutional: Negative for chills, fever and weight loss. HENT: Negative for ear pain. Eyes: Negative for pain. Respiratory: Negative for shortness of breath. Cardiovascular: Negative for chest pain. Gastrointestinal: Negative for abdominal pain and blood in stool. Genitourinary: Negative for hematuria. Musculoskeletal: Negative for joint pain. Skin: Negative for rash. Neurological: Negative for dizziness, focal weakness, seizures and headaches. Endo/Heme/Allergies: Does not bruise/bleed easily. Psychiatric/Behavioral: The patient does not have insomnia. Physical Exam  Constitutional:       General: She is not in acute distress. Appearance: She is well-developed. She is not diaphoretic. HENT:      Head: Normocephalic and atraumatic. Mouth/Throat:      Pharynx: No oropharyngeal exudate. Eyes:      Pupils: Pupils are equal, round, and reactive to light. Neck:      Musculoskeletal: Normal range of motion. Trachea: No tracheal deviation. Cardiovascular:      Rate and Rhythm: Normal rate and regular rhythm. Heart sounds: Normal heart sounds. No murmur. Pulmonary:      Effort: Pulmonary effort is normal. No respiratory distress. Breath sounds: Normal breath sounds. No wheezing. Abdominal:      General: Bowel sounds are normal. There is no distension. Palpations: Abdomen is soft. There is no mass. Tenderness:  There is no abdominal tenderness. There is no guarding or rebound. Musculoskeletal: Normal range of motion. General: No tenderness. Lymphadenopathy:      Cervical: No cervical adenopathy. Skin:     General: Skin is warm. Findings: No erythema or rash. Neurological:      Mental Status: She is alert and oriented to person, place, and time. Psychiatric:         Behavior: Behavior normal.         ASSESSMENT and PLAN    ICD-10-CM ICD-9-CM    1. Lipoma, unspecified site  D17.9 214.9    2. Obesity, Class I, BMI 30-34.9  E66.9 278.00        We reviewed a differential dx. Masses are almost certainly benign lipomas. They are not visible on the CT scan she had earlier this year which further rules out hernia or other worrisome lesion. I reassured her that unless they grow significantly or cause symptoms, observation is reasonable at this point. They are not bothering her and she is happy not to undergo any intervention. She will call me with any questions or concerns or with any change in symptoms. Expressed understanding of our discussion and agreeable with the plan. Thank you for this consult. I had an extensive and thorough discussion with Leatha Posada regarding current diagnosis and treatment recommendations. Total face-to-face time spent with her was 25 minutes with the majority spent with counseling and coordination of care.

## 2020-11-12 NOTE — PROGRESS NOTES
dentified pt with two pt identifiers(name and ). Reviewed record in preparation for visit and have obtained necessary documentation. All patient medications has been reviewed. Chief Complaint   Patient presents with    Breast Mass     Seen at the request of Dr Cintia Smith for evaluation of lipoma on Rt breast       Health Maintenance Due   Topic    Shingrix Vaccine Age 50> (1 of 2)    GLAUCOMA SCREENING Q2Y        Vitals:    20 1539   BP: (!) 156/77   Pulse: 69   Resp: 16   Temp: 97.2 °F (36.2 °C)   TempSrc: Temporal   SpO2: 99%   Weight: 81.6 kg (180 lb)   Height: 5' 5\" (1.651 m)   PainSc:   0 - No pain       4. Have you been to the ER, urgent care clinic since your last visit? Hospitalized since your last visit? No    5. Have you seen or consulted any other health care providers outside of the [address] [place] [address] since your last visit? Include any pap smears or colon screening. No    6. Do you have an Advanced Directive/ Living Will in place? NO  If yes, do we have a copy on file NO  If no, would you like information NO    Patient is accompanied by self I have received verbal consent from Nakul Paige to discuss any/all medical information while they are present in the room.

## 2020-11-27 ENCOUNTER — TRANSCRIBE ORDER (OUTPATIENT)
Dept: SCHEDULING | Age: 80
End: 2020-11-27

## 2020-11-27 DIAGNOSIS — K86.2 PANCREATIC CYST: Primary | ICD-10-CM

## 2020-11-27 DIAGNOSIS — Z79.01 LONG TERM (CURRENT) USE OF ANTICOAGULANTS: ICD-10-CM

## 2020-11-27 DIAGNOSIS — R15.9 FECAL INCONTINENCE: ICD-10-CM

## 2020-12-08 ENCOUNTER — HOSPITAL ENCOUNTER (OUTPATIENT)
Dept: CT IMAGING | Age: 80
Discharge: HOME | End: 2020-12-08
Attending: INTERNAL MEDICINE
Payer: MEDICARE

## 2020-12-08 DIAGNOSIS — R15.9 FECAL INCONTINENCE: ICD-10-CM

## 2020-12-08 DIAGNOSIS — Z79.01 LONG TERM (CURRENT) USE OF ANTICOAGULANTS: ICD-10-CM

## 2020-12-08 DIAGNOSIS — K86.2 PANCREATIC CYST: ICD-10-CM

## 2020-12-08 PROCEDURE — 74170 CT ABD WO CNTRST FLWD CNTRST: CPT

## 2020-12-08 PROCEDURE — 74011000636 HC RX REV CODE- 636: Performed by: INTERNAL MEDICINE

## 2020-12-08 RX ADMIN — IOPAMIDOL 100 ML: 755 INJECTION, SOLUTION INTRAVENOUS at 13:50

## 2020-12-14 RX ORDER — LEVOTHYROXINE SODIUM 75 UG/1
TABLET ORAL
Qty: 90 TAB | Refills: 0 | Status: SHIPPED | OUTPATIENT
Start: 2020-12-14 | End: 2021-03-08

## 2020-12-20 RX ORDER — HYDROCHLOROTHIAZIDE 25 MG/1
TABLET ORAL
Qty: 90 TAB | Refills: 0 | Status: SHIPPED | OUTPATIENT
Start: 2020-12-20 | End: 2021-03-22

## 2020-12-29 ENCOUNTER — HOSPITAL ENCOUNTER (OUTPATIENT)
Dept: LAB | Age: 80
Discharge: HOME | End: 2020-12-29
Payer: MEDICARE

## 2020-12-29 PROCEDURE — 80053 COMPREHEN METABOLIC PANEL: CPT

## 2020-12-29 PROCEDURE — 36415 COLL VENOUS BLD VENIPUNCTURE: CPT

## 2020-12-29 PROCEDURE — 84443 ASSAY THYROID STIM HORMONE: CPT

## 2020-12-29 PROCEDURE — 85027 COMPLETE CBC AUTOMATED: CPT

## 2020-12-30 LAB
ALBUMIN SERPL-MCNC: 4.5 G/DL (ref 3.7–4.7)
ALBUMIN/GLOB SERPL: 2 {RATIO} (ref 1.2–2.2)
ALP SERPL-CCNC: 70 IU/L (ref 39–117)
ALT SERPL-CCNC: 13 IU/L (ref 0–32)
AST SERPL-CCNC: 14 IU/L (ref 0–40)
BILIRUB SERPL-MCNC: 0.4 MG/DL (ref 0–1.2)
BUN SERPL-MCNC: 23 MG/DL (ref 8–27)
BUN/CREAT SERPL: 25 (ref 12–28)
CALCIUM SERPL-MCNC: 9.9 MG/DL (ref 8.7–10.3)
CHLORIDE SERPL-SCNC: 102 MMOL/L (ref 96–106)
CO2 SERPL-SCNC: 26 MMOL/L (ref 20–29)
CREAT SERPL-MCNC: 0.92 MG/DL (ref 0.57–1)
ERYTHROCYTE [DISTWIDTH] IN BLOOD BY AUTOMATED COUNT: 12.3 % (ref 11.7–15.4)
GLOBULIN SER CALC-MCNC: 2.3 G/DL (ref 1.5–4.5)
GLUCOSE SERPL-MCNC: 99 MG/DL (ref 65–99)
HCT VFR BLD AUTO: 39.7 % (ref 34–46.6)
HGB BLD-MCNC: 13.3 G/DL (ref 11.1–15.9)
MCH RBC QN AUTO: 32.1 PG (ref 26.6–33)
MCHC RBC AUTO-ENTMCNC: 33.5 G/DL (ref 31.5–35.7)
MCV RBC AUTO: 96 FL (ref 79–97)
PLATELET # BLD AUTO: 176 X10E3/UL (ref 150–450)
POTASSIUM SERPL-SCNC: 3.8 MMOL/L (ref 3.5–5.2)
PROT SERPL-MCNC: 6.8 G/DL (ref 6–8.5)
RBC # BLD AUTO: 4.14 X10E6/UL (ref 3.77–5.28)
SODIUM SERPL-SCNC: 139 MMOL/L (ref 134–144)
TSH SERPL DL<=0.005 MIU/L-ACNC: 0.93 UIU/ML (ref 0.45–4.5)
WBC # BLD AUTO: 3.9 X10E3/UL (ref 3.4–10.8)

## 2021-01-15 ENCOUNTER — TELEPHONE (OUTPATIENT)
Dept: INTERNAL MEDICINE CLINIC | Age: 81
End: 2021-01-15

## 2021-01-15 ENCOUNTER — TELEPHONE (OUTPATIENT)
Dept: CARDIOLOGY CLINIC | Age: 81
End: 2021-01-15

## 2021-01-15 NOTE — TELEPHONE ENCOUNTER
Per PSR MR,   Pt called through triage line. C/o elevated HR. Advised PSR MR that given pt has h/o AFib, pt needs to call Cardiology.    Per PSR MR, pt reports she will call Dr. Alesia Shelton.

## 2021-01-15 NOTE — TELEPHONE ENCOUNTER
Statement Selected Verified patient with 2 identifiers   The last reading patient took around 15 Noon was 141/. Patient denies chest pain, sob, dizziness. The only sx she is experiencing is a \"full\" feeling in her head. She does have a follow up scheduled with Dr Robby Santiago on 1/28/21.

## 2021-01-15 NOTE — TELEPHONE ENCOUNTER
Please call patient regarding high blood pressure readings    185/91 pulse 95    2 hours later    169/87 pulse 115    [phone]    Thanks  Sophy Amaya

## 2021-01-15 NOTE — TELEPHONE ENCOUNTER
Verified patient with 2 identifiers. Advised per Darvin Borden,   Have her take an additional clonidine now. Can get event monitor on Monday prior to her follow up. Advised patient you would be calling her to schedule a time for her to come in Monday for the monitor.

## 2021-01-15 NOTE — TELEPHONE ENCOUNTER
----- Message from [facility] sent at 1/15/2021  9:57 AM EST -----  Regarding: /Telephone  Contact: [phone]  General Message/Vendor Calls    Caller's first and last name:pt      Reason for call:high pulse rate - 115      Callback required yes/no and why:yes to discuss next steps      Best contact number(s):[phone]      Details to clarify the request:pt want to discuss next steps, pt is okay, slight headache       Message from [facility]

## 2021-01-18 ENCOUNTER — CLINICAL SUPPORT (OUTPATIENT)
Dept: CARDIOLOGY CLINIC | Age: 81
End: 2021-01-18
Payer: MEDICARE

## 2021-01-18 DIAGNOSIS — Z86.79 S/P ABLATION OF ATRIAL FIBRILLATION: ICD-10-CM

## 2021-01-18 DIAGNOSIS — I48.91 ATRIAL FIBRILLATION, UNSPECIFIED TYPE (HCC): Primary | ICD-10-CM

## 2021-01-18 DIAGNOSIS — Z98.890 S/P ABLATION OF ATRIAL FIBRILLATION: ICD-10-CM

## 2021-01-18 PROCEDURE — 93228 REMOTE 30 DAY ECG REV/REPORT: CPT | Performed by: INTERNAL MEDICINE

## 2021-01-18 NOTE — PROGRESS NOTES
Patient received a 2 week event monitor.  Instructions given verbally as well as an instruction sheet.  Pt verbalized understanding.    [facility] Event Monitoring

## 2021-01-19 NOTE — PROGRESS NOTES
HISTORY OF PRESENT ILLNESS  Raudel Garrett is a 68 y.o. female. HPI     Last vv 10/6/20  Pt is here for chronic conditions. This is an established visit completed with telemedicine was completed with video assist  the patient acknowledges and agrees to this method of visitation antonioe    She c/o discomfort below L breast today  Discussed gas, muscle irritation can cause sxs and to give it time  Discussed tylenol       She c/o high bp   BP at home 159 185 184/93 152/72 131  HR 83 115 106 90 76 98 94  She is currently wearing a holter monitor   Lov 138/73, at [facility] 156/77, 120/70 at [facility] on clonidine 0.2mg BID, HCTZ 25mg, and  losartan 25mg daily  She took an extra clonidine 1/15/21 after talking to cardio  Continues on verapamil  240 mg per day   Recall she had a cough on lisinopril   She has been having increased anxiety which may be attributing to higher bps, will increase zoloft to 50 mg  Discussed taking 2 losartan (50 mg) if BP> 150 in am      Wt is 177 lbs per pt - up 1 lbs x lov    She has been hungry in the am, discussed this can change   Please recall we have been evaluating this extensively over the last 1 to 2 years she has had multiple CTs of the abdomen she has had an EGD she had a chest x-ray she has had a head CT she is had multiple labs completed colonoscopy now will need to be repeated her mammograms have been up-to-date though her mammogram is currently due     Reviewed labs      Pt is following with Dr Evelin Velazquez) for weight loss and pancreatic cyst  Pt had an EGD and EUS 10/24/19  Last there 11/20  Reviewed ct abd 12/8/20: No significant change in cystic lesions of the pancreas, the larger which in the head contains a dependent calcification with no CT evidence of aggressive neoplastic transformation or significant change since 2017 which may reflect IPMN lesions.   Will check back in 1 year   Will be having colo 3/4/21      Pt had a cardiac ablation for her afib per Dr. Cheyenne Augustin (cardio) on 3/17/17   Pt will only f/u with this physician prn       Pt saw Dr. Danell Dance (cardio) for f/u after a fib  Pt was taken off of sotalol  Feels better more energy with this  Continues on eliquis bid  Pt denies any bleeding/falls on these meds 1/21  Last visit 1/28/20  She called Dr. Eileen Burrows office 1/15/21 about high bp, they had her take an additional clonidine   She is currently wearing a holter monitor until 2/1/21, scheduled apt 1/28/21       Pt follows Dr. Demetra José (Neuro) for neuropathy   Last there 7/10/20  Completed ENG study -- showed neuropathy   Apt scheduled tomorrow 1/21/21     Pt saw Dr. Kenzie Coleman (cardio) for foot circulation  Last visit was 8/1/17   Recall has chronic sx of leg tightness  Pt will only f/u with this physician prn      Pt saw Dr. Davonte Guzman (uro-gyn) for urinary sx  Last visit was 11/17  Pt was told she had lichen sclerosus   Now resolved      Pt saw Dr Doug John (sleep)  Last visit was 7/3/18  Recall telephone note 9/18: no significant sleep disordered breathing, overall AHI of 0.6/h  Pt had a poor experience and was told she has no sleep apnea    She has been seeing Dr. Thais Wyatt (surg) for lipoma  Reviewed labs 11/12/20: We reviewed a differential dx. Masses are almost certainly benign lipomas. They are not visible on the CT scan she had earlier this year which further rules out hernia or other worrisome lesion. I reassured her that unless they grow significantly or cause symptoms, observation is reasonable at this point. They are not bothering her and she is happy not to undergo any intervention. She will call me with any questions or concerns or with any change in symptoms. Expressed understanding of our discussion and agreeable with the plan.   Thank you for this consult.     Pt had a cyst on R breast removed by Dr Anjali Neil (surg) in the past      Continues crestor 20mg daily for cholesterol at goal 1/21      Continues klor-con 20meqs BID for her potassium at goal 10/20     Continues on synthroid 75mcg daily  1/21  Will take full dose 6 days per week and will take half day 1 day per week      the patient is on zoloft 25mg for anxiety  She has been feeling more anxious recently, would like this increased  Will increase to 50 mg       Pt followed with Dr Jordan Alex (podiatry)   She would like to f/u with this physician for 2 corns on her foot    She wonders about b12, discussed taking lowest dose daily     Discussed covid vaccine      ACP not on file.  SDM is her sister, Bryan Abreu. Provided information      Recall She had a cervical polyp, was having vaginal bleeding, this was removed by julio césar, vaginal bleeding has since resolved.        PREVENTIVE:    Colonoscopy: 8/21/13, Dr. Rosy Munroe, repeat 10 years, scheduled 3/21 with Dr. Nikolas Murrieta  Pap: Usually Dr. Dolores Zhang - will get report for review  DEXA: 10/19 osteopenia   Tdap: 4/14/2015  Pneumovax: 11/19/2013  Qjbtvap50: 4/05/2017  Zostavax: declines  Shingrix: ordered 04/17/19, h/o shingles  Flu shot: 10/06/20  A1C:  8/16 5.9, 10/16 5.8, 4/17 5.9, 10/17 POC 5.6, 4/18 5.6, 10/18 5.6, 4/19 5.6 11/19 5.5  4/20 5.5 10/20 5.4  Eye exam: Dr. Lelia Blackmon, cataract, due now reminded  Lipids: 4/20       Patient Active Problem List    Diagnosis Date Noted    Pericardial effusion with cardiac tamponade 03/18/2017    S/P ablation of atrial fibrillation 03/17/2017    Acquired hypothyroidism 12/14/2015    HTN (hypertension) 07/21/2015    Atrial fibrillation ([address] 03/16/2015    Morbid obesity ([address]     Hyperlipidemia 07/14/2014    Hypokalemia 07/14/2014    Postmenopausal bleeding 10/18/2011     Current Outpatient Medications   Medication Sig Dispense Refill    sertraline (ZOLOFT) 50 mg tablet Take 1 Tab by mouth daily.  30 Tab 2    hydroCHLOROthiazide (HYDRODIURIL) 25 mg tablet TAKE 1 TABLET BY MOUTH EVERY DAY 90 Tab 0    Synthroid 75 mcg tablet TAKE 1 TABLET BY MOUTH EVERY DAY BEFORE BREAKFAST 90 Tab 0    verapamil ER (CALAN-SR) 240 mg CR tablet Take 1 Tab by mouth nightly. TAKE 1 TABLET BY MOUTH EVERY MORNING FOR BLOOD PRESSURE 90 Tab 0    losartan (COZAAR) 25 mg tablet TAKE 1 TABLET BY MOUTH EVERY DAY 30 Tab 0    cloNIDine HCL (CATAPRES) 0.2 mg tablet TAKE 1 TABLET BY MOUTH TWICE A DAY 60 Tab 0    rosuvastatin (CRESTOR) 20 mg tablet TAKE 1 TABLET BY MOUTH EVERY DAY 90 Tab 1    potassium chloride SR (KLOR-CON 10) 10 mEq tablet Take 2 tablets in morning and 2 tablets in the evening 360 Tab 1    Eliquis 5 mg tablet TAKE 1 TABLET BY MOUTH TWICE A DAY 60 Tab 6    CALCIUM CARBONATE/VITAMIN D3 (CALTRATE 600 + D PO) Take 1 Tab by mouth daily.  MULTIVITAMIN W-MINERALS/LUTEIN (CENTRUM SILVER PO) Take 1 Tab by mouth daily (after lunch).       varicella-zoster recombinant, PF, (Shingrix, PF,) 50 mcg/0.5 mL susr injection 0.5mL by IntraMUSCular route once now and then repeat in 2-6 months 0.5 mL 1     Past Surgical History:   Procedure Laterality Date    BREAST SURGERY PROCEDURE UNLISTED  1982    breast bx rt    CARDIAC SURG PROCEDURE UNLIST  01/2015    cardiac catheterization, no intervention, medical management    CARDIAC SURG PROCEDURE UNLIST  03/2017    ablation    HX GYN      myomectomy on uterus    HX GYN  2011    hystereoscopy D&C    HX LYMPH NODE DISSECTION      biopsy    HX OTHER SURGICAL      lymph node bx    UPPER GI ENDOSCOPY,BIOPSY  10/24/2019         UPPER GI ENDOSCOPY,FN NEEDLE BX,GUIDED  10/24/2019           Lab Results   Component Value Date/Time    WBC 3.9 12/29/2020 08:15 AM    HGB 13.3 12/29/2020 08:15 AM    HCT 39.7 12/29/2020 08:15 AM    PLATELET 397 08/76/0867 08:15 AM    MCV 96 12/29/2020 08:15 AM     Lab Results   Component Value Date/Time    Cholesterol, total 144 04/07/2020 12:28 PM    HDL Cholesterol 58 04/07/2020 12:28 PM    LDL, calculated 69 04/07/2020 12:28 PM    Triglyceride 86 04/07/2020 12:28 PM     Lab Results   Component Value Date/Time GFR est non-AA 60 12/29/2020 08:15 AM    GFR est AA 69 12/29/2020 08:15 AM    Creatinine 0.92 12/29/2020 08:15 AM    BUN 23 12/29/2020 08:15 AM    Sodium 139 12/29/2020 08:15 AM    Potassium 3.8 12/29/2020 08:15 AM    Chloride 102 12/29/2020 08:15 AM    CO2 26 12/29/2020 08:15 AM    Magnesium 2.2 01/16/2020 11:36 PM        Review of Systems   Respiratory: Negative for shortness of breath. Cardiovascular: Negative for chest pain. Psychiatric/Behavioral: The patient is nervous/anxious. Physical Exam  Constitutional:       General: She is not in acute distress. Appearance: Normal appearance. She is not ill-appearing, toxic-appearing or diaphoretic. HENT:      Head: Normocephalic and atraumatic. Eyes:      General:         Right eye: No discharge. Left eye: No discharge. Conjunctiva/sclera: Conjunctivae normal.   Pulmonary:      Effort: No respiratory distress. Neurological:      Mental Status: She is alert and oriented to person, place, and time. Mental status is at baseline. Gait: Gait normal.   Psychiatric:         Mood and Affect: Mood normal.         Behavior: Behavior normal.         ASSESSMENT and PLAN    ICD-10-CM ICD-9-CM    1. Essential hypertension     Patient's blood pressure have been well controlled    However recently she has noticed some increased blood pressure readings but they seem related to stress and anxiety as well    She is on clonidine twice daily    She is also on losartan 25 mg    Hydrochlorothiazide and verapamil    At this time we will have her take losartan 50 mg if her systolic blood pressure is 150 or higher    If lower will discontinue with the 25 mg of losartan    I am working on increasing her anxiety medication and hopefully as this improves over the course of the next 6 weeks her blood pressure will improve as well    She is also undergoing an evaluation with cardiology for palpitations           I10 401.9    2.  Paroxysmal atrial fibrillation ([address]           Has a Holter monitor in place    Has follow-up with cardiology at the end of the month    On Eliquis twice daily I48.0 427.31    3. Acquired hypothyroidism         On Synthroid 75 mcg daily    Takes this 6 days/week    On the seventh day she takes half tablet daily    Monitor TSH     E03.9 244.9    4. Morbid obesity ([address]         Weight has improved over the last year or 2 she had been more about weight loss she has had an extensive evaluation currently weight is stable     E66.01 278.01    5. Hypokalemia           At goal on recent labs on Klor-Con E87.6 276.8    6. Mixed hyperlipidemia         Controlled on Crestor continue E78.2 272.2    7. Anxiety         Progressive symptoms increase Zoloft to 50 mg daily     F41.9 300.00         8 pancreatic cyst recently had a CT for follow-up and reviewed this with her gastroenterologist already has colonoscopy scheduled patient has been struggling with weight loss however her weight is actually quite stable currently and she now reports increased appetite and is worried about potential weight gain        Depression screen reviewed and positive (1), she has been more anxious, increased zoloft to 50 mg      Scribed by Marvin Ghotra of [address] [place] [address] [address], as dictated by Dr. Lincoln. Current diagnosis and concerns discussed with pt at length. Pt understands risks and benefits or current treatment plan and medications, and accepts the treatment and medication with any possible risks. Pt asks appropriate questions, which were answered. Pt was instructed to call with any concerns or problems. I have reviewed the note documented by the scribe. The services provided are my own. The documentation is accurate     Dominga Gutierrez, who was evaluated through a synchronous (real-time) audio-video encounter, and/or her healthcare decision maker, is aware that it is a billable service, with coverage as determined by her insurance carrier.  She provided verbal consent to proceed: Yes, and patient identification was verified. It was conducted pursuant to the emergency declaration under the [address] [place] [address] and the [employer] Act. A caregiver was present when appropriate. Ability to conduct physical exam was limited. I was at home. The patient was at home.

## 2021-01-20 ENCOUNTER — VIRTUAL VISIT (OUTPATIENT)
Dept: INTERNAL MEDICINE CLINIC | Age: 81
End: 2021-01-20
Payer: MEDICARE

## 2021-01-20 DIAGNOSIS — E03.9 ACQUIRED HYPOTHYROIDISM: ICD-10-CM

## 2021-01-20 DIAGNOSIS — E66.01 MORBID OBESITY (HCC): ICD-10-CM

## 2021-01-20 DIAGNOSIS — E78.2 MIXED HYPERLIPIDEMIA: ICD-10-CM

## 2021-01-20 DIAGNOSIS — E87.6 HYPOKALEMIA: ICD-10-CM

## 2021-01-20 DIAGNOSIS — I48.0 PAROXYSMAL ATRIAL FIBRILLATION (HCC): ICD-10-CM

## 2021-01-20 DIAGNOSIS — F41.9 ANXIETY: ICD-10-CM

## 2021-01-20 DIAGNOSIS — I10 ESSENTIAL HYPERTENSION: Primary | ICD-10-CM

## 2021-01-20 PROCEDURE — G8432 DEP SCR NOT DOC, RNG: HCPCS | Performed by: INTERNAL MEDICINE

## 2021-01-20 PROCEDURE — 1101F PT FALLS ASSESS-DOCD LE1/YR: CPT | Performed by: INTERNAL MEDICINE

## 2021-01-20 PROCEDURE — G8756 NO BP MEASURE DOC: HCPCS | Performed by: INTERNAL MEDICINE

## 2021-01-20 PROCEDURE — G8427 DOCREV CUR MEDS BY ELIG CLIN: HCPCS | Performed by: INTERNAL MEDICINE

## 2021-01-20 PROCEDURE — G8399 PT W/DXA RESULTS DOCUMENT: HCPCS | Performed by: INTERNAL MEDICINE

## 2021-01-20 PROCEDURE — 99214 OFFICE O/P EST MOD 30 MIN: CPT | Performed by: INTERNAL MEDICINE

## 2021-01-20 PROCEDURE — 1090F PRES/ABSN URINE INCON ASSESS: CPT | Performed by: INTERNAL MEDICINE

## 2021-01-20 PROCEDURE — G0463 HOSPITAL OUTPT CLINIC VISIT: HCPCS | Performed by: INTERNAL MEDICINE

## 2021-01-20 RX ORDER — SERTRALINE HYDROCHLORIDE 50 MG/1
50 TABLET, FILM COATED ORAL DAILY
Qty: 30 TAB | Refills: 2 | Status: SHIPPED | OUTPATIENT
Start: 2021-01-20 | End: 2021-03-09 | Stop reason: SDUPTHER

## 2021-01-21 ENCOUNTER — VIRTUAL VISIT (OUTPATIENT)
Dept: NEUROLOGY | Age: 81
End: 2021-01-21
Payer: MEDICARE

## 2021-01-21 DIAGNOSIS — R51.9 NEW ONSET HEADACHE: Primary | ICD-10-CM

## 2021-01-21 DIAGNOSIS — G62.9 NEUROPATHY: ICD-10-CM

## 2021-01-21 DIAGNOSIS — G60.9 IDIOPATHIC PERIPHERAL NEUROPATHY: ICD-10-CM

## 2021-01-21 DIAGNOSIS — M79.2 NEUROPATHIC PAIN: ICD-10-CM

## 2021-01-21 PROCEDURE — 99214 OFFICE O/P EST MOD 30 MIN: CPT | Performed by: PSYCHIATRY & NEUROLOGY

## 2021-01-21 PROCEDURE — 1090F PRES/ABSN URINE INCON ASSESS: CPT | Performed by: PSYCHIATRY & NEUROLOGY

## 2021-01-21 PROCEDURE — G8536 NO DOC ELDER MAL SCRN: HCPCS | Performed by: PSYCHIATRY & NEUROLOGY

## 2021-01-21 PROCEDURE — G8399 PT W/DXA RESULTS DOCUMENT: HCPCS | Performed by: PSYCHIATRY & NEUROLOGY

## 2021-01-21 PROCEDURE — G8428 CUR MEDS NOT DOCUMENT: HCPCS | Performed by: PSYCHIATRY & NEUROLOGY

## 2021-01-21 PROCEDURE — G8756 NO BP MEASURE DOC: HCPCS | Performed by: PSYCHIATRY & NEUROLOGY

## 2021-01-21 PROCEDURE — G8432 DEP SCR NOT DOC, RNG: HCPCS | Performed by: PSYCHIATRY & NEUROLOGY

## 2021-01-21 PROCEDURE — 1101F PT FALLS ASSESS-DOCD LE1/YR: CPT | Performed by: PSYCHIATRY & NEUROLOGY

## 2021-01-21 PROCEDURE — G8417 CALC BMI ABV UP PARAM F/U: HCPCS | Performed by: PSYCHIATRY & NEUROLOGY

## 2021-01-21 RX ORDER — AMITRIPTYLINE HYDROCHLORIDE 25 MG/1
25 TABLET, FILM COATED ORAL
Qty: 30 TAB | Refills: 2 | Status: SHIPPED | OUTPATIENT
Start: 2021-01-21 | End: 2021-03-09

## 2021-01-21 NOTE — PROGRESS NOTES
Neurology Note    Chief Complaint   Patient presents with    Headache    Numbness       HPI/Subjective  Maryruth Dakin is a 68 y.o. female who presented to the neurology office for management of RLS. She started having symptoms started in 2019. She does get tightness and numbness of sometimes the left leg and sometimes the right leg. It is off and on. It can stay for a few minutes or some hours. It can be any time of the day. It is a sensation of tightness. No back pain. No shooting pain down the leg. No DM. No weakness in the legs. It is knee down. Patient is also having intermittent head tenderness and vision changes. She sees zigzag lines. Interval history:  EMG/NCS shows peripheral neuropathy  She continues to get intermittent stinging pain . It has progressed since the last visit. She does have a sensation of \"fluid\" and heaviness in her head. Current Outpatient Medications   Medication Sig    amitriptyline (ELAVIL) 25 mg tablet Take 1 Tab by mouth nightly.  sertraline (ZOLOFT) 50 mg tablet Take 1 Tab by mouth daily.  hydroCHLOROthiazide (HYDRODIURIL) 25 mg tablet TAKE 1 TABLET BY MOUTH EVERY DAY    Synthroid 75 mcg tablet TAKE 1 TABLET BY MOUTH EVERY DAY BEFORE BREAKFAST    verapamil ER (CALAN-SR) 240 mg CR tablet Take 1 Tab by mouth nightly.  TAKE 1 TABLET BY MOUTH EVERY MORNING FOR BLOOD PRESSURE    losartan (COZAAR) 25 mg tablet TAKE 1 TABLET BY MOUTH EVERY DAY    cloNIDine HCL (CATAPRES) 0.2 mg tablet TAKE 1 TABLET BY MOUTH TWICE A DAY    rosuvastatin (CRESTOR) 20 mg tablet TAKE 1 TABLET BY MOUTH EVERY DAY    potassium chloride SR (KLOR-CON 10) 10 mEq tablet Take 2 tablets in morning and 2 tablets in the evening    Eliquis 5 mg tablet TAKE 1 TABLET BY MOUTH TWICE A DAY    varicella-zoster recombinant, PF, (Shingrix, PF,) 50 mcg/0.5 mL susr injection 0.5mL by IntraMUSCular route once now and then repeat in 2-6 months    CALCIUM CARBONATE/VITAMIN D3 (CALTRATE 600 + D PO) Take 1 Tab by mouth daily.  MULTIVITAMIN W-MINERALS/LUTEIN (CENTRUM SILVER PO) Take 1 Tab by mouth daily (after lunch). No current facility-administered medications for this visit. EXAMINATION:   There were no vitals taken for this visit. General:  Exam limited because of virtual visit. General appearance: Pt is in no acute distress     Neurological Examination:   Mental Status: AAO x3. Speech is fluent. Follows commands, has normal fund of knowledge, attention, short term recall, comprehension and insight. Cranial Nerves:  Extraocular movements are full. Facial movement intact. Hearing intact to conversation. Shoulder shrug symmetric. Tongue midline. Motor: Strength is symmetric in all 4 ext. Sensation: Normal according to patient to light touch    Coordination/Cerebellar: Intact to finger-nose-finger     Gait: Casual gait is normal.     Laboratory review:   Results for orders placed or performed in visit on 52/84/08   METABOLIC PANEL, COMPREHENSIVE   Result Value Ref Range    Glucose 99 65 - 99 mg/dL    BUN 23 8 - 27 mg/dL    Creatinine 0.92 0.57 - 1.00 mg/dL    GFR est non-AA 60 >59 mL/min/1.73    GFR est AA 69 >59 mL/min/1.73    BUN/Creatinine ratio 25 12 - 28    Sodium 139 134 - 144 mmol/L    Potassium 3.8 3.5 - 5.2 mmol/L    Chloride 102 96 - 106 mmol/L    CO2 26 20 - 29 mmol/L    Calcium 9.9 8.7 - 10.3 mg/dL    Protein, total 6.8 6.0 - 8.5 g/dL    Albumin 4.5 3.7 - 4.7 g/dL    GLOBULIN, TOTAL 2.3 1.5 - 4.5 g/dL    A-G Ratio 2.0 1.2 - 2.2    Bilirubin, total 0.4 0.0 - 1.2 mg/dL    Alk.  phosphatase 70 39 - 117 IU/L    AST (SGOT) 14 0 - 40 IU/L    ALT (SGPT) 13 0 - 32 IU/L   TSH 3RD GENERATION   Result Value Ref Range    TSH 0.926 0.450 - 4.500 uIU/mL   CBC W/O DIFF   Result Value Ref Range    WBC 3.9 3.4 - 10.8 x10E3/uL    RBC 4.14 3.77 - 5.28 x10E6/uL    HGB 13.3 11.1 - 15.9 g/dL    HCT 39.7 34.0 - 46.6 %    MCV 96 79 - 97 fL    MCH 32.1 26.6 - 33.0 pg    MCHC 33.5 31.5 - 35.7 g/dL    RDW 12.3 11.7 - 15.4 %    PLATELET 282 538 - 155 x10E3/uL       Imaging review:  6/4/2020  EMG/nerve conduction study  This is an abnormal study. 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 3/11/2020  CT Head  Normal    Documentation review:  None    Assessment/Plan:   Roque Bain is a 68 y.o. female who presented to the neurology office for management of paresthesia distally in the lower extremities. Sometimes it can be on the left and sometimes on the right. On examination patient does have decreased pinprick sensation distally in the lower extremities. 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 Blood work has been normal.  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 She is complaining of neuropathic pain. We will start her on amitriptyline 25 mg p.o. nightly. The patient is also complaining of intermittent headaches and does have visual auras of zigzag lines. She does have possibly retinal migraines. CT scan of the head was normal.  Amitriptyline for neuropathy is also going to help her with her headaches. Follow-up in 3 months    3 most recent PHQ Screens 1/20/2021   Little interest or pleasure in doing things Several days   Feeling down, depressed, irritable, or hopeless -   Total Score PHQ 2 -     Primary care to address possible depression if PHQ-9 score is more than 9. ICD-10-CM ICD-9-CM    1. New onset headache  R51.9 784.0 amitriptyline (ELAVIL) 25 mg tablet   2. Neuropathy  G62.9 355.9    3. Neuropathic pain  M79.2 729.2 amitriptyline (ELAVIL) 25 mg tablet   4. Idiopathic peripheral neuropathy  G60.9 356.9 amitriptyline (ELAVIL) 25 mg tablet      Thank you for allowing me to participate in the care of Ms. Bird. Please feel free to contact me if you have any questions. Electronically signed.    Daryl Daniels MD  Neurologist    CC: Victor Hugo Chaney MD  Fax: [phone]    This note was created using voice recognition software. Despite editing, there may be syntax errors. Mechelle King was seen by synchronous (real-time) audio-video technology on 01/21/21. Consent:  She and/or her healthcare decision maker is aware that this patient-initiated Telehealth encounter is a billable service, with coverage as determined by her insurance carrier. She is aware that she may receive a bill and has provided verbal consent to proceed: Yes    I was in the office while conducting this encounter. Pursuant to the emergency declaration under the [facility], [facility] waiver authority and the [employer] and [employer] Act, this Virtual  Visit was conducted, with patient's consent, to reduce the patient's risk of exposure to COVID-19 and provide continuity of care for an established patient. Services were provided through a video synchronous discussion virtually to substitute for in-person clinic visit.

## 2021-01-26 ENCOUNTER — TELEPHONE (OUTPATIENT)
Dept: CARDIOLOGY CLINIC | Age: 81
End: 2021-01-26

## 2021-01-26 NOTE — TELEPHONE ENCOUNTER
Verified patient with 2 identifiers  Patient had an issue of high bp readings on 1/15/21. We advised her to take an extra carvedilol that day. Today Her pressures were up again. 10 am 155/; 2 pm 170/ and 3 pm 171/88 3 pm. Should she take an extra carvedilol today  She has an OV here on Thursday.

## 2021-01-26 NOTE — TELEPHONE ENCOUNTER
Verified patient with 2 identifiers   Advised patient to start taking Losartan 50 mg daily  Patient verified understanding. 20.27

## 2021-01-28 ENCOUNTER — OFFICE VISIT (OUTPATIENT)
Dept: CARDIOLOGY CLINIC | Age: 81
End: 2021-01-28
Payer: MEDICARE

## 2021-01-28 VITALS
OXYGEN SATURATION: 96 % | HEIGHT: 65 IN | WEIGHT: 179.2 LBS | HEART RATE: 64 BPM | SYSTOLIC BLOOD PRESSURE: 122 MMHG | BODY MASS INDEX: 29.85 KG/M2 | RESPIRATION RATE: 18 BRPM | DIASTOLIC BLOOD PRESSURE: 74 MMHG

## 2021-01-28 DIAGNOSIS — I48.0 PAROXYSMAL ATRIAL FIBRILLATION (HCC): Primary | ICD-10-CM

## 2021-01-28 DIAGNOSIS — I10 ESSENTIAL HYPERTENSION: ICD-10-CM

## 2021-01-28 DIAGNOSIS — R00.2 PALPITATIONS: ICD-10-CM

## 2021-01-28 DIAGNOSIS — E78.2 MIXED HYPERLIPIDEMIA: ICD-10-CM

## 2021-01-28 PROCEDURE — 93005 ELECTROCARDIOGRAM TRACING: CPT | Performed by: INTERNAL MEDICINE

## 2021-01-28 PROCEDURE — 93010 ELECTROCARDIOGRAM REPORT: CPT | Performed by: INTERNAL MEDICINE

## 2021-01-28 PROCEDURE — 1101F PT FALLS ASSESS-DOCD LE1/YR: CPT | Performed by: INTERNAL MEDICINE

## 2021-01-28 PROCEDURE — G0463 HOSPITAL OUTPT CLINIC VISIT: HCPCS | Performed by: INTERNAL MEDICINE

## 2021-01-28 PROCEDURE — G8752 SYS BP LESS 140: HCPCS | Performed by: INTERNAL MEDICINE

## 2021-01-28 PROCEDURE — G8427 DOCREV CUR MEDS BY ELIG CLIN: HCPCS | Performed by: INTERNAL MEDICINE

## 2021-01-28 PROCEDURE — 1090F PRES/ABSN URINE INCON ASSESS: CPT | Performed by: INTERNAL MEDICINE

## 2021-01-28 PROCEDURE — G8417 CALC BMI ABV UP PARAM F/U: HCPCS | Performed by: INTERNAL MEDICINE

## 2021-01-28 PROCEDURE — 99215 OFFICE O/P EST HI 40 MIN: CPT | Performed by: INTERNAL MEDICINE

## 2021-01-28 PROCEDURE — G8432 DEP SCR NOT DOC, RNG: HCPCS | Performed by: INTERNAL MEDICINE

## 2021-01-28 PROCEDURE — G8399 PT W/DXA RESULTS DOCUMENT: HCPCS | Performed by: INTERNAL MEDICINE

## 2021-01-28 PROCEDURE — G8754 DIAS BP LESS 90: HCPCS | Performed by: INTERNAL MEDICINE

## 2021-01-28 PROCEDURE — G8536 NO DOC ELDER MAL SCRN: HCPCS | Performed by: INTERNAL MEDICINE

## 2021-01-28 NOTE — PROGRESS NOTES
Subjective:      Tom Briceno is a 68 y.o. female is here for EP consult. She is doing a volunteer job and c/o increased stress and palpitations. Monitor was placed and her losartan was increased due to increased bp.       Patient Active Problem List    Diagnosis Date Noted    Pericardial effusion with cardiac tamponade 03/18/2017    S/P ablation of atrial fibrillation 03/17/2017    Acquired hypothyroidism 12/14/2015    HTN (hypertension) 07/21/2015    Atrial fibrillation ([address] 03/16/2015    Morbid obesity ([address]     Hyperlipidemia 07/14/2014    Hypokalemia 07/14/2014    Postmenopausal bleeding 10/18/2011      Brayan Cm MD  Past Medical History:   Diagnosis Date    Arrhythmia     afib    Arthritis     right knee, left shoulder    Diverticulosis     Frequency of urination     High cholesterol     Hypertension     Morbid obesity ([address]     Thyroid disease     hypothyroid    Unspecified adverse effect of anesthesia     low BP      Past Surgical History:   Procedure Laterality Date    HX GYN      myomectomy on uterus    HX GYN  2011    hystereoscopy D&C    HX LYMPH NODE DISSECTION      biopsy    HX OTHER SURGICAL      lymph node bx    [place] BREAST SURGERY PROCEDURE UNLISTED  1982    breast bx rt    [place] CARDIAC SURG PROCEDURE UNLIST  01/2015    cardiac catheterization, no intervention, medical management    [place] CARDIAC SURG PROCEDURE UNLIST  03/2017    ablation    UPPER GI ENDOSCOPY,BIOPSY  10/24/2019         UPPER GI ENDOSCOPY,FN NEEDLE BX,GUIDED  10/24/2019          Allergies   Allergen Reactions    Adhesive Hives    Amoxicillin Unknown (comments)    Lipitor [Atorvastatin] Myalgia     Gets the flu      Family History   Problem Relation Age of Onset    Alzheimer Mother     Heart Disease Mother     Heart Disease Father     Hypertension Father     Cancer Paternal Aunt     Diabetes Paternal Grandmother     negative for cardiac disease  Social History     Socioeconomic History  Marital status:      Spouse name: Not on file    Number of children: Not on file    Years of education: Not on file    Highest education level: Not on file   Tobacco Use    Smoking status: Never Smoker    Smokeless tobacco: Never Used   Substance and Sexual Activity    Alcohol use: No    Drug use: No     Current Outpatient Medications   Medication Sig    sertraline (ZOLOFT) 50 mg tablet Take 1 Tab by mouth daily.  hydroCHLOROthiazide (HYDRODIURIL) 25 mg tablet TAKE 1 TABLET BY MOUTH EVERY DAY    Synthroid 75 mcg tablet TAKE 1 TABLET BY MOUTH EVERY DAY BEFORE BREAKFAST    verapamil ER (CALAN-SR) 240 mg CR tablet Take 1 Tab by mouth nightly. TAKE 1 TABLET BY MOUTH EVERY MORNING FOR BLOOD PRESSURE    losartan (COZAAR) 25 mg tablet TAKE 1 TABLET BY MOUTH EVERY DAY (Patient taking differently: TAKE 2 TABLET = 50 MG BY MOUTH EVERY DAY)    cloNIDine HCL (CATAPRES) 0.2 mg tablet TAKE 1 TABLET BY MOUTH TWICE A DAY    rosuvastatin (CRESTOR) 20 mg tablet TAKE 1 TABLET BY MOUTH EVERY DAY    potassium chloride SR (KLOR-CON 10) 10 mEq tablet Take 2 tablets in morning and 2 tablets in the evening    Eliquis 5 mg tablet TAKE 1 TABLET BY MOUTH TWICE A DAY    CALCIUM CARBONATE/VITAMIN D3 (CALTRATE 600 + D PO) Take 1 Tab by mouth daily.  MULTIVITAMIN W-MINERALS/LUTEIN (CENTRUM SILVER PO) Take 1 Tab by mouth daily (after lunch).  amitriptyline (ELAVIL) 25 mg tablet Take 1 Tab by mouth nightly.  varicella-zoster recombinant, PF, (Shingrix, PF,) 50 mcg/0.5 mL susr injection 0.5mL by IntraMUSCular route once now and then repeat in 2-6 months     No current facility-administered medications for this visit. Vitals:    01/28/21 1125   BP: 122/74   Pulse: 64   Resp: 18   SpO2: 96%   Weight: 179 lb 3.2 oz (81.3 kg)   Height: 5' 5\" (1.651 m)       I have reviewed the nurses notes, vitals, problem list, allergy list, medical history, family, social history and medications.     Review of Symptoms:    General: Pt denies excessive weight gain or loss. Pt is able to conduct ADL's  HEENT: Denies blurred vision, headaches, hearing loss, epistaxis and difficulty swallowing. Respiratory: Denies cough, congestion, shortness of breath, ANGUIANO, wheezing or stridor. Cardiovascular: Denies precordial pain, palpitations, edema or PND  Gastrointestinal: Denies poor appetite, indigestion, abdominal pain or blood in stool  Genitourinary: Denies hematuria, dysuria, increased urinary frequency  Musculoskeletal: Denies joint pain or swelling from muscles or joints  Neurologic: Denies tremor, paresthesias, headache, or sensory motor disturbance  Psychiatric: Denies confusion, insomnia, depression  Integumentray: Denies rash, itching or ulcers. Hematologic: Denies easy bruising, bleeding    Physical Exam:      General: Well developed, in no acute distress. HEENT: Eyes - PERRL, no jvd  Heart:  Normal S1/S2 negative S3 or S4. Regular, no murmur, gallop or rub. Respiratory: Clear bilaterally x 4, no wheezing or rales  Abdomen:   Soft, non-tender, bowel sounds are active. Extremities:  No edema, normal cap refill, no cyanosis. Musculoskeletal: No clubbing  Neuro: A&Ox3, speech clear, gait stable. Skin: Skin color is normal. No rashes or lesions.  Non diaphoretic, no ulcers or subcutaneous nodule  Vascular: 2+ pulses symmetric in all extremities  Psych - judgement intact and orientation is wnl     Cardiographics    Ekg: nsr    Results for orders placed or performed during the hospital encounter of 01/16/20   EKG, 12 LEAD, INITIAL   Result Value Ref Range    Ventricular Rate 79 BPM    Atrial Rate 79 BPM    P-R Interval 166 ms    QRS Duration 76 ms    Q-T Interval 370 ms    QTC Calculation (Bezet) 424 ms    Calculated P Axis 51 degrees    Calculated R Axis -24 degrees    Calculated T Axis 20 degrees    Diagnosis       Normal sinus rhythm  Minimal voltage criteria for LVH, may be normal variant  Poor R-wave Progression (consider lead placement or loss of anterior forces)  Nonspecific ST and T wave abnormality    Confirmed by Timmy Sullivan MD, Mcarthur Lefort ([id number]) on 1/17/2020 7:59:54 AM     Results for orders placed or performed in visit on 10/24/17   CARDIAC HOLTER MONITOR, 24 HOURS    Narrative    ECG Monitor/24 hours, Complete    Reason for Holter Monitor   PAF    Heartbeat    Slowest 51  Average 67  Fastest  102      Results:   Underlying Rhythm: Normal sinus rhythm      Atrial Arrhythmias: premature atrial contractions; occasional, atrial couplets and atrial triplets            AV Conduction: normal    Ventricular Arrhythmias: premature ventricular contractions; occasional     ST Segment Analysis:normal     Symptom Correlation:  none    Comment:   Sinus rhythm throughout with occasional atrial ectopy     Jemima Pardo MD, [facility]            Lab Results   Component Value Date/Time    WBC 3.9 12/29/2020 08:15 AM    HGB 13.3 12/29/2020 08:15 AM    HCT 39.7 12/29/2020 08:15 AM    PLATELET 290 03/76/4758 08:15 AM    MCV 96 12/29/2020 08:15 AM      Lab Results   Component Value Date/Time    Sodium 139 12/29/2020 08:15 AM    Potassium 3.8 12/29/2020 08:15 AM    Chloride 102 12/29/2020 08:15 AM    CO2 26 12/29/2020 08:15 AM    Anion gap 4 (L) 01/16/2020 11:36 PM    Glucose 99 12/29/2020 08:15 AM    BUN 23 12/29/2020 08:15 AM    Creatinine 0.92 12/29/2020 08:15 AM    BUN/Creatinine ratio 25 12/29/2020 08:15 AM    GFR est AA 69 12/29/2020 08:15 AM    GFR est non-AA 60 12/29/2020 08:15 AM    Calcium 9.9 12/29/2020 08:15 AM    Bilirubin, total 0.4 12/29/2020 08:15 AM    Alk. phosphatase 70 12/29/2020 08:15 AM    Protein, total 6.8 12/29/2020 08:15 AM    Albumin 4.5 12/29/2020 08:15 AM    Globulin 3.7 01/16/2020 11:36 PM    A-G Ratio 2.0 12/29/2020 08:15 AM    ALT (SGPT) 13 12/29/2020 08:15 AM         Assessment:     Assessment:        ICD-10-CM ICD-9-CM    1.  Paroxysmal atrial fibrillation (HCC)  I48.0 427.31 AMB POC EKG ROUTINE W/ 12 LEADS, INTER & REP   2. Essential hypertension  I10 401.9    3. Mixed hyperlipidemia  E78.2 272.2    4. Palpitations  R00.2 785.1      Orders Placed This Encounter    AMB POC EKG ROUTINE W/ 12 LEADS, INTER & REP     Order Specific Question:   Reason for Exam:     Answer:   routine        Plan:   Nimo Masterson is having some increased palpitations and bp with new job. bp is better controlled with increased losartan. We will read the monitor once the period is over. She is in sinus and asymptomatic sp af abl. In monitor is wnl, she can f/u in one year. Cont med rx for htn and hyperlipidemia. During this visit,  the patient and I had a comprehensive discussion of device management using principles of shared decision making. This included a discussion of anti-arrhythmic medications including class III agents (e.g. amiodarone, sotalol etc) and both class II agents (beta-blockers). We reviewed the potentially life-threatening side effects of these medications, including (but not limited to) fatal tachyarrhythmias, pulmonary toxicity, liver toxicity and thyroid disorders. We also reviewed the requisite monitoring required of some of these medications. In addition, we reviewed device therapy, including the potential risks and benefits of device management. These risks include death, myocardial infarction, stroke, cardiac perforation, vascular injury, injury, pacing induced cardiomyopathy, inappropriate shocks (defibrillator) and other less severe complications. The patient demonstrated a clear understanding of these issues during out discussion. Our plans, determined together after thorough consideration, are outlined else where in this note. Continue medical management for af, htn and hyperlipidemia. Thank you for allowing me to participate in Nimo Masterson 's care.     Cha Ramírez MD, [facility]

## 2021-01-28 NOTE — LETTER
1/28/2021 Patient: Sergio Box YOB: 1940 Date of Visit: 1/28/2021 Jani Garcia MD 
[address] [place] [address] [address] [employer] Dear Jani Garcia MD, Thank you for referring Ms. Yuri Jhaveri to [facility] for evaluation. My notes for this consultation are attached. If you have questions, please do not hesitate to call me. I look forward to following your patient along with you. Sincerely, Saint Good, MD

## 2021-01-28 NOTE — PROGRESS NOTES
Chief Complaint   Patient presents with    Irregular Heart Beat     Annual follow up - has been having high BP     Palpitations     feels heart racing      1. Have you been to the ER, urgent care clinic since your last visit? Hospitalized since your last visit? NO     2. Have you seen or consulted any other health care providers outside of the [address] [place] [address] since your last visit? Include any pap smears or colon screening.   NO

## 2021-02-16 DIAGNOSIS — I10 ESSENTIAL HYPERTENSION: ICD-10-CM

## 2021-02-16 RX ORDER — VERAPAMIL HYDROCHLORIDE 240 MG/1
TABLET, FILM COATED, EXTENDED RELEASE ORAL
Qty: 90 TAB | Refills: 0 | Status: SHIPPED | OUTPATIENT
Start: 2021-02-16 | End: 2021-05-21

## 2021-02-28 ENCOUNTER — HOSPITAL ENCOUNTER (OUTPATIENT)
Dept: PREADMISSION TESTING | Age: 81
Discharge: HOME | End: 2021-02-28
Payer: MEDICARE

## 2021-02-28 LAB — SARS-COV-2, COV2: NORMAL

## 2021-02-28 PROCEDURE — U0003 INFECTIOUS AGENT DETECTION BY NUCLEIC ACID (DNA OR RNA); SEVERE ACUTE RESPIRATORY SYNDROME CORONAVIRUS 2 (SARS-COV-2) (CORONAVIRUS DISEASE [COVID-19]), AMPLIFIED PROBE TECHNIQUE, MAKING USE OF HIGH THROUGHPUT TECHNOLOGIES AS DESCRIBED BY CMS-2020-01-R: HCPCS

## 2021-03-03 ENCOUNTER — ANESTHESIA EVENT (OUTPATIENT)
Dept: ENDOSCOPY | Age: 81
End: 2021-03-03
Payer: MEDICARE

## 2021-03-03 LAB — SARS-COV-2, COV2NT: NOT DETECTED

## 2021-03-03 NOTE — ANESTHESIA PREPROCEDURE EVALUATION
Relevant Problems   CARDIOVASCULAR   (+) Atrial fibrillation (HCC)   (+) HTN (hypertension)      ENDOCRINE   (+) Acquired hypothyroidism   (+) Morbid obesity ([facility]       Anesthetic History               Review of Systems / Medical History  Patient summary reviewed and pertinent labs reviewed    Pulmonary  Within defined limits                 Neuro/Psych   Within defined limits           Cardiovascular    Hypertension        Dysrhythmias (has had an ablation previously) : atrial fibrillation  Hyperlipidemia    Exercise tolerance: >4 METS     GI/Hepatic/Renal  Within defined limits              Endo/Other      Hypothyroidism: well controlled  Obesity and arthritis     Other Findings            Physical Exam    Airway  Mallampati: II  TM Distance: 4 - 6 cm  Neck ROM: normal range of motion   Mouth opening: Normal     Cardiovascular  Regular rate and rhythm,  S1 and S2 normal,  no murmur, click, rub, or gallop             Dental  No notable dental hx       Pulmonary  Breath sounds clear to auscultation               Abdominal  GI exam deferred       Other Findings            Anesthetic Plan    ASA: 2  Anesthesia type: MAC and total IV anesthesia          Induction: Intravenous  Anesthetic plan and risks discussed with: Patient

## 2021-03-04 ENCOUNTER — ANESTHESIA (OUTPATIENT)
Dept: ENDOSCOPY | Age: 81
End: 2021-03-04
Payer: MEDICARE

## 2021-03-05 NOTE — PROGRESS NOTES
HISTORY OF PRESENT ILLNESS  Elbert Xiong is a 80 y.o. female. HPI     Last vv 1/20/21  Pt is here for chronic conditions.      She c/o neck pain sporadically in the left neck when she turns her head a certain direction x 1 year  Discussed this is muscular discussed painful and progressive  Pain doesn't go down arms       BP is controlled  BP at cardio 122/74  BP at home 120-130/70  Continues on clonidine 0.2mg BID, HCTZ 25mg   Losartan was increased from 25 mg to 50 mg per cardio when her bp was running 155/77 170/75  Continues on verapamil  240 mg per day   Recall she had a cough on lisinopril      Wt is 183 lbs - up 6 lbs x lov     Please recall we have been evaluating this extensively over the last 1 to 2 years she has had multiple CTs of the abdomen she has had an EGD she had a chest x-ray she has had a head CT she is had multiple labs completed colonoscopy now will need to be repeated her mammograms have been up-to-date though her mammogram is currently due     Reviewed labs   Ordered labs      Pt is following with Dr Morales (GI) for weight loss and pancreatic cyst  Pt had an EGD and EUS 10/24/19  Last there 11/20. Will check back in 1 year   Will be having colo 7/1/21      Pt had a cardiac ablation for her afib per Dr. Jim Jaramillo (cardio) on 3/17/17   Pt will only f/u with this physician prn       Pt saw Dr. Breana Trinidad (cardio) for f/u after a fib  Pt was taken off of sotalol  Feels better more energy with this  Continues on eliquis 5 mg bid  Pt denies any bleeding/falls on these meds 3/21  Reviewed note 1/28/21: Elbert Xiong is having some increased palpitations and bp with new job. bp is better controlled with increased losartan. We will read the monitor once the period is over. She is in sinus and asymptomatic sp af abl. In monitor is wnl, she can f/u in one year. Cont med rx for htn and hyperlipidemia.   During this visit,  the patient and I had a comprehensive discussion of device management using principles of shared decision making. This included a discussion of anti-arrhythmic medications including class III agents (e.g. amiodarone, sotalol etc) and both class II agents (beta-blockers). We reviewed the potentially life-threatening side effects of these medications, including (but not limited to) fatal tachyarrhythmias, pulmonary toxicity, liver toxicity and thyroid disorders. We also reviewed the requisite monitoring required of some of these medications. In addition, we reviewed device therapy, including the potential risks and benefits of device management. These risks include death, myocardial infarction, stroke, cardiac perforation, vascular injury, injury, pacing induced cardiomyopathy, inappropriate shocks (defibrillator) and other less severe complications. The patient demonstrated a clear understanding of these issues during out discussion. Our plans, determined together after thorough consideration, are outlined else where in this note. He increased losartan to 50 mg      Pt follows Dr. Tita Yeager (Neuro) for neuropathy and HA  Reviewed note 1/21/21: Roque Bain is a 68 y.o. female who presented to the neurology office for management of paresthesia distally in the lower extremities. Sometimes it can be on the left and sometimes on the right. On examination patient does have decreased pinprick sensation distally in the lower extremities. 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 Blood work has been normal.  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 She is complaining of neuropathic pain. We will start her on amitriptyline 25 mg p.o. nightly. The patient is also complaining of intermittent headaches and does have visual auras of zigzag lines. She does have possibly retinal migraines.   CT scan of the head was normal.  Amitriptyline for neuropathy is also going to help her with her headaches  She was given amitriptyline for headaches and neuropathy- she is not taking this, it was never approved   She is doing fine with this     Pt saw Dr. Teodoro De Santiago (cardio) for foot circulation  Last visit was 8/1/17   Recall has chronic sx of leg tightness  Pt will only f/u with this physician prn      Pt saw Dr. Joon Christensen (uro-gyn) for urinary sx  Last visit was 11/17  Pt was told she had lichen sclerosus   Now resolved      Pt saw Dr Aurea Stephenson (sleep)  Last visit was 7/3/18  Recall telephone note 9/18: no significant sleep disordered breathing, overall AHI of 0.6/h  Pt had a poor experience and was told she has no sleep apnea     She has been seeing Dr. Emil Mota (surg) for lipoma  Last there 11/12/20     Pt had a cyst on R breast removed by Dr Paradise Sales (surg) in the past      Continues crestor 20mg daily for cholesterol at goal 1/21      Continues klor-con 20meqs BID for her potassium at goal 3/21     Continues on synthroid 75mcg daily  1/21  Will take full dose 6 days per week and will take half day 1 day per week      Lov, increased zoloft from 25mg to 50 mg for anxiety - this is working well happy with increased dose      Pt followed with Dr Vic Pedroza (podiatry)   She would like to f/u with this physician for 2 corns on her foot      ACP not on file.  SDM is her sister, Rashmi Granda.   Provided information      Recall She had a cervical polyp, was having vaginal bleeding, this was removed by julio césar, vaginal bleeding has since resolved.        PREVENTIVE:    Colonoscopy: 8/21/13, Dr. Francesca Antonio, repeat 10 years, scheduled 7/21 with Dr. Lulú Mendoza  Pap: Usually Dr. Mirian Salmon - will get report for review  DEXA: 10/19 osteopenia   Tdap: 4/14/2015  Pneumovax: 11/19/2013  [id number]: 4/05/2017  Zostavax: declines  Shingrix: ordered 04/17/19 still needs to complete, h/o shingles  Flu shot: 10/06/20  A1C:  8/16 5.9, 10/16 5.8, 4/17 5.9, 10/17 POC 5.6, 4/18 5.6, 10/18 5.6, 4/19 5.6 11/19 5.5  4/20 5.5 10/20 5.4  Eye exam: Dr. Olivier Madrid, cataract, due now reminded Lipids: 4/20  Covid: 1st round completed ([place])    Patient Active Problem List    Diagnosis Date Noted    Anxiety 03/09/2021    Pericardial effusion with cardiac tamponade 03/18/2017    S/P ablation of atrial fibrillation 03/17/2017    Acquired hypothyroidism 12/14/2015    HTN (hypertension) 07/21/2015    Atrial fibrillation ([facility] 03/16/2015    Morbid obesity ([facility]     Hyperlipidemia 07/14/2014    Hypokalemia 07/14/2014    Postmenopausal bleeding 10/18/2011     Current Outpatient Medications   Medication Sig Dispense Refill    sertraline (ZOLOFT) 50 mg tablet Take 1 Tab by mouth daily. 90 Tab 2    losartan (COZAAR) 50 mg tablet Take 1 Tab by mouth daily. 90 Tab 1    Synthroid 75 mcg tablet TAKE 1 TABLET BY MOUTH EVERY DAY BEFORE BREAKFAST 90 Tab 0    verapamil ER (CALAN-SR) 240 mg CR tablet TAKE 1 TABLET BY MOUTH EVERY MORNING FOR BLOOD PRESSURE 90 Tab 0    hydroCHLOROthiazide (HYDRODIURIL) 25 mg tablet TAKE 1 TABLET BY MOUTH EVERY DAY 90 Tab 0    cloNIDine HCL (CATAPRES) 0.2 mg tablet TAKE 1 TABLET BY MOUTH TWICE A DAY 60 Tab 0    rosuvastatin (CRESTOR) 20 mg tablet TAKE 1 TABLET BY MOUTH EVERY DAY 90 Tab 1    potassium chloride SR (KLOR-CON 10) 10 mEq tablet Take 2 tablets in morning and 2 tablets in the evening 360 Tab 1    Eliquis 5 mg tablet TAKE 1 TABLET BY MOUTH TWICE A DAY 60 Tab 6    MULTIVITAMIN W-MINERALS/LUTEIN (CENTRUM SILVER PO) Take 1 Tab by mouth daily (after lunch).  CALCIUM CARBONATE/VITAMIN D3 (CALTRATE 600 + D PO) Take 1 Tab by mouth daily.        Past Surgical History:   Procedure Laterality Date    HX GYN      myomectomy on uterus    HX GYN  2011    hystereoscopy D&C    HX LYMPH NODE DISSECTION      biopsy    HX OTHER SURGICAL      lymph node bx    [facility] BREAST SURG[facility] PROCEDURE UNLISTED  1982    breast bx rt    [facility] CARDIAC SUR[facility] PROCEDURE UNLIST  01/2015    cardiac catheterization, no intervention, medical management    [facility] CARDIAC SUR[facility] PROCEDURE UNLIST  03/2017 ablation    UPPER GI ENDOSCOPY,BIOPSY  10/24/2019         UPPER GI ENDOSCOPY,FN NEEDLE BX,GUIDED  10/24/2019           Lab Results   Component Value Date/Time    WBC 3.9 12/29/2020 08:15 AM    HGB 13.3 12/29/2020 08:15 AM    HCT 39.7 12/29/2020 08:15 AM    PLATELET 689 10/85/8487 08:15 AM    MCV 96 12/29/2020 08:15 AM     Lab Results   Component Value Date/Time    Cholesterol, total 144 04/07/2020 12:28 PM    HDL Cholesterol 58 04/07/2020 12:28 PM    LDL, calculated 69 04/07/2020 12:28 PM    Triglyceride 86 04/07/2020 12:28 PM     Lab Results   Component Value Date/Time    GFR est non-AA 60 12/29/2020 08:15 AM    GFR est AA 69 12/29/2020 08:15 AM    Creatinine 0.92 12/29/2020 08:15 AM    BUN 23 12/29/2020 08:15 AM    Sodium 139 12/29/2020 08:15 AM    Potassium 3.8 12/29/2020 08:15 AM    Chloride 102 12/29/2020 08:15 AM    CO2 26 12/29/2020 08:15 AM    Magnesium 2.2 01/16/2020 11:36 PM        Review of Systems   Respiratory: Negative for shortness of breath. Cardiovascular: Negative for chest pain. Musculoskeletal: Positive for neck pain. Physical Exam  Constitutional:       General: She is not in acute distress. Appearance: Normal appearance. She is not ill-appearing, toxic-appearing or diaphoretic. HENT:      Head: Normocephalic and atraumatic. Right Ear: External ear normal.      Left Ear: External ear normal.   Eyes:      General:         Right eye: No discharge. Left eye: No discharge. Conjunctiva/sclera: Conjunctivae normal.      Pupils: Pupils are equal, round, and reactive to light. Neck:      Musculoskeletal: Normal range of motion and neck supple. Vascular: No carotid bruit. Cardiovascular:      Rate and Rhythm: Normal rate and regular rhythm. Pulses: Normal pulses. Heart sounds: Murmur (2/6) present. No friction rub. No gallop. Pulmonary:      Effort: No respiratory distress. Breath sounds: Normal breath sounds. No wheezing or rales.    Chest: Chest wall: No tenderness. Musculoskeletal: Normal range of motion. Right lower leg: No edema. Left lower leg: No edema. Skin:     General: Skin is warm and dry. Neurological:      Mental Status: She is alert and oriented to person, place, and time. Mental status is at baseline. Coordination: Coordination normal.      Gait: Gait normal.   Psychiatric:         Mood and Affect: Mood normal.         Behavior: Behavior normal.         ASSESSMENT and PLAN    ICD-10-CM ICD-9-CM    1. Essential hypertension         Controlled on clonidine twice daily hydrochlorothiazide daily losartan 50 mg daily and verapamil continue no change dose       C94 983.9 METABOLIC PANEL, COMPREHENSIVE      TSH 3RD GENERATION      HEMOGLOBIN A1C WITH EAG      LIPID PANEL      CBC W/O DIFF      HEPATITIS C AB   2. Morbid obesity (HCC)  F46.24 724.80 METABOLIC PANEL, COMPREHENSIVE      TSH 3RD GENERATION   Weight improved over the last couple of years though recently some weight has come back she will work on portions   HEMOGLOBIN A1C WITH EAG      LIPID PANEL      CBC W/O DIFF      HEPATITIS C AB   3. Paroxysmal atrial fibrillation (HCC)  K34.9 548.52 METABOLIC PANEL, COMPREHENSIVE      TSH 3RD GENERATION   Rate controlled    Anticoagulated on Eliquis twice daily   HEMOGLOBIN A1C WITH EAG      LIPID PANEL      CBC W/O DIFF      HEPATITIS C AB   4. Acquired hypothyroidism  X39.5 726.3 METABOLIC PANEL, COMPREHENSIVE      TSH 3RD GENERATION   On Synthroid monitor TSH adjust dose as needed   HEMOGLOBIN A1C WITH EAG      LIPID PANEL      CBC W/O DIFF      HEPATITIS C AB   5. Mixed hyperlipidemia  F28.7 931.3 METABOLIC PANEL, COMPREHENSIVE      TSH 3RD GENERATION   Controlled on Crestor 20 mg   HEMOGLOBIN A1C WITH EAG      LIPID PANEL      CBC W/O DIFF      HEPATITIS C AB   6.  Anxiety  D65.1 379.00 METABOLIC PANEL, COMPREHENSIVE      TSH 3RD GENERATION   Much improved with Zoloft 50 mg continue   HEMOGLOBIN A1C WITH EAG LIPID PANEL      CBC W/O DIFF      HEPATITIS C AB   7. Hypokalemia  U60.5 592.4 METABOLIC PANEL, COMPREHENSIVE      TSH 3RD GENERATION   Has been controlled on Klor-Con monitor BMP   HEMOGLOBIN A1C WITH EAG      LIPID PANEL      CBC W/O DIFF      HEPATITIS C AB   8. Neuropathy  P06.5 265.5 METABOLIC PANEL, COMPREHENSIVE      TSH 3RD GENERATION   Sees neurology for this ultimately decided not to take gabapentin or Elavil doing well without medication symptoms are tolerable   HEMOGLOBIN A1C WITH EAG      LIPID PANEL      CBC W/O DIFF      HEPATITIS C AB   9. IFG (impaired fasting glucose)  J60.29 237.32 METABOLIC PANEL, COMPREHENSIVE      TSH 3RD GENERATION      HEMOGLOBIN A1C WITH EAG   Check A1c   LIPID PANEL      CBC W/O DIFF      HEPATITIS C AB   10. Encounter for hepatitis C screening test for low risk patient  S48.99 I74.46 METABOLIC PANEL, COMPREHENSIVE      TSH 3RD GENERATION      HEMOGLOBIN A1C WITH EAG   Screen   LIPID PANEL      CBC W/O DIFF      HEPATITIS C AB      Depression screen reviewed and negative      Scribed by Angelo Melo of [address] [place] [address] [address], as dictated by Dr. Christiano Amanda. Current diagnosis and concerns discussed with pt at length. Pt understands risks and benefits or current treatment plan and medications, and accepts the treatment and medication with any possible risks. Pt asks appropriate questions, which were answered. Pt was instructed to call with any concerns or problems. I have reviewed the note documented by the scribe. The services provided are my own.   The documentation is accurate

## 2021-03-08 RX ORDER — LEVOTHYROXINE SODIUM 75 UG/1
TABLET ORAL
Qty: 90 TAB | Refills: 0 | Status: SHIPPED | OUTPATIENT
Start: 2021-03-08 | End: 2021-05-31

## 2021-03-09 ENCOUNTER — OFFICE VISIT (OUTPATIENT)
Dept: INTERNAL MEDICINE CLINIC | Age: 81
End: 2021-03-09
Payer: MEDICARE

## 2021-03-09 VITALS
SYSTOLIC BLOOD PRESSURE: 126 MMHG | HEART RATE: 63 BPM | WEIGHT: 184 LBS | DIASTOLIC BLOOD PRESSURE: 64 MMHG | BODY MASS INDEX: 30.66 KG/M2 | RESPIRATION RATE: 16 BRPM | TEMPERATURE: 97.6 F | HEIGHT: 65 IN | OXYGEN SATURATION: 98 %

## 2021-03-09 DIAGNOSIS — E03.9 ACQUIRED HYPOTHYROIDISM: ICD-10-CM

## 2021-03-09 DIAGNOSIS — Z11.59 ENCOUNTER FOR HEPATITIS C SCREENING TEST FOR LOW RISK PATIENT: ICD-10-CM

## 2021-03-09 DIAGNOSIS — E87.6 HYPOKALEMIA: ICD-10-CM

## 2021-03-09 DIAGNOSIS — I10 ESSENTIAL HYPERTENSION: Primary | ICD-10-CM

## 2021-03-09 DIAGNOSIS — G62.9 NEUROPATHY: ICD-10-CM

## 2021-03-09 DIAGNOSIS — E78.2 MIXED HYPERLIPIDEMIA: ICD-10-CM

## 2021-03-09 DIAGNOSIS — R73.01 IFG (IMPAIRED FASTING GLUCOSE): ICD-10-CM

## 2021-03-09 DIAGNOSIS — E66.01 MORBID OBESITY (HCC): ICD-10-CM

## 2021-03-09 DIAGNOSIS — I48.0 PAROXYSMAL ATRIAL FIBRILLATION (HCC): ICD-10-CM

## 2021-03-09 DIAGNOSIS — F41.9 ANXIETY: ICD-10-CM

## 2021-03-09 PROCEDURE — G0463 HOSPITAL OUTPT CLINIC VISIT: HCPCS | Performed by: INTERNAL MEDICINE

## 2021-03-09 PROCEDURE — G8752 SYS BP LESS 140: HCPCS | Performed by: INTERNAL MEDICINE

## 2021-03-09 PROCEDURE — G8399 PT W/DXA RESULTS DOCUMENT: HCPCS | Performed by: INTERNAL MEDICINE

## 2021-03-09 PROCEDURE — G8536 NO DOC ELDER MAL SCRN: HCPCS | Performed by: INTERNAL MEDICINE

## 2021-03-09 PROCEDURE — G8510 SCR DEP NEG, NO PLAN REQD: HCPCS | Performed by: INTERNAL MEDICINE

## 2021-03-09 PROCEDURE — G8754 DIAS BP LESS 90: HCPCS | Performed by: INTERNAL MEDICINE

## 2021-03-09 PROCEDURE — 99214 OFFICE O/P EST MOD 30 MIN: CPT | Performed by: INTERNAL MEDICINE

## 2021-03-09 PROCEDURE — G8419 CALC BMI OUT NRM PARAM NOF/U: HCPCS | Performed by: INTERNAL MEDICINE

## 2021-03-09 PROCEDURE — 1101F PT FALLS ASSESS-DOCD LE1/YR: CPT | Performed by: INTERNAL MEDICINE

## 2021-03-09 PROCEDURE — 1090F PRES/ABSN URINE INCON ASSESS: CPT | Performed by: INTERNAL MEDICINE

## 2021-03-09 PROCEDURE — G8427 DOCREV CUR MEDS BY ELIG CLIN: HCPCS | Performed by: INTERNAL MEDICINE

## 2021-03-09 RX ORDER — LOSARTAN POTASSIUM 50 MG/1
50 TABLET ORAL DAILY
Qty: 90 TAB | Refills: 1 | Status: SHIPPED | OUTPATIENT
Start: 2021-03-09 | End: 2021-07-22

## 2021-03-09 RX ORDER — SERTRALINE HYDROCHLORIDE 50 MG/1
50 TABLET, FILM COATED ORAL DAILY
Qty: 90 TAB | Refills: 2 | Status: SHIPPED | OUTPATIENT
Start: 2021-03-09 | End: 2021-12-06

## 2021-03-22 RX ORDER — HYDROCHLOROTHIAZIDE 25 MG/1
TABLET ORAL
Qty: 90 TAB | Refills: 0 | Status: SHIPPED | OUTPATIENT
Start: 2021-03-22 | End: 2021-06-24

## 2021-04-01 RX ORDER — APIXABAN 5 MG/1
TABLET, FILM COATED ORAL
Qty: 60 TAB | Refills: 6 | Status: SHIPPED | OUTPATIENT
Start: 2021-04-01 | End: 2021-10-25

## 2021-04-04 RX ORDER — POTASSIUM CHLORIDE 750 MG/1
TABLET, FILM COATED, EXTENDED RELEASE ORAL
Qty: 360 TAB | Refills: 1 | Status: SHIPPED | OUTPATIENT
Start: 2021-04-04 | End: 2021-10-10

## 2021-04-08 ENCOUNTER — OFFICE VISIT (OUTPATIENT)
Dept: NEUROLOGY | Age: 81
End: 2021-04-08
Payer: MEDICARE

## 2021-04-08 VITALS
HEIGHT: 65 IN | BODY MASS INDEX: 31.25 KG/M2 | TEMPERATURE: 97.7 F | RESPIRATION RATE: 16 BRPM | HEART RATE: 91 BPM | DIASTOLIC BLOOD PRESSURE: 68 MMHG | SYSTOLIC BLOOD PRESSURE: 130 MMHG | WEIGHT: 187.6 LBS | OXYGEN SATURATION: 98 %

## 2021-04-08 DIAGNOSIS — M79.2 NEUROPATHIC PAIN: ICD-10-CM

## 2021-04-08 DIAGNOSIS — G43.109 OCULAR MIGRAINE: ICD-10-CM

## 2021-04-08 DIAGNOSIS — G60.9 IDIOPATHIC PERIPHERAL NEUROPATHY: Primary | ICD-10-CM

## 2021-04-08 PROCEDURE — G8399 PT W/DXA RESULTS DOCUMENT: HCPCS | Performed by: PSYCHIATRY & NEUROLOGY

## 2021-04-08 PROCEDURE — 99214 OFFICE O/P EST MOD 30 MIN: CPT | Performed by: PSYCHIATRY & NEUROLOGY

## 2021-04-08 PROCEDURE — 1090F PRES/ABSN URINE INCON ASSESS: CPT | Performed by: PSYCHIATRY & NEUROLOGY

## 2021-04-08 PROCEDURE — G8536 NO DOC ELDER MAL SCRN: HCPCS | Performed by: PSYCHIATRY & NEUROLOGY

## 2021-04-08 PROCEDURE — 1101F PT FALLS ASSESS-DOCD LE1/YR: CPT | Performed by: PSYCHIATRY & NEUROLOGY

## 2021-04-08 PROCEDURE — G8510 SCR DEP NEG, NO PLAN REQD: HCPCS | Performed by: PSYCHIATRY & NEUROLOGY

## 2021-04-08 PROCEDURE — G8417 CALC BMI ABV UP PARAM F/U: HCPCS | Performed by: PSYCHIATRY & NEUROLOGY

## 2021-04-08 PROCEDURE — G8754 DIAS BP LESS 90: HCPCS | Performed by: PSYCHIATRY & NEUROLOGY

## 2021-04-08 PROCEDURE — G8752 SYS BP LESS 140: HCPCS | Performed by: PSYCHIATRY & NEUROLOGY

## 2021-04-08 PROCEDURE — G8427 DOCREV CUR MEDS BY ELIG CLIN: HCPCS | Performed by: PSYCHIATRY & NEUROLOGY

## 2021-04-08 NOTE — PROGRESS NOTES
Neurology Note    Chief Complaint   Patient presents with    Follow-up    Peripheral Neuropathy     having more often- now in the knees, medication was not approved by insurance        HPI/Subjective  Jenny Patel is a 80 y.o. female who presented to the neurology office for management of RLS. She started having symptoms started in 2019. She does get tightness and numbness of sometimes the left leg and sometimes the right leg. It is off and on. It can stay for a few minutes or some hours. It can be any time of the day. It is a sensation of tightness. No back pain. No shooting pain down the leg. No DM. No weakness in the legs. It is knee down. Patient is also having intermittent head tenderness and vision changes. She sees zigzag lines. Interval history:  EMG/NCS shows peripheral neuropathy  She complains of intermittent pain in her upper back and her chest area as well. Amitriptyline was not approved by insurance    Current Outpatient Medications   Medication Sig    potassium chloride SR (KLOR-CON 10) 10 mEq tablet TAKE 2 TABLETS IN MORNING AND 2 TABLETS IN THE EVENING    Eliquis 5 mg tablet TAKE 1 TABLET BY MOUTH TWICE A DAY    hydroCHLOROthiazide (HYDRODIURIL) 25 mg tablet TAKE 1 TABLET BY MOUTH EVERY DAY    sertraline (ZOLOFT) 50 mg tablet Take 1 Tab by mouth daily.  losartan (COZAAR) 50 mg tablet Take 1 Tab by mouth daily.  Synthroid 75 mcg tablet TAKE 1 TABLET BY MOUTH EVERY DAY BEFORE BREAKFAST    verapamil ER (CALAN-SR) 240 mg CR tablet TAKE 1 TABLET BY MOUTH EVERY MORNING FOR BLOOD PRESSURE    cloNIDine HCL (CATAPRES) 0.2 mg tablet TAKE 1 TABLET BY MOUTH TWICE A DAY    rosuvastatin (CRESTOR) 20 mg tablet TAKE 1 TABLET BY MOUTH EVERY DAY    CALCIUM CARBONATE/VITAMIN D3 (CALTRATE 600 + D PO) Take 1 Tab by mouth daily.  MULTIVITAMIN W-MINERALS/LUTEIN (CENTRUM SILVER PO) Take 1 Tab by mouth daily (after lunch). No current facility-administered medications for this visit. EXAMINATION  Visit Vitals  /68 (BP 1 Location: Left arm, BP Patient Position: Sitting, BP Cuff Size: Adult)   Pulse 91   Temp 97.7 °F (36.5 °C)   Resp 16   Ht 5' 5\" (1.651 m)   Wt 187 lb 9.6 oz (85.1 kg)   SpO2 98%   BMI 31.22 kg/m²        General:   General appearance: Pt is in no acute distress   Distal pulses are preserved    Neurological Examination:   Mental Status: AAO x3. Speech is fluent. Follows commands, has normal fund of knowledge, attention, short term recall, comprehension and insight. Cranial Nerves: Visual fields are full. PERRL, Extraocular movements are full. Facial sensation intact V1- V3. Facial movement intact, symmetric. Hearing intact to conversation. Palate elevates symmetrically. Shoulder shrug symmetric. Tongue midline. Motor: Strength is 5/5 in all 4 ext. Normal tone. No atrophy. Sensation: Light touch - Normal    Coordination/Cerebellar: Intact to finger-nose-finger     Gait: Romberg is negative and casual gait is normal.     Laboratory review:   Results for orders placed or performed during the hospital encounter of 02/28/21   SARS-COV-2   Result Value Ref Range    SARS-CoV-2 Please find results under separate order     NOVEL CORONAVIRUS (COVID-19)   Result Value Ref Range    SARS-CoV-2 Not Detected Not Detected         Imaging review:  6/4/2020  EMG/nerve conduction study  This is an abnormal study. 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 3/11/2020  CT Head  Normal    Documentation review:  None    Assessment/Plan:   Mono Harman is a 80 y.o. female who presented to the neurology office for management of paresthesia distally in the lower extremities. Sometimes it can be on the left and sometimes on the right. On examination patient does have decreased pinprick sensation distally in the lower extremities. 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   Blood work has been normal.  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 She was complaining of neuropathic pain and was prescribed amitriptyline 25 mg p.o. nightly. The insurance did not cover it. She discussed with the primary care physician and the patient states was told not to take it as it is for anxiety. Amitriptyline was prescribed for neuropathic pain but at this time patient wants to hold off on taking it. The patient is also complaining of intermittent headaches and does have visual auras of zigzag lines. She does have possibly ocular migraines. CT scan of the head was normal.  Amitriptyline was prescribed for migraines as well. Patient is not taking it at this time. Will reassess her situation and the need for amitriptyline in 6 months. For now she states that she can live with the symptoms. I also discussed with her that amitriptyline is not expensive and we can use good Rx card when needed. Follow-up in 6 months    3 most recent PHQ Screens 4/8/2021   Little interest or pleasure in doing things Not at all   Feeling down, depressed, irritable, or hopeless Not at all   Total Score PHQ 2 0     Primary care to address possible depression if PHQ-9 score is more than 9. ICD-10-CM ICD-9-CM    1. Idiopathic peripheral neuropathy  G60.9 356.9    2. Neuropathic pain  M79.2 729.2    3. Ocular migraine  G43.109 346.80       Thank you for allowing me to participate in the care of Ms. Bird. Please feel free to contact me if you have any questions. Electronically signed. Danya Seip, MD  Neurologist    CC: Edna Shore MD  Fax: [phone]    This note was created using voice recognition software. Despite editing, there may be syntax errors. Daren Mercado was seen by synchronous (real-time) audio-video technology on 04/08/21.      Consent:  She and/or her healthcare decision maker is aware that this patient-initiated Telehealth encounter is a billable service, with coverage as determined by her insurance carrier. She is aware that she may receive a bill and has provided verbal consent to proceed: Yes    I was in the office while conducting this encounter. Pursuant to the emergency declaration under the [facility] waiver authority and the [employer] and [employer] Act, this Virtual  Visit was conducted, with patient's consent, to reduce the patient's risk of exposure to COVID-19 and provide continuity of care for an established patient. Services were provided through a video synchronous discussion virtually to substitute for in-person clinic visit.

## 2021-04-08 NOTE — PROGRESS NOTES
Chief Complaint   Patient presents with    Follow-up    Peripheral Neuropathy     having more often- now in the knees, medication was not approved by insurance     Has questions regarding if this pain can be in her back or breasts. Starting to have pain on the left side of her neck. Stated some concerns with memory not being good. Stated having a fullness feeling in her head.

## 2021-04-13 RX ORDER — ROSUVASTATIN CALCIUM 20 MG/1
TABLET, COATED ORAL
Qty: 90 TAB | Refills: 1 | Status: SHIPPED | OUTPATIENT
Start: 2021-04-13 | End: 2021-10-12

## 2021-04-20 RX ORDER — LOSARTAN POTASSIUM 50 MG/1
50 TABLET ORAL DAILY
Qty: 90 TAB | Refills: 0 | Status: SHIPPED | OUTPATIENT
Start: 2021-04-20 | End: 2021-06-30 | Stop reason: CLARIF

## 2021-05-02 RX ORDER — CLONIDINE HYDROCHLORIDE 0.2 MG/1
TABLET ORAL
Qty: 180 TAB | Refills: 1 | Status: SHIPPED | OUTPATIENT
Start: 2021-05-02 | End: 2021-10-28

## 2021-05-21 DIAGNOSIS — I10 ESSENTIAL HYPERTENSION: ICD-10-CM

## 2021-05-21 RX ORDER — VERAPAMIL HYDROCHLORIDE 240 MG/1
TABLET, FILM COATED, EXTENDED RELEASE ORAL
Qty: 90 TABLET | Refills: 0 | Status: SHIPPED | OUTPATIENT
Start: 2021-05-21 | End: 2021-08-15

## 2021-05-31 RX ORDER — LEVOTHYROXINE SODIUM 75 UG/1
TABLET ORAL
Qty: 90 TABLET | Refills: 0 | Status: SHIPPED | OUTPATIENT
Start: 2021-05-31 | End: 2021-09-29

## 2021-06-24 RX ORDER — HYDROCHLOROTHIAZIDE 25 MG/1
TABLET ORAL
Qty: 90 TABLET | Refills: 0 | Status: SHIPPED | OUTPATIENT
Start: 2021-06-24 | End: 2021-09-19

## 2021-07-01 ENCOUNTER — HOSPITAL ENCOUNTER (OUTPATIENT)
Age: 81
Setting detail: OUTPATIENT SURGERY
Discharge: HOME | End: 2021-07-01
Attending: INTERNAL MEDICINE | Admitting: INTERNAL MEDICINE
Payer: MEDICARE

## 2021-07-01 VITALS
HEART RATE: 64 BPM | HEIGHT: 63 IN | WEIGHT: 185 LBS | SYSTOLIC BLOOD PRESSURE: 131 MMHG | BODY MASS INDEX: 32.78 KG/M2 | DIASTOLIC BLOOD PRESSURE: 56 MMHG | RESPIRATION RATE: 17 BRPM | OXYGEN SATURATION: 99 % | TEMPERATURE: 98.3 F

## 2021-07-01 PROCEDURE — 74011250636 HC RX REV CODE- 250/636: Performed by: INTERNAL MEDICINE

## 2021-07-01 PROCEDURE — 77030021593 HC FCPS BIOP ENDOSC BSC -A: Performed by: INTERNAL MEDICINE

## 2021-07-01 PROCEDURE — 76060000032 HC ANESTHESIA 0.5 TO 1 HR: Performed by: INTERNAL MEDICINE

## 2021-07-01 PROCEDURE — 2709999900 HC NON-CHARGEABLE SUPPLY: Performed by: INTERNAL MEDICINE

## 2021-07-01 PROCEDURE — 77030013992 HC SNR POLYP ENDOSC BSC -B: Performed by: INTERNAL MEDICINE

## 2021-07-01 PROCEDURE — 74011000250 HC RX REV CODE- 250: Performed by: ANESTHESIOLOGY

## 2021-07-01 PROCEDURE — 88305 TISSUE EXAM BY PATHOLOGIST: CPT

## 2021-07-01 PROCEDURE — 76040000007: Performed by: INTERNAL MEDICINE

## 2021-07-01 PROCEDURE — 74011250636 HC RX REV CODE- 250/636: Performed by: ANESTHESIOLOGY

## 2021-07-01 RX ORDER — FENTANYL CITRATE 50 UG/ML
25 INJECTION, SOLUTION INTRAMUSCULAR; INTRAVENOUS
Status: DISCONTINUED | OUTPATIENT
Start: 2021-07-01 | End: 2021-07-01 | Stop reason: SDUPTHER

## 2021-07-01 RX ORDER — SODIUM CHLORIDE 0.9 % (FLUSH) 0.9 %
5-40 SYRINGE (ML) INJECTION EVERY 8 HOURS
Status: DISCONTINUED | OUTPATIENT
Start: 2021-07-01 | End: 2021-07-01 | Stop reason: SDUPTHER

## 2021-07-01 RX ORDER — SODIUM CHLORIDE 0.9 % (FLUSH) 0.9 %
5-40 SYRINGE (ML) INJECTION AS NEEDED
Status: DISCONTINUED | OUTPATIENT
Start: 2021-07-01 | End: 2021-07-01 | Stop reason: HOSPADM

## 2021-07-01 RX ORDER — FLUMAZENIL 0.1 MG/ML
0.2 INJECTION INTRAVENOUS
Status: DISCONTINUED | OUTPATIENT
Start: 2021-07-01 | End: 2021-07-01 | Stop reason: SDUPTHER

## 2021-07-01 RX ORDER — EPINEPHRINE 0.1 MG/ML
1 INJECTION INTRACARDIAC; INTRAVENOUS
Status: DISCONTINUED | OUTPATIENT
Start: 2021-07-01 | End: 2021-07-01 | Stop reason: SDUPTHER

## 2021-07-01 RX ORDER — DEXTROMETHORPHAN/PSEUDOEPHED 2.5-7.5/.8
1.2 DROPS ORAL
Status: DISCONTINUED | OUTPATIENT
Start: 2021-07-01 | End: 2021-07-01 | Stop reason: SDUPTHER

## 2021-07-01 RX ORDER — SODIUM CHLORIDE 9 MG/ML
75 INJECTION, SOLUTION INTRAVENOUS CONTINUOUS
Status: DISCONTINUED | OUTPATIENT
Start: 2021-07-01 | End: 2021-07-01 | Stop reason: HOSPADM

## 2021-07-01 RX ORDER — FENTANYL CITRATE 50 UG/ML
25 INJECTION, SOLUTION INTRAMUSCULAR; INTRAVENOUS
Status: DISCONTINUED | OUTPATIENT
Start: 2021-07-01 | End: 2021-07-01 | Stop reason: HOSPADM

## 2021-07-01 RX ORDER — MIDAZOLAM HYDROCHLORIDE 1 MG/ML
.25-5 INJECTION, SOLUTION INTRAMUSCULAR; INTRAVENOUS
Status: DISCONTINUED | OUTPATIENT
Start: 2021-07-01 | End: 2021-07-01 | Stop reason: HOSPADM

## 2021-07-01 RX ORDER — SODIUM CHLORIDE 0.9 % (FLUSH) 0.9 %
5-40 SYRINGE (ML) INJECTION EVERY 8 HOURS
Status: DISCONTINUED | OUTPATIENT
Start: 2021-07-01 | End: 2021-07-01 | Stop reason: HOSPADM

## 2021-07-01 RX ORDER — DEXTROMETHORPHAN/PSEUDOEPHED 2.5-7.5/.8
1.2 DROPS ORAL
Status: DISCONTINUED | OUTPATIENT
Start: 2021-07-01 | End: 2021-07-01 | Stop reason: HOSPADM

## 2021-07-01 RX ORDER — SODIUM CHLORIDE 0.9 % (FLUSH) 0.9 %
5-40 SYRINGE (ML) INJECTION AS NEEDED
Status: DISCONTINUED | OUTPATIENT
Start: 2021-07-01 | End: 2021-07-01 | Stop reason: SDUPTHER

## 2021-07-01 RX ORDER — EPINEPHRINE 0.1 MG/ML
1 INJECTION INTRACARDIAC; INTRAVENOUS
Status: DISCONTINUED | OUTPATIENT
Start: 2021-07-01 | End: 2021-07-01 | Stop reason: HOSPADM

## 2021-07-01 RX ORDER — ATROPINE SULFATE 0.1 MG/ML
0.5 INJECTION INTRAVENOUS
Status: DISCONTINUED | OUTPATIENT
Start: 2021-07-01 | End: 2021-07-01 | Stop reason: HOSPADM

## 2021-07-01 RX ORDER — PROPOFOL 10 MG/ML
INJECTION, EMULSION INTRAVENOUS AS NEEDED
Status: DISCONTINUED | OUTPATIENT
Start: 2021-07-01 | End: 2021-07-01 | Stop reason: HOSPADM

## 2021-07-01 RX ORDER — ATROPINE SULFATE 0.1 MG/ML
0.5 INJECTION INTRAVENOUS
Status: DISCONTINUED | OUTPATIENT
Start: 2021-07-01 | End: 2021-07-01 | Stop reason: SDUPTHER

## 2021-07-01 RX ORDER — MIDAZOLAM HYDROCHLORIDE 1 MG/ML
.25-5 INJECTION, SOLUTION INTRAMUSCULAR; INTRAVENOUS
Status: DISCONTINUED | OUTPATIENT
Start: 2021-07-01 | End: 2021-07-01 | Stop reason: SDUPTHER

## 2021-07-01 RX ORDER — SODIUM CHLORIDE 9 MG/ML
75 INJECTION, SOLUTION INTRAVENOUS CONTINUOUS
Status: DISCONTINUED | OUTPATIENT
Start: 2021-07-01 | End: 2021-07-01 | Stop reason: SDUPTHER

## 2021-07-01 RX ORDER — NALOXONE HYDROCHLORIDE 0.4 MG/ML
0.4 INJECTION, SOLUTION INTRAMUSCULAR; INTRAVENOUS; SUBCUTANEOUS
Status: DISCONTINUED | OUTPATIENT
Start: 2021-07-01 | End: 2021-07-01 | Stop reason: HOSPADM

## 2021-07-01 RX ORDER — LIDOCAINE HYDROCHLORIDE 20 MG/ML
INJECTION, SOLUTION EPIDURAL; INFILTRATION; INTRACAUDAL; PERINEURAL AS NEEDED
Status: DISCONTINUED | OUTPATIENT
Start: 2021-07-01 | End: 2021-07-01 | Stop reason: HOSPADM

## 2021-07-01 RX ORDER — FLUMAZENIL 0.1 MG/ML
0.2 INJECTION INTRAVENOUS
Status: DISCONTINUED | OUTPATIENT
Start: 2021-07-01 | End: 2021-07-01 | Stop reason: HOSPADM

## 2021-07-01 RX ORDER — NALOXONE HYDROCHLORIDE 0.4 MG/ML
0.4 INJECTION, SOLUTION INTRAMUSCULAR; INTRAVENOUS; SUBCUTANEOUS
Status: DISCONTINUED | OUTPATIENT
Start: 2021-07-01 | End: 2021-07-01 | Stop reason: SDUPTHER

## 2021-07-01 RX ADMIN — PROPOFOL 200 MG: 10 INJECTION, EMULSION INTRAVENOUS at 10:36

## 2021-07-01 RX ADMIN — SODIUM CHLORIDE 75 ML/HR: 900 INJECTION, SOLUTION INTRAVENOUS at 10:00

## 2021-07-01 RX ADMIN — LIDOCAINE HYDROCHLORIDE 40 MG: 20 INJECTION, SOLUTION EPIDURAL; INFILTRATION; INTRACAUDAL; PERINEURAL at 10:12

## 2021-07-01 RX ADMIN — SODIUM CHLORIDE: 900 INJECTION, SOLUTION INTRAVENOUS at 10:32

## 2021-07-01 NOTE — PROCEDURES
NAME:  Tova Zamora   :   1940   MRN:   [id number]     Date/Time:  2021 10:44 AM    Colonoscopy Operative Report    Procedure Type:   Colonoscopy with biopsy, polypectomy (snare cautery)     Indications:     Change in bowel habits, fecal incontinence  Pre-operative Diagnosis: see indication above  Post-operative Diagnosis:  See findings below  :  Laura Cary MD  Referring Provider: Padmaja Smith MD    Exam:  Airway: clear, no airway problems anticipated  Heart: RRR, without gallops or rubs  Lungs: clear bilaterally without wheezes, crackles, or rhonchi  Abdomen: soft, nontender, nondistended, bowel sounds present  Mental Status: awake, alert and oriented to person, place and time    Sedation:  MAC anesthesia Propofol 200mg IV  Procedure Details:  After informed consent was obtained with all risks and benefits of procedure explained and preoperative exam completed, the patient was taken to the endoscopy suite and placed in the left lateral decubitus position. Upon sequential sedation as per above, a digital rectal exam was performed demonstrating internal hemorrhoids. The Olympus videocolonoscope  was inserted in the rectum and carefully advanced to the cecum, which was identified by the ileocecal valve and appendiceal orifice. The distal terminal ileum was evaluated. The quality of preparation was adequate. The colonoscope was slowly withdrawn with careful evaluation between folds. Retroflexion in the rectum was completed demonstrating internal hemorrhoids. Findings:     -Normal terminal ileum  -Single 5mm sessile polyp on ileocecal valve with associated ulceration; removed with hot snare cautery  -Sigmoid diverticulosis  -Small grade 1 internal hemorrhoids  -No colitis noted; biopsies obtained in ascending colon to exclude microscopic colitis    Specimen Removed:  #1 ileocecal valved polyp; #2 asc colon bx  Complications: None. EBL:  None.     Impression:    -Normal terminal ileum  -Single 5mm sessile polyp on ileocecal valve with associated ulceration; removed with hot snare cautery  -Sigmoid diverticulosis  -Small grade 1 internal hemorrhoids  -No colitis noted; biopsies obtained in ascending colon to exclude microscopic colitis    Recommendations: --Await pathology. , -Repeat colonoscopy in 5 years. , -Resume Eliquis tomorrow. , - High fiber diet. Resume normal medication(s) otherwise. You will receive a letter about the biopsy results in about 10 days. You may be asked to call your doctor's office for the results. Discharge Disposition:  Home in the company of a  when able to ambulate.     Sonya Hi MD

## 2021-07-01 NOTE — DISCHARGE INSTRUCTIONS
Prabhjot Mariee  [id number]  1940    COLON DISCHARGE INSTRUCTIONS  Discomfort:  Redness at IV site- apply warm compress to area; if redness or soreness persist- contact your physician  There may be a slight amount of blood passed from the rectum  Gaseous discomfort- walking, belching will help relieve any discomfort  You may not operate a vehicle for 12 hours  You may not engage in an occupation involving machinery or appliances for rest of today  You may not drink alcoholic beverages for at least 12 hours  Avoid making any critical decisions for at least 24 hour  DIET:   High fiber diet. - however -  remember your colon is empty and a heavy meal will produce gas. Avoid these foods:  vegetables, fried / greasy foods, carbonated drinks for today  MEDICATION:         ACTIVITY:  You may not resume your normal daily activities until tomorrow AM; it is recommended that you spend the remainder of the day resting -  avoid any strenuous activity. CALL M.D. ANY SIGN OF:   Increasing pain, nausea, vomiting  Abdominal distension (swelling)  New increased bleeding (oral or rectal)  Fever (chills)  Pain in chest area  Bloody discharge from nose or mouth  Shortness of breath    You may not  take any Advil, Aspirin, Ibuprofen, Motrin, Aleve, or Goodys for 10 days, ONLY  Tylenol as needed for pain.     IMPRESSION:  -Normal terminal ileum  -Single 5mm sessile polyp on ileocecal valve with associated ulceration; removed with hot snare cautery  -Sigmoid diverticulosis  -Small grade 1 internal hemorrhoids  -No colitis noted; biopsies obtained in ascending colon to exclude microscopic colitis    Follow-up Instructions:   Call Dr. Hosea Mahan for the results of procedure / biopsy in 7-10 days  Telephone # [phone]  Repeat colonoscopy in 5 years if clinically appropriate  Kit Giron MD

## 2021-07-01 NOTE — ROUTINE PROCESS
Rigo Dukes  1940  [id number]    Situation:  Verbal report received from: Jeanmarie Banda  Procedure: Procedure(s):  COLONOSCOPY  COLON BIOPSY  ENDOSCOPIC POLYPECTOMY    Background:    Preoperative diagnosis: FECAL INCONTINENCE  Postoperative diagnosis: Diverticulosis, colon polyp, hemorrhoids    :  Dr. Radha Harrison  Assistant(s): Endoscopy Technician-1: Andie Colvin  Endoscopy RN-1: Soni Garcia    Specimens:   ID Type Source Tests Collected by Time Destination   1 : Ileo Cecal Valve Polyp HOT Preservative Colon, Ileocecal valve  Vern Sarabia MD 7/1/2021 1028 Pathology   2 : Ascending Colon BX Preservative Colon, Ascending  Vern Sarabia MD 7/1/2021 1029 Pathology     H. Pylori  no    Assessment:  Intra-procedure medications     Anesthesia gave intra-procedure sedation and medications, see anesthesia flow sheet yes    Intravenous fluids: NS@ KVO     Vital signs stable     Abdominal assessment: round and soft     Recommendation:  Discharge patient per MD order.   Return to floor  Family or Friend   Permission to share finding with family or friend yes

## 2021-07-01 NOTE — ANESTHESIA POSTPROCEDURE EVALUATION
Procedure(s):  COLONOSCOPY  COLON BIOPSY  ENDOSCOPIC POLYPECTOMY. total IV anesthesia    Anesthesia Post Evaluation        Patient location during evaluation: PACU  Note status: Adequate. Level of consciousness: responsive to verbal stimuli and sleepy but conscious  Pain management: satisfactory to patient  Airway patency: patent  Anesthetic complications: no  Cardiovascular status: acceptable  Respiratory status: acceptable  Hydration status: acceptable  Comments: +Post-Anesthesia Evaluation and Assessment    Patient: Eliane Hansen MRN: [id number]  SSN: [id number]   YOB: 1940  Age: 80 y.o. Sex: female      Cardiovascular Function/Vital Signs    BP (!) 131/56   Pulse 64   Temp 36.8 °C (98.3 °F)   Resp 17   Ht 5' 3\" (1.6 m)   Wt 83.9 kg (185 lb)   SpO2 99%   Breastfeeding No   BMI 32.77 kg/m²     Patient is status post Procedure(s):  COLONOSCOPY  COLON BIOPSY  ENDOSCOPIC POLYPECTOMY. Nausea/Vomiting: Controlled. Postoperative hydration reviewed and adequate. Pain:  Pain Scale 1: Numeric (0 - 10) (07/01/21 1057)  Pain Intensity 1: 0 (07/01/21 1057)   Managed. Neurological Status: At baseline. Mental Status and Level of Consciousness: Arousable. Pulmonary Status:   O2 Device: CO2 nasal cannula (07/01/21 1042)   Adequate oxygenation and airway patent. Complications related to anesthesia: None    Post-anesthesia assessment completed. No concerns. Signed By: Ann Kennedy DO    7/1/2021  Post anesthesia nausea and vomiting:  controlled      INITIAL Post-op Vital signs:   Vitals Value Taken Time   /56 07/01/21 1057   Temp     Pulse 64 07/01/21 1104   Resp 15 07/01/21 1104   SpO2 100 % 07/01/21 1102   Vitals shown include unvalidated device data.

## 2021-07-01 NOTE — PROGRESS NOTES
Endoscope was pre-cleaned at the bedside immediately following procedure by [employer]. For medications administered by anesthesia, see anesthesia chart.

## 2021-07-22 ENCOUNTER — OFFICE VISIT (OUTPATIENT)
Dept: URGENT CARE | Age: 81
End: 2021-07-22
Payer: MEDICARE

## 2021-07-22 VITALS
OXYGEN SATURATION: 97 % | HEART RATE: 89 BPM | DIASTOLIC BLOOD PRESSURE: 82 MMHG | TEMPERATURE: 98.4 F | SYSTOLIC BLOOD PRESSURE: 158 MMHG | BODY MASS INDEX: 33.31 KG/M2 | HEIGHT: 63 IN | WEIGHT: 188 LBS | RESPIRATION RATE: 16 BRPM

## 2021-07-22 DIAGNOSIS — A69.20 ERYTHEMA MIGRANS (LYME DISEASE): Primary | ICD-10-CM

## 2021-07-22 DIAGNOSIS — S40.869A INSECT BITE OF UPPER ARM, UNSPECIFIED LATERALITY, INITIAL ENCOUNTER: ICD-10-CM
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
## 2021-07-22 PROCEDURE — G8399 PT W/DXA RESULTS DOCUMENT: HCPCS | Performed by: NURSE PRACTITIONER

## 2021-07-22 PROCEDURE — G8432 DEP SCR NOT DOC, RNG: HCPCS | Performed by: NURSE PRACTITIONER

## 2021-07-22 PROCEDURE — G8536 NO DOC ELDER MAL SCRN: HCPCS | Performed by: NURSE PRACTITIONER

## 2021-07-22 PROCEDURE — G8753 SYS BP > OR = 140: HCPCS | Performed by: NURSE PRACTITIONER

## 2021-07-22 PROCEDURE — 99213 OFFICE O/P EST LOW 20 MIN: CPT | Performed by: NURSE PRACTITIONER

## 2021-07-22 PROCEDURE — G8754 DIAS BP LESS 90: HCPCS | Performed by: NURSE PRACTITIONER

## 2021-07-22 PROCEDURE — 1101F PT FALLS ASSESS-DOCD LE1/YR: CPT | Performed by: NURSE PRACTITIONER

## 2021-07-22 PROCEDURE — G8427 DOCREV CUR MEDS BY ELIG CLIN: HCPCS | Performed by: NURSE PRACTITIONER

## 2021-07-22 PROCEDURE — G8417 CALC BMI ABV UP PARAM F/U: HCPCS | Performed by: NURSE PRACTITIONER

## 2021-07-22 PROCEDURE — 1090F PRES/ABSN URINE INCON ASSESS: CPT | Performed by: NURSE PRACTITIONER

## 2021-07-22 RX ORDER — DOXYCYCLINE 100 MG/1
100 TABLET ORAL 2 TIMES DAILY
Qty: 24 TABLET | Refills: 0 | Status: SHIPPED | OUTPATIENT
Start: 2021-07-22 | End: 2021-08-03

## 2021-07-22 RX ORDER — TRIAMCINOLONE ACETONIDE 1 MG/G
CREAM TOPICAL 2 TIMES DAILY
Qty: 45 G | Refills: 0 | Status: SHIPPED | OUTPATIENT
Start: 2021-07-22 | End: 2021-07-29

## 2021-07-22 RX ORDER — LOSARTAN POTASSIUM 50 MG/1
50 TABLET ORAL DAILY
COMMUNITY
End: 2021-09-10 | Stop reason: SDUPTHER

## 2021-07-22 NOTE — PATIENT INSTRUCTIONS
Suspect this is lyme/tick bite based on appearance and hx of exposure to yardwork  Immediate follow up for new, worsening or changes       Tick Bite: Care Instructions  Your Care Instructions     Ticks are small spiderlike animals. They bite to fasten themselves onto your skin and feed on your blood. Ticks can carry diseases. But most ticks do not carry diseases, and most tick bites do not cause serious health problems. Some people may have an allergic reaction to a tick bite. This reaction may be mild, with symptoms like itching and swelling. In rare cases, a severe allergic reaction may occur. Most of the time, all you need to do for a tick bite is relieve any symptoms you may have. Follow-up care is a key part of your treatment and safety. Be sure to make and go to all appointments, and call your doctor if you are having problems. It's also a good idea to know your test results and keep a list of the medicines you take. How can you care for yourself at home? · Put ice or a cold pack on the bite for 15 to 20 minutes once an hour. Put a thin cloth between the ice and your skin. · Try an over-the-counter medicine to relieve itching, redness, swelling, and pain. Be safe with medicines. Read and follow all instructions on the label. ? Take an antihistamine medicine, such as a nondrowsy one like loratadine (Claritin) or one that might make you sleepy like diphenhydramine (Benadryl). These medicines may help relieve itching, redness, and swelling. ? Use a spray of local anesthetic that contains benzocaine, such as Solarcaine. It may help relieve pain. If your skin reacts to the spray, stop using it. ? Put calamine lotion on the skin. It may help relieve itching. To avoid tick bites  · Avoid ticks:  ? Learn where ticks are found in your community, and stay away from those areas if possible. ? Cover as much of your body as possible when you work or play in grassy or wooded areas. ?  Use insect repellents, such as products containing DEET. You can spray them on your skin. ? Take steps to control ticks on your property if you live in an area where Lyme disease occurs. Clear leaves, brush, tall grasses, woodpiles, and stone fences from around your house and the edges of your yard or garden. This may help get rid of ticks. · When you come in from outdoors, check your body for ticks, including your groin, head, and underarms. The ticks may be about the size of a sesame seed. If no one else can help you check for ticks on your scalp, comb your hair with a fine-tooth comb. · If you find a tick, remove it quickly. Use tweezers to grasp the tick as close to its mouth (the part in your skin) as possible. Slowly pull the tick straight outdo not twist or yankuntil its mouth releases from your skin. If part of the tick stays in the skin, leave it alone. It will likely come out on its own in a few days. · Ticks can come into your house on clothing, outdoor gear, and pets. These ticks can fall off and attach to you. ? Check your clothing and outdoor gear. Remove any ticks you find. Then put your clothing in a clothes dryer on high heat for 1 hour to kill any ticks that might remain. ? Check your pets for ticks after they have been outdoors. When should you call for help? Call 911 anytime you think you may need emergency care. For example, call if:    · You have symptoms of a severe allergic reaction. These may include:  ? Sudden raised, red areas (hives) all over your body. ? Swelling of the throat, mouth, lips, or tongue. ? Trouble breathing. ? Passing out (losing consciousness). Or you may feel very lightheaded or suddenly feel weak, confused, or restless. Call your doctor now or seek immediate medical care if:    · You have signs of infection, such as:  ? Increased pain, swelling, warmth, or redness around the bite. ? Red streaks leading from the bite. ? Pus draining from the bite. ? A fever.    Watch closely for changes in your health, and be sure to contact your doctor if:    · You develop a new rash.     · You have joint pain.     · You are very tired.     · You have flu-like symptoms.     · You have symptoms for more than 1 week. Where can you learn more? Go to http://www.gray.com/  Enter [id number] in the search box to learn more about \"Tick Bite: Care Instructions. \"  Current as of: February 26, 2020               Content Version: 12.8  © 2006-2021 [employer]. Care instructions adapted under license by [employer] (which disclaims liability or warranty for this information). If you have questions about a medical condition or this instruction, always ask your healthcare professional. [address] any warranty or liability for your use of this information.

## 2021-07-22 NOTE — PROGRESS NOTES
Here for insect bites  Working in yard this past weekend in [facility] beds  A couple days after noticed several itchy bites on her upper arms bilat  Also mentions a different looking bite on her inner left thigh that is more sore  Denies any identified tick to area and feels well otherwise without fever, chills, headache, dizziness or malaise  She has tried topical steroid cream to itchy rash on arms but this hanst helped. Overall rash on thigh is worse.              Past Medical History:   Diagnosis Date    Arrhythmia     afib    Arthritis     right knee, left shoulder    Diverticulosis     Frequency of urination     High cholesterol     Hypertension     Morbid obesity (HCC)     Osteoarthritis     Peripheral neuropathy     bilateral LE    Thyroid disease     hypothyroid    Unspecified adverse effect of anesthesia     low BP        Past Surgical History:   Procedure Laterality Date    COLONOSCOPY N/A 7/1/2021    COL[facility]  7/1/2021         COLONOSCOPY,TESSIE Phelps  7/1/2021         HX GYN      myomectomy on uterus    HX GYN  2011    hystereoscopy D&C    HX LYMPH NODE DISSECTION      biopsy    HX OTHER SURGICAL      lymph node bx    NC BREAST SURGERY PROCEDURE UNLISTED  1982    breast bx rt    NC CARDIAC SURG PROCEDURE UNLIST  01/2015    cardiac catheterization, no intervention, medical management    NC CARDIAC SURG PROCEDURE UNLIST  03/2017    ablation    UPPER GI ENDOSCOPY,BIOPSY  10/24/2019         UPPER GI ENDOSCOPY,FN NEEDLE BX,GUIDED  10/24/2019              Family History   Problem Relation Age of Onset    Alzheimer Mother     Heart Disease Mother     Heart Disease Father     Hypertension Father     Cancer Paternal Aunt     Diabetes Paternal Grandmother         Social History     Socioeconomic History    Marital status:      Spouse name: Not on file    Number of children: Not on file    Years of education: Not on file    Highest education level: Not on file   Occupational History    Not on file   Tobacco Use    Smoking status: Never Smoker    Smokeless tobacco: Never Used   Vaping Use    Vaping Use: Never used   Substance and Sexual Activity    Alcohol use: No    Drug use: No    Sexual activity: Not on file   Other Topics Concern    Not on file   Social History Narrative    Not on file     Social Determinants of Health     Financial Resource Strain:     Difficulty of Paying Living Expenses:    Food Insecurity:     Worried About Running Out of Food in the Last Year:     [address] in the Last Year:    Transportation Needs:     Lack of Transportation (Medical):  Lack of Transportation (Non-Medical):    Physical Activity:     Days of Exercise per Week:     Minutes of Exercise per Session:    Stress:     Feeling of Stress :    Social Connections:     Frequency of Communication with Friends and Family:     Frequency of Social Gatherings with Friends and Family:     Attends Church Services:     Active Member of Clubs or Organizations:     Attends Club or Organization Meetings:     Marital Status:    Intimate Partner Violence:     Fear of Current or Ex-Partner:     Emotionally Abused:     Physically Abused:     Sexually Abused: ALLERGIES: Adhesive, Amoxicillin, and Lipitor [atorvastatin]    Review of Systems   Constitutional: Negative for chills, fatigue and fever. Respiratory: Negative for shortness of breath. Cardiovascular: Negative for chest pain. Gastrointestinal: Negative for nausea and vomiting. All other systems reviewed and are negative. Vitals:    07/22/21 1036 07/22/21 1038   BP:  (!) 158/82   Pulse:  89   Resp:  16   Temp:  98.4 °F (36.9 °C)   SpO2:  97%   Weight: 188 lb (85.3 kg)    Height: 5' 3\" (1.6 m)        Physical Exam  Constitutional:       General: She is not in acute distress. Appearance: Normal appearance.  She is not ill-appearing. Eyes:      Pupils: Pupils are equal, round, and reactive to light. Cardiovascular:      Rate and Rhythm: Normal rate and regular rhythm. Pulses: Normal pulses. Heart sounds: Normal heart sounds. Pulmonary:      Effort: Pulmonary effort is normal.      Breath sounds: Normal breath sounds. Skin:     Capillary Refill: Capillary refill takes less than 2 seconds. Findings: No rash. Neurological:      Mental Status: She is alert and oriented to person, place, and time. Psychiatric:         Mood and Affect: Mood normal.         Behavior: Behavior normal.         Thought Content: Thought content normal.         MDM     Differential Diagnosis; Clinical Impression; Plan:       CLINICAL IMPRESSION:  (A69.20) Erythema migrans (Lyme disease)  (primary encounter diagnosis)  (V06.668M,  W57. Lexie Portillo) Insect bite of upper arm, unspecified laterality, initial encounter    Orders Placed This Encounter      doxycycline (ADOXA) 100 mg tablet          Sig: Take 1 Tablet by mouth two (2) times a day for 12 days. Dispense:  24 Tablet          Refill:  0      triamcinolone acetonide (KENALOG) 0.1 % topical cream          Sig: Apply  to affected area two (2) times a day for 7 days. use thin layer          Dispense:  45 g          Refill:  0      Plan:  Rash of thigh consistent with erythema migrans with known exposure to possible tick environment; start doxycycline  Rash of upper arms appears to be possible other insect bites and are more itchy- will Rx triamcinolone  Follow up with PCP if not improving over the next week. Return immediately for new, worsening or changes. We have reviewed concerning signs/symptoms, normal vs abnormal progression of medical condition and when to seek immediate medical attention. Schedule with PCP or Urgent Care immediately for worsening or new symptoms. You should see your PCP for updates on your routine health maintenance. Procedures

## 2021-07-23 ENCOUNTER — VIRTUAL VISIT (OUTPATIENT)
Dept: INTERNAL MEDICINE CLINIC | Age: 81
End: 2021-07-23
Payer: MEDICARE

## 2021-07-23 DIAGNOSIS — R21 RASH: Primary | ICD-10-CM

## 2021-07-23 DIAGNOSIS — F41.9 ANXIETY: ICD-10-CM

## 2021-07-23 DIAGNOSIS — I10 ESSENTIAL HYPERTENSION: ICD-10-CM

## 2021-07-23 PROCEDURE — G8510 SCR DEP NEG, NO PLAN REQD: HCPCS | Performed by: INTERNAL MEDICINE

## 2021-07-23 PROCEDURE — 99214 OFFICE O/P EST MOD 30 MIN: CPT | Performed by: INTERNAL MEDICINE

## 2021-07-23 PROCEDURE — G8399 PT W/DXA RESULTS DOCUMENT: HCPCS | Performed by: INTERNAL MEDICINE

## 2021-07-23 PROCEDURE — 1101F PT FALLS ASSESS-DOCD LE1/YR: CPT | Performed by: INTERNAL MEDICINE

## 2021-07-23 PROCEDURE — 1090F PRES/ABSN URINE INCON ASSESS: CPT | Performed by: INTERNAL MEDICINE

## 2021-07-23 PROCEDURE — G8427 DOCREV CUR MEDS BY ELIG CLIN: HCPCS | Performed by: INTERNAL MEDICINE

## 2021-07-23 PROCEDURE — G8756 NO BP MEASURE DOC: HCPCS | Performed by: INTERNAL MEDICINE

## 2021-07-23 PROCEDURE — G0463 HOSPITAL OUTPT CLINIC VISIT: HCPCS | Performed by: INTERNAL MEDICINE

## 2021-07-23 NOTE — PROGRESS NOTES
HISTORY OF PRESENT ILLNESS  Lucina Valerio is a 80 y.o. female. HPI  Last seen 03/09/21. Pt is here for tick bite. This is an established visit completed with telemedicine was completed with video assist  the patient acknowledges and agrees to this method of visitation antonia     Pt went to Urgent Care on 7/22/21 for insect bites  Reviewed note  Rash of thigh consistent with erythema migrans with known exposure to possible tick environment; start doxycycline  Rash of upper arms appears to be possible other insect bites and are more itchy- will Rx triamcinolone  Follow up with PCP if not improving over the next week. Return immediately for new, worsening or changes.      We have reviewed concerning signs/symptoms, normal vs abnormal progression of medical condition and when to seek immediate medical attention. Schedule with PCP or Urgent Care immediately for worsening or new symptoms. You should see your PCP for updates on your routine health maintenance.      She has been taking doxycycline for lesion on her thigh  Reports she is no longer itching  She went to the urgent care because she had several bites on her upper arms that are itchy she has been putting some topical steroid on it she also had a larger lesion on her thigh which started the size of a dime but increased to the size of a nickel or quarter she is out in the yard a lot and she may been exposed to ticks is not sure they were worried about erythema migrans and give her doxycycline she is taking discussed all this at great length with her  Recommended to continue on doxycycline  F/u in 1 week if no improvement    She does do a lot of yard work wonders what she can do to avoid tick bites in the future discussed bug repellent with DEET      BP elevated at Urgent Care  Continues on clonidine 0.2mg BID, HCTZ 25mg   Losartan was increased from 25 mg to 50 mg per cardio when her bp was running 155/77 170/75 her blood pressure actually improved significantly since then but was little bit up at the urgent care she reports the discharge summary said to stop the losartan she was concerned because she needs to be on we discussed that she will continue her losartan  Continues on verapamil  240 mg per day   Recall she had a cough on lisinopril       Had colonoscopy completed since last office visit in July no complications    She is anxious about her rashes and what been going on      Patient Active Problem List    Diagnosis Date Noted    Anxiety 03/09/2021    Pericardial effusion with cardiac tamponade 03/18/2017    S/P ablation of atrial fibrillation 03/17/2017    Acquired hypothyroidism 12/14/2015    HTN (hypertension) 07/21/2015    Atrial fibrillation ([facility] [address] 03/16/2015    Morbid obesity ([facility] [address]     Hyperlipidemia 07/14/2014    Hypokalemia 07/14/2014    Postmenopausal bleeding 10/18/2011     Current Outpatient Medications   Medication Sig Dispense Refill    doxycycline (ADOXA) 100 mg tablet Take 1 Tablet by mouth two (2) times a day for 12 days. 24 Tablet 0    triamcinolone acetonide (KENALOG) 0.1 % topical cream Apply  to affected area two (2) times a day for 7 days. use thin layer 45 g 0    losartan (COZAAR) 50 mg tablet Take 50 mg by mouth daily.  hydroCHLOROthiazide (HYDRODIURIL) 25 mg tablet TAKE 1 TABLET BY MOUTH EVERY DAY 90 Tablet 0    Synthroid 75 mcg tablet TAKE 1 TABLET BY MOUTH EVERY DAY BEFORE BREAKFAST 90 Tablet 0    verapamil ER (CALAN-SR) 240 mg CR tablet TAKE 1 TABLET BY MOUTH EVERY MORNING FOR BLOOD PRESSURE 90 Tablet 0    cloNIDine HCL (CATAPRES) 0.2 mg tablet TAKE 1 TABLET BY MOUTH TWICE A  Tab 1    rosuvastatin (CRESTOR) 20 mg tablet TAKE 1 TABLET BY MOUTH EVERY DAY 90 Tab 1    potassium chloride SR (KLOR-CON 10) 10 mEq tablet TAKE 2 TABLETS IN MORNING AND 2 TABLETS IN THE EVENING 360 Tab 1    Eliquis 5 mg tablet TAKE 1 TABLET BY MOUTH TWICE A DAY 60 Tab 6    sertraline (ZOLOFT) 50 mg tablet Take 1 Tab by mouth daily.  80 Tab 2    CALCIUM CARBONATE/VITAMIN D3 (CALTRATE 600 + D PO) Take 1 Tab by mouth daily.  MULTIVITAMIN W-MINERALS/LUTEIN (CENTRUM SILVER PO) Take 1 Tab by mouth daily (after lunch). Past Surgical History:   Procedure Laterality Date    COLONOSCOPY N/A 7/1/2021    COLON[facility] at [facility]  7/1/2021         Jessica Rousseau  7/1/2021         HX GYN      myomectomy on uterus    HX GYN  2011    hystereoscopy D&C    HX LYMPH NODE DISSECTION      biopsy    HX OTHER SURGICAL      lymph node bx    NE BREAST SURGERY PROCEDURE UNLISTED  1982    breast bx rt    NE CARDIAC SURG PROCEDURE UNLIST  01/2015    cardiac catheterization, no intervention, medical management    NE CARDIAC SURG PROCEDURE UNLIST  03/2017    ablation    UPPER GI ENDOSCOPY,BIOPSY  10/24/2019         UPPER GI ENDOSCOPY,FN NEEDLE BX,GUIDED  10/24/2019           Lab Results   Component Value Date/Time    WBC 3.9 12/29/2020 08:15 AM    HGB 13.3 12/29/2020 08:15 AM    HCT 39.7 12/29/2020 08:15 AM    PLATELET 395 72/81/8181 08:15 AM    MCV 96 12/29/2020 08:15 AM     Lab Results   Component Value Date/Time    Cholesterol, total 144 04/07/2020 12:28 PM    HDL Cholesterol 58 04/07/2020 12:28 PM    LDL, calculated 69 04/07/2020 12:28 PM    Triglyceride 86 04/07/2020 12:28 PM     Lab Results   Component Value Date/Time    GFR est non-AA 60 12/29/2020 08:15 AM    GFR est AA 69 12/29/2020 08:15 AM    Creatinine 0.92 12/29/2020 08:15 AM    BUN 23 12/29/2020 08:15 AM    Sodium 139 12/29/2020 08:15 AM    Potassium 3.8 12/29/2020 08:15 AM    Chloride 102 12/29/2020 08:15 AM    CO2 26 12/29/2020 08:15 AM    Magnesium 2.2 01/16/2020 11:36 PM        Review of Systems   Respiratory: Negative for shortness of breath. Cardiovascular: Negative for chest pain. Skin: Positive for rash. Physical Exam  Constitutional:       General: She is not in acute distress.      Appearance: Normal appearance. She is not ill-appearing, toxic-appearing or diaphoretic. HENT:      Head: Normocephalic and atraumatic. Eyes:      General:         Right eye: No discharge. Left eye: No discharge. Conjunctiva/sclera: Conjunctivae normal.   Pulmonary:      Effort: No respiratory distress. Skin:     Findings: Rash present. Rash is papular (Erythematous papule on R shoulder and L thigh). Neurological:      Mental Status: She is alert and oriented to person, place, and time. Mental status is at baseline. Gait: Gait normal.   Psychiatric:         Mood and Affect: Mood normal.         Behavior: Behavior normal.         ASSESSMENT and PLAN    ICD-10-CM ICD-9-CM    1. Rash       Complete course of doxycycline discussed how to take the medication can use topicals on the upper arm rashes the left leg rash is on the larger side if it does not continue to improve we will have her come into the office to get a better look at it     R21 782.1    2. Essential hypertension       Discussed that she does need to continue her losartan blood pressure is usually better controlled she was not feeling well and they checked her blood pressure at the urgent care    Continue current dose of losartan hydrochlorothiazide verapamil and clonidine I10 401.9    3. Anxiety       Improved with Zoloft anxious about what is going on provided reassurance and reviewed treatment plan F41.9 300.00         Scribed by Jennyfer Jacob [facility], as dictated by Dr. Liz Nunez.     Current diagnosis and concerns discussed with pt at length. Pt understands risks and benefits or current treatment plan and medications, and accepts the treatment and medication with any possible risks. Pt asks appropriate questions, which were answered. Pt was instructed to call with any concerns or problems.      I have reviewed the note documented by the scribe.  The services provided are my own.   The documentation is accurate     Angelina Roman was evaluated through a synchronous (real-time) audio-video encounter. The patient (or guardian if applicable) is aware that this is a billable service. Verbal consent to proceed has been obtained within the past 12 months. The visit was conducted pursuant to the emergency declaration under the [address] [place] [address] authority and the [employer]. Patient identification was verified, and a caregiver was present when appropriate. The patient was located in a state where the provider was credentialed to provide care.

## 2021-07-23 NOTE — TELEPHONE ENCOUNTER
Patient had a scheduled virtual Visit with Dr. Ayo Garcia today 07/23/2021 to address concerns.  No further action required

## 2021-07-23 NOTE — TELEPHONE ENCOUNTER
Patient states she needs a call back in reference to medication prescribed yesterday for  Insect/Tick Bite that patient reports she was seen for at / [place] [id number] on Yesterday. Patient states she was advised that the medication prescribed conflicts with her Blood Pressure medication & they advised for her not to take her blood pressure medication. Patient states she was also advised of possible Lyme's disease but no testing was done. Patient states she needs to discuss her Blood pressure medication Asap & also see if appt can be done for her to see PCP for Follow up Asap. Please call.  Thank you

## 2021-08-15 DIAGNOSIS — I10 ESSENTIAL HYPERTENSION: ICD-10-CM

## 2021-08-15 RX ORDER — VERAPAMIL HYDROCHLORIDE 240 MG/1
TABLET, FILM COATED, EXTENDED RELEASE ORAL
Qty: 90 TABLET | Refills: 0 | Status: SHIPPED | OUTPATIENT
Start: 2021-08-15 | End: 2021-11-11

## 2021-09-10 RX ORDER — LOSARTAN POTASSIUM 50 MG/1
50 TABLET ORAL DAILY
Qty: 90 TABLET | Refills: 0 | Status: SHIPPED | OUTPATIENT
Start: 2021-09-10 | End: 2021-12-05

## 2021-09-10 NOTE — TELEPHONE ENCOUNTER
PCP: Jan Ascencio MD    Last appt: 7/23/2021  Future Appointments   Date Time Provider Marilin Ventura   9/15/2021 11:15 AM Jan Ascencio MD [facility] BS AMB   12/22/2021 11:20 AM Toni Arizmendi MD [facility] BS AMB   2/1/2022  9:15 AM Andres Rojas MD [facility] BS AMB       Requested Prescriptions     Pending Prescriptions Disp Refills    losartan (COZAAR) 50 mg tablet       Sig: Take 1 Tablet by mouth daily.

## 2021-09-10 NOTE — TELEPHONE ENCOUNTER
Pt called again. Patient said pharm said the script was \"terminated\" by dr on 6/30/21  Is there a problem with the script? Pt asks for a callback if there is. Caller requests Refill of:  losartan (COZAAR) 50 mg tablet      Please send to:  [facility]/pharmacy #[id number], [place] - [address] [facility]  [phone]          Caller advised Meds will be refilled as soon as possible, however there can be a 48-72 business hour turn around on refill requests.

## 2021-09-10 NOTE — TELEPHONE ENCOUNTER
----- Message from Shira James sent at 9/10/2021 11:17 AM EDT -----  Regarding: MD Lopes/Telephone  Medication Refill    Caller (if not patient): NA       Relationship of caller (if not patient):Self       Best contact number(s): [phone]        Name of medication and dosage if known: Losartan Potassium 50 mg. Is patient out of this medication (yes/no): Yes      Pharmacy name: [facility]    Pharmacy listed in chart? (yes/no): Yes  Pharmacy phone number: NA       Details to clarify the request: Patient advising pharmacist advising that as of 06/30/21 patient prescription is terminated in system and cannot be refilled. Patient advising she needs to have RX filled prior to end of day to get her through the weekend. Patient also wanting to know if MD Trista Myers is working all day or partial day today as she needs to speak with nurse and or MD Trista Myers.       Shira James   Copy/Paste from [facility]

## 2021-09-11 LAB
ALBUMIN SERPL-MCNC: 4.1 G/DL (ref 3.7–4.7)
ALBUMIN/GLOB SERPL: 1.6 {RATIO} (ref 1.2–2.2)
ALP SERPL-CCNC: 64 IU/L (ref 48–121)
ALT SERPL-CCNC: 13 IU/L (ref 0–32)
AST SERPL-CCNC: 15 IU/L (ref 0–40)
BILIRUB SERPL-MCNC: 0.4 MG/DL (ref 0–1.2)
BUN SERPL-MCNC: 12 MG/DL (ref 8–27)
BUN/CREAT SERPL: 15 (ref 12–28)
CALCIUM SERPL-MCNC: 9.5 MG/DL (ref 8.7–10.3)
CHLORIDE SERPL-SCNC: 104 MMOL/L (ref 96–106)
CHOLEST SERPL-MCNC: 149 MG/DL (ref 100–199)
CO2 SERPL-SCNC: 25 MMOL/L (ref 20–29)
CREAT SERPL-MCNC: 0.8 MG/DL (ref 0.57–1)
ERYTHROCYTE [DISTWIDTH] IN BLOOD BY AUTOMATED COUNT: 12.1 % (ref 11.7–15.4)
EST. AVERAGE GLUCOSE BLD GHB EST-MCNC: 103 MG/DL
GLOBULIN SER CALC-MCNC: 2.5 G/DL (ref 1.5–4.5)
GLUCOSE SERPL-MCNC: 101 MG/DL (ref 65–99)
HBA1C MFR BLD: 5.2 % (ref 4.8–5.6)
HCT VFR BLD AUTO: 36.1 % (ref 34–46.6)
HCV AB S/CO SERPL IA: <0.1 S/CO RATIO (ref 0–0.9)
HDLC SERPL-MCNC: 70 MG/DL
HGB BLD-MCNC: 11.9 G/DL (ref 11.1–15.9)
LDLC SERPL CALC-MCNC: 67 MG/DL (ref 0–99)
MCH RBC QN AUTO: 31.8 PG (ref 26.6–33)
MCHC RBC AUTO-ENTMCNC: 33 G/DL (ref 31.5–35.7)
MCV RBC AUTO: 97 FL (ref 79–97)
PLATELET # BLD AUTO: 155 X10E3/UL (ref 150–450)
POTASSIUM SERPL-SCNC: 3.9 MMOL/L (ref 3.5–5.2)
PROT SERPL-MCNC: 6.6 G/DL (ref 6–8.5)
RBC # BLD AUTO: 3.74 X10E6/UL (ref 3.77–5.28)
SODIUM SERPL-SCNC: 141 MMOL/L (ref 134–144)
TRIGL SERPL-MCNC: 58 MG/DL (ref 0–149)
TSH SERPL DL<=0.005 MIU/L-ACNC: 1.11 UIU/ML (ref 0.45–4.5)
VLDLC SERPL CALC-MCNC: 12 MG/DL (ref 5–40)
WBC # BLD AUTO: 4.2 X10E3/UL (ref 3.4–10.8)

## 2021-09-14 NOTE — PROGRESS NOTES
HISTORY OF PRESENT ILLNESS  Angelina Roman is a 80 y.o. female. HPI     Last here 7/23/21. Pt is here for chronic conditions.      Lov she c/o tick bites and rashes on L thigh  She was given doxycycline, which she states helped  She c/o this again today, pt states that this is not painful or oozing   On exam this looks like an infection, will order doxycycline  She mentions that sometimes feels odd sensation around vagina, wonders if this could be from worms, discussed that this is not the case        Lov she c/o neck pain sporadically in the left neck when she turns her head a certain direction x 1 year    She c/o breast pain/pulling on L side  Discussed this is nl, well-fitting bras can help     She c/o hair thinning, wonders if taking biotin would be ok  Discussed this would be fine    BP is controlled  BP good at Dr. Marco Mckeon office per pt  Continues on clonidine 0.2mg BID, HCTZ 25mg   Losartan was increased from 25 mg to 50 mg per cardio   Continues on verapamil  240 mg per day   Recall she had a cough on lisinopril      Wt today is 185 lbs        Reviewed labs   Ordered labs      Pt is following with Dr Morales (GI) for weight loss and pancreatic cyst  Pt had an EGD and EUS 10/24/19  Last there 8/21, she had been having blood in stool, was told that she has hemorrhoids   She is now taking metamucil, discussed taking 1-2      Pt had a cardiac ablation for her afib per Dr. Chantale Shanks (cardio) on 3/17/17   Pt will only f/u with this physician prn       Pt saw Dr. Cayden Hdz (cardio) for f/u after a fib  Next appt 2/01/22  Pt was taken off of sotalol  Feels better more energy with this  Continues on eliquis 5 mg bid  Pt denies any bleeding/falls on these meds 9/21  Last visit 1/28/21   He increased losartan to 50 mg      Pt follows Dr. Caryle Look (Neuro) for neuropathy and HA  Reviewed note 4/08/21:  Angelina Roman is a 80 y.o. female who presented to the neurology office for management of paresthesia distally in the lower extremities. Sometimes it can be on the left and sometimes on the right. On examination patient does have decreased pinprick sensation distally in the lower extremities. 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 Blood work has been normal.  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 She was complaining of neuropathic pain and was prescribed amitriptyline 25 mg p.o. nightly. The insurance did not cover it. She discussed with the primary care physician and the patient states was told not to take it as it is for anxiety. Amitriptyline was prescribed for neuropathic pain but at this time patient wants to hold off on taking it. The patient is also complaining of intermittent headaches and does have visual auras of zigzag lines. She does have possibly ocular migraines. CT scan of the head was normal.  Amitriptyline was prescribed for migraines as well. Patient is not taking it at this time. Will reassess her situation and the need for amitriptyline in 6 months. For now she states that she can live with the symptoms. I also discussed with her that amitriptyline is not expensive and we can use good Rx card when needed.   She will now be following with Dr. Uvaldo Covarrubias (neuro) because Dr. Jyothi Fuller has moved   Appt scheduled 12/22/21    She was given amitriptyline for headaches and neuropathy- she is not taking this, it was never approved   She is doing fine with this      Pt saw Dr. Brianna Braden (cardio) for foot circulation  Last visit was 8/1/17   Recall has chronic sx of leg tightness  Pt will only f/u with this physician prn      Pt saw Dr. Kelin Gonzalez (uro-gyn) for urinary sx  Last visit was 11/17  Pt was told she had lichen sclerosus   Now resolved      Pt saw Dr Radha Dill (sleep)  Last visit was 7/3/18  Recall telephone note 9/18: no significant sleep disordered breathing, overall AHI of 0.6/h  Pt had a poor experience and was told she has no sleep apnea     She has been seeing  Navid Sandoval (surg) for lipoma  Last there 11/12/20     Pt had a cyst on R breast removed by Dr Britt Carter (surg) in the past      Continues crestor 20mg daily for [employer] klor-con 20meqs BID for her potassium      Continues on synthroid 75mcg daily  Will take full dose 6 days per week and will take half day 1 day per week      Taking zoloft  50 mg for anxiety      Pt followed with Dr Davy Kemp (podiatry)   She would like to f/u with this physician for 2 corns on her foot    Reviewed colo 7/01/21:  -Normal terminal ileum  -Single 5mm sessile polyp on ileocecal valve with associated ulceration; removed with hot snare cautery  -Sigmoid diverticulosis  -Small grade 1 internal hemorrhoids  -No colitis noted; biopsies obtained in ascending colon to exclude microscopic colitis    Pt lives by herself    Pt is functionally independent       No memory concerns   Knows the month, date, year, location   Can recall 3/3 objects         ACP not on file.  SDM is her sister, Brie Tilley. Provided information      Recall She had a cervical polyp, was having vaginal bleeding, this was removed by julio césar, vaginal bleeding has since resolved.        PREVENTIVE:    Colonoscopy: 7/01/21.  Dr. Susan Sosa, repeat 5 years  Pap: Usually Dr. Carlos Ricci, due 10/21  DEXA: 10/19 osteopenia, due 10/21  Tdap: 4/14/2015  Pneumovax: 11/19/2013  [id number]: 4/05/2017  Zostavax: declines  Shingrix: due  h/o shingles  Flu shot: 10/06/20, will get today 09/15/21  A1C: 4/17 5.9, 10/17 POC 5.6, 4/18 5.6, 10/18 5.6, 4/19 5.6 11/19 5.5  4/20 5.5 10/20 5.4 9/21 5.2  Eye exam: Dr. Jenny Grande, cataract, due now reminded   Lipids: 9/21 LDL 67  Hep C screen: 9/21 negative  Covid: both (Jasminmarianela Lynch)    Patient Active Problem List    Diagnosis Date Noted    Anxiety 03/09/2021    Pericardial effusion with cardiac tamponade 03/18/2017    S/P ablation of atrial fibrillation 03/17/2017    Acquired hypothyroidism 12/14/2015    HTN (hypertension) 07/21/2015    Atrial fibrillation ([facility] [address] 03/16/2015    Morbid obesity ([facility] [address]     Hyperlipidemia 07/14/2014    Hypokalemia 07/14/2014    Postmenopausal bleeding 10/18/2011     Current Outpatient Medications   Medication Sig Dispense Refill    varicella-zoster recombinant, PF, (Shingrix, PF,) 50 mcg/0.5 mL susr injection 0.5mL by IntraMUSCular route once now and then repeat in 2-6 months 0.5 mL 1    doxycycline (ADOXA) 100 mg tablet Take 1 Tablet by mouth two (2) times a day for 7 days. 14 Tablet 0    losartan (COZAAR) 50 mg tablet Take 1 Tablet by mouth daily. 90 Tablet 0    verapamil ER (CALAN-SR) 240 mg CR tablet TAKE 1 TABLET BY MOUTH EVERY MORNING FOR BLOOD PRESSURE 90 Tablet 0    hydroCHLOROthiazide (HYDRODIURIL) 25 mg tablet TAKE 1 TABLET BY MOUTH EVERY DAY 90 Tablet 0    Synthroid 75 mcg tablet TAKE 1 TABLET BY MOUTH EVERY DAY BEFORE BREAKFAST 90 Tablet 0    cloNIDine HCL (CATAPRES) 0.2 mg tablet TAKE 1 TABLET BY MOUTH TWICE A  Tab 1    rosuvastatin (CRESTOR) 20 mg tablet TAKE 1 TABLET BY MOUTH EVERY DAY 90 Tab 1    potassium chloride SR (KLOR-CON 10) 10 mEq tablet TAKE 2 TABLETS IN MORNING AND 2 TABLETS IN THE EVENING 360 Tab 1    Eliquis 5 mg tablet TAKE 1 TABLET BY MOUTH TWICE A DAY 60 Tab 6    sertraline (ZOLOFT) 50 mg tablet Take 1 Tab by mouth daily. 90 Tab 2    CALCIUM CARBONATE/VITAMIN D3 (CALTRATE 600 + D PO) Take 1 Tab by mouth daily.  MULTIVITAMIN W-MINERALS/LUTEIN (CENTRUM SILVER PO) Take 1 Tab by mouth daily (after lunch).        Past Surgical History:   Procedure Laterality Date    COLONOSCOPY N/A 7/1/2021    [facility]  7/1/2021         COLONOSCOPYTESSIE  7/1/2021         HX GYN      myomectomy on uterus    HX GYN  2011    hystereoscopy D&C    HX LYMPH NODE DISSECTION      biopsy    HX OTHER SURGICAL      lymph node bx    ME BREAST SURGERY PROCEDURE UNLISTED  1982    breast bx rt    [place] CARDIAC SURG PROCEDURE UNLIST  01/2015    cardiac catheterization, no intervention, medical management    [place] CARDIAC SURG PROCEDURE UNLIST  03/2017    ablation    UPPER GI ENDOSCOPY,BIOPSY  10/24/2019         UPPER GI ENDOSCOPY,FN NEEDLE BX,GUIDED  10/24/2019           Lab Results   Component Value Date/Time    WBC 4.2 09/10/2021 08:46 AM    HGB 11.9 09/10/2021 08:46 AM    HCT 36.1 09/10/2021 08:46 AM    PLATELET 626 82/18/6754 08:46 AM    MCV 97 09/10/2021 08:46 AM     Lab Results   Component Value Date/Time    Cholesterol, total 149 09/10/2021 08:46 AM    HDL Cholesterol 70 09/10/2021 08:46 AM    LDL, calculated 67 09/10/2021 08:46 AM    LDL, calculated 69 04/07/2020 12:28 PM    Triglyceride 58 09/10/2021 08:46 AM     Lab Results   Component Value Date/Time    GFR est non-AA 70 09/10/2021 08:46 AM    GFR est AA 81 09/10/2021 08:46 AM    Creatinine 0.80 09/10/2021 08:46 AM    BUN 12 09/10/2021 08:46 AM    Sodium 141 09/10/2021 08:46 AM    Potassium 3.9 09/10/2021 08:46 AM    Chloride 104 09/10/2021 08:46 AM    CO2 25 09/10/2021 08:46 AM    Magnesium 2.2 01/16/2020 11:36 PM        Review of Systems   Respiratory: Negative for shortness of breath. Cardiovascular: Negative for chest pain. Physical Exam  Constitutional:       General: She is not in acute distress. Appearance: Normal appearance. She is not ill-appearing, toxic-appearing or diaphoretic. HENT:      Head: Normocephalic and atraumatic. Right Ear: External ear normal.      Left Ear: External ear normal.   Eyes:      General:         Right eye: No discharge. Left eye: No discharge. Conjunctiva/sclera: Conjunctivae normal.      Pupils: Pupils are equal, round, and reactive to light. Cardiovascular:      Rate and Rhythm: Normal rate and regular rhythm. Heart sounds: Murmur heard. No friction rub. No gallop.        Comments: 2/6 murmur  Pulmonary:      Effort: No respiratory distress. Breath sounds: Normal breath sounds. No wheezing or rales. Chest:      Chest wall: No tenderness. Musculoskeletal:         General: Normal range of motion. Cervical back: Normal range of motion and neck supple. Skin:     General: Skin is warm and dry. Comments: Indurated area with erythema about 1 cm in diameter   Neurological:      Mental Status: She is alert and oriented to person, place, and time. Mental status is at baseline. Coordination: Coordination normal.      Gait: Gait normal.   Psychiatric:         Mood and Affect: Mood normal.         Behavior: Behavior normal.         ASSESSMENT and PLAN    ICD-10-CM ICD-9-CM    1. Essential hypertension      Y04 406.9 METABOLIC PANEL, COMPREHENSIVE   Well-controlled on current regimen of clonidine and hydrochlorothiazide losartan and verapamil   HEMOGLOBIN A1C WITH EAG      TSH 3RD GENERATION   2. Medicare annual wellness visit, subsequent  Y36.07 K95.8 METABOLIC PANEL, COMPREHENSIVE      HEMOGLOBIN A1C WITH EAG      TSH 3RD GENERATION   3. Encounter for screening mammogram for malignant neoplasm of breast  Z12.31 V76.12 [facility] MAMMO BI SCREENING INCL CAD      METABOLIC PANEL, COMPREHENSIVE      HEMOGLOBIN A1C WITH EAG      TSH 3RD GENERATION   4. Postmenopausal state  Z78.0 V49.81 DEXA BONE DENSITY STUDY AXIAL      METABOLIC PANEL, COMPREHENSIVE   DEXA ordered   HEMOGLOBIN A1C WITH EAG      TSH 3RD GENERATION   5. Paroxysmal atrial fibrillation (HCC)  N30.3 287.36 METABOLIC PANEL, COMPREHENSIVE   Up-to-date with cardiology has follow-up pending for this winter continues on Eliquis for anticoagulation and verapamil for rate control   HEMOGLOBIN A1C WITH EAG      TSH 3RD GENERATION   6. Morbid obesity ([facility] [address]  H78.32 924.23 METABOLIC PANEL, COMPREHENSIVE   Work on portions   HEMOGLOBIN A1C WITH EAG      TSH 3RD GENERATION   7.  Mixed hyperlipidemia  D18.8 383.4 METABOLIC PANEL, COMPREHENSIVE   Controlled on Crestor   HEMOGLOBIN A1C WITH EAG      TSH 3RD GENERATION   8. Acquired hypothyroidism  M59.7 486.7 METABOLIC PANEL, COMPREHENSIVE   On Synthroid 75 mcg monitor TSH adjust dose as needed   HEMOGLOBIN A1C WITH EAG      TSH 3RD GENERATION   9. Anxiety  F91.5 943.18 METABOLIC PANEL, COMPREHENSIVE   Improved with Zoloft 50 mg daily continue   HEMOGLOBIN A1C WITH EAG      TSH 3RD GENERATION   10. Hypokalemia  T30.3 713.0 METABOLIC PANEL, COMPREHENSIVE   On Klor-Con 20 M EQ twice daily monitor metabolic panel adjust dosing as needed   HEMOGLOBIN A1C WITH EAG      TSH 3RD GENERATION   11. IFG (impaired fasting glucose)  N16.04 556.38 METABOLIC PANEL, COMPREHENSIVE   Monitor A1c   HEMOGLOBIN A1C WITH EAG      TSH 3RD GENERATION   12. Neuropathy       Was given Elavil ultimately stop any medication she has some neck tightness which is likely musculoskeletal plans on seeing neurology again has an appointment pending for later on this winter G62.9 355.9    13. Papule       Possible infection we will treat with doxycycline twice daily if continues to recur we will have her see dermatology R23.8 709.8 REFERRAL TO DERMATOLOGY   14. Hair thinning     Discussed biotin is okay L65.9 704.00     15 breast pain with no lump discussed well fitting bra mammogram has been ordered we will move up from October if symptoms are progressive  Depression screen reviewed and negative     Scribed by Tanner Rivera, as dictated by Dr. Joann Rhodes. Current diagnosis and concerns discussed with pt at length. Pt understands risks and benefits or current treatment plan and medications, and accepts the treatment and medication with any possible risks. Pt asks appropriate questions, which were answered. Pt was instructed to call with any concerns or problems. I have reviewed the note documented by the scribe. The services provided are my own.   The documentation is accurate

## 2021-09-15 ENCOUNTER — OFFICE VISIT (OUTPATIENT)
Dept: INTERNAL MEDICINE CLINIC | Age: 81
End: 2021-09-15
Payer: MEDICARE

## 2021-09-15 VITALS
HEIGHT: 65 IN | TEMPERATURE: 97.7 F | HEART RATE: 66 BPM | BODY MASS INDEX: 30.82 KG/M2 | OXYGEN SATURATION: 98 % | SYSTOLIC BLOOD PRESSURE: 107 MMHG | DIASTOLIC BLOOD PRESSURE: 64 MMHG | WEIGHT: 185 LBS | RESPIRATION RATE: 16 BRPM

## 2021-09-15 DIAGNOSIS — F41.9 ANXIETY: ICD-10-CM

## 2021-09-15 DIAGNOSIS — R73.01 IFG (IMPAIRED FASTING GLUCOSE): ICD-10-CM

## 2021-09-15 DIAGNOSIS — Z23 NEEDS FLU SHOT: ICD-10-CM

## 2021-09-15 DIAGNOSIS — G62.9 NEUROPATHY: ICD-10-CM

## 2021-09-15 DIAGNOSIS — L65.9 HAIR THINNING: ICD-10-CM

## 2021-09-15 DIAGNOSIS — Z12.31 ENCOUNTER FOR SCREENING MAMMOGRAM FOR MALIGNANT NEOPLASM OF BREAST: ICD-10-CM

## 2021-09-15 DIAGNOSIS — Z78.0 POSTMENOPAUSAL STATE: ICD-10-CM

## 2021-09-15 DIAGNOSIS — E87.6 HYPOKALEMIA: ICD-10-CM

## 2021-09-15 DIAGNOSIS — E03.9 ACQUIRED HYPOTHYROIDISM: ICD-10-CM

## 2021-09-15 DIAGNOSIS — I10 ESSENTIAL HYPERTENSION: Primary | ICD-10-CM

## 2021-09-15 DIAGNOSIS — E66.01 MORBID OBESITY (HCC): ICD-10-CM

## 2021-09-15 DIAGNOSIS — E78.2 MIXED HYPERLIPIDEMIA: ICD-10-CM

## 2021-09-15 DIAGNOSIS — R23.8 PAPULE: ICD-10-CM

## 2021-09-15 DIAGNOSIS — Z00.00 MEDICARE ANNUAL WELLNESS VISIT, SUBSEQUENT: ICD-10-CM

## 2021-09-15 DIAGNOSIS — I48.0 PAROXYSMAL ATRIAL FIBRILLATION (HCC): ICD-10-CM

## 2021-09-15 PROCEDURE — G8510 SCR DEP NEG, NO PLAN REQD: HCPCS | Performed by: INTERNAL MEDICINE

## 2021-09-15 PROCEDURE — G0439 PPPS, SUBSEQ VISIT: HCPCS | Performed by: INTERNAL MEDICINE

## 2021-09-15 PROCEDURE — G0463 HOSPITAL OUTPT CLINIC VISIT: HCPCS | Performed by: INTERNAL MEDICINE

## 2021-09-15 PROCEDURE — G8427 DOCREV CUR MEDS BY ELIG CLIN: HCPCS | Performed by: INTERNAL MEDICINE

## 2021-09-15 PROCEDURE — G8399 PT W/DXA RESULTS DOCUMENT: HCPCS | Performed by: INTERNAL MEDICINE

## 2021-09-15 PROCEDURE — G8754 DIAS BP LESS 90: HCPCS | Performed by: INTERNAL MEDICINE

## 2021-09-15 PROCEDURE — G8752 SYS BP LESS 140: HCPCS | Performed by: INTERNAL MEDICINE

## 2021-09-15 PROCEDURE — G8417 CALC BMI ABV UP PARAM F/U: HCPCS | Performed by: INTERNAL MEDICINE

## 2021-09-15 PROCEDURE — 1090F PRES/ABSN URINE INCON ASSESS: CPT | Performed by: INTERNAL MEDICINE

## 2021-09-15 PROCEDURE — G8536 NO DOC ELDER MAL SCRN: HCPCS | Performed by: INTERNAL MEDICINE

## 2021-09-15 PROCEDURE — 99214 OFFICE O/P EST MOD 30 MIN: CPT | Performed by: INTERNAL MEDICINE

## 2021-09-15 PROCEDURE — 90694 VACC AIIV4 NO PRSRV 0.5ML IM: CPT | Performed by: INTERNAL MEDICINE

## 2021-09-15 PROCEDURE — 1101F PT FALLS ASSESS-DOCD LE1/YR: CPT | Performed by: INTERNAL MEDICINE

## 2021-09-15 RX ORDER — ZOSTER VACCINE RECOMBINANT, ADJUVANTED 50 MCG/0.5
KIT INTRAMUSCULAR
Qty: 0.5 ML | Refills: 1 | Status: SHIPPED | OUTPATIENT
Start: 2021-09-15 | End: 2021-12-27

## 2021-09-15 RX ORDER — DOXYCYCLINE 100 MG/1
100 TABLET ORAL 2 TIMES DAILY
Qty: 14 TABLET | Refills: 0 | Status: SHIPPED | OUTPATIENT
Start: 2021-09-15 | End: 2021-09-22

## 2021-09-15 NOTE — PATIENT INSTRUCTIONS

## 2021-09-15 NOTE — PROGRESS NOTES
This is the Subsequent Medicare Annual Wellness Exam, performed 12 months or more after the Initial AWV or the last Subsequent AWV    I have reviewed the patient's medical history in detail and updated the computerized patient record. Assessment/Plan   Education and counseling provided:  Are appropriate based on today's review and evaluation    1. Medicare annual wellness visit, subsequent       Depression Risk Factor Screening     3 most recent PHQ Screens 9/15/2021   Little interest or pleasure in doing things Not at all   Feeling down, depressed, irritable, or hopeless Not at all   Total Score PHQ 2 0       Alcohol Risk Screen    Do you average more than 1 drink per night or more than 7 drinks a week:  No    On any one occasion in the past three months have you have had more than 3 drinks containing alcohol:  No        Functional Ability and Level of Safety    Hearing: Hearing is good. Activities of Daily Living: The home contains: handrails and grab bars  Patient does total self care      Ambulation: with no difficulty     Fall Risk:  Fall Risk Assessment, last 12 mths 9/15/2021   Able to walk? Yes   Fall in past 12 months? 0   Do you feel unsteady? -   Are you worried about falling -   Is TUG test greater than 12 seconds? -   Is the gait abnormal? -   Number of falls in past 12 months -   Fall with injury?  -      Abuse Screen:  Patient is not abused     Lives alone      Cognitive Screening    Has your family/caregiver stated any concerns about your memory: no     Cognitive Screening: Normal - Mini Cog Test     No memory concerns   Pt knows the month, day and year   Can recall 3/3 objects     Health Maintenance Due     Health Maintenance Due   Topic Date Due    Shingrix Vaccine Age 49> (1 of 2) Never done    Medicare Yearly Exam  04/15/2021    Flu Vaccine (1) 09/01/2021       Patient Care Team   Patient Care Team:  Marcial Duarte MD as PCP - General (Internal Medicine)  Marcial Duarte MD as PCP - [facility]  Ulises De Santiago MD (Colon and Rectal Surgery)  Gilbert Brandt MD (Ophthalmology)  Alise Umana MD (Obstetrics & Gynecology)  Tania Alexandre MD (Gynecology)  Flavia Adam MD (Cardiology)  Mariaa Pedraza MD as Physician (Cardiology)  Debora Trinidad MD (Cardiology)  Andree Juarez DPM (Podiatry)  Leanne Bermeo MD as Surgeon (General Surgery)  Angela Ward MD as Physician (Sleep Medicine)  Efrain Callaway MD (Gastroenterology)  Keiko Egan MD (Gastroenterology)  Khang Clark MD (Neurology)  Debby Hernandez MD (Neurology)     updated    History     Patient Active Problem List   Diagnosis Code    Postmenopausal bleeding N95.0    Hyperlipidemia E78.5    Hypokalemia E87.6    Morbid obesity ([address] E66.01    Atrial fibrillation ([address] I48.91    HTN (hypertension) I10    Acquired hypothyroidism E03.9    S/P ablation of atrial fibrillation Z98.890, Z86.79    Pericardial effusion with cardiac tamponade I31.3, I31.4    Anxiety F41.9     Past Medical History:   Diagnosis Date    Arrhythmia     afib    Arthritis     right knee, left shoulder    Diverticulosis     Frequency of urination     High cholesterol     Hypertension     Morbid obesity ([address]     Osteoarthritis     Peripheral neuropathy     bilateral LE    Thyroid disease     hypothyroid    Unspecified adverse effect of anesthesia     low BP      Past Surgical History:   Procedure Laterality Date    COLONOSCOPY N/A 7/1/2021    COL[facility]gan [facility] ENDOSCOPY    COLONOSCOPY,DIAGNOSTIC  7/1/2021         COLONOSCOPY,TESSIE [place]  7/1/2021         HX GYN      myomectomy on uterus    HX GYN  2011    hystereoscopy D&C    HX LYMPH NODE DISSECTION      biopsy    HX OTHER SURGICAL      lymph node bx    [place] BREAST SURGERY PROCEDURE UNLISTED  1982    breast bx rt    [place] CARDIAC SURG PROCEDURE UNLIST  01/2015    cardiac catheterization, no intervention, medical management    [place] CARDIAC SURG PROCEDURE UNLIST  03/2017    ablation    UPPER GI ENDOSCOPY,BIOPSY  10/24/2019         UPPER GI ENDOSCOPY,FN NEEDLE BX,GUIDED  10/24/2019          Current Outpatient Medications   Medication Sig Dispense Refill    losartan (COZAAR) 50 mg tablet Take 1 Tablet by mouth daily. 90 Tablet 0    verapamil ER (CALAN-SR) 240 mg CR tablet TAKE 1 TABLET BY MOUTH EVERY MORNING FOR BLOOD PRESSURE 90 Tablet 0    hydroCHLOROthiazide (HYDRODIURIL) 25 mg tablet TAKE 1 TABLET BY MOUTH EVERY DAY 90 Tablet 0    Synthroid 75 mcg tablet TAKE 1 TABLET BY MOUTH EVERY DAY BEFORE BREAKFAST 90 Tablet 0    cloNIDine HCL (CATAPRES) 0.2 mg tablet TAKE 1 TABLET BY MOUTH TWICE A  Tab 1    rosuvastatin (CRESTOR) 20 mg tablet TAKE 1 TABLET BY MOUTH EVERY DAY 90 Tab 1    potassium chloride SR (KLOR-CON 10) 10 mEq tablet TAKE 2 TABLETS IN MORNING AND 2 TABLETS IN THE EVENING 360 Tab 1    Eliquis 5 mg tablet TAKE 1 TABLET BY MOUTH TWICE A DAY 60 Tab 6    sertraline (ZOLOFT) 50 mg tablet Take 1 Tab by mouth daily. 90 Tab 2    CALCIUM CARBONATE/VITAMIN D3 (CALTRATE 600 + D PO) Take 1 Tab by mouth daily.  MULTIVITAMIN W-MINERALS/LUTEIN (CENTRUM SILVER PO) Take 1 Tab by mouth daily (after lunch). Allergies   Allergen Reactions    Adhesive Hives     Paper tape fine    Amoxicillin Unknown (comments)    Lipitor [Atorvastatin] Myalgia     Gets the flu       Family History   Problem Relation Age of Onset    Alzheimer Mother     Heart Disease Mother     Heart Disease Father     Hypertension Father     Cancer Paternal Aunt     Diabetes Paternal Grandmother      Social History     Tobacco Use    Smoking status: Never Smoker    Smokeless tobacco: Never Used   Substance Use Topics    Alcohol use: No     ACP not on file.  SDM is her sister, Ariel Palomino. Provided information        Colonoscopy: 7/01/21.  Dr. Angela Duke, repeat 5 years  Pap: Usually  Luz, 10/20 q 2 years   [address] 6725 annual --due next month   DEXA: 10/19 osteopenia  due     Tdap: 4/14/2015  Pneumovax: 11/19/2013  [id number]: 4/05/2017  Zostavax: declines  Shingrix: ordered 04/17/19 still needs to complete, h/o shingles  Flu shot: 9/21    A1C: 9/21 5.2 annual     Eye exam: Dr. Eddie Neely, cataract, due now reminded     Lipids: 9/21 LDL 67 annual   Hep C screen: 9/21 negative  Covid: both (Susana Quan)    Medication reconciliation completed by MA and reviewed by me. Medical/surgical/social/family history reviewed and updated by me. Patient provided AVS and preventative screening table. Patient verbalized understanding of all information discussed.     Rosana Chappell MD

## 2021-09-19 RX ORDER — HYDROCHLOROTHIAZIDE 25 MG/1
TABLET ORAL
Qty: 90 TABLET | Refills: 0 | Status: SHIPPED | OUTPATIENT
Start: 2021-09-19 | End: 2021-12-14

## 2021-09-29 RX ORDER — LEVOTHYROXINE SODIUM 75 UG/1
TABLET ORAL
Qty: 90 TABLET | Refills: 0 | Status: SHIPPED | OUTPATIENT
Start: 2021-09-29 | End: 2021-12-26

## 2021-10-10 RX ORDER — POTASSIUM CHLORIDE 750 MG/1
TABLET, FILM COATED, EXTENDED RELEASE ORAL
Qty: 360 TABLET | Refills: 1 | Status: SHIPPED | OUTPATIENT
Start: 2021-10-10 | End: 2022-04-06

## 2021-10-12 RX ORDER — ROSUVASTATIN CALCIUM 20 MG/1
TABLET, COATED ORAL
Qty: 90 TABLET | Refills: 1 | Status: SHIPPED | OUTPATIENT
Start: 2021-10-12 | End: 2022-04-05 | Stop reason: SDUPTHER

## 2021-10-25 RX ORDER — APIXABAN 5 MG/1
TABLET, FILM COATED ORAL
Qty: 60 TABLET | Refills: 6 | Status: SHIPPED | OUTPATIENT
Start: 2021-10-25 | End: 2022-02-22 | Stop reason: SDUPTHER

## 2021-10-28 RX ORDER — CLONIDINE HYDROCHLORIDE 0.2 MG/1
TABLET ORAL
Qty: 180 TABLET | Refills: 1 | Status: SHIPPED | OUTPATIENT
Start: 2021-10-28 | End: 2022-04-29

## 2021-11-11 DIAGNOSIS — I10 ESSENTIAL HYPERTENSION: ICD-10-CM

## 2021-11-11 RX ORDER — VERAPAMIL HYDROCHLORIDE 240 MG/1
TABLET, FILM COATED, EXTENDED RELEASE ORAL
Qty: 90 TABLET | Refills: 0 | Status: SHIPPED | OUTPATIENT
Start: 2021-11-11 | End: 2022-02-06

## 2021-12-05 RX ORDER — LOSARTAN POTASSIUM 50 MG/1
TABLET ORAL
Qty: 90 TABLET | Refills: 0 | Status: SHIPPED | OUTPATIENT
Start: 2021-12-05 | End: 2022-02-28

## 2021-12-06 ENCOUNTER — TELEPHONE (OUTPATIENT)
Dept: CARDIOLOGY CLINIC | Age: 81
End: 2021-12-06

## 2021-12-06 RX ORDER — SERTRALINE HYDROCHLORIDE 50 MG/1
TABLET, FILM COATED ORAL
Qty: 90 TABLET | Refills: 2 | Status: SHIPPED | OUTPATIENT
Start: 2021-12-06 | End: 2022-05-20 | Stop reason: SDUPTHER

## 2021-12-06 NOTE — TELEPHONE ENCOUNTER
Pt having oral surgery on 12/10/2021. Please call her back at [phone]. She has a question about her eliquis.         Thanks Lilibeth

## 2021-12-06 NOTE — TELEPHONE ENCOUNTER
Verified patient with 2 identifiers   Has an oral surgeon appt on 12/10/21. She is having her top Right first molar extracted  Dr Juan Alberto Browne will be performing surgery  Patient states he does not recommend that she stop the eliquis. jose will be taking Clindamycin 4 times daily 2 days before the surgery. She would like your opinion on the eliquis  Please advise.

## 2021-12-07 NOTE — TELEPHONE ENCOUNTER
Verified patient with 2 identifiers   Advised per Maria Dolores Kovacs ANP, She may hold it the day of and the night before (aware of CVA risk) or have the patient take it as prescribed as they are aware that she may bleed. Patient verified understand  Patient states she may or may not hold it.

## 2021-12-14 RX ORDER — HYDROCHLOROTHIAZIDE 25 MG/1
TABLET ORAL
Qty: 90 TABLET | Refills: 0 | Status: SHIPPED | OUTPATIENT
Start: 2021-12-14 | End: 2022-03-08

## 2021-12-17 ENCOUNTER — TELEPHONE (OUTPATIENT)
Dept: INTERNAL MEDICINE CLINIC | Age: 81
End: 2021-12-17

## 2021-12-17 ENCOUNTER — OFFICE VISIT (OUTPATIENT)
Dept: URGENT CARE | Age: 81
End: 2021-12-17
Payer: MEDICARE

## 2021-12-17 VITALS — RESPIRATION RATE: 18 BRPM | OXYGEN SATURATION: 97 % | HEART RATE: 84 BPM | TEMPERATURE: 98.9 F

## 2021-12-17 DIAGNOSIS — L50.9 URTICARIA: Primary | ICD-10-CM

## 2021-12-17 DIAGNOSIS — T36.8X5A: ICD-10-CM

## 2021-12-17 PROCEDURE — 96372 THER/PROPH/DIAG INJ SC/IM: CPT

## 2021-12-17 PROCEDURE — G8417 CALC BMI ABV UP PARAM F/U: HCPCS | Performed by: NURSE PRACTITIONER

## 2021-12-17 PROCEDURE — 1090F PRES/ABSN URINE INCON ASSESS: CPT | Performed by: NURSE PRACTITIONER

## 2021-12-17 PROCEDURE — G8427 DOCREV CUR MEDS BY ELIG CLIN: HCPCS | Performed by: NURSE PRACTITIONER

## 2021-12-17 PROCEDURE — 1101F PT FALLS ASSESS-DOCD LE1/YR: CPT | Performed by: NURSE PRACTITIONER

## 2021-12-17 PROCEDURE — G8756 NO BP MEASURE DOC: HCPCS | Performed by: NURSE PRACTITIONER

## 2021-12-17 PROCEDURE — 99213 OFFICE O/P EST LOW 20 MIN: CPT | Performed by: NURSE PRACTITIONER

## 2021-12-17 PROCEDURE — G8432 DEP SCR NOT DOC, RNG: HCPCS | Performed by: NURSE PRACTITIONER

## 2021-12-17 PROCEDURE — G8399 PT W/DXA RESULTS DOCUMENT: HCPCS | Performed by: NURSE PRACTITIONER

## 2021-12-17 PROCEDURE — G8536 NO DOC ELDER MAL SCRN: HCPCS | Performed by: NURSE PRACTITIONER

## 2021-12-17 RX ORDER — METHYLPREDNISOLONE 4 MG/1
TABLET ORAL
Qty: 1 DOSE PACK | Refills: 0 | Status: SHIPPED | OUTPATIENT
Start: 2021-12-17 | End: 2021-12-27

## 2021-12-17 NOTE — TELEPHONE ENCOUNTER
Patient is having an allergic reaction to a medication her oral surgeon prescribed. They told her to take Benadryl and that is not working. The surgeon's office advised her to call her PCP.

## 2021-12-17 NOTE — TELEPHONE ENCOUNTER
Called, spoke with Pt. Two pt identifiers confirmed. Pt informed Dr. Delma Givens is not working today but if Benadryl is not working to seek Urgent Care for further treatment. Pt stated she is Urgent Care now. Pt verbalized understanding of information discussed w/ no further questions at this time.

## 2021-12-17 NOTE — PROGRESS NOTES
Brook Puri is a 80 y.o. female who presents for evaluation of rash x 3 days. Reports she was started on clindamycin 9 days ago prior to dental procedure, last dose about 2.5 days ago. Noted rash about three days ago, now diffuse on face, chest, abd, back and buttocks. Reports very itchy but not painful. Denies any SOB, dysphagia. Denies history of allergy to clindamycin. Relief of itching with moisturizer. Has tried single dose of benadryl without relief. Allergic Reaction  Pertinent negatives include no chest pain, no headaches and no shortness of breath.         Past Medical History:   Diagnosis Date    Arrhythmia     afib    Arthritis     right knee, left shoulder    Diverticulosis     Frequency of urination     High cholesterol     Hypertension     Morbid obesity (HCC)     Osteoarthritis     Peripheral neuropathy     bilateral LE    Thyroid disease     hypothyroid    Unspecified adverse effect of anesthesia     low BP        Past Surgical History:   Procedure Laterality Date    COLONOSCOPY N/A 7/1/2021    [facility]  7/1/2021         COLONOSCOPY,TESSIE Valdez Number  7/1/2021         HX GYN      myomectomy on uterus    HX GYN  2011    hystereoscopy D&C    HX LYMPH NODE DISSECTION      biopsy    HX OTHER SURGICAL      lymph node bx    [place] [facility] [facility] PROCEDURE [place]LISTED  1982    breast bx rt    [place] CARDIAC SURG PROCEDURE UNLIST  01/2015    cardiac catheterization, no intervention, medical management    [place] CARDI[place] SUR[place] PROCEDURE UNLIST  03/2017    ablation    UPPER GI ENDOSCOPY,BIOPSY  10/24/2019         UPPER GI ENDOSCOPY,FN NEEDLE BX,GUIDED  10/24/2019              Family History   Problem Relation Age of Onset    Alzheimer's Disease Mother     Heart Disease Mother     Heart Disease Father     Hypertension Father     Cancer Paternal Aunt     Diabetes Paternal Grandmother         Social History Socioeconomic History    Marital status:      Spouse name: Not on file    Number of children: Not on file    Years of education: Not on file    Highest education level: Not on file   Occupational History    Not on file   Tobacco Use    Smoking status: Never Smoker    Smokeless tobacco: Never Used   Vaping Use    Vaping Use: Never used   Substance and Sexual Activity    Alcohol use: No    Drug use: No    Sexual activity: Not on file   Other Topics Concern    Not on file   Social History Narrative    Not on file     Social Determinants of Health     Financial Resource Strain:     Difficulty of Paying Living Expenses: Not on file   Food Insecurity:     Worried About Running Out of Food in the Last Year: Not on file    Godwin of Food in the Last Year: Not on file   Transportation Needs:     Lack of Transportation (Medical): Not on file    Lack of Transportation (Non-Medical):  Not on file   Physical Activity:     Days of Exercise per Week: Not on file    Minutes of Exercise per Session: Not on file   Stress:     Feeling of Stress : Not on file   Social Connections:     Frequency of Communication with Friends and Family: Not on file    Frequency of Social Gatherings with Friends and Family: Not on file    Attends Alevism Services: Not on file    Active Member of [address] [place] [address] [employer] or Organizations: Not on file    Attends Club or Organization Meetings: Not on file    Marital Status: Not on file   Intimate Partner Violence:     Fear of Current or Ex-Partner: Not on file    Emotionally Abused: Not on file    Physically Abused: Not on file    Sexually Abused: Not on file   Housing Stability:     Unable to Pay for Housing in the Last Year: Not on file    Number of [place] in the Last Year: Not on file    Unstable Housing in the Last Year: Not on file                ALLERGIES: Adhesive, Amoxicillin, and Lipitor [atorvastatin]    Review of Systems   Constitutional: Negative for fatigue and fever. HENT: Negative for facial swelling, sore throat, trouble swallowing and voice change. Respiratory: Negative for chest tightness and shortness of breath. Cardiovascular: Negative for chest pain and palpitations. Gastrointestinal: Negative for nausea and vomiting. Skin: Positive for rash. Neurological: Negative for light-headedness and headaches. Vitals:    12/17/21 1149   Pulse: 84   Resp: 18   Temp: 98.9 °F (37.2 °C)   SpO2: 97%       Physical Exam  Vitals and nursing note reviewed. Constitutional:       General: She is not in acute distress. Appearance: Normal appearance. She is not ill-appearing. HENT:      Head: Normocephalic and atraumatic. Mouth/Throat:      Mouth: Mucous membranes are moist.   Eyes:      Conjunctiva/sclera: Conjunctivae normal.      Pupils: Pupils are equal, round, and reactive to light. Cardiovascular:      Rate and Rhythm: Normal rate and regular rhythm. Heart sounds: Normal heart sounds. No murmur heard. Pulmonary:      Effort: Pulmonary effort is normal.      Breath sounds: Normal breath sounds. No wheezing or rhonchi. Musculoskeletal:         General: Normal range of motion. Cervical back: Normal range of motion and neck supple. No muscular tenderness. Lymphadenopathy:      Cervical: No cervical adenopathy. Skin:     General: Skin is warm and dry. Findings: Rash present. Comments: Diffuse Urticaric rash noted on chest, face, abdomen and back, areas of exoriation noted   Neurological:      Mental Status: She is alert and oriented to person, place, and time. Psychiatric:         Mood and Affect: Mood normal.         Behavior: Behavior normal.         MDM    Procedures      ICD-10-CM ICD-9-CM   1. Urticaria  L50.9 708.9   2.  Clindamycin adverse reaction, initial encounter  T36.8X5A E930.8       Orders Placed This Encounter    methylPREDNISolone (PF) (Solu-MEDROL) injection 125 mg    methylPREDNISolone (MEDROL DOSEPACK) 4 mg tablet     Sig: Complete as directed     Dispense:  1 Dose Pack     Refill:  0     IM solumedrol at visit today for acuity of symptoms. Will start patient on oral steroids today to decrease reaction to likely adverse reaction to clindamycin. Advised on mech of action and potential side effects. Can continue use OTC antihistamines as well to reduce itching. Can use of moisturizer as needed to help reduce itching. Advised on risks of taking clindamycin in the future, advised against future use. The patient is to follow up with PCP PRN if symptoms do not improve. Red flag signs and symptoms discussed with patient/parent indicated need for ED evaluation and treatment.

## 2021-12-21 ENCOUNTER — OFFICE VISIT (OUTPATIENT)
Dept: INTERNAL MEDICINE CLINIC | Age: 81
End: 2021-12-21
Payer: MEDICARE

## 2021-12-21 DIAGNOSIS — F41.9 ANXIETY: ICD-10-CM

## 2021-12-21 DIAGNOSIS — I10 PRIMARY HYPERTENSION: ICD-10-CM

## 2021-12-21 DIAGNOSIS — L50.9 URTICARIA: Primary | ICD-10-CM

## 2021-12-21 PROCEDURE — G8756 NO BP MEASURE DOC: HCPCS | Performed by: INTERNAL MEDICINE

## 2021-12-21 PROCEDURE — G8427 DOCREV CUR MEDS BY ELIG CLIN: HCPCS | Performed by: INTERNAL MEDICINE

## 2021-12-21 PROCEDURE — G8510 SCR DEP NEG, NO PLAN REQD: HCPCS | Performed by: INTERNAL MEDICINE

## 2021-12-21 PROCEDURE — G8399 PT W/DXA RESULTS DOCUMENT: HCPCS | Performed by: INTERNAL MEDICINE

## 2021-12-21 PROCEDURE — 99213 OFFICE O/P EST LOW 20 MIN: CPT | Performed by: INTERNAL MEDICINE

## 2021-12-21 PROCEDURE — G0463 HOSPITAL OUTPT CLINIC VISIT: HCPCS | Performed by: INTERNAL MEDICINE

## 2021-12-21 PROCEDURE — 1101F PT FALLS ASSESS-DOCD LE1/YR: CPT | Performed by: INTERNAL MEDICINE

## 2021-12-21 PROCEDURE — 1090F PRES/ABSN URINE INCON ASSESS: CPT | Performed by: INTERNAL MEDICINE

## 2021-12-21 NOTE — PROGRESS NOTES
HISTORY OF PRESENT ILLNESS  Vicente Walker is a 80 y.o. female. HPI      Last here 9/15/21. Pt is here for acute care.    This is an established visit completed with telemedicine was completed with video assist  the patient acknowledges and agrees to this method of visitation [email or web address]    She had an allergic reaction to clindamycin from a dental procedure  She had itchy rash on face, chest, abd, back, and buttocks  She was given a steroid injection and medrol dose pack at urgent care  Following this she reports high BP, HR, and shivering and shaking and then flushed in her face  BP last night after it calmed down was 137/82 per pt  Discussed that since rash is overall improving at this time she does not need additional steroids and can take benadryl once every 8 hours as needed  Discussed avoiding driving if this makes her sleepy    Patient Active Problem List    Diagnosis Date Noted    Anxiety 03/09/2021    Pericardial effusion with cardiac tamponade 03/18/2017    S/P ablation of atrial fibrillation 03/17/2017    Acquired hypothyroidism 12/14/2015    HTN (hypertension) 07/21/2015    Atrial fibrillation ([facility] 03/16/2015    Morbid obesity ([facility]     Hyperlipidemia 07/14/2014    Hypokalemia 07/14/2014    Postmenopausal bleeding 10/18/2011     Current Outpatient Medications   Medication Sig Dispense Refill    methylPREDNISolone (MEDROL DOSEPACK) 4 mg tablet Complete as directed 1 Dose Pack 0    hydroCHLOROthiazide (HYDRODIURIL) 25 mg tablet TAKE 1 TABLET BY MOUTH EVERY DAY 90 Tablet 0    sertraline (ZOLOFT) 50 mg tablet TAKE 1 TABLET BY MOUTH DAILY 90 Tablet 2    losartan (COZAAR) 50 mg tablet TAKE 1 TABLET BY MOUTH EVERY DAY 90 Tablet 0    verapamil ER (CALAN-SR) 240 mg CR tablet TAKE 1 TABLET BY MOUTH EVERY MORNING FOR BLOOD PRESSURE 90 Tablet 0    cloNIDine HCL (CATAPRES) 0.2 mg tablet TAKE 1 TABLET BY MOUTH TWICE A  Tablet 1    Eliquis 5 mg tablet TAKE 1 TABLET BY MOUTH TWICE A DAY 60 Tablet 6  rosuvastatin (CRESTOR) 20 mg tablet TAKE 1 TABLET BY MOUTH EVERY DAY 90 Tablet 1    potassium chloride SR (KLOR-CON 10) 10 mEq tablet TAKE 2 TABLETS IN MORNING AND 2 TABLETS IN THE EVENING 360 Tablet 1    Synthroid 75 mcg tablet TAKE 1 TABLET BY MOUTH EVERY DAY BEFORE BREAKFAST 90 Tablet 0    varicella-zoster recombinant, PF, (Shingrix, PF,) 50 mcg/0.5 mL susr injection 0.5mL by IntraMUSCular route once now and then repeat in 2-6 months 0.5 mL 1    CALCIUM CARBONATE/VITAMIN D3 (CALTRATE 600 + D PO) Take 1 Tab by mouth daily.  MULTIVITAMIN W-MINERALS/LUTEIN (CENTRUM SILVER PO) Take 1 Tab by mouth daily (after lunch).        Past Surgical History:   Procedure Laterality Date    COLONOSCOPY N/A 7/1/2021    COLONOSCOPY performed by [facility] Jules [facility] at [facility]  7/1/2021         Lisa Bent  7/1/2021         HX GYN      myomectomy on uterus    HX GYN  2011    hystereoscopy D&C    HX LYMPH NODE DISSECTION      biopsy    HX OTHER SURGICAL      lymph node bx    [place] BREAST SURGERY PROCEDURE UNLISTED  1982    breast bx rt    [place] CARDIAC SURG PROCEDURE UNLIST  01/2015    cardiac catheterization, no intervention, medical management    [place] CARDIAC SURG PROCEDURE UNLIST  03/2017    ablation    UPPER GI ENDOSCOPY,BIOPSY  10/24/2019         UPPER GI ENDOSCOPY,FN NEEDLE BX,GUIDED  10/24/2019           Lab Results   Component Value Date/Time    WBC 4.2 09/10/2021 08:46 AM    HGB 11.9 09/10/2021 08:46 AM    HCT 36.1 09/10/2021 08:46 AM    PLATELET 402 77/35/4480 08:46 AM    MCV 97 09/10/2021 08:46 AM     Lab Results   Component Value Date/Time    Cholesterol, total 149 09/10/2021 08:46 AM    HDL Cholesterol 70 09/10/2021 08:46 AM    LDL, calculated 67 09/10/2021 08:46 AM    LDL, calculated 69 04/07/2020 12:28 PM    Triglyceride 58 09/10/2021 08:46 AM     Lab Results   Component Value Date/Time    GFR est non-AA 70 09/10/2021 08:46 AM    GFR est AA 81 09/10/2021 08:46 AM    Creatinine 0.80 09/10/2021 08:46 AM    BUN 12 09/10/2021 08:46 AM    Sodium 141 09/10/2021 08:46 AM    Potassium 3.9 09/10/2021 08:46 AM    Chloride 104 09/10/2021 08:46 AM    CO2 25 09/10/2021 08:46 AM    Magnesium 2.2 01/16/2020 11:36 PM        Review of Systems   Skin: Positive for itching and rash. Physical Exam  Constitutional:       General: She is not in acute distress. Appearance: Normal appearance. She is not ill-appearing, toxic-appearing or diaphoretic. HENT:      Head: Normocephalic and atraumatic. Eyes:      General:         Right eye: No discharge. Left eye: No discharge. Conjunctiva/sclera: Conjunctivae normal.   Pulmonary:      Effort: No respiratory distress. Neurological:      Mental Status: She is alert and oriented to person, place, and time. Mental status is at baseline. Gait: Gait normal.   Psychiatric:         Mood and Affect: Mood normal.         Behavior: Behavior normal.         ASSESSMENT and PLAN    ICD-10-CM ICD-9-CM    1. Urticaria     Related to clindamycin avoid clindamycin in the future    We will stop Medrol Dosepak as this is making her feel jittery and flushed    Rash is improving discussed okay to take Benadryl 25 mg every 8 hours as needed discussed this could be sedating to avoid driving     O37.4 534.1    2. Primary hypertension     Had elevated reading related to feeling jittery from steroid    However repeat blood pressure at home was good continue losartan 50 mg clonidine hydrochlorothiazide and verapamil     I10 401.9    3. Anxiety     She reports she is taking her Zoloft daily    She struggles with anxiety general that doing well with this regimen F41.9 300.00         Scribed by Nadine Ma [facility], as dictated by Dr. Milan Cook. Current diagnosis and concerns discussed with pt at length.  Pt understands risks and benefits or current treatment plan and medications, and accepts the treatment and medication with any possible risks. Pt asks appropriate questions, which were answered. Pt was instructed to call with any concerns or problems. I have reviewed the note documented by the scribe. The services provided are my own. The documentation is accurate     Richard Peña, was evaluated through a synchronous (real-time) audio-video encounter. The patient (or guardian if applicable) is aware that this is a billable service. Verbal consent to proceed has been obtained within the past 12 months. The visit was conducted pursuant to the emergency declaration under the [address] [place] [address], [address] [place] [address] authority and the [employer] and [employer] Act. Patient identification was verified, and a caregiver was present when appropriate. The patient was located in a state where the provider was credentialed to provide care.

## 2021-12-21 NOTE — TELEPHONE ENCOUNTER
Pt called and states that she had that reaction to the clindomycin antibiotic then went to urgent care as advised. She went to [facility]. States she was given a shot and given prednisone. States that last night her blood pressure went up and she got the shakes. States that she called the pharmacist and they told her that was a reaction to the prednisone and that if not subsided in 2 hours, to go to ER. Pt states the bp and shakes went away about 2.5 hrs after however she is still having the Hives symptom.     Pt asking what she should do    Pt phones:  Home [phone]  Cell [phone]

## 2021-12-21 NOTE — PROGRESS NOTES
Identified pt with two pt identifiers(name and ). Reviewed record in preparation for visit and have obtained necessary documentation. Chief Complaint   Patient presents with    Skin Problem     Pt reports having a rash as an reaction to medication. There were no vitals taken for this visit. Health Maintenance Due   Topic    Shingrix Vaccine Age 49> (1 of 2)       Med Reconciliation: Completed    Coordination of Care Questionnaire:  :   1) Have you been to an emergency room, urgent care, or hospitalized since your last visit? If yes, where when, and reason for visit? No       2. Have seen or consulted any other health care provider since your last visit? If yes, where when, and reason for visit? No       3) Do you have an Advanced Directive/ Living Will in place? No  If yes, do we have a copy on file   If no, would you like information       This is an established visit conducted via telemedicine. The patient has been instructed that this meets HIPAA criteria and acknowledges and agrees to this method of visitation.     Haim Mail  21  11:23 AM

## 2021-12-22 ENCOUNTER — OFFICE VISIT (OUTPATIENT)
Dept: NEUROLOGY | Age: 81
End: 2021-12-22
Payer: MEDICARE

## 2021-12-22 VITALS
BODY MASS INDEX: 31.12 KG/M2 | SYSTOLIC BLOOD PRESSURE: 130 MMHG | WEIGHT: 187 LBS | HEART RATE: 78 BPM | RESPIRATION RATE: 16 BRPM | DIASTOLIC BLOOD PRESSURE: 80 MMHG | OXYGEN SATURATION: 99 %

## 2021-12-22 DIAGNOSIS — I10 ESSENTIAL HYPERTENSION: ICD-10-CM

## 2021-12-22 DIAGNOSIS — I48.21 PERMANENT ATRIAL FIBRILLATION (HCC): ICD-10-CM

## 2021-12-22 DIAGNOSIS — G60.9 IDIOPATHIC PERIPHERAL NEUROPATHY: Primary | ICD-10-CM

## 2021-12-22 PROCEDURE — G8427 DOCREV CUR MEDS BY ELIG CLIN: HCPCS | Performed by: PSYCHIATRY & NEUROLOGY

## 2021-12-22 PROCEDURE — G8417 CALC BMI ABV UP PARAM F/U: HCPCS | Performed by: PSYCHIATRY & NEUROLOGY

## 2021-12-22 PROCEDURE — G8432 DEP SCR NOT DOC, RNG: HCPCS | Performed by: PSYCHIATRY & NEUROLOGY

## 2021-12-22 PROCEDURE — G8399 PT W/DXA RESULTS DOCUMENT: HCPCS | Performed by: PSYCHIATRY & NEUROLOGY

## 2021-12-22 PROCEDURE — G8536 NO DOC ELDER MAL SCRN: HCPCS | Performed by: PSYCHIATRY & NEUROLOGY

## 2021-12-22 PROCEDURE — 99214 OFFICE O/P EST MOD 30 MIN: CPT | Performed by: PSYCHIATRY & NEUROLOGY

## 2021-12-22 PROCEDURE — 1090F PRES/ABSN URINE INCON ASSESS: CPT | Performed by: PSYCHIATRY & NEUROLOGY

## 2021-12-22 PROCEDURE — G8752 SYS BP LESS 140: HCPCS | Performed by: PSYCHIATRY & NEUROLOGY

## 2021-12-22 PROCEDURE — G8754 DIAS BP LESS 90: HCPCS | Performed by: PSYCHIATRY & NEUROLOGY

## 2021-12-22 PROCEDURE — 1101F PT FALLS ASSESS-DOCD LE1/YR: CPT | Performed by: PSYCHIATRY & NEUROLOGY

## 2021-12-22 RX ORDER — TRAMADOL HYDROCHLORIDE AND ACETAMINOPHEN 37.5; 325 MG/1; MG/1
TABLET ORAL
COMMUNITY
Start: 2021-12-10 | End: 2021-12-27

## 2021-12-22 RX ORDER — VALACYCLOVIR HYDROCHLORIDE 500 MG/1
500 TABLET, FILM COATED ORAL AS NEEDED
COMMUNITY
Start: 2021-10-04 | End: 2022-08-26 | Stop reason: ALTCHOICE

## 2021-12-26 RX ORDER — LEVOTHYROXINE SODIUM 75 UG/1
TABLET ORAL
Qty: 90 TABLET | Refills: 0 | Status: SHIPPED | OUTPATIENT
Start: 2021-12-26 | End: 2022-03-23

## 2022-02-06 DIAGNOSIS — I10 ESSENTIAL HYPERTENSION: ICD-10-CM

## 2022-02-06 RX ORDER — VERAPAMIL HYDROCHLORIDE 240 MG/1
TABLET, FILM COATED, EXTENDED RELEASE ORAL
Qty: 90 TABLET | Refills: 0 | Status: SHIPPED | OUTPATIENT
Start: 2022-02-06 | End: 2022-05-06

## 2022-02-22 ENCOUNTER — OFFICE VISIT (OUTPATIENT)
Dept: CARDIOLOGY CLINIC | Age: 82
End: 2022-02-22
Payer: MEDICARE

## 2022-02-22 VITALS
HEIGHT: 65 IN | DIASTOLIC BLOOD PRESSURE: 64 MMHG | OXYGEN SATURATION: 99 % | RESPIRATION RATE: 18 BRPM | BODY MASS INDEX: 31.65 KG/M2 | HEART RATE: 67 BPM | WEIGHT: 190 LBS | SYSTOLIC BLOOD PRESSURE: 128 MMHG

## 2022-02-22 DIAGNOSIS — I48.0 PAROXYSMAL ATRIAL FIBRILLATION (HCC): Primary | ICD-10-CM

## 2022-02-22 DIAGNOSIS — E78.2 MIXED HYPERLIPIDEMIA: ICD-10-CM

## 2022-02-22 DIAGNOSIS — I10 PRIMARY HYPERTENSION: ICD-10-CM

## 2022-02-22 PROCEDURE — G0463 HOSPITAL OUTPT CLINIC VISIT: HCPCS | Performed by: INTERNAL MEDICINE

## 2022-02-22 PROCEDURE — 1090F PRES/ABSN URINE INCON ASSESS: CPT | Performed by: INTERNAL MEDICINE

## 2022-02-22 PROCEDURE — G8536 NO DOC ELDER MAL SCRN: HCPCS | Performed by: INTERNAL MEDICINE

## 2022-02-22 PROCEDURE — 93005 ELECTROCARDIOGRAM TRACING: CPT | Performed by: INTERNAL MEDICINE

## 2022-02-22 PROCEDURE — G8417 CALC BMI ABV UP PARAM F/U: HCPCS | Performed by: INTERNAL MEDICINE

## 2022-02-22 PROCEDURE — G8754 DIAS BP LESS 90: HCPCS | Performed by: INTERNAL MEDICINE

## 2022-02-22 PROCEDURE — G8752 SYS BP LESS 140: HCPCS | Performed by: INTERNAL MEDICINE

## 2022-02-22 PROCEDURE — G8427 DOCREV CUR MEDS BY ELIG CLIN: HCPCS | Performed by: INTERNAL MEDICINE

## 2022-02-22 PROCEDURE — 99213 OFFICE O/P EST LOW 20 MIN: CPT | Performed by: INTERNAL MEDICINE

## 2022-02-22 PROCEDURE — G8399 PT W/DXA RESULTS DOCUMENT: HCPCS | Performed by: INTERNAL MEDICINE

## 2022-02-22 PROCEDURE — 1101F PT FALLS ASSESS-DOCD LE1/YR: CPT | Performed by: INTERNAL MEDICINE

## 2022-02-22 PROCEDURE — 93010 ELECTROCARDIOGRAM REPORT: CPT | Performed by: INTERNAL MEDICINE

## 2022-02-22 PROCEDURE — G8510 SCR DEP NEG, NO PLAN REQD: HCPCS | Performed by: INTERNAL MEDICINE

## 2022-02-22 NOTE — PROGRESS NOTES
Chief Complaint   Patient presents with    Irregular Heart Beat     annual follow up -denies cardiac sx      1. Have you been to the ER, urgent care clinic since your last visit? Hospitalized since your last visit? Yes [facility] for allergy to Clindamycin     2. Have you seen or consulted any other health care providers outside of the [address] [place] [address] since your last visit? Include any pap smears or colon screening.   No

## 2022-02-22 NOTE — PROGRESS NOTES
Subjective:      Steffany Corrales is a 80 y.o. female is here for EP consult. The patient denies chest pain/ shortness of breath, orthopnea, PND, LE edema, palpitations, syncope, presyncope or fatigue.        Patient Active Problem List    Diagnosis Date Noted    Anxiety 03/09/2021    Pericardial effusion with cardiac tamponade 03/18/2017    S/P ablation of atrial fibrillation 03/17/2017    Acquired hypothyroidism 12/14/2015    HTN (hypertension) 07/21/2015    Atrial fibrillation ([address] 03/16/2015    Morbid obesity ([address]     Hyperlipidemia 07/14/2014    Hypokalemia 07/14/2014    Postmenopausal bleeding 10/18/2011      Pramod Odom MD  Past Medical History:   Diagnosis Date    Arrhythmia     afib    Arthritis     right knee, left shoulder    Atrial fibrillation ([address]     CAD (coronary artery disease)     Diverticulosis     Frequency of urination     High cholesterol     Hypertension     Long term current use of anticoagulant therapy     Morbid obesity ([address]     Osteoarthritis     Peripheral neuropathy     bilateral LE    Thyroid disease     hypothyroid    Unspecified adverse effect of anesthesia     low BP      Past Surgical History:   Procedure Laterality Date    COLONOSCOPY N/A 7/1/2021    COLON[facility] Le[facility]den[facility]  7/1/2021         [facility]         HX GYN      myomectomy on uterus    HX GYN  2011    hystereoscopy D&C    HX LYMPH NODE DISSECTION      biopsy    HX OTHER SURGICAL      lymph node bx    SD BREAST SURGERY PROCEDURE UNLISTED  1982    breast bx rt    SD CARDIAC SURG PROCEDURE UNLIST  01/2015    cardiac catheterization, no intervention, medical management    SD CARDIAC SURG PROCEDURE UNLIST  03/2017    ablation    UPPER GI ENDOSCOPY,BIOPSY  10/24/2019         UPPER GI ENDOSCOPY,FN NEEDLE BX,GUIDED  10/24/2019          Allergies   Allergen Reactions    Adhesive Hives Paper tape fine    Amoxicillin Unknown (comments)    Clindamycin Rash    Lipitor [Atorvastatin] Myalgia     Gets the flu      Family History   Problem Relation Age of Onset    Alzheimer's Disease Mother     Heart Disease Mother     Heart Disease Father     Hypertension Father     Cancer Paternal Aunt     Diabetes Paternal Grandmother     negative for cardiac disease  Social History     Socioeconomic History    Marital status:    Tobacco Use    Smoking status: Never Smoker    Smokeless tobacco: Never Used   Vaping Use    Vaping Use: Never used   Substance and Sexual Activity    Alcohol use: No    Drug use: No     Current Outpatient Medications   Medication Sig    apixaban (Eliquis) 5 mg tablet Take 1 Tablet by mouth two (2) times a day for 30 days.  verapamil ER (CALAN-SR) 240 mg CR tablet TAKE 1 TABLET BY MOUTH EVERY MORNING FOR BLOOD PRESSURE    Synthroid 75 mcg tablet TAKE 1 TABLET BY MOUTH EVERY DAY BEFORE BREAKFAST    valACYclovir (VALTREX) 500 mg tablet 500 mg as needed.  hydroCHLOROthiazide (HYDRODIURIL) 25 mg tablet TAKE 1 TABLET BY MOUTH EVERY DAY    sertraline (ZOLOFT) 50 mg tablet TAKE 1 TABLET BY MOUTH DAILY    losartan (COZAAR) 50 mg tablet TAKE 1 TABLET BY MOUTH EVERY DAY    cloNIDine HCL (CATAPRES) 0.2 mg tablet TAKE 1 TABLET BY MOUTH TWICE A DAY    rosuvastatin (CRESTOR) 20 mg tablet TAKE 1 TABLET BY MOUTH EVERY DAY    potassium chloride SR (KLOR-CON 10) 10 mEq tablet TAKE 2 TABLETS IN MORNING AND 2 TABLETS IN THE EVENING    CALCIUM CARBONATE/VITAMIN D3 (CALTRATE 600 + D PO) Take 1 Tab by mouth daily.  MULTIVITAMIN W-MINERALS/LUTEIN (CENTRUM SILVER PO) Take 1 Tab by mouth daily (after lunch). No current facility-administered medications for this visit.       Vitals:    02/22/22 1108   BP: 128/64   Pulse: 67   Resp: 18   SpO2: 99%   Weight: 190 lb (86.2 kg)   Height: 5' 5\" (1.651 m)       I have reviewed the nurses notes, vitals, problem list, allergy list, medical history, family, social history and medications. Review of Symptoms:    General: Pt denies excessive weight gain or loss. Pt is able to conduct ADL's  HEENT: Denies blurred vision, headaches, hearing loss, epistaxis and difficulty swallowing. Respiratory: Denies cough, congestion, shortness of breath, ANGUIANO, wheezing or stridor. Cardiovascular: Denies precordial pain, palpitations, edema or PND  Gastrointestinal: Denies poor appetite, indigestion, abdominal pain or blood in stool  Genitourinary: Denies hematuria, dysuria, increased urinary frequency  Musculoskeletal: Denies joint pain or swelling from muscles or joints  Neurologic: Denies tremor, paresthesias, headache, or sensory motor disturbance  Psychiatric: Denies confusion, insomnia, depression  Integumentray: Denies rash, itching or ulcers. Hematologic: Denies easy bruising, bleeding    Physical Exam:      General: Well developed, in no acute distress. HEENT: Eyes - PERRL, no jvd  Heart:  Normal S1/S2 negative S3 or S4. Regular, no murmur, gallop or rub. Respiratory: Clear bilaterally x 4, no wheezing or rales  Abdomen:   Soft, non-tender, bowel sounds are active. Extremities:  No edema, normal cap refill, no cyanosis. Musculoskeletal: No clubbing  Neuro: A&Ox3, speech clear, gait stable. Skin: Skin color is normal. No rashes or lesions.  Non diaphoretic, no ulcers or subcutaneous nodule  Vascular: 2+ pulses symmetric in all extremities  Psych - judgement intact and orientation is wnl     Cardiographics    Ekg: nsr    Results for orders placed or performed during the hospital encounter of 01/16/20   EKG, 12 LEAD, INITIAL   Result Value Ref Range    Ventricular Rate 79 BPM    Atrial Rate 79 BPM    P-R Interval 166 ms    QRS Duration 76 ms    Q-T Interval 370 ms    QTC Calculation (Bezet) 424 ms    Calculated P Axis 51 degrees    Calculated R Axis -24 degrees    Calculated T Axis 20 degrees    Diagnosis       Normal sinus rhythm  Minimal voltage criteria for LVH, may be normal variant  Poor R-wave Progression (consider lead placement or loss of anterior forces)  Nonspecific ST and T wave abnormality    Confirmed by Laura Wade MD, Artis Primrose ([id number]) on 1/17/2020 7:59:54 AM     Results for orders placed or performed in visit on 10/24/17   CARDIAC HOLTER MONITOR, 24 HOURS    Narrative    ECG Monitor/24 hours, Complete    Reason for Holter Monitor   PAF    Heartbeat    Slowest 51  Average 67  Fastest  102      Results:   Underlying Rhythm: Normal sinus rhythm      Atrial Arrhythmias: premature atrial contractions; occasional, atrial couplets and atrial triplets            AV Conduction: normal    Ventricular Arrhythmias: premature ventricular contractions; occasional     ST Segment Analysis:normal     Symptom Correlation:  none    Comment:   Sinus rhythm throughout with occasional atrial ectopy     Ochoa Sandoval MD, [facility]            Lab Results   Component Value Date/Time    WBC 4.2 09/10/2021 08:46 AM    HGB 11.9 09/10/2021 08:46 AM    HCT 36.1 09/10/2021 08:46 AM    PLATELET 135 37/03/0801 08:46 AM    MCV 97 09/10/2021 08:46 AM      Lab Results   Component Value Date/Time    Sodium 141 09/10/2021 08:46 AM    Potassium 3.9 09/10/2021 08:46 AM    Chloride 104 09/10/2021 08:46 AM    CO2 25 09/10/2021 08:46 AM    Anion gap 4 (L) 01/16/2020 11:36 PM    Glucose 101 (H) 09/10/2021 08:46 AM    BUN 12 09/10/2021 08:46 AM    Creatinine 0.80 09/10/2021 08:46 AM    BUN/Creatinine ratio 15 09/10/2021 08:46 AM    GFR est AA 81 09/10/2021 08:46 AM    GFR est non-AA 70 09/10/2021 08:46 AM    Calcium 9.5 09/10/2021 08:46 AM    Bilirubin, total 0.4 09/10/2021 08:46 AM    Alk. phosphatase 64 09/10/2021 08:46 AM    Protein, total 6.5 12/22/2021 12:30 PM    Albumin 4.1 09/10/2021 08:46 AM    Globulin 3.7 01/16/2020 11:36 PM    A-G Ratio 1.6 09/10/2021 08:46 AM    ALT (SGPT) 13 09/10/2021 08:46 AM         Assessment:     Assessment:        ICD-10-CM ICD-9-CM    1. Paroxysmal atrial fibrillation (HCC)  I48.0 427.31 AMB POC EKG ROUTINE W/ 12 LEADS, INTER & REP      apixaban (Eliquis) 5 mg tablet   2. Primary hypertension  I10 401.9 apixaban (Eliquis) 5 mg tablet   3. Mixed hyperlipidemia  E78.2 272.2 apixaban (Eliquis) 5 mg tablet     Orders Placed This Encounter    AMB POC EKG ROUTINE W/ 12 LEADS, INTER & REP     Order Specific Question:   Reason for Exam:     Answer:   routine    apixaban (Eliquis) 5 mg tablet     Sig: Take 1 Tablet by mouth two (2) times a day for 30 days. Dispense:  60 Tablet     Refill:  11        Plan:   Eddie Juarez is doing well except for her peripheral neuropathy which is limiting her activity. She is in sinus and asymptomatic. Stable and compliant with oac for AF and elevated chadsvasc score. Cont med rx for htn and hyperlipidemia. F/u in one year. Thank you for allowing me to participate in Eddie Juarez 's care.     Kunal Mccabe MD, [facility]

## 2022-02-22 NOTE — LETTER
2/22/2022    Patient: Charlene Melendez   YOB: 1940   Date of Visit: 2/22/2022     Travon Portillo MD  [address] [facility] [address]  [facility]  [place] [facility] [address]    Dear Travon Portillo MD,      Thank you for referring Ms. Rohith Jay to [facility] for evaluation. My notes for this consultation are attached. If you have questions, please do not hesitate to call me. I look forward to following your patient along with you.       Sincerely,    Deanna Zavala MD

## 2022-02-28 RX ORDER — LOSARTAN POTASSIUM 50 MG/1
TABLET ORAL
Qty: 90 TABLET | Refills: 0 | Status: SHIPPED | OUTPATIENT
Start: 2022-02-28 | End: 2022-03-15 | Stop reason: SDUPTHER

## 2022-03-08 ENCOUNTER — TELEPHONE (OUTPATIENT)
Dept: INTERNAL MEDICINE CLINIC | Age: 82
End: 2022-03-08

## 2022-03-08 DIAGNOSIS — E03.9 ACQUIRED HYPOTHYROIDISM: ICD-10-CM

## 2022-03-08 DIAGNOSIS — R73.01 IFG (IMPAIRED FASTING GLUCOSE): ICD-10-CM

## 2022-03-08 DIAGNOSIS — I10 BENIGN ESSENTIAL HTN: Primary | ICD-10-CM

## 2022-03-08 DIAGNOSIS — I48.0 PAROXYSMAL ATRIAL FIBRILLATION (HCC): ICD-10-CM

## 2022-03-08 DIAGNOSIS — E78.2 MIXED HYPERLIPIDEMIA: ICD-10-CM

## 2022-03-08 DIAGNOSIS — Z78.0 POSTMENOPAUSAL STATE: ICD-10-CM

## 2022-03-08 DIAGNOSIS — E66.01 MORBID OBESITY (HCC): ICD-10-CM

## 2022-03-08 DIAGNOSIS — Z00.00 ENCOUNTER FOR ANNUAL WELLNESS EXAM IN MEDICARE PATIENT: ICD-10-CM

## 2022-03-08 DIAGNOSIS — E87.6 HYPOKALEMIA: ICD-10-CM

## 2022-03-08 RX ORDER — HYDROCHLOROTHIAZIDE 25 MG/1
TABLET ORAL
Qty: 90 TABLET | Refills: 0 | Status: SHIPPED | OUTPATIENT
Start: 2022-03-08 | End: 2022-05-31

## 2022-03-08 NOTE — TELEPHONE ENCOUNTER
----- Message from Yasir Carmen sent at 3/8/2022 11:09 AM EST -----  Subject: Message to Provider    QUESTIONS  Information for Provider? Patient coming early in morning on Wed to pick   up blood work orders for her trip to [facility].  ---------------------------------------------------------------------------  --------------  [address]  What is the best way for the office to contact you? OK to leave message on   voicemail  Preferred Call Back Phone Number? [phone]  ---------------------------------------------------------------------------  --------------  SCRIPT ANSWERS  Relationship to Patient?  Self

## 2022-03-10 LAB
ALBUMIN SERPL-MCNC: 4.8 G/DL (ref 3.7–4.7)
ALBUMIN/GLOB SERPL: 2.4 {RATIO} (ref 1.2–2.2)
ALP SERPL-CCNC: 68 IU/L (ref 44–121)
ALT SERPL-CCNC: 19 IU/L (ref 0–32)
AST SERPL-CCNC: 18 IU/L (ref 0–40)
BILIRUB SERPL-MCNC: 0.4 MG/DL (ref 0–1.2)
BUN SERPL-MCNC: 16 MG/DL (ref 8–27)
BUN/CREAT SERPL: 18 (ref 12–28)
CALCIUM SERPL-MCNC: 9.9 MG/DL (ref 8.7–10.3)
CHLORIDE SERPL-SCNC: 105 MMOL/L (ref 96–106)
CO2 SERPL-SCNC: 23 MMOL/L (ref 20–29)
CREAT SERPL-MCNC: 0.91 MG/DL (ref 0.57–1)
EGFR: 64 ML/MIN/1.73
EST. AVERAGE GLUCOSE BLD GHB EST-MCNC: 114 MG/DL
GLOBULIN SER CALC-MCNC: 2 G/DL (ref 1.5–4.5)
GLUCOSE SERPL-MCNC: 95 MG/DL (ref 65–99)
HBA1C MFR BLD: 5.6 % (ref 4.8–5.6)
POTASSIUM SERPL-SCNC: 4.1 MMOL/L (ref 3.5–5.2)
PROT SERPL-MCNC: 6.8 G/DL (ref 6–8.5)
SODIUM SERPL-SCNC: 143 MMOL/L (ref 134–144)
TSH SERPL DL<=0.005 MIU/L-ACNC: 1.04 UIU/ML (ref 0.45–4.5)

## 2022-03-14 NOTE — PROGRESS NOTES
HISTORY OF PRESENT ILLNESS  Andreina Stokes is a [de-identified] y.o. female. HPI     Last here vv 12/17/21. Pt is here for chronic conditions. Reports concern with decrease in memory she does feel that she does not remember as quickly as in the past I do not see any concerning features but I think her anxiety makes this worse  Discussed can see neuropsych for memory testing this will likely provide reassurance        She c/o R knee pain x fall 2021 this is a chronic issue she feels that her right knee is always a bit more swollen and larger than the other she would like to establish with orthopedics  Hurts with movement  Will refer to Dr. Orlando Kincaid (ortho)    She wonders how much biotin to take per day, discussed one per day  She is taking this for hair loss       History of hypertension no recent home readings to review  BP today is 112/69  Continues on clonidine 0.2mg BID, HCTZ 25mg   Losartan 50 mg   Continues on verapamil  240 mg per day   Recall she had a cough on lisinopril      Wt today is 194 lbs, up 9 lbs x lov recall previously she been wearing about losing weight but now she unfortunately is eating more has a good appetite and has been gaining weight needs to work on portions     Reviewed labs   Ordered labs     Pt is following with Dr Morales (GI) for weight loss and pancreatic cyst  Pt had an EGD and EUS 10/24/19  Last there 8/21, ol, was told that she has hemorrhoids   She is now taking metamucil,  She will follow-up with him annually            Pt saw Dr. Ioana Negron (cardio) for f/u after a fib  Has a history of ablation  Reviewed note 2/22/22:  Andreina Stokes is doing well except for her peripheral neuropathy which is limiting her activity. She is in sinus and asymptomatic. Stable and compliant with oac for AF and elevated chadsvasc score. Cont med rx for htn and hyperlipidemia. F/u in one year. Thank you for allowing me to participate in Andreina Stokes 's care.   Pt was taken off of sotalol  Continues on eliquis 5 mg bid  Pt denies any bleeding/falls on these meds 3/22       Pt follows Dr. Yulissa Sykes (Neuro) for neuropathy and HA  Last visit 4/08/21  She saw Dr. Sheng Hamilton (neuro)  Reviewed note 12/22/21:  Patient comes for a follow up visit. SHe as followed by Dr. Yulissa Sykes for peripheral neuropathy and PLM. She associates the abnormal movements of her legs to her neuropathy. Neuropathy is not painful and she is not seeking treatment for it. We discussed the possible causes of neuropathy and absence of diabetes. Reviewed her medical records and labs.   An SPEP was not done and I recommended that this be tested.   Dr. Sheng Hamilton has left, provided referral to Dr. Moody Null  She was given amitriptyline for headaches and neuropathy- she is not taking this, had not been interested in the end  However pt c/o worsened neuropathy just tingling in different parts of her body discussed options  Will order gabapentin 100 mg p.o. twice daily as needed she discussed some hesitancy due to some friends taking and not helping discussed we will give this a trial and she will also follow-up with neurology     Pt saw Dr. Niesha Marks (cardio) for foot circulation  Last visit was 8/1/17   Recall has chronic sx of leg tightness  Pt will only f/u with this physician prn      Pt saw Dr. Kamilah Zhu (uro-gyn) for urinary sx  Last visit was 11/17  Pt was told she had lichen sclerosus   Now resolved      Pt saw Dr Edwin Araya (sleep)  Last visit was 7/3/18  Recall telephone note 9/18: no significant sleep disordered breathing, overall AHI of 0.6/h  Pt had a poor experience and was told she has no sleep apnea     She has been seeing Dr. Candido Valenzuela (surg) for lipoma  Last there 11/12/20     Pt had a cyst on R breast removed by Dr Suzie Gonzales (surg) in the past      Continues crestor 20mg daily for [employer] klor-con 20meqs BID for her potassium      Continues on synthroid 75mcg daily  Will take full dose 6 days per week and will take half day 1 day per week      Taking zoloft  50 mg for anxiety overall this works well      Pt followed with Dr Deysi Falk (podiatry)   She would like to f/u with this physician for 2 corns on her foot      ACP not on file.  SDM is her sister, Pete Vu. Provided information in the past.     Recall She had a cervical polyp, was having vaginal bleeding, this was removed by galgano, vaginal bleeding has since resolved.        PREVENTIVE:    Colonoscopy: 7/01/21.  [facility], [facility]  Pap: Dr. Huseyin Carey, 11/21 will get report for review  DEXA:  osteopenia, 11/21 [facility] will get report for review  Tdap: 4/14/2015  Pneumovax: 11/19/2013  [address]18: 4/05/2017  Zostavax: declines  Shingrix: due  h/o shingles  Flu shot: 09/15/21  A1C:  11/19 5.5  4/20 5.5 10/20 5.4 9/21 5.2 3/22 5.6  Eye exam: Dr. Shani Mack, cataract, due now reminded   Lipids: 9/21 LDL 67  Hep C screen: 9/21 negative  Covid: three doses (Curry Sprinkle)    Patient Active Problem List    Diagnosis Date Noted    Anxiety 03/09/2021    Pericardial effusion with cardiac tamponade 03/18/2017    S/P ablation of atrial fibrillation 03/17/2017    Acquired hypothyroidism 12/14/2015    HTN (hypertension) 07/21/2015    Atrial fibrillation ([address] 03/16/2015    Morbid obesity ([address]     Hyperlipidemia 07/14/2014    Hypokalemia 07/14/2014    Postmenopausal bleeding 10/18/2011     Current Outpatient Medications   Medication Sig Dispense Refill    losartan (COZAAR) 50 mg tablet Take 1 Tablet by mouth daily. 90 Tablet 1    gabapentin (NEURONTIN) 100 mg capsule Take 1 Capsule by mouth two (2) times daily as needed for Pain. Max Daily Amount: 200 mg. 60 Capsule 1    hydroCHLOROthiazide (HYDRODIURIL) 25 mg tablet TAKE 1 TABLET BY MOUTH EVERY DAY 90 Tablet 0    apixaban (Eliquis) 5 mg tablet Take 1 Tablet by mouth two (2) times a day for 30 days.  60 Tablet 11    verapamil ER (CALAN-SR) 240 mg CR tablet TAKE 1 TABLET BY MOUTH EVERY MORNING FOR BLOOD PRESSURE 90 Tablet 0    Synthroid 75 mcg tablet TAKE 1 TABLET BY MOUTH EVERY DAY BEFORE BREAKFAST 90 Tablet 0    sertraline (ZOLOFT) 50 mg tablet TAKE 1 TABLET BY MOUTH DAILY 90 Tablet 2    cloNIDine HCL (CATAPRES) 0.2 mg tablet TAKE 1 TABLET BY MOUTH TWICE A  Tablet 1    rosuvastatin (CRESTOR) 20 mg tablet TAKE 1 TABLET BY MOUTH EVERY DAY 90 Tablet 1    potassium chloride SR (KLOR-CON 10) 10 mEq tablet TAKE 2 TABLETS IN MORNING AND 2 TABLETS IN THE EVENING 360 Tablet 1    CALCIUM CARBONATE/VITAMIN D3 (CALTRATE 600 + D PO) Take 1 Tab by mouth daily.  MULTIVITAMIN W-MINERALS/LUTEIN (CENTRUM SILVER PO) Take 1 Tab by mouth daily (after lunch).  valACYclovir (VALTREX) 500 mg tablet 500 mg as needed.  (Patient not taking: Reported on 3/15/2022)       Past Surgical History:   Procedure Laterality Date    COLONOSCOPY N/A 7/1/2021    COL[facility] by Rosey [facility] at [facility]  7/1/2021         [facility]  7/1/2021         HX GYN      myomectomy on uterus    HX GYN  2011    hystereoscopy D&C    HX LYMPH NODE DISSECTION      biopsy    HX OTHER SURGICAL      lymph node bx    HI BREAST SURGERY PROCEDURE UNLISTED  1982    breast bx rt    HI CARDIAC SURG PROCEDURE UNLIST  01/2015    cardiac catheterization, no intervention, medical management    HI CARDIAC SURG PROCEDURE UNLIST  03/2017    ablation    UPPER GI ENDOSCOPY,BIOPSY  10/24/2019         UPPER GI ENDOSCOPY,FN NEEDLE BX,GUIDED  10/24/2019           Lab Results   Component Value Date/Time    WBC 4.2 09/10/2021 08:46 AM    HGB 11.9 09/10/2021 08:46 AM    HCT 36.1 09/10/2021 08:46 AM    PLATELET 699 84/18/3859 08:46 AM    MCV 97 09/10/2021 08:46 AM     Lab Results   Component Value Date/Time    Cholesterol, total 149 09/10/2021 08:46 AM    HDL Cholesterol 70 09/10/2021 08:46 AM    LDL, calculated 67 09/10/2021 08:46 AM    LDL, calculated 69 04/07/2020 12:28 PM    Triglyceride 58 09/10/2021 08:46 AM     Lab Results   Component Value Date/Time    GFR est non-AA 70 09/10/2021 08:46 AM    GFR est AA 81 09/10/2021 08:46 AM    Creatinine 0.91 03/09/2022 08:57 AM    BUN 16 03/09/2022 08:57 AM    Sodium 143 03/09/2022 08:57 AM    Potassium 4.1 03/09/2022 08:57 AM    Chloride 105 03/09/2022 08:57 AM    CO2 23 03/09/2022 08:57 AM    Magnesium 2.2 01/16/2020 11:36 PM        Review of Systems   Respiratory: Negative for shortness of breath. Cardiovascular: Negative for chest pain. Musculoskeletal: Positive for joint pain (R knee pain). Neurological:        Neuropathy   Psychiatric/Behavioral:        Concern with memory       Physical Exam  Constitutional:       General: She is not in acute distress. Appearance: Normal appearance. She is not ill-appearing, toxic-appearing or diaphoretic. HENT:      Head: Normocephalic and atraumatic. Right Ear: External ear normal.      Left Ear: External ear normal.   Eyes:      General:         Right eye: No discharge. Left eye: No discharge. Conjunctiva/sclera: Conjunctivae normal.      Pupils: Pupils are equal, round, and reactive to light. Cardiovascular:      Rate and Rhythm: Normal rate and regular rhythm. Heart sounds: Murmur (2/6 ) heard. No friction rub. No gallop. Pulmonary:      Effort: No respiratory distress. Breath sounds: Normal breath sounds. No wheezing or rales. Chest:      Chest wall: No tenderness. Musculoskeletal:         General: Normal range of motion. Cervical back: Normal range of motion and neck supple. Skin:     General: Skin is warm and dry. Neurological:      Mental Status: She is alert and oriented to person, place, and time. Mental status is at baseline.       Coordination: Coordination normal.      Gait: Gait normal.   Psychiatric:         Mood and Affect: Mood normal.         Behavior: Behavior normal.         ASSESSMENT and PLAN    ICD-10-CM ICD-9-CM    1. Primary hypertension    I10 401.9 LIPID PANEL      METABOLIC PANEL, COMPREHENSIVE   Adequately controlled on clonidine hydrochlorothiazide losartan and verapamil continue   HEMOGLOBIN A1C WITH EAG      TSH 3RD GENERATION      CBC W/O DIFF   2. Morbid obesity ([address]  E66.01 278.01 LIPID PANEL      METABOLIC PANEL, COMPREHENSIVE   Several years ago she been worried about weight loss and now unfortunately she is gaining too much weight back she has a good appetite and needs to start working more aggressively on portions and maintaining current weight not gaining weight   HEMOGLOBIN A1C WITH EAG      TSH 3RD GENERATION      CBC W/O DIFF   3. Acquired hypothyroidism  E03.9 244.9 LIPID PANEL      METABOLIC PANEL, COMPREHENSIVE   On Synthroid monitor TSH adjust dose as needed   HEMOGLOBIN A1C WITH EAG      TSH 3RD GENERATION      CBC W/O DIFF   4. Paroxysmal atrial fibrillation (HCC)  I48.0 427.31 LIPID PANEL      METABOLIC PANEL, COMPREHENSIVE   On Eliquis for anticoagulation    Previously was on sotalol doing well without it remains in NSR follows with cardiology up-to-date   HEMOGLOBIN A1C WITH EAG      TSH 3RD GENERATION      CBC W/O DIFF   5. Primary insomnia  F51.01 307.42 LIPID PANEL   She reports some difficulty falling asleep in general   METABOLIC PANEL, COMPREHENSIVE   Discussed sleep hygiene at length and melatonin   HEMOGLOBIN A1C WITH EAG      TSH 3RD GENERATION      CBC W/O DIFF   6. Hypokalemia  E87.6 276.8 LIPID PANEL      METABOLIC PANEL, COMPREHENSIVE   On Klor-Con check levels adjust dose as needed   HEMOGLOBIN A1C WITH EAG      TSH 3RD GENERATION      CBC W/O DIFF   7. Mixed hyperlipidemia  E78.2 272.2 LIPID PANEL   Controlled on Crestor continue   METABOLIC PANEL, COMPREHENSIVE      HEMOGLOBIN A1C WITH EAG      TSH 3RD GENERATION      CBC W/O DIFF   8.  Anxiety  F41.9 300.00 LIPID PANEL   Improved on Zoloft continue patient struggles with a lot of underlying anxiety   METABOLIC PANEL, COMPREHENSIVE      HEMOGLOBIN A1C WITH EAG      TSH 3RD GENERATION      CBC W/O DIFF   9. IFG (impaired fasting glucose)  R73.01 790.21 LIPID PANEL   Diet controlled check A1c work on weight loss   METABOLIC PANEL, COMPREHENSIVE      HEMOGLOBIN A1C WITH EAG      TSH 3RD GENERATION      CBC W/O DIFF   10. Neuropathy  G62.9 355.9 gabapentin (NEURONTIN) 100 mg capsule   We will give trial gabapentin 100 mg p.o. twice daily as needed    Will establish with neurology   REFERRAL TO NEUROLOGY   11. Chronic pain of right knee  M25.561 719.46 REFERRAL TO ORTHOPEDICS   Refer to orthopedics G89.29 338.29    12. Memory problem       Provided reassurance I do not think she has a memory problem I think her anxiety makes her feel forgetful and she worries about this quite a bit discussed neuropsych testing primarily to provide reassurance R41.3 780.93 REFERRAL TO NEUROPSYCHOLOGY        Depression screen reviewed and negative     Scribed by Melissa Courtney of Renetta Phelps, as dictated by Dr. Elio Arias. Current diagnosis and concerns discussed with pt at length. Pt understands risks and benefits or current treatment plan and medications, and accepts the treatment and medication with any possible risks. Pt asks appropriate questions, which were answered. Pt was instructed to call with any concerns or problems. I have reviewed the note documented by the scribe. The services provided are my own.   The documentation is accurate

## 2022-03-15 ENCOUNTER — OFFICE VISIT (OUTPATIENT)
Dept: INTERNAL MEDICINE CLINIC | Age: 82
End: 2022-03-15
Payer: MEDICARE

## 2022-03-15 VITALS
TEMPERATURE: 97.1 F | RESPIRATION RATE: 16 BRPM | HEART RATE: 73 BPM | SYSTOLIC BLOOD PRESSURE: 112 MMHG | BODY MASS INDEX: 32.39 KG/M2 | HEIGHT: 65 IN | OXYGEN SATURATION: 100 % | DIASTOLIC BLOOD PRESSURE: 69 MMHG | WEIGHT: 194.4 LBS

## 2022-03-15 DIAGNOSIS — E03.9 ACQUIRED HYPOTHYROIDISM: ICD-10-CM

## 2022-03-15 DIAGNOSIS — I10 PRIMARY HYPERTENSION: Primary | ICD-10-CM

## 2022-03-15 DIAGNOSIS — M25.561 CHRONIC PAIN OF RIGHT KNEE: ICD-10-CM

## 2022-03-15 DIAGNOSIS — E66.01 MORBID OBESITY (HCC): ICD-10-CM

## 2022-03-15 DIAGNOSIS — G89.29 CHRONIC PAIN OF RIGHT KNEE: ICD-10-CM

## 2022-03-15 DIAGNOSIS — E78.2 MIXED HYPERLIPIDEMIA: ICD-10-CM

## 2022-03-15 DIAGNOSIS — E87.6 HYPOKALEMIA: ICD-10-CM

## 2022-03-15 DIAGNOSIS — F51.01 PRIMARY INSOMNIA: ICD-10-CM

## 2022-03-15 DIAGNOSIS — R41.3 MEMORY PROBLEM: ICD-10-CM

## 2022-03-15 DIAGNOSIS — G62.9 NEUROPATHY: ICD-10-CM

## 2022-03-15 DIAGNOSIS — I48.0 PAROXYSMAL ATRIAL FIBRILLATION (HCC): ICD-10-CM

## 2022-03-15 DIAGNOSIS — F41.9 ANXIETY: ICD-10-CM

## 2022-03-15 DIAGNOSIS — R73.01 IFG (IMPAIRED FASTING GLUCOSE): ICD-10-CM

## 2022-03-15 PROCEDURE — 1101F PT FALLS ASSESS-DOCD LE1/YR: CPT | Performed by: INTERNAL MEDICINE

## 2022-03-15 PROCEDURE — G8754 DIAS BP LESS 90: HCPCS | Performed by: INTERNAL MEDICINE

## 2022-03-15 PROCEDURE — G8427 DOCREV CUR MEDS BY ELIG CLIN: HCPCS | Performed by: INTERNAL MEDICINE

## 2022-03-15 PROCEDURE — 99214 OFFICE O/P EST MOD 30 MIN: CPT | Performed by: INTERNAL MEDICINE

## 2022-03-15 PROCEDURE — G8536 NO DOC ELDER MAL SCRN: HCPCS | Performed by: INTERNAL MEDICINE

## 2022-03-15 PROCEDURE — G0463 HOSPITAL OUTPT CLINIC VISIT: HCPCS | Performed by: INTERNAL MEDICINE

## 2022-03-15 PROCEDURE — G8417 CALC BMI ABV UP PARAM F/U: HCPCS | Performed by: INTERNAL MEDICINE

## 2022-03-15 PROCEDURE — G8752 SYS BP LESS 140: HCPCS | Performed by: INTERNAL MEDICINE

## 2022-03-15 PROCEDURE — G8510 SCR DEP NEG, NO PLAN REQD: HCPCS | Performed by: INTERNAL MEDICINE

## 2022-03-15 PROCEDURE — 1090F PRES/ABSN URINE INCON ASSESS: CPT | Performed by: INTERNAL MEDICINE

## 2022-03-15 PROCEDURE — G8399 PT W/DXA RESULTS DOCUMENT: HCPCS | Performed by: INTERNAL MEDICINE

## 2022-03-15 RX ORDER — GABAPENTIN 100 MG/1
100 CAPSULE ORAL
Qty: 60 CAPSULE | Refills: 1 | Status: SHIPPED | OUTPATIENT
Start: 2022-03-15 | End: 2022-08-26 | Stop reason: ALTCHOICE

## 2022-03-15 RX ORDER — LOSARTAN POTASSIUM 50 MG/1
50 TABLET ORAL DAILY
Qty: 90 TABLET | Refills: 1 | Status: SHIPPED | OUTPATIENT
Start: 2022-03-15 | End: 2022-09-01

## 2022-03-15 NOTE — LETTER
3/18/2022 8:20 AM    Ms. Vallecillo[address] [address]      Dear Care Provider    Please fax us the most recent mammogram results so that we may update the patient's records for continuity of care. Our fax number: [phone].     Patient:   Madison Herron  1940        Sincerely,      Tereza Jhaveri MD

## 2022-03-15 NOTE — PROGRESS NOTES
1. \"Have you been to the ER, urgent care clinic since your last visit? Hospitalized since your last visit? \" No    2. \"Have you seen or consulted any other health care providers outside of the [address] [place] [address] since your last visit? \" No     3. For patients aged 39-70: Has the patient had a colonoscopy / FIT/ Cologuard? NA - based on age      If the patient is female:    4. For patients aged 41-77: Has the patient had a mammogram within the past 2 years? NA - based on age or sex      11. For patients aged 21-65: Has the patient had a pap smear?  NA - based on age or sex

## 2022-03-19 PROBLEM — I31.4 PERICARDIAL EFFUSION WITH CARDIAC TAMPONADE: Status: ACTIVE | Noted: 2017-03-18

## 2022-03-19 PROBLEM — Z98.890 S/P ABLATION OF ATRIAL FIBRILLATION: Status: ACTIVE | Noted: 2017-03-17

## 2022-03-19 PROBLEM — I31.39 PERICARDIAL EFFUSION WITH CARDIAC TAMPONADE: Status: ACTIVE | Noted: 2017-03-18

## 2022-03-19 PROBLEM — Z86.79 S/P ABLATION OF ATRIAL FIBRILLATION: Status: ACTIVE | Noted: 2017-03-17

## 2022-03-20 PROBLEM — F41.9 ANXIETY: Status: ACTIVE | Noted: 2021-03-09

## 2022-03-23 RX ORDER — LEVOTHYROXINE SODIUM 75 UG/1
TABLET ORAL
Qty: 90 TABLET | Refills: 0 | Status: SHIPPED | OUTPATIENT
Start: 2022-03-23 | End: 2022-07-05

## 2022-04-05 RX ORDER — ROSUVASTATIN CALCIUM 20 MG/1
TABLET, COATED ORAL
Qty: 90 TABLET | Refills: 1 | Status: SHIPPED | OUTPATIENT
Start: 2022-04-05 | End: 2022-09-09

## 2022-04-06 RX ORDER — POTASSIUM CHLORIDE 750 MG/1
TABLET, FILM COATED, EXTENDED RELEASE ORAL
Qty: 360 TABLET | Refills: 1 | Status: SHIPPED | OUTPATIENT
Start: 2022-04-06 | End: 2022-10-08

## 2022-04-29 RX ORDER — CLONIDINE HYDROCHLORIDE 0.2 MG/1
TABLET ORAL
Qty: 180 TABLET | Refills: 1 | Status: SHIPPED | OUTPATIENT
Start: 2022-04-29 | End: 2022-11-01

## 2022-05-06 DIAGNOSIS — I10 ESSENTIAL HYPERTENSION: ICD-10-CM

## 2022-05-06 RX ORDER — VERAPAMIL HYDROCHLORIDE 240 MG/1
TABLET, FILM COATED, EXTENDED RELEASE ORAL
Qty: 90 TABLET | Refills: 0 | Status: SHIPPED | OUTPATIENT
Start: 2022-05-06 | End: 2022-07-12 | Stop reason: DRUGHIGH

## 2022-05-09 ENCOUNTER — TELEPHONE (OUTPATIENT)
Dept: INTERNAL MEDICINE CLINIC | Age: 82
End: 2022-05-09

## 2022-05-09 NOTE — TELEPHONE ENCOUNTER
----- Message from [facility] sent at 5/9/2022  9:32 AM EDT -----  Subject: Appointment Request    Reason for Call: Semi-Routine Eye Problem    QUESTIONS  Type of Appointment? Established Patient  Reason for appointment request? No appointments available during search  Additional Information for Provider? Pt called to schedule an appt fur a   broken blood vessel in her Left eye that happened yesterday. We attempted   to reach the office but weren't successful and pt doesn't have an eye   doctor. Maybe a phone line issue? Please call pt to discuss/advise.  ---------------------------------------------------------------------------  --------------  CALL BACK INFO  What is the best way for the office to contact you? OK to leave message on   voicemail  Preferred Call Back Phone Number? [phone]  ---------------------------------------------------------------------------  --------------  SCRIPT ANSWERS  Relationship to Patient? Self  Have you had an injury or trauma? No  Have you had sudden loss of vision? No  Are you having eye pain? No  Are you having a headache? No  Have you recently (14 days) seen a provider for this issue? No  Have you been diagnosed with, awaiting test results for, or told that you   are suspected of having COVID-19 (Coronavirus)? (If patient has tested   negative or was tested as a requirement for work, school, or travel and   not based on symptoms, answer no)? No  Within the past 10 days have you developed any of the following symptoms   (answer no if symptoms have been present longer than 10 days or began   more than 10 days ago)? Fever or Chills, Cough, Shortness of breath or   difficulty breathing, Loss of taste or smell, Sore throat, Nasal   congestion, Sneezing or runny nose, Fatigue or generalized body aches   (answer no if pain is specific to a body part e.g. back pain), Diarrhea,   Headache? No  Have you had close contact with someone with COVID-19 in the last 7 days?    No  (Service Expert  click yes below to proceed with [employer] As Usual   Scheduling)?  Yes

## 2022-05-09 NOTE — TELEPHONE ENCOUNTER
Called, spoke to pt  Received two pt identifiers  Pt offered vv today but declined due to already being in [place] tomorrow for another appt. Pt states will keep 05/10/22 appt  Pt verbalizes understanding of the instructions and has no further questions at this time.

## 2022-05-09 NOTE — TELEPHONE ENCOUNTER
Called and offered to schedule patient for VV this week. Patient states that she does not have the ability to do a VV. Offered to schedule patient for an enhanced VV. Patient agreed. Patient states that she will be going to her cardiologist tomorrow morning and will be in the [address] [place] [address] area then.  Scheduled patient for enhanced VV on 5/10, arrival time 1:00 PM.

## 2022-05-10 ENCOUNTER — VIRTUAL VISIT (OUTPATIENT)
Dept: INTERNAL MEDICINE CLINIC | Age: 82
End: 2022-05-10
Payer: MEDICARE

## 2022-05-10 VITALS
RESPIRATION RATE: 16 BRPM | OXYGEN SATURATION: 100 % | DIASTOLIC BLOOD PRESSURE: 62 MMHG | HEIGHT: 65 IN | WEIGHT: 192.2 LBS | BODY MASS INDEX: 32.02 KG/M2 | SYSTOLIC BLOOD PRESSURE: 101 MMHG | TEMPERATURE: 97.9 F | HEART RATE: 62 BPM

## 2022-05-10 DIAGNOSIS — I10 PRIMARY HYPERTENSION: ICD-10-CM

## 2022-05-10 DIAGNOSIS — I48.0 PAROXYSMAL ATRIAL FIBRILLATION (HCC): ICD-10-CM

## 2022-05-10 DIAGNOSIS — H11.32 SUBCONJUNCTIVAL BLEED, LEFT: Primary | ICD-10-CM

## 2022-05-10 PROCEDURE — 99213 OFFICE O/P EST LOW 20 MIN: CPT | Performed by: INTERNAL MEDICINE

## 2022-05-10 PROCEDURE — G8427 DOCREV CUR MEDS BY ELIG CLIN: HCPCS | Performed by: INTERNAL MEDICINE

## 2022-05-10 PROCEDURE — G8432 DEP SCR NOT DOC, RNG: HCPCS | Performed by: INTERNAL MEDICINE

## 2022-05-10 PROCEDURE — G0463 HOSPITAL OUTPT CLINIC VISIT: HCPCS | Performed by: INTERNAL MEDICINE

## 2022-05-10 PROCEDURE — G8399 PT W/DXA RESULTS DOCUMENT: HCPCS | Performed by: INTERNAL MEDICINE

## 2022-05-10 PROCEDURE — 1101F PT FALLS ASSESS-DOCD LE1/YR: CPT | Performed by: INTERNAL MEDICINE

## 2022-05-10 PROCEDURE — 1090F PRES/ABSN URINE INCON ASSESS: CPT | Performed by: INTERNAL MEDICINE

## 2022-05-10 PROCEDURE — G8752 SYS BP LESS 140: HCPCS | Performed by: INTERNAL MEDICINE

## 2022-05-10 PROCEDURE — G8754 DIAS BP LESS 90: HCPCS | Performed by: INTERNAL MEDICINE

## 2022-05-10 NOTE — PROGRESS NOTES
HISTORY OF PRESENT ILLNESS  Francisca Noguera is a [de-identified] y.o. female. HPI     Last here 3/15/22. Pt is here for acute care. This is an established visit completed with telemedicine was completed with video assist  the patient acknowledges and agrees to this method of visitation antonia  Pt presents in the office    She reports recent high pulse, went to urgent care and was held for 8 hours, was told she needed to see Dr. Willie Dewitt (cardio) for holter monitor  She was supposed to see him today but his practice is transitioning and she is going to be getting an event monitor in the near future instead    Also discussed her blood pressure which is good but on the lower side of normal she states generally it is higher than this she has no signs or symptoms of orthostasis she will start monitoring her blood pressure at home she has follow-up with me in July and will bring in readings to that visit    She states that Sunday morning 5/8/22.  the white part of L eye was covered in blood, appeared to have liquid inside of her eye  Denies anything dripping outside of her eye  Discussed that thinks sounds like subconjunctival hemorrhage  Endorses some discomfort   Discussed that this will probably last for about 1 week    Patient Active Problem List    Diagnosis Date Noted    Anxiety 03/09/2021    Pericardial effusion with cardiac tamponade 03/18/2017    S/P ablation of atrial fibrillation 03/17/2017    Acquired hypothyroidism 12/14/2015    HTN (hypertension) 07/21/2015    Atrial fibrillation [facility] 03/16/2015    Morbid obesity ([facility]     Hyperlipidemia 07/14/2014    Hypokalemia 07/14/2014    Postmenopausal bleeding 10/18/2011     Current Outpatient Medications   Medication Sig Dispense Refill    verapamil ER (CALAN-SR) 240 mg CR tablet TAKE 1 TABLET BY MOUTH EVERY MORNING FOR BLOOD PRESSURE 90 Tablet 0    cloNIDine HCL (CATAPRES) 0.2 mg tablet TAKE 1 TABLET BY MOUTH TWICE A  Tablet 1    potassium chloride SR (KLOR-CON 10) 10 mEq tablet TAKE 2 TABLETS IN MORNING AND 2 TABLETS IN THE EVENING 360 Tablet 1    rosuvastatin (CRESTOR) 20 mg tablet TAKE 1 TABLET BY MOUTH EVERY DAY 90 Tablet 1    Synthroid 75 mcg tablet TAKE 1 TABLET BY MOUTH EVERY DAY BEFORE BREAKFAST 90 Tablet 0    losartan (COZAAR) 50 mg tablet Take 1 Tablet by mouth daily. 90 Tablet 1    gabapentin (NEURONTIN) 100 mg capsule Take 1 Capsule by mouth two (2) times daily as needed for Pain. Max Daily Amount: 200 mg. 60 Capsule 1    hydroCHLOROthiazide (HYDRODIURIL) 25 mg tablet TAKE 1 TABLET BY MOUTH EVERY DAY 90 Tablet 0    sertraline (ZOLOFT) 50 mg tablet TAKE 1 TABLET BY MOUTH DAILY 90 Tablet 2    CALCIUM CARBONATE/VITAMIN D3 (CALTRATE 600 + D PO) Take 1 Tab by mouth daily.  MULTIVITAMIN W-MINERALS/LUTEIN (CENTRUM SILVER PO) Take 1 Tab by mouth daily (after lunch).  valACYclovir (VALTREX) 500 mg tablet 500 mg as needed.  (Patient not taking: Reported on 3/15/2022)       Past Surgical History:   Procedure Laterality Date    COLONOSCOPY N/A 7/1/2021    [facility]  7/1/2021         COLONOSCOPY,TESSIE Liriano  7/1/2021         HX GYN      myomectomy on uterus    HX GYN  2011    hystereoscopy D&C    HX LYMPH NODE DISSECTION      biopsy    HX OTHER SURGICAL      lymph node bx    ID BREAST SURGERY PROCEDURE UNLISTED  1982    breast bx rt    ID CARDIAC SURG PROCEDURE UNLIST  01/2015    cardiac catheterization, no intervention, medical management    ID CARDIAC SURG PROCEDURE UNLIST  03/2017    ablation    UPPER GI ENDOSCOPY,BIOPSY  10/24/2019         UPPER GI ENDOSCOPY,FN NEEDLE BX,GUIDED  10/24/2019           Lab Results   Component Value Date/Time    WBC 4.2 09/10/2021 08:46 AM    HGB 11.9 09/10/2021 08:46 AM    HCT 36.1 09/10/2021 08:46 AM    PLATELET 582 18/74/6319 08:46 AM    MCV 97 09/10/2021 08:46 AM     Lab Results   Component Value Date/Time    Cholesterol, total 149 09/10/2021 08:46 AM    HDL Cholesterol 70 09/10/2021 08:46 AM    LDL, calculated 67 09/10/2021 08:46 AM    LDL, calculated 69 04/07/2020 12:28 PM    Triglyceride 58 09/10/2021 08:46 AM     Lab Results   Component Value Date/Time    GFR est non-AA 70 09/10/2021 08:46 AM    GFR est AA 81 09/10/2021 08:46 AM    Creatinine 0.91 03/09/2022 08:57 AM    BUN 16 03/09/2022 08:57 AM    Sodium 143 03/09/2022 08:57 AM    Potassium 4.1 03/09/2022 08:57 AM    Chloride 105 03/09/2022 08:57 AM    CO2 23 03/09/2022 08:57 AM    Magnesium 2.2 01/16/2020 11:36 PM        Review of Systems   Respiratory: Negative for shortness of breath. Cardiovascular: Negative for chest pain. Physical Exam  Constitutional:       General: She is not in acute distress. Appearance: Normal appearance. She is not ill-appearing, toxic-appearing or diaphoretic. HENT:      Head: Normocephalic and atraumatic. Eyes:      General:         Right eye: No discharge. Left eye: No discharge. Conjunctiva/sclera: Conjunctivae normal.      Comments: subconjuctival hemorrhage    Pulmonary:      Effort: No respiratory distress. Neurological:      Mental Status: She is alert and oriented to person, place, and time. Mental status is at baseline. Gait: Gait normal.   Psychiatric:         Mood and Affect: Mood normal.         Behavior: Behavior normal.     Hemorrhage on the medial aspect of the left eye    ASSESSMENT and PLAN    ICD-10-CM ICD-9-CM    1. Subconjunctival bleed, left       Benign blood vessel bleed in the left eye    Improving    No visual changes    Provided reassurance will resolve on its own H11.32 372.72    2.  Paroxysmal atrial fibrillation (HCC)       Had some episode of palpitations went to [facility] [place]    Reviewed labs which were overall unremarkable    She is following up with cardiology for event monitor she normally follows with her cardiologist routinely and is on Eliquis for anticoagulation I48.0 427.31    3. Primary hypertension       Blood pressure very well controlled actually running on the lower side of normal discussed this with her    No signs or symptoms of orthostasis typically her blood pressure is higher than this for now continue current regimen we will reassess at follow-up in July she will monitor her blood pressure and bring in readings to that visit I10 401.9         Scribed by Maria Fernanda Wood of [employer], as dictated by Dr. Elaina Dozier. Current diagnosis and concerns discussed with pt at length. Pt understands risks and benefits or current treatment plan and medications, and accepts the treatment and medication with any possible risks. Pt asks appropriate questions, which were answered. Pt was instructed to call with any concerns or problems. I have reviewed the note documented by the scribe. The services provided are my own. The documentation is accurate     Amy Bath, was evaluated through a synchronous (real-time) audio-video encounter. The patient (or guardian if applicable) is aware that this is a billable service. Verbal consent to proceed has been obtained. The visit was conducted pursuant to the emergency declaration under the [facility], [address] [place] [address] [facility] authority and the [employer] General Act. Patient identification was verified, and a caregiver was present when appropriate. The patient was located at home in a state where the provider was licensed to provide care.

## 2022-05-20 DIAGNOSIS — F41.9 ANXIETY: Primary | ICD-10-CM

## 2022-05-20 NOTE — TELEPHONE ENCOUNTER
----- Message from Lissette Pozo sent at 5/20/2022  1:05 PM EDT -----  Subject: Message to Provider    QUESTIONS  Information for Provider? Pt requesting a call back from Priscilla Lopes's nurse. Pt stated she wears a heart monitor and she is having bad   anxiety, which is effecting her BP and heart monitor. Pt would like to   discuss changing one of her medications.  ---------------------------------------------------------------------------  --------------  CALL BACK INFO  What is the best way for the office to contact you? OK to leave message on   voicemail  Preferred Call Back Phone Number? [phone]  ---------------------------------------------------------------------------  --------------  SCRIPT ANSWERS  Relationship to Patient?  Self

## 2022-05-22 RX ORDER — SERTRALINE HYDROCHLORIDE 50 MG/1
75 TABLET, FILM COATED ORAL DAILY
Qty: 135 TABLET | Refills: 1 | Status: SHIPPED | OUTPATIENT
Start: 2022-05-22 | End: 2022-06-06 | Stop reason: SDUPTHER

## 2022-05-31 RX ORDER — HYDROCHLOROTHIAZIDE 25 MG/1
TABLET ORAL
Qty: 90 TABLET | Refills: 0 | Status: SHIPPED | OUTPATIENT
Start: 2022-05-31 | End: 2022-09-13

## 2022-06-06 DIAGNOSIS — F41.9 ANXIETY: ICD-10-CM

## 2022-06-06 RX ORDER — SERTRALINE HYDROCHLORIDE 50 MG/1
75 TABLET, FILM COATED ORAL DAILY
Qty: 135 TABLET | Refills: 1 | Status: SHIPPED | OUTPATIENT
Start: 2022-06-06

## 2022-06-06 NOTE — TELEPHONE ENCOUNTER
Called, spoke to pt. Two pt identifiers confirmed. Patient requesting a refill on  prescription Sertraline 75 mg tablet. Patient requesting for tablet dose to be 75 mg. Patient informed her request will be sent to Dr. Lisbeth Kinney for approval.     Pt verbalized understanding of information discussed w/ no further questions at this time.

## 2022-06-06 NOTE — TELEPHONE ENCOUNTER
5/20 script not accepted by pt due to it supposed to be 75 mg. States please adjust as per previous convo and please send to pharmacy.         sertraline (ZOLOFT) 75 mg tablet        PSR did not pend, as pt requested adjustment first.

## 2022-06-09 ENCOUNTER — OFFICE VISIT (OUTPATIENT)
Dept: NEUROLOGY | Age: 82
End: 2022-06-09
Payer: MEDICARE

## 2022-06-09 VITALS
HEART RATE: 73 BPM | WEIGHT: 189.3 LBS | OXYGEN SATURATION: 97 % | TEMPERATURE: 98.9 F | DIASTOLIC BLOOD PRESSURE: 59 MMHG | SYSTOLIC BLOOD PRESSURE: 110 MMHG | HEIGHT: 65 IN | BODY MASS INDEX: 31.54 KG/M2

## 2022-06-09 DIAGNOSIS — G60.9 IDIOPATHIC PERIPHERAL NEUROPATHY: Primary | ICD-10-CM

## 2022-06-09 PROCEDURE — G8752 SYS BP LESS 140: HCPCS | Performed by: PSYCHIATRY & NEUROLOGY

## 2022-06-09 PROCEDURE — G8399 PT W/DXA RESULTS DOCUMENT: HCPCS | Performed by: PSYCHIATRY & NEUROLOGY

## 2022-06-09 PROCEDURE — G8754 DIAS BP LESS 90: HCPCS | Performed by: PSYCHIATRY & NEUROLOGY

## 2022-06-09 PROCEDURE — 1123F ACP DISCUSS/DSCN MKR DOCD: CPT | Performed by: PSYCHIATRY & NEUROLOGY

## 2022-06-09 PROCEDURE — 1090F PRES/ABSN URINE INCON ASSESS: CPT | Performed by: PSYCHIATRY & NEUROLOGY

## 2022-06-09 PROCEDURE — G8417 CALC BMI ABV UP PARAM F/U: HCPCS | Performed by: PSYCHIATRY & NEUROLOGY

## 2022-06-09 PROCEDURE — 99215 OFFICE O/P EST HI 40 MIN: CPT | Performed by: PSYCHIATRY & NEUROLOGY

## 2022-06-09 PROCEDURE — G8510 SCR DEP NEG, NO PLAN REQD: HCPCS | Performed by: PSYCHIATRY & NEUROLOGY

## 2022-06-09 PROCEDURE — 1101F PT FALLS ASSESS-DOCD LE1/YR: CPT | Performed by: PSYCHIATRY & NEUROLOGY

## 2022-06-09 PROCEDURE — G8536 NO DOC ELDER MAL SCRN: HCPCS | Performed by: PSYCHIATRY & NEUROLOGY

## 2022-06-09 PROCEDURE — G8427 DOCREV CUR MEDS BY ELIG CLIN: HCPCS | Performed by: PSYCHIATRY & NEUROLOGY

## 2022-06-09 RX ORDER — APIXABAN 5 MG/1
TABLET, FILM COATED ORAL
COMMUNITY
Start: 2022-05-28 | End: 2022-07-12 | Stop reason: DRUGHIGH

## 2022-06-09 NOTE — PROGRESS NOTES
Chief Complaint   Patient presents with    New Patient     hands, feet and legs    Peripheral Neuropathy       HISTORY OF PRESENT ILLNESS  Jenny Montague is a [de-identified] y.o. female who came in to establish care as her previous neurologist has left the practice. She was seeing Dr. Lenny Desai initially and saw Dr. Foreign Baez in December of last year  She has been diagnosed with idiopathic peripheral neuropathy and restless leg syndrome.   She started having symptoms started in 2019. She does get tightness and numbness of sometimes the left leg and sometimes the right leg. It is off and on. It can stay for a few minutes or some hours. It can be any time of the day. Left foot is worse than the right and numbness/tingling is worse on the fifth digit side. No back pain. No shooting pain down the leg. No DM. No weakness in the legs. Symptoms are worse in the winter. Gabapentin has been suggested to her but she prefers not to take any additional medications at this time. Past Medical History:   Diagnosis Date    Arrhythmia     afib    Arthritis     right knee, left shoulder    Atrial fibrillation (HCC)     CAD (coronary artery disease)     Diverticulosis     Frequency of urination     High cholesterol     Hypertension     Long term current use of anticoagulant therapy     Morbid obesity (HCC)     Osteoarthritis     Peripheral neuropathy     bilateral LE    Thyroid disease     hypothyroid    Unspecified adverse effect of anesthesia     low BP     Current Outpatient Medications   Medication Sig    Eliquis 5 mg tablet TAKE 1 TABLET BY MOUTH TWO (2) TIMES A DAY FOR 30 DAYS.  acetaminophen (TYLENOL EXTRA STRENGTH PO) Take 500 mg by mouth nightly. Tylenol PM    sertraline (ZOLOFT) 50 mg tablet Take 1.5 Tablets by mouth daily.     hydroCHLOROthiazide (HYDRODIURIL) 25 mg tablet TAKE 1 TABLET BY MOUTH EVERY DAY    verapamil ER (CALAN-SR) 240 mg CR tablet TAKE 1 TABLET BY MOUTH EVERY MORNING FOR BLOOD PRESSURE    cloNIDine HCL (CATAPRES) 0.2 mg tablet TAKE 1 TABLET BY MOUTH TWICE A DAY    potassium chloride SR (KLOR-CON 10) 10 mEq tablet TAKE 2 TABLETS IN MORNING AND 2 TABLETS IN THE EVENING    rosuvastatin (CRESTOR) 20 mg tablet TAKE 1 TABLET BY MOUTH EVERY DAY    Synthroid 75 mcg tablet TAKE 1 TABLET BY MOUTH EVERY DAY BEFORE BREAKFAST    losartan (COZAAR) 50 mg tablet Take 1 Tablet by mouth daily.  CALCIUM CARBONATE/VITAMIN D3 (CALTRATE 600 + D PO) Take 1 Tab by mouth daily.  MULTIVITAMIN W-MINERALS/LUTEIN (CENTRUM SILVER PO) Take 1 Tab by mouth daily (after lunch).  gabapentin (NEURONTIN) 100 mg capsule Take 1 Capsule by mouth two (2) times daily as needed for Pain. Max Daily Amount: 200 mg. (Patient not taking: Reported on 6/9/2022)    valACYclovir (VALTREX) 500 mg tablet 500 mg as needed. (Patient not taking: Reported on 6/9/2022)     No current facility-administered medications for this visit.      Allergies   Allergen Reactions    Adhesive Hives     Paper tape fine    Amoxicillin Unknown (comments)    Clindamycin Rash    Lipitor [Atorvastatin] Myalgia     Gets the flu     Family History   Problem Relation Age of Onset    Alzheimer's Disease Mother     Heart Disease Mother     Heart Disease Father     Hypertension Father     Cancer Paternal Aunt     Diabetes Paternal Grandmother      Social History     Tobacco Use    Smoking status: Never Smoker    Smokeless tobacco: Never Used   Vaping Use    Vaping Use: Never used   Substance Use Topics    Alcohol use: No    Drug use: No     Past Surgical History:   Procedure Laterality Date    COLONOSCOPY N/A 7/1/2021    COL[facility] Jenn [facility]imentel [facility]  7/1/2021         TESSIE MURGUIA  7/1/2021         HX GYN      myomectomy on uterus    HX GYN  2011    hystereoscopy D&C    HX LYMPH NODE DISSECTION      biopsy    HX OTHER SURGICAL      lymph node bx    SD BREAST SURGERY PROCEDURE UNLISTED  1982    breast bx rt    AK CARDIAC SURG PROCEDURE UNLIST  01/2015    cardiac catheterization, no intervention, medical management    AK CARDIAC SURG PROCEDURE UNLIST  03/2017    ablation    UPPER GI ENDOSCOPY,BIOPSY  10/24/2019         UPPER GI ENDOSCOPY,FN NEEDLE BX,GUIDED  10/24/2019              REVIEW OF SYSTEMS  Review of Systems - History obtained from the patient  Psychological ROS: negative  ENT ROS: negative  Hematological and Lymphatic ROS: negative  Endocrine ROS: negative  Respiratory ROS: no cough, shortness of breath, or wheezing  Cardiovascular ROS: no chest pain or dyspnea on exertion  Gastrointestinal ROS: no abdominal pain, change in bowel habits, or black or bloody stools  Genito-Urinary ROS: no dysuria, trouble voiding, or hematuria  Musculoskeletal ROS: negative  Dermatological ROS: negative      PHYSICAL EXAMINATION:    Visit Vitals  BP (!) 110/59   Pulse 73   Temp 98.9 °F (37.2 °C) (Oral)   Ht 5' 5\" (1.651 m)   Wt 189 lb 4.8 oz (85.9 kg)   SpO2 97%   BMI 31.50 kg/m²     General:  Well groomed individual in no acute distress. Neck: Supple, nontender, no bruits, no pain with resistance to active range of motion. Heart: Regular rate and rhythm. Normal S1S2. Lungs:  Equal chest expansion, no cough, no wheeze  Musculoskeletal:  Extremities revealed no edema and had full range of motion of joints. Psych:  Good mood and bright affect    NEUROLOGICAL EXAMINATION:     Mental Status:   Alert and oriented to person, place, and time with recent and remote memory intact. Attention span and concentration are normal. Speech is fluent. Cranial Nerves:    II, III, IV, VI:  Visual acuity grossly intact. Visual fields are normal.    Pupils are equal, round, and reactive to light. Extra-ocular movements are full and fluid. Fundoscopic exam was benign, no ptosis or nystagmus. V-XII: Hearing is grossly intact. Facial features are symmetric, with normal sensation and strength. The palate rises symmetrically and the tongue protrudes midline. Sternocleidomastoids 5/5. Motor Examination: Normal tone, bulk, and strength. 5/5 muscle strength throughout. No cogwheel rigidity or clonus present. Sensory exam: Impaired pinprick, temperature and vibration sense distally in both feet with distal to proximal gradient. Normal proprioception. Coordination:  Finger to nose and rapid arm movement testing was normal.   No resting or intention tremor    Gait and Station:  Steady but cannot do tandem walking. Normal arm swing. No Rhomberg or pronator drift. No muscle wasting or fasiculations noted. Reflexes:  DTRs 2+ throughout except ankle jerks that were difficult to elicit. Toes downgoing. LABS / IMAGING  Lab Results   Component Value Date/Time    WBC 4.2 09/10/2021 08:46 AM    HGB 11.9 09/10/2021 08:46 AM    HCT 36.1 09/10/2021 08:46 AM    PLATELET 873 34/66/7767 08:46 AM    MCV 97 09/10/2021 08:46 AM     Lab Results   Component Value Date/Time    Sodium 143 03/09/2022 08:57 AM    Potassium 4.1 03/09/2022 08:57 AM    Chloride 105 03/09/2022 08:57 AM    CO2 23 03/09/2022 08:57 AM    Anion gap 4 (L) 01/16/2020 11:36 PM    Glucose 95 03/09/2022 08:57 AM    BUN 16 03/09/2022 08:57 AM    Creatinine 0.91 03/09/2022 08:57 AM    BUN/Creatinine ratio 18 03/09/2022 08:57 AM    GFR est AA 81 09/10/2021 08:46 AM    GFR est non-AA 70 09/10/2021 08:46 AM    Calcium 9.9 03/09/2022 08:57 AM    Bilirubin, total 0.4 03/09/2022 08:57 AM    Alk.  phosphatase 68 03/09/2022 08:57 AM    Protein, total 6.8 03/09/2022 08:57 AM    Albumin 4.8 (H) 03/09/2022 08:57 AM    Globulin 3.7 01/16/2020 11:36 PM    A-G Ratio 2.4 (H) 03/09/2022 08:57 AM    ALT (SGPT) 19 03/09/2022 08:57 AM    AST (SGOT) 18 03/09/2022 08:57 AM     Lab Results   Component Value Date/Time    Vitamin B12 708 08/15/2019 11:54 AM     Lab Results   Component Value Date/Time    TSH 1.040 03/09/2022 08:57 AM     EMG/NCV showed distal, symmetric, large fiber peripheral neuropathy with axonal features    Lab Results   Component Value Date/Time    Hemoglobin A1c 5.6 03/09/2022 08:57 AM    Hemoglobin A1c (POC) 5.6 10/06/2017 03:23 PM     Serum protein electrophoresis was negative    ASSESSMENT    ICD-10-CM ICD-9-CM    1. Idiopathic peripheral neuropathy  G60.9 356.9        DISCUSSION  Ms. Colleen Ledesma has idiopathic polyneuropathy. Her main symptoms are tingling in her feet and a sensation of tightness, left worse than right. She has impaired pinprick, vibration sense and light touch distally in both legs in a symmetric fashion.    Extensive work-up in this regard has been negative and neuropathy may be idiopathic or age-related  Treatment essentially asymptomatic  Importance of adopting a regular exercise regimen was reviewed  If paresthesias start to become bothersome medication such as gabapentin or duloxetine can be tried  Paperwork for handicap placard was provided due to balance issues  Can follow periodically  Time 40 minutes    Erna Gleason MD  Diplomate, American Board of Psychiatry & Neurology (Neurology)  [place]lee Osler Board of Psychiatry & Neurology (Clinical Neurophysiology)  Diplomate, American Board of Electrodiagnostic Medicine

## 2022-06-27 ENCOUNTER — TELEPHONE (OUTPATIENT)
Dept: INTERNAL MEDICINE CLINIC | Age: 82
End: 2022-06-27

## 2022-06-27 NOTE — TELEPHONE ENCOUNTER
Called, spoke to pt  Received two pt identifiers  Pt informed to go to UC to get the pustule looked at. Pt verbalizes understanding of the instructions and has no further questions at this time.

## 2022-06-27 NOTE — TELEPHONE ENCOUNTER
#[phone] or #[phone] (last 4 numbers the same)    Pt states she has a bite under her bra strap area on shoulder. There is a hard center to it. She has been putting Cortizone 10 on it and that is not helping. Pt sending picture via [employer] phone in which I will share with physician.

## 2022-06-28 ENCOUNTER — TELEPHONE (OUTPATIENT)
Dept: INTERNAL MEDICINE CLINIC | Age: 82
End: 2022-06-28

## 2022-06-28 NOTE — TELEPHONE ENCOUNTER
Called, spoke to pt  Received two pt identifiers  Pt states Went to [facility] for lump on shoulder blade. Pt states [facility] dx as cellulitis of the neck and is just a mass. Pt states was given Keflex 500mg 4 times a day  Pt offered and accepted virtual appt for 07/1/22 @ 245  Pt verbalizes understanding of the instructions and has no further questions at this time.

## 2022-06-28 NOTE — TELEPHONE ENCOUNTER
States went to urgent care, [facility] was told she has a \"mass\" and given medication for 4 days. Given Med: cephalexin (keflex) 4 times a day. Went to:  [facility] in [place]    Pt requests appt with pcp by Friday, no availability as pcp is out of office.     [phone]

## 2022-07-01 ENCOUNTER — TELEPHONE (OUTPATIENT)
Dept: INTERNAL MEDICINE CLINIC | Age: 82
End: 2022-07-01

## 2022-07-01 NOTE — TELEPHONE ENCOUNTER
Pt called again    States that she wanted to double check if able to come into office instead of VV    Advised only the currently scheduled VV is available at the moment    Updated appt notes to reflect information pt provided:  ashley (first time using) [phone]    Went to [facility]  for bumps, dx cellulitis, given Keflex 500mg, it has spread more, some nurses think it may actually be spider bites. Now silver dollar size or up to 3 inches diameter,   Hard,   Most are circular shape, one is long,   Red and Warm to touch. 8 spots now. Advised per direction of  andrew that pt should keep VV if able and if pt feels it worsening or is unable to wait or feels it needs to be seen in person, he recommends Urgent Care as the next best option. Pt states will keep vv. Please be advised.

## 2022-07-05 RX ORDER — LEVOTHYROXINE SODIUM 75 UG/1
TABLET ORAL
Qty: 90 TABLET | Refills: 0 | Status: SHIPPED | OUTPATIENT
Start: 2022-07-05 | End: 2022-10-01

## 2022-07-08 NOTE — PROGRESS NOTES
HISTORY OF PRESENT ILLNESS  Roque Bain is a [de-identified] y.o. female. HPI     Last here vv 5/10/21 for acute care. Pt is here for acute/routine care    Pt in the ED 6/27/22 for cellulitis of neck. Given Keflex. Pt had vv w/ Dr. Corbin Risk 7/1/22 when sx's did not improve. Given prednisone and referred to derm. States she is still breaking out on her arm and back. She did see dermatology  Reports she saw Dr. Aryan Garcia (derm) last week 7/22, they did a biopsy, but no results yet. Rx'd silver nitrate. F/u 7/14/22    History of hypertension  BP today is 125/77.   Home BP readings 272G systolic  Continues on clonidine 0.2mg BID, HCTZ 25mg   And losartan 50 mg   Increased to Verapamil  360 mg per day per Dr. Chelle Reyes  Recall she had a cough on lisinopril      Wt today 187 lbs, down 7 lbs since lov.     Reviewed labs   Ordered labs    Prev c/o c/o R knee pain x fall 2021 this is a chronic issue she feels that her right knee is always a bit more swollen and larger than the other she would like to establish with orthopedics  Hurts with movement  Saw Dr. Neda Romeo (ortho), dx w/ arthritis     Pt is following with Dr Morales (GI) for weight loss and pancreatic cyst  Pt had an EGD and EUS 10/24/19  Last there 8/21, sees him annually         Pt saw Dr. Andreea Casas (cardio) for foot circulation  Last visit was 8/1/17   Recall has chronic sx of leg tightness  Pt will only f/u with this physician prn       Pt saw Dr. Riana Stoll (cardio) for f/u after a fib  Has a history of ablation  Lov 6/22 w/ NP  Continues on eliquis 5 mg bid  Increased verapamil 240 to 360 mg   Also added tylenol PM for insomnia, stated once she finished the bottle to switch to Eure  We discussed starting first w/ melatonin and working on sleep hygiene before goint to benedryl    No longer taking sotalol  Pt states she has an appt w/ Dr. Obed Buckner at [facility] whom she might be transitioning to            Pt saw Dr. Shala Carias 6/9/22 for neuropathy  Reviewed note 6/9/22:  Ms. Bruce Quintana Cruz Huffman has idiopathic polyneuropathy. Her main symptoms are tingling in her feet and a sensation of tightness, left worse than right. She has impaired pinprick, vibration sense and light touch distally in both legs in a symmetric fashion. Extensive work-up in this regard has been negative and neuropathy may be idiopathic or age-related  Treatment essentially asymptomatic  Importance of adopting a regular exercise regimen was reviewed  If paresthesias start to become bothersome medication such as gabapentin or duloxetine can be tried  Paperwork for handicap placard was provided due to balance issues  Can follow periodically  No longer taking amitriptyline for headaches and neuropathy  Does not want to take gabapentin at this time overall she is doing okay without medications  She was given a handicap placard.           Pt saw Dr. Isaias Gloria (uro-gyn) for urinary sx  Last visit was 11/17  Pt was told she had lichen sclerosus   Now resolved      Pt saw Dr Kacie Banerjee (sleep)  Last visit was 7/3/18  Recall telephone note 9/18: no significant sleep disordered breathing, overall AHI of 0.6/h  Pt had a poor experience and was told she has no sleep apnea     She has been seeing Dr. Maggie Ortiz (surg) for lipoma  Last there 11/12/20     Pt had a cyst on R breast removed by Dr Mahad Orosco (surg) in the past    Pt prev followed with Dr Sukhwinder Clement (podiatry)       Continues crestor 20mg daily for cholesterol     Continues klor-con 20meqs BID for her potassium      Continues on synthroid 75mcg daily  Will take full dose 6 days per week and will take half day 1 day per week  (Monday)     Taking zoloft now 76 mg for anxiety overall this works well 7/22      Recall She had a cervical polyp, was having vaginal bleeding, this was removed by julio césar, vaginal bleeding has since resolved.      ACP not on file.  SDM is her sister, Mary Krishna.   Provided information in the past.      PREVENTIVE:    Colonoscopy: 7/01/21. Dr. Morales, repeat 5 years  Pap: Dr. Mariano Can, 11/19/21, nl  DEXA:  osteopenia, 11/21, nl [address]  Tdap: 4/14/2015  Pneumovax: 11/19/2013  Sfywpdp06: 4/05/2017  Zostavax: declines  Shingrix: due  h/o shingles, reminded  Flu shot: 09/15/21  A1C:  11/19 5.5  4/20 5.5 10/20 5.4 9/21 5.2 3/22 5.6  Eye exam: VEI on [place] 6/15/22, cataract  Lipids: 9/21 LDL 67  Hep C screen: 9/21 negative  Covid: three doses (pfizer)    Patient Active Problem List    Diagnosis Date Noted    Anxiety 03/09/2021    Pericardial effusion with cardiac tamponade 03/18/2017    S/P ablation of atrial fibrillation 03/17/2017    Acquired hypothyroidism 12/14/2015    HTN (hypertension) 07/21/2015    Atrial fibrillation ([facility] [address] 03/16/2015    Morbid obesity ([facility] [address]     Hyperlipidemia 07/14/2014    Hypokalemia 07/14/2014    Postmenopausal bleeding 10/18/2011     Current Outpatient Medications   Medication Sig Dispense Refill    Synthroid 75 mcg tablet TAKE 1 TABLET BY MOUTH EVERY DAY BEFORE BREAKFAST 90 Tablet 0    cephALEXin (KEFLEX) 500 mg capsule TAKE 1 CAPSULE BY MOUTH 4 TIMES A DAY FOR 7 DAYS.  predniSONE (STERAPRED DS) 10 mg dose pack See administration instruction per 10mg dose pack 21 Tablet 0    Eliquis 5 mg tablet TAKE 1 TABLET BY MOUTH TWO (2) TIMES A DAY FOR 30 DAYS.  acetaminophen (TYLENOL EXTRA STRENGTH PO) Take 500 mg by mouth nightly. Tylenol PM      sertraline (ZOLOFT) 50 mg tablet Take 1.5 Tablets by mouth daily.  135 Tablet 1    hydroCHLOROthiazide (HYDRODIURIL) 25 mg tablet TAKE 1 TABLET BY MOUTH EVERY DAY 90 Tablet 0    verapamil ER (CALAN-SR) 240 mg CR tablet TAKE 1 TABLET BY MOUTH EVERY MORNING FOR BLOOD PRESSURE 90 Tablet 0    cloNIDine HCL (CATAPRES) 0.2 mg tablet TAKE 1 TABLET BY MOUTH TWICE A  Tablet 1    potassium chloride SR (KLOR-CON 10) 10 mEq tablet TAKE 2 TABLETS IN MORNING AND 2 TABLETS IN THE EVENING 360 Tablet 1    rosuvastatin (CRESTOR) 20 mg tablet TAKE 1 TABLET BY MOUTH EVERY DAY 90 Tablet 1    losartan (COZAAR) 50 mg tablet Take 1 Tablet by mouth daily. 90 Tablet 1    gabapentin (NEURONTIN) 100 mg capsule Take 1 Capsule by mouth two (2) times daily as needed for Pain. Max Daily Amount: 200 mg. (Patient not taking: Reported on 6/9/2022) 60 Capsule 1    valACYclovir (VALTREX) 500 mg tablet 500 mg as needed. (Patient not taking: Reported on 6/9/2022)      CALCIUM CARBONATE/VITAMIN D3 (CALTRATE 600 + D PO) Take 1 Tab by mouth daily.  MULTIVITAMIN W-MINERALS/LUTEIN (CENTRUM SILVER PO) Take 1 Tab by mouth daily (after lunch).        Past Surgical History:   Procedure Laterality Date    COLONOSCOPY N/A 7/1/2021    [facility]  7/1/2021         Jh Prado  7/1/2021         HX GYN      myomectomy on uterus    HX GYN  2011    hystereoscopy D&C    HX LYMPH NODE DISSECTION      biopsy    HX OTHER SURGICAL      lymph node bx    [place] BREAST SURGERY PROCEDURE UNLISTED  1982    breast bx rt    [place] CARDIAC SURG PROCEDURE UNLIST  01/2015    cardiac catheterization, no intervention, medical management    [place] CARDIAC SURG PROCEDURE UNLIST  03/2017    ablation    UPPER GI ENDOSCOPY,BIOPSY  10/24/2019         UPPER GI ENDOSCOPY,FN NEEDLE BX,GUIDED  10/24/2019           Lab Results   Component Value Date/Time    WBC 4.2 09/10/2021 08:46 AM    HGB 11.9 09/10/2021 08:46 AM    HCT 36.1 09/10/2021 08:46 AM    PLATELET 648 59/52/4303 08:46 AM    MCV 97 09/10/2021 08:46 AM     Lab Results   Component Value Date/Time    Cholesterol, total 149 09/10/2021 08:46 AM    HDL Cholesterol 70 09/10/2021 08:46 AM    LDL, calculated 67 09/10/2021 08:46 AM    LDL, calculated 69 04/07/2020 12:28 PM    Triglyceride 58 09/10/2021 08:46 AM     Lab Results   Component Value Date/Time    GFR est non-AA 70 09/10/2021 08:46 AM    GFR est AA 81 09/10/2021 08:46 AM    Creatinine 0.91 03/09/2022 08:57 AM    BUN 16 03/09/2022 08:57 AM    Sodium 143 03/09/2022 08:57 AM    Potassium 4.1 03/09/2022 08:57 AM    Chloride 105 03/09/2022 08:57 AM    CO2 23 03/09/2022 08:57 AM    Magnesium 2.2 01/16/2020 11:36 PM        Review of Systems   Respiratory: Negative for shortness of breath. Cardiovascular: Negative for chest pain. Physical Exam  Constitutional:       General: She is not in acute distress. Appearance: She is not ill-appearing, toxic-appearing or diaphoretic. HENT:      Head: Normocephalic and atraumatic. Right Ear: External ear normal.      Left Ear: External ear normal.   Eyes:      General:         Right eye: No discharge. Left eye: No discharge. Conjunctiva/sclera: Conjunctivae normal.      Pupils: Pupils are equal, round, and reactive to light. Cardiovascular:      Rate and Rhythm: Normal rate and regular rhythm. Heart sounds: Murmur heard. No friction rub. No gallop. Pulmonary:      Effort: No respiratory distress. Breath sounds: Normal breath sounds. No wheezing or rales. Chest:      Chest wall: No tenderness. Musculoskeletal:         General: Normal range of motion. Cervical back: Normal.   Skin:     General: Skin is warm and dry. Neurological:      Mental Status: She is oriented to person, place, and time. Mental status is at baseline. Coordination: Coordination normal.      Gait: Gait normal.   Psychiatric:         Mood and Affect: Mood normal.         Behavior: Behavior normal.         ASSESSMENT and PLAN    ICD-10-CM ICD-9-CM    1. Primary hypertension     Controlled on verapamil clonidine hydrochlorothiazide and losartan continue     I10 401.9    2. Paroxysmal atrial fibrillation [facility])       Up-to-date with cardiology may be changing cardiologist currently in normal sinus rhythm anticoagulated on Eliquis     I48.0 427.31    3.  Morbid obesity ([address]     Patient had gained 9 pounds last visit but has been able to lose that again at this time continue to work on portions    Recall at one time she was concerned about weight loss and did not know why she had a very extensive evaluation for this in the past     E66.01 278.01    4. Acquired hypothyroidism       Controlled on Synthroid takes 75 mcg 6 days/week takes 1/2 tablet the seventh day     E03.9 244.9    5. Mixed hyperlipidemia         Controlled Crestor     E78.2 272.2    6. Anxiety     Patient with significant anxiety improved on Zoloft 75 mg daily     F41.9 300.00    7. Hypokalemia       Controlled on Klor-Con     E87.6 276.8    8. Rash     Seeing dermatology recently had a biopsy taken     R21 782.1    9. Neuropathy     Has seen several neurologist for this in the past previously had gabapentin not interested in any medication at this time I have ordered gabapentin in the past and amitriptyline but ultimately she is doing okay without medication G62.9 355.9    10. Primary insomnia       Discussed sleep hygiene at great length she was told to take Benadryl over-the-counter from cardiology    Discussed trying Benadryl first F51.01 307.42         Depression screen reviewed and negative. Scribed by Cecilia Dorman, as dictated by Dr. Amira Alcala. Current diagnosis and concerns discussed with pt at length. Pt understands risks and benefits or current treatment plan and medications, and accepts the treatment and medication with any possible risks. Pt asks appropriate questions, which were answered. Pt was instructed to call with any concerns or problems. I have reviewed the note documented by the scribe. The services provided are my own. The documentation is accurate.

## 2022-07-12 ENCOUNTER — OFFICE VISIT (OUTPATIENT)
Dept: INTERNAL MEDICINE CLINIC | Age: 82
End: 2022-07-12
Payer: MEDICARE

## 2022-07-12 VITALS
OXYGEN SATURATION: 98 % | HEART RATE: 70 BPM | HEIGHT: 65 IN | BODY MASS INDEX: 31.22 KG/M2 | SYSTOLIC BLOOD PRESSURE: 125 MMHG | TEMPERATURE: 98.9 F | RESPIRATION RATE: 16 BRPM | DIASTOLIC BLOOD PRESSURE: 77 MMHG | WEIGHT: 187.4 LBS

## 2022-07-12 DIAGNOSIS — R21 RASH: ICD-10-CM

## 2022-07-12 DIAGNOSIS — E66.01 MORBID OBESITY (HCC): ICD-10-CM

## 2022-07-12 DIAGNOSIS — E03.9 ACQUIRED HYPOTHYROIDISM: ICD-10-CM

## 2022-07-12 DIAGNOSIS — G62.9 NEUROPATHY: ICD-10-CM

## 2022-07-12 DIAGNOSIS — F41.9 ANXIETY: ICD-10-CM

## 2022-07-12 DIAGNOSIS — R73.01 IFG (IMPAIRED FASTING GLUCOSE): ICD-10-CM

## 2022-07-12 DIAGNOSIS — I10 PRIMARY HYPERTENSION: Primary | ICD-10-CM

## 2022-07-12 DIAGNOSIS — E78.2 MIXED HYPERLIPIDEMIA: ICD-10-CM

## 2022-07-12 DIAGNOSIS — E87.6 HYPOKALEMIA: ICD-10-CM

## 2022-07-12 DIAGNOSIS — I48.0 PAROXYSMAL ATRIAL FIBRILLATION (HCC): ICD-10-CM

## 2022-07-12 DIAGNOSIS — F51.01 PRIMARY INSOMNIA: ICD-10-CM

## 2022-07-12 PROCEDURE — G8417 CALC BMI ABV UP PARAM F/U: HCPCS | Performed by: INTERNAL MEDICINE

## 2022-07-12 PROCEDURE — G8510 SCR DEP NEG, NO PLAN REQD: HCPCS | Performed by: INTERNAL MEDICINE

## 2022-07-12 PROCEDURE — G8752 SYS BP LESS 140: HCPCS | Performed by: INTERNAL MEDICINE

## 2022-07-12 PROCEDURE — G8399 PT W/DXA RESULTS DOCUMENT: HCPCS | Performed by: INTERNAL MEDICINE

## 2022-07-12 PROCEDURE — G0463 HOSPITAL OUTPT CLINIC VISIT: HCPCS | Performed by: INTERNAL MEDICINE

## 2022-07-12 PROCEDURE — 99214 OFFICE O/P EST MOD 30 MIN: CPT | Performed by: INTERNAL MEDICINE

## 2022-07-12 PROCEDURE — G8536 NO DOC ELDER MAL SCRN: HCPCS | Performed by: INTERNAL MEDICINE

## 2022-07-12 PROCEDURE — 1090F PRES/ABSN URINE INCON ASSESS: CPT | Performed by: INTERNAL MEDICINE

## 2022-07-12 PROCEDURE — G8754 DIAS BP LESS 90: HCPCS | Performed by: INTERNAL MEDICINE

## 2022-07-12 PROCEDURE — 1101F PT FALLS ASSESS-DOCD LE1/YR: CPT | Performed by: INTERNAL MEDICINE

## 2022-07-12 PROCEDURE — G8427 DOCREV CUR MEDS BY ELIG CLIN: HCPCS | Performed by: INTERNAL MEDICINE

## 2022-07-12 RX ORDER — VERAPAMIL HYDROCHLORIDE 360 MG/1
360 CAPSULE, DELAYED RELEASE PELLETS ORAL DAILY
COMMUNITY
Start: 2022-06-24

## 2022-07-12 NOTE — PROGRESS NOTES
Reviewed record in preparation for visit and have obtained necessary documentation. Identified pt with two pt identifiers(name and ). Chief Complaint   Patient presents with    Follow-up    Hypertension    Irregular Heart Beat       Visit Vitals  /77 (BP 1 Location: Left upper arm, BP Patient Position: Sitting, BP Cuff Size: Adult)   Pulse 70   Temp 98.9 °F (37.2 °C) (Temporal)   Resp 16   Ht 5' 5\" (1.651 m)   Wt 187 lb 6.4 oz (85 kg)   SpO2 98%   BMI 31.18 kg/m²       Health Maintenance Due   Topic Date Due    Shingles Vaccine (1 of 2) Never done       Med Reconciliation: Completed        Coordination of Care Questionnaire:  :     1. \"Have you been to the ER, urgent care clinic since your last visit? Hospitalized since your last visit? \" Yes, for Left shoulder. [facility] [place] [facility]    2. \"Have you seen or consulted any other health care providers outside of the [address] [place] [address] since your last visit? \" No     3. For patients aged 39-70: Has the patient had a colonoscopy / FIT/ Cologuard? NA - based on age      If the patient is female:    4. For patients aged 41-77: Has the patient had a mammogram within the past 2 years? NA - based on age or sex      11. For patients aged 21-65: Has the patient had a pap smear?  NA - based on age or sex

## 2022-07-13 LAB
ALBUMIN SERPL-MCNC: 4.1 G/DL (ref 3.5–5)
ALBUMIN/GLOB SERPL: 1.5 {RATIO} (ref 1.1–2.2)
ALP SERPL-CCNC: 67 U/L (ref 45–117)
ALT SERPL-CCNC: 21 U/L (ref 12–78)
ANION GAP SERPL CALC-SCNC: 8 MMOL/L (ref 5–15)
AST SERPL-CCNC: 13 U/L (ref 15–37)
BILIRUB SERPL-MCNC: 0.3 MG/DL (ref 0.2–1)
BUN SERPL-MCNC: 14 MG/DL (ref 6–20)
BUN/CREAT SERPL: 17 (ref 12–20)
CALCIUM SERPL-MCNC: 9.3 MG/DL (ref 8.5–10.1)
CHLORIDE SERPL-SCNC: 101 MMOL/L (ref 97–108)
CHOLEST SERPL-MCNC: 148 MG/DL
CO2 SERPL-SCNC: 26 MMOL/L (ref 21–32)
CREAT SERPL-MCNC: 0.83 MG/DL (ref 0.55–1.02)
ERYTHROCYTE [DISTWIDTH] IN BLOOD BY AUTOMATED COUNT: 13.1 % (ref 11.5–14.5)
EST. AVERAGE GLUCOSE BLD GHB EST-MCNC: 108 MG/DL
GLOBULIN SER CALC-MCNC: 2.7 G/DL (ref 2–4)
GLUCOSE SERPL-MCNC: 91 MG/DL (ref 65–100)
HBA1C MFR BLD: 5.4 % (ref 4–5.6)
HCT VFR BLD AUTO: 38.2 % (ref 35–47)
HDLC SERPL-MCNC: 60 MG/DL
HDLC SERPL: 2.5 {RATIO} (ref 0–5)
HGB BLD-MCNC: 12.7 G/DL (ref 11.5–16)
LDLC SERPL CALC-MCNC: 67.6 MG/DL (ref 0–100)
MCH RBC QN AUTO: 32.8 PG (ref 26–34)
MCHC RBC AUTO-ENTMCNC: 33.2 G/DL (ref 30–36.5)
MCV RBC AUTO: 98.7 FL (ref 80–99)
NRBC # BLD: 0 K/UL (ref 0–0.01)
NRBC BLD-RTO: 0 PER 100 WBC
PLATELET # BLD AUTO: 175 K/UL (ref 150–400)
PMV BLD AUTO: 10.2 FL (ref 8.9–12.9)
POTASSIUM SERPL-SCNC: 4 MMOL/L (ref 3.5–5.1)
PROT SERPL-MCNC: 6.8 G/DL (ref 6.4–8.2)
RBC # BLD AUTO: 3.87 M/UL (ref 3.8–5.2)
SODIUM SERPL-SCNC: 135 MMOL/L (ref 136–145)
TRIGL SERPL-MCNC: 102 MG/DL (ref ?–150)
TSH SERPL DL<=0.05 MIU/L-ACNC: 0.66 UIU/ML (ref 0.36–3.74)
VLDLC SERPL CALC-MCNC: 20.4 MG/DL
WBC # BLD AUTO: 3.8 K/UL (ref 3.6–11)

## 2022-08-23 ENCOUNTER — TELEPHONE (OUTPATIENT)
Dept: INTERNAL MEDICINE CLINIC | Age: 82
End: 2022-08-23

## 2022-08-23 NOTE — TELEPHONE ENCOUNTER
Unable to leave message on home number due to voicemail unavailable. Generic voicemail on mobile number. Left message for patient to return call to office.

## 2022-08-23 NOTE — PROGRESS NOTES
HISTORY OF PRESENT ILLNESS  Gilbert Adler is a [de-identified] y.o. female. HPI  Last here vv 5/10/21 for acute care. Pt is here for acute/routine care  This is an established visit completed with telemedicine was completed with video assist. The patient acknowledges and agrees to this method of visitation kathrynyme. Pt presents to the office today w/ her friend Michael Moore, who helps present hx today. Pt presented to ED 8/4/22 for a fall. Reviewed XR knee 8/4/22: IMPRESSION(S):  Final report. 1. No acute findings. Chronic findings as above. Reviewed XR pelvis 8/4/22:  Degenerative changes noted of the lower lumbosacral spine with bilateral facet arthropathy. Aside and hip joints with mild to moderate degenerative changes. No acute fracture or dislocation. Reviewed XR R femur 8/4/22  No acute fracture  Reviewed XR L femur 8/4/22:  No acute fracture. Arthritic changes in the knee. She presented to ED again 8/22/22 for fullness in head. On 8/21/22, noted her BP was high 382 diastolic and her head felt \"off\" and her ankles felt weak. She called her friend Michael Moore who came over and noted her face was ashen, she could only open one eye at a time, and she was very confused. She had a lot of difficulty with word finding could not speak could not understand concern for TIA  Michael Cortezlavelle called the cardiologist,  Willa Beasley who told her to go to [facility] [place]. No med changes  Pt states she generally feels better, but still feels a little tired and \"off. \"    Reviewed CT head 8/22/22:  No acute intracranial abnormality. Reviewed CV muse 8/24/22:  Normal sinus rhythm  Voltage criteria for left ventricular hypertrophy  Inferior myocardial infarction , age undetermined    Discussed this could have been a TIA, dehydration, anxiety. Ordered brain MRI to definitively rule out stroke.     History of hypertension  BP today: 124/65  BP at the ER 8/4/22: 159/57   Continues on clonidine 0.2mg BID, HCTZ 25mg, losartan 50 mg, Verapamil  360 mg daily  Recall she had a cough on lisinopril     Advised pt that she does not need to take her BP multiple times every day only needs to check once per week, and only when she's had a calm day and has been sitting for at least 10 minutes. Wt today is 188 lbs, stable since lov. Reviewed labs   Ordered labs    She saw Dr. Maira Briggs (derm) 7/22 for cellulitis of neck  they did a biopsy, said she had Well's syndrome. Rx'd silver nitrate. F/u 7/14/22     Saw Dr. Marlene Rankin (ortho), for R knee pain, dx w/ arthritis     Pt is following with Dr Dary Suh (GI) for weight loss and pancreatic cyst  Pt had an EGD and EUS 10/24/19  Last there 8/21, sees him annually      Pt saw Dr. Camryn Ojeda (cardio) for foot circulation  Last visit was 8/1/17   Recall has chronic sx of leg tightness  Pt will only f/u with this physician prn       Pt saw Dr. Maura Recinos (cardio) for f/u after a fib  Has a history of ablation  Lov 6/22 w/ NP, will f/U in 10/22--however changing to [facility] cardiology  Continues on eliquis 5 mg bid  Taking verapamil 360 mg (increased from 240 mg)  He might have her start on sotalol in 10/22 at [facility] cardiology     Pt saw Dr. Mirta Castleman at [facility] 8/18/22  Reviewed note:  IMPRESSION  Long discussion about sotalol vs. Amiodarone vs. Pacemaker plus AVJ    PLAN  Sotalol load   She has not yet started sotalol has follow-up pending        Pt saw Dr. Sofía Rolle (neuro) 6/9/22 for neuropathy  Lov 6/9/22--has follow-up pending  No longer taking amitriptyline for headaches and neuropathy  Does not want to take gabapentin at this time overall she is doing okay without medications  She was given a handicap placard.         Pt saw Dr. Ofelia Esparza (uro-gyn) for urinary sx  Last visit was 11/17  Pt was told she had lichen sclerosus   Now resolved      Pt saw Dr Keith Morillo (sleep)  Last visit was 7/3/18  Recall telephone note 9/18: no significant sleep disordered breathing, overall AHI of 0.6/h  Pt had a poor experience and was told she has no sleep apnea  Advised pt she can take 3 mg melatonin. She has been seeing Dr. Emil Mota (surg) for lipoma  Last there 11/12/20     Pt had a cyst on R breast removed by Dr Paradise Sales (surg) in the past     Pt prev followed with Dr Vic Pedroza (podiatry)       Continues crestor 20mg daily for cholesterol     Continues klor-con 20meqs BID for her potassium      Continues on synthroid 75mcg daily  Will take full dose 6 days per week and will take half day 1 day per week  (Monday)     Taking zoloft now 75 mg (1.5 tablets/day) for anxiety   Pt states she thinks she is alright at this dosage, but her friend notes pt has been very anxious. We will leave dosage as is for now, pt will call back if she changes her mind. Rx'd hydroxyzine. Recall She had a cervical polyp, was having vaginal bleeding, this was removed by julio césar, vaginal bleeding has since resolved. ACP not on file. SDM is her sister, Rashmi Granda. Provided information in the past.      PREVENTIVE:    Colonoscopy: 7/01/21.  Dr. Miles Cassette, repeat 5 years  Pap: Dr. Antoinette Christensen, 11/21  Mammogram:  [facility], 11/19/21, nl  DEXA:  osteopenia, 11/21, nl [address]  Tdap: 4/14/2015  Pneumovax: 11/19/2013  Ardqvdo27: 4/05/2017  Zostavax: declines  Shingrix: due  h/o shingles, reminded  Flu shot: 09/15/21  A1C:  11/19 5.5  4/20 5.5 10/20 5.4 9/21 5.2 3/22 5.6 7/22 5.4  Eye exam: VEI on [place] 6/15/22, cataract  Lipids: 7/22 LDL 67  Hep C screen: 9/21 negative  Covid: three doses (Doretha Santos)      Patient Active Problem List    Diagnosis Date Noted    Anxiety 03/09/2021    Pericardial effusion with cardiac tamponade 03/18/2017    S/P ablation of atrial fibrillation 03/17/2017    Acquired hypothyroidism 12/14/2015    HTN (hypertension) 07/21/2015    Atrial fibrillation ([facility] [address] 03/16/2015    Morbid obesity ([facility] [address]     Hyperlipidemia 07/14/2014    Hypokalemia 07/14/2014    Postmenopausal bleeding 10/18/2011     Current Outpatient Medications   Medication Sig Dispense Refill    verapamil ER (VERELAN) 360 mg ER capsule Take 360 mg by mouth daily. acetaminophen/diphenhydramine (TYLENOL PM EXTRA STRENGTH PO) Take 1-2 Tablets by mouth. At bedtime as needed      Synthroid 75 mcg tablet TAKE 1 TABLET BY MOUTH EVERY DAY BEFORE BREAKFAST 90 Tablet 0    acetaminophen (TYLENOL EXTRA STRENGTH PO) Take 500 mg by mouth nightly. Tylenol PM      sertraline (ZOLOFT) 50 mg tablet Take 1.5 Tablets by mouth daily. 135 Tablet 1    hydroCHLOROthiazide (HYDRODIURIL) 25 mg tablet TAKE 1 TABLET BY MOUTH EVERY DAY 90 Tablet 0    cloNIDine HCL (CATAPRES) 0.2 mg tablet TAKE 1 TABLET BY MOUTH TWICE A  Tablet 1    potassium chloride SR (KLOR-CON 10) 10 mEq tablet TAKE 2 TABLETS IN MORNING AND 2 TABLETS IN THE EVENING 360 Tablet 1    rosuvastatin (CRESTOR) 20 mg tablet TAKE 1 TABLET BY MOUTH EVERY DAY 90 Tablet 1    losartan (COZAAR) 50 mg tablet Take 1 Tablet by mouth daily. 90 Tablet 1    gabapentin (NEURONTIN) 100 mg capsule Take 1 Capsule by mouth two (2) times daily as needed for Pain. Max Daily Amount: 200 mg. (Patient not taking: Reported on 6/9/2022) 60 Capsule 1    valACYclovir (VALTREX) 500 mg tablet 500 mg as needed. (Patient not taking: Reported on 6/9/2022)      CALCIUM CARBONATE/VITAMIN D3 (CALTRATE 600 + D PO) Take 1 Tab by mouth daily. MULTIVITAMIN W-MINERALS/LUTEIN (CENTRUM SILVER PO) Take 1 Tab by mouth daily (after lunch).        Past Surgical History:   Procedure Laterality Date    COLONOSCOPY N/A 7/1/2021    COL[facility] Dana [facility]  7/1/2021         COLONOSCOPY,TESSIE Mejia  7/1/2021         HX GYN      myomectomy on uterus    HX GYN  2011    hystereoscopy D&C    HX LYMPH NODE DISSECTION      biopsy    HX OTHER SURGICAL      lymph node bx    [place] BREAST SURGERY PROCEDURE UNLISTED  1982    breast bx rt    [place] CARDIAC SURG PROCEDURE UNLIST  01/2015    cardiac catheterization, no intervention, medical management [place] CARDIAC SURG PROCEDURE UNLIST  03/2017    ablation    UPPER GI ENDOSCOPY,BIOPSY  10/24/2019         UPPER GI ENDOSCOPY,FN NEEDLE BX,GUIDED  10/24/2019           Lab Results   Component Value Date/Time    WBC 3.8 07/12/2022 11:43 AM    HGB 12.7 07/12/2022 11:43 AM    HCT 38.2 07/12/2022 11:43 AM    PLATELET 045 89/03/7556 11:43 AM    MCV 98.7 07/12/2022 11:43 AM     Lab Results   Component Value Date/Time    Cholesterol, total 148 07/12/2022 11:43 AM    HDL Cholesterol 60 07/12/2022 11:43 AM    LDL, calculated 67.6 07/12/2022 11:43 AM    Triglyceride 102 07/12/2022 11:43 AM    CHOL/HDL Ratio 2.5 07/12/2022 11:43 AM     Lab Results   Component Value Date/Time    GFR est non-AA >60 07/12/2022 11:43 AM    GFR est AA >60 07/12/2022 11:43 AM    Creatinine 0.83 07/12/2022 11:43 AM    BUN 14 07/12/2022 11:43 AM    Sodium 135 (L) 07/12/2022 11:43 AM    Potassium 4.0 07/12/2022 11:43 AM    Chloride 101 07/12/2022 11:43 AM    CO2 26 07/12/2022 11:43 AM    Magnesium 2.2 01/16/2020 11:36 PM        Review of Systems   Constitutional:  Positive for malaise/fatigue. Respiratory:  Negative for shortness of breath. Cardiovascular:  Negative for chest pain. Physical Exam  Constitutional:       General: She is not in acute distress. Appearance: Normal appearance. She is not ill-appearing, toxic-appearing or diaphoretic. HENT:      Head: Normocephalic and atraumatic. Eyes:      General:         Right eye: No discharge. Left eye: No discharge. Conjunctiva/sclera: Conjunctivae normal.   Neurological:      General: No focal deficit present. Mental Status: She is alert and oriented to person, place, and time. Psychiatric:         Mood and Affect: Mood normal.         Behavior: Behavior normal.       ASSESSMENT and PLAN over 40 minutes total time answering questions reviewing charts    ICD-10-CM ICD-9-CM    1.  Primary hypertension  I10 401.9 TSH 3RD GENERATION   Well-controlled on current regimen of T4, FREE   Continues on clonidine 0.2mg BID, HCTZ 25mg, losartan 50 mg, Verapamil  360 mg daily    Has been very well controlled in clinic blood pressure tends to climb when she is feeling anxious she gets overly anxious and starts checking it over and over again and it tends to drive her blood pressure up    Had mild elevation in the ER    But was not feeling well then    For now continue current regimen provided reassurance   2. Paroxysmal atrial fibrillation (HCC)  I48.0 427.31 TSH 3RD GENERATION      T4, FREE   Patient with a history of atrial fibrillation she is changing cardiology practice and has just established at [facility]    She is wearing an event monitor currently    They are discussing potentially placing her on sotalol versus amiodarone    She continues on Eliquis for anticoagulation she has not missed any doses       3. Morbid obesity (HCC)  E66.01 278.01 TSH 3RD GENERATION      T4, FREE   Weight stable needs to continue to work on weight loss   4. Hypokalemia  E87.6 276.8 TSH 3RD GENERATION      T4, FREE   On Klor-Con   5. Acquired hypothyroidism  E03.9 244.9 TSH 3RD GENERATION      T4, FREE   On Synthroid last TSH was on the faster side of normal which could contribute to some of her symptoms we will repeat TFTs and adjust medication as needed   6. Mixed hyperlipidemia  E78.2 272.2 TSH 3RD GENERATION      T4, FREE      7. Anxiety  F41.9 300.00 TSH 3RD GENERATION   Patient struggles a lot with anxiety her friend discusses this at length with us here today as well    She is currently on Zoloft 75 mg daily    Discussed increasing dose ultimately she would prefer to keep with the current dose did provide a limited supply of very low-dose hydroxyzine to use as needed for acute anxiety this would help with sleep on the days she struggles as well   T4, FREE      8.  Confusion  R41.0 298.9 MRI BRAIN WO CONT      TSH 3RD GENERATION   Patient had an episode of confusion that is resolved had difficulty with speaking and could not find words    Unclear if this was a TIA versus confusion related to anxiety or blood pressure changes    Head CT was negative in the ER    Will evaluate further with MRI    Of note she has been compliant with her Eliquis this whole time    Symptoms have resolved    She also does follow with neurology routinely for now we will keep scheduled appointment    Unless new symptoms develop       T4, FREE      9. TIA (transient ischemic attack)  G45.9 435.9 MRI BRAIN WO CONT   See above   TSH 3RD GENERATION      T4, FREE      10. Primary insomnia  F51.01 307.42    Discussed melatonin hydroxyzine can help as well     Depression screen reviewed and negative. Scribed by Anh Kamara, as dictated by Dr. Justin Lopez. Current diagnosis and concerns discussed with pt at length. Pt understands risks and benefits or current treatment plan and medications, and accepts the treatment and medication with any possible risks. Pt asks appropriate questions, which were answered. Pt was instructed to call with any concerns or problems. I have reviewed the note documented by the scribe. The services provided are my own. The documentation is accurate. Gilbert Adler, who was evaluated through a synchronous (real-time) audio-video encounter, and/or her healthcare decision maker, is aware that it is a billable service, which includes applicable co-pays, with coverage as determined by her insurance carrier. She provided verbal consent to proceed and patient identification was verified. This visit was conducted pursuant to the emergency declaration under the [facility], [address] [place] [address] authority and the [employer] Act. A caregiver was present when appropriate. Ability to conduct physical exam was limited.  The patient was located at: Home: [facility] [id number]  The provider was located at: Home: [unfilled]    Heidi Freire on 8/23/2022 at 3:59 PM

## 2022-08-23 NOTE — TELEPHONE ENCOUNTER
Called, spoke with Pt. Two pt identifiers confirmed. Pt stated she wanted an in person appt for an ER follow up for calling. Pt declined virtual appts. Pt informed I will send message to Dr. Kathy Gracia and see if she can be worked in one day last week. Pt verbalized understanding of information discussed w/ no further questions at this time.

## 2022-08-23 NOTE — TELEPHONE ENCOUNTER
Pt needs and ED follow up    ED symptoms:  HTN and heaches    Facility:  [facility]      No availability for this psr to schedule. Pt declines VV, wants in office only    PSR did offer pt a possible option of enhanced vv and explained how it would work and that we would assist her with the virtual part. Pt declined and said to ask nurse to be worked into office, but also states if absolute last resort she will do  Enhanced.       Call pt to advise on scheduling  [phone]

## 2022-08-23 NOTE — TELEPHONE ENCOUNTER
----- Message from Amarilis Montana sent at 8/23/2022  9:41 AM EDT -----  Subject: Appointment Request    Reason for Call: Established Patient Appointment needed: Urgent (Patient   Request) ED Follow Up Visit    QUESTIONS    Reason for appointment request? Other - ED says pt need to be seen asap     Additional Information for Provider? Pt calling in to schedule and ED   follow up (for High BP & headaches) but first appt not avail until   10/05/22. Pt's paperwork says she needs to be seen as soon as possible. Tried calling office-line busy.  Pt would like someone to please call her   right away regarding this matter  ---------------------------------------------------------------------------  --------------  Shayna SEAY  [phone]; OK to leave message on voicemail  ---------------------------------------------------------------------------  --------------  SCRIPT ANSWERS  COVID Screen: Kyler Spain

## 2022-08-26 ENCOUNTER — VIRTUAL VISIT (OUTPATIENT)
Dept: INTERNAL MEDICINE CLINIC | Age: 82
End: 2022-08-26
Payer: MEDICARE

## 2022-08-26 VITALS
SYSTOLIC BLOOD PRESSURE: 124 MMHG | OXYGEN SATURATION: 99 % | BODY MASS INDEX: 31.32 KG/M2 | HEART RATE: 72 BPM | WEIGHT: 188 LBS | DIASTOLIC BLOOD PRESSURE: 65 MMHG | TEMPERATURE: 98.8 F | RESPIRATION RATE: 16 BRPM | HEIGHT: 65 IN

## 2022-08-26 DIAGNOSIS — R41.0 CONFUSION: ICD-10-CM

## 2022-08-26 DIAGNOSIS — F41.9 ANXIETY: ICD-10-CM

## 2022-08-26 DIAGNOSIS — E78.2 MIXED HYPERLIPIDEMIA: ICD-10-CM

## 2022-08-26 DIAGNOSIS — E66.01 MORBID OBESITY (HCC): ICD-10-CM

## 2022-08-26 DIAGNOSIS — G45.9 TIA (TRANSIENT ISCHEMIC ATTACK): ICD-10-CM

## 2022-08-26 DIAGNOSIS — F51.01 PRIMARY INSOMNIA: ICD-10-CM

## 2022-08-26 DIAGNOSIS — I10 PRIMARY HYPERTENSION: Primary | ICD-10-CM

## 2022-08-26 DIAGNOSIS — E03.9 ACQUIRED HYPOTHYROIDISM: ICD-10-CM

## 2022-08-26 DIAGNOSIS — E87.6 HYPOKALEMIA: ICD-10-CM

## 2022-08-26 DIAGNOSIS — I48.0 PAROXYSMAL ATRIAL FIBRILLATION (HCC): ICD-10-CM

## 2022-08-26 PROCEDURE — G8427 DOCREV CUR MEDS BY ELIG CLIN: HCPCS | Performed by: INTERNAL MEDICINE

## 2022-08-26 PROCEDURE — 1101F PT FALLS ASSESS-DOCD LE1/YR: CPT | Performed by: INTERNAL MEDICINE

## 2022-08-26 PROCEDURE — G8752 SYS BP LESS 140: HCPCS | Performed by: INTERNAL MEDICINE

## 2022-08-26 PROCEDURE — 99215 OFFICE O/P EST HI 40 MIN: CPT | Performed by: INTERNAL MEDICINE

## 2022-08-26 PROCEDURE — G8399 PT W/DXA RESULTS DOCUMENT: HCPCS | Performed by: INTERNAL MEDICINE

## 2022-08-26 PROCEDURE — 1090F PRES/ABSN URINE INCON ASSESS: CPT | Performed by: INTERNAL MEDICINE

## 2022-08-26 PROCEDURE — G8754 DIAS BP LESS 90: HCPCS | Performed by: INTERNAL MEDICINE

## 2022-08-26 PROCEDURE — G8510 SCR DEP NEG, NO PLAN REQD: HCPCS | Performed by: INTERNAL MEDICINE

## 2022-08-26 PROCEDURE — G0463 HOSPITAL OUTPT CLINIC VISIT: HCPCS | Performed by: INTERNAL MEDICINE

## 2022-08-26 RX ORDER — HYDROXYZINE HYDROCHLORIDE 10 MG/1
10 TABLET, FILM COATED ORAL
Qty: 10 TABLET | Refills: 0 | Status: SHIPPED | OUTPATIENT
Start: 2022-08-26 | End: 2022-09-05

## 2022-08-26 NOTE — PROGRESS NOTES
Identified pt with two pt identifiers(name and ). Reviewed record in preparation for visit and have obtained necessary documentation. Chief Complaint   Patient presents with    ED Follow-up     Pt went to [facility] ER on 22 for being very weak. Please see encounter    Annual Wellness Visit       Visit Vitals  /65 (BP 1 Location: Left upper arm, BP Patient Position: Sitting)   Pulse 72   Temp 98.8 °F (37.1 °C)   Resp 16   Ht 5' 5\" (1.651 m)   Wt 188 lb (85.3 kg)   SpO2 99%   BMI 31.28 kg/m²           Health Maintenance Due   Topic    Shingrix Vaccine Age 50> (1 of 2)    Medicare Yearly Exam        Med Reconciliation: Completed    1. \"Have you been to the ER, urgent care clinic since your last visit? Hospitalized since your last visit? \" Yes 2022, See encounter    2. \"Have you seen or consulted any other health care providers outside of the [address] [place] [address] since your last visit? \" No     3. For patients aged 39-70: Has the patient had a colonoscopy / FIT/ Cologuard? NA - based on age      If the patient is female:    4. For patients aged 41-77: Has the patient had a mammogram within the past 2 years? NA - based on age or sex      11. For patients aged 21-65: Has the patient had a pap smear? NA - based on age or sex       This is an established visit conducted via telemedicine. The patient has been instructed that this meets HIPAA criteria and acknowledges and agrees to this method of visitation.     William Morfin  22  1:39 PM

## 2022-08-27 ENCOUNTER — HOSPITAL ENCOUNTER (OUTPATIENT)
Dept: MRI IMAGING | Age: 82
Discharge: HOME | End: 2022-08-27
Attending: INTERNAL MEDICINE
Payer: MEDICARE

## 2022-08-27 DIAGNOSIS — R41.0 CONFUSION: ICD-10-CM

## 2022-08-27 DIAGNOSIS — G45.9 TIA (TRANSIENT ISCHEMIC ATTACK): ICD-10-CM

## 2022-08-27 LAB
T4 FREE SERPL-MCNC: 1.2 NG/DL (ref 0.8–1.5)
TSH SERPL DL<=0.05 MIU/L-ACNC: 0.83 UIU/ML (ref 0.36–3.74)

## 2022-08-27 PROCEDURE — 70551 MRI BRAIN STEM W/O DYE: CPT

## 2022-09-01 RX ORDER — LOSARTAN POTASSIUM 50 MG/1
TABLET ORAL
Qty: 90 TABLET | Refills: 1 | Status: SHIPPED | OUTPATIENT
Start: 2022-09-01

## 2022-09-09 RX ORDER — ROSUVASTATIN CALCIUM 20 MG/1
TABLET, COATED ORAL
Qty: 90 TABLET | Refills: 1 | Status: SHIPPED | OUTPATIENT
Start: 2022-09-09

## 2022-09-13 RX ORDER — HYDROCHLOROTHIAZIDE 25 MG/1
TABLET ORAL
Qty: 90 TABLET | Refills: 0 | Status: SHIPPED | OUTPATIENT
Start: 2022-09-13

## 2022-10-01 RX ORDER — LEVOTHYROXINE SODIUM 75 UG/1
TABLET ORAL
Qty: 90 TABLET | Refills: 0 | Status: SHIPPED | OUTPATIENT
Start: 2022-10-01

## 2022-10-08 RX ORDER — POTASSIUM CHLORIDE 750 MG/1
TABLET, FILM COATED, EXTENDED RELEASE ORAL
Qty: 360 TABLET | Refills: 1 | Status: SHIPPED | OUTPATIENT
Start: 2022-10-08

## 2022-10-25 ENCOUNTER — TELEPHONE (OUTPATIENT)
Dept: INTERNAL MEDICINE CLINIC | Age: 82
End: 2022-10-25

## 2022-10-25 ENCOUNTER — OFFICE VISIT (OUTPATIENT)
Dept: URGENT CARE | Age: 82
End: 2022-10-25
Payer: MEDICARE

## 2022-10-25 VITALS
DIASTOLIC BLOOD PRESSURE: 74 MMHG | RESPIRATION RATE: 16 BRPM | OXYGEN SATURATION: 98 % | WEIGHT: 188 LBS | HEART RATE: 70 BPM | TEMPERATURE: 97.9 F | SYSTOLIC BLOOD PRESSURE: 121 MMHG | BODY MASS INDEX: 31.28 KG/M2

## 2022-10-25 DIAGNOSIS — I10 ELEVATED BLOOD PRESSURE READING WITH DIAGNOSIS OF HYPERTENSION: ICD-10-CM

## 2022-10-25 DIAGNOSIS — J06.9 VIRAL URI WITH COUGH: Primary | ICD-10-CM

## 2022-10-25 PROCEDURE — G8536 NO DOC ELDER MAL SCRN: HCPCS | Performed by: FAMILY MEDICINE

## 2022-10-25 PROCEDURE — G8417 CALC BMI ABV UP PARAM F/U: HCPCS | Performed by: FAMILY MEDICINE

## 2022-10-25 PROCEDURE — 1123F ACP DISCUSS/DSCN MKR DOCD: CPT | Performed by: FAMILY MEDICINE

## 2022-10-25 PROCEDURE — 1090F PRES/ABSN URINE INCON ASSESS: CPT | Performed by: FAMILY MEDICINE

## 2022-10-25 PROCEDURE — G8427 DOCREV CUR MEDS BY ELIG CLIN: HCPCS | Performed by: FAMILY MEDICINE

## 2022-10-25 PROCEDURE — 1101F PT FALLS ASSESS-DOCD LE1/YR: CPT | Performed by: FAMILY MEDICINE

## 2022-10-25 PROCEDURE — G8752 SYS BP LESS 140: HCPCS | Performed by: FAMILY MEDICINE

## 2022-10-25 PROCEDURE — G8399 PT W/DXA RESULTS DOCUMENT: HCPCS | Performed by: FAMILY MEDICINE

## 2022-10-25 PROCEDURE — G8432 DEP SCR NOT DOC, RNG: HCPCS | Performed by: FAMILY MEDICINE

## 2022-10-25 PROCEDURE — G8754 DIAS BP LESS 90: HCPCS | Performed by: FAMILY MEDICINE

## 2022-10-25 PROCEDURE — 99213 OFFICE O/P EST LOW 20 MIN: CPT | Performed by: FAMILY MEDICINE

## 2022-10-25 NOTE — TELEPHONE ENCOUNTER
**TRIAGE**    Received call from patient  Two pt identifiers confirmed. Complaint: cold symptoms that is causing her blood pressure be elevated      Symptoms: nasal congestion, chest congestion, non-productive cough.  Has not been in contact with COVID  Took test Sunday - negative  Unable to get thermometer to take her temperature but does not feel warm    Blood pressure readings:   10/24 152/78,    144/76,    160/83,     10/25 166/82, HR 98    Currently using: Coricidin Cold and Flu, which she stopped last night        Patient prefers in office appt

## 2022-10-25 NOTE — PROGRESS NOTES
Cold Symptoms  The history is provided by the Patient. This is a new problem. The current episode started 2 days ago. The problem occurs every few minutes. The problem has not changed since onset. The cough is Non-productive. There has been no fever. Pertinent negatives include no chest pain (chest congestion - pain on posterior left thorex), no chills, no ear congestion, no headaches, no rhinorrhea, no sore throat, no shortness of breath, no wheezing and no nausea. Treatments tried: corcidin. She is not a smoker. Her past medical history is significant for bronchitis. Her past medical history does not include pneumonia.       Past Medical History:   Diagnosis Date    Arrhythmia     afib    Arthritis     right knee, left shoulder    Atrial fibrillation (HCC)     CAD (coronary artery disease)     Diverticulosis     Frequency of urination     High cholesterol     Hypertension     Long term current use of anticoagulant therapy     Morbid obesity (HCC)     Osteoarthritis     Peripheral neuropathy     bilateral LE    Thyroid disease     hypothyroid    Unspecified adverse effect of anesthesia     low BP    Wells' syndrome         Past Surgical History:   Procedure Laterality Date    COLONOSCOPY N/A 7/1/2021    COLONOSCOPY performed by Pao Tavarez MD at [facility]  7/1/2021         COLONOSCOPY,TESSIE Pang  7/1/2021         HX GYN      myomectomy on uterus    HX GYN  2011    hystereoscopy D&C    HX LYMPH NODE DISSECTION      biopsy    HX OTHER SURGICAL      lymph node bx    [place] BREAST SURGERY PROCEDURE UNLISTED  1982    breast bx rt    [place] CARDIAC SURG PROCEDURE UNLIST  01/2015    cardiac catheterization, no intervention, medical management    [place] CARDIAC SURG PROCEDURE UNLIST  03/2017    ablation    UPPER GI ENDOSCOPY,BIOPSY  10/24/2019         UPPER GI ENDOSCOPY,FN NEEDLE BX,GUIDED  10/24/2019              Family History   Problem Relation Age of Onset    Alzheimer's Disease Mother     Heart Disease Mother     Heart Disease Father     Hypertension Father     Cancer Paternal Aunt     Diabetes Paternal Grandmother         Social History     Socioeconomic History    Marital status:      Spouse name: Not on file    Number of children: Not on file    Years of education: Not on file    Highest education level: Not on file   Occupational History    Not on file   Tobacco Use    Smoking status: Never    Smokeless tobacco: Never   Vaping Use    Vaping Use: Never used   Substance and Sexual Activity    Alcohol use: No    Drug use: No    Sexual activity: Not on file   Other Topics Concern    Not on file   Social History Narrative    Not on file     Social Determinants of Health     Financial Resource Strain: Low Risk     Difficulty of Paying Living Expenses: Not hard at all   Food Insecurity: No Food Insecurity    Worried About Running Out of Food in the Last Year: Never true    Ran Out of Food in the Last Year: Never true   Transportation Needs: Not on file   Physical Activity: Not on file   Stress: Not on file   Social Connections: Not on file   Intimate Partner Violence: Not on file   Housing Stability: Not on file                ALLERGIES: Adhesive, Amoxicillin, Clindamycin, and Lipitor [atorvastatin]    Review of Systems   Constitutional:  Negative for chills. HENT:  Negative for rhinorrhea and sore throat. Respiratory:  Negative for shortness of breath and wheezing. Cardiovascular:  Negative for chest pain (chest congestion - pain on posterior left thorex). BP running high at home    Gastrointestinal:  Negative for nausea. Neurological:  Negative for headaches. All other systems reviewed and are negative. Vitals:    10/25/22 1227   BP: 121/74   Pulse: 70   Resp: 16   Temp: 97.9 °F (36.6 °C)   SpO2: 98%   Weight: 188 lb (85.3 kg)       Physical Exam  Vitals and nursing note reviewed. Constitutional:       General: She is not in acute distress.   HENT:      Right Ear: Tympanic membrane and ear canal normal.      Left Ear: Tympanic membrane and ear canal normal.      Nose: Nose normal.      Mouth/Throat:      Pharynx: No oropharyngeal exudate or posterior oropharyngeal erythema. Eyes:      General:         Right eye: No discharge. Left eye: No discharge. Conjunctiva/sclera: Conjunctivae normal.   Pulmonary:      Effort: Pulmonary effort is normal. No respiratory distress. Breath sounds: Normal breath sounds. No wheezing, rhonchi or rales. Musculoskeletal:      Cervical back: Neck supple. Lymphadenopathy:      Cervical: No cervical adenopathy. Skin:     Findings: No rash. MDM    Procedures      ICD-10-CM ICD-9-CM    1. Viral URI with cough  J06.9 465.9 XR CHEST PA LAT      2. Elevated blood pressure reading with diagnosis of hypertension  I10 401.9         Claritin/ corcidin - delsym as needed  Monitor BP- don't check too often  Follow with PCP     No orders of the defined types were placed in this encounter. Results for orders placed or performed in visit on 10/25/22   XR CHEST PA LAT    Narrative    EXAM:  XR CHEST PA LAT    INDICATION: Cough    COMPARISON: 7/20/2020    TECHNIQUE: PA and lateral chest views    FINDINGS: The cardiomediastinal and hilar contours are within normal limits. The  pulmonary vasculature is within normal limits. The lungs and pleural spaces are clear. There is no pneumothorax. The visualized  bones and upper abdomen are age-appropriate. Impression    No acute process. The patients condition was discussed with the patient and they understand. The patient is to follow up with primary care doctor. If signs and symptoms become worse the pt is to go to the ER. The patient is to take medications as prescribed.

## 2022-10-25 NOTE — TELEPHONE ENCOUNTER
Called, spoke to pt  Received two pt identifiers  Offered pt vv appt today 10/25 but declined. Pt states wants to be seen in person to get her vitals checked and liusten to her lungs. Advised pt to go to   Pt verbalizes understanding of the instructions and has no further questions at this time.

## 2022-10-31 NOTE — TELEPHONE ENCOUNTER
Patient states she needs to get refill Approval Asap on prescription requested thru Pharmacy on Friday, 10/28/22 & was Never received & patient states she is out of medication. Patient states she is sorry for short notice & is her Mistake but needs to see if can be approved today. Please call if any questions. Thank you    [facility].       Patient was reminded of [address] on refills

## 2022-11-01 RX ORDER — CLONIDINE HYDROCHLORIDE 0.2 MG/1
TABLET ORAL
Qty: 180 TABLET | Refills: 1 | Status: SHIPPED | OUTPATIENT
Start: 2022-11-01

## 2022-11-16 ENCOUNTER — APPOINTMENT (OUTPATIENT)
Dept: CT IMAGING | Age: 82
End: 2022-11-16
Attending: STUDENT IN AN ORGANIZED HEALTH CARE EDUCATION/TRAINING PROGRAM
Payer: COMMERCIAL

## 2022-11-16 ENCOUNTER — TRANSCRIBE ORDER (OUTPATIENT)
Dept: SCHEDULING | Age: 82
End: 2022-11-16

## 2022-11-16 ENCOUNTER — APPOINTMENT (OUTPATIENT)
Dept: GENERAL RADIOLOGY | Age: 82
End: 2022-11-16
Attending: STUDENT IN AN ORGANIZED HEALTH CARE EDUCATION/TRAINING PROGRAM
Payer: COMMERCIAL

## 2022-11-16 ENCOUNTER — HOSPITAL ENCOUNTER (EMERGENCY)
Age: 82
Discharge: HOME | End: 2022-11-16
Attending: EMERGENCY MEDICINE
Payer: COMMERCIAL

## 2022-11-16 VITALS
SYSTOLIC BLOOD PRESSURE: 162 MMHG | DIASTOLIC BLOOD PRESSURE: 81 MMHG | HEIGHT: 65 IN | WEIGHT: 192.68 LBS | BODY MASS INDEX: 32.1 KG/M2 | TEMPERATURE: 98.2 F | HEART RATE: 77 BPM | OXYGEN SATURATION: 100 % | RESPIRATION RATE: 17 BRPM
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
## 2022-11-16 DIAGNOSIS — K86.2 PANCREATIC CYST: Primary | ICD-10-CM

## 2022-11-16 DIAGNOSIS — I10 PRIMARY HYPERTENSION: ICD-10-CM

## 2022-11-16 DIAGNOSIS — S43.401A SPRAIN OF RIGHT SHOULDER, UNSPECIFIED SHOULDER SPRAIN TYPE, INITIAL ENCOUNTER: ICD-10-CM

## 2022-11-16 PROCEDURE — 74011250637 HC RX REV CODE- 250/637: Performed by: EMERGENCY MEDICINE

## 2022-11-16 PROCEDURE — 74011000250 HC RX REV CODE- 250: Performed by: EMERGENCY MEDICINE

## 2022-11-16 PROCEDURE — 99284 EMERGENCY DEPT VISIT MOD MDM: CPT

## 2022-11-16 PROCEDURE — 70450 CT HEAD/BRAIN W/O DYE: CPT

## 2022-11-16 PROCEDURE — 73030 X-RAY EXAM OF SHOULDER: CPT

## 2022-11-16 RX ORDER — DICLOFENAC SODIUM 10 MG/G
GEL TOPICAL 4 TIMES DAILY
Qty: 1 EACH | Refills: 0 | Status: SHIPPED | OUTPATIENT
Start: 2022-11-16 | End: 2022-11-21

## 2022-11-16 RX ORDER — CYCLOBENZAPRINE HCL 10 MG
10 TABLET ORAL
Qty: 15 TABLET | Refills: 0 | Status: SHIPPED | OUTPATIENT
Start: 2022-11-16 | End: 2022-11-21

## 2022-11-16 RX ORDER — LIDOCAINE 4 G/100G
2 PATCH TOPICAL
Status: DISCONTINUED | OUTPATIENT
Start: 2022-11-16 | End: 2022-11-17 | Stop reason: HOSPADM

## 2022-11-16 RX ORDER — CYCLOBENZAPRINE HCL 10 MG
10 TABLET ORAL
Status: COMPLETED | OUTPATIENT
Start: 2022-11-16 | End: 2022-11-16

## 2022-11-16 RX ADMIN — CYCLOBENZAPRINE HYDROCHLORIDE 10 MG: 10 TABLET, FILM COATED ORAL at 21:52
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
## 2022-11-17 NOTE — DISCHARGE INSTRUCTIONS
It was a pleasure taking care of you in our Emergency Department today. We know that when you come to [facility], you are entrusting us with your health, comfort, and safety. Our physicians and nurses honor that trust, and truly appreciate the opportunity to care for you and your loved ones. We also value your feedback. If you receive a survey about your Emergency Department experience today, please fill it out. We care about our patients' feedback, and we listen to what you have to say.   Thank you!       --- Dr. Jackie Liu MD

## 2022-11-25 ENCOUNTER — TELEPHONE (OUTPATIENT)
Dept: INTERNAL MEDICINE CLINIC | Age: 82
End: 2022-11-25

## 2022-11-25 RX ORDER — CLONIDINE HYDROCHLORIDE 0.2 MG/1
0.2 TABLET ORAL 2 TIMES DAILY
Qty: 180 TABLET | Refills: 1 | Status: SHIPPED | OUTPATIENT
Start: 2022-11-25

## 2022-11-25 NOTE — TELEPHONE ENCOUNTER
Future Appointments:  Future Appointments   Date Time Provider Marilin Ventura   12/2/2022  2:00 PM [facility] 3 [facility]   12/22/2022 10:20 AM MD MYRNA Mackey BS AMB   1/10/2023 11:45 AM Delma Shah MD [facility] BS AMB   3/2/2023 11:00 AM Samuel Anderson MD [facility] BS AMB   6/9/2023  1:40 PM MD STAS Robbins BS AMB        Last Appointment With Me:  8/26/2022     Requested Prescriptions     Pending Prescriptions Disp Refills    cloNIDine HCL (CATAPRES) 0.2 mg tablet 180 Tablet 1     Sig: Take 1 Tablet by mouth two (2) times a day. Patient states she needs to get refill Approval Asap on prescription requested thru Pharmacy on Friday, 10/28/22 & was Never received & patient states she is out of medication. Patient states she is sorry for short notice & is her Mistake but needs to see if can be approved today. Please call if any questions. Thank you    [facility] on [address].       Patient was reminded of [address] on refills
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
## 2022-11-28 NOTE — PROGRESS NOTES
This is the Subsequent Medicare Annual Wellness Exam, performed 12 months or more after the Initial AWV or the last Subsequent AWV    I have reviewed the patient's medical history in detail and updated the computerized patient record. Assessment/Plan   Education and counseling provided:  Are appropriate based on today's review and evaluation    1. Medicare annual wellness visit, subsequent     Depression Risk Factor Screening     3 most recent PHQ Screens 11/29/2022   Little interest or pleasure in doing things Not at all   Feeling down, depressed, irritable, or hopeless Not at all   Total Score PHQ 2 0       Alcohol & Drug Abuse Risk Screen    Do you average more than 1 drink per night or more than 7 drinks a week:  No    On any one occasion in the past three months have you have had more than 3 drinks containing alcohol:  No          Functional Ability and Level of Safety    Hearing: Hearing is good. Activities of Daily Living: The home contains: handrails and grab bars  Patient does total self care      Ambulation: with no difficulty     Fall Risk:  Fall Risk Assessment, last 12 mths 11/29/2022   Able to walk? Yes   Fall in past 12 months? 1   Do you feel unsteady? 0   Are you worried about falling -   Is TUG test greater than 12 seconds? -   Is the gait abnormal? -   Number of falls in past 12 months 1   Fall with injury?  0   Patient tripped no significant injury   Abuse Screen:  Patient is not abused   Lives alone    Cognitive Screening    Has your family/caregiver stated any concerns about your memory:yes--sister is concerned, more forgetful ,        Alzheimers runs in her family         memory concerns   Pt knows the month, day and year   Can recall 5 objects      Health Maintenance Due     Health Maintenance Due   Topic Date Due    Shingrix Vaccine Age 51> (1 of 2) Never done    Medicare Yearly Exam  09/16/2022       Patient Care Team   Patient Care Team:  Joelle Polk MD as PCP - General (Internal Medicine Physician)  Joelle Polk MD as PCP - [facility] Empaneled Provider  Catarino Rashid MD (Colon and Rectal Surgery)  Autumn Cazares MD (Ophthalmology)  Shruthi Lorenzo MD (Obstetrics & Gynecology)  Kathleen Ayers MD (Gynecology)  Markus Leos MD (Cardiovascular Disease Physician)  Camila Martini MD as Physician (Cardiovascular Disease Physician)  Wm Fajardo MD (Cardiovascular Disease Physician)  Prachi Blanco DPM (Podiatry)  Сергей Timmons MD as Surgeon (General Surgery)  Marc Villalobos MD as Physician (Sleep Medicine Physician)  Jumana Wellington MD (Gastroenterology)  Amanda Morris MD (Gastroenterology)  Dori Loo MD (Neurology)  Casey Terry MD (Neurology)  Renato Steinberg DO (Dermatology Physician)  Keiko Cool MD (Cardio Vascular Surgery)    History     Patient Active Problem List   Diagnosis Code    Postmenopausal bleeding N95.0    Hyperlipidemia E78.5    Hypokalemia E87.6    Morbid obesity ([address] E66.01    Atrial fibrillation ([address] I48.91    HTN (hypertension) I10    Acquired hypothyroidism E03.9    S/P ablation of atrial fibrillation Z98.890, Z86.79    Pericardial effusion with cardiac tamponade I31.39, I31.4    Anxiety F41.9     Past Medical History:   Diagnosis Date    Arrhythmia     afib    Arthritis     right knee, left shoulder    Atrial fibrillation (HCC)     CAD (coronary artery disease)     Diverticulosis     Frequency of urination     High cholesterol     Hypertension     Long term current use of anticoagulant therapy     Morbid obesity ([address]     Osteoarthritis     Peripheral neuropathy     bilateral LE    Thyroid disease     hypothyroid    Unspecified adverse effect of anesthesia     low BP    Wells' syndrome       Past Surgical History:   Procedure Laterality Date    COLONOSCOPY N/A 7/1/2021    COLONOSCOPY performed by Amanda Morris MD [facility] ENDOSCOPY    COLONOSCOPY,DIAGNOSTIC  7/1/2021 Lorena Gisela  7/1/2021         HX GYN      myomectomy on uterus    HX GYN  2011    hystereoscopy D&C    HX LYMPH NODE DISSECTION      biopsy    HX OTHER SURGICAL      lymph node bx    [place] BREAST SURGERY PROCEDURE UNLISTED  1982    breast bx rt    [place] CARDIAC SURG PROCEDURE UNLIST  01/2015    cardiac catheterization, no intervention, medical management    [place] CARDIAC SURG PROCEDURE UNLIST  03/2017    ablation    UPPER GI ENDOSCOPY,BIOPSY  10/24/2019         UPPER GI ENDOSCOPY,FN NEEDLE BX,GUIDED  10/24/2019          Current Outpatient Medications   Medication Sig Dispense Refill    cloNIDine HCL (CATAPRES) 0.2 mg tablet Take 1 Tablet by mouth two (2) times a day. 180 Tablet 1    cloNIDine HCL (CATAPRES) 0.2 mg tablet TAKE 1 TABLET BY MOUTH TWICE A  Tablet 1    potassium chloride SR (KLOR-CON 10) 10 mEq tablet TAKE 2 TABLETS IN MORNING AND 2 TABLETS IN THE EVENING 360 Tablet 1    Synthroid 75 mcg tablet TAKE 1 TABLET BY MOUTH EVERY DAY BEFORE BREAKFAST 90 Tablet 0    hydroCHLOROthiazide (HYDRODIURIL) 25 mg tablet TAKE 1 TABLET BY MOUTH EVERY DAY 90 Tablet 0    rosuvastatin (CRESTOR) 20 mg tablet TAKE 1 TABLET BY MOUTH EVERY DAY 90 Tablet 1    losartan (COZAAR) 50 mg tablet TAKE 1 TABLET BY MOUTH EVERY DAY 90 Tablet 1    verapamil ER (VERELAN) 360 mg ER capsule Take 360 mg by mouth daily. acetaminophen/diphenhydramine (TYLENOL PM EXTRA STRENGTH PO) Take 1-2 Tablets by mouth. At bedtime as needed      acetaminophen (TYLENOL EXTRA STRENGTH PO) Take 500 mg by mouth nightly. Tylenol PM      sertraline (ZOLOFT) 50 mg tablet Take 1.5 Tablets by mouth daily. 135 Tablet 1    CALCIUM CARBONATE/VITAMIN D3 (CALTRATE 600 + D PO) Take 1 Tab by mouth daily. MULTIVITAMIN W-MINERALS/LUTEIN (CENTRUM SILVER PO) Take 1 Tab by mouth daily (after lunch).        Allergies   Allergen Reactions    Adhesive Hives     Paper tape fine    Amoxicillin Unknown (comments)    Clindamycin Rash    Lipitor [Atorvastatin] Myalgia     Gets the flu       Family History   Problem Relation Age of Onset    Alzheimer's Disease Mother     Heart Disease Mother     Heart Disease Father     Hypertension Father     Cancer Paternal Aunt     Diabetes Paternal Grandmother      Social History     Tobacco Use    Smoking status: Never    Smokeless tobacco: Never   Substance Use Topics    Alcohol use: No     ACP not on file. SDM is her sister, Lv Melendez. Provided information in the past.         Colonoscopy: 7/01/21. Dr. Star Pardo, repeat 5 years  Pap: Dr. Bhatt Nurse, 11/21 q2yr  Mammogram:  [facility], 11/19/21, --due now --nl annual   DEXA:  osteopenia, 11/21, nl [address] q2yr  Aaa screen: No family history    Tdap: 4/14/2015  Pneumovax: 11/19/2013  Jzograx26: 4/05/2017  Zostavax: declines  Shingrix: due  h/o shingles, reminded  Flu shot: 09/22    A1C:  7/22 5.4 due    Eye exam: VEI on [place] 6/15/22, cataract    Lipids: 7/22 LDL 67 annual   Hep C screen: 9/21 negative    Covid: 4  doses (pfizer)    Medication reconciliation completed by MA and reviewed by me. Medical/surgical/social/family history reviewed and updated by me. Patient provided AVS and preventative screening table. Patient verbalized understanding of all information discussed.      Chapin Morgan MD

## 2022-11-28 NOTE — PATIENT INSTRUCTIONS
Medicare Wellness Visit, Female     The best way to live healthy is to have a lifestyle where you eat a well-balanced diet, exercise regularly, limit alcohol use, and quit all forms of tobacco/nicotine, if applicable. Regular preventive services are another way to keep healthy. Preventive services (vaccines, screening tests, monitoring & exams) can help personalize your care plan, which helps you manage your own care. Screening tests can find health problems at the earliest stages, when they are easiest to treat. Derek follows the current, evidence-based guidelines published by the [facility] Geovanny Chapman ([facility]) when recommending preventive services for our patients. Because we follow these guidelines, sometimes recommendations change over time as research supports it. (For example, mammograms used to be recommended annually. Even though Medicare will still pay for an annual mammogram, the newer guidelines recommend a mammogram every two years for women of average risk). Of course, you and your doctor may decide to screen more often for some diseases, based on your risk and your co-morbidities (chronic disease you are already diagnosed with). Preventive services for you include:  - Medicare offers their members a free annual wellness visit, which is time for you and your primary care provider to discuss and plan for your preventive service needs. Take advantage of this benefit every year!  -All adults over the age of 72 should receive the recommended pneumonia vaccines. Current USPSTF guidelines recommend a series of two vaccines for the best pneumonia protection.   -All adults should have a flu vaccine yearly and a tetanus vaccine every 10 years.   -All adults age 48 and older should receive the shingles vaccines (series of two vaccines).       -All adults age 38-68 who are overweight should have a diabetes screening test once every three years.   -All Subjective:    HPI                    Objective:    System    Physical Exam    General     ROS    Assessment/Plan:      DISCHARGE REPORT    Progress reporting period is from 7-26-17 to 8-8-17.       SUBJECTIVE   Subjective: Feeling better. Has been doing the exs and stopped yoga    Current Pain level: 4/10.     Previous pain level was  4/10  .   Changes in function:  Yes (See Goal flowsheet attached for changes in current functional level)  Adverse reaction to treatment or activity: None    OBJECTIVE  Changes noted in objective findings:  Patient has failed to return to therapy so current objective findings are unknown. and The objective findings below are from DOS 8-8-17.  Objective: Feels less pain having elbow pain showed her the lateral epicondyle exs for stretches. AROM pain at end of flexion and abduction. Winging notedL>R. Advanced the exs further for scapular control. She is going to be traveling [place] and then to [place] until the end of Sept. Plans to RTC then. Pt did not return for further PT    ASSESSMENT/PLAN  Updated problem list and treatment plan: Diagnosis 1:  R shoulder pain    STG/LTGs have been met or progress has been made towards goals:  Yes (See Goal flow sheet completed today.)  Assessment of Progress: The patient has not returned to therapy. Current status is unknown.  Self Management Plans:  Patient has been instructed in a home treatment program.    Sydnee continues to require the following intervention to meet STG and LTG's:  NA    Recommendations:  DC PT    Please refer to the daily flowsheet for treatment today, total treatment time and time spent performing 1:1 timed codes.                 adults born between [birth year] and 1963 should be screened once for Hepatitis C.  -Other screening tests and preventive services for persons with diabetes include: an eye exam to screen for diabetic retinopathy, a kidney function test, a foot exam, and stricter control over your cholesterol.   -Cardiovascular screening for adults with routine risk involves an electrocardiogram (ECG) at intervals determined by your doctor.   -Colorectal cancer screenings should be done for adults age 54-65 with no increased risk factors for colorectal cancer. There are a number of acceptable methods of screening for this type of cancer. Each test has its own benefits and drawbacks. Discuss with your doctor what is most appropriate for you during your annual wellness visit. The different tests include: colonoscopy (considered the best screening method), a fecal occult blood test, a fecal DNA test, and sigmoidoscopy.    -A bone mass density test is recommended when a woman turns 65 to screen for osteoporosis. This test is only recommended one time, as a screening. Some providers will use this same test as a disease monitoring tool if you already have osteoporosis. -Breast cancer screenings are recommended every other year for women of normal risk, age 54-69.  -Cervical cancer screenings for women over age 72 are only recommended with certain risk factors.      Here is a list of your current Health Maintenance items (your personalized list of preventive services) with a due date:  Health Maintenance Due   Topic Date Due    Shingles Vaccine (1 of 2) Never done    Annual Well Visit  09/16/2022

## 2022-11-29 ENCOUNTER — OFFICE VISIT (OUTPATIENT)
Dept: INTERNAL MEDICINE CLINIC | Age: 82
End: 2022-11-29
Payer: MEDICARE

## 2022-11-29 VITALS
TEMPERATURE: 98.8 F | BODY MASS INDEX: 31.65 KG/M2 | HEART RATE: 75 BPM | RESPIRATION RATE: 16 BRPM | OXYGEN SATURATION: 98 % | DIASTOLIC BLOOD PRESSURE: 73 MMHG | SYSTOLIC BLOOD PRESSURE: 127 MMHG | WEIGHT: 190 LBS | HEIGHT: 65 IN

## 2022-11-29 DIAGNOSIS — F41.9 ANXIETY: ICD-10-CM

## 2022-11-29 DIAGNOSIS — M25.511 ACUTE PAIN OF RIGHT SHOULDER: ICD-10-CM
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
## 2022-11-29 DIAGNOSIS — E03.9 ACQUIRED HYPOTHYROIDISM: ICD-10-CM

## 2022-11-29 DIAGNOSIS — M79.604 PAIN OF RIGHT LOWER EXTREMITY: ICD-10-CM

## 2022-11-29 DIAGNOSIS — E78.2 MIXED HYPERLIPIDEMIA: ICD-10-CM

## 2022-11-29 DIAGNOSIS — E87.6 HYPOKALEMIA: ICD-10-CM

## 2022-11-29 DIAGNOSIS — E66.01 MORBID OBESITY (HCC): ICD-10-CM

## 2022-11-29 DIAGNOSIS — R41.3 MEMORY IMPAIRMENT: ICD-10-CM

## 2022-11-29 DIAGNOSIS — Z00.00 MEDICARE ANNUAL WELLNESS VISIT, SUBSEQUENT: ICD-10-CM

## 2022-11-29 DIAGNOSIS — I10 PRIMARY HYPERTENSION: Primary | ICD-10-CM

## 2022-11-29 DIAGNOSIS — I48.0 PAROXYSMAL ATRIAL FIBRILLATION (HCC): ICD-10-CM

## 2022-11-29 DIAGNOSIS — R73.01 IFG (IMPAIRED FASTING GLUCOSE): ICD-10-CM

## 2022-11-29 PROCEDURE — G8536 NO DOC ELDER MAL SCRN: HCPCS | Performed by: INTERNAL MEDICINE

## 2022-11-29 PROCEDURE — G8427 DOCREV CUR MEDS BY ELIG CLIN: HCPCS | Performed by: INTERNAL MEDICINE

## 2022-11-29 PROCEDURE — G8754 DIAS BP LESS 90: HCPCS | Performed by: INTERNAL MEDICINE

## 2022-11-29 PROCEDURE — G0439 PPPS, SUBSEQ VISIT: HCPCS | Performed by: INTERNAL MEDICINE

## 2022-11-29 PROCEDURE — G8417 CALC BMI ABV UP PARAM F/U: HCPCS | Performed by: INTERNAL MEDICINE

## 2022-11-29 PROCEDURE — 1090F PRES/ABSN URINE INCON ASSESS: CPT | Performed by: INTERNAL MEDICINE

## 2022-11-29 PROCEDURE — 99214 OFFICE O/P EST MOD 30 MIN: CPT | Performed by: INTERNAL MEDICINE

## 2022-11-29 PROCEDURE — 1101F PT FALLS ASSESS-DOCD LE1/YR: CPT | Performed by: INTERNAL MEDICINE

## 2022-11-29 PROCEDURE — G0463 HOSPITAL OUTPT CLINIC VISIT: HCPCS | Performed by: INTERNAL MEDICINE

## 2022-11-29 PROCEDURE — G8510 SCR DEP NEG, NO PLAN REQD: HCPCS | Performed by: INTERNAL MEDICINE

## 2022-11-29 PROCEDURE — G8752 SYS BP LESS 140: HCPCS | Performed by: INTERNAL MEDICINE

## 2022-11-29 PROCEDURE — G8399 PT W/DXA RESULTS DOCUMENT: HCPCS | Performed by: INTERNAL MEDICINE

## 2022-11-29 RX ORDER — HYDROXYZINE 25 MG/1
25 TABLET, FILM COATED ORAL
Qty: 30 TABLET | Refills: 1 | Status: SHIPPED | OUTPATIENT
Start: 2022-11-29 | End: 2022-12-09
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
## 2022-11-30 ENCOUNTER — TELEPHONE (OUTPATIENT)
Dept: NEUROLOGY | Age: 82
End: 2022-11-30
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
## 2022-12-02 ENCOUNTER — HOSPITAL ENCOUNTER (OUTPATIENT)
Dept: CT IMAGING | Age: 82
End: 2022-12-02
Attending: INTERNAL MEDICINE
Payer: MEDICARE

## 2022-12-02 DIAGNOSIS — K86.2 PANCREATIC CYST: ICD-10-CM

## 2022-12-02 LAB — CREAT BLD-MCNC: 0.9 MG/DL (ref 0.6–1.3)

## 2022-12-02 PROCEDURE — 82565 ASSAY OF CREATININE: CPT

## 2022-12-02 PROCEDURE — 74011000636 HC RX REV CODE- 636: Performed by: INTERNAL MEDICINE

## 2022-12-02 PROCEDURE — 74170 CT ABD WO CNTRST FLWD CNTRST: CPT

## 2022-12-02 RX ADMIN — IOPAMIDOL 100 ML: 755 INJECTION, SOLUTION INTRAVENOUS at 13:52
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
## 2022-12-08 RX ORDER — HYDROCHLOROTHIAZIDE 25 MG/1
TABLET ORAL
Qty: 90 TABLET | Refills: 0 | Status: SHIPPED | OUTPATIENT
Start: 2022-12-08

## 2022-12-15 ENCOUNTER — APPOINTMENT (OUTPATIENT)
Dept: PHYSICAL THERAPY | Age: 82
End: 2022-12-15

## 2022-12-19 ENCOUNTER — TELEPHONE (OUTPATIENT)
Dept: INTERNAL MEDICINE CLINIC | Age: 82
End: 2022-12-19

## 2022-12-19 NOTE — PROGRESS NOTES
HISTORY OF PRESENT ILLNESS  Jori Aguilar is a [de-identified] y.o. female. HPI  Last here 11/29/22. Pt is here for hospital f/U. Pt presents to the office today w/ her sister Jean-Claude Thao, who helps present hx today. Pt's sister notes she has been coughing more than usual.    She was at [facility] 12/13/22 - 12/16/22 for A-fib. Ms. Monserrat Talley is a [de-identified]y.o. year old female, she is seen today for Transition of Care services following a hospital discharge for A fib on 12/13/22 - 12/16/22. Our office Nurse Navigator performed an outreach to Ms. Bird on 12/19/22 (within 2 business days of discharge) to complete medication reconciliation and a telephonic assessment of her condition. Started on Tikosyn and entresto  Stopped HCTZ, losartan, Verapamil   Decreased clonidine to 0.1 mg from 0.2 mg  She was going to get a sotalol load, this was ultimately aborted due to difficulties with potassium  Reviewed last note from NP [address] 12/15/22:  Assessment/Plan   Jori Aguilar is a [de-identified] y.o. female with a PMH of paroxysmal atrial fibrillation on verapamil and apixaban 5mg BID, anxiety, pericardial effusion with tamponade '17 after Afib RFA, hypothyroidism, HTN, HLD, diverticulitis, osteoarthritis, peripheral neuropathy, and CAD who is referred for elective sotalol load. Paroxysmal Afib, pt is currently in NSR  -continue apixaban 5mg BID for anticoagulation  -STOP verapamil  - NP on 12/13/22discussed with cardiology pharmacist, will decrease home clonidine to 0.1mg BID (home dose was 0.2mg BID).  --cannot start sotalol until potassium is >4, giving 40mEq potassium x 2 and rechecking BMP later for potassium of 3.4  -check ECG 2-3 hours after each dose of Sotalol      NP Maria Dolores 12/13/22 d/w Dr aMurice Thacker load Tikosyn So hydrochlorothiazide was stopped    HTN  -Pt's home regimen was: verapamil 360mg ER daily, clonidine 0.2mg BID, losartan 50mg daily, and hydrochlorothiazide 25mg daily  -pt requires 20mEq potassium BID to maintain normal potassium level while on hydrochlorothiazide and K+ today is 3.4  -will stop hydrochlorothiazide. It will not be   -, stopped verapamil on admitt+ stopped . amlodipine 5mg daily 12/15/22 it may be contributing to rash    - clonidine - decrease 0.1mg BID   stop losartan add entrestro    Hypokalemia resolve 4.4 on 12/15/22 cont 20 mg bid KCL  -goal 4      Other chronic medical conditions:  Anxiety     Hypothyroidism Cont levothyroxine 75mcg daily     Anxiety Cont home Zoloft 75mg daily      Well's syndrome had rash overnite better with hydrocortisone cream did reciecve iv benadryl Also given for sleep  Could use clariton tonite for rash   Will follow up with her dermatologist,   it is getting better    Insomnia  Could also be environment  offer benadryl not sure if wants     Has memory issue has follow up asa an out patient already set up    Up ad chuck tcdb ic spirometer  -check stat BMP/Mag and then repeat daily  -pt's pharmacy is [facility] [address] [facility]  cvoid noin detected  Reviewed H&P from JORDY Feng 12/13/22:  ASSESSMENT AND PLAN  Adry Miller is a [de-identified] y.o. female with a PMH of paroxysmal atrial fibrillation on verapamil and apixaban 5mg BID, anxiety, pericardial effusion with tamponade '17 after Afib RFA, hypothyroidism, HTN, HLD, diverticulitis, osteoarthritis, peripheral neuropathy, and CAD who is referred for elective sotalol load. Paroxysmal Afib, pt is currently in NSR  -continue apixaban 5mg BID for anticoagulation  -STOP verapamil  -discussed with cardiology pharmacist, will decrease home clonidine to 0.1mg BID (home dose was 0.2mg BID).  Would be optimal to completely wean this med off as it not ideal in the elderly  -renal function normal, will dose sotalol based once I'm able to speak with Dr. Armstrong Body  -cannot start sotalol until potassium is >4, giving 40mEq potassium x 2 and rechecking BMP later for potassium of 3.4  -check ECG 2-3 hours after each dose of Sotalol  -check stat BMP/Mag and then repeat daily  -pt's pharmacy is [facility] on [address]    HTN  -Pt's home regimen was: verapamil 360mg ER daily, clonidine 0.2mg BID, losartan 50mg daily, and hydrochlorothiazide 25mg daily  -pt requires 20mEq potassium BID to maintain normal potassium level while on hydrochlorothiazide and K+ today is 3.4  -will stop hydrochlorothiazide. It will not be   -per Dr. Paty Hills, stopped verapamil. Will start amlodipine 5mg daily in it's place.  -weaning off clonidine - start 0.1mg BID    Hypokalemia  -giving 40mEq potassium x 2  by 2 hours and rechecking BMP at 2000  -cannot start sotalol until potassium is >4    Other chronic medical conditions:  Anxiety  Hypothyroidism  HLD    Continue home levothyroxine 75mcg daily  Continue home Zoloft 75mg daily  Continue home rosuvastatin 20mg daily   Reviewed hospital labs    History of hypertension  BP today is 162/83 still elevated on repeat she just took her pills in the office today and they have not had time for full effect  BP at hospital 140/71  No home BP readings for review  Continues on Clonidine 0.1mg BID (decreased from 0.2mg BID in hospital)   Now on entresto 24-26 mg  No longer on Verapamil 360 mg daily or HCTZ 25mg or Losartan 50 mg, these were stopped in the hospital   Recall she had a cough on lisinopril      Wt today is 184 lbs, down 6 lbs since lov  Discussed diet and wt/l      Reviewed labs      Lov pt's sister noted pt had become much more forgetful x 2-3 months. Lov I referred to Dr. Brandon Santiago (neuro psych) and explained neuro psych testing at length.    They are working on scheduling this     She sees Dr. 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 Pillo Dixon (ortho), for R knee pain, dx w/ arthritis  She will be starting PT soon      Pt is following with Dr Jose Poole (GI) for weight loss and pancreatic cyst  Pt had an EGD and EUS 10/24/19  Last there 11/22, will f/U in 1-3 years (?), discussed calling to double check  Reviewed CT abd 12/2/22:  Impression:     1. 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 2. Pancreatic neck cystic lesion with dependent calcifications, immediately   adjacent the duct. No clear communication to the back or upstream duct dilation. 3. Smaller sidebranch IPMN in the body. Pt saw Dr. Ismael Morales (cardio) for foot circulation  Last visit was 8/1/17   Recall has chronic sx of leg tightness  Pt will only f/u with this physician prn       Pt saw Dr. Kalia Garrett (cardio) for f/u after a fib  Has a history of ablation  Lov 6/22 w/ NP now following with [facility] instead  Continues on eliquis 5 mg bid   No longer on verapamil 360 mg, this was stopped in the hospital      Now sees Dr. Jasmin Carreno (cardio) at [facility] for atrial fibrillation  Last there 12/13/22, was admitted 12/13/22 - 12/16/22 (see above)  Was going to get a sotalol load December 2022, this was ultimately aborted she is now on Tikosyn continues on Eliquis twice daily has follow-up with the nurse practitioner February 2023     Pt saw Dr. Abdirahman Trevino (neuro) 6/9/22 for neuropathy  Lov 6/9/22, f/U scheduled 6/9/23  No longer taking amitriptyline for headaches and neuropathy  Does not want to take gabapentin at this time overall she is doing okay without medications  She was given a handicap placard. She previously had an appt w/ Dr. Dorrie Osler (neuro) but she cancelled this  Have discussed moving up appt w/ Dr. Abdirahman Trevino to after neuro psych appt for memory testing     Pt saw Dr. Gal Melgoza (uro-gyn) for urinary sx  Last visit was 11/17  Pt was told she had lichen sclerosus   Now resolved      Pt saw Dr Miguel Angel Roberts (sleep)  Last visit was 7/3/18  Recall telephone note 9/18: no significant sleep disordered breathing, overall AHI of 0.6/h  Pt had a poor experience and was told she has no sleep apnea  Advised pt she can take 3 mg melatonin.      She has been seeing Dr. Syeda Bella (surg) for lipoma  Last there 11/12/20     Pt had a cyst on R breast removed by Dr Doug Majano (surg) in the past     Pt prev followed with Dr Shalom Boogie (podiatry)       Continues crestor 20mg daily for cholesterol     Continues klor-con 20meqs BID for her potassium potassium was low while hospitalized this is being monitored we will repeat levels today     Continues on synthroid 75mcg daily  Will take full dose 6 days per week and half day 1 day per week  (Monday)     Taking zoloft now 75 mg (1.5 tablets/day) for anxiety   Has hydroxyzine for PRN use, has not needed this     Recall She had a cervical polyp, was having vaginal bleeding, this was removed by galyueo, vaginal bleeding has since resolved. Pt lives alone. ACP not on file. SDM is her sister, Nicoel Hernandez. Provided information in the past.      PREVENTIVE:    Colonoscopy: 7/01/21.  Dr. Susy Estrada, repeat 5 years  Pap: Dr. Sundeep Ortiz, 11/21 every 2 years  Mammogram:  [facility], 11/19/21, nl this is due, ordered   DEXA:  osteopenia, 11/21, nl [address]  Tdap: 4/14/2015  Pneumovax: 11/19/2013  [address]: 4/05/2017  Zostavax: declines  Shingrix: due  h/o shingles, reminded  Flu shot: Fall 2022  A1C:  11/19 5.5  4/20 5.5 10/20 5.4 9/21 5.2 3/22 5.6 7/22 5.4  Eye exam: VEI on [address] 6/15/22, cataract  Lipids: 7/22 LDL 67  Hep C screen: 9/21 negative  Covid: four doses (pfizer)      Patient Active Problem List    Diagnosis Date Noted    Anxiety 03/09/2021    Pericardial effusion with cardiac tamponade 03/18/2017    S/P ablation of atrial fibrillation 03/17/2017    Acquired hypothyroidism 12/14/2015    HTN (hypertension) 07/21/2015    Atrial fibrillation ([facility] [address] 03/16/2015    Morbid obesity ([facility] [address]     Hyperlipidemia 07/14/2014    Hypokalemia 07/14/2014    Postmenopausal bleeding 10/18/2011     Current Outpatient Medications   Medication Sig Dispense Refill    hydroCHLOROthiazide (HYDRODIURIL) 25 mg tablet TAKE 1 TABLET BY MOUTH EVERY DAY 90 Tablet 0    cloNIDine HCL (CATAPRES) 0.2 mg tablet Take 1 Tablet by mouth two (2) times a day. 180 Tablet 1    cloNIDine HCL (CATAPRES) 0.2 mg tablet TAKE 1 TABLET BY MOUTH TWICE A  Tablet 1    potassium chloride SR (KLOR-CON 10) 10 mEq tablet TAKE 2 TABLETS IN MORNING AND 2 TABLETS IN THE EVENING 360 Tablet 1    Synthroid 75 mcg tablet TAKE 1 TABLET BY MOUTH EVERY DAY BEFORE BREAKFAST 90 Tablet 0    rosuvastatin (CRESTOR) 20 mg tablet TAKE 1 TABLET BY MOUTH EVERY DAY 90 Tablet 1    losartan (COZAAR) 50 mg tablet TAKE 1 TABLET BY MOUTH EVERY DAY 90 Tablet 1    verapamil ER (VERELAN) 360 mg ER capsule Take 360 mg by mouth daily. acetaminophen/diphenhydramine (TYLENOL PM EXTRA STRENGTH PO) Take 1-2 Tablets by mouth. At bedtime as needed      acetaminophen (TYLENOL EXTRA STRENGTH PO) Take 500 mg by mouth nightly. Tylenol PM      sertraline (ZOLOFT) 50 mg tablet Take 1.5 Tablets by mouth daily. 135 Tablet 1    CALCIUM CARBONATE/VITAMIN D3 (CALTRATE 600 + D PO) Take 1 Tab by mouth daily. MULTIVITAMIN W-MINERALS/LUTEIN (CENTRUM SILVER PO) Take 1 Tab by mouth daily (after lunch).        Past Surgical History:   Procedure Laterality Date    COLONOSCOPY N/A 7/1/2021    COLONOSCOPY performed by Melissa Sylvester MD at [address]  7/1/2021         COLONOSCOPY,TESSIE Lua  7/1/2021         HX GYN      myomectomy on uterus    HX GYN  2011    hystereoscopy D&C    HX LYMPH NODE DISSECTION      biopsy    HX OTHER SURGICAL      lymph node bx    [place] BREAST SURGERY PROCEDURE UNLISTED  1982    breast bx rt    [place] CARDIAC SURG PROCEDURE UNLIST  01/2015    cardiac catheterization, no intervention, medical management    [place] CARDIAC SURG PROCEDURE UNLIST  03/2017    ablation    UPPER GI ENDOSCOPY,BIOPSY  10/24/2019         UPPER GI ENDOSCOPY,FN NEEDLE BX,GUIDED  10/24/2019           Lab Results   Component Value Date/Time    WBC 3.8 07/12/2022 11:43 AM    HGB 12.7 07/12/2022 11:43 AM    HCT 38.2 07/12/2022 11:43 AM    PLATELET 855 11/99/0790 11:43 AM    MCV 98.7 07/12/2022 11:43 AM     Lab Results   Component Value Date/Time    Cholesterol, total 148 07/12/2022 11:43 AM    HDL Cholesterol 60 07/12/2022 11:43 AM    LDL, calculated 67.6 07/12/2022 11:43 AM    Triglyceride 102 07/12/2022 11:43 AM    CHOL/HDL Ratio 2.5 07/12/2022 11:43 AM     Lab Results   Component Value Date/Time    GFR est non-AA >60 07/12/2022 11:43 AM    GFR est AA >60 07/12/2022 11:43 AM    Creatinine 0.83 07/12/2022 11:43 AM    Creatinine (POC) 0.90 12/02/2022 01:24 PM    BUN 14 07/12/2022 11:43 AM    Sodium 135 (L) 07/12/2022 11:43 AM    Potassium 4.0 07/12/2022 11:43 AM    Chloride 101 07/12/2022 11:43 AM    CO2 26 07/12/2022 11:43 AM    Magnesium 2.2 01/16/2020 11:36 PM        Review of Systems   Respiratory:  Positive for cough. Negative for shortness of breath. Cardiovascular:  Negative for chest pain. Physical Exam  Constitutional:       General: She is not in acute distress. Appearance: She is not ill-appearing, toxic-appearing or diaphoretic. HENT:      Head: Normocephalic and atraumatic. Right Ear: External ear normal.      Left Ear: External ear normal.   Eyes:      General:         Right eye: No discharge. Left eye: No discharge. Conjunctiva/sclera: Conjunctivae normal.      Pupils: Pupils are equal, round, and reactive to light. Cardiovascular:      Rate and Rhythm: Normal rate and regular rhythm. Heart sounds: No murmur heard. No friction rub. No gallop. Pulmonary:      Effort: No respiratory distress. Breath sounds: Normal breath sounds. No wheezing or rales. Chest:      Chest wall: No tenderness. Musculoskeletal:         General: Normal range of motion. Cervical back: Normal.      Right lower leg: No edema. Left lower leg: No edema. Skin:     General: Skin is warm and dry.    Neurological:      Mental Status: She is oriented to person, place, and time. Mental status is at baseline. Coordination: Coordination normal.      Gait: Gait normal.   Psychiatric:         Mood and Affect: Mood normal.         Behavior: Behavior normal.       ASSESSMENT and PLAN    ICD-10-CM ICD-9-CM    1. Pancreatic cyst  K86.2 577.2    Recent imaging stable slight enlargement will be following up in a year with gastroenterology will verify timeframe for follow-up   2. Morbid obesity ([facility] [address]  E66.01 278.01    Weight stable   3. Paroxysmal atrial fibrillation (HCC)  I48.0 427.31    Was recently admitted for potential sotalol loading ultimately this was changed and she was given Tikosyn instead    Continues on Eliquis twice daily    All of her blood pressure medications were adjusted    Labs need follow-up on which have ordered for today   4. Primary hypertension  I10 401.9    Blood pressure not well controlled today however her medications were recently adjusted significantly clonidine was decreased to 0.1 mg twice daily    Entresto was started    Losartan and verapamil and hydrochlorothiazide were stopped    She did not take her pills before coming in here today she took them while she was in the office    We will have her return for nurse visit later this week or next week and will further adjust medication at that time       5. Mixed hyperlipidemia  E78.2 272.2    Controlled on Crestor check lipids today   6. Acquired hypothyroidism  E03.9 244.9    Stable on Synthroid check TSH   7. Hypokalemia  E87.6 276.8    Had difficulty with hypokalemia while hospitalized she is taking Klor-Con 21 M EQ's twice daily will check today she is no longer on hydrochlorothiazide this may need to be adjusted   8. Anxiety  F41.9 300.00    Overall improved with Zoloft     9 memory concerns--will schedule appointment with neuropsychology to address this today sister is here to help provide assistance  Depression screen reviewed and negative.   Scribed by Braden Aguirre of [employer], as dictated by Dr. Nancy Aponte. Current diagnosis and concerns discussed with pt at length. Pt understands risks and benefits or current treatment plan and medications, and accepts the treatment and medication with any possible risks. Pt asks appropriate questions, which were answered. Pt was instructed to call with any concerns or problems. I have reviewed the note documented by the scribe. The services provided are my own. The documentation is accurate.

## 2022-12-19 NOTE — TELEPHONE ENCOUNTER
----- Message from Kade Tatum sent at 12/16/2022  4:05 PM EST -----  Subject: Hospital Follow Up    QUESTIONS  What hospital was the Patient Discharged from? [facility]  Date of Discharge? 2022-12-16  Discharge Location? Reason for hospitalization as patient stated? cardiologist corinne   medicaiton called load. .   What question does the patient have, if applicable?   ---------------------------------------------------------------------------  --------------  CALL BACK INFO  What is the best way for the office to contact you? OK to leave message on   voicemail  Preferred Call Back Phone Number? [phone]  ---------------------------------------------------------------------------  --------------  SCRIPT ANSWERS  Relationship to Patient?  Self

## 2022-12-20 ENCOUNTER — OFFICE VISIT (OUTPATIENT)
Dept: INTERNAL MEDICINE CLINIC | Age: 82
End: 2022-12-20
Payer: MEDICARE

## 2022-12-20 VITALS
HEART RATE: 84 BPM | TEMPERATURE: 98 F | WEIGHT: 184 LBS | RESPIRATION RATE: 16 BRPM | DIASTOLIC BLOOD PRESSURE: 85 MMHG | OXYGEN SATURATION: 99 % | BODY MASS INDEX: 30.66 KG/M2 | HEIGHT: 65 IN | SYSTOLIC BLOOD PRESSURE: 159 MMHG

## 2022-12-20 DIAGNOSIS — E66.01 MORBID OBESITY (HCC): ICD-10-CM

## 2022-12-20 DIAGNOSIS — E03.9 ACQUIRED HYPOTHYROIDISM: ICD-10-CM

## 2022-12-20 DIAGNOSIS — I10 PRIMARY HYPERTENSION: ICD-10-CM

## 2022-12-20 DIAGNOSIS — E78.2 MIXED HYPERLIPIDEMIA: ICD-10-CM
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
## 2022-12-20 DIAGNOSIS — F41.9 ANXIETY: ICD-10-CM

## 2022-12-20 DIAGNOSIS — Z00.00 MEDICARE ANNUAL WELLNESS VISIT, SUBSEQUENT: ICD-10-CM

## 2022-12-20 DIAGNOSIS — K86.2 PANCREATIC CYST: Primary | ICD-10-CM

## 2022-12-20 DIAGNOSIS — E87.6 HYPOKALEMIA: ICD-10-CM

## 2022-12-20 DIAGNOSIS — M25.511 ACUTE PAIN OF RIGHT SHOULDER: ICD-10-CM

## 2022-12-20 DIAGNOSIS — R73.01 IFG (IMPAIRED FASTING GLUCOSE): ICD-10-CM

## 2022-12-20 DIAGNOSIS — R41.3 MEMORY IMPAIRMENT: ICD-10-CM

## 2022-12-20 DIAGNOSIS — I48.0 PAROXYSMAL ATRIAL FIBRILLATION (HCC): ICD-10-CM

## 2022-12-20 DIAGNOSIS — M79.604 PAIN OF RIGHT LOWER EXTREMITY: ICD-10-CM

## 2022-12-20 PROCEDURE — 99496 TRANSJ CARE MGMT HIGH F2F 7D: CPT | Performed by: INTERNAL MEDICINE

## 2022-12-20 PROCEDURE — G8427 DOCREV CUR MEDS BY ELIG CLIN: HCPCS | Performed by: INTERNAL MEDICINE

## 2022-12-20 RX ORDER — DOFETILIDE 0.5 MG/1
500 CAPSULE ORAL EVERY 12 HOURS
COMMUNITY
Start: 2022-12-16 | End: 2023-12-16

## 2022-12-20 RX ORDER — SACUBITRIL AND VALSARTAN 24; 26 MG/1; MG/1
1 TABLET, FILM COATED ORAL 2 TIMES DAILY
COMMUNITY
Start: 2022-12-16
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
## 2022-12-21 LAB
ALBUMIN SERPL-MCNC: 4.2 G/DL (ref 3.5–5)
ALBUMIN/GLOB SERPL: 1.4 {RATIO} (ref 1.1–2.2)
ALP SERPL-CCNC: 86 U/L (ref 45–117)
ALT SERPL-CCNC: 20 U/L (ref 12–78)
ANION GAP SERPL CALC-SCNC: 3 MMOL/L (ref 5–15)
AST SERPL-CCNC: 17 U/L (ref 15–37)
BILIRUB SERPL-MCNC: 0.4 MG/DL (ref 0.2–1)
BUN SERPL-MCNC: 20 MG/DL (ref 6–20)
BUN/CREAT SERPL: 24 (ref 12–20)
CALCIUM SERPL-MCNC: 9.5 MG/DL (ref 8.5–10.1)
CHLORIDE SERPL-SCNC: 105 MMOL/L (ref 97–108)
CHOLEST SERPL-MCNC: 178 MG/DL
CO2 SERPL-SCNC: 30 MMOL/L (ref 21–32)
CREAT SERPL-MCNC: 0.83 MG/DL (ref 0.55–1.02)
ERYTHROCYTE [DISTWIDTH] IN BLOOD BY AUTOMATED COUNT: 12.8 % (ref 11.5–14.5)
EST. AVERAGE GLUCOSE BLD GHB EST-MCNC: 111 MG/DL
GLOBULIN SER CALC-MCNC: 3 G/DL (ref 2–4)
GLUCOSE SERPL-MCNC: 93 MG/DL (ref 65–100)
HBA1C MFR BLD: 5.5 % (ref 4–5.6)
HCT VFR BLD AUTO: 43.2 % (ref 35–47)
HDLC SERPL-MCNC: 74 MG/DL
HDLC SERPL: 2.4 {RATIO} (ref 0–5)
HGB BLD-MCNC: 13.6 G/DL (ref 11.5–16)
LDLC SERPL CALC-MCNC: 85 MG/DL (ref 0–100)
MCH RBC QN AUTO: 31.6 PG (ref 26–34)
MCHC RBC AUTO-ENTMCNC: 31.5 G/DL (ref 30–36.5)
MCV RBC AUTO: 100.2 FL (ref 80–99)
NRBC # BLD: 0 K/UL (ref 0–0.01)
NRBC BLD-RTO: 0 PER 100 WBC
PLATELET # BLD AUTO: 183 K/UL (ref 150–400)
PMV BLD AUTO: 10.5 FL (ref 8.9–12.9)
POTASSIUM SERPL-SCNC: 4.2 MMOL/L (ref 3.5–5.1)
PROT SERPL-MCNC: 7.2 G/DL (ref 6.4–8.2)
RBC # BLD AUTO: 4.31 M/UL (ref 3.8–5.2)
SODIUM SERPL-SCNC: 138 MMOL/L (ref 136–145)
TRIGL SERPL-MCNC: 95 MG/DL (ref ?–150)
TSH SERPL DL<=0.05 MIU/L-ACNC: 0.32 UIU/ML (ref 0.36–3.74)
VLDLC SERPL CALC-MCNC: 19 MG/DL
WBC # BLD AUTO: 4.4 K/UL (ref 3.6–11)

## 2022-12-21 NOTE — PROGRESS NOTES
Please call patient back about results  Thyroid running on faster side--currently she is on synthroid 75mcg 6 d per week and half tab on 7th, reduce to 50mcg daily --pend new dose  Will repeat labs at f/U

## 2022-12-21 NOTE — TELEPHONE ENCOUNTER
Future Appointments:  Future Appointments   Date Time Provider Marilin Ventura   12/22/2022 11:20 AM [facility] NURSE [facility] BS AMB   1/4/2023 12:00 PM Vandana Engle, PT [facility] REG   2/21/2023  1:45 PM Francisca Sandoval MD [facility] BS AMB   3/2/2023 11:00 AM Shella Goodell, MD [facility] BS AMB   6/9/2023  1:40 PM Magui Granados MD [facility] BS AMB        Last Appointment With Me:  12/20/2022     Requested Prescriptions     Pending Prescriptions Disp Refills    levothyroxine (SYNTHROID) 50 mcg tablet 90 Tablet 0     Sig: Take 1 Tablet by mouth Daily (before breakfast).

## 2022-12-21 NOTE — PROGRESS NOTES
Called, spoke to pt  Received two pt identifiers  Pt informed per Dr. Lloyd Zamudio Thyroid running on faster side  Pt informed per Dr. Lloyd Zamudio  reduce to 50mcg daily---pended  Pt informed per Dr. Lloyd Zamudio Will repeat labs at f/U  Pt verbalizes understanding of the instructions and has no further questions at this time.

## 2022-12-22 ENCOUNTER — TELEPHONE (OUTPATIENT)
Dept: INTERNAL MEDICINE CLINIC | Age: 82
End: 2022-12-22

## 2022-12-22 ENCOUNTER — CLINICAL SUPPORT (OUTPATIENT)
Dept: INTERNAL MEDICINE CLINIC | Age: 82
End: 2022-12-22

## 2022-12-22 VITALS
SYSTOLIC BLOOD PRESSURE: 152 MMHG | RESPIRATION RATE: 18 BRPM | DIASTOLIC BLOOD PRESSURE: 83 MMHG | HEART RATE: 95 BPM | OXYGEN SATURATION: 97 %

## 2022-12-22 DIAGNOSIS — I10 PRIMARY HYPERTENSION: Primary | ICD-10-CM

## 2022-12-22 RX ORDER — LEVOTHYROXINE SODIUM 50 UG/1
50 TABLET ORAL
Qty: 90 TABLET | Refills: 0 | Status: SHIPPED | OUTPATIENT
Start: 2022-12-22

## 2022-12-22 NOTE — TELEPHONE ENCOUNTER
Bp above goal    Cardiology recently adjusted all medication and has goal to wean off clonidine    Entresto started by cards low dose    Bmp shows stable k and cr levels    Would rec doubling dose of entresto this will need to be adjusted by her cardiologist who just started this medication     Will need to have her contact her [facility] clinic    Also looks like she was doing a tele visit with them today

## 2022-12-22 NOTE — PROGRESS NOTES
Identified pt with two pt identifiers(name and ). Reviewed record in preparation for visit and have obtained necessary documentation. Chief Complaint   Patient presents with    Blood Pressure Check    Abnormal Lab Results        Vitals:    22 1152 22 1158   BP: (!) 166/79 (!) 152/83   Pulse: 95 95   Resp: 18    SpO2: 97%      The patient came to the office with her sister, her sister wanted to know what was the change in her medication. I advised the patient and her sister of new synthroid dose, per .   levothyroxine (SYNTHROID) 50 mcg tablet 90 Tablet 0        Sig: Take 1 Tablet by mouth Daily (before breakfast). Pt and the patient's sister verbalized understanding of information discussed w/ no further questions at this time.       Jin Kim, [address] [place] [address]  W: [phone]  F: [phone]

## 2022-12-26 RX ORDER — LEVOTHYROXINE SODIUM 75 UG/1
TABLET ORAL
Qty: 90 TABLET | Refills: 0 | OUTPATIENT
Start: 2022-12-26

## 2022-12-28 NOTE — TELEPHONE ENCOUNTER
Patient called back , verified two identifiers. Informed patient per Dr. Bruna Churchill,   Bp above goal     Cardiology recently adjusted all medication and has goal to wean off clonidine     Entresto started by cards low dose     Bmp shows stable k and cr levels     Would rec doubling dose of entresto this will need to be adjusted by her cardiologist who just started this medication      Will need to have her contact her [facility]     Also looks like she was doing a tele visit with them today.     Patient states okay, she will be calling cardiology

## 2023-01-04 ENCOUNTER — HOSPITAL ENCOUNTER (OUTPATIENT)
Dept: PHYSICAL THERAPY | Age: 83
Discharge: HOME | End: 2023-01-04
Payer: MEDICARE

## 2023-01-04 DIAGNOSIS — M25.511 ACUTE PAIN OF RIGHT SHOULDER: Primary | ICD-10-CM

## 2023-01-04 PROCEDURE — 97162 PT EVAL MOD COMPLEX 30 MIN: CPT | Performed by: PHYSICAL THERAPIST

## 2023-01-04 PROCEDURE — 97110 THERAPEUTIC EXERCISES: CPT | Performed by: PHYSICAL THERAPIST
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
## 2023-01-05 ENCOUNTER — TELEPHONE (OUTPATIENT)
Dept: NEUROLOGY | Age: 83
End: 2023-01-05

## 2023-01-09 NOTE — TELEPHONE ENCOUNTER
Returned call and schedule an appt with Dr Farheen Cee
VOICEMAIL      Pt has referral to see Dr. Queen Arreaga. Please call to schedule.  [phone]
none

## 2023-01-12 ENCOUNTER — APPOINTMENT (OUTPATIENT)
Dept: PHYSICAL THERAPY | Age: 83
End: 2023-01-12
Payer: MEDICARE

## 2023-01-19 ENCOUNTER — APPOINTMENT (OUTPATIENT)
Dept: PHYSICAL THERAPY | Age: 83
End: 2023-01-19
Payer: MEDICARE

## 2023-01-26 ENCOUNTER — APPOINTMENT (OUTPATIENT)
Dept: PHYSICAL THERAPY | Age: 83
End: 2023-01-26
Payer: MEDICARE

## 2023-01-31 NOTE — PROGRESS NOTES
HISTORY OF PRESENT ILLNESS  Guillermo Wu is a [de-identified] y.o. female. HPI  Last here 12/20/22. Pt is here for hospital f/U. Pt presents to the office today w/ her sister Jose Zuniga, who helps present hx today. History of hypertension  BP today is 134/72  No home BP readings for review, her sister notes she has been taking this obsessively  Advised pt she only needs to take once/week  Still on clonidine 0.2 mg BID, takes a full tablet if her BP is > 150, half tablet if SBP is < 150, she normally just takes half tablet   on entresto 24-26 mg twice daily  No longer on Verapamil 360 mg daily or HCTZ 25mg or Losartan 50 mg, these were stopped in the hospital   Recall she had a cough on lisinopril      Wt today is 185 lbs, stable since lov  Discussed diet and wt/l      Reviewed labs   Ordered labs     She sees Dr. 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 Zo Cotter (ortho), for R knee pain, dx w/ arthritis  She is completing PT for her knee and shoulder      Pt is following with Dr Estela Michele (GI) for weight loss and pancreatic cyst  Pt had an EGD and EUS 10/24/19  Last there 11/22, will f/U in 1-3 years (?), discussed calling to double check     Pt saw Dr. Jeannette Main (cardio) for foot circulation  Last visit was 8/1/17   Recall has chronic sx of leg tightness  Pt will only f/u with this physician prn       She was at [address] [place] [address] 12/13/22 - 12/16/22 for A-fib.   Pt saw Dr. Jazmin Sim (cardio) for f/u after a fib  Has a history of ablation  Lov 6/22 w/ NP now following with [facility] instead (see below)  Continues on eliquis 5 mg bid   No longer on verapamil 360 mg, this was stopped in the hospital      Now sees Dr. Sterling Freeman (cardio) at [facility] for atrial fibrillation  Last there 12/13/22, was admitted 12/13/22 - 12/16/22 (see above)  Was going to get a sotalol load December 2022, this was ultimately aborted she is on Tikosyn continues on Eliquis twice daily has follow-up with the nurse practitioner February 2023  On Entresto as well      Pt saw Dr. Lee Ann Chaudhry (neuro) 6/9/22 for neuropathy  Lov 6/9/22, f/U scheduled 6/9/23  No longer taking amitriptyline for headaches and neuropathy  Does not want to take gabapentin at this time overall she is doing okay without medications  She was given a handicap placard. Pt saw Dr. Rafael Leblanc (uro-gyn) for urinary sx  Last visit was 11/17  Pt was told she had lichen sclerosus   Now resolved      Pt saw Dr Talon Radford (sleep)  Last visit was 7/3/18  Recall telephone note 9/18: no significant sleep disordered breathing, overall AHI of 0.6/h  Pt had a poor experience and was told she has no sleep apnea  She has been taking 2 3 mg melatonin, occasionally 3 tablets  Discussed 6 mg is okay, but she really should not be taking 9 mg. Discussed she could reserve tylenol PM for a bad night     She has been seeing Dr. Per Ceballos (surg) for lipoma  Last there 11/12/20     Pt had a cyst on R breast removed by Dr Pamela Miranda (surg) in the past     Pt prev followed with Dr Donis Rolle (podiatry)       Continues crestor 20mg daily for cholesterol       Continues klor-con 20meqs BID for her potassium      Continues on synthroid 50 mcg daily (adjusted from 75 mcg 6 days/week and 37.5 1 day/week)    She is working on scheduling neuropsych testing w/ Dr. Dasia Gupta (neuro psych), they have had difficulty finding an appt before 8/22  Gave information for Dr. Richard No and [facility]. Taking sertraline 75 mg (1.5 tablets/day) for anxiety   Pt reports increased feelings of anxiety, she feels it is not helping and that sertraline in general has not been helpful. She has been having trouble focusing. Discussed we can try effexor.  Will reduce sertraline to 50 mg for 1 week, then taper to 25 mg and start effexor, then stop sertraline in another week. Gave info for psychiatry  Has hydroxyzine for PRN use, has not tried this    Recall She had a cervical polyp, was having vaginal bleeding, this was removed by julio césar, vaginal bleeding has since resolved. Pt lives alone. ACP not on file. SDM is her sister, Rosalva Pérez. Provided information in the past.      PREVENTIVE:    Colonoscopy: 7/01/21. Dr. Oneida Baez, repeat 5 years  Pap: Dr. Julianna Hodges, 11/21 every 2 years  Mammogram:  [facility], 11/19/21, nl this is due, ordered   DEXA:  osteopenia, 11/21, nl [address]  Tdap: 4/14/2015  Pneumovax: 11/19/2013  Tlhvaeu98: 4/05/2017  Zostavax: declines  Shingrix: due, h/o shingles, reminded  Flu shot: Fall 2022  A1C:  9/21 5.2 3/22 5.6 7/22 5.4 12/22 5.5   Eye exam: VEI on [place] 6/15/22, cataract  Lipids: 12/22 LDL 85  Hep C screen: 9/21 negative  Covid: four doses (Sofie Carbo)    Patient Active Problem List    Diagnosis Date Noted    Anxiety 03/09/2021    Pericardial effusion with cardiac tamponade 03/18/2017    S/P ablation of atrial fibrillation 03/17/2017    Acquired hypothyroidism 12/14/2015    HTN (hypertension) 07/21/2015    Atrial fibrillation ([facility] [address] 03/16/2015    Morbid obesity ([facility] [address]     Hyperlipidemia 07/14/2014    Hypokalemia 07/14/2014    Postmenopausal bleeding 10/18/2011     Current Outpatient Medications   Medication Sig Dispense Refill    levothyroxine (SYNTHROID) 50 mcg tablet Take 1 Tablet by mouth Daily (before breakfast). 90 Tablet 0    Entresto 24-26 mg tablet Take 1 Tablet by mouth two (2) times a day. dofetilide (TIKOSYN) 500 mcg capsule Take 500 mcg by mouth every twelve (12) hours. cloNIDine HCL (CATAPRES) 0.2 mg tablet Take 1 Tablet by mouth two (2) times a day. (Patient taking differently: Take 0.2 mg by mouth two (2) times a day.  0.5 tablet BID) 180 Tablet 1    cloNIDine HCL (CATAPRES) 0.2 mg tablet TAKE 1 TABLET BY MOUTH TWICE A  Tablet 1    potassium chloride SR (KLOR-CON 10) 10 mEq tablet TAKE 2 TABLETS IN MORNING AND 2 TABLETS IN THE EVENING 360 Tablet 1    rosuvastatin (CRESTOR) 20 mg tablet TAKE 1 TABLET BY MOUTH EVERY DAY 90 Tablet 1    acetaminophen/diphenhydramine (TYLENOL PM EXTRA STRENGTH PO) Take 1-2 Tablets by mouth. At bedtime as needed      acetaminophen (TYLENOL EXTRA STRENGTH PO) Take 500 mg by mouth nightly. Tylenol PM      sertraline (ZOLOFT) 50 mg tablet Take 1.5 Tablets by mouth daily. 135 Tablet 1    CALCIUM CARBONATE/VITAMIN D3 (CALTRATE 600 + D PO) Take 1 Tab by mouth daily. MULTIVITAMIN W-MINERALS/LUTEIN (CENTRUM SILVER PO) Take 1 Tab by mouth daily (after lunch).        Past Surgical History:   Procedure Laterality Date    COLONOSCOPY N/A 7/1/2021    COLONOSCOPY performed by Amanda Morris MD at [address]  7/1/2021         COLONOSCOPY,REMV Angela Rhymesteban  7/1/2021         HX GYN      myomectomy on uterus    HX GYN  2011    hystereoscopy D&C    HX LYMPH NODE DISSECTION      biopsy    HX OTHER SURGICAL      lymph node bx    [place] BREAST SURGERY PROCEDURE UNLISTED  1982    breast bx rt    [place] CARDIAC SUR[place] PROCEDURE UNLIST  01/2015    cardiac catheterization, no intervention, medical management    [place] CARDIAC SUR[place] PROCEDURE UNLIST  03/2017    ablation    UPPER GI ENDOSCOPY,BIOPSY  10/24/2019         UPPER GI ENDOSCOPY,FN NEEDLE BX,GUIDED  10/24/2019           Lab Results   Component Value Date/Time    WBC 4.4 12/20/2022 10:27 AM    HGB 13.6 12/20/2022 10:27 AM    HCT 43.2 12/20/2022 10:27 AM    PLATELET 043 46/81/2129 10:27 AM    .2 (H) 12/20/2022 10:27 AM     Lab Results   Component Value Date/Time    Cholesterol, total 178 12/20/2022 10:27 AM    HDL Cholesterol 74 12/20/2022 10:27 AM    LDL, calculated 85 12/20/2022 10:27 AM    Triglyceride 95 12/20/2022 10:27 AM    CHOL/HDL Ratio 2.4 12/20/2022 10:27 AM     Lab Results   Component Value Date/Time    GFR est non-AA >60 07/12/2022 11:43 AM    GFR est AA >60 07/12/2022 11:43 AM    Creatinine 0.83 12/20/2022 10:27 AM    Creatinine (POC) 0.90 12/02/2022 01:24 PM    BUN 20 12/20/2022 10:27 AM    Sodium 138 12/20/2022 10:27 AM    Potassium 4.2 12/20/2022 10:27 AM    Chloride 105 12/20/2022 10:27 AM    CO2 30 12/20/2022 10:27 AM    Magnesium 2.2 01/16/2020 11:36 PM        Review of Systems   Respiratory:  Negative for shortness of breath. Cardiovascular:  Negative for chest pain. Psychiatric/Behavioral:  The patient is nervous/anxious. Physical Exam  Constitutional:       General: She is not in acute distress. Appearance: She is not ill-appearing, toxic-appearing or diaphoretic. HENT:      Head: Normocephalic and atraumatic. Right Ear: External ear normal.      Left Ear: External ear normal.   Eyes:      General:         Right eye: No discharge. Left eye: No discharge. Conjunctiva/sclera: Conjunctivae normal.      Pupils: Pupils are equal, round, and reactive to light. Cardiovascular:      Rate and Rhythm: Normal rate and regular rhythm. Heart sounds: Murmur (slight) heard. No friction rub. No gallop. Pulmonary:      Effort: No respiratory distress. Breath sounds: Normal breath sounds. No wheezing or rales. Chest:      Chest wall: No tenderness. Musculoskeletal:         General: Normal range of motion. Cervical back: Normal.   Skin:     General: Skin is warm and dry. Comments: A few papules on upper back  Peeling splotches on forehead    Neurological:      Mental Status: She is oriented to person, place, and time. Mental status is at baseline. Coordination: Coordination normal.      Gait: Gait normal.   Psychiatric:         Mood and Affect: Mood normal.         Behavior: Behavior normal.       ASSESSMENT and PLAN    ICD-10-CM ICD-9-CM    1.  Primary hypertension  I10 401.9    Controlled on clonidine 0.1 mg twice daily and Entresto twice daily continue to change dose    Takes 0.2 mg of clonidine if systolic blood pressure greater than 150   2. Paroxysmal atrial fibrillation (HCC)  I48.0 427.31    On Tikosyn and Eliquis has follow-up with cardiology pending   3. Morbid obesity ([address]  E66.01 278.01    Weight stable work on portions   4. Mixed hyperlipidemia  E78.2 272.2    Controlled Crestor   5. Acquired hypothyroidism  E03.9 244.9 TSH 3RD GENERATION   Now on Synthroid 50 mcg daily check TSH adjust dose as needed   6. Anxiety  F41.9 300.00    Patient is with her sister today who does not believe the Zoloft is working adequately we will plan on tapering off the Zoloft and trying Effexor instead    We will first decrease Zoloft to 50 mg daily    Then decrease to 25 mg and start Effexor 37.5 mg daily    Will taper off Effexor as needed   7. Hypokalemia  E87.6 276.8    Potassium level stable last check on Klor-Con   8. Rash  R21 782. 1    Patient with history of recurrent rash reports a history of well syndrome with her dermatologist has a steroid cream at home to utilize but having more outbreaks she will call her dermatologist may need repeat biopsy   9. MCI (mild cognitive impairment)  G31.84 331.83 REFERRAL TO NEUROPSYCHOLOGY   Neuropsych testing is scheduled for August she like to have this sooner unfortunately the neurology clinic has been short staffed and there are no sooner appointments provided information for alternate neuropsychologist in the area discussed trying's PCU   10. Primary insomnia  F51.01 307.42         Depression screen reviewed and negative. Scribed by Carolina Guerrero, as dictated by Dr. Marti Peterson. Current diagnosis and concerns discussed with pt at length. Pt understands risks and benefits or current treatment plan and medications, and accepts the treatment and medication with any possible risks. Pt asks appropriate questions, which were answered. Pt was instructed to call with any concerns or problems.     I have reviewed the note documented by the scribe. The services provided are my own. The documentation is accurate.

## 2023-02-01 ENCOUNTER — OFFICE VISIT (OUTPATIENT)
Dept: INTERNAL MEDICINE CLINIC | Age: 83
End: 2023-02-01
Payer: MEDICARE

## 2023-02-01 VITALS
TEMPERATURE: 98 F | DIASTOLIC BLOOD PRESSURE: 72 MMHG | SYSTOLIC BLOOD PRESSURE: 134 MMHG | RESPIRATION RATE: 16 BRPM | WEIGHT: 185 LBS | HEIGHT: 65 IN | HEART RATE: 76 BPM | OXYGEN SATURATION: 98 % | BODY MASS INDEX: 30.82 KG/M2

## 2023-02-01 DIAGNOSIS — F41.9 ANXIETY: ICD-10-CM

## 2023-02-01 DIAGNOSIS — E78.2 MIXED HYPERLIPIDEMIA: ICD-10-CM

## 2023-02-01 DIAGNOSIS — R21 RASH: ICD-10-CM

## 2023-02-01 DIAGNOSIS — F51.01 PRIMARY INSOMNIA: ICD-10-CM

## 2023-02-01 DIAGNOSIS — I48.0 PAROXYSMAL ATRIAL FIBRILLATION (HCC): ICD-10-CM

## 2023-02-01 DIAGNOSIS — E87.6 HYPOKALEMIA: ICD-10-CM

## 2023-02-01 DIAGNOSIS — E03.9 ACQUIRED HYPOTHYROIDISM: ICD-10-CM

## 2023-02-01 DIAGNOSIS — G31.84 MCI (MILD COGNITIVE IMPAIRMENT): ICD-10-CM

## 2023-02-01 DIAGNOSIS — E66.01 MORBID OBESITY (HCC): ICD-10-CM

## 2023-02-01 DIAGNOSIS — I10 PRIMARY HYPERTENSION: Primary | ICD-10-CM

## 2023-02-01 PROCEDURE — G8427 DOCREV CUR MEDS BY ELIG CLIN: HCPCS | Performed by: INTERNAL MEDICINE

## 2023-02-01 PROCEDURE — 1101F PT FALLS ASSESS-DOCD LE1/YR: CPT | Performed by: INTERNAL MEDICINE

## 2023-02-01 PROCEDURE — G8536 NO DOC ELDER MAL SCRN: HCPCS | Performed by: INTERNAL MEDICINE

## 2023-02-01 PROCEDURE — G0463 HOSPITAL OUTPT CLINIC VISIT: HCPCS | Performed by: INTERNAL MEDICINE

## 2023-02-01 PROCEDURE — G8399 PT W/DXA RESULTS DOCUMENT: HCPCS | Performed by: INTERNAL MEDICINE

## 2023-02-01 PROCEDURE — 1090F PRES/ABSN URINE INCON ASSESS: CPT | Performed by: INTERNAL MEDICINE

## 2023-02-01 PROCEDURE — G8510 SCR DEP NEG, NO PLAN REQD: HCPCS | Performed by: INTERNAL MEDICINE

## 2023-02-01 PROCEDURE — 99214 OFFICE O/P EST MOD 30 MIN: CPT | Performed by: INTERNAL MEDICINE

## 2023-02-01 PROCEDURE — G8417 CALC BMI ABV UP PARAM F/U: HCPCS | Performed by: INTERNAL MEDICINE

## 2023-02-01 RX ORDER — VENLAFAXINE HYDROCHLORIDE 37.5 MG/1
37.5 CAPSULE, EXTENDED RELEASE ORAL DAILY
Qty: 30 CAPSULE | Refills: 0 | Status: SHIPPED | OUTPATIENT
Start: 2023-02-01

## 2023-02-01 RX ORDER — APIXABAN 5 MG/1
TABLET, FILM COATED ORAL
COMMUNITY
Start: 2023-01-24

## 2023-02-01 NOTE — PATIENT INSTRUCTIONS
Reduce zoloft to 50mg daily       In 1 week reduce to half tablet daily and start the effexor (venlafaxine)      In another week stop zoloft and continue effexor only       Melatonin 3mg to 6mg

## 2023-02-01 NOTE — PROGRESS NOTES
1. \"Have you been to the ER, urgent care clinic since your last visit? Hospitalized since your last visit? \" No    2. \"Have you seen or consulted any other health care providers outside of the [address] [place] [address] since your last visit? \" No     3. For patients aged 39-70: Has the patient had a colonoscopy / FIT/ Cologuard? NA - based on age      If the patient is female:    4. For patients aged 41-77: Has the patient had a mammogram within the past 2 years? NA - based on age or sex      11. For patients aged 21-65: Has the patient had a pap smear?  NA - based on age or sex

## 2023-02-02 LAB — TSH SERPL DL<=0.05 MIU/L-ACNC: 0.62 UIU/ML (ref 0.36–3.74)

## 2023-03-01 ENCOUNTER — PATIENT MESSAGE (OUTPATIENT)
Dept: INTERNAL MEDICINE CLINIC | Age: 83
End: 2023-03-01

## 2023-03-02 RX ORDER — SERTRALINE HYDROCHLORIDE 50 MG/1
75 TABLET, FILM COATED ORAL DAILY
Qty: 135 TABLET | Refills: 0 | Status: SHIPPED | OUTPATIENT
Start: 2023-03-02

## 2023-03-02 NOTE — TELEPHONE ENCOUNTER
Future Appointments:  Future Appointments   Date Time Provider Marilin Ventura   3/29/2023  1:45 PM Erika Rosas MD [facility] BS AMB   6/9/2023  1:40 PM MD STAS Rogel BS AMB   8/11/2023 11:00 AM [place] BS AMB        Last Appointment With Me:  2/1/2023     Requested Prescriptions     Pending Prescriptions Disp Refills    sertraline (ZOLOFT) 50 mg tablet 135 Tablet 0     Sig: Take 1.5 Tablets by mouth daily.

## 2023-03-02 NOTE — TELEPHONE ENCOUNTER
Souleymane Latishayayo 3/1/2023 11:57 AM EST      ----- Message -----  From: Rose Marie Roberts  Sent: 3/1/2023 10:53 AM EST  To: [facility] Nurse Pool  Subject: Refill for Sertraline HCL 50 MG Tablet     Dr. Chula Kincaid,  My Sertraline medicine was changed earlier to Venlafaxine HCL ER 37.5 MG cap which I have not taken as my heart doctor, Dr. Darien Jesus at [facility], said this could affect my blood pressure and I would need to check it twice a day while on it. I still had a a little of the Sertraline left so I have been taking that but now need a new prescription for it. My anxiety level has decreased which I am so pleased with. I would like to continue taking the Sertraline 75 mg as I feel like it is working and would greatly appreciate your sending a refill for it to my [facility] pharmacy. Thank you so much. Pearl Colvin.  Albina Valiente

## 2023-03-07 ENCOUNTER — TELEPHONE (OUTPATIENT)
Dept: NEUROLOGY | Age: 83
End: 2023-03-07

## 2023-03-10 RX ORDER — VENLAFAXINE HYDROCHLORIDE 37.5 MG/1
37.5 CAPSULE, EXTENDED RELEASE ORAL DAILY
Qty: 30 CAPSULE | Refills: 1 | Status: SHIPPED | OUTPATIENT
Start: 2023-03-10 | End: 2023-03-29

## 2023-03-10 RX ORDER — VENLAFAXINE 37.5 MG/1
37.5 TABLET ORAL DAILY
COMMUNITY
End: 2023-03-29

## 2023-03-10 RX ORDER — VENLAFAXINE 37.5 MG/1
37.5 TABLET ORAL DAILY
Qty: 30 TABLET | Refills: 2 | Status: CANCELLED | OUTPATIENT
Start: 2023-03-10 | End: 2023-04-09

## 2023-03-15 RX ORDER — LEVOTHYROXINE SODIUM 50 UG/1
TABLET ORAL
Qty: 90 TABLET | Refills: 0 | Status: SHIPPED | OUTPATIENT
Start: 2023-03-15

## 2023-03-28 NOTE — PROGRESS NOTES
HISTORY OF PRESENT ILLNESS  Liliane Prabhakar is a 82 y.o. female. HPI  Last here 23. Pt is here for routine care. Pt presents to the office today w/ her sister Chino Telles, who helps present hx today. She is having a corn removal surgery by Dr. Heidi Grijalva (podiatry)   Pt denies cp, sob, palpitations, orthopnea, claudication, PND, and new swelling in legs  Pt can sleep laying flat  Pt can walk up a set of stairs  Pt can walk around the mall   Pt can vacuum and do laundry     Functional mets >>4     Will run EKG today. History of hypertension  BP today is 118/73  SBP at home 130s-140s   BP at [facility]: 124/81  Still on clonidine 0.2 mg BID, takes a full tablet if her BP is > 150, half tablet if SBP is < 150, she normally just takes half tablet  On entresto 24-26 mg twice daily   No longer on Verapamil 360 mg daily or HCTZ 25mg or Losartan 50 mg, these were stopped in the hospital   Recall she had a cough on lisinopril      Wt today is 188 lbs, up 3 lbs since lov  Discussed diet and wt/l      Reviewed labs   Ordered labs    She saw Dr. Zuleyma Barry (geriatrics) at [facility] on [facility] for ACP  Reviewed note:  A/P  In context of Ms. Simon existing medical issues as outlined above, most notably atrial fibrillation and history of CAD, our advance care planning/goals of care conversation included a discussion about the following:     Our advance care planning/goals of care conversation included a discussion about the following:  -The value and importance of advance care planning/goals of care planning.  -Experience with loved ones who have been seriously ill or have . -Exploration of personal, cultural, or spiritual beliefs that might influence medical decisions. -Exploration of goals of care in the event of a sudden injury or illness. -Identification of healthcare agent.    -Review and update, or completion of, an advance directive document: Documents and instruction for completion to be sent to patient in the mail.     She sees Dr. 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 Joel Reynaga (ortho), for R knee pain, dx w/ arthritis  She is completing PT for her knee and shoulder      Pt is following with Dr Rivera Childers (GI) for weight loss and pancreatic cyst  Pt had an EGD and EUS 10/24/19  Last there 11/22  Notes she has been having more loose stools, so is planning to f/U sooner  Recommended taking fiber supplement. Pt saw Dr. Juanis Roberts (cardio) for foot circulation  Last visit was 8/1/17   Recall has chronic sx of leg tightness  Pt will only f/u with this physician prn       She was at [address] 12/13/22 - 12/16/22 for A-fib. Pt saw Dr. Albina Palmer (cardio) for f/u after a fib  Has a history of ablation  Lov 6/22 w/ NP now following with [facility] instead (see below)  Continues on eliquis 5 mg BID   No longer on verapamil 360 mg, this was stopped in the hospital      Now sees Dr. Tu Denney (cardio) at [facility] for atrial fibrillation  Last there 12/13/22, was admitted 12/13/22 - 12/16/22 (see above)  Last saw NP Larry Parker 2/20/23  Reviewed note:  ASSESSMENT/PLAN:    1. Atrial Fibrillation, paroxysmal.   -she is status post Tikosyn drug load 12/13-12/16/2022  -continue apixaban 5mg BID for anticoagulation, no bleeding  -labs 12/2022: Cr normal, e-lytes normal  -QT stable on EKG today, HR 78 bpm   -doing well with current Tikosyn dose, will continue  -she will follow with PCP for BP control    Return to clinic in 6 months or sooner if needed.   Was going to get a sotalol load December 2022, this was ultimately aborted she is on Tikosyn continues on Eliquis twice daily   On Entresto as well  Has follow-up with the nurse practitioner February 2023     Pt saw Dr. Jan White (neuro) 6/9/22 for neuropathy  Lov 6/9/22, f/U scheduled 6/9/23  No longer taking amitriptyline for headaches and neuropathy  Does not want to take gabapentin at this time overall she is doing okay without medications  She was previously given a handicap placard. She will see Dr. Dee Nguyen (neuro psych) 5/30/23 and 6/7/23  for neuropsych testing     Pt saw Dr. Serafin Hernandez (uro-gyn) for urinary sx  Last visit was 11/17  Pt was told she had lichen sclerosus   Now resolved      Pt saw Dr Rigo Velasquez (sleep)  Last visit was 7/3/18  Recall telephone note 9/18: no significant sleep disordered breathing, overall AHI of 0.6/h  Pt had a poor experience and was told she has no sleep apnea  She has been taking 2 3 mg melatonin, occasionally 3 tablets  We have discussed 6 mg is okay, but she really should not be taking 9 mg. Discussed she could reserve tylenol PM for a bad night     She has been seeing Dr. Estela Vidal (surg) for lipoma  Last there 11/12/20     Pt had a cyst on R breast removed by Dr Jaden Diaz (surg) in the past     Pt prev followed with Dr Angelic Melgoza (podiatry)   Now following w/ Dr. Jareth Pressley 3/23  Will have corn surgery 4/23      Continues crestor 20mg daily for cholesterol        Continues klor-con 20meqs BID for her potassium      Continues on synthroid 50 mcg daily      Taking zoloft 75 mg for anxiety  Lov I ordered effexor, however cardiology did not want her on this due to drug interactions   Lov gave info for psychiatry   Has hydroxyzine for PRN use, has not tried this     Recall She had a cervical polyp, was having vaginal bleeding, this was removed by galgano, vaginal bleeding has since resolved. Pt lives alone. ACP not on file. SDM is her sister, Dorota Pierre. Provided information in the past.      PREVENTIVE:    Colonoscopy: 7/01/21.  Dr. Jasmina Jones, repeat 5 years  Pap: Dr. Chanda Boggs, 11/21 every 2 years  Mammogram:  [facility], 11/19/21, nl this is due, ordered  DEXA:  osteopenia, 11/21, nl [address]  Tdap: 4/14/2015  Pneumovax: 11/19/2013  Sjkexnt66: 4/05/2017  Zostavax: declines  Shingrix: due, h/o shingles, reminded  Flu shot: Fall 2022  A1C:  9/21 5.2 3/22 5.6 7/22 5.4 12/22 5.5   Eye exam: VEI on [place] 6/15/22, cataract  Lipids: 12/22 LDL 85  Hep C screen: 9/21 negative  Covid: four doses (pfizer)    Patient Active Problem List    Diagnosis Date Noted    Anxiety 03/09/2021    Pericardial effusion with cardiac tamponade 03/18/2017    S/P ablation of atrial fibrillation 03/17/2017    Acquired hypothyroidism 12/14/2015    HTN (hypertension) 07/21/2015    Atrial fibrillation ([facility] [address] 03/16/2015    Morbid obesity ([facility] [address]     Hyperlipidemia 07/14/2014    Hypokalemia 07/14/2014    Postmenopausal bleeding 10/18/2011     Current Outpatient Medications   Medication Sig Dispense Refill    synthroid 50 mcg tablet TAKE 1 TABLET BY MOUTH EVERY DAY BEFORE BREAKFAST 90 Tablet 0    venlafaxine (EFFEXOR) 37.5 mg tablet Take 37.5 mg by mouth daily. 1 capsule by mouth everyday. venlafaxine-SR (EFFEXOR-XR) 37.5 mg capsule Take 1 Capsule by mouth daily. 30 Capsule 1    sertraline (ZOLOFT) 50 mg tablet Take 1.5 Tablets by mouth daily. 135 Tablet 0    Eliquis 5 mg tablet TAKE 1 TABLET BY MOUTH TWO (2) TIMES A DAY FOR 30 DAYS. Entresto 24-26 mg tablet Take 1 Tablet by mouth two (2) times a day. dofetilide (TIKOSYN) 500 mcg capsule Take 500 mcg by mouth every twelve (12) hours. cloNIDine HCL (CATAPRES) 0.2 mg tablet Take 1 Tablet by mouth two (2) times a day. (Patient taking differently: Take 0.2 mg by mouth two (2) times a day. 0.5 tablet BID) 180 Tablet 1    potassium chloride SR (KLOR-CON 10) 10 mEq tablet TAKE 2 TABLETS IN MORNING AND 2 TABLETS IN THE EVENING 360 Tablet 1    rosuvastatin (CRESTOR) 20 mg tablet TAKE 1 TABLET BY MOUTH EVERY DAY 90 Tablet 1    acetaminophen (TYLENOL EXTRA STRENGTH PO) Take 500 mg by mouth nightly.  Tylenol PM      CALCIUM CARBONATE/VITAMIN D3 (CALTRATE 600 + D PO) Take 1 Tab by mouth daily. MULTIVITAMIN W-MINERALS/LUTEIN (CENTRUM SILVER PO) Take 1 Tab by mouth daily (after lunch). Past Surgical History:   Procedure Laterality Date    COLONOSCOPY N/A 7/1/2021    COLONOSCOPY performed by Pola Halsted, MD at [address]  7/1/2021         COLONOSCOPY,[facility]  7/1/2021         HX GYN      myomectomy on uterus    HX GYN  2011    hystereoscopy D&C    HX LYMPH NODE DISSECTION      biopsy    HX OTHER SURGICAL      lymph node bx    ND UNLISTED PROCEDURE BREAST  1982    breast bx rt    ND UNLISTED PROCEDURE CARDIAC SURGERY  01/2015    cardiac catheterization, no intervention, medical management    ND UNLISTED PROCEDURE CARDIAC SURGERY  03/2017    ablation    UPPER GI ENDOSCOPY,BIOPSY  10/24/2019         UPPER GI ENDOSCOPY,FN NEEDLE BX,GUIDED  10/24/2019           Lab Results   Component Value Date/Time    WBC 4.4 12/20/2022 10:27 AM    HGB 13.6 12/20/2022 10:27 AM    HCT 43.2 12/20/2022 10:27 AM    PLATELET 098 86/01/5774 10:27 AM    .2 (H) 12/20/2022 10:27 AM     Lab Results   Component Value Date/Time    Cholesterol, total 178 12/20/2022 10:27 AM    HDL Cholesterol 74 12/20/2022 10:27 AM    LDL, calculated 85 12/20/2022 10:27 AM    Triglyceride 95 12/20/2022 10:27 AM    CHOL/HDL Ratio 2.4 12/20/2022 10:27 AM     Lab Results   Component Value Date/Time    GFR est non-AA >60 07/12/2022 11:43 AM    GFR est AA >60 07/12/2022 11:43 AM    Creatinine 0.83 12/20/2022 10:27 AM    Creatinine (POC) 0.90 12/02/2022 01:24 PM    BUN 20 12/20/2022 10:27 AM    Sodium 138 12/20/2022 10:27 AM    Potassium 4.2 12/20/2022 10:27 AM    Chloride 105 12/20/2022 10:27 AM    CO2 30 12/20/2022 10:27 AM    Magnesium 2.2 01/16/2020 11:36 PM        Review of Systems   Respiratory:  Negative for shortness of breath. Cardiovascular:  Negative for chest pain.      Physical Exam  Constitutional:       General: She is not in acute distress. Appearance: She is not ill-appearing, toxic-appearing or diaphoretic. HENT:      Head: Normocephalic and atraumatic. Right Ear: External ear normal.      Left Ear: External ear normal.   Eyes:      General:         Right eye: No discharge. Left eye: No discharge. Conjunctiva/sclera: Conjunctivae normal.      Pupils: Pupils are equal, round, and reactive to light. Cardiovascular:      Rate and Rhythm: Normal rate and regular rhythm. Heart sounds: Murmur heard. No friction rub. No gallop. Pulmonary:      Effort: No respiratory distress. Breath sounds: Normal breath sounds. No wheezing or rales. Chest:      Chest wall: No tenderness. Abdominal:      Palpations: Abdomen is soft. Tenderness: There is no abdominal tenderness. Musculoskeletal:         General: Normal range of motion. Cervical back: Normal.      Right lower leg: No edema. Left lower leg: No edema. Skin:     General: Skin is warm and dry. Neurological:      Mental Status: She is oriented to person, place, and time. Mental status is at baseline. Coordination: Coordination normal.      Gait: Gait normal.   Psychiatric:         Mood and Affect: Mood normal.         Behavior: Behavior normal.       ASSESSMENT and PLAN    ICD-10-CM ICD-9-CM    1. Primary hypertension  D37 435.8 METABOLIC PANEL, COMPREHENSIVE   Very well controlled on current regimen of Entresto and clonidine takes half tablet twice daily   CBC W/O DIFF      2. Preop examination  V55.326 U54.91 METABOLIC PANEL, COMPREHENSIVE   Is intermediate risk for a low risk surgery with good functional METS no signs or symptoms of acute heart failure or CAD    She would not be going under general anesthesia    She may proceed to surgery without additional cardiac work-up    EKG no acute changes--unchanged from prior   CBC W/O DIFF      3.  Loose stools  I01.7 242.8 METABOLIC PANEL, COMPREHENSIVE   Patient reports some looser stools for the last several weeks discussed taking a probiotic and fiber supplementation colonoscopy is up-to-date from July 2021 if not improving she will schedule GI follow-up   CBC W/O DIFF      4. Encounter for screening mammogram for malignant neoplasm of breast  Z12.31 V76.12 KYLIE MAMMO BI SCREENING INCL CAD   Mammogram due ordered   METABOLIC PANEL, COMPREHENSIVE      CBC W/O DIFF      5. Mixed hyperlipidemia  A35.8 167.3 METABOLIC PANEL, COMPREHENSIVE   Controlled on Crestor continue   CBC W/O DIFF      6. Paroxysmal atrial fibrillation (HCC)  H62.2 468.13 METABOLIC PANEL, COMPREHENSIVE      CBC W/O DIFF   Remains in NSR continues on Tikosyn and Eliquis for anticoagulation up-to-date with cardiology   7 anxiety continue Zoloft daily  8 memory impairment has moved up her neuropsych testing to May and will be seeing neurologist in June  9 hypokalemia continues on Klor-Con  10 hypothyroid controlled on Synthroid  Depression screen reviewed and negative. Scribed by Mary Jara, as dictated by Dr. Fan Turk. Current diagnosis and concerns discussed with pt at length. Pt understands risks and benefits or current treatment plan and medications, and accepts the treatment and medication with any possible risks. Pt asks appropriate questions, which were answered. Pt was instructed to call with any concerns or problems. I have reviewed the note documented by the scribe. The services provided are my own. The documentation is accurate.

## 2023-03-29 ENCOUNTER — OFFICE VISIT (OUTPATIENT)
Dept: INTERNAL MEDICINE CLINIC | Age: 83
End: 2023-03-29
Payer: MEDICARE

## 2023-03-29 VITALS
HEIGHT: 65 IN | TEMPERATURE: 98.8 F | HEART RATE: 78 BPM | DIASTOLIC BLOOD PRESSURE: 73 MMHG | SYSTOLIC BLOOD PRESSURE: 118 MMHG | OXYGEN SATURATION: 94 % | WEIGHT: 188 LBS | BODY MASS INDEX: 31.32 KG/M2 | RESPIRATION RATE: 16 BRPM

## 2023-03-29 DIAGNOSIS — Z12.31 ENCOUNTER FOR SCREENING MAMMOGRAM FOR MALIGNANT NEOPLASM OF BREAST: ICD-10-CM

## 2023-03-29 DIAGNOSIS — E66.01 MORBID OBESITY (HCC): ICD-10-CM

## 2023-03-29 DIAGNOSIS — R19.5 LOOSE STOOLS: ICD-10-CM

## 2023-03-29 DIAGNOSIS — E03.9 ACQUIRED HYPOTHYROIDISM: ICD-10-CM

## 2023-03-29 DIAGNOSIS — E78.2 MIXED HYPERLIPIDEMIA: ICD-10-CM

## 2023-03-29 DIAGNOSIS — F41.9 ANXIETY: ICD-10-CM

## 2023-03-29 DIAGNOSIS — R41.3 MEMORY CHANGE: ICD-10-CM

## 2023-03-29 DIAGNOSIS — I10 PRIMARY HYPERTENSION: ICD-10-CM

## 2023-03-29 DIAGNOSIS — Z01.818 PREOP EXAMINATION: Primary | ICD-10-CM

## 2023-03-29 DIAGNOSIS — I48.0 PAROXYSMAL ATRIAL FIBRILLATION (HCC): ICD-10-CM

## 2023-03-29 PROCEDURE — G8399 PT W/DXA RESULTS DOCUMENT: HCPCS | Performed by: INTERNAL MEDICINE

## 2023-03-29 PROCEDURE — 1101F PT FALLS ASSESS-DOCD LE1/YR: CPT | Performed by: INTERNAL MEDICINE

## 2023-03-29 PROCEDURE — G0463 HOSPITAL OUTPT CLINIC VISIT: HCPCS | Performed by: INTERNAL MEDICINE

## 2023-03-29 PROCEDURE — G8510 SCR DEP NEG, NO PLAN REQD: HCPCS | Performed by: INTERNAL MEDICINE

## 2023-03-29 PROCEDURE — 99214 OFFICE O/P EST MOD 30 MIN: CPT | Performed by: INTERNAL MEDICINE

## 2023-03-29 PROCEDURE — G8417 CALC BMI ABV UP PARAM F/U: HCPCS | Performed by: INTERNAL MEDICINE

## 2023-03-29 PROCEDURE — G8427 DOCREV CUR MEDS BY ELIG CLIN: HCPCS | Performed by: INTERNAL MEDICINE

## 2023-03-29 PROCEDURE — 93005 ELECTROCARDIOGRAM TRACING: CPT | Performed by: INTERNAL MEDICINE

## 2023-03-29 PROCEDURE — G8536 NO DOC ELDER MAL SCRN: HCPCS | Performed by: INTERNAL MEDICINE

## 2023-03-29 PROCEDURE — 1090F PRES/ABSN URINE INCON ASSESS: CPT | Performed by: INTERNAL MEDICINE

## 2023-03-29 PROCEDURE — 93010 ELECTROCARDIOGRAM REPORT: CPT | Performed by: INTERNAL MEDICINE

## 2023-03-29 NOTE — PATIENT INSTRUCTIONS
Probiotic and fiber supplement (citrucel ) over the counterl   Medicare Wellness Visit, Female     The best way to live healthy is to have a lifestyle where you eat a well-balanced diet, exercise regularly, limit alcohol use, and quit all forms of tobacco/nicotine, if applicable. Regular preventive services are another way to keep healthy. Preventive services (vaccines, screening tests, monitoring & exams) can help personalize your care plan, which helps you manage your own care. Screening tests can find health problems at the earliest stages, when they are easiest to treat. Derek follows the current, evidence-based guidelines published by the [facility] ([facility]) when recommending preventive services for our patients. Because we follow these guidelines, sometimes recommendations change over time as research supports it. (For example, mammograms used to be recommended annually. Even though Medicare will still pay for an annual mammogram, the newer guidelines recommend a mammogram every two years for women of average risk). Of course, you and your doctor may decide to screen more often for some diseases, based on your risk and your co-morbidities (chronic disease you are already diagnosed with). Preventive services for you include:  - Medicare offers their members a free annual wellness visit, which is time for you and your primary care provider to discuss and plan for your preventive service needs.  Take advantage of this benefit every year!    -Over the age of 72 should receive the recommended pneumonia vaccines.    -All adults should have a flu vaccine yearly.  -All adults should have a tetanus vaccine every 10 years.   -Over the age 48 should receive the shingles vaccines.        -All adults should be screened once for Hepatitis C.  -All adults age 38-68 who are overweight should have a diabetes screening test once every three years.   -Other screening tests and preventive services for persons with diabetes include: an eye exam to screen for diabetic retinopathy, a kidney function test, a foot exam, and stricter control over your cholesterol.   -Cardiovascular screening for adults with routine risk involves an electrocardiogram (ECG) at intervals determined by your doctor.     -Colorectal cancer screenings should be done for adults age 39-70 with no increased risk factors for colorectal cancer. There are a number of acceptable methods of screening for this type of cancer. Each test has its own benefits and drawbacks. Discuss with your doctor what is most appropriate for you during your annual wellness visit. The different tests include: colonoscopy (considered the best screening method), a fecal occult blood test, a fecal DNA test, and sigmoidoscopy.    -Lung cancer screening is recommended annually with a low dose CT scan for adults between age 54 and 68, who have smoked at least 30 pack years (equivalent of 1 pack per day for 30 days), and who is a current smoker or quit less than 15 years ago.    -A bone mass density test is recommended when a woman turns 65 to screen for osteoporosis. This test is only recommended one time, as a screening. Some providers will use this same test as a disease monitoring tool if you already have osteoporosis. -Breast cancer screenings are recommended every other year for women of normal risk, age 54-69.    -Cervical cancer screenings for women over age 72 are only recommended with certain risk factors.      Here is a list of your current Health Maintenance items (your personalized list of preventive services) with a due date:  Health Maintenance Due   Topic Date Due    Shingles Vaccine (1 of 2) Never done

## 2023-03-29 NOTE — PROGRESS NOTES
1. \"Have you been to the ER, urgent care clinic since your last visit? Hospitalized since your last visit? \" No    2. \"Have you seen or consulted any other health care providers outside of the [address] [place] [address] since your last visit? \" No     3. For patients aged 39-70: Has the patient had a colonoscopy / FIT/ Cologuard? Yes - Care Gap present. Most recent result on file      If the patient is female:    4. For patients aged 41-77: Has the patient had a mammogram within the past 2 years? NA - based on age or sex      11. For patients aged 21-65: Has the patient had a pap smear?  NA - based on age or sex

## 2023-03-30 LAB
ALBUMIN SERPL-MCNC: 3.9 G/DL (ref 3.5–5)
ALBUMIN/GLOB SERPL: 1.3 (ref 1.1–2.2)
ALP SERPL-CCNC: 78 U/L (ref 45–117)
ALT SERPL-CCNC: 22 U/L (ref 12–78)
ANION GAP SERPL CALC-SCNC: 3 MMOL/L (ref 5–15)
AST SERPL-CCNC: 14 U/L (ref 15–37)
BILIRUB SERPL-MCNC: 0.3 MG/DL (ref 0.2–1)
BUN SERPL-MCNC: 19 MG/DL (ref 6–20)
BUN/CREAT SERPL: 21 (ref 12–20)
CALCIUM SERPL-MCNC: 9.5 MG/DL (ref 8.5–10.1)
CHLORIDE SERPL-SCNC: 107 MMOL/L (ref 97–108)
CO2 SERPL-SCNC: 28 MMOL/L (ref 21–32)
CREAT SERPL-MCNC: 0.92 MG/DL (ref 0.55–1.02)
ERYTHROCYTE [DISTWIDTH] IN BLOOD BY AUTOMATED COUNT: 12.4 % (ref 11.5–14.5)
GLOBULIN SER CALC-MCNC: 3.1 G/DL (ref 2–4)
GLUCOSE SERPL-MCNC: 99 MG/DL (ref 65–100)
HCT VFR BLD AUTO: 39 % (ref 35–47)
HGB BLD-MCNC: 12.4 G/DL (ref 11.5–16)
MCH RBC QN AUTO: 30.8 PG (ref 26–34)
MCHC RBC AUTO-ENTMCNC: 31.8 G/DL (ref 30–36.5)
MCV RBC AUTO: 97 FL (ref 80–99)
NRBC # BLD: 0 K/UL (ref 0–0.01)
NRBC BLD-RTO: 0 PER 100 WBC
PLATELET # BLD AUTO: 190 K/UL (ref 150–400)
PMV BLD AUTO: 10.9 FL (ref 8.9–12.9)
POTASSIUM SERPL-SCNC: 4.1 MMOL/L (ref 3.5–5.1)
PROT SERPL-MCNC: 7 G/DL (ref 6.4–8.2)
RBC # BLD AUTO: 4.02 M/UL (ref 3.8–5.2)
SODIUM SERPL-SCNC: 138 MMOL/L (ref 136–145)
WBC # BLD AUTO: 4.3 K/UL (ref 3.6–11)

## 2023-05-01 ENCOUNTER — HOSPITAL ENCOUNTER (OUTPATIENT)
Dept: MAMMOGRAPHY | Age: 83
Discharge: HOME | End: 2023-05-01
Attending: INTERNAL MEDICINE
Payer: MEDICARE

## 2023-05-01 DIAGNOSIS — Z12.31 ENCOUNTER FOR SCREENING MAMMOGRAM FOR MALIGNANT NEOPLASM OF BREAST: ICD-10-CM

## 2023-05-01 PROCEDURE — 77067 SCR MAMMO BI INCL CAD: CPT

## 2023-05-20 ENCOUNTER — APPOINTMENT (OUTPATIENT)
Facility: HOSPITAL | Age: 83
End: 2023-05-20
Payer: MEDICARE

## 2023-05-20 ENCOUNTER — OFFICE VISIT (OUTPATIENT)
Age: 83
End: 2023-05-20

## 2023-05-20 ENCOUNTER — HOSPITAL ENCOUNTER (EMERGENCY)
Facility: HOSPITAL | Age: 83
Discharge: HOME | End: 2023-05-20
Attending: STUDENT IN AN ORGANIZED HEALTH CARE EDUCATION/TRAINING PROGRAM
Payer: MEDICARE

## 2023-05-20 VITALS
BODY MASS INDEX: 31.73 KG/M2 | WEIGHT: 190.7 LBS | HEART RATE: 78 BPM | RESPIRATION RATE: 17 BRPM | SYSTOLIC BLOOD PRESSURE: 154 MMHG | TEMPERATURE: 97 F | OXYGEN SATURATION: 98 % | DIASTOLIC BLOOD PRESSURE: 74 MMHG

## 2023-05-20 VITALS
SYSTOLIC BLOOD PRESSURE: 140 MMHG | TEMPERATURE: 97.9 F | HEART RATE: 65 BPM | RESPIRATION RATE: 18 BRPM | BODY MASS INDEX: 32.14 KG/M2 | DIASTOLIC BLOOD PRESSURE: 68 MMHG | OXYGEN SATURATION: 97 % | WEIGHT: 193.12 LBS

## 2023-05-20 DIAGNOSIS — H93.13 TINNITUS OF BOTH EARS: Primary | ICD-10-CM

## 2023-05-20 DIAGNOSIS — H53.8 BLURRING OF VISION: Primary | ICD-10-CM

## 2023-05-20 DIAGNOSIS — H61.23 BILATERAL IMPACTED CERUMEN: ICD-10-CM

## 2023-05-20 DIAGNOSIS — H53.8 BLURRED VISION: ICD-10-CM

## 2023-05-20 LAB
ALBUMIN SERPL-MCNC: 3.9 G/DL (ref 3.5–5)
ALBUMIN/GLOB SERPL: 1.1 (ref 1.1–2.2)
ALP SERPL-CCNC: 69 U/L (ref 45–117)
ALT SERPL-CCNC: 25 U/L (ref 12–78)
ANION GAP SERPL CALC-SCNC: 4 MMOL/L (ref 5–15)
AST SERPL-CCNC: 19 U/L (ref 15–37)
BASOPHILS # BLD: 0 K/UL (ref 0–0.1)
BASOPHILS NFR BLD: 0 % (ref 0–1)
BILIRUB SERPL-MCNC: 0.4 MG/DL (ref 0.2–1)
BUN SERPL-MCNC: 18 MG/DL (ref 6–20)
BUN/CREAT SERPL: 19 (ref 12–20)
CALCIUM SERPL-MCNC: 9.4 MG/DL (ref 8.5–10.1)
CHLORIDE SERPL-SCNC: 107 MMOL/L (ref 97–108)
CO2 SERPL-SCNC: 25 MMOL/L (ref 21–32)
COMMENT:: NORMAL
CREAT SERPL-MCNC: 0.93 MG/DL (ref 0.55–1.02)
DIFFERENTIAL METHOD BLD: NORMAL
EKG ATRIAL RATE: 66 BPM
EKG DIAGNOSIS: NORMAL
EKG P AXIS: 65 DEGREES
EKG P-R INTERVAL: 162 MS
EKG Q-T INTERVAL: 464 MS
EKG QRS DURATION: 74 MS
EKG QTC CALCULATION (BAZETT): 486 MS
EKG R AXIS: -15 DEGREES
EKG T AXIS: 13 DEGREES
EKG VENTRICULAR RATE: 66 BPM
EOSINOPHIL # BLD: 0.1 K/UL (ref 0–0.4)
EOSINOPHIL NFR BLD: 2 % (ref 0–7)
ERYTHROCYTE [DISTWIDTH] IN BLOOD BY AUTOMATED COUNT: 12.8 % (ref 11.5–14.5)
GLOBULIN SER CALC-MCNC: 3.7 G/DL (ref 2–4)
GLUCOSE BLD STRIP.AUTO-MCNC: 94 MG/DL (ref 65–117)
GLUCOSE SERPL-MCNC: 103 MG/DL (ref 65–100)
HCT VFR BLD AUTO: 39.9 % (ref 35–47)
HGB BLD-MCNC: 13.2 G/DL (ref 11.5–16)
IMM GRANULOCYTES # BLD AUTO: 0 K/UL (ref 0–0.04)
IMM GRANULOCYTES NFR BLD AUTO: 0 % (ref 0–0.5)
INR PPP: 1 (ref 0.9–1.1)
LYMPHOCYTES # BLD: 1 K/UL (ref 0.8–3.5)
LYMPHOCYTES NFR BLD: 20 % (ref 12–49)
MCH RBC QN AUTO: 31 PG (ref 26–34)
MCHC RBC AUTO-ENTMCNC: 33.1 G/DL (ref 30–36.5)
MCV RBC AUTO: 93.7 FL (ref 80–99)
MONOCYTES # BLD: 0.4 K/UL (ref 0–1)
MONOCYTES NFR BLD: 7 % (ref 5–13)
NEUTS SEG # BLD: 3.4 K/UL (ref 1.8–8)
NEUTS SEG NFR BLD: 71 % (ref 32–75)
NRBC # BLD: 0 K/UL (ref 0–0.01)
NRBC BLD-RTO: 0 PER 100 WBC
PLATELET # BLD AUTO: 175 K/UL (ref 150–400)
PMV BLD AUTO: 10.8 FL (ref 8.9–12.9)
POTASSIUM SERPL-SCNC: 4.3 MMOL/L (ref 3.5–5.1)
PROT SERPL-MCNC: 7.6 G/DL (ref 6.4–8.2)
PROTHROMBIN TIME: 10 SEC (ref 9–11.1)
RBC # BLD AUTO: 4.26 M/UL (ref 3.8–5.2)
SERVICE CMNT-IMP: NORMAL
SODIUM SERPL-SCNC: 136 MMOL/L (ref 136–145)
SPECIMEN HOLD: NORMAL
TROPONIN I SERPL HS-MCNC: <3 NG/L (ref 0–37)
WBC # BLD AUTO: 4.9 K/UL (ref 3.6–11)

## 2023-05-20 PROCEDURE — 85025 COMPLETE CBC W/AUTO DIFF WBC: CPT

## 2023-05-20 PROCEDURE — 6370000000 HC RX 637 (ALT 250 FOR IP): Performed by: STUDENT IN AN ORGANIZED HEALTH CARE EDUCATION/TRAINING PROGRAM

## 2023-05-20 PROCEDURE — 6360000004 HC RX CONTRAST MEDICATION: Performed by: RADIOLOGY

## 2023-05-20 PROCEDURE — 80053 COMPREHEN METABOLIC PANEL: CPT

## 2023-05-20 PROCEDURE — 93005 ELECTROCARDIOGRAM TRACING: CPT | Performed by: STUDENT IN AN ORGANIZED HEALTH CARE EDUCATION/TRAINING PROGRAM

## 2023-05-20 PROCEDURE — 0042T CT BRAIN PERFUSION: CPT

## 2023-05-20 PROCEDURE — 70551 MRI BRAIN STEM W/O DYE: CPT

## 2023-05-20 PROCEDURE — 69209 REMOVE IMPACTED EAR WAX UNI: CPT

## 2023-05-20 PROCEDURE — 84484 ASSAY OF TROPONIN QUANT: CPT

## 2023-05-20 PROCEDURE — 36415 COLL VENOUS BLD VENIPUNCTURE: CPT

## 2023-05-20 PROCEDURE — 99285 EMERGENCY DEPT VISIT HI MDM: CPT

## 2023-05-20 PROCEDURE — 70496 CT ANGIOGRAPHY HEAD: CPT

## 2023-05-20 PROCEDURE — 70450 CT HEAD/BRAIN W/O DYE: CPT

## 2023-05-20 PROCEDURE — 82962 GLUCOSE BLOOD TEST: CPT

## 2023-05-20 PROCEDURE — 85610 PROTHROMBIN TIME: CPT

## 2023-05-20 RX ORDER — DOCUSATE SODIUM 100 MG/1
100 CAPSULE, LIQUID FILLED ORAL ONCE
Status: COMPLETED | OUTPATIENT
Start: 2023-05-20 | End: 2023-05-20

## 2023-05-20 RX ORDER — CALCIUM CARBONATE 500(1250)
TABLET ORAL
COMMUNITY
Start: 2007-12-14

## 2023-05-20 RX ORDER — HYDROXYZINE HYDROCHLORIDE 25 MG/1
TABLET, FILM COATED ORAL DAILY PRN
COMMUNITY

## 2023-05-20 RX ADMIN — IOPAMIDOL 80 ML: 755 INJECTION, SOLUTION INTRAVENOUS at 12:02

## 2023-05-20 RX ADMIN — DOCUSATE SODIUM 100 MG: 100 CAPSULE, LIQUID FILLED ORAL at 12:58

## 2023-05-20 RX ADMIN — IOPAMIDOL 40 ML: 755 INJECTION, SOLUTION INTRAVENOUS at 11:54

## 2023-05-20 ASSESSMENT — LIFESTYLE VARIABLES
HOW MANY STANDARD DRINKS CONTAINING ALCOHOL DO YOU HAVE ON A TYPICAL DAY: PATIENT DOES NOT DRINK
HOW OFTEN DO YOU HAVE A DRINK CONTAINING ALCOHOL: NEVER

## 2023-05-20 ASSESSMENT — ENCOUNTER SYMPTOMS
VOMITING: 0
NAUSEA: 0
DIARRHEA: 0
SHORTNESS OF BREATH: 0
RHINORRHEA: 0
EYE REDNESS: 0
EYE PAIN: 0
SHORTNESS OF BREATH: 0
COUGH: 0
COUGH: 0
CHEST TIGHTNESS: 0
SORE THROAT: 0
ABDOMINAL PAIN: 0

## 2023-05-20 ASSESSMENT — PAIN - FUNCTIONAL ASSESSMENT: PAIN_FUNCTIONAL_ASSESSMENT: NONE - DENIES PAIN

## 2023-05-20 NOTE — ED NOTES
Bedside shift change report given to [employer], RN (oncoming nurse) by Katja Enriquez RN (offgoing nurse). Report included the following information Nurse Handoff Report, ED SBAR, MAR, Recent Results, and Cardiac Rhythm SR Dual neuro assessment.         Shell Oliver RN  05/20/23 [id number]

## 2023-05-20 NOTE — ED NOTES
Pt returned from MRI. Ambulatory to stretcher.  Sister at [address], [facility]  05/20/23 0401

## 2023-05-20 NOTE — ED TRIAGE NOTES
Patient states she wore up this am around 5 am and had ringing in her ears and blurred vision. She states the ringing has continued but the vision is improved.

## 2023-05-20 NOTE — ED NOTES
11:35 AM  I have evaluated the patient as the Provider in Triage. I have reviewed her vital signs and the triage nurse assessment. I have talked with the patient and any available family and advised that I am the provider in triage and have ordered the appropriate study to initiate their work up based on the clinical presentation during my assessment. I have advised that the patient will be accommodated in the Main ED as soon as possible. I have also requested to contact the triage nurse or myself immediately if the patient experiences any changes in their condition during this brief waiting period.   Graciela Kumar PA  05/20/23 1135

## 2023-05-20 NOTE — PATIENT INSTRUCTIONS
YOU ARE BEING REFERRED TO THE EMERGENCY DEPARTMENT FOR EVALUATION OF SUDDEN ONSET OF BLURRED VISION.

## 2023-05-20 NOTE — PROGRESS NOTES
Neurocritical Care Code Stroke Documentation      Symptoms: \"Buzzing in head\", blurred vision   Baseline mRS:   0   Last Known Well: 12 MN   Medical hx: Past Medical History:   Diagnosis Date    Arthritis     right knee, left shoulder    Atrial fibrillation ([address]     23 Dr. Deedee Mcneal    CAD (coronary artery disease)     Diverticulosis     Frequency of urination     High cholesterol     Hypertension     Hypothyroid     Long term current use of anticoagulant therapy     Morbid obesity (HCC)     Osteoarthritis     Peripheral neuropathy     bilateral LE    Unspecified adverse effect of anesthesia     low BP    Wells' syndrome       Anticoagulation: Eliquis   VAN:   Negative   NIHSS:   1a-LOC:0    1b-Month/Age:0    1c-Open/Close Hand:0    2-Best Gaze:0    3-Visual Fields:0    4-Facial Palsy:0    5a-Left Arm:0    5b-Right Arm:0    6a-Left Le    6b-Right Le    7-Limb Ataxia:0    8-Sensory:0    9-Best Language:0    10-Dysarthria:0    11-Extinction/Inattention:0  TOTAL SCORE:0   Imaging:   CT- No acute process    CTA- No acute vascular abnormality    CTP- No perfusion defect   Plan:   TNK Candidate: NO    Mechanical thrombectomy Candidate: NO     *Perform dysphagia screening prior to any PO intake*    Discussed with: Dr. Smith Marion, Teleneurology    Arrival time: 11:38 am  Time spent: 50 minutes.      TAYLOR Bunn NP  Neurocritical Care Nurse Practitioner  [phone]

## 2023-05-20 NOTE — ED NOTES
Irrigation to the left ear attempted without return of any ear wax. Pt is requesting to stop irrigation because she has an appointment on Monday to have her ears cleaned out.         Isabela Toure RN  05/20/23 5930

## 2023-05-20 NOTE — ED PROVIDER NOTES
[facility] EMERGENCY DEP  EMERGENCY DEPARTMENT ENCOUNTER      Pt Name: Larry Card  MRN: [id number]  Akbar 1940  Date of evaluation: 5/20/2023  Provider: [facility]       Chief Complaint   Patient presents with    Tinnitus    Blurred Vision         HISTORY OF PRESENT ILLNESS   (Location/Symptom, Timing/Onset, Context/Setting, Quality, Duration, Modifying Factors, Severity)  Note limiting factors. 37-year-old female prior history of A-fib on Eliquis, coronary disease, hyperlipidemia, hypertension, hypothyroidism presenting today with visual changes and buzzing in her ears. Symptoms started when she woke up at 4:30 AM this morning she feels that her whole head is buzzing radiating to both ears. She also reports that at 9 AM she was driving and had sudden change in vision with severe blurred vision affecting both eyes, no specific visual field defect. No focal weakness or numbness. No trouble walking. The vision issue lasted an hour and has since resolved. She has not had headaches. Level 2 stroke initiated upon arrival.          Review of External Medical Records:     Nursing Notes were reviewed. REVIEW OF SYSTEMS    (2-9 systems for level 4, 10 or more for level 5)     Review of Systems   Constitutional:  Negative for fatigue and fever. HENT:  Positive for tinnitus. Negative for congestion. Eyes:  Positive for visual disturbance. Negative for pain and redness. Respiratory:  Negative for cough and shortness of breath. Gastrointestinal:  Negative for abdominal pain, diarrhea, nausea and vomiting. Genitourinary:  Negative for dysuria and vaginal bleeding. Musculoskeletal:  Negative for arthralgias and myalgias. Skin:  Negative for rash and wound. Neurological:  Negative for dizziness and headaches. All other systems reviewed and are negative. Except as noted above the remainder of the review of systems was reviewed and negative.        PAST

## 2023-05-20 NOTE — PROGRESS NOTES
Subjective: (As above and below)     The patient/guardian gave verbal consent to treat. Chief Complaint   Patient presents with    Eye Problem     Patient c/o ringing in her head and eyes are blurry close up patient states she woke up like this this morning. Ken Lares is a 82 y.o. female presenting to clinic today with sudden-onset blurry vision and tinnitus when she awoke this morning. She states that her far vision is unchanged, but reports that she is having difficulty with near vision, new this morning. Also states that it feels like her Joanna Rias is ringing. \" Denies headache, denies loss of balance, denies new numbness/tingling of the extremities, denies focal weakness. Denies dizziness, chest pain, or shortness of breath. No other complaints today. Of note, patient drove herself here today. History provided by:  Patient  History limited by: nothing.  used: No    Eye Problem   Pertinent negatives include no fever or weakness. Review of Systems   Constitutional:  Negative for chills and fever. HENT:  Negative for congestion, rhinorrhea and sore throat. Eyes:  Positive for visual disturbance. Respiratory:  Negative for cough, chest tightness and shortness of breath. Cardiovascular:  Negative for chest pain. Neurological:  Negative for dizziness, speech difficulty, weakness, numbness and headaches. Psychiatric/Behavioral:  Negative for confusion. Review of Systems - negative except as listed above    Reviewed PmHx, RxHx, FmHx, SocHx, AllgHx and updated in chart.   Family History   Problem Relation Age of Onset    Diabetes Paternal Grandmother     Cancer Paternal Aunt     Hypertension Father     Heart Disease Father     Heart Disease Mother     Alzheimer's Disease Mother        Past Medical History:   Diagnosis Date    Arthritis     right knee, left shoulder    Atrial fibrillation ([facility] [address]     4/12/23 Dr. Cortney Gonzales    CAD (coronary artery

## 2023-05-24 NOTE — TELEPHONE ENCOUNTER
PCP: Jannet Morelos MD    Last appt:   3/29/2023    Future Appointments   Date Time Provider Kenn Davis   6/9/2023  1:40 PM Sharri Riddle MD [facility] BS AMB   8/11/2023 11:00 AM BARBIE Bradshaw BS AMB   9/26/2023 12:00 PM Anayeli Nelson MD [facility] BS AMB       Requested Prescriptions     Pending Prescriptions Disp Refills    sertraline (ZOLOFT) 50 MG tablet [Pharmacy Med Name: SERTRALINE HCL 50 MG TABLET] 135 tablet      Sig: TAKE 1 AND 1/2 TABLETS BY MOUTH EVERY DAY

## 2023-06-02 ENCOUNTER — OFFICE VISIT (OUTPATIENT)
Age: 83
End: 2023-06-02

## 2023-06-02 VITALS
BODY MASS INDEX: 31.32 KG/M2 | TEMPERATURE: 98.1 F | WEIGHT: 188 LBS | HEIGHT: 65 IN | RESPIRATION RATE: 15 BRPM | DIASTOLIC BLOOD PRESSURE: 73 MMHG | SYSTOLIC BLOOD PRESSURE: 138 MMHG | OXYGEN SATURATION: 97 % | HEART RATE: 86 BPM
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
## 2023-06-02 RX ORDER — DOXYCYCLINE HYCLATE 100 MG
100 TABLET ORAL 2 TIMES DAILY
Qty: 14 TABLET | Refills: 0 | Status: SHIPPED | OUTPATIENT
Start: 2023-06-02 | End: 2023-06-09

## 2023-06-02 RX ORDER — CEPHALEXIN 500 MG/1
CAPSULE ORAL
COMMUNITY

## 2023-06-02 RX ORDER — TRIAMCINOLONE ACETONIDE 1 MG/G
CREAM TOPICAL
Qty: 1 EACH | Refills: 0 | Status: SHIPPED | OUTPATIENT
Start: 2023-06-02

## 2023-06-02 NOTE — PROGRESS NOTES
Out of Food in the Last Year: Never true    Ran Out of Food in the Last Year: Never true          Current Outpatient Medications   Medication Sig    doxycycline hyclate (VIBRA-TABS) 100 MG tablet Take 1 tablet by mouth 2 times daily for 7 days    triamcinolone (KENALOG) 0.1 % cream Apply topically 2 times daily for up to 10 days. sertraline (ZOLOFT) 50 MG tablet TAKE 1 AND 1/2 TABLETS BY MOUTH EVERY DAY    calcium carbonate (OSCAL) 500 MG TABS tablet Caltrate 600-D Plus Minerals 600 mg calcium-400 unit tablet    hydrOXYzine HCl (ATARAX) 25 MG tablet Take by mouth daily as needed    apixaban (ELIQUIS) 5 MG TABS tablet TAKE 1 TABLET BY MOUTH TWO (2) TIMES A DAY FOR 30 DAYS. cloNIDine (CATAPRES) 0.2 MG tablet Take 1 tablet by mouth 2 times daily    dofetilide (TIKOSYN) 500 MCG capsule Take 1 capsule by mouth in the morning and 1 capsule in the evening. levothyroxine (SYNTHROID) 50 MCG tablet TAKE 1 TABLET BY MOUTH EVERY DAY BEFORE BREAKFAST    potassium chloride (KLOR-CON) 10 MEQ extended release tablet TAKE 2 TABLETS IN MORNING AND 2 TABLETS IN THE EVENING    rosuvastatin (CRESTOR) 20 MG tablet TAKE 1 TABLET BY MOUTH EVERY DAY    sacubitril-valsartan (ENTRESTO) 24-26 MG per tablet Take 1 tablet by mouth 2 times daily    cephALEXin (KEFLEX) 500 MG capsule cephalexin 500 mg capsule   TAKE 1 CAPSULE BY MOUTH 4 TIMES A DAY FOR 7 DAYS. venlafaxine (EFFEXOR) 37.5 MG tablet Take 37.5 mg by mouth daily (Patient not taking: Reported on 5/20/2023)    venlafaxine (EFFEXOR XR) 37.5 MG extended release capsule Take 37.5 mg by mouth daily (Patient not taking: Reported on 5/20/2023)     No current facility-administered medications for this visit.        Objective:     Vitals:    06/02/23 1907   BP: 138/73   Site: Left Upper Arm   Position: Sitting   Cuff Size: Large Adult   Pulse: 86   Resp: 15   Temp: 98.1 °F (36.7 °C)   TempSrc: Temporal   SpO2: 97%   Weight: 188 lb (85.3 kg)   Height: 5' 5\" (1.651 m)       Physical

## 2023-06-07 NOTE — ANESTHESIA PREPROCEDURE EVALUATION
Anesthetic History   No history of anesthetic complications            Review of Systems / Medical History  Patient summary reviewed, nursing notes reviewed and pertinent labs reviewed    Pulmonary  Within defined limits                 Neuro/Psych   Within defined limits           Cardiovascular    Hypertension: poorly controlled        Dysrhythmias : atrial fibrillation  CAD and hyperlipidemia    Exercise tolerance: >4 METS     GI/Hepatic/Renal               Comments: Diverticulosis  H/O Rectal Bleeding Endo/Other      Hypothyroidism: well controlled  Morbid obesity and arthritis     Other Findings            Physical Exam    Airway  Mallampati: II  TM Distance: > 6 cm  Neck ROM: normal range of motion   Mouth opening: Normal     Cardiovascular  Regular rate and rhythm,  S1 and S2 normal,  no murmur, click, rub, or gallop             Dental      Comments: Several missing molars, no loose teeth.    Pulmonary  Breath sounds clear to auscultation               Abdominal  GI exam deferred       Other Findings            Anesthetic Plan    ASA: 3  Anesthesia type: general          Induction: Intravenous  Anesthetic plan and risks discussed with: Patient No

## 2023-07-10 RX ORDER — ROSUVASTATIN CALCIUM 20 MG/1
TABLET, COATED ORAL
Qty: 90 TABLET | Refills: 0 | Status: SHIPPED | OUTPATIENT
Start: 2023-07-10

## 2023-07-10 RX ORDER — POTASSIUM CHLORIDE 750 MG/1
TABLET, FILM COATED, EXTENDED RELEASE ORAL
Qty: 360 TABLET | Refills: 0 | Status: SHIPPED | OUTPATIENT
Start: 2023-07-10

## 2023-07-12 ENCOUNTER — TELEPHONE (OUTPATIENT)
Age: 83
End: 2023-07-12

## 2023-08-09 ENCOUNTER — TELEPHONE (OUTPATIENT)
Age: 83
End: 2023-08-09

## 2023-08-09 NOTE — TELEPHONE ENCOUNTER
Patient called to cancel her appt with Dr Aparna Holguin for 8/11. Stated that she is no longer seeing Dr. Tenzin Gross and is transitioning over to a new pcp. Pt wants to touch base with her new pcp before she sees Dr Aparna Holguin. Please advise.  [phone]

## 2023-08-23 ENCOUNTER — OFFICE VISIT (OUTPATIENT)
Age: 83
End: 2023-08-23
Payer: MEDICARE

## 2023-08-23 VITALS
OXYGEN SATURATION: 99 % | SYSTOLIC BLOOD PRESSURE: 166 MMHG | TEMPERATURE: 97.8 F | DIASTOLIC BLOOD PRESSURE: 84 MMHG | BODY MASS INDEX: 31.49 KG/M2 | RESPIRATION RATE: 18 BRPM | HEIGHT: 65 IN | WEIGHT: 189 LBS | HEART RATE: 75 BPM

## 2023-08-23 DIAGNOSIS — E03.9 ACQUIRED HYPOTHYROIDISM: ICD-10-CM

## 2023-08-23 DIAGNOSIS — R41.3 MEMORY LOSS: Primary | ICD-10-CM

## 2023-08-23 DIAGNOSIS — F41.9 ANXIETY: ICD-10-CM

## 2023-08-23 DIAGNOSIS — E78.00 PURE HYPERCHOLESTEROLEMIA: ICD-10-CM

## 2023-08-23 DIAGNOSIS — I48.91 ATRIAL FIBRILLATION, UNSPECIFIED TYPE (HCC): ICD-10-CM

## 2023-08-23 DIAGNOSIS — Z91.81 AT HIGH RISK FOR FALLS: ICD-10-CM

## 2023-08-23 DIAGNOSIS — N39.46 MIXED STRESS AND URGE URINARY INCONTINENCE: ICD-10-CM

## 2023-08-23 DIAGNOSIS — I10 PRIMARY HYPERTENSION: ICD-10-CM

## 2023-08-23 PROCEDURE — 1090F PRES/ABSN URINE INCON ASSESS: CPT | Performed by: FAMILY MEDICINE

## 2023-08-23 PROCEDURE — 1036F TOBACCO NON-USER: CPT | Performed by: FAMILY MEDICINE

## 2023-08-23 PROCEDURE — 99214 OFFICE O/P EST MOD 30 MIN: CPT | Performed by: FAMILY MEDICINE

## 2023-08-23 PROCEDURE — G8427 DOCREV CUR MEDS BY ELIG CLIN: HCPCS | Performed by: FAMILY MEDICINE

## 2023-08-23 PROCEDURE — 0509F URINE INCON PLAN DOCD: CPT | Performed by: FAMILY MEDICINE

## 2023-08-23 PROCEDURE — G8399 PT W/DXA RESULTS DOCUMENT: HCPCS | Performed by: FAMILY MEDICINE

## 2023-08-23 PROCEDURE — G8417 CALC BMI ABV UP PARAM F/U: HCPCS | Performed by: FAMILY MEDICINE

## 2023-08-23 PROCEDURE — 1123F ACP DISCUSS/DSCN MKR DOCD: CPT | Performed by: FAMILY MEDICINE

## 2023-08-23 PROCEDURE — 3077F SYST BP >= 140 MM HG: CPT | Performed by: FAMILY MEDICINE

## 2023-08-23 PROCEDURE — 3079F DIAST BP 80-89 MM HG: CPT | Performed by: FAMILY MEDICINE

## 2023-08-23 RX ORDER — ROSUVASTATIN CALCIUM 20 MG/1
20 TABLET, COATED ORAL DAILY
Qty: 90 TABLET | Refills: 1 | Status: SHIPPED | OUTPATIENT
Start: 2023-08-23

## 2023-08-23 RX ORDER — CLONIDINE HYDROCHLORIDE 0.2 MG/1
TABLET ORAL
Qty: 180 TABLET | Refills: 1 | Status: SHIPPED | OUTPATIENT
Start: 2023-08-23

## 2023-08-23 RX ORDER — POTASSIUM CHLORIDE 750 MG/1
TABLET, FILM COATED, EXTENDED RELEASE ORAL
Qty: 360 TABLET | Refills: 1 | Status: SHIPPED | OUTPATIENT
Start: 2023-08-23

## 2023-08-23 RX ORDER — DONEPEZIL HYDROCHLORIDE 5 MG/1
5 TABLET, FILM COATED ORAL NIGHTLY
Qty: 90 TABLET | Refills: 1 | Status: SHIPPED | OUTPATIENT
Start: 2023-08-23

## 2023-08-23 SDOH — ECONOMIC STABILITY: INCOME INSECURITY: HOW HARD IS IT FOR YOU TO PAY FOR THE VERY BASICS LIKE FOOD, HOUSING, MEDICAL CARE, AND HEATING?: NOT HARD AT ALL

## 2023-08-23 SDOH — ECONOMIC STABILITY: HOUSING INSECURITY
IN THE LAST 12 MONTHS, WAS THERE A TIME WHEN YOU DID NOT HAVE A STEADY PLACE TO SLEEP OR SLEPT IN A SHELTER (INCLUDING NOW)?: NO

## 2023-08-23 SDOH — ECONOMIC STABILITY: FOOD INSECURITY: WITHIN THE PAST 12 MONTHS, YOU WORRIED THAT YOUR FOOD WOULD RUN OUT BEFORE YOU GOT MONEY TO BUY MORE.: NEVER TRUE

## 2023-08-23 SDOH — ECONOMIC STABILITY: FOOD INSECURITY: WITHIN THE PAST 12 MONTHS, THE FOOD YOU BOUGHT JUST DIDN'T LAST AND YOU DIDN'T HAVE MONEY TO GET MORE.: NEVER TRUE

## 2023-08-23 ASSESSMENT — PATIENT HEALTH QUESTIONNAIRE - PHQ9
SUM OF ALL RESPONSES TO PHQ QUESTIONS 1-9: 0
SUM OF ALL RESPONSES TO PHQ9 QUESTIONS 1 & 2: 0
SUM OF ALL RESPONSES TO PHQ QUESTIONS 1-9: 0
1. LITTLE INTEREST OR PLEASURE IN DOING THINGS: 0
2. FEELING DOWN, DEPRESSED OR HOPELESS: 0

## 2023-08-23 NOTE — PROGRESS NOTES
Carla Mensah is a 82 y.o. female who presents to re-establish. Has been seeing dr Marquez Bolton memory issues. Lives alone. Sister is her POA, lives in [facility]. Patient had neuropsych testing. DR Ortez. Treated for hypothyroidism. TSH 0.62. Treated for HTN. BP elevated today. Treated for HLP. LDL at goal.      [facility] cardiology. Dr Franceen Bamberger. On eliquis for AF. No bleeding. Reports urinary frequency, up at night every 2 hours. Has incontinence. Using pads. No dysuria. Has LE neuropathy. Chand Cody and injured left leg, extensive bruising, occurred 2 months ago, getting better.           Past Medical History:   Diagnosis Date    Arthritis     right knee, left shoulder    Atrial fibrillation ([address])     4/12/23 Dr. Jim Paez    CAD (coronary artery disease)     Diverticulosis     Fractures     Frequency of urination     High cholesterol     Hypertension     Hypothyroid     Long term current use of anticoagulant therapy     Morbid obesity (HCC)     Osteoarthritis     Peripheral neuropathy     bilateral LE    Unspecified adverse effect of anesthesia     low BP    Wells' syndrome        Family History   Problem Relation Age of Onset    Diabetes Paternal Grandmother     Cancer Paternal Aunt     Hypertension Father     Heart Disease Father     Heart Disease Mother     Alzheimer's Disease Mother         Social History     Socioeconomic History    Marital status:      Spouse name: Not on file    Number of children: Not on file    Years of education: Not on file    Highest education level: Not on file   Occupational History    Not on file   Tobacco Use    Smoking status: Never    Smokeless tobacco: Never   Vaping Use    Vaping Use: Never used   Substance and Sexual Activity    Alcohol use: No    Drug use: No    Sexual activity: Not on file   Other Topics Concern    Not on file   Social History Narrative    Not on file     Social Determinants of Health     Financial Resource Strain:

## 2023-08-24 LAB
CHOLEST SERPL-MCNC: 166 MG/DL
HDLC SERPL-MCNC: 58 MG/DL
HDLC SERPL: 2.9 (ref 0–5)
LDLC SERPL CALC-MCNC: 71 MG/DL (ref 0–100)
TRIGL SERPL-MCNC: 185 MG/DL
TSH SERPL DL<=0.05 MIU/L-ACNC: 0.9 UIU/ML (ref 0.36–3.74)
VIT B12 SERPL-MCNC: 600 PG/ML (ref 193–986)
VLDLC SERPL CALC-MCNC: 37 MG/DL

## 2023-09-11 RX ORDER — LEVOTHYROXINE SODIUM 50 MCG
TABLET ORAL
Qty: 90 TABLET | Refills: 0 | Status: SHIPPED | OUTPATIENT
Start: 2023-09-11

## 2023-09-21 ENCOUNTER — TELEPHONE (OUTPATIENT)
Age: 83
End: 2023-09-21

## 2023-09-27 ENCOUNTER — TELEPHONE (OUTPATIENT)
Age: 83
End: 2023-09-27

## 2023-09-27 ENCOUNTER — NURSE ONLY (OUTPATIENT)
Age: 83
End: 2023-09-27

## 2023-09-27 VITALS
HEART RATE: 78 BPM | BODY MASS INDEX: 30.72 KG/M2 | DIASTOLIC BLOOD PRESSURE: 74 MMHG | OXYGEN SATURATION: 98 % | TEMPERATURE: 97.5 F | WEIGHT: 184.4 LBS | RESPIRATION RATE: 16 BRPM | HEIGHT: 65 IN | SYSTOLIC BLOOD PRESSURE: 139 MMHG

## 2023-09-27 NOTE — PROGRESS NOTES
Identified pt with two pt identifiers(name and ). Reviewed record in preparation for visit and have obtained necessary documentation. Chief Complaint   Patient presents with    Hypertension        Patient came in to have their BP checked. Patient states they are feeling great. Patient's Blood pressure was 139/74  Patient did not have any other questions today.       Liz Sweet LPN

## 2023-09-27 NOTE — TELEPHONE ENCOUNTER
Pt has an appt on 10-2-23, but is having b/p problems today. Earlier am 175/98 72 pulse. Then took half clonidine and re did b/p and it was 165/85 86 pulse    Takes medications at 10:15 am & 10:15 pm    Pt didn't take meds today until 11:35 am    B/p has not gone down 165/89 78 pulse    Pt states she just doesn't feel right. Face is red. Please call. You may try both numbers if needed.

## 2023-09-27 NOTE — TELEPHONE ENCOUNTER
Patient states she is calling back to advise & add to earlier Message that she is also having Symptoms of Frequent Urination. Please call to discuss.  Thank you

## 2023-09-30 ENCOUNTER — APPOINTMENT (OUTPATIENT)
Facility: HOSPITAL | Age: 83
End: 2023-09-30
Payer: MEDICARE

## 2023-09-30 ENCOUNTER — HOSPITAL ENCOUNTER (EMERGENCY)
Facility: HOSPITAL | Age: 83
Discharge: HOME | End: 2023-09-30
Attending: STUDENT IN AN ORGANIZED HEALTH CARE EDUCATION/TRAINING PROGRAM
Payer: MEDICARE

## 2023-09-30 VITALS
OXYGEN SATURATION: 98 % | SYSTOLIC BLOOD PRESSURE: 143 MMHG | RESPIRATION RATE: 32 BRPM | TEMPERATURE: 97.8 F | BODY MASS INDEX: 30.66 KG/M2 | WEIGHT: 184 LBS | HEIGHT: 65 IN | HEART RATE: 60 BPM | DIASTOLIC BLOOD PRESSURE: 78 MMHG

## 2023-09-30 DIAGNOSIS — R00.2 PALPITATIONS: Primary | ICD-10-CM

## 2023-09-30 DIAGNOSIS — I48.0 PAROXYSMAL A-FIB (HCC): ICD-10-CM

## 2023-09-30 DIAGNOSIS — M25.561 ACUTE PAIN OF RIGHT KNEE: ICD-10-CM

## 2023-09-30 LAB
ALBUMIN SERPL-MCNC: 3.7 G/DL (ref 3.5–5)
ALBUMIN/GLOB SERPL: 1 (ref 1.1–2.2)
ALP SERPL-CCNC: 69 U/L (ref 45–117)
ALT SERPL-CCNC: 25 U/L (ref 12–78)
ANION GAP SERPL CALC-SCNC: 6 MMOL/L (ref 5–15)
APPEARANCE UR: CLEAR
AST SERPL-CCNC: 15 U/L (ref 15–37)
BACTERIA URNS QL MICRO: NEGATIVE /HPF
BASOPHILS # BLD: 0 K/UL (ref 0–0.1)
BASOPHILS NFR BLD: 1 % (ref 0–1)
BILIRUB SERPL-MCNC: 0.4 MG/DL (ref 0.2–1)
BILIRUB UR QL: NEGATIVE
BUN SERPL-MCNC: 15 MG/DL (ref 6–20)
BUN/CREAT SERPL: 18 (ref 12–20)
CALCIUM SERPL-MCNC: 9 MG/DL (ref 8.5–10.1)
CHLORIDE SERPL-SCNC: 107 MMOL/L (ref 97–108)
CO2 SERPL-SCNC: 25 MMOL/L (ref 21–32)
COLOR UR: ABNORMAL
COMMENT:: NORMAL
CREAT SERPL-MCNC: 0.83 MG/DL (ref 0.55–1.02)
DIFFERENTIAL METHOD BLD: NORMAL
EKG ATRIAL RATE: 63 BPM
EKG DIAGNOSIS: NORMAL
EKG P AXIS: 52 DEGREES
EKG P-R INTERVAL: 162 MS
EKG Q-T INTERVAL: 460 MS
EKG QRS DURATION: 80 MS
EKG QTC CALCULATION (BAZETT): 470 MS
EKG R AXIS: -15 DEGREES
EKG T AXIS: 15 DEGREES
EKG VENTRICULAR RATE: 63 BPM
EOSINOPHIL # BLD: 0.1 K/UL (ref 0–0.4)
EOSINOPHIL NFR BLD: 4 % (ref 0–7)
EPITH CASTS URNS QL MICRO: ABNORMAL /LPF
ERYTHROCYTE [DISTWIDTH] IN BLOOD BY AUTOMATED COUNT: 12.8 % (ref 11.5–14.5)
GLOBULIN SER CALC-MCNC: 3.7 G/DL (ref 2–4)
GLUCOSE SERPL-MCNC: 108 MG/DL (ref 65–100)
GLUCOSE UR STRIP.AUTO-MCNC: NEGATIVE MG/DL
HCT VFR BLD AUTO: 38.4 % (ref 35–47)
HGB BLD-MCNC: 12.8 G/DL (ref 11.5–16)
HGB UR QL STRIP: ABNORMAL
IMM GRANULOCYTES # BLD AUTO: 0 K/UL (ref 0–0.04)
IMM GRANULOCYTES NFR BLD AUTO: 0 % (ref 0–0.5)
KETONES UR QL STRIP.AUTO: NEGATIVE MG/DL
LEUKOCYTE ESTERASE UR QL STRIP.AUTO: NEGATIVE
LYMPHOCYTES # BLD: 1 K/UL (ref 0.8–3.5)
LYMPHOCYTES NFR BLD: 27 % (ref 12–49)
MAGNESIUM SERPL-MCNC: 2.5 MG/DL (ref 1.6–2.4)
MCH RBC QN AUTO: 31.4 PG (ref 26–34)
MCHC RBC AUTO-ENTMCNC: 33.3 G/DL (ref 30–36.5)
MCV RBC AUTO: 94.1 FL (ref 80–99)
MONOCYTES # BLD: 0.4 K/UL (ref 0–1)
MONOCYTES NFR BLD: 11 % (ref 5–13)
NEUTS SEG # BLD: 2.1 K/UL (ref 1.8–8)
NEUTS SEG NFR BLD: 57 % (ref 32–75)
NITRITE UR QL STRIP.AUTO: NEGATIVE
NRBC # BLD: 0 K/UL (ref 0–0.01)
NRBC BLD-RTO: 0 PER 100 WBC
NT PRO BNP: 42 PG/ML
PH UR STRIP: 6.5 (ref 5–8)
PLATELET # BLD AUTO: 155 K/UL (ref 150–400)
PMV BLD AUTO: 10.5 FL (ref 8.9–12.9)
POTASSIUM SERPL-SCNC: 4.2 MMOL/L (ref 3.5–5.1)
PROT SERPL-MCNC: 7.4 G/DL (ref 6.4–8.2)
PROT UR STRIP-MCNC: NEGATIVE MG/DL
RBC # BLD AUTO: 4.08 M/UL (ref 3.8–5.2)
RBC #/AREA URNS HPF: ABNORMAL /HPF (ref 0–5)
SODIUM SERPL-SCNC: 138 MMOL/L (ref 136–145)
SP GR UR REFRACTOMETRY: 1.01 (ref 1–1.03)
SPECIMEN HOLD: NORMAL
TROPONIN I SERPL HS-MCNC: 6 NG/L (ref 0–51)
TSH SERPL DL<=0.05 MIU/L-ACNC: 0.87 UIU/ML (ref 0.36–3.74)
URINE CULTURE IF INDICATED: ABNORMAL
UROBILINOGEN UR QL STRIP.AUTO: 0.2 EU/DL (ref 0.2–1)
WBC # BLD AUTO: 3.7 K/UL (ref 3.6–11)
WBC URNS QL MICRO: ABNORMAL /HPF (ref 0–4)

## 2023-09-30 PROCEDURE — 84484 ASSAY OF TROPONIN QUANT: CPT

## 2023-09-30 PROCEDURE — 80053 COMPREHEN METABOLIC PANEL: CPT

## 2023-09-30 PROCEDURE — 84443 ASSAY THYROID STIM HORMONE: CPT

## 2023-09-30 PROCEDURE — 81001 URINALYSIS AUTO W/SCOPE: CPT

## 2023-09-30 PROCEDURE — 83735 ASSAY OF MAGNESIUM: CPT

## 2023-09-30 PROCEDURE — 85025 COMPLETE CBC W/AUTO DIFF WBC: CPT

## 2023-09-30 PROCEDURE — 36415 COLL VENOUS BLD VENIPUNCTURE: CPT

## 2023-09-30 PROCEDURE — 99285 EMERGENCY DEPT VISIT HI MDM: CPT

## 2023-09-30 PROCEDURE — 70450 CT HEAD/BRAIN W/O DYE: CPT

## 2023-09-30 PROCEDURE — 71045 X-RAY EXAM CHEST 1 VIEW: CPT

## 2023-09-30 PROCEDURE — 83880 ASSAY OF NATRIURETIC PEPTIDE: CPT

## 2023-09-30 ASSESSMENT — PAIN SCALES - GENERAL: PAINLEVEL_OUTOF10: 8

## 2023-09-30 ASSESSMENT — PAIN DESCRIPTION - LOCATION: LOCATION: HEAD;KNEE

## 2023-09-30 NOTE — DISCHARGE INSTRUCTIONS
You were seen in the emergency department for fast heart rate. In the emergency department your heart rate was normal and your work-up was reassuring. We did not find any evidence of any acute emergencies or reasons to be admitted to the hospital.  Please follow-up with your primary care appointment on Monday for control of your blood pressure. Return to the emergency department for reevaluation for any new or worsening symptoms including chest pain, racing heartbeat, shortness of breath, nausea or vomiting, headache, vision changes, weakness, slurred speech, or any other acute concerns.

## 2023-09-30 NOTE — ED PROVIDER NOTES
QTc Calculation (Bazett) 470 ms    P Axis 52 degrees    R Axis -15 degrees    T Axis 15 degrees    Diagnosis       Normal sinus rhythm  Cannot rule out Anterior infarct (cited on or before 20-MAY-2023)  When compared with ECG of 20-MAY-2023 12:21,  No significant change was found  Confirmed by Yana Delgado ([id number]) on 9/30/2023 2:39:13 PM     Extra Tubes Hold    Collection Time: 09/30/23  7:42 AM   Result Value Ref Range    Specimen HOld 2PST LAV RED     Comment:        Add-on orders for these samples will be processed based on acceptable specimen integrity and analyte stability, which may vary by analyte.    Magnesium    Collection Time: 09/30/23  7:42 AM   Result Value Ref Range    Magnesium 2.5 (H) 1.6 - 2.4 mg/dL   Troponin    Collection Time: 09/30/23  7:42 AM   Result Value Ref Range    Troponin, High Sensitivity 6 0 - 51 ng/L   Brain Natriuretic Peptide    Collection Time: 09/30/23  7:42 AM   Result Value Ref Range    NT Pro-BNP 42 <450 PG/ML   CBC with Auto Differential    Collection Time: 09/30/23  7:42 AM   Result Value Ref Range    WBC 3.7 3.6 - 11.0 K/uL    RBC 4.08 3.80 - 5.20 M/uL    Hemoglobin 12.8 11.5 - 16.0 g/dL    Hematocrit 38.4 35.0 - 47.0 %    MCV 94.1 80.0 - 99.0 FL    MCH 31.4 26.0 - 34.0 PG    MCHC 33.3 30.0 - 36.5 g/dL    RDW 12.8 11.5 - 14.5 %    Platelets 978 330 - 677 K/uL    MPV 10.5 8.9 - 12.9 FL    Nucleated RBCs 0.0 0  WBC    nRBC 0.00 0.00 - 0.01 K/uL    Neutrophils % 57 32 - 75 %    Lymphocytes % 27 12 - 49 %    Monocytes % 11 5 - 13 %    Eosinophils % 4 0 - 7 %    Basophils % 1 0 - 1 %    Immature Granulocytes 0 0.0 - 0.5 %    Neutrophils Absolute 2.1 1.8 - 8.0 K/UL    Lymphocytes Absolute 1.0 0.8 - 3.5 K/UL    Monocytes Absolute 0.4 0.0 - 1.0 K/UL    Eosinophils Absolute 0.1 0.0 - 0.4 K/UL    Basophils Absolute 0.0 0.0 - 0.1 K/UL    Absolute Immature Granulocyte 0.0 0.00 - 0.04 K/UL    Differential Type AUTOMATED     CMP    Collection Time: 09/30/23  7:42 AM   Result abnormality      XR CHEST PORTABLE   Final Result   1. No acute disease               PROCEDURES   Unless otherwise noted below, none  Procedures     CRITICAL CARE TIME   N/a    EMERGENCY DEPARTMENT COURSE and DIFFERENTIAL DIAGNOSIS/MDM   Vitals:    Vitals:    09/30/23 0800 09/30/23 0815 09/30/23 0830 09/30/23 0845   BP: (!) 163/82 (!) 147/76  (!) 143/78   Pulse: 62 60 61 60   Resp: 18 15 22 (!) 32   Temp:       TempSrc:       SpO2: 98% 98% 97% 98%   Weight:       Height:            Patient was given the following medications:  Medications - No data to display    Medical Decision Making  Patient is 82-year-old female with history of paroxysmal A-fib on dofetilide and Eliquis presenting for palpitations. At the time of evaluation she is in normal sinus rhythm with a heart rate in the 60s. She is alert and mentating appropriately with warm well-perfused extremities. She is in no apparent distress. She states her symptoms have since resolved aside from a mild headache. She is neurologically intact with no evidence of stroke. Suspect she had an episode of A-fib with RVR this morning and has since converted back to normal sinus rhythm. She is appropriately anticoagulated so low clinical suspicion for thrombus formation. Will obtain broad labs, UA, chest x-ray to evaluate for potential trigger although she has no localizing signs or symptoms. Given that she is back in normal sinus rhythm with a reassuring EKG anticipate likely discharge however dispo pending work-up and clinical course. Amount and/or Complexity of Data Reviewed  Labs: ordered. Radiology: ordered. ECG/medicine tests: ordered. ED Course as of 09/30/23 1605   Sat Sep 30, 2023   0809 EKG interpreted by me. Normal sinus rhythm. Q waves in the anterior precordial leads. No ST or T wave abnormalities. [MJ]   0940 CT with no acute abnormality. Labs and imaging reviewed. Discussed diagnostics and imaging findings with the patient.   All

## 2023-10-01 NOTE — PROGRESS NOTES
Carla Mensah is a 82 y.o. female who presents for follow-up on ED visit 9/30 on 9/30. Reviewed labs. Patient was seen with palpitations. She reported they awakened her from sleep. She does have a history of atrial fibrillation. She is on antiarrhythmic and Eliquis and Tikosyn. Followed regularly by cardiology. In ED, EKG was sinus rhythm. Seen again at ED today, awakened with palpitations. [facility] Taking 1/2 of clonidine 0.2mg BID,  -165/79-94. Taking sertraline 75mg daily. For anxiety. Still anxious. Has urge incontinence. Up at night to urinate. Wearing pads. No prior medications. To see  [facility] [place] in January.         Past Medical History:   Diagnosis Date    Arthritis     right knee, left shoulder    Atrial fibrillation ([address])     4/12/23 Dr. Jim Paez    CAD (coronary artery disease)     Diverticulosis     Fractures     Frequency of urination     High cholesterol     Hypertension     Hypothyroid     Long term current use of anticoagulant therapy     Morbid obesity (HCC)     Osteoarthritis     Peripheral neuropathy     bilateral LE    Unspecified adverse effect of anesthesia     low BP    Wells' syndrome        Family History   Problem Relation Age of Onset    Diabetes Paternal Grandmother     Cancer Paternal Aunt     Hypertension Father     Heart Disease Father     Heart Disease Mother     Alzheimer's Disease Mother         Social History     Socioeconomic History    Marital status:      Spouse name: Not on file    Number of children: Not on file    Years of education: Not on file    Highest education level: Not on file   Occupational History    Not on file   Tobacco Use    Smoking status: Never    Smokeless tobacco: Never   Vaping Use    Vaping Use: Never used   Substance and Sexual Activity    Alcohol use: No    Drug use: No    Sexual activity: Not on file   Other Topics Concern    Not on file   Social History Narrative    Not on

## 2023-10-02 ENCOUNTER — OFFICE VISIT (OUTPATIENT)
Age: 83
End: 2023-10-02
Payer: MEDICARE

## 2023-10-02 VITALS
BODY MASS INDEX: 30.42 KG/M2 | RESPIRATION RATE: 16 BRPM | HEIGHT: 65 IN | TEMPERATURE: 98.8 F | OXYGEN SATURATION: 98 % | HEART RATE: 68 BPM | SYSTOLIC BLOOD PRESSURE: 145 MMHG | WEIGHT: 182.6 LBS | DIASTOLIC BLOOD PRESSURE: 78 MMHG

## 2023-10-02 DIAGNOSIS — I10 PRIMARY HYPERTENSION: ICD-10-CM

## 2023-10-02 DIAGNOSIS — F41.9 ANXIETY: ICD-10-CM

## 2023-10-02 DIAGNOSIS — I48.0 PAF (PAROXYSMAL ATRIAL FIBRILLATION) (HCC): Primary | ICD-10-CM

## 2023-10-02 PROCEDURE — 1036F TOBACCO NON-USER: CPT | Performed by: FAMILY MEDICINE

## 2023-10-02 PROCEDURE — 3078F DIAST BP <80 MM HG: CPT | Performed by: FAMILY MEDICINE

## 2023-10-02 PROCEDURE — 3077F SYST BP >= 140 MM HG: CPT | Performed by: FAMILY MEDICINE

## 2023-10-02 PROCEDURE — G8484 FLU IMMUNIZE NO ADMIN: HCPCS | Performed by: FAMILY MEDICINE

## 2023-10-02 PROCEDURE — G8399 PT W/DXA RESULTS DOCUMENT: HCPCS | Performed by: FAMILY MEDICINE

## 2023-10-02 PROCEDURE — G8427 DOCREV CUR MEDS BY ELIG CLIN: HCPCS | Performed by: FAMILY MEDICINE

## 2023-10-02 PROCEDURE — 99214 OFFICE O/P EST MOD 30 MIN: CPT | Performed by: FAMILY MEDICINE

## 2023-10-02 PROCEDURE — G8417 CALC BMI ABV UP PARAM F/U: HCPCS | Performed by: FAMILY MEDICINE

## 2023-10-02 PROCEDURE — 1123F ACP DISCUSS/DSCN MKR DOCD: CPT | Performed by: FAMILY MEDICINE

## 2023-10-02 PROCEDURE — 1090F PRES/ABSN URINE INCON ASSESS: CPT | Performed by: FAMILY MEDICINE

## 2023-10-02 RX ORDER — SERTRALINE HYDROCHLORIDE 100 MG/1
100 TABLET, FILM COATED ORAL DAILY
Qty: 90 TABLET | Refills: 1 | Status: SHIPPED | OUTPATIENT
Start: 2023-10-02

## 2023-10-02 NOTE — PATIENT INSTRUCTIONS
CAN TAKE 2 OF THE SERTRALINE 50MG ONCE A DAY (100MG DOSE). NEW PRESCRIPTION IS FOR THE 100MG TABLET ONCE A DAY FOR ANXIETY. CONTINUE CLONIDINE 1/2 OF THE 0.2MG TABLET TWICE A DAY. IF YOU GET A BLOOD PRESSURE READING , WAIT AND RECHECK IN 15 MINUTES. Change second dose of all medications to dinnertime except rosuvastatin (crestor) and donepezil (aricept).

## 2023-10-05 ENCOUNTER — HOSPITAL ENCOUNTER (EMERGENCY)
Facility: HOSPITAL | Age: 83
Discharge: HOME | End: 2023-10-05
Attending: EMERGENCY MEDICINE
Payer: MEDICARE

## 2023-10-05 VITALS
DIASTOLIC BLOOD PRESSURE: 68 MMHG | BODY MASS INDEX: 31.47 KG/M2 | TEMPERATURE: 97.9 F | HEART RATE: 62 BPM | WEIGHT: 184.3 LBS | HEIGHT: 64 IN | OXYGEN SATURATION: 99 % | SYSTOLIC BLOOD PRESSURE: 132 MMHG | RESPIRATION RATE: 13 BRPM

## 2023-10-05 DIAGNOSIS — I48.0 PAROXYSMAL ATRIAL FIBRILLATION (HCC): ICD-10-CM

## 2023-10-05 DIAGNOSIS — R00.2 PALPITATIONS: ICD-10-CM

## 2023-10-05 DIAGNOSIS — R53.81 MALAISE: Primary | ICD-10-CM

## 2023-10-05 LAB
ALBUMIN SERPL-MCNC: 3.5 G/DL (ref 3.5–5)
ALBUMIN/GLOB SERPL: 1 (ref 1.1–2.2)
ALP SERPL-CCNC: 73 U/L (ref 45–117)
ALT SERPL-CCNC: 22 U/L (ref 12–78)
ANION GAP SERPL CALC-SCNC: 5 MMOL/L (ref 5–15)
APPEARANCE UR: CLEAR
AST SERPL-CCNC: 14 U/L (ref 15–37)
BACTERIA URNS QL MICRO: NEGATIVE /HPF
BASOPHILS # BLD: 0 K/UL (ref 0–0.1)
BASOPHILS NFR BLD: 1 % (ref 0–1)
BILIRUB SERPL-MCNC: 0.3 MG/DL (ref 0.2–1)
BILIRUB UR QL: NEGATIVE
BUN SERPL-MCNC: 12 MG/DL (ref 6–20)
BUN/CREAT SERPL: 14 (ref 12–20)
CALCIUM SERPL-MCNC: 8.9 MG/DL (ref 8.5–10.1)
CHLORIDE SERPL-SCNC: 106 MMOL/L (ref 97–108)
CO2 SERPL-SCNC: 25 MMOL/L (ref 21–32)
COLOR UR: ABNORMAL
CREAT SERPL-MCNC: 0.85 MG/DL (ref 0.55–1.02)
DIFFERENTIAL METHOD BLD: ABNORMAL
EKG ATRIAL RATE: 61 BPM
EKG DIAGNOSIS: NORMAL
EKG P AXIS: 45 DEGREES
EKG P-R INTERVAL: 154 MS
EKG Q-T INTERVAL: 470 MS
EKG QRS DURATION: 86 MS
EKG QTC CALCULATION (BAZETT): 473 MS
EKG R AXIS: -10 DEGREES
EKG T AXIS: 23 DEGREES
EKG VENTRICULAR RATE: 61 BPM
EOSINOPHIL # BLD: 0.2 K/UL (ref 0–0.4)
EOSINOPHIL NFR BLD: 4 % (ref 0–7)
EPITH CASTS URNS QL MICRO: ABNORMAL /LPF
ERYTHROCYTE [DISTWIDTH] IN BLOOD BY AUTOMATED COUNT: 12.8 % (ref 11.5–14.5)
FLUAV AG NPH QL IA: NEGATIVE
FLUBV AG NOSE QL IA: NEGATIVE
GLOBULIN SER CALC-MCNC: 3.6 G/DL (ref 2–4)
GLUCOSE SERPL-MCNC: 115 MG/DL (ref 65–100)
GLUCOSE UR STRIP.AUTO-MCNC: NEGATIVE MG/DL
HCT VFR BLD AUTO: 35.2 % (ref 35–47)
HGB BLD-MCNC: 11.8 G/DL (ref 11.5–16)
HGB UR QL STRIP: NEGATIVE
HYALINE CASTS URNS QL MICRO: ABNORMAL /LPF (ref 0–2)
IMM GRANULOCYTES # BLD AUTO: 0 K/UL (ref 0–0.04)
IMM GRANULOCYTES NFR BLD AUTO: 0 % (ref 0–0.5)
KETONES UR QL STRIP.AUTO: NEGATIVE MG/DL
LEUKOCYTE ESTERASE UR QL STRIP.AUTO: ABNORMAL
LIPASE SERPL-CCNC: 51 U/L (ref 13–75)
LYMPHOCYTES # BLD: 1.1 K/UL (ref 0.8–3.5)
LYMPHOCYTES NFR BLD: 28 % (ref 12–49)
MCH RBC QN AUTO: 31.3 PG (ref 26–34)
MCHC RBC AUTO-ENTMCNC: 33.5 G/DL (ref 30–36.5)
MCV RBC AUTO: 93.4 FL (ref 80–99)
MONOCYTES # BLD: 0.5 K/UL (ref 0–1)
MONOCYTES NFR BLD: 12 % (ref 5–13)
NEUTS SEG # BLD: 2.2 K/UL (ref 1.8–8)
NEUTS SEG NFR BLD: 55 % (ref 32–75)
NITRITE UR QL STRIP.AUTO: NEGATIVE
NRBC # BLD: 0 K/UL (ref 0–0.01)
NRBC BLD-RTO: 0 PER 100 WBC
NT PRO BNP: 53 PG/ML
PH UR STRIP: 6.5 (ref 5–8)
PLATELET # BLD AUTO: 151 K/UL (ref 150–400)
PMV BLD AUTO: 10.2 FL (ref 8.9–12.9)
POTASSIUM SERPL-SCNC: 4 MMOL/L (ref 3.5–5.1)
PROT SERPL-MCNC: 7.1 G/DL (ref 6.4–8.2)
PROT UR STRIP-MCNC: NEGATIVE MG/DL
RBC # BLD AUTO: 3.77 M/UL (ref 3.8–5.2)
RBC #/AREA URNS HPF: ABNORMAL /HPF (ref 0–5)
SARS-COV-2 RDRP RESP QL NAA+PROBE: NOT DETECTED
SODIUM SERPL-SCNC: 136 MMOL/L (ref 136–145)
SOURCE: NORMAL
SP GR UR REFRACTOMETRY: <1.005
TROPONIN I SERPL HS-MCNC: 6 NG/L (ref 0–51)
URINE CULTURE IF INDICATED: ABNORMAL
UROBILINOGEN UR QL STRIP.AUTO: 0.2 EU/DL (ref 0.2–1)
WBC # BLD AUTO: 3.9 K/UL (ref 3.6–11)
WBC URNS QL MICRO: ABNORMAL /HPF (ref 0–4)

## 2023-10-05 PROCEDURE — 83690 ASSAY OF LIPASE: CPT

## 2023-10-05 PROCEDURE — 85025 COMPLETE CBC W/AUTO DIFF WBC: CPT

## 2023-10-05 PROCEDURE — 99284 EMERGENCY DEPT VISIT MOD MDM: CPT

## 2023-10-05 PROCEDURE — 83880 ASSAY OF NATRIURETIC PEPTIDE: CPT

## 2023-10-05 PROCEDURE — 84484 ASSAY OF TROPONIN QUANT: CPT

## 2023-10-05 PROCEDURE — 87804 INFLUENZA ASSAY W/OPTIC: CPT

## 2023-10-05 PROCEDURE — 36415 COLL VENOUS BLD VENIPUNCTURE: CPT

## 2023-10-05 PROCEDURE — 80053 COMPREHEN METABOLIC PANEL: CPT

## 2023-10-05 PROCEDURE — 87635 SARS-COV-2 COVID-19 AMP PRB: CPT

## 2023-10-05 PROCEDURE — 81001 URINALYSIS AUTO W/SCOPE: CPT

## 2023-10-05 ASSESSMENT — ENCOUNTER SYMPTOMS
ABDOMINAL PAIN: 0
SHORTNESS OF BREATH: 0
NAUSEA: 0
DIARRHEA: 0
VOMITING: 0

## 2023-10-05 ASSESSMENT — PAIN - FUNCTIONAL ASSESSMENT: PAIN_FUNCTIONAL_ASSESSMENT: NONE - DENIES PAIN

## 2023-10-05 NOTE — ED NOTES
0400: Patient placed on purwic at this time. 0423: Patient called out about the bed being wet. Patient's bed changed at this time.       Noemy Gutiérrez RN  10/05/23 5389

## 2023-10-05 NOTE — ED NOTES
DC papers reviewed and in hand, pt verbalized understanding. Patient ambulatory upon discharge, no acute distress noted.        Monique Garcia  10/05/23 1343

## 2023-10-05 NOTE — ED PROVIDER NOTES
troponin, negative COVID and flu swabs as well as nonischemic EKG. Will discharge home patient if she feels much improved. Discussed follow-up with cardiology, Dr. Catarina Sanabria as discussed. Knowledges understanding that she may return to the emergency department anytime should her symptoms worsen. CRITICAL CARE TIME      None     SOCIAL DETERMINATES OF HEALTH AFFECTING DX OR TX     Lack of Support/Lives Alone    FINAL IMPRESSION     1. Malaise    2. Palpitations    3. Paroxysmal atrial fibrillation [facility])         DISPOSITION/PLAN     Discharge to home     Nina Archuleta's  results have been reviewed with her. She has been counseled regarding her diagnosis, treatment, and plan. She verbally conveys understanding and agreement of the signs, symptoms, diagnosis, treatment and prognosis and additionally agrees to follow up as discussed. She also agrees with the care-plan and conveys that all of her questions have been answered. I have also provided discharge instructions for her that include: educational information regarding their diagnosis and treatment, and list of reasons why they would want to return to the ED prior to their follow-up appointment, should her condition change. PATIENT REFERRED TO:   Ute Marsh MD  [address]  [facility] [address]  [facility]  [phone]    Schedule an appointment as soon as possible for a visit in 2 days      [facility] EMER[facility]ENC[facility] DEPT  [address] [place] [address] [address]  [phone]    If symptoms worsen    Abhishek Sherwood MD  [address] [place] [address]  [phone]    Schedule an appointment as soon as possible for a visit in 2 days          DISCHARGE MEDICATIONS:     Medication List        ASK your doctor about these medications      apixaban 5 MG Tabs tablet  Commonly known as: ELIQUIS     calcium carbonate 500 MG Tabs tablet  Commonly known as: OSCAL     cloNIDine 0.2 MG tablet  Commonly known as:

## 2023-10-05 NOTE — DISCHARGE INSTRUCTIONS
It was a pleasure taking care of you in our Emergency Department today. We know that when you come to [facility], you are entrusting us with your health, comfort, and safety. Our physicians and nurses honor that trust, and truly appreciate the opportunity to care for you and your loved ones. We also value your feedback. If you receive a survey about your Emergency Department experience today, please fill it out. We care about our patients' feedback, and we listen to what you have to say. Thank you!       Dr. Jaspreet Holcomb MD.

## 2023-10-31 ENCOUNTER — HOSPITAL ENCOUNTER (OUTPATIENT)
Facility: HOSPITAL | Age: 83
Setting detail: OBSERVATION
LOS: 1 days | Discharge: HOME | End: 2023-11-01
Attending: EMERGENCY MEDICINE | Admitting: INTERNAL MEDICINE
Payer: MEDICARE

## 2023-10-31 ENCOUNTER — APPOINTMENT (OUTPATIENT)
Facility: HOSPITAL | Age: 83
End: 2023-10-31
Attending: INTERNAL MEDICINE
Payer: MEDICARE

## 2023-10-31 ENCOUNTER — APPOINTMENT (OUTPATIENT)
Facility: HOSPITAL | Age: 83
End: 2023-10-31
Payer: MEDICARE

## 2023-10-31 DIAGNOSIS — G45.9 TIA (TRANSIENT ISCHEMIC ATTACK): Primary | ICD-10-CM

## 2023-10-31 PROBLEM — R29.90 STROKE-LIKE SYMPTOMS: Status: ACTIVE | Noted: 2023-10-31

## 2023-10-31 LAB
ALBUMIN SERPL-MCNC: 4.1 G/DL (ref 3.5–5)
ALBUMIN/GLOB SERPL: 1.1 (ref 1.1–2.2)
ALP SERPL-CCNC: 84 U/L (ref 45–117)
ALT SERPL-CCNC: 26 U/L (ref 12–78)
ANION GAP SERPL CALC-SCNC: 6 MMOL/L (ref 5–15)
AST SERPL-CCNC: 15 U/L (ref 15–37)
BASOPHILS # BLD: 0 K/UL (ref 0–0.1)
BASOPHILS NFR BLD: 1 % (ref 0–1)
BILIRUB SERPL-MCNC: 0.4 MG/DL (ref 0.2–1)
BUN SERPL-MCNC: 14 MG/DL (ref 6–20)
BUN/CREAT SERPL: 15 (ref 12–20)
CALCIUM SERPL-MCNC: 9.6 MG/DL (ref 8.5–10.1)
CHLORIDE SERPL-SCNC: 105 MMOL/L (ref 97–108)
CO2 SERPL-SCNC: 28 MMOL/L (ref 21–32)
CREAT SERPL-MCNC: 0.95 MG/DL (ref 0.55–1.02)
DIFFERENTIAL METHOD BLD: NORMAL
ECHO AR MAX VEL PISA: 3.7 M/S
ECHO AV MEAN GRADIENT: 9 MMHG
ECHO AV MEAN VELOCITY: 1.4 M/S
ECHO AV PEAK GRADIENT: 0 MILLIMETER OF MERCURY COLUMN
ECHO AV PEAK GRADIENT: 16 MMHG
ECHO AV PEAK VELOCITY: 0 M/S
ECHO AV PEAK VELOCITY: 2 M/S
ECHO AV REGURGITANT PHT: 458.9 MILLISECOND
ECHO AV VTI: 44.1 CM
ECHO BSA: 1.94 M2
ECHO LA DIAMETER INDEX: 2.01 CM/M2
ECHO LA DIAMETER: 3.8 CM
ECHO LV EDV A4C: 97 ML
ECHO LV EDV INDEX A4C: 51 ML/M2
ECHO LV EJECTION FRACTION A4C: 80 %
ECHO LV ESV A4C: 20 ML
ECHO LV ESV INDEX A4C: 11 ML/M2
ECHO LV FRACTIONAL SHORTENING: 38 % (ref 28–44)
ECHO LV INTERNAL DIMENSION DIASTOLE INDEX: 2.38 CM/M2
ECHO LV INTERNAL DIMENSION DIASTOLIC: 4.5 CM (ref 3.9–5.3)
ECHO LV INTERNAL DIMENSION SYSTOLIC INDEX: 1.48 CM/M2
ECHO LV INTERNAL DIMENSION SYSTOLIC: 2.8 CM
ECHO LV IVSD: 0.9 CM (ref 0.6–0.9)
ECHO LV MASS 2D: 132.8 G (ref 67–162)
ECHO LV MASS INDEX 2D: 70.3 G/M2 (ref 43–95)
ECHO LV POSTERIOR WALL DIASTOLIC: 0.9 CM (ref 0.6–0.9)
ECHO LV RELATIVE WALL THICKNESS RATIO: 0.4
ECHO LVOT AREA: 2.8 CM2
ECHO LVOT DIAM: 1.9 CM
ECHO MV MAX VELOCITY: 1.5 M/S
ECHO MV MEAN GRADIENT: 3 MMHG
ECHO MV MEAN VELOCITY: 0.8 M/S
ECHO MV PEAK GRADIENT: 9 MMHG
ECHO MV VTI: 39.2 CM
ECHO TV REGURGITANT MAX VELOCITY: 2.95 M/S
ECHO TV REGURGITANT PEAK GRADIENT: 35 MMHG
EOSINOPHIL # BLD: 0.2 K/UL (ref 0–0.4)
EOSINOPHIL NFR BLD: 5 % (ref 0–7)
ERYTHROCYTE [DISTWIDTH] IN BLOOD BY AUTOMATED COUNT: 13 % (ref 11.5–14.5)
GLOBULIN SER CALC-MCNC: 3.9 G/DL (ref 2–4)
GLUCOSE BLD STRIP.AUTO-MCNC: 102 MG/DL (ref 65–117)
GLUCOSE BLD STRIP.AUTO-MCNC: 98 MG/DL (ref 65–117)
GLUCOSE SERPL-MCNC: 113 MG/DL (ref 65–100)
HCT VFR BLD AUTO: 40.4 % (ref 35–47)
HGB BLD-MCNC: 13.5 G/DL (ref 11.5–16)
IMM GRANULOCYTES # BLD AUTO: 0 K/UL (ref 0–0.04)
IMM GRANULOCYTES NFR BLD AUTO: 0 % (ref 0–0.5)
INR PPP: 0.9 (ref 0.9–1.1)
LYMPHOCYTES # BLD: 1 K/UL (ref 0.8–3.5)
LYMPHOCYTES NFR BLD: 25 % (ref 12–49)
MCH RBC QN AUTO: 31.5 PG (ref 26–34)
MCHC RBC AUTO-ENTMCNC: 33.4 G/DL (ref 30–36.5)
MCV RBC AUTO: 94.4 FL (ref 80–99)
MONOCYTES # BLD: 0.3 K/UL (ref 0–1)
MONOCYTES NFR BLD: 8 % (ref 5–13)
NEUTS SEG # BLD: 2.4 K/UL (ref 1.8–8)
NEUTS SEG NFR BLD: 61 % (ref 32–75)
NRBC # BLD: 0 K/UL (ref 0–0.01)
NRBC BLD-RTO: 0 PER 100 WBC
PLATELET # BLD AUTO: 167 K/UL (ref 150–400)
PMV BLD AUTO: 9.8 FL (ref 8.9–12.9)
POTASSIUM SERPL-SCNC: 3.7 MMOL/L (ref 3.5–5.1)
PROT SERPL-MCNC: 8 G/DL (ref 6.4–8.2)
PROTHROMBIN TIME: 9.8 SEC (ref 9–11.1)
RBC # BLD AUTO: 4.28 M/UL (ref 3.8–5.2)
SERVICE CMNT-IMP: NORMAL
SERVICE CMNT-IMP: NORMAL
SODIUM SERPL-SCNC: 139 MMOL/L (ref 136–145)
TROPONIN I SERPL HS-MCNC: 6 NG/L (ref 0–51)
WBC # BLD AUTO: 4 K/UL (ref 3.6–11)

## 2023-10-31 PROCEDURE — 71045 X-RAY EXAM CHEST 1 VIEW: CPT

## 2023-10-31 PROCEDURE — 97161 PT EVAL LOW COMPLEX 20 MIN: CPT

## 2023-10-31 PROCEDURE — 85610 PROTHROMBIN TIME: CPT

## 2023-10-31 PROCEDURE — C8924 2D TTE W OR W/O FOL W/CON,FU: HCPCS

## 2023-10-31 PROCEDURE — 6360000004 HC RX CONTRAST MEDICATION: Performed by: EMERGENCY MEDICINE

## 2023-10-31 PROCEDURE — 2580000003 HC RX 258: Performed by: INTERNAL MEDICINE

## 2023-10-31 PROCEDURE — G0378 HOSPITAL OBSERVATION PER HR: HCPCS

## 2023-10-31 PROCEDURE — 99222 1ST HOSP IP/OBS MODERATE 55: CPT | Performed by: INTERNAL MEDICINE

## 2023-10-31 PROCEDURE — 6370000000 HC RX 637 (ALT 250 FOR IP): Performed by: INTERNAL MEDICINE

## 2023-10-31 PROCEDURE — 70496 CT ANGIOGRAPHY HEAD: CPT

## 2023-10-31 PROCEDURE — 0042T CT BRAIN PERFUSION: CPT

## 2023-10-31 PROCEDURE — 97116 GAIT TRAINING THERAPY: CPT

## 2023-10-31 PROCEDURE — 99285 EMERGENCY DEPT VISIT HI MDM: CPT

## 2023-10-31 PROCEDURE — 70450 CT HEAD/BRAIN W/O DYE: CPT

## 2023-10-31 PROCEDURE — 6360000004 HC RX CONTRAST MEDICATION: Performed by: INTERNAL MEDICINE

## 2023-10-31 PROCEDURE — 80053 COMPREHEN METABOLIC PANEL: CPT

## 2023-10-31 PROCEDURE — 96374 THER/PROPH/DIAG INJ IV PUSH: CPT

## 2023-10-31 PROCEDURE — 36415 COLL VENOUS BLD VENIPUNCTURE: CPT

## 2023-10-31 PROCEDURE — 84484 ASSAY OF TROPONIN QUANT: CPT

## 2023-10-31 PROCEDURE — 82962 GLUCOSE BLOOD TEST: CPT

## 2023-10-31 PROCEDURE — 6360000002 HC RX W HCPCS: Performed by: INTERNAL MEDICINE

## 2023-10-31 PROCEDURE — 85025 COMPLETE CBC W/AUTO DIFF WBC: CPT

## 2023-10-31 RX ORDER — ONDANSETRON 2 MG/ML
4 INJECTION INTRAMUSCULAR; INTRAVENOUS EVERY 6 HOURS PRN
Status: DISCONTINUED | OUTPATIENT
Start: 2023-10-31 | End: 2023-11-01 | Stop reason: HOSPADM

## 2023-10-31 RX ORDER — DOFETILIDE 0.5 MG/1
500 CAPSULE ORAL EVERY 12 HOURS SCHEDULED
Status: DISCONTINUED | OUTPATIENT
Start: 2023-10-31 | End: 2023-11-01 | Stop reason: HOSPADM

## 2023-10-31 RX ORDER — MORPHINE SULFATE 4 MG/ML
4 INJECTION, SOLUTION INTRAMUSCULAR; INTRAVENOUS EVERY 4 HOURS PRN
Status: DISCONTINUED | OUTPATIENT
Start: 2023-10-31 | End: 2023-11-01 | Stop reason: HOSPADM

## 2023-10-31 RX ORDER — SODIUM CHLORIDE 0.9 % (FLUSH) 0.9 %
5-40 SYRINGE (ML) INJECTION PRN
Status: DISCONTINUED | OUTPATIENT
Start: 2023-10-31 | End: 2023-11-01 | Stop reason: HOSPADM

## 2023-10-31 RX ORDER — ONDANSETRON 4 MG/1
4 TABLET, ORALLY DISINTEGRATING ORAL EVERY 8 HOURS PRN
Status: DISCONTINUED | OUTPATIENT
Start: 2023-10-31 | End: 2023-11-01 | Stop reason: HOSPADM

## 2023-10-31 RX ORDER — POLYETHYLENE GLYCOL 3350 17 G/17G
17 POWDER, FOR SOLUTION ORAL DAILY PRN
Status: DISCONTINUED | OUTPATIENT
Start: 2023-10-31 | End: 2023-11-01 | Stop reason: HOSPADM

## 2023-10-31 RX ORDER — HYDRALAZINE HYDROCHLORIDE 20 MG/ML
10 INJECTION INTRAMUSCULAR; INTRAVENOUS ONCE
Status: COMPLETED | OUTPATIENT
Start: 2023-10-31 | End: 2023-10-31

## 2023-10-31 RX ORDER — LABETALOL HYDROCHLORIDE 5 MG/ML
10 INJECTION, SOLUTION INTRAVENOUS EVERY 10 MIN PRN
Status: DISCONTINUED | OUTPATIENT
Start: 2023-10-31 | End: 2023-11-01 | Stop reason: HOSPADM

## 2023-10-31 RX ORDER — SODIUM CHLORIDE 9 MG/ML
INJECTION, SOLUTION INTRAVENOUS PRN
Status: DISCONTINUED | OUTPATIENT
Start: 2023-10-31 | End: 2023-11-01 | Stop reason: HOSPADM

## 2023-10-31 RX ORDER — ASPIRIN 300 MG/1
300 SUPPOSITORY RECTAL DAILY
Status: DISCONTINUED | OUTPATIENT
Start: 2023-10-31 | End: 2023-11-01 | Stop reason: HOSPADM

## 2023-10-31 RX ORDER — ONDANSETRON 2 MG/ML
4 INJECTION INTRAMUSCULAR; INTRAVENOUS EVERY 4 HOURS PRN
Status: DISCONTINUED | OUTPATIENT
Start: 2023-10-31 | End: 2023-10-31

## 2023-10-31 RX ORDER — CLONIDINE HYDROCHLORIDE 0.1 MG/1
0.1 TABLET ORAL 2 TIMES DAILY
Status: DISCONTINUED | OUTPATIENT
Start: 2023-10-31 | End: 2023-11-01 | Stop reason: HOSPADM

## 2023-10-31 RX ORDER — ASPIRIN 81 MG/1
81 TABLET, CHEWABLE ORAL DAILY
Status: DISCONTINUED | OUTPATIENT
Start: 2023-10-31 | End: 2023-11-01 | Stop reason: HOSPADM

## 2023-10-31 RX ORDER — DONEPEZIL HYDROCHLORIDE 5 MG/1
5 TABLET, FILM COATED ORAL NIGHTLY
Status: DISCONTINUED | OUTPATIENT
Start: 2023-10-31 | End: 2023-11-01 | Stop reason: HOSPADM

## 2023-10-31 RX ORDER — ACETAMINOPHEN 325 MG/1
650 TABLET ORAL EVERY 4 HOURS PRN
Status: DISCONTINUED | OUTPATIENT
Start: 2023-10-31 | End: 2023-11-01 | Stop reason: HOSPADM

## 2023-10-31 RX ORDER — ENOXAPARIN SODIUM 100 MG/ML
40 INJECTION SUBCUTANEOUS DAILY
Status: DISCONTINUED | OUTPATIENT
Start: 2023-10-31 | End: 2023-10-31

## 2023-10-31 RX ORDER — ROSUVASTATIN CALCIUM 20 MG/1
20 TABLET, COATED ORAL DAILY
Status: DISCONTINUED | OUTPATIENT
Start: 2023-10-31 | End: 2023-11-01 | Stop reason: HOSPADM

## 2023-10-31 RX ORDER — SODIUM CHLORIDE 0.9 % (FLUSH) 0.9 %
5-40 SYRINGE (ML) INJECTION EVERY 12 HOURS SCHEDULED
Status: DISCONTINUED | OUTPATIENT
Start: 2023-10-31 | End: 2023-11-01 | Stop reason: HOSPADM

## 2023-10-31 RX ORDER — ACETAMINOPHEN 650 MG/1
650 SUPPOSITORY RECTAL EVERY 4 HOURS PRN
Status: DISCONTINUED | OUTPATIENT
Start: 2023-10-31 | End: 2023-11-01 | Stop reason: HOSPADM

## 2023-10-31 RX ADMIN — SERTRALINE 100 MG: 50 TABLET, FILM COATED ORAL at 20:18

## 2023-10-31 RX ADMIN — SODIUM CHLORIDE, PRESERVATIVE FREE 10 ML: 5 INJECTION INTRAVENOUS at 20:19

## 2023-10-31 RX ADMIN — APIXABAN 5 MG: 5 TABLET, FILM COATED ORAL at 20:17

## 2023-10-31 RX ADMIN — DONEPEZIL HYDROCHLORIDE 5 MG: 5 TABLET, FILM COATED ORAL at 20:18

## 2023-10-31 RX ADMIN — PERFLUTREN 1.5 ML: 6.52 INJECTION, SUSPENSION INTRAVENOUS at 12:54

## 2023-10-31 RX ADMIN — SACUBITRIL AND VALSARTAN 1 TABLET: 24; 26 TABLET, FILM COATED ORAL at 20:18

## 2023-10-31 RX ADMIN — ACETAMINOPHEN 650 MG: 325 TABLET ORAL at 13:31

## 2023-10-31 RX ADMIN — IOPAMIDOL 100 ML: 755 INJECTION, SOLUTION INTRAVENOUS at 08:29

## 2023-10-31 RX ADMIN — CLONIDINE HYDROCHLORIDE 0.1 MG: 0.1 TABLET ORAL at 20:17

## 2023-10-31 RX ADMIN — DOFETILIDE 500 MCG: 0.5 CAPSULE ORAL at 20:17

## 2023-10-31 RX ADMIN — SODIUM CHLORIDE, PRESERVATIVE FREE 10 ML: 5 INJECTION INTRAVENOUS at 11:50

## 2023-10-31 RX ADMIN — ROSUVASTATIN 20 MG: 20 TABLET, FILM COATED ORAL at 20:18

## 2023-10-31 RX ADMIN — HYDRALAZINE HYDROCHLORIDE 10 MG: 20 INJECTION, SOLUTION INTRAMUSCULAR; INTRAVENOUS at 11:49

## 2023-10-31 ASSESSMENT — PAIN SCALES - GENERAL
PAINLEVEL_OUTOF10: 4
PAINLEVEL_OUTOF10: 0
PAINLEVEL_OUTOF10: 0
PAINLEVEL_OUTOF10: 6
PAINLEVEL_OUTOF10: 0
PAINLEVEL_OUTOF10: 0

## 2023-10-31 ASSESSMENT — PAIN DESCRIPTION - LOCATION
LOCATION: HEAD
LOCATION: HEAD

## 2023-10-31 NOTE — ED PROVIDER NOTES
[facility] EMERGENCY DEPT  EMERGENCY DEPARTMENT ENCOUNTER       Pt Name: Ron Mena  MRN: [id number]  [address] 1940  Date of evaluation: 10/31/2023  Provider: Gemma Nevarez MD   PCP: Mannie Márquez MD  Note Started: 9:14 AM EDT 10/31/23     CHIEF COMPLAINT       Chief Complaint   Patient presents with    Palpitations     Ambulatory to triage stating \"I feel like I'm in A-fib\"; states that about an hour she began having palpitations, dizziness, and shortness of breath; reprots hx Afib, states she sees Dr. Galina Maher with [facility] cards    Numbness     States that about 30 mins ago, began experiencing LLE foot numbness; states that when she woke up just before 0700 she was experiencing dizziness; went to bed around 2330 feeling normal        HISTORY OF PRESENT ILLNESS: 1 or more elements      History From: Patient  HPI Limitations: None     Ron Mena is a 82 y.o. female who presents with complaints of left lower extremity numbness, difficulty walking, and dizziness. Waking up with symptoms this morning and last known well was last night around 9 PM.  She does have a history of atrial fibrillation, is on Eliquis, has not taken her dose today. She sees her cardiologist at [facility]. She reports she is never had similar symptoms in the past, has no visual changes, no speech disturbances, no recent trauma. Nursing Notes were all reviewed and agreed with or any disagreements were addressed in the HPI. REVIEW OF SYSTEMS      Review of Systems     Positives and Pertinent negatives as per HPI.     PAST HISTORY     Past Medical History:  Past Medical History:   Diagnosis Date    Arthritis     right knee, left shoulder    Atrial fibrillation ([address])     4/12/23 Dr. Eliane Tejada    CAD (coronary artery disease)     Diverticulosis     Fractures     Frequency of urination     High cholesterol     Hypertension     Hypothyroid     Long term current use of anticoagulant therapy     Morbid obesity ([address])

## 2023-10-31 NOTE — PROGRESS NOTES
Speech Pathology Note    Reviewed chart and note patient admitted with LLE foot with concern for CVA. Note CT showed \"No acute process\" and MRI pending. Note patient passed the [address] and a regular diet was ordered. NIHSS=0. Discussed case with RN who reported no SLP-related concerns. Introduced self and role of SLP to patient. Patient denied any decline in motor speech, language, cognitive, or swallowing function, and patient informally observed drinking thin liquids with no difficulty. Patient Ox4. Formal SLP evaluation not clinically indicated at this time. Will sign off. Please re-consult if further needs arise. Thank you.     Boubacar Marin M.S., CCC-SLP

## 2023-10-31 NOTE — PROGRESS NOTES
End of Shift Note    Bedside shift change report given to Kelly Galicia (oncoming nurse) by Mariana Figueroa RN (offgoing nurse). Report included the following information Nurse Handoff Report, ED SBAR, Adult Overview, MAR, Recent Results, Cardiac Rhythm  , Quality Measures, and Neuro Assessment      Shift worked:  7a-7p   Shift summary and any significant changes:     Patient received from ER. Concerns for physician to address:  Plan   Zone phone for oncoming shift:   [phone]     Patient Information  Luly Esquivel  82 y.o.  10/31/2023  8:10 AM by Star Joya MD. Luly Esquivel was admitted from Home    Problem List  Patient Active Problem List    Diagnosis Date Noted    Stroke-like symptoms 10/31/2023    TIA (transient ischemic attack) 10/31/2023    Anxiety 03/09/2021    Pericardial effusion with cardiac tamponade 03/18/2017    S/P ablation of atrial fibrillation 03/17/2017    Acquired hypothyroidism 12/14/2015    HTN (hypertension) 07/21/2015    Atrial fibrillation ([address]) 03/16/2015    Morbid obesity ([address])     Hypokalemia 07/14/2014    Hyperlipidemia 07/14/2014    Postmenopausal bleeding 10/18/2011     Past Medical History:   Diagnosis Date    Arthritis     right knee, left shoulder    Atrial fibrillation ([address])     4/12/23 Dr. Franko Arroyo    CAD (coronary artery disease)     Diverticulosis     Fractures     Frequency of urination     High cholesterol     Hypertension     Hypothyroid     Long term current use of anticoagulant therapy     Morbid obesity (HCC)     Osteoarthritis     Peripheral neuropathy     bilateral LE    Unspecified adverse effect of anesthesia     low BP    Wells' syndrome        Core Measures:  CVA: No Yes  CHF:No No  PNA:NoNo    Activity:     Number times ambulated in hallways past shift: 0  Number of times OOB to chair past shift: 4    Cardiac:   Cardiac Monitoring: Yes           Access:   Current line(s): PIV  Central Line?  No     Genitourinary:   Urinary status: voiding   Urinary

## 2023-10-31 NOTE — CONSULTS
reaction(s): Unknown (comments)    Atorvastatin Myalgia     Gets the flu    Amlodipine Hives, Itching and Rash    Clindamycin Rash    Methylprednisolone Rash     Pt states was ineffective          Prior to Admission medications    Medication Sig Start Date End Date Taking? Authorizing Provider   Multiple Vitamins-Minerals (WOMENS MULTIVITAMIN PLUS PO) Take by mouth    Tamra Gomez MD   sertraline (ZOLOFT) 100 MG tablet Take 1 tablet by mouth daily 10/2/23   Marcial Marsh MD   SYNTHROID 50 MCG tablet TAKE 1 TABLET BY MOUTH EVERY DAY BEFORE BREAKFAST  Patient taking differently: 1.5 tablets 9/11/23   Marcial Marsh MD   potassium chloride (KLOR-CON) 10 MEQ extended release tablet TAKE 2 TABLETS IN MORNING AND 2 TABLETS IN THE EVENING 8/23/23   Marcial Marsh MD   rosuvastatin (CRESTOR) 20 MG tablet Take 1 tablet by mouth daily 8/23/23   Marcial Marsh MD   cloNIDine (CATAPRES) 0.2 MG tablet Take 1/2-1 tablet by mouth twice a day, based on BP readings per scale. 8/23/23   Marcial Marsh MD   donepezil (ARICEPT) 5 MG tablet Take 1 tablet by mouth nightly 8/23/23   Marcial Marsh MD   triamcinolone (KENALOG) 0.1 % cream Apply topically 2 times daily for up to 10 days. Patient not taking: Reported on 10/2/2023 6/2/23   TAYLOR Sanchez - NP   calcium carbonate (OSCAL) 500 MG TABS tablet Caltrate 600-D Plus Minerals 600 mg calcium-400 unit tablet 12/14/07   Provider, MD Tamra   apixaban (ELIQUIS) 5 MG TABS tablet TAKE 1 TABLET BY MOUTH TWO (2) TIMES A DAY FOR 30 DAYS. 1/24/23   Automatic Reconciliation, Ar   dofetilide (TIKOSYN) 500 MCG capsule Take 1 capsule by mouth in the morning and 1 capsule in the evening.  12/16/22 12/16/23  Automatic Reconciliation, Ar   sacubitril-valsartan (ENTRESTO) 24-26 MG per tablet Take 1 tablet by mouth 2 times daily 12/16/22   Automatic Reconciliation, Ar       Review of Systems:    General, constitutional: negative  Eyes, vision: minutes providing care to this acutely ill inpatient with > 50% of the time counseling as well as reviewing the patient's chart, notes, labs, medications and preparing documentation along with assisting in the coordination of care of the patient on the patient's hospital floor/unit.        Kenn Villalobos DO

## 2023-10-31 NOTE — H&P
Hospitalist Admission Note    NAME:   Jarret Key   : 1940   MRN: [id number]     Date/Time: 10/31/2023 9:24 AM    Patient PCP: Brianna Gonzales MD    ______________________________________________________________________  Given the patient's current clinical presentation, I have a high level of concern for decompensation if discharged from the emergency department. Complex decision making was performed, which includes reviewing the patient's available past medical records, laboratory results, and x-ray films. My assessment of this patient's clinical condition and my plan of care is as follows. Assessment / Plan:  Left lower extremity numbness/tingling sensation  Neuropathy  This is an elderly patient with baseline left lower extremity numbness who is currently presenting with worsening symptoms that is started this morning after waking up. Her symptoms are resolved. CT head, CT angiogram brain and neck negative. She was evaluated by telemetry neurology who recommended her usual stroke work-up. Our in-house neurology does not think this is a TIA or CVA. No need for MRI brain. We will get echocardiogram to assess heart function. PT OT, speech swallow eval.  We will check for lipid panel, A1c. Hypertension  Patient's systolic blood pressure is in the 200s. Patient reports taking clonidine at home. We will give her as needed hydralazine for SBP more than 160. Atrial fibrillation  Continue with dofetilide and Eliquis. Hyperlipidemia  Continue with Crestor. CHF  Continue with Entresto. Hypothyroidism  Continue with Synthroid. Depression  Dementia  Continue with sertraline, donepezil. Medical Decision Making:   I personally reviewed labs: CBC, BMP, troponin  I personally reviewed imaging: X-ray, CT head, CT angiogram brain and neck  I personally reviewed EKG:  Toxic drug monitoring: CBC while patient is on Eliquis  Discussed case with: ED provider.  After

## 2023-10-31 NOTE — ED NOTES
0820  BS 98.  Labs sent. Transported to CT via w/c for stroke scans.       Yennifer Lakhani RN  10/31/23 7577

## 2023-10-31 NOTE — ED NOTES
7890: Pt arrived to ER 14. Placed on the monitor x3. Teleneuro at bedside waiting to come on and evaluate pt. Pt states she has LLE numbness/tingling. Pt states she also has neuropathy and it feels \"slightly\"  different than normal. Denies dizziness/headache but sttaes head feels \"full\". No motor defects noted att. 12:  Lui Chirinos repaged teleneuro att.        Jose.Go: MD Vidya Silva with teleneuro on screen att to evaluate pt.       Dwayne Townsend RN  10/31/23 2888

## 2023-10-31 NOTE — PLAN OF CARE
Problem: Discharge Planning  Goal: Discharge to home or other facility with appropriate resources  Outcome: Progressing     Problem: Pain  Goal: Verbalizes/displays adequate comfort level or baseline comfort level  Outcome: Progressing     Problem: Safety - Adult  Goal: Free from fall injury  Outcome: Progressing     Problem: Neurosensory - Adult  Goal: Achieves stable or improved neurological status  Outcome: Progressing  Goal: Achieves maximal functionality and self care  Outcome: Progressing     Problem: Respiratory - Adult  Goal: Achieves optimal ventilation and oxygenation  Outcome: Progressing     Problem: Cardiovascular - Adult  Goal: Maintains optimal cardiac output and hemodynamic stability  Outcome: Progressing  Goal: Absence of cardiac dysrhythmias or at baseline  Outcome: Progressing     Problem: Skin/Tissue Integrity - Adult  Goal: Skin integrity remains intact  Outcome: Progressing  Goal: Oral mucous membranes remain intact  Outcome: Progressing     Problem: Musculoskeletal - Adult  Goal: Return mobility to safest level of function  Outcome: Progressing  Goal: Return ADL status to a safe level of function  Outcome: Progressing     Problem: Gastrointestinal - Adult  Goal: Minimal or absence of nausea and vomiting  Outcome: Progressing  Goal: Maintains or returns to baseline bowel function  Outcome: Progressing  Goal: Maintains adequate nutritional intake  Outcome: Progressing     Problem: Genitourinary - Adult  Goal: Absence of urinary retention  Outcome: Progressing     Problem: Infection - Adult  Goal: Absence of infection at discharge  Outcome: Progressing  Goal: Absence of fever/infection during anticipated neutropenic period  Outcome: Progressing     Problem: Metabolic/Fluid and Electrolytes - Adult  Goal: Electrolytes maintained within normal limits  Outcome: Progressing  Goal: Hemodynamic stability and optimal renal function maintained  Outcome: Progressing  Goal: Glucose maintained within

## 2023-11-01 VITALS
HEART RATE: 81 BPM | SYSTOLIC BLOOD PRESSURE: 139 MMHG | RESPIRATION RATE: 18 BRPM | HEIGHT: 64 IN | TEMPERATURE: 98.1 F | OXYGEN SATURATION: 99 % | WEIGHT: 183.86 LBS | BODY MASS INDEX: 31.39 KG/M2 | DIASTOLIC BLOOD PRESSURE: 69 MMHG

## 2023-11-01 PROBLEM — G45.9 TIA (TRANSIENT ISCHEMIC ATTACK): Status: RESOLVED | Noted: 2023-10-31 | Resolved: 2023-11-01

## 2023-11-01 PROBLEM — R29.90 STROKE-LIKE SYMPTOMS: Status: RESOLVED | Noted: 2023-10-31 | Resolved: 2023-11-01

## 2023-11-01 LAB
ANION GAP SERPL CALC-SCNC: 5 MMOL/L (ref 5–15)
BUN SERPL-MCNC: 18 MG/DL (ref 6–20)
BUN/CREAT SERPL: 20 (ref 12–20)
CALCIUM SERPL-MCNC: 8.9 MG/DL (ref 8.5–10.1)
CHLORIDE SERPL-SCNC: 110 MMOL/L (ref 97–108)
CHOLEST SERPL-MCNC: 153 MG/DL
CO2 SERPL-SCNC: 25 MMOL/L (ref 21–32)
CREAT SERPL-MCNC: 0.88 MG/DL (ref 0.55–1.02)
ERYTHROCYTE [DISTWIDTH] IN BLOOD BY AUTOMATED COUNT: 13.2 % (ref 11.5–14.5)
EST. AVERAGE GLUCOSE BLD GHB EST-MCNC: 105 MG/DL
GLUCOSE SERPL-MCNC: 105 MG/DL (ref 65–100)
HBA1C MFR BLD: 5.3 % (ref 4–5.6)
HCT VFR BLD AUTO: 37.2 % (ref 35–47)
HDLC SERPL-MCNC: 68 MG/DL
HDLC SERPL: 2.3 (ref 0–5)
HGB BLD-MCNC: 12.4 G/DL (ref 11.5–16)
LDLC SERPL CALC-MCNC: 72.6 MG/DL (ref 0–100)
MAGNESIUM SERPL-MCNC: 2.6 MG/DL (ref 1.6–2.4)
MCH RBC QN AUTO: 31.4 PG (ref 26–34)
MCHC RBC AUTO-ENTMCNC: 33.3 G/DL (ref 30–36.5)
MCV RBC AUTO: 94.2 FL (ref 80–99)
NRBC # BLD: 0 K/UL (ref 0–0.01)
NRBC BLD-RTO: 0 PER 100 WBC
PLATELET # BLD AUTO: 169 K/UL (ref 150–400)
PMV BLD AUTO: 10 FL (ref 8.9–12.9)
POTASSIUM SERPL-SCNC: 3.7 MMOL/L (ref 3.5–5.1)
RBC # BLD AUTO: 3.95 M/UL (ref 3.8–5.2)
SODIUM SERPL-SCNC: 140 MMOL/L (ref 136–145)
TRIGL SERPL-MCNC: 62 MG/DL
VLDLC SERPL CALC-MCNC: 12.4 MG/DL
WBC # BLD AUTO: 4.6 K/UL (ref 3.6–11)

## 2023-11-01 PROCEDURE — 2580000003 HC RX 258: Performed by: INTERNAL MEDICINE

## 2023-11-01 PROCEDURE — 97530 THERAPEUTIC ACTIVITIES: CPT

## 2023-11-01 PROCEDURE — 97165 OT EVAL LOW COMPLEX 30 MIN: CPT

## 2023-11-01 PROCEDURE — 83036 HEMOGLOBIN GLYCOSYLATED A1C: CPT

## 2023-11-01 PROCEDURE — 85027 COMPLETE CBC AUTOMATED: CPT

## 2023-11-01 PROCEDURE — 36415 COLL VENOUS BLD VENIPUNCTURE: CPT

## 2023-11-01 PROCEDURE — G0378 HOSPITAL OBSERVATION PER HR: HCPCS

## 2023-11-01 PROCEDURE — 6370000000 HC RX 637 (ALT 250 FOR IP): Performed by: INTERNAL MEDICINE

## 2023-11-01 PROCEDURE — 80061 LIPID PANEL: CPT

## 2023-11-01 PROCEDURE — 83735 ASSAY OF MAGNESIUM: CPT

## 2023-11-01 PROCEDURE — 97535 SELF CARE MNGMENT TRAINING: CPT

## 2023-11-01 PROCEDURE — 80048 BASIC METABOLIC PNL TOTAL CA: CPT

## 2023-11-01 RX ADMIN — APIXABAN 5 MG: 5 TABLET, FILM COATED ORAL at 09:18

## 2023-11-01 RX ADMIN — SODIUM CHLORIDE, PRESERVATIVE FREE 10 ML: 5 INJECTION INTRAVENOUS at 11:26

## 2023-11-01 RX ADMIN — SACUBITRIL AND VALSARTAN 1 TABLET: 24; 26 TABLET, FILM COATED ORAL at 12:51

## 2023-11-01 RX ADMIN — ROSUVASTATIN 20 MG: 20 TABLET, FILM COATED ORAL at 09:15

## 2023-11-01 RX ADMIN — DOFETILIDE 500 MCG: 0.5 CAPSULE ORAL at 09:14

## 2023-11-01 RX ADMIN — CLONIDINE HYDROCHLORIDE 0.1 MG: 0.1 TABLET ORAL at 09:14

## 2023-11-01 RX ADMIN — LEVOTHYROXINE SODIUM 75 MCG: 0.05 TABLET ORAL at 05:46

## 2023-11-01 ASSESSMENT — PAIN SCALES - GENERAL
PAINLEVEL_OUTOF10: 0

## 2023-11-01 NOTE — PROGRESS NOTES
Occupational Therapy     Chart reviewed and order acknowledged. Pt was seen for OT evaluation, pt does not require further acute care OT services nor after discharge. Pt is at her independent baseline. Recommend RN staff continue to ambulate with pt and for completion of ADLs. Full eval to follow.      Raj Louis, KAYLEEN, OTR/L

## 2023-11-01 NOTE — DISCHARGE SUMMARY
Hospitalist Discharge Summary     Patient ID:  Carla Mensah  [id number]  60 y.o.  1940  10/31/2023    PCP on record: Edwin Davenport MD    Admit date: 10/31/2023  Discharge date and time: 11/1/2023    DISCHARGE DIAGNOSIS:    Left lower extremity numbness/tingling sensation  Neuropathy  HTN  Atrial fibrillation  Hyperlipidemia  Thyroidism  Depression  Dementia      CONSULTATIONS:  IP CONSULT TO NEUROLOGY    Excerpted HPI from H&P of Swapnil Moya MD:       HISTORY OF PRESENT ILLNESS:     Jeremy Camacho is a 82 y.o.  female with PMHx significant for obesity BMI of 31 and has A-fib on Eliquis, CHF, HTN, HLD, neuropathy with baseline numbness of her left lower extremity, hypothyroidism. She was last well-known at 9 PM last night. She went to sleep and woke up this morning at 7 AM and started experiencing numbness and tingling in her left foot. She also reports headache and palpitations. No blurry vision or syncope. No head trauma. No recent fever or chills. No nausea, vomiting, abdominal pain or diarrhea. She came to the ED for further evaluation. Her symptoms were practically gone by the time she came to the ED. In the ED, she was hypertensive with blood pressure of 197/98. Weight went up as high as 209/97. Rest of the vitals are stable. EKG was done and is here to be uploaded in the system. Chest x-ray showed mild pulmonary vascular congestion. CT head was negative for any acute intracranial process. CT angiogram brain and neck negative for any large vessel occlusion, artery dissection or flow-limiting stenosis. Lab work shows normal CBC, BMP and LFT. Troponin of 6. Rest of the CBC, BMP and LFT within normal limits. INR 0.9. The patient was treated as left lower extremity numbness and tingling sensation, rule out TIA versus stroke. She was given IV Zofran. Admitted to hospital service for further management.   We were asked to admit for work up and evaluation of the above problems. ______________________________________________________________________  DISCHARGE SUMMARY/HOSPITAL COURSE:  for full details see H&P, daily progress notes, labs, consult notes. For left leg numbness and tingling sensation and feeling not well overall, neurology evaluated the patient and did not expect to be TIA, patient blood pressure was elevated likely with symptoms related to the high blood pressure, as the patient CT head and CTA head and neck as well as CT head perfusion were all unremarkable, no MRI as per neurology to, patient has A-fib but she is already on Eliquis, patient she had blood pressure machine at home, blood pressure well controlled now, patient to continue with home Entresto and clonidine, patient recommended to follow with her PCP in 1 week to recheck blood pressure. Patient also had echo and this was unremarkable      _______________________________________________________________________  Patient seen and examined by me on discharge day. Pertinent Findings:  Gen:    Not in distress  Chest: Clear lungs  CVS:   Regular rhythm. No edema  Abd:  Soft, not distended, not tender  Neuro:  Alert,   _______________________________________________________________________  DISCHARGE MEDICATIONS:      Medication List        ASK your doctor about these medications      apixaban 5 MG Tabs tablet  Commonly known as: ELIQUIS     calcium carbonate 500 MG Tabs tablet  Commonly known as: OSCAL     cloNIDine 0.2 MG tablet  Commonly known as: CATAPRES  Take 1/2-1 tablet by mouth twice a day, based on BP readings per scale.      dofetilide 500 MCG capsule  Commonly known as: TIKOSYN     donepezil 5 MG tablet  Commonly known as: ARICEPT  Take 1 tablet by mouth nightly     Entresto 24-26 MG per tablet  Generic drug: sacubitril-valsartan     potassium chloride 10 MEQ extended release tablet  Commonly known as: KLOR-CON  TAKE 2 TABLETS IN MORNING AND 2 TABLETS IN THE

## 2023-11-01 NOTE — CARE COORDINATION
Care Management Initial Assessment       RUR: 10% (low RUR)  Readmission? No  1st IM letter given? No - Obs  1st  letter given: No - n/a    CM met with pt at bedside. CM introduced self and role and completed initial assessment. CM verified demographic and clinical information. Pt lives alone in a 1 level home, 3 JAIR with ramped entrance. Pt reports being independent in ADLs. Pt reports RW, WC and cane at home. Pt denies O2/CPAP at home. Pt states they are supported by their friends and neighbors. Patient is an active . Pt reports/denies history of HH/IPR/SNF. Pt plans to discharge home and anticipates neighbor to assist with transportation. No CM needs identified. CM will continue to follow for dc planning. 1022 - Per IDRs, pt to dc today. Code 44 given, SMAARTER tool completed and placed on doorway. Pt is clear from CM for dc.     11/01/23 0922   Service Assessment   Patient Orientation Alert and Oriented   Cognition Alert   History Provided By Patient   Primary [address] is: Legal Next of Kin  (Pt has ACP docs not completed, knows to bring them in when she completes them. Declines completing them here.)   PCP Verified by CM Yes   Last Visit to PCP Within last 3 months  (Dr. Jatinder Sanchez at [facility])   Prior [address] in ADLs/IADLs   Current Functional Level Independent in ADLs/IADLs   Can patient return to prior living arrangement Yes   Ability to make needs known: Good   Family able to assist with home care needs: Yes   Would you like for me to discuss the discharge plan with any other family members/significant others, and if so, who? No   Financial Resources Medicare   Community Resources None   Social/Functional History   Lives With Alone   Type of [address] [place] [address]  One level   Home Access Ramped entrance   [address] Cane;Walker, rolling; Wheelchair-manual   ADL

## 2023-11-01 NOTE — PROGRESS NOTES
End of Shift Note    Bedside shift change report given to ELENI Rojas (oncoming nurse) by Geremias Julian RN (offgoing nurse). Report included the following information Nurse Handoff Report, ED SBAR, Adult Overview, MAR, Recent Results, Cardiac Rhythm  , Quality Measures, and Neuro Assessment      Shift worked:  Nights    Shift summary and any significant changes:     Patient slept between care no significant shift events to note. All symptoms resolved.         Concerns for physician to address:  Plan   Zone phone for oncoming shift:   [phone]     Patient Information  Rose Paredes  82 y.o.  10/31/2023  8:10 AM by Alina Michaud MD. Rose Paredes was admitted from Home    Problem List  Patient Active Problem List    Diagnosis Date Noted    Stroke-like symptoms 10/31/2023    TIA (transient ischemic attack) 10/31/2023    Anxiety 03/09/2021    Pericardial effusion with cardiac tamponade 03/18/2017    S/P ablation of atrial fibrillation 03/17/2017    Acquired hypothyroidism 12/14/2015    HTN (hypertension) 07/21/2015    Atrial fibrillation ([address]) 03/16/2015    Morbid obesity ([address])     Hypokalemia 07/14/2014    Hyperlipidemia 07/14/2014    Postmenopausal bleeding 10/18/2011     Past Medical History:   Diagnosis Date    Arthritis     right knee, left shoulder    Atrial fibrillation ([address])     4/12/23 Dr. Gill Lopez    CAD (coronary artery disease)     Diverticulosis     Fractures     Frequency of urination     High cholesterol     Hypertension     Hypothyroid     Long term current use of anticoagulant therapy     Morbid obesity (HCC)     Osteoarthritis     Peripheral neuropathy     bilateral LE    Unspecified adverse effect of anesthesia     low BP    Wells' syndrome        Core Measures:  CVA: No Yes  CHF:No No  PNA:NoNo    Activity:  Activity: In bed  Number times ambulated in hallways past shift: 0  Number of times OOB to chair past shift: 4    Cardiac:   Cardiac Monitoring: Yes           Access:   Current line(s):

## 2023-11-01 NOTE — PROGRESS NOTES
[id number] [facility] follow-up PCP transitional care appointment has been scheduled with Dr. Amanda Nolen on 11/17/23 1030. This is the first available appt due to limited provider availability. PCP office does not offer alternate provider option for hospital follow up. [facility] placed [facility] information AVS for patient resource. PCP office has placed patient on cancellation list to be called for a sooner appt if one becomes available. Pending patient discharge. Eliceo Beasley, Care Management Assistant    Attempted to schedule hospital follow up for PCP appointment. Sent a message to PCP office to find patient an appointment. Awaiting callback from PCP office.  [address] [place] [address]

## 2023-11-02 ENCOUNTER — TELEPHONE (OUTPATIENT)
Age: 83
End: 2023-11-02

## 2023-11-02 NOTE — PROGRESS NOTES
David Sam is a 82 y.o. female who presents for hospital follow-up. Admitted 10/31-11/1 with left lower extremity numbness and tingling. Seen by neurology. Symptoms thought to be related to uncontrolled hypertension. On Entresto and clonidine 0.2mg 1/2 tab BID, if above 160.  for hypertension. On Eliquis for A-fib, no bleeding. Today, reports symptoms in both legs of numbness and tingling, worse in cold weather, episodic. Reports gait is off at times, no falls. Denies chest pain or CHE, some palpitations, not bothersome. Sees cardio regularly. Normal BM, has urinary frequency     Reports anxiety, on sertraline 100mg daily. Reports compliance with medications. Reports insomnia. Taking melatonin. To see Dr Zeynep De La Cruz, urogyn, for urinary frequency,         Past Medical History:   Diagnosis Date    Arthritis     right knee, left shoulder    Atrial fibrillation ([address])     4/12/23 Dr. Holbrook Dire    CAD (coronary artery disease)     Diverticulosis     Fractures     Frequency of urination     High cholesterol     Hypertension     Hypothyroid     Long term current use of anticoagulant therapy     Morbid obesity (HCC)     Osteoarthritis     Peripheral neuropathy     bilateral LE    Unspecified adverse effect of anesthesia     low BP    Wells' syndrome        Family History   Problem Relation Age of Onset    Diabetes Paternal Grandmother     Cancer Paternal Aunt     Hypertension Father     Heart Disease Father     Heart Disease Mother     Alzheimer's Disease Mother         Social History     Socioeconomic History    Marital status:      Spouse name: Not on file    Number of children: Not on file    Years of education: Not on file    Highest education level: Not on file   Occupational History    Not on file   Tobacco Use    Smoking status: Never    Smokeless tobacco: Never   Vaping Use    Vaping Use: Never used   Substance and Sexual Activity    Alcohol use: No    Drug use:  No

## 2023-11-03 ENCOUNTER — OFFICE VISIT (OUTPATIENT)
Age: 83
End: 2023-11-03

## 2023-11-03 VITALS
RESPIRATION RATE: 19 BRPM | OXYGEN SATURATION: 98 % | TEMPERATURE: 98.1 F | DIASTOLIC BLOOD PRESSURE: 73 MMHG | HEART RATE: 84 BPM | SYSTOLIC BLOOD PRESSURE: 135 MMHG | HEIGHT: 64 IN | WEIGHT: 184 LBS | BODY MASS INDEX: 31.41 KG/M2

## 2023-11-03 DIAGNOSIS — E03.9 ACQUIRED HYPOTHYROIDISM: ICD-10-CM

## 2023-11-03 DIAGNOSIS — F51.01 PRIMARY INSOMNIA: ICD-10-CM

## 2023-11-03 DIAGNOSIS — F41.9 ANXIETY: ICD-10-CM

## 2023-11-03 DIAGNOSIS — I10 PRIMARY HYPERTENSION: Primary | ICD-10-CM

## 2023-11-03 DIAGNOSIS — E78.00 PURE HYPERCHOLESTEROLEMIA: ICD-10-CM

## 2023-11-03 DIAGNOSIS — I48.0 PAF (PAROXYSMAL ATRIAL FIBRILLATION) (HCC): ICD-10-CM

## 2023-11-03 DIAGNOSIS — Z23 FLU VACCINE NEED: ICD-10-CM

## 2023-11-03 NOTE — PATIENT INSTRUCTIONS
MINIMIZE COFFEE, TEA AND SODA. DRINK AT LEAST 4 GLASSES OF WATER A DAY.       Can take melatonin up to 10mg or can take tylenol PM.        Sleep hygiene: Basic rules for a good night's sleep  Sleep only as much as you need to feel rested and then get out of bed   Keep a regular sleep schedule   Do not try to sleep unless you feel sleepy   Exercise regularly, preferably at least 4 to 5 hours before bedtime   Avoid caffeinated beverages after lunch   Avoid alcohol near bedtime: no \"night cap\"   Avoid smoking, especially in the evening   Do not go to bed hungry   Make the bedroom environment conducive to sleep   Avoid prolonged use of light-emitting screens before bedtime    Deal with your worries before bedtime

## 2023-11-04 ENCOUNTER — APPOINTMENT (OUTPATIENT)
Facility: HOSPITAL | Age: 83
End: 2023-11-04
Payer: MEDICARE

## 2023-11-04 ENCOUNTER — HOSPITAL ENCOUNTER (EMERGENCY)
Facility: HOSPITAL | Age: 83
Discharge: HOME | End: 2023-11-04
Attending: STUDENT IN AN ORGANIZED HEALTH CARE EDUCATION/TRAINING PROGRAM
Payer: MEDICARE

## 2023-11-04 VITALS
SYSTOLIC BLOOD PRESSURE: 125 MMHG | BODY MASS INDEX: 31.43 KG/M2 | OXYGEN SATURATION: 98 % | HEART RATE: 68 BPM | HEIGHT: 64 IN | RESPIRATION RATE: 18 BRPM | WEIGHT: 184.08 LBS | DIASTOLIC BLOOD PRESSURE: 69 MMHG | TEMPERATURE: 97.7 F

## 2023-11-04 DIAGNOSIS — R20.0 LEG NUMBNESS: ICD-10-CM

## 2023-11-04 DIAGNOSIS — R20.2 PARESTHESIA: Primary | ICD-10-CM

## 2023-11-04 LAB
ALBUMIN SERPL-MCNC: 4 G/DL (ref 3.5–5)
ALBUMIN/GLOB SERPL: 1.1 (ref 1.1–2.2)
ALP SERPL-CCNC: 76 U/L (ref 45–117)
ALT SERPL-CCNC: 24 U/L (ref 12–78)
ANION GAP SERPL CALC-SCNC: 4 MMOL/L (ref 5–15)
AST SERPL-CCNC: 15 U/L (ref 15–37)
BASOPHILS # BLD: 0 K/UL (ref 0–0.1)
BASOPHILS NFR BLD: 1 % (ref 0–1)
BILIRUB SERPL-MCNC: 0.4 MG/DL (ref 0.2–1)
BUN SERPL-MCNC: 14 MG/DL (ref 6–20)
BUN/CREAT SERPL: 16 (ref 12–20)
CALCIUM SERPL-MCNC: 9.2 MG/DL (ref 8.5–10.1)
CHLORIDE SERPL-SCNC: 106 MMOL/L (ref 97–108)
CO2 SERPL-SCNC: 28 MMOL/L (ref 21–32)
CREAT SERPL-MCNC: 0.88 MG/DL (ref 0.55–1.02)
DIFFERENTIAL METHOD BLD: ABNORMAL
EKG ATRIAL RATE: 61 BPM
EKG DIAGNOSIS: NORMAL
EKG P AXIS: 40 DEGREES
EKG P-R INTERVAL: 160 MS
EKG Q-T INTERVAL: 472 MS
EKG QRS DURATION: 84 MS
EKG QTC CALCULATION (BAZETT): 475 MS
EKG R AXIS: -17 DEGREES
EKG T AXIS: 17 DEGREES
EKG VENTRICULAR RATE: 61 BPM
EOSINOPHIL # BLD: 0.2 K/UL (ref 0–0.4)
EOSINOPHIL NFR BLD: 5 % (ref 0–7)
ERYTHROCYTE [DISTWIDTH] IN BLOOD BY AUTOMATED COUNT: 13.1 % (ref 11.5–14.5)
GLOBULIN SER CALC-MCNC: 3.8 G/DL (ref 2–4)
GLUCOSE BLD STRIP.AUTO-MCNC: 98 MG/DL (ref 65–117)
GLUCOSE SERPL-MCNC: 108 MG/DL (ref 65–100)
HCT VFR BLD AUTO: 39 % (ref 35–47)
HGB BLD-MCNC: 13.2 G/DL (ref 11.5–16)
IMM GRANULOCYTES # BLD AUTO: 0 K/UL (ref 0–0.04)
IMM GRANULOCYTES NFR BLD AUTO: 1 % (ref 0–0.5)
INR PPP: 1 (ref 0.9–1.1)
LYMPHOCYTES # BLD: 1 K/UL (ref 0.8–3.5)
LYMPHOCYTES NFR BLD: 24 % (ref 12–49)
MCH RBC QN AUTO: 31.6 PG (ref 26–34)
MCHC RBC AUTO-ENTMCNC: 33.8 G/DL (ref 30–36.5)
MCV RBC AUTO: 93.3 FL (ref 80–99)
MONOCYTES # BLD: 0.4 K/UL (ref 0–1)
MONOCYTES NFR BLD: 11 % (ref 5–13)
NEUTS SEG # BLD: 2.5 K/UL (ref 1.8–8)
NEUTS SEG NFR BLD: 58 % (ref 32–75)
NRBC # BLD: 0 K/UL (ref 0–0.01)
NRBC BLD-RTO: 0 PER 100 WBC
PLATELET # BLD AUTO: 167 K/UL (ref 150–400)
PMV BLD AUTO: 9.8 FL (ref 8.9–12.9)
POTASSIUM SERPL-SCNC: 3.9 MMOL/L (ref 3.5–5.1)
PROT SERPL-MCNC: 7.8 G/DL (ref 6.4–8.2)
PROTHROMBIN TIME: 10.3 SEC (ref 9–11.1)
RBC # BLD AUTO: 4.18 M/UL (ref 3.8–5.2)
SERVICE CMNT-IMP: NORMAL
SODIUM SERPL-SCNC: 138 MMOL/L (ref 136–145)
TROPONIN I SERPL HS-MCNC: 6 NG/L (ref 0–51)
WBC # BLD AUTO: 4.2 K/UL (ref 3.6–11)

## 2023-11-04 PROCEDURE — 36415 COLL VENOUS BLD VENIPUNCTURE: CPT

## 2023-11-04 PROCEDURE — 84484 ASSAY OF TROPONIN QUANT: CPT

## 2023-11-04 PROCEDURE — 80053 COMPREHEN METABOLIC PANEL: CPT

## 2023-11-04 PROCEDURE — 71045 X-RAY EXAM CHEST 1 VIEW: CPT

## 2023-11-04 PROCEDURE — 99285 EMERGENCY DEPT VISIT HI MDM: CPT

## 2023-11-04 PROCEDURE — 70551 MRI BRAIN STEM W/O DYE: CPT

## 2023-11-04 PROCEDURE — 85610 PROTHROMBIN TIME: CPT

## 2023-11-04 PROCEDURE — 70450 CT HEAD/BRAIN W/O DYE: CPT

## 2023-11-04 PROCEDURE — 82962 GLUCOSE BLOOD TEST: CPT

## 2023-11-04 PROCEDURE — 85025 COMPLETE CBC W/AUTO DIFF WBC: CPT

## 2023-11-04 ASSESSMENT — PAIN DESCRIPTION - ORIENTATION: ORIENTATION: RIGHT

## 2023-11-04 ASSESSMENT — PAIN SCALES - GENERAL: PAINLEVEL_OUTOF10: 5

## 2023-11-04 ASSESSMENT — LIFESTYLE VARIABLES
HOW OFTEN DO YOU HAVE A DRINK CONTAINING ALCOHOL: NEVER
HOW MANY STANDARD DRINKS CONTAINING ALCOHOL DO YOU HAVE ON A TYPICAL DAY: PATIENT DOES NOT DRINK

## 2023-11-04 ASSESSMENT — PAIN DESCRIPTION - LOCATION: LOCATION: KNEE

## 2023-11-04 NOTE — ED NOTES
Patient was provided discharge paperwork, denies needs from staff at this time.  Discussed discharge with .        Andie Castillo, RN  11/04/23 6933

## 2023-11-06 ENCOUNTER — TELEPHONE (OUTPATIENT)
Age: 83
End: 2023-11-06

## 2023-11-06 NOTE — TELEPHONE ENCOUNTER
----- Message from Mara Mclaughlin sent at 11/6/2023 12:24 PM EST -----  Subject: Appointment Request    Reason for Call: Established Patient Appointment needed: Routine ED Follow   Up Visit    QUESTIONS    Reason for appointment request? No appointments available during search     Additional Information for Provider? pt. needs ED f/u from 11.4   ---------------------------------------------------------------------------  --------------  Elizabeth Hawi Nestor  [phone]; OK to leave message on voicemail  ---------------------------------------------------------------------------  --------------  SCRIPT ANSWERS

## 2023-11-08 LAB
EKG ATRIAL RATE: 61 BPM
EKG DIAGNOSIS: NORMAL
EKG P AXIS: 40 DEGREES
EKG P-R INTERVAL: 160 MS
EKG Q-T INTERVAL: 472 MS
EKG QRS DURATION: 84 MS
EKG QTC CALCULATION (BAZETT): 475 MS
EKG R AXIS: -17 DEGREES
EKG T AXIS: 17 DEGREES
EKG VENTRICULAR RATE: 61 BPM

## 2023-11-12 NOTE — PROGRESS NOTES
bilaterally  Hearing intact to conversation  Voice with normal projection, no evidence of secretion pooling  Shoulder shrug intact bilaterally  No tongue deviation appreciated     Motor:   Normal bulk  No tremor appreciated on today's exam  No abnormal movements appreciated on today's exam  Moves extremities spontaneously and with purpose  5/5 x 4      Sensation: Intact to light touch    Coordination/Cerebellar: Finger to nose intact bilaterally    Gait: Ambulates independently    Reflexes: Decreased throughout    Fall risk assessment  No flowsheet data found. Return in about 1 year (around 11/13/2024) for With me, In Office. This medical record was transcribed using an electronic medical records system. Although proofread, it may and can contain electronic and spelling errors. Other human spelling and other errors may be present. Corrections may be executed at a later time. Please feel free to contact us for any clarifications as needed.          TAYLOR Melendez - NP

## 2023-11-13 ENCOUNTER — OFFICE VISIT (OUTPATIENT)
Age: 83
End: 2023-11-13
Payer: MEDICARE

## 2023-11-13 VITALS
SYSTOLIC BLOOD PRESSURE: 126 MMHG | RESPIRATION RATE: 16 BRPM | TEMPERATURE: 97.3 F | OXYGEN SATURATION: 96 % | HEART RATE: 62 BPM | BODY MASS INDEX: 31.48 KG/M2 | WEIGHT: 184.4 LBS | DIASTOLIC BLOOD PRESSURE: 76 MMHG | HEIGHT: 64 IN

## 2023-11-13 DIAGNOSIS — Z91.89 STROKE RISK: ICD-10-CM

## 2023-11-13 DIAGNOSIS — Z09 HOSPITAL DISCHARGE FOLLOW-UP: ICD-10-CM

## 2023-11-13 DIAGNOSIS — R51.9 NONINTRACTABLE EPISODIC HEADACHE, UNSPECIFIED HEADACHE TYPE: Primary | ICD-10-CM

## 2023-11-13 DIAGNOSIS — G62.9 NEUROPATHY: ICD-10-CM

## 2023-11-13 DIAGNOSIS — R41.3 MEMORY DEFICIT: ICD-10-CM

## 2023-11-13 PROCEDURE — G8427 DOCREV CUR MEDS BY ELIG CLIN: HCPCS

## 2023-11-13 PROCEDURE — 1123F ACP DISCUSS/DSCN MKR DOCD: CPT

## 2023-11-13 PROCEDURE — G8399 PT W/DXA RESULTS DOCUMENT: HCPCS

## 2023-11-13 PROCEDURE — 3078F DIAST BP <80 MM HG: CPT

## 2023-11-13 PROCEDURE — 1036F TOBACCO NON-USER: CPT

## 2023-11-13 PROCEDURE — 1111F DSCHRG MED/CURRENT MED MERGE: CPT

## 2023-11-13 PROCEDURE — G8417 CALC BMI ABV UP PARAM F/U: HCPCS

## 2023-11-13 PROCEDURE — 99215 OFFICE O/P EST HI 40 MIN: CPT

## 2023-11-13 PROCEDURE — 1090F PRES/ABSN URINE INCON ASSESS: CPT

## 2023-11-13 PROCEDURE — G8484 FLU IMMUNIZE NO ADMIN: HCPCS

## 2023-11-13 PROCEDURE — 3074F SYST BP LT 130 MM HG: CPT

## 2023-11-13 RX ORDER — LANOLIN ALCOHOL/MO/W.PET/CERES
6 CREAM (GRAM) TOPICAL NIGHTLY PRN
COMMUNITY

## 2023-11-13 ASSESSMENT — PATIENT HEALTH QUESTIONNAIRE - PHQ9
2. FEELING DOWN, DEPRESSED OR HOPELESS: 0
SUM OF ALL RESPONSES TO PHQ9 QUESTIONS 1 & 2: 0
SUM OF ALL RESPONSES TO PHQ QUESTIONS 1-9: 0
SUM OF ALL RESPONSES TO PHQ QUESTIONS 1-9: 0
1. LITTLE INTEREST OR PLEASURE IN DOING THINGS: 0
SUM OF ALL RESPONSES TO PHQ QUESTIONS 1-9: 0
SUM OF ALL RESPONSES TO PHQ QUESTIONS 1-9: 0

## 2023-11-13 NOTE — PATIENT INSTRUCTIONS
As per our discussion,    Overall, you are doing really well. I would not make any changes at this time. Since your neuropathic symptoms are not bothersome at this time, we will continue to monitor you for that. If your symptoms become worse, please not hesitate to let us know. As for your headaches, we will continue to monitor you as well. You can take Tylenol up to 2 to 3 days a week as needed. As for your memory, continue to take donepezil as prescribed. I encouraged you to engage in approximately 2.5 hours of physician approved physical activity on a weekly basis. To maintain a healthy diet. they will also be informed of the Mediterranean diet, which has been associated with reduced risk for neurodegenerative conditions as well as heart disease. To maintain a cognitively stimulated lifestyle, also incorporating social interaction. It was a pleasure to meet you today    I will see you back in a year or sooner if needed. Please do not hesitate to reach out for any questions or concerns.

## 2023-11-14 PROBLEM — R51.9 NONINTRACTABLE EPISODIC HEADACHE: Status: ACTIVE | Noted: 2023-11-14

## 2023-11-14 PROBLEM — Z91.89 STROKE RISK: Status: ACTIVE | Noted: 2023-11-14

## 2023-11-14 PROBLEM — G62.9 NEUROPATHY: Status: ACTIVE | Noted: 2023-11-14

## 2023-11-14 PROBLEM — Z09 HOSPITAL DISCHARGE FOLLOW-UP: Status: ACTIVE | Noted: 2023-11-14

## 2023-11-14 PROBLEM — R41.3 MEMORY DEFICIT: Status: ACTIVE | Noted: 2023-11-14

## 2023-11-14 ASSESSMENT — ENCOUNTER SYMPTOMS
RESPIRATORY NEGATIVE: 1
GASTROINTESTINAL NEGATIVE: 1
EYES NEGATIVE: 1
ALLERGIC/IMMUNOLOGIC NEGATIVE: 1

## 2023-11-14 NOTE — ASSESSMENT & PLAN NOTE
Patient is to continue on rosuvastatin 20 mg daily and apixaban 5 mg twice a day. Patient is to continue to work to all of his comorbid conditions as managed by PCP and other specialists as appropriate    RECOMMENDATIONS:  - BP goal is less than 140/90  - Goal HbA1c is less than 7.   - LDL less than 70     Education was provided today in regards to risk factors for stroke and lifestyle modifications to help minimize the risk of future stroke. This included medication compliance, regular follow up with primary care physician,  and healthy lifestyle habits (nutrition/exercise).

## 2023-11-14 NOTE — ASSESSMENT & PLAN NOTE
Based on patient's description, her headaches do not have any migrainous features. She was advised to continue to use Tylenol as needed but to limit its usage to no more than 2 to 3 days a week to prevent rebound headaches. We discussed the importance of a proper diet including plenty of fruits and vegetables as this can help with headache prevention. We discussed the importance of staying hydrated and drinking at least 32 to 64 ounces of water daily as this can be a cause of tension type headaches. We discussed the importance of sleep hygiene and attempting to get at least 6 to 8 hours of sleep daily to help with headache prevention.

## 2023-11-15 NOTE — ASSESSMENT & PLAN NOTE
Patient denied any recurrence of her symptoms. She continues to experience headaches but not as bad as they used to be. These headaches are not bothersome and usually goes away with Tylenol. Labs and imagings were reviewed with patient today. There is no additional neurological work-up needed at this time from neurological standpoint. Patient is to continue to follow-up with PCP and other specialists for other comorbid conditions as appropriate. Fall safety was also discussed with patient.

## 2023-11-15 NOTE — ASSESSMENT & PLAN NOTE
Supported by EMG completed on 6/4/2020 by Dr. Kris Mcleod which was abnormal.  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 Patient verbalized she continues to experience numbness and tingling on the left lower extremity but symptoms are not bothersome to patient. We will continue to monitor patient for this. Patient is to continue to follow-up with PCP and other specialists for other comorbid conditions management as appropriate. Education about adopting healthy lifestyles which include nutrition and exercise which would help alleviate patient's symptoms was reviewed with patient. Fall safety was discussed with patient.

## 2023-11-15 NOTE — ASSESSMENT & PLAN NOTE
Per patient, she was started on donepezil 5 weeks ago as a preventative given she had a family history of dementia. As I personally reviewed patient's medical history, she had a recent brain MRI without contrast completed on 11/4/2023 which showed mild chronic microvascular change and mild cerebral atrophy. Brain MRI without contrast completed on 5/20/2023 showed several bilateral subcentimeter white matter hyperintensities consistent with chronic microvascular ischemic change. It was found that patient was scheduled to have neuropsych evaluation done with Dr. Tracy Delcid but was canceled. It seemed that she had neuropsych evaluation with [facility] but no notes were found. Her last office visit with  [facility] on 7/3/2023. We will reach out to Ms. Ortez for records. In the meantime, patient is to continue to take donepezil 5 mg at bedtime. We will increase dosage to donepezil 10 mg at the next visit. Patient was encouraged to engage in approximately 2.5 hours of physician approved physical activity on a weekly basis. Patient was encouraged to maintain a healthy diet. they will also be informed of the Mediterranean diet, which has been associated with reduced risk for neurodegenerative conditions as well as heart disease. Patient was encouraged to maintain a cognitively stimulated lifestyle, also incorporating social interaction.

## 2023-11-28 NOTE — PROGRESS NOTES
Adelaida Green is a 82 y.o. female who presents for follow-up after ED visit on 11/4 with paresthesias on left side of body. Stroke code called. CT head negative. MRI with mild chronic microvascular changes and mild cerebral atrophy. No acute findings. Neurologic symptoms were variable during her emergency room visit and felt not to be having a CVA. Has seen Dr Jai Casillas ,neurology in the past.  Reports symptoms resolved    Reports neuropathy in both feet, not painful except in cold weather. Reports ringing in head. Followed by cardiology. History of atrial fibrillation and HTN. Will get nighttime palpitations. On Eliquis. No bleeding. On tikosyn,  entresto, 1/2 tablet of clonidine 0.2 BID. On statin. No myalgias. On aricept for memory loss. Urinary frequency, nocturia, incontinence. no dysuria. To see Dr Miller Brewer January 2024. Has frequent BM, no BM incontinence. On sertraline 100mg, mood stable. Supportive sister (13 years younger) and best friend.        Past Medical History:   Diagnosis Date    Arthritis     right knee, left shoulder    Atrial fibrillation ([address])     4/12/23 Dr. Rickey Rojas    CAD (coronary artery disease)     Diverticulosis     Fractures     Frequency of urination     High cholesterol     Hypertension     Hypothyroid     Long term current use of anticoagulant therapy     Morbid obesity (HCC)     Osteoarthritis     Peripheral neuropathy     bilateral LE    Unspecified adverse effect of anesthesia     low BP    Wells' syndrome        Family History   Problem Relation Age of Onset    Diabetes Paternal Grandmother     Cancer Paternal Aunt     Hypertension Father     Heart Disease Father     Heart Disease Mother     Alzheimer's Disease Mother         Social History     Socioeconomic History    Marital status:      Spouse name: Not on file    Number of children: Not on file    Years of education: Not on file    Highest education level: Not on

## 2023-11-29 ENCOUNTER — OFFICE VISIT (OUTPATIENT)
Age: 83
End: 2023-11-29
Payer: MEDICARE

## 2023-11-29 VITALS
HEART RATE: 71 BPM | HEIGHT: 64 IN | RESPIRATION RATE: 12 BRPM | SYSTOLIC BLOOD PRESSURE: 119 MMHG | DIASTOLIC BLOOD PRESSURE: 73 MMHG | OXYGEN SATURATION: 100 % | TEMPERATURE: 96.8 F | WEIGHT: 183.6 LBS | BODY MASS INDEX: 31.34 KG/M2

## 2023-11-29 DIAGNOSIS — N39.41 URGE INCONTINENCE OF URINE: ICD-10-CM

## 2023-11-29 DIAGNOSIS — R20.2 PARESTHESIAS: Primary | ICD-10-CM

## 2023-11-29 DIAGNOSIS — R35.1 NOCTURIA: ICD-10-CM

## 2023-11-29 DIAGNOSIS — E78.00 PURE HYPERCHOLESTEROLEMIA: ICD-10-CM

## 2023-11-29 DIAGNOSIS — R41.3 MEMORY DEFICIT: ICD-10-CM

## 2023-11-29 DIAGNOSIS — I10 PRIMARY HYPERTENSION: ICD-10-CM

## 2023-11-29 DIAGNOSIS — H91.90 HEARING LOSS, UNSPECIFIED HEARING LOSS TYPE, UNSPECIFIED LATERALITY: ICD-10-CM

## 2023-11-29 PROCEDURE — G8484 FLU IMMUNIZE NO ADMIN: HCPCS | Performed by: FAMILY MEDICINE

## 2023-11-29 PROCEDURE — 3074F SYST BP LT 130 MM HG: CPT | Performed by: FAMILY MEDICINE

## 2023-11-29 PROCEDURE — 99214 OFFICE O/P EST MOD 30 MIN: CPT | Performed by: FAMILY MEDICINE

## 2023-11-29 PROCEDURE — 1090F PRES/ABSN URINE INCON ASSESS: CPT | Performed by: FAMILY MEDICINE

## 2023-11-29 PROCEDURE — 0509F URINE INCON PLAN DOCD: CPT | Performed by: FAMILY MEDICINE

## 2023-11-29 PROCEDURE — G8417 CALC BMI ABV UP PARAM F/U: HCPCS | Performed by: FAMILY MEDICINE

## 2023-11-29 PROCEDURE — G8399 PT W/DXA RESULTS DOCUMENT: HCPCS | Performed by: FAMILY MEDICINE

## 2023-11-29 PROCEDURE — G8427 DOCREV CUR MEDS BY ELIG CLIN: HCPCS | Performed by: FAMILY MEDICINE

## 2023-11-29 PROCEDURE — 1123F ACP DISCUSS/DSCN MKR DOCD: CPT | Performed by: FAMILY MEDICINE

## 2023-11-29 PROCEDURE — 1036F TOBACCO NON-USER: CPT | Performed by: FAMILY MEDICINE

## 2023-11-29 PROCEDURE — 3078F DIAST BP <80 MM HG: CPT | Performed by: FAMILY MEDICINE

## 2023-11-29 RX ORDER — OXYBUTYNIN CHLORIDE 5 MG/1
5 TABLET, EXTENDED RELEASE ORAL DAILY
Qty: 30 TABLET | Refills: 3 | Status: SHIPPED | OUTPATIENT
Start: 2023-11-29

## 2023-11-29 NOTE — PATIENT INSTRUCTIONS
Sent in bladder medication. Possible side effects, dry mouth or constipation. Stop if not tolerated. [facility], [facility]  [email or web address]  [address] [facility], [place], [address] [place] [address] Se   [phone]

## 2023-12-07 RX ORDER — LEVOTHYROXINE SODIUM 50 MCG
TABLET ORAL
Qty: 90 TABLET | Refills: 0 | Status: SHIPPED | OUTPATIENT
Start: 2023-12-07

## 2023-12-14 PROBLEM — Z09 HOSPITAL DISCHARGE FOLLOW-UP: Status: RESOLVED | Noted: 2023-11-14 | Resolved: 2023-12-14

## 2023-12-28 ENCOUNTER — TELEPHONE (OUTPATIENT)
Age: 83
End: 2023-12-28

## 2023-12-28 NOTE — TELEPHONE ENCOUNTER
Faxed release for neuro psych testing results to Dr. Vangie Ortez, ph # [phone], fax # [phone].    Confirmation received.      Neuropsych testing result request  Received: 1 month ago  David Schulte APRN - NP sent to Gloria Holbrook LPN  Ms. Castro,    Could you please reach out to a provider named Vangie Ortez who is supposed to be a psychiatrist who probably did neuropsych testing on this patient and request results of the neuropsych testing?    Thank you

## 2024-01-06 DIAGNOSIS — I10 PRIMARY HYPERTENSION: ICD-10-CM

## 2024-01-08 RX ORDER — CLONIDINE HYDROCHLORIDE 0.2 MG/1
TABLET ORAL
Qty: 180 TABLET | Refills: 1 | Status: SHIPPED | OUTPATIENT
Start: 2024-01-08

## 2024-01-18 ENCOUNTER — HOSPITAL ENCOUNTER (EMERGENCY)
Facility: HOSPITAL | Age: 84
Discharge: HOME | End: 2024-01-19
Attending: EMERGENCY MEDICINE
Payer: MEDICARE

## 2024-01-18 VITALS
OXYGEN SATURATION: 98 % | TEMPERATURE: 98.6 F | DIASTOLIC BLOOD PRESSURE: 67 MMHG | BODY MASS INDEX: 31.54 KG/M2 | HEIGHT: 64 IN | SYSTOLIC BLOOD PRESSURE: 142 MMHG | RESPIRATION RATE: 17 BRPM | HEART RATE: 67 BPM | WEIGHT: 184.75 LBS

## 2024-01-18 DIAGNOSIS — R00.2 PALPITATIONS: Primary | ICD-10-CM

## 2024-01-18 DIAGNOSIS — I10 ESSENTIAL HYPERTENSION: ICD-10-CM

## 2024-01-18 DIAGNOSIS — R03.0 ELEVATED BLOOD PRESSURE READING: ICD-10-CM

## 2024-01-18 DIAGNOSIS — I48.0 PAROXYSMAL ATRIAL FIBRILLATION (HCC): ICD-10-CM

## 2024-01-18 LAB
ALBUMIN SERPL-MCNC: 3.8 G/DL (ref 3.5–5)
ALBUMIN/GLOB SERPL: 1 (ref 1.1–2.2)
ALP SERPL-CCNC: 81 U/L (ref 45–117)
ALT SERPL-CCNC: 25 U/L (ref 12–78)
ANION GAP SERPL CALC-SCNC: 0 MMOL/L (ref 5–15)
AST SERPL-CCNC: 36 U/L (ref 15–37)
BASOPHILS # BLD: 0 K/UL (ref 0–0.1)
BASOPHILS NFR BLD: 1 % (ref 0–1)
BILIRUB SERPL-MCNC: 0.4 MG/DL (ref 0.2–1)
BUN SERPL-MCNC: 15 MG/DL (ref 6–20)
BUN/CREAT SERPL: 16 (ref 12–20)
CALCIUM SERPL-MCNC: 9.6 MG/DL (ref 8.5–10.1)
CHLORIDE SERPL-SCNC: 108 MMOL/L (ref 97–108)
CO2 SERPL-SCNC: 27 MMOL/L (ref 21–32)
CREAT SERPL-MCNC: 0.91 MG/DL (ref 0.55–1.02)
DIFFERENTIAL METHOD BLD: NORMAL
EOSINOPHIL # BLD: 0.1 K/UL (ref 0–0.4)
EOSINOPHIL NFR BLD: 4 % (ref 0–7)
ERYTHROCYTE [DISTWIDTH] IN BLOOD BY AUTOMATED COUNT: 12.7 % (ref 11.5–14.5)
GLOBULIN SER CALC-MCNC: 3.9 G/DL (ref 2–4)
GLUCOSE SERPL-MCNC: 101 MG/DL (ref 65–100)
HCT VFR BLD AUTO: 38.7 % (ref 35–47)
HGB BLD-MCNC: 13.1 G/DL (ref 11.5–16)
IMM GRANULOCYTES # BLD AUTO: 0 K/UL (ref 0–0.04)
IMM GRANULOCYTES NFR BLD AUTO: 0 % (ref 0–0.5)
LYMPHOCYTES # BLD: 0.8 K/UL (ref 0.8–3.5)
LYMPHOCYTES NFR BLD: 21 % (ref 12–49)
MCH RBC QN AUTO: 31.8 PG (ref 26–34)
MCHC RBC AUTO-ENTMCNC: 33.9 G/DL (ref 30–36.5)
MCV RBC AUTO: 93.9 FL (ref 80–99)
MONOCYTES # BLD: 0.4 K/UL (ref 0–1)
MONOCYTES NFR BLD: 10 % (ref 5–13)
NEUTS SEG # BLD: 2.5 K/UL (ref 1.8–8)
NEUTS SEG NFR BLD: 64 % (ref 32–75)
NRBC # BLD: 0 K/UL (ref 0–0.01)
NRBC BLD-RTO: 0 PER 100 WBC
PLATELET # BLD AUTO: 187 K/UL (ref 150–400)
PMV BLD AUTO: 10.6 FL (ref 8.9–12.9)
POTASSIUM SERPL-SCNC: 5 MMOL/L (ref 3.5–5.1)
PROT SERPL-MCNC: 7.7 G/DL (ref 6.4–8.2)
RBC # BLD AUTO: 4.12 M/UL (ref 3.8–5.2)
SODIUM SERPL-SCNC: 135 MMOL/L (ref 136–145)
TROPONIN I SERPL HS-MCNC: 5 NG/L (ref 0–51)
WBC # BLD AUTO: 3.9 K/UL (ref 3.6–11)

## 2024-01-18 PROCEDURE — 36415 COLL VENOUS BLD VENIPUNCTURE: CPT

## 2024-01-18 PROCEDURE — 84484 ASSAY OF TROPONIN QUANT: CPT

## 2024-01-18 PROCEDURE — 99284 EMERGENCY DEPT VISIT MOD MDM: CPT

## 2024-01-18 PROCEDURE — 80053 COMPREHEN METABOLIC PANEL: CPT

## 2024-01-18 PROCEDURE — 93005 ELECTROCARDIOGRAM TRACING: CPT | Performed by: EMERGENCY MEDICINE

## 2024-01-18 PROCEDURE — 85025 COMPLETE CBC W/AUTO DIFF WBC: CPT

## 2024-01-18 ASSESSMENT — PAIN - FUNCTIONAL ASSESSMENT: PAIN_FUNCTIONAL_ASSESSMENT: NONE - DENIES PAIN

## 2024-01-19 LAB
EKG ATRIAL RATE: 70 BPM
EKG DIAGNOSIS: NORMAL
EKG P AXIS: 56 DEGREES
EKG P-R INTERVAL: 154 MS
EKG Q-T INTERVAL: 420 MS
EKG QRS DURATION: 78 MS
EKG QTC CALCULATION (BAZETT): 453 MS
EKG R AXIS: 57 DEGREES
EKG T AXIS: 63 DEGREES
EKG VENTRICULAR RATE: 70 BPM

## 2024-01-19 ASSESSMENT — ENCOUNTER SYMPTOMS
VOMITING: 0
NAUSEA: 0
DIARRHEA: 0
SHORTNESS OF BREATH: 0
ABDOMINAL PAIN: 0

## 2024-01-19 NOTE — ED PROVIDER NOTES
EMERGENCY DEPARTMENT HISTORY AND PHYSICAL EXAM     ----------------------------------------------------------------------------  Please note that this dictation was completed with [employer], the computer voice recognition software.  Quite often unanticipated grammatical, syntax, homophones, and other interpretive errors are inadvertently transcribed by the computer software.  Please disregard these errors.  Please excuse any errors that have escaped final proofreading  ----------------------------------------------------------------------------      Date: 1/18/2024  Patient Name: Chichi Archuleta      HISTORY OF PRESENT ILLNESS     Chief Complaint   Patient presents with    Hypertension     Pt arrived to ED from home with palpitations x 4 hours, pt took her BP and it was high. Pt took an extra clonidine PTA.         History obtainted from:  Patient    Other independent source of history: family    HPI: Chichi Archuleta is a 83 y.o. female, with significant pmhx of afib, htn, who presents via private vehicle to the ED with c/o elevated blood pressure reading at home with palpitations earlier tonight.  Patient reports having thumping sensation in her chest without describing it as pain.  Took her extra dose of 0.1 mg clonidine prior to presenting to the emergency department.  Noted to have improvement of blood pressure on arrival.  No longer having palpitation or thumping sensation in her chest.  Denies any other recent changes to her medications.      PCP: Katlyn Marhs MD    Allergy List:   Allergies   Allergen Reactions    Adhesive Tape Hives     Paper tape fine    Amoxicillin      Other reaction(s): Unknown (comments)    Atorvastatin Myalgia     Gets the flu    Amlodipine Hives, Itching and Rash    Clindamycin Rash    Methylprednisolone Rash     Pt states was ineffective           Medications:  No current facility-administered medications for this encounter.     Current Outpatient Medications   Medication Sig

## 2024-02-05 ENCOUNTER — OFFICE VISIT (OUTPATIENT)
Age: 84
End: 2024-02-05
Payer: MEDICARE

## 2024-02-05 VITALS
HEART RATE: 63 BPM | BODY MASS INDEX: 42.91 KG/M2 | RESPIRATION RATE: 16 BRPM | OXYGEN SATURATION: 98 % | SYSTOLIC BLOOD PRESSURE: 104 MMHG | HEIGHT: 55 IN | WEIGHT: 185.4 LBS | DIASTOLIC BLOOD PRESSURE: 68 MMHG | TEMPERATURE: 98.1 F

## 2024-02-05 DIAGNOSIS — Z00.00 MEDICARE ANNUAL WELLNESS VISIT, SUBSEQUENT: ICD-10-CM

## 2024-02-05 DIAGNOSIS — I10 PRIMARY HYPERTENSION: Primary | ICD-10-CM

## 2024-02-05 DIAGNOSIS — F51.01 PRIMARY INSOMNIA: ICD-10-CM

## 2024-02-05 DIAGNOSIS — E03.9 ACQUIRED HYPOTHYROIDISM: ICD-10-CM

## 2024-02-05 DIAGNOSIS — I48.0 PAF (PAROXYSMAL ATRIAL FIBRILLATION) (HCC): ICD-10-CM

## 2024-02-05 DIAGNOSIS — G62.9 NEUROPATHY: ICD-10-CM

## 2024-02-05 DIAGNOSIS — F41.9 ANXIETY: ICD-10-CM

## 2024-02-05 DIAGNOSIS — E78.00 PURE HYPERCHOLESTEROLEMIA: ICD-10-CM

## 2024-02-05 DIAGNOSIS — Z23 ENCOUNTER FOR IMMUNIZATION: ICD-10-CM

## 2024-02-05 PROCEDURE — G8417 CALC BMI ABV UP PARAM F/U: HCPCS | Performed by: FAMILY MEDICINE

## 2024-02-05 PROCEDURE — 99214 OFFICE O/P EST MOD 30 MIN: CPT | Performed by: FAMILY MEDICINE

## 2024-02-05 PROCEDURE — 1090F PRES/ABSN URINE INCON ASSESS: CPT | Performed by: FAMILY MEDICINE

## 2024-02-05 PROCEDURE — 3074F SYST BP LT 130 MM HG: CPT | Performed by: FAMILY MEDICINE

## 2024-02-05 PROCEDURE — G8484 FLU IMMUNIZE NO ADMIN: HCPCS | Performed by: FAMILY MEDICINE

## 2024-02-05 PROCEDURE — G0439 PPPS, SUBSEQ VISIT: HCPCS | Performed by: FAMILY MEDICINE

## 2024-02-05 PROCEDURE — 1123F ACP DISCUSS/DSCN MKR DOCD: CPT | Performed by: FAMILY MEDICINE

## 2024-02-05 PROCEDURE — G8427 DOCREV CUR MEDS BY ELIG CLIN: HCPCS | Performed by: FAMILY MEDICINE

## 2024-02-05 PROCEDURE — 1036F TOBACCO NON-USER: CPT | Performed by: FAMILY MEDICINE

## 2024-02-05 PROCEDURE — G8399 PT W/DXA RESULTS DOCUMENT: HCPCS | Performed by: FAMILY MEDICINE

## 2024-02-05 PROCEDURE — 3078F DIAST BP <80 MM HG: CPT | Performed by: FAMILY MEDICINE

## 2024-02-05 ASSESSMENT — PATIENT HEALTH QUESTIONNAIRE - PHQ9
SUM OF ALL RESPONSES TO PHQ9 QUESTIONS 1 & 2: 0
SUM OF ALL RESPONSES TO PHQ QUESTIONS 1-9: 0
1. LITTLE INTEREST OR PLEASURE IN DOING THINGS: 0
SUM OF ALL RESPONSES TO PHQ QUESTIONS 1-9: 0
2. FEELING DOWN, DEPRESSED OR HOPELESS: 0
SUM OF ALL RESPONSES TO PHQ QUESTIONS 1-9: 0
SUM OF ALL RESPONSES TO PHQ QUESTIONS 1-9: 0

## 2024-02-05 NOTE — PROGRESS NOTES
\"Have you been to the ER, urgent care clinic since your last visit?  Hospitalized since your last visit?\"    Yes 01/18/2024 Palpitations    “Have you seen or consulted any other health care providers outside of [facility] since your last visit?”    Yes [facility]'s [place] Dr. Bhatia               
(paroxysmal atrial fibrillation) (HCC)  Primary insomnia  Neuropathy  Medicare annual wellness visit, subsequent  Encounter for immunization  -     respiratory syncytial vaccine, adjuvanted (AREXVY) 120 MCG/0.5ML injection; Inject 0.5 mLs into the muscle once for 1 dose, Disp-0.5 mL, R-0Normal  Anxiety    Recommendations for Preventive Services Due: see orders and patient instructions/AVS.  Recommended screening schedule for the next 5-10 years is provided to the patient in written form: see Patient Instructions/AVS.     Return in 6 months (on 8/5/2024) for follow up and fasting labs.  .     Subjective   The following acute and/or chronic problems were also addressed today:    Patient's complete Health Risk Assessment and screening values have been reviewed and are found in Flowsheets. The following problems were reviewed today and where indicated follow up appointments were made and/or referrals ordered.    Positive Risk Factor Screenings with Interventions:    Fall Risk:  Do you feel unsteady or are you worried about falling? : (!) yes  2 or more falls in past year?: no  Fall with injury in past year?: no     Interventions:    See AVS for additional education material             Activity, Diet, and Weight:  On average, how many days per week do you engage in moderate to strenuous exercise (like a brisk walk)?: 0 days  On average, how many minutes do you engage in exercise at this level?: 0 min    Do you eat balanced/healthy meals regularly?: Yes    Body mass index is 48.21 kg/m². (!) Abnormal      Inactivity Interventions:  See AVS for additional education material  Obesity Interventions:  See AVS for additional education material        Vision Screen:  Do you have difficulty driving, watching TV, or doing any of your daily activities because of your eyesight?: No  Have you had an eye exam within the past year?: (!) No  No results found.    Interventions:   See AVS for additional education material      Advanced

## 2024-02-05 NOTE — PATIENT INSTRUCTIONS
there is anything you should change to stay safe as your body and health change.  If you tend to feel dizzy after you take medicine, avoid activity at that time. Try being active before you take your medicine. This will reduce your risk of falls.  If you plan to be active at home, make sure to clear your space before you get started. Remove things like TV cords, coffee tables, and throw rugs. It's safest to have plenty of space to move freely.  The key to getting more active is to take it slow and steady. Try to improve only a little bit at a time. Pick just one area to improve on at first. And if an activity hurts, stop and talk to your doctor.  Where can you learn more?  Go to https://[email or web address].net/patientEd and enter P600 to learn more about \"Learning About Being Active as an Older Adult.\"  Current as of: June 6, 2023               Content Version: 13.9  © 2006-2023 [employer].   Care instructions adapted under license by [employer]. If you have questions about a medical condition or this instruction, always ask your healthcare professional. [employer] disclaims any warranty or liability for your use of this information.           Learning About Vision Tests  What are vision tests?     The four most common vision tests are visual acuity tests, refraction, visual field tests, and color vision tests.  Visual acuity (sharpness) tests  These tests are used:  To see if you need glasses or contact lenses.  To monitor an eye problem.  To check an eye injury.  Visual acuity tests are done as part of routine exams. You may also have this test when you get your 's license or apply for some types of jobs.  Visual field tests  These tests are used:  To check for vision loss in any area of your range of vision.  To screen for certain eye diseases.  To look for nerve damage after a stroke, head injury, or other problem that could reduce blood flow to the brain.  Refraction and color

## 2024-03-04 NOTE — ROUTINE PROCESS
1900 Bedside shift change report received from [address] [place] [address]. 1930 Patient returned to bed with assist of one, per patient's request.    2142 100 mg docusate given per patient request for constipation. 0400 Patient with complaints of aching and soreness in back offered pain medication, heat or ice packs, patient refused. Dangled patient on side of bed per request, placed roll behind patient's neck when she returned to lying down. Patient states that she is now comfortable. 0700 Bedside shift change report given to [address] [place] [address] (oncoming nurse) by Shabana Douglas (offgoing nurse). Report included the following information SBAR, Kardex, Procedure Summary, Intake/Output, MAR, Accordion, Recent Results, Med Rec Status and Cardiac Rhythm Sinus Rhythm. Insurance came back denied due to the fact that patient no longer has insurance. Called and spoke with patient, she is is [place] and is getting her imitrex through [facility]. She will let us know if she needs anything else done with insurance later on when she is back in [place].

## 2024-03-13 RX ORDER — DONEPEZIL HYDROCHLORIDE 5 MG/1
5 TABLET, FILM COATED ORAL NIGHTLY
Qty: 90 TABLET | Refills: 1 | Status: SHIPPED | OUTPATIENT
Start: 2024-03-13

## 2024-03-18 RX ORDER — LEVOTHYROXINE SODIUM 50 MCG
TABLET ORAL
Qty: 90 TABLET | Refills: 0 | Status: SHIPPED | OUTPATIENT
Start: 2024-03-18 | End: 2024-03-21 | Stop reason: SDUPTHER

## 2024-03-21 RX ORDER — LEVOTHYROXINE SODIUM 0.05 MG/1
50 TABLET ORAL
Qty: 90 TABLET | Refills: 1 | Status: SHIPPED | OUTPATIENT
Start: 2024-03-21

## 2024-03-21 NOTE — TELEPHONE ENCOUNTER
Regarding medication:  levothyroxine    Problem:  States must put brand name synthroid and include medically necessary.    States unable to take the levothyroxine due to many years ago she was told she has a sensitive thyroid and should only take Synthroid.    Please notify pharmacy/send in updated script.    States only a 1  left      Caller confirms readback of documented phone/fax number(s) as correct.

## 2024-04-07 ENCOUNTER — APPOINTMENT (OUTPATIENT)
Facility: HOSPITAL | Age: 84
End: 2024-04-07
Payer: MEDICARE

## 2024-04-07 ENCOUNTER — HOSPITAL ENCOUNTER (EMERGENCY)
Facility: HOSPITAL | Age: 84
Discharge: HOME | End: 2024-04-07
Attending: STUDENT IN AN ORGANIZED HEALTH CARE EDUCATION/TRAINING PROGRAM
Payer: MEDICARE

## 2024-04-07 VITALS
RESPIRATION RATE: 21 BRPM | DIASTOLIC BLOOD PRESSURE: 64 MMHG | TEMPERATURE: 97.8 F | HEART RATE: 62 BPM | WEIGHT: 190 LBS | BODY MASS INDEX: 32.44 KG/M2 | OXYGEN SATURATION: 98 % | SYSTOLIC BLOOD PRESSURE: 138 MMHG | HEIGHT: 64 IN

## 2024-04-07 DIAGNOSIS — R55 SYNCOPE AND COLLAPSE: Primary | ICD-10-CM

## 2024-04-07 DIAGNOSIS — S80.01XA CONTUSION OF RIGHT KNEE, INITIAL ENCOUNTER: ICD-10-CM

## 2024-04-07 LAB
ALBUMIN SERPL-MCNC: 3.9 G/DL (ref 3.5–5)
ALBUMIN/GLOB SERPL: 1.1 (ref 1.1–2.2)
ALP SERPL-CCNC: 83 U/L (ref 45–117)
ALT SERPL-CCNC: 24 U/L (ref 12–78)
ANION GAP SERPL CALC-SCNC: 5 MMOL/L (ref 5–15)
APPEARANCE UR: CLEAR
AST SERPL-CCNC: 17 U/L (ref 15–37)
BACTERIA URNS QL MICRO: NEGATIVE /HPF
BASOPHILS # BLD: 0.1 K/UL (ref 0–0.1)
BASOPHILS NFR BLD: 1 % (ref 0–1)
BILIRUB SERPL-MCNC: 1.1 MG/DL (ref 0.2–1)
BILIRUB UR QL: NEGATIVE
BUN SERPL-MCNC: 17 MG/DL (ref 6–20)
BUN/CREAT SERPL: 18 (ref 12–20)
CALCIUM SERPL-MCNC: 9.3 MG/DL (ref 8.5–10.1)
CHLORIDE SERPL-SCNC: 106 MMOL/L (ref 97–108)
CO2 SERPL-SCNC: 26 MMOL/L (ref 21–32)
COLOR UR: ABNORMAL
COMMENT:: NORMAL
CREAT SERPL-MCNC: 0.96 MG/DL (ref 0.55–1.02)
DIFFERENTIAL METHOD BLD: ABNORMAL
EKG ATRIAL RATE: 61 BPM
EKG DIAGNOSIS: NORMAL
EKG P AXIS: 53 DEGREES
EKG P-R INTERVAL: 162 MS
EKG Q-T INTERVAL: 474 MS
EKG QRS DURATION: 86 MS
EKG QTC CALCULATION (BAZETT): 477 MS
EKG R AXIS: 5 DEGREES
EKG T AXIS: 47 DEGREES
EKG VENTRICULAR RATE: 61 BPM
EOSINOPHIL # BLD: 0.1 K/UL (ref 0–0.4)
EOSINOPHIL NFR BLD: 2 % (ref 0–7)
EPITH CASTS URNS QL MICRO: ABNORMAL /LPF
ERYTHROCYTE [DISTWIDTH] IN BLOOD BY AUTOMATED COUNT: 12.5 % (ref 11.5–14.5)
GLOBULIN SER CALC-MCNC: 3.7 G/DL (ref 2–4)
GLUCOSE SERPL-MCNC: 128 MG/DL (ref 65–100)
GLUCOSE UR STRIP.AUTO-MCNC: NEGATIVE MG/DL
HCT VFR BLD AUTO: 40 % (ref 35–47)
HGB BLD-MCNC: 13 G/DL (ref 11.5–16)
HGB UR QL STRIP: ABNORMAL
HYALINE CASTS URNS QL MICRO: ABNORMAL /LPF (ref 0–2)
IMM GRANULOCYTES # BLD AUTO: 0 K/UL (ref 0–0.04)
IMM GRANULOCYTES NFR BLD AUTO: 0 % (ref 0–0.5)
KETONES UR QL STRIP.AUTO: NEGATIVE MG/DL
LEUKOCYTE ESTERASE UR QL STRIP.AUTO: ABNORMAL
LYMPHOCYTES # BLD: 0.5 K/UL (ref 0.8–3.5)
LYMPHOCYTES NFR BLD: 8 % (ref 12–49)
MAGNESIUM SERPL-MCNC: 2.2 MG/DL (ref 1.6–2.4)
MCH RBC QN AUTO: 31.9 PG (ref 26–34)
MCHC RBC AUTO-ENTMCNC: 32.5 G/DL (ref 30–36.5)
MCV RBC AUTO: 98 FL (ref 80–99)
MONOCYTES # BLD: 0.4 K/UL (ref 0–1)
MONOCYTES NFR BLD: 6 % (ref 5–13)
NEUTS SEG # BLD: 5.1 K/UL (ref 1.8–8)
NEUTS SEG NFR BLD: 83 % (ref 32–75)
NITRITE UR QL STRIP.AUTO: NEGATIVE
NRBC # BLD: 0 K/UL (ref 0–0.01)
NRBC BLD-RTO: 0 PER 100 WBC
PH UR STRIP: 7 (ref 5–8)
PLATELET # BLD AUTO: 159 K/UL (ref 150–400)
PMV BLD AUTO: 10 FL (ref 8.9–12.9)
POTASSIUM SERPL-SCNC: 3.9 MMOL/L (ref 3.5–5.1)
PROT SERPL-MCNC: 7.6 G/DL (ref 6.4–8.2)
PROT UR STRIP-MCNC: NEGATIVE MG/DL
RBC # BLD AUTO: 4.08 M/UL (ref 3.8–5.2)
RBC #/AREA URNS HPF: ABNORMAL /HPF (ref 0–5)
RBC MORPH BLD: ABNORMAL
SODIUM SERPL-SCNC: 137 MMOL/L (ref 136–145)
SP GR UR REFRACTOMETRY: 1.01
SPECIMEN HOLD: NORMAL
TROPONIN I SERPL HS-MCNC: 4 NG/L (ref 0–51)
URINE CULTURE IF INDICATED: ABNORMAL
UROBILINOGEN UR QL STRIP.AUTO: 0.2 EU/DL (ref 0.2–1)
WBC # BLD AUTO: 6.2 K/UL (ref 3.6–11)
WBC URNS QL MICRO: ABNORMAL /HPF (ref 0–4)

## 2024-04-07 PROCEDURE — 81001 URINALYSIS AUTO W/SCOPE: CPT

## 2024-04-07 PROCEDURE — 72125 CT NECK SPINE W/O DYE: CPT

## 2024-04-07 PROCEDURE — 93005 ELECTROCARDIOGRAM TRACING: CPT | Performed by: STUDENT IN AN ORGANIZED HEALTH CARE EDUCATION/TRAINING PROGRAM

## 2024-04-07 PROCEDURE — 36415 COLL VENOUS BLD VENIPUNCTURE: CPT

## 2024-04-07 PROCEDURE — 99285 EMERGENCY DEPT VISIT HI MDM: CPT

## 2024-04-07 PROCEDURE — 70450 CT HEAD/BRAIN W/O DYE: CPT

## 2024-04-07 PROCEDURE — 84484 ASSAY OF TROPONIN QUANT: CPT

## 2024-04-07 PROCEDURE — 83735 ASSAY OF MAGNESIUM: CPT

## 2024-04-07 PROCEDURE — 85025 COMPLETE CBC W/AUTO DIFF WBC: CPT

## 2024-04-07 PROCEDURE — 73562 X-RAY EXAM OF KNEE 3: CPT

## 2024-04-07 PROCEDURE — 80053 COMPREHEN METABOLIC PANEL: CPT

## 2024-04-07 PROCEDURE — 6370000000 HC RX 637 (ALT 250 FOR IP): Performed by: STUDENT IN AN ORGANIZED HEALTH CARE EDUCATION/TRAINING PROGRAM

## 2024-04-07 PROCEDURE — 71045 X-RAY EXAM CHEST 1 VIEW: CPT

## 2024-04-07 RX ORDER — LIDOCAINE 4 G/G
1 PATCH TOPICAL
Status: DISCONTINUED | OUTPATIENT
Start: 2024-04-07 | End: 2024-04-07 | Stop reason: HOSPADM

## 2024-04-07 RX ORDER — ACETAMINOPHEN 500 MG
1000 TABLET ORAL
Status: COMPLETED | OUTPATIENT
Start: 2024-04-07 | End: 2024-04-07

## 2024-04-07 RX ADMIN — ACETAMINOPHEN 1000 MG: 500 TABLET ORAL at 06:59

## 2024-04-07 ASSESSMENT — PAIN DESCRIPTION - LOCATION
LOCATION: LEG;KNEE
LOCATION: KNEE

## 2024-04-07 ASSESSMENT — PAIN DESCRIPTION - ORIENTATION
ORIENTATION: RIGHT
ORIENTATION: RIGHT

## 2024-04-07 ASSESSMENT — PAIN - FUNCTIONAL ASSESSMENT: PAIN_FUNCTIONAL_ASSESSMENT: 0-10

## 2024-04-07 ASSESSMENT — PAIN SCALES - GENERAL
PAINLEVEL_OUTOF10: 6
PAINLEVEL_OUTOF10: 3

## 2024-04-07 NOTE — ED NOTES
Bedside and Verbal shift change report given to Madhavi RN (oncoming nurse) by Ana RN (offgoing nurse). Report included the following information Nurse Handoff Report, ED SBAR, MAR, Recent Results, Neuro Assessment, and Event Log.

## 2024-04-07 NOTE — ED NOTES
0601: Shreya BECKER at bedside to assess pt.     0649: Pt ISRA to CT.     0559: Pt back from CT, on monitor x3 and purewick hooked up to suction. Pt given warm blankets, call bell in reach

## 2024-04-08 ENCOUNTER — HOSPITAL ENCOUNTER (OUTPATIENT)
Facility: HOSPITAL | Age: 84
Discharge: HOME | End: 2024-04-11
Payer: MEDICARE

## 2024-04-08 ENCOUNTER — OFFICE VISIT (OUTPATIENT)
Age: 84
End: 2024-04-08
Payer: MEDICARE

## 2024-04-08 VITALS
OXYGEN SATURATION: 95 % | BODY MASS INDEX: 32.44 KG/M2 | HEART RATE: 63 BPM | SYSTOLIC BLOOD PRESSURE: 125 MMHG | HEIGHT: 64 IN | TEMPERATURE: 98 F | WEIGHT: 190 LBS | RESPIRATION RATE: 16 BRPM | DIASTOLIC BLOOD PRESSURE: 72 MMHG

## 2024-04-08 DIAGNOSIS — M25.552 PAIN OF LEFT HIP: ICD-10-CM

## 2024-04-08 DIAGNOSIS — Z91.81 AT RISK FOR FALLS: ICD-10-CM

## 2024-04-08 DIAGNOSIS — R55 VASOVAGAL SYNCOPE: Primary | ICD-10-CM

## 2024-04-08 PROCEDURE — G8399 PT W/DXA RESULTS DOCUMENT: HCPCS | Performed by: FAMILY MEDICINE

## 2024-04-08 PROCEDURE — 99214 OFFICE O/P EST MOD 30 MIN: CPT | Performed by: FAMILY MEDICINE

## 2024-04-08 PROCEDURE — 1090F PRES/ABSN URINE INCON ASSESS: CPT | Performed by: FAMILY MEDICINE

## 2024-04-08 PROCEDURE — G8417 CALC BMI ABV UP PARAM F/U: HCPCS | Performed by: FAMILY MEDICINE

## 2024-04-08 PROCEDURE — 73502 X-RAY EXAM HIP UNI 2-3 VIEWS: CPT

## 2024-04-08 PROCEDURE — 1036F TOBACCO NON-USER: CPT | Performed by: FAMILY MEDICINE

## 2024-04-08 PROCEDURE — 1123F ACP DISCUSS/DSCN MKR DOCD: CPT | Performed by: FAMILY MEDICINE

## 2024-04-08 PROCEDURE — 3078F DIAST BP <80 MM HG: CPT | Performed by: FAMILY MEDICINE

## 2024-04-08 PROCEDURE — G8427 DOCREV CUR MEDS BY ELIG CLIN: HCPCS | Performed by: FAMILY MEDICINE

## 2024-04-08 PROCEDURE — 3074F SYST BP LT 130 MM HG: CPT | Performed by: FAMILY MEDICINE

## 2024-04-08 RX ORDER — ZOSTER VACCINE RECOMBINANT, ADJUVANTED 50 MCG/0.5
0.5 KIT INTRAMUSCULAR SEE ADMIN INSTRUCTIONS
Qty: 0.5 ML | Refills: 1 | Status: SHIPPED | OUTPATIENT
Start: 2024-04-08 | End: 2024-10-05

## 2024-04-08 NOTE — PROGRESS NOTES
\"Have you been to the ER, urgent care clinic since your last visit?  Hospitalized since your last visit?\"    [facility] 4/7/2024 Syncope and collapse     “Have you seen or consulted any other health care providers outside of [facility] since your last visit?”    NO            Click Here for Release of Records Request

## 2024-04-08 NOTE — PROGRESS NOTES
Chichi Archuleta is a 84 y.o. female who presents for ED follow up.     Seen 4/7 with dizziness, reported syncope.  Recalls getting up at night to take out mouth guard and fell. Did not recall the fall. Recalls being on floor, could not get up off floor.  No premonitory symptoms.  Hit left knee and right elbow.  Xrays right knee normal. CXR normal. CT neck and brain, no acute findings.      Reports will get up every 2 hours to urinate. Sees Dr Carlos Bhatia urogyn.  Given gemtesa.  Too expensive.  Recommended botox.      Has been on brand synthroid, changed to generic. Patient does not recall why she cannot take generic.      On sertraline 100mg daily.        Past Medical History:   Diagnosis Date    Arthritis     right knee, left shoulder    Atrial fibrillation (HCC)     4/12/23 Dr. Moore [facility]    CAD (coronary artery disease)     Diverticulosis     Fractures     Frequency of urination     High cholesterol     Hypertension     Hypothyroid     Long term current use of anticoagulant therapy     Morbid obesity (HCC)     Osteoarthritis     Peripheral neuropathy     bilateral LE    Unspecified adverse effect of anesthesia     low BP    Wells' syndrome        Family History   Problem Relation Age of Onset    Diabetes Paternal Grandmother     Cancer Paternal Aunt     Hypertension Father     Heart Disease Father     Heart Disease Mother     Alzheimer's Disease Mother         Social History     Socioeconomic History    Marital status:      Spouse name: Not on file    Number of children: Not on file    Years of education: Not on file    Highest education level: Not on file   Occupational History    Not on file   Tobacco Use    Smoking status: Never    Smokeless tobacco: Never   Vaping Use    Vaping Use: Never used   Substance and Sexual Activity    Alcohol use: No    Drug use: No    Sexual activity: Not Currently   Other Topics Concern    Not on file   Social History Narrative    Not on file     Social Determinants of

## 2024-04-09 ENCOUNTER — TELEPHONE (OUTPATIENT)
Age: 84
End: 2024-04-09

## 2024-04-12 DIAGNOSIS — E78.00 PURE HYPERCHOLESTEROLEMIA: ICD-10-CM

## 2024-04-12 NOTE — ED PROVIDER NOTES
integrity and analyte stability, which may vary by analyte.   CBC with Auto Differential    Collection Time: 04/07/24  6:07 AM   Result Value Ref Range    WBC 6.2 3.6 - 11.0 K/uL    RBC 4.08 3.80 - 5.20 M/uL    Hemoglobin 13.0 11.5 - 16.0 g/dL    Hematocrit 40.0 35.0 - 47.0 %    MCV 98.0 80.0 - 99.0 FL    MCH 31.9 26.0 - 34.0 PG    MCHC 32.5 30.0 - 36.5 g/dL    RDW 12.5 11.5 - 14.5 %    Platelets 159 150 - 400 K/uL    MPV 10.0 8.9 - 12.9 FL    Nucleated RBCs 0.0 0  WBC    nRBC 0.00 0.00 - 0.01 K/uL    Neutrophils % 83 (H) 32 - 75 %    Lymphocytes % 8 (L) 12 - 49 %    Monocytes % 6 5 - 13 %    Eosinophils % 2 0 - 7 %    Basophils % 1 0 - 1 %    Immature Granulocytes % 0 0.0 - 0.5 %    Neutrophils Absolute 5.1 1.8 - 8.0 K/UL    Lymphocytes Absolute 0.5 (L) 0.8 - 3.5 K/UL    Monocytes Absolute 0.4 0.0 - 1.0 K/UL    Eosinophils Absolute 0.1 0.0 - 0.4 K/UL    Basophils Absolute 0.1 0.0 - 0.1 K/UL    Immature Granulocytes Absolute 0.0 0.00 - 0.04 K/UL    Differential Type SMEAR SCANNED      RBC Comment NORMOCYTIC, NORMOCHROMIC     CMP    Collection Time: 04/07/24  6:07 AM   Result Value Ref Range    Sodium 137 136 - 145 mmol/L    Potassium 3.9 3.5 - 5.1 mmol/L    Chloride 106 97 - 108 mmol/L    CO2 26 21 - 32 mmol/L    Anion Gap 5 5 - 15 mmol/L    Glucose 128 (H) 65 - 100 mg/dL    BUN 17 6 - 20 MG/DL    Creatinine 0.96 0.55 - 1.02 MG/DL    Bun/Cre Ratio 18 12 - 20      Est, Glom Filt Rate 59 (L) >60 ml/min/1.73m2    Calcium 9.3 8.5 - 10.1 MG/DL    Total Bilirubin 1.1 (H) 0.2 - 1.0 MG/DL    ALT 24 12 - 78 U/L    AST 17 15 - 37 U/L    Alk Phosphatase 83 45 - 117 U/L    Total Protein 7.6 6.4 - 8.2 g/dL    Albumin 3.9 3.5 - 5.0 g/dL    Globulin 3.7 2.0 - 4.0 g/dL    Albumin/Globulin Ratio 1.1 1.1 - 2.2     Magnesium    Collection Time: 04/07/24  6:07 AM   Result Value Ref Range    Magnesium 2.2 1.6 - 2.4 mg/dL   Troponin    Collection Time: 04/07/24  6:07 AM   Result Value Ref Range    Troponin, High Sensitivity 4 0 -

## 2024-04-14 RX ORDER — ROSUVASTATIN CALCIUM 20 MG/1
20 TABLET, COATED ORAL DAILY
Qty: 90 TABLET | Refills: 1 | Status: SHIPPED | OUTPATIENT
Start: 2024-04-14

## 2024-04-16 DIAGNOSIS — I10 PRIMARY HYPERTENSION: ICD-10-CM

## 2024-04-16 RX ORDER — POTASSIUM CHLORIDE 750 MG/1
TABLET, FILM COATED, EXTENDED RELEASE ORAL
Qty: 360 TABLET | Refills: 1 | Status: SHIPPED | OUTPATIENT
Start: 2024-04-16

## 2024-04-22 ENCOUNTER — TELEPHONE (OUTPATIENT)
Age: 84
End: 2024-04-22

## 2024-04-22 NOTE — TELEPHONE ENCOUNTER
176/87   174/95   Patient states she took her bp and they were high 176/87 and 174/95. She states  took 1/2 tablet of cloNIDine (CATAPRES) 0.2 MG tablet along a full pill also.

## 2024-04-22 NOTE — TELEPHONE ENCOUNTER
TRIAGE    Reports bp of 176/87 at  11:14 today    Then took 1/2 tablet of cloNIDine (CATAPRES) 0.2 MG tablet     Reports bp of 174/95  (about 12:45 today)    \"Face is red\".  \"Doesn't feel clear\".          TRANSFERRED CALL TO CLINICAL TEAM: александр

## 2024-05-07 DIAGNOSIS — F41.9 ANXIETY: ICD-10-CM

## 2024-05-07 RX ORDER — SERTRALINE HYDROCHLORIDE 100 MG/1
100 TABLET, FILM COATED ORAL DAILY
Qty: 90 TABLET | Refills: 1 | Status: SHIPPED | OUTPATIENT
Start: 2024-05-07

## 2024-05-14 ENCOUNTER — TELEPHONE (OUTPATIENT)
Age: 84
End: 2024-05-14

## 2024-05-14 RX ORDER — DOXYCYCLINE HYCLATE 100 MG
TABLET ORAL
Qty: 8 TABLET | Refills: 1 | Status: SHIPPED | OUTPATIENT
Start: 2024-05-14

## 2024-05-14 NOTE — TELEPHONE ENCOUNTER
----- Message from Chichi Archuleta sent at 5/14/2024 11:52 AM EDT -----  Regarding: Tick Bite  Contact: [phone]  Found yesterday around middle of day.  Was not there when I took my bath Allan morning.

## 2024-06-11 ENCOUNTER — APPOINTMENT (OUTPATIENT)
Facility: HOSPITAL | Age: 84
End: 2024-06-11
Payer: MEDICARE

## 2024-06-11 ENCOUNTER — HOSPITAL ENCOUNTER (EMERGENCY)
Facility: HOSPITAL | Age: 84
Discharge: HOME | End: 2024-06-12
Attending: EMERGENCY MEDICINE
Payer: MEDICARE
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
## 2024-06-11 DIAGNOSIS — S01.81XA FACIAL LACERATION, INITIAL ENCOUNTER: ICD-10-CM

## 2024-06-11 DIAGNOSIS — S00.83XA CONTUSION OF FACE, INITIAL ENCOUNTER: ICD-10-CM

## 2024-06-11 DIAGNOSIS — S61.412A SKIN TEAR OF LEFT HAND WITHOUT COMPLICATION, INITIAL ENCOUNTER: ICD-10-CM

## 2024-06-11 DIAGNOSIS — S09.90XA CLOSED HEAD INJURY, INITIAL ENCOUNTER: ICD-10-CM

## 2024-06-11 PROCEDURE — 72125 CT NECK SPINE W/O DYE: CPT

## 2024-06-11 PROCEDURE — 99284 EMERGENCY DEPT VISIT MOD MDM: CPT

## 2024-06-11 PROCEDURE — 70450 CT HEAD/BRAIN W/O DYE: CPT

## 2024-06-11 PROCEDURE — 12013 RPR F/E/E/N/L/M 2.6-5.0 CM: CPT

## 2024-06-11 PROCEDURE — 81001 URINALYSIS AUTO W/SCOPE: CPT

## 2024-06-11 ASSESSMENT — PAIN DESCRIPTION - LOCATION: LOCATION: HEAD

## 2024-06-11 ASSESSMENT — PAIN DESCRIPTION - ORIENTATION: ORIENTATION: LEFT

## 2024-06-11 ASSESSMENT — PAIN - FUNCTIONAL ASSESSMENT: PAIN_FUNCTIONAL_ASSESSMENT: 0-10

## 2024-06-11 ASSESSMENT — PAIN SCALES - GENERAL: PAINLEVEL_OUTOF10: 6

## 2024-06-12 ENCOUNTER — TELEPHONE (OUTPATIENT)
Age: 84
End: 2024-06-12

## 2024-06-12 VITALS
HEART RATE: 67 BPM | BODY MASS INDEX: 32.44 KG/M2 | WEIGHT: 190 LBS | TEMPERATURE: 98 F | DIASTOLIC BLOOD PRESSURE: 74 MMHG | OXYGEN SATURATION: 94 % | HEIGHT: 64 IN | SYSTOLIC BLOOD PRESSURE: 134 MMHG | RESPIRATION RATE: 18 BRPM

## 2024-06-12 LAB
ALBUMIN SERPL-MCNC: 3.3 G/DL (ref 3.5–5)
ALBUMIN/GLOB SERPL: 1 (ref 1.1–2.2)
ALP SERPL-CCNC: 81 U/L (ref 45–117)
ALT SERPL-CCNC: 16 U/L (ref 12–78)
ANION GAP SERPL CALC-SCNC: 4 MMOL/L (ref 5–15)
APPEARANCE UR: CLEAR
AST SERPL-CCNC: 12 U/L (ref 15–37)
BACTERIA URNS QL MICRO: NEGATIVE /HPF
BASOPHILS # BLD: 0 K/UL (ref 0–0.1)
BASOPHILS NFR BLD: 1 % (ref 0–1)
BILIRUB SERPL-MCNC: 0.3 MG/DL (ref 0.2–1)
BILIRUB UR QL: NEGATIVE
BUN SERPL-MCNC: 20 MG/DL (ref 6–20)
BUN/CREAT SERPL: 22 (ref 12–20)
CALCIUM SERPL-MCNC: 9.3 MG/DL (ref 8.5–10.1)
CAOX CRY URNS QL MICRO: ABNORMAL
CHLORIDE SERPL-SCNC: 110 MMOL/L (ref 97–108)
CO2 SERPL-SCNC: 27 MMOL/L (ref 21–32)
COLOR UR: ABNORMAL
CREAT SERPL-MCNC: 0.93 MG/DL (ref 0.55–1.02)
DIFFERENTIAL METHOD BLD: ABNORMAL
EOSINOPHIL # BLD: 0.2 K/UL (ref 0–0.4)
EOSINOPHIL NFR BLD: 3 % (ref 0–7)
EPITH CASTS URNS QL MICRO: ABNORMAL /LPF
ERYTHROCYTE [DISTWIDTH] IN BLOOD BY AUTOMATED COUNT: 12.6 % (ref 11.5–14.5)
GLOBULIN SER CALC-MCNC: 3.3 G/DL (ref 2–4)
GLUCOSE SERPL-MCNC: 112 MG/DL (ref 65–100)
GLUCOSE UR STRIP.AUTO-MCNC: NEGATIVE MG/DL
HCT VFR BLD AUTO: 35.6 % (ref 35–47)
HGB BLD-MCNC: 11.5 G/DL (ref 11.5–16)
HGB UR QL STRIP: ABNORMAL
HYALINE CASTS URNS QL MICRO: ABNORMAL /LPF (ref 0–5)
IMM GRANULOCYTES # BLD AUTO: 0 K/UL (ref 0–0.04)
IMM GRANULOCYTES NFR BLD AUTO: 0 % (ref 0–0.5)
KETONES UR QL STRIP.AUTO: ABNORMAL MG/DL
LEUKOCYTE ESTERASE UR QL STRIP.AUTO: NEGATIVE
LYMPHOCYTES # BLD: 0.9 K/UL (ref 0.8–3.5)
LYMPHOCYTES NFR BLD: 17 % (ref 12–49)
MCH RBC QN AUTO: 30.7 PG (ref 26–34)
MCHC RBC AUTO-ENTMCNC: 32.3 G/DL (ref 30–36.5)
MCV RBC AUTO: 94.9 FL (ref 80–99)
MONOCYTES # BLD: 0.5 K/UL (ref 0–1)
MONOCYTES NFR BLD: 9 % (ref 5–13)
NEUTS SEG # BLD: 3.6 K/UL (ref 1.8–8)
NEUTS SEG NFR BLD: 70 % (ref 32–75)
NITRITE UR QL STRIP.AUTO: NEGATIVE
NRBC # BLD: 0 K/UL (ref 0–0.01)
NRBC BLD-RTO: 0 PER 100 WBC
PH UR STRIP: 6 (ref 5–8)
PMV BLD AUTO: 9.8 FL (ref 8.9–12.9)
POTASSIUM SERPL-SCNC: 3.9 MMOL/L (ref 3.5–5.1)
PROT SERPL-MCNC: 6.6 G/DL (ref 6.4–8.2)
PROT UR STRIP-MCNC: 30 MG/DL
RBC # BLD AUTO: 3.75 M/UL (ref 3.8–5.2)
RBC #/AREA URNS HPF: ABNORMAL /HPF (ref 0–5)
SODIUM SERPL-SCNC: 141 MMOL/L (ref 136–145)
SP GR UR REFRACTOMETRY: 1.02
URINE CULTURE IF INDICATED: ABNORMAL
UROBILINOGEN UR QL STRIP.AUTO: 1 EU/DL (ref 0.2–1)
WBC # BLD AUTO: 5.3 K/UL (ref 3.6–11)
WBC URNS QL MICRO: ABNORMAL /HPF (ref 0–4)

## 2024-06-12 PROCEDURE — 85025 COMPLETE CBC W/AUTO DIFF WBC: CPT

## 2024-06-12 PROCEDURE — 80053 COMPREHEN METABOLIC PANEL: CPT

## 2024-06-12 PROCEDURE — 36415 COLL VENOUS BLD VENIPUNCTURE: CPT

## 2024-06-12 ASSESSMENT — ENCOUNTER SYMPTOMS
VOMITING: 0
DIARRHEA: 0
SHORTNESS OF BREATH: 0
NAUSEA: 0
ABDOMINAL PAIN: 0

## 2024-06-12 NOTE — ED NOTES
Pt able to ambulate to and from the bed without dizziness.     0245AM  Pt discharged, not in distress, wheeled the pt out into their car, accompanied by friend

## 2024-06-12 NOTE — ED PROVIDER NOTES
note that parts of this dictation were completed with voice recognition software. Quite often unanticipated grammatical, syntax, homophones, and other interpretive errors are inadvertently transcribed by the computer software. Please disregards these errors. Please excuse any errors that have escaped final proofreading.)           Jessica Bahena MD  06/12/24 035

## 2024-06-12 NOTE — DISCHARGE INSTRUCTIONS
It was a pleasure taking care of you in our Emergency Department today.  We know that when you come to [facility], you are entrusting us with your health, comfort, and safety.  Our physicians and nurses honor that trust, and truly appreciate the opportunity to care for you and your loved ones.      We also value your feedback.  If you receive a survey about your Emergency Department experience today, please fill it out.  We care about our patients' feedback, and we listen to what you have to say.  Thank you!      Dr. Jessica Bahena MD.

## 2024-06-12 NOTE — TELEPHONE ENCOUNTER
Pt was advised to have Ed Follow up for fall within 2 days. No apts available     Pt has facial contusion and lacerations. Sutures above L eye    Please call to schedule

## 2024-06-13 LAB
EKG ATRIAL RATE: 69 BPM
EKG DIAGNOSIS: NORMAL
EKG P AXIS: 56 DEGREES
EKG P-R INTERVAL: 170 MS
EKG Q-T INTERVAL: 414 MS
EKG QRS DURATION: 64 MS
EKG QTC CALCULATION (BAZETT): 443 MS
EKG R AXIS: -3 DEGREES
EKG T AXIS: 53 DEGREES
EKG VENTRICULAR RATE: 69 BPM

## 2024-06-17 ENCOUNTER — OFFICE VISIT (OUTPATIENT)
Age: 84
End: 2024-06-17
Payer: MEDICARE

## 2024-06-17 VITALS
BODY MASS INDEX: 32.44 KG/M2 | DIASTOLIC BLOOD PRESSURE: 65 MMHG | TEMPERATURE: 98.4 F | RESPIRATION RATE: 16 BRPM | HEART RATE: 58 BPM | SYSTOLIC BLOOD PRESSURE: 119 MMHG | OXYGEN SATURATION: 99 % | WEIGHT: 190 LBS | HEIGHT: 64 IN

## 2024-06-17 DIAGNOSIS — R29.6 FREQUENT FALLS: Primary | ICD-10-CM

## 2024-06-17 DIAGNOSIS — R41.3 MEMORY LOSS: ICD-10-CM

## 2024-06-17 DIAGNOSIS — I10 PRIMARY HYPERTENSION: ICD-10-CM

## 2024-06-17 DIAGNOSIS — E78.00 PURE HYPERCHOLESTEROLEMIA: ICD-10-CM

## 2024-06-17 DIAGNOSIS — T07.XXXA MULTIPLE CONTUSIONS: ICD-10-CM

## 2024-06-17 PROCEDURE — 1036F TOBACCO NON-USER: CPT | Performed by: FAMILY MEDICINE

## 2024-06-17 PROCEDURE — 99214 OFFICE O/P EST MOD 30 MIN: CPT | Performed by: FAMILY MEDICINE

## 2024-06-17 PROCEDURE — 3078F DIAST BP <80 MM HG: CPT | Performed by: FAMILY MEDICINE

## 2024-06-17 PROCEDURE — 3074F SYST BP LT 130 MM HG: CPT | Performed by: FAMILY MEDICINE

## 2024-06-17 PROCEDURE — G8427 DOCREV CUR MEDS BY ELIG CLIN: HCPCS | Performed by: FAMILY MEDICINE

## 2024-06-17 PROCEDURE — G8417 CALC BMI ABV UP PARAM F/U: HCPCS | Performed by: FAMILY MEDICINE

## 2024-06-17 PROCEDURE — 1123F ACP DISCUSS/DSCN MKR DOCD: CPT | Performed by: FAMILY MEDICINE

## 2024-06-17 PROCEDURE — 1090F PRES/ABSN URINE INCON ASSESS: CPT | Performed by: FAMILY MEDICINE

## 2024-06-17 PROCEDURE — G8399 PT W/DXA RESULTS DOCUMENT: HCPCS | Performed by: FAMILY MEDICINE

## 2024-06-17 RX ORDER — CLONIDINE HYDROCHLORIDE 0.1 MG/1
0.1 TABLET ORAL 2 TIMES DAILY
Qty: 180 TABLET | Refills: 1 | Status: SHIPPED | OUTPATIENT
Start: 2024-06-17

## 2024-06-17 RX ORDER — DONEPEZIL HYDROCHLORIDE 10 MG/1
10 TABLET, FILM COATED ORAL NIGHTLY
Qty: 90 TABLET | Refills: 1 | Status: SHIPPED | OUTPATIENT
Start: 2024-06-17

## 2024-06-17 RX ORDER — ROSUVASTATIN CALCIUM 20 MG/1
20 TABLET, COATED ORAL
Qty: 90 TABLET | Refills: 1
Start: 2024-06-17

## 2024-06-17 NOTE — PROGRESS NOTES
\"Have you been to the ER, urgent care clinic since your last visit?  Hospitalized since your last visit?\"    [facility] 06/11/2024 Fall     “Have you seen or consulted any other health care providers outside of [facility] since your last visit?”    NO            Click Here for Release of Records Request  
breakfast) 90 tablet 1    Multiple Vitamins-Minerals (CENTRUM SILVER PO) Take by mouth      melatonin 3 MG TABS tablet Take 2 tablets by mouth nightly as needed      calcium carbonate (OSCAL) 500 MG TABS tablet Caltrate 600-D Plus Minerals 600 mg calcium-400 unit tablet      apixaban (ELIQUIS) 5 MG TABS tablet TAKE 1 TABLET BY MOUTH TWO (2) TIMES A DAY FOR 30 DAYS.      dofetilide (TIKOSYN) 500 MCG capsule Take 1 capsule by mouth in the morning and 1 capsule in the evening.      sacubitril-valsartan (ENTRESTO) 24-26 MG per tablet Take 1 tablet by mouth 2 times daily (Patient not taking: Reported on 4/8/2024)       No current facility-administered medications on file prior to visit.       Review of Systems  Pertinent items are noted in HPI.    Objective:     /65   Pulse 58   Temp 98.4 °F (36.9 °C) (Temporal)   Resp 16   Ht 1.626 m (5' 4\")   Wt 86.2 kg (190 lb)   SpO2 99%   BMI 32.61 kg/m²   Gen: well appearing female  HEENT:   PERRL,normal conjunctiva.  OP no erythema, no exudates, MMM, bruising left face, well-healed laceration to left brow, sutures removed  Neck:   No masses or LAD  Resp:  No wheezing, no rhonchi, no rales.  CV:  RRR, normal S1S2, no murmur.    GI: soft, nontender, without masses.    Extrem:  +2 pulses, no edema, warm distally, skin tears left hand with bruising, eczema left antecubital fossa  Neuro: Alert, able to answer simple questions, cautious gait    Assessment/Plan:      Diagnosis Orders   1. Frequent falls  External Referral To Physical Therapy      2. Multiple contusions        3. Primary hypertension  cloNIDine (CATAPRES) 0.1 MG tablet      4. Pure hypercholesterolemia  rosuvastatin (CRESTOR) 20 MG tablet      5. Memory loss  donepezil (ARICEPT) 10 MG tablet      Advised patient to take medications as prescribed.  Referred to physical therapy.  High fall risk.  Titrate donepezil to 10 mg once daily.  Brow sutures removed.  Discussed wound care.  Use topical steroid to eczema

## 2024-06-17 NOTE — PATIENT INSTRUCTIONS
[facility]  # [phone]  [facility] dermatology # [phone]  [facility] # [phone]

## 2024-07-14 RX ORDER — LEVOTHYROXINE SODIUM 50 MCG
50 TABLET ORAL
Qty: 90 TABLET | Refills: 1 | Status: SHIPPED | OUTPATIENT
Start: 2024-07-14

## 2024-07-20 DIAGNOSIS — E78.00 PURE HYPERCHOLESTEROLEMIA: ICD-10-CM

## 2024-07-22 RX ORDER — ROSUVASTATIN CALCIUM 20 MG/1
20 TABLET, COATED ORAL DAILY
Qty: 90 TABLET | Refills: 1 | Status: SHIPPED | OUTPATIENT
Start: 2024-07-22

## 2024-07-23 RX ORDER — LEVOTHYROXINE SODIUM 0.05 MG/1
50 TABLET ORAL
Qty: 90 TABLET | Refills: 1 | Status: SHIPPED | OUTPATIENT
Start: 2024-07-23

## 2024-07-23 NOTE — TELEPHONE ENCOUNTER
PCP: Katlyn Marsh MD    Last appt:   6/17/2024    Future Appointments   Date Time Provider Department Center   8/5/2024  1:00 PM Katlyn Marsh MD [facility]       Requested Prescriptions     Pending Prescriptions Disp Refills    levothyroxine (SYNTHROID) 50 MCG tablet 90 tablet 1     Sig: Take 1 tablet by mouth every morning (before breakfast) Patient can only have brand

## 2024-07-23 NOTE — TELEPHONE ENCOUNTER
Pt states that she takes synthroid.  Refilled this time with generic.  She does not do good with the generic.    Please send name brand only.       SYNTHROID 50 MCG tablet    Refill to [facility] #[phone]

## 2024-08-05 ENCOUNTER — OFFICE VISIT (OUTPATIENT)
Age: 84
End: 2024-08-05
Payer: MEDICARE

## 2024-08-05 VITALS
SYSTOLIC BLOOD PRESSURE: 116 MMHG | DIASTOLIC BLOOD PRESSURE: 67 MMHG | HEART RATE: 63 BPM | BODY MASS INDEX: 31.24 KG/M2 | HEIGHT: 64 IN | TEMPERATURE: 98.2 F | WEIGHT: 183 LBS | RESPIRATION RATE: 16 BRPM | OXYGEN SATURATION: 98 %

## 2024-08-05 DIAGNOSIS — E03.9 ACQUIRED HYPOTHYROIDISM: Primary | ICD-10-CM

## 2024-08-05 DIAGNOSIS — E78.00 PURE HYPERCHOLESTEROLEMIA: ICD-10-CM

## 2024-08-05 DIAGNOSIS — E03.9 ACQUIRED HYPOTHYROIDISM: ICD-10-CM

## 2024-08-05 DIAGNOSIS — T07.XXXA MULTIPLE CONTUSIONS: ICD-10-CM

## 2024-08-05 DIAGNOSIS — Z86.79 S/P ABLATION OF ATRIAL FIBRILLATION: ICD-10-CM

## 2024-08-05 DIAGNOSIS — G62.9 NEUROPATHY: ICD-10-CM

## 2024-08-05 DIAGNOSIS — I48.0 PAF (PAROXYSMAL ATRIAL FIBRILLATION) (HCC): ICD-10-CM

## 2024-08-05 DIAGNOSIS — I10 PRIMARY HYPERTENSION: ICD-10-CM

## 2024-08-05 DIAGNOSIS — Z98.890 S/P ABLATION OF ATRIAL FIBRILLATION: ICD-10-CM

## 2024-08-05 DIAGNOSIS — Z91.81 AT RISK FOR FALLS: ICD-10-CM

## 2024-08-05 DIAGNOSIS — R41.3 MEMORY LOSS: ICD-10-CM

## 2024-08-05 LAB
ALBUMIN SERPL-MCNC: 3.9 G/DL (ref 3.5–5)
ALBUMIN/GLOB SERPL: 1.3 (ref 1.1–2.2)
ALP SERPL-CCNC: 85 U/L (ref 45–117)
ALT SERPL-CCNC: 20 U/L (ref 12–78)
ANION GAP SERPL CALC-SCNC: 4 MMOL/L (ref 5–15)
AST SERPL-CCNC: 20 U/L (ref 15–37)
BILIRUB SERPL-MCNC: 0.4 MG/DL (ref 0.2–1)
BUN SERPL-MCNC: 13 MG/DL (ref 6–20)
BUN/CREAT SERPL: 16 (ref 12–20)
CALCIUM SERPL-MCNC: 9.4 MG/DL (ref 8.5–10.1)
CHLORIDE SERPL-SCNC: 107 MMOL/L (ref 97–108)
CHOLEST SERPL-MCNC: 146 MG/DL
CO2 SERPL-SCNC: 27 MMOL/L (ref 21–32)
CREAT SERPL-MCNC: 0.83 MG/DL (ref 0.55–1.02)
GLOBULIN SER CALC-MCNC: 3 G/DL (ref 2–4)
GLUCOSE SERPL-MCNC: 100 MG/DL (ref 65–100)
HDLC SERPL-MCNC: 61 MG/DL
HDLC SERPL: 2.4 (ref 0–5)
LDLC SERPL CALC-MCNC: 69.6 MG/DL (ref 0–100)
POTASSIUM SERPL-SCNC: 4.2 MMOL/L (ref 3.5–5.1)
PROT SERPL-MCNC: 6.9 G/DL (ref 6.4–8.2)
SODIUM SERPL-SCNC: 138 MMOL/L (ref 136–145)
TRIGL SERPL-MCNC: 77 MG/DL
TSH SERPL DL<=0.05 MIU/L-ACNC: 0.68 UIU/ML (ref 0.36–3.74)
VLDLC SERPL CALC-MCNC: 15.4 MG/DL

## 2024-08-05 PROCEDURE — G8399 PT W/DXA RESULTS DOCUMENT: HCPCS | Performed by: FAMILY MEDICINE

## 2024-08-05 PROCEDURE — 1123F ACP DISCUSS/DSCN MKR DOCD: CPT | Performed by: FAMILY MEDICINE

## 2024-08-05 PROCEDURE — 3078F DIAST BP <80 MM HG: CPT | Performed by: FAMILY MEDICINE

## 2024-08-05 PROCEDURE — 99214 OFFICE O/P EST MOD 30 MIN: CPT | Performed by: FAMILY MEDICINE

## 2024-08-05 PROCEDURE — 3074F SYST BP LT 130 MM HG: CPT | Performed by: FAMILY MEDICINE

## 2024-08-05 PROCEDURE — 1090F PRES/ABSN URINE INCON ASSESS: CPT | Performed by: FAMILY MEDICINE

## 2024-08-05 PROCEDURE — G8417 CALC BMI ABV UP PARAM F/U: HCPCS | Performed by: FAMILY MEDICINE

## 2024-08-05 PROCEDURE — G8427 DOCREV CUR MEDS BY ELIG CLIN: HCPCS | Performed by: FAMILY MEDICINE

## 2024-08-05 PROCEDURE — 1036F TOBACCO NON-USER: CPT | Performed by: FAMILY MEDICINE

## 2024-08-05 NOTE — PATIENT INSTRUCTIONS
[facility]  [address] ·   [phone]    Voltaren gel for knee pain

## 2024-08-05 NOTE — PROGRESS NOTES
\"Have you been to the ER, urgent care clinic since your last visit?  Hospitalized since your last visit?\"    NO    “Have you seen or consulted any other health care providers outside of [facility] since your last visit?”    NO            Click Here for Release of Records Request  
MD Maximo

## 2024-09-06 ENCOUNTER — TELEPHONE (OUTPATIENT)
Age: 84
End: 2024-09-06

## 2024-09-06 DIAGNOSIS — R41.3 MEMORY LOSS: ICD-10-CM

## 2024-09-06 RX ORDER — DONEPEZIL HYDROCHLORIDE 5 MG/1
5 TABLET, FILM COATED ORAL NIGHTLY
Qty: 90 TABLET | Refills: 1 | Status: SHIPPED | OUTPATIENT
Start: 2024-09-06 | End: 2024-09-06

## 2024-09-06 RX ORDER — DONEPEZIL HYDROCHLORIDE 10 MG/1
10 TABLET, FILM COATED ORAL NIGHTLY
Qty: 90 TABLET | Refills: 1 | Status: SHIPPED | OUTPATIENT
Start: 2024-09-06

## 2024-09-23 ENCOUNTER — HOSPITAL ENCOUNTER (INPATIENT)
Facility: HOSPITAL | Age: 84
LOS: 1 days | Discharge: HOME HEALTH | DRG: 552 | End: 2024-09-24
Attending: EMERGENCY MEDICINE | Admitting: INTERNAL MEDICINE
Payer: MEDICARE

## 2024-09-23 ENCOUNTER — APPOINTMENT (OUTPATIENT)
Facility: HOSPITAL | Age: 84
DRG: 552 | End: 2024-09-23
Payer: MEDICARE

## 2024-09-23 DIAGNOSIS — M17.11 OSTEOARTHRITIS OF RIGHT KNEE, UNSPECIFIED OSTEOARTHRITIS TYPE: ICD-10-CM

## 2024-09-23 DIAGNOSIS — I63.9 ACUTE CVA (CEREBROVASCULAR ACCIDENT) (HCC): ICD-10-CM

## 2024-09-23 DIAGNOSIS — R29.898 RIGHT LEG WEAKNESS: Primary | ICD-10-CM

## 2024-09-23 LAB
ALBUMIN SERPL-MCNC: 4 G/DL (ref 3.5–5)
ALBUMIN/GLOB SERPL: 1.3 (ref 1.1–2.2)
ALP SERPL-CCNC: 88 U/L (ref 45–117)
ALT SERPL-CCNC: 18 U/L (ref 12–78)
ANION GAP SERPL CALC-SCNC: 2 MMOL/L (ref 2–12)
AST SERPL-CCNC: 13 U/L (ref 15–37)
BASOPHILS # BLD: 0 K/UL (ref 0–0.1)
BASOPHILS NFR BLD: 1 % (ref 0–1)
BILIRUB SERPL-MCNC: 0.5 MG/DL (ref 0.2–1)
BUN SERPL-MCNC: 14 MG/DL (ref 6–20)
BUN/CREAT SERPL: 16 (ref 12–20)
CALCIUM SERPL-MCNC: 9.4 MG/DL (ref 8.5–10.1)
CHLORIDE SERPL-SCNC: 109 MMOL/L (ref 97–108)
CO2 SERPL-SCNC: 28 MMOL/L (ref 21–32)
CREAT SERPL-MCNC: 0.85 MG/DL (ref 0.55–1.02)
DIFFERENTIAL METHOD BLD: NORMAL
ECHO BSA: 1.91 M2
EOSINOPHIL # BLD: 0.2 K/UL (ref 0–0.4)
EOSINOPHIL NFR BLD: 4 % (ref 0–7)
ERYTHROCYTE [DISTWIDTH] IN BLOOD BY AUTOMATED COUNT: 13 % (ref 11.5–14.5)
GLOBULIN SER CALC-MCNC: 3.2 G/DL (ref 2–4)
GLUCOSE BLD STRIP.AUTO-MCNC: 117 MG/DL (ref 65–117)
GLUCOSE SERPL-MCNC: 113 MG/DL (ref 65–100)
HCT VFR BLD AUTO: 38.7 % (ref 35–47)
HGB BLD-MCNC: 12.8 G/DL (ref 11.5–16)
IMM GRANULOCYTES # BLD AUTO: 0 K/UL (ref 0–0.04)
IMM GRANULOCYTES NFR BLD AUTO: 0 % (ref 0–0.5)
INR PPP: 1 (ref 0.9–1.1)
LYMPHOCYTES # BLD: 0.8 K/UL (ref 0.8–3.5)
LYMPHOCYTES NFR BLD: 22 % (ref 12–49)
MAGNESIUM SERPL-MCNC: 2.4 MG/DL (ref 1.6–2.4)
MCH RBC QN AUTO: 31.1 PG (ref 26–34)
MCHC RBC AUTO-ENTMCNC: 33.1 G/DL (ref 30–36.5)
MCV RBC AUTO: 93.9 FL (ref 80–99)
MONOCYTES # BLD: 0.3 K/UL (ref 0–1)
MONOCYTES NFR BLD: 8 % (ref 5–13)
NEUTS SEG # BLD: 2.5 K/UL (ref 1.8–8)
NEUTS SEG NFR BLD: 65 % (ref 32–75)
NRBC # BLD: 0 K/UL (ref 0–0.01)
NRBC BLD-RTO: 0 PER 100 WBC
PLATELET # BLD AUTO: 171 K/UL (ref 150–400)
PMV BLD AUTO: 10.5 FL (ref 8.9–12.9)
POTASSIUM SERPL-SCNC: 4.2 MMOL/L (ref 3.5–5.1)
PROT SERPL-MCNC: 7.2 G/DL (ref 6.4–8.2)
PROTHROMBIN TIME: 10.4 SEC (ref 9–11.1)
RBC # BLD AUTO: 4.12 M/UL (ref 3.8–5.2)
SERVICE CMNT-IMP: NORMAL
SODIUM SERPL-SCNC: 139 MMOL/L (ref 136–145)
TROPONIN I SERPL HS-MCNC: 4 NG/L (ref 0–51)
WBC # BLD AUTO: 3.8 K/UL (ref 3.6–11)

## 2024-09-23 PROCEDURE — 85610 PROTHROMBIN TIME: CPT

## 2024-09-23 PROCEDURE — 36415 COLL VENOUS BLD VENIPUNCTURE: CPT

## 2024-09-23 PROCEDURE — 84484 ASSAY OF TROPONIN QUANT: CPT

## 2024-09-23 PROCEDURE — 83735 ASSAY OF MAGNESIUM: CPT

## 2024-09-23 PROCEDURE — 82962 GLUCOSE BLOOD TEST: CPT

## 2024-09-23 PROCEDURE — 80053 COMPREHEN METABOLIC PANEL: CPT

## 2024-09-23 PROCEDURE — 6360000004 HC RX CONTRAST MEDICATION: Performed by: EMERGENCY MEDICINE

## 2024-09-23 PROCEDURE — 1100000003 HC PRIVATE W/ TELEMETRY

## 2024-09-23 PROCEDURE — 73502 X-RAY EXAM HIP UNI 2-3 VIEWS: CPT

## 2024-09-23 PROCEDURE — 70551 MRI BRAIN STEM W/O DYE: CPT

## 2024-09-23 PROCEDURE — 71045 X-RAY EXAM CHEST 1 VIEW: CPT

## 2024-09-23 PROCEDURE — 93971 EXTREMITY STUDY: CPT

## 2024-09-23 PROCEDURE — 85025 COMPLETE CBC W/AUTO DIFF WBC: CPT

## 2024-09-23 PROCEDURE — 6370000000 HC RX 637 (ALT 250 FOR IP): Performed by: INTERNAL MEDICINE

## 2024-09-23 PROCEDURE — 73562 X-RAY EXAM OF KNEE 3: CPT

## 2024-09-23 PROCEDURE — 73610 X-RAY EXAM OF ANKLE: CPT

## 2024-09-23 PROCEDURE — 0042T CT BRAIN PERFUSION: CPT

## 2024-09-23 PROCEDURE — 99285 EMERGENCY DEPT VISIT HI MDM: CPT

## 2024-09-23 PROCEDURE — 70496 CT ANGIOGRAPHY HEAD: CPT

## 2024-09-23 PROCEDURE — 70450 CT HEAD/BRAIN W/O DYE: CPT

## 2024-09-23 RX ORDER — CLONIDINE HYDROCHLORIDE 0.1 MG/1
0.1 TABLET ORAL 2 TIMES DAILY
Status: DISCONTINUED | OUTPATIENT
Start: 2024-09-24 | End: 2024-09-24 | Stop reason: HOSPADM

## 2024-09-23 RX ORDER — SODIUM CHLORIDE 0.9 % (FLUSH) 0.9 %
5-40 SYRINGE (ML) INJECTION EVERY 12 HOURS SCHEDULED
Status: DISCONTINUED | OUTPATIENT
Start: 2024-09-23 | End: 2024-09-24 | Stop reason: HOSPADM

## 2024-09-23 RX ORDER — DONEPEZIL HYDROCHLORIDE 5 MG/1
10 TABLET, FILM COATED ORAL NIGHTLY
Status: DISCONTINUED | OUTPATIENT
Start: 2024-09-23 | End: 2024-09-24 | Stop reason: HOSPADM

## 2024-09-23 RX ORDER — POLYETHYLENE GLYCOL 3350 17 G/17G
17 POWDER, FOR SOLUTION ORAL DAILY PRN
Status: DISCONTINUED | OUTPATIENT
Start: 2024-09-23 | End: 2024-09-24 | Stop reason: HOSPADM

## 2024-09-23 RX ORDER — SODIUM CHLORIDE 0.9 % (FLUSH) 0.9 %
5-40 SYRINGE (ML) INJECTION PRN
Status: DISCONTINUED | OUTPATIENT
Start: 2024-09-23 | End: 2024-09-24 | Stop reason: HOSPADM

## 2024-09-23 RX ORDER — ACETAMINOPHEN 325 MG/1
650 TABLET ORAL EVERY 4 HOURS PRN
Status: DISCONTINUED | OUTPATIENT
Start: 2024-09-23 | End: 2024-09-24 | Stop reason: HOSPADM

## 2024-09-23 RX ORDER — LABETALOL HYDROCHLORIDE 5 MG/ML
10 INJECTION, SOLUTION INTRAVENOUS EVERY 10 MIN PRN
Status: DISCONTINUED | OUTPATIENT
Start: 2024-09-23 | End: 2024-09-24 | Stop reason: HOSPADM

## 2024-09-23 RX ORDER — LANOLIN ALCOHOL/MO/W.PET/CERES
6 CREAM (GRAM) TOPICAL NIGHTLY PRN
Status: DISCONTINUED | OUTPATIENT
Start: 2024-09-23 | End: 2024-09-24 | Stop reason: HOSPADM

## 2024-09-23 RX ORDER — DOFETILIDE 0.5 MG/1
500 CAPSULE ORAL 2 TIMES DAILY
COMMUNITY

## 2024-09-23 RX ORDER — LEVOTHYROXINE SODIUM 50 UG/1
50 TABLET ORAL
Status: DISCONTINUED | OUTPATIENT
Start: 2024-09-24 | End: 2024-09-24 | Stop reason: HOSPADM

## 2024-09-23 RX ORDER — ASPIRIN 81 MG/1
81 TABLET, CHEWABLE ORAL DAILY
Status: DISCONTINUED | OUTPATIENT
Start: 2024-09-23 | End: 2024-09-24 | Stop reason: HOSPADM

## 2024-09-23 RX ORDER — IOPAMIDOL 755 MG/ML
100 INJECTION, SOLUTION INTRAVASCULAR
Status: COMPLETED | OUTPATIENT
Start: 2024-09-23 | End: 2024-09-23

## 2024-09-23 RX ORDER — DOFETILIDE 0.5 MG/1
500 CAPSULE ORAL EVERY 12 HOURS
Status: DISCONTINUED | OUTPATIENT
Start: 2024-09-23 | End: 2024-09-24 | Stop reason: HOSPADM

## 2024-09-23 RX ORDER — SODIUM CHLORIDE 9 MG/ML
INJECTION, SOLUTION INTRAVENOUS PRN
Status: DISCONTINUED | OUTPATIENT
Start: 2024-09-23 | End: 2024-09-24 | Stop reason: HOSPADM

## 2024-09-23 RX ORDER — ASPIRIN 300 MG/1
300 SUPPOSITORY RECTAL DAILY
Status: DISCONTINUED | OUTPATIENT
Start: 2024-09-23 | End: 2024-09-24 | Stop reason: HOSPADM

## 2024-09-23 RX ORDER — ROSUVASTATIN CALCIUM 20 MG/1
20 TABLET, COATED ORAL DAILY
Status: DISCONTINUED | OUTPATIENT
Start: 2024-09-23 | End: 2024-09-24 | Stop reason: HOSPADM

## 2024-09-23 RX ADMIN — APIXABAN 5 MG: 5 TABLET, FILM COATED ORAL at 21:51

## 2024-09-23 RX ADMIN — DOFETILIDE 500 MCG: 0.5 CAPSULE ORAL at 22:41

## 2024-09-23 RX ADMIN — SERTRALINE 100 MG: 50 TABLET, FILM COATED ORAL at 22:41

## 2024-09-23 RX ADMIN — ROSUVASTATIN CALCIUM 20 MG: 20 TABLET, FILM COATED ORAL at 22:41

## 2024-09-23 RX ADMIN — IOPAMIDOL 100 ML: 755 INJECTION, SOLUTION INTRAVENOUS at 14:28

## 2024-09-23 RX ADMIN — DONEPEZIL HYDROCHLORIDE 10 MG: 5 TABLET, FILM COATED ORAL at 21:51

## 2024-09-24 VITALS
RESPIRATION RATE: 16 BRPM | OXYGEN SATURATION: 98 % | SYSTOLIC BLOOD PRESSURE: 138 MMHG | BODY MASS INDEX: 30.45 KG/M2 | TEMPERATURE: 99 F | WEIGHT: 178.35 LBS | DIASTOLIC BLOOD PRESSURE: 76 MMHG | HEIGHT: 64 IN | HEART RATE: 67 BPM

## 2024-09-24 LAB
ANION GAP SERPL CALC-SCNC: 5 MMOL/L (ref 2–12)
BUN SERPL-MCNC: 16 MG/DL (ref 6–20)
BUN/CREAT SERPL: 16 (ref 12–20)
CALCIUM SERPL-MCNC: 8.9 MG/DL (ref 8.5–10.1)
CHLORIDE SERPL-SCNC: 111 MMOL/L (ref 97–108)
CHOLEST SERPL-MCNC: 152 MG/DL
CO2 SERPL-SCNC: 23 MMOL/L (ref 21–32)
CREAT SERPL-MCNC: 0.97 MG/DL (ref 0.55–1.02)
EKG ATRIAL RATE: 62 BPM
EKG DIAGNOSIS: NORMAL
EKG P AXIS: 21 DEGREES
EKG P-R INTERVAL: 158 MS
EKG Q-T INTERVAL: 472 MS
EKG QRS DURATION: 74 MS
EKG QTC CALCULATION (BAZETT): 479 MS
EKG R AXIS: -19 DEGREES
EKG T AXIS: 12 DEGREES
EKG VENTRICULAR RATE: 62 BPM
ERYTHROCYTE [DISTWIDTH] IN BLOOD BY AUTOMATED COUNT: 13 % (ref 11.5–14.5)
EST. AVERAGE GLUCOSE BLD GHB EST-MCNC: 105 MG/DL
GLUCOSE SERPL-MCNC: 95 MG/DL (ref 65–100)
HBA1C MFR BLD: 5.3 % (ref 4–5.6)
HCT VFR BLD AUTO: 36 % (ref 35–47)
HDLC SERPL-MCNC: 60 MG/DL
HDLC SERPL: 2.5 (ref 0–5)
HGB BLD-MCNC: 11.9 G/DL (ref 11.5–16)
LDLC SERPL CALC-MCNC: 76.4 MG/DL (ref 0–100)
MAGNESIUM SERPL-MCNC: 2.3 MG/DL (ref 1.6–2.4)
MCH RBC QN AUTO: 30.6 PG (ref 26–34)
MCHC RBC AUTO-ENTMCNC: 33.1 G/DL (ref 30–36.5)
MCV RBC AUTO: 92.5 FL (ref 80–99)
NRBC # BLD: 0 K/UL (ref 0–0.01)
NRBC BLD-RTO: 0 PER 100 WBC
PLATELET # BLD AUTO: 144 K/UL (ref 150–400)
PMV BLD AUTO: 10 FL (ref 8.9–12.9)
POTASSIUM SERPL-SCNC: 3.8 MMOL/L (ref 3.5–5.1)
RBC # BLD AUTO: 3.89 M/UL (ref 3.8–5.2)
SODIUM SERPL-SCNC: 139 MMOL/L (ref 136–145)
TRIGL SERPL-MCNC: 78 MG/DL
VLDLC SERPL CALC-MCNC: 15.6 MG/DL
WBC # BLD AUTO: 4.1 K/UL (ref 3.6–11)

## 2024-09-24 PROCEDURE — 97162 PT EVAL MOD COMPLEX 30 MIN: CPT

## 2024-09-24 PROCEDURE — 6370000000 HC RX 637 (ALT 250 FOR IP): Performed by: INTERNAL MEDICINE

## 2024-09-24 PROCEDURE — 36415 COLL VENOUS BLD VENIPUNCTURE: CPT

## 2024-09-24 PROCEDURE — 80048 BASIC METABOLIC PNL TOTAL CA: CPT

## 2024-09-24 PROCEDURE — 80061 LIPID PANEL: CPT

## 2024-09-24 PROCEDURE — 83036 HEMOGLOBIN GLYCOSYLATED A1C: CPT

## 2024-09-24 PROCEDURE — 97535 SELF CARE MNGMENT TRAINING: CPT | Performed by: OCCUPATIONAL THERAPIST

## 2024-09-24 PROCEDURE — 99221 1ST HOSP IP/OBS SF/LOW 40: CPT | Performed by: PSYCHIATRY & NEUROLOGY

## 2024-09-24 PROCEDURE — 2580000003 HC RX 258: Performed by: INTERNAL MEDICINE

## 2024-09-24 PROCEDURE — 97166 OT EVAL MOD COMPLEX 45 MIN: CPT | Performed by: OCCUPATIONAL THERAPIST

## 2024-09-24 PROCEDURE — 85027 COMPLETE CBC AUTOMATED: CPT

## 2024-09-24 PROCEDURE — 97530 THERAPEUTIC ACTIVITIES: CPT

## 2024-09-24 PROCEDURE — 97116 GAIT TRAINING THERAPY: CPT

## 2024-09-24 PROCEDURE — 83735 ASSAY OF MAGNESIUM: CPT

## 2024-09-24 RX ADMIN — CLONIDINE HYDROCHLORIDE 0.1 MG: 0.1 TABLET ORAL at 09:34

## 2024-09-24 RX ADMIN — SODIUM CHLORIDE, PRESERVATIVE FREE 10 ML: 5 INJECTION INTRAVENOUS at 09:35

## 2024-09-24 RX ADMIN — DOFETILIDE 500 MCG: 0.5 CAPSULE ORAL at 10:56

## 2024-09-24 RX ADMIN — SERTRALINE 100 MG: 50 TABLET, FILM COATED ORAL at 09:34

## 2024-09-24 RX ADMIN — LEVOTHYROXINE SODIUM 50 MCG: 0.05 TABLET ORAL at 06:17

## 2024-09-24 RX ADMIN — APIXABAN 5 MG: 5 TABLET, FILM COATED ORAL at 09:34

## 2024-09-25 ENCOUNTER — TELEPHONE (OUTPATIENT)
Age: 84
End: 2024-09-25

## 2024-10-06 NOTE — PROGRESS NOTES
Chichi Archuleta is a 84 y.o. female who presents for follow up.  In with sister    Hospitalized 9/23-9/24. Right leg weakness and right upper extremity numbness.  CT head negative for CVA.  CTA negative for acute process.  MRI brain no acute abnormality.  Cervical spine with DJD/stenosis.  X-ray right hip negative.  Right lower extremity venous ultrasound negative for DVT.. Seen by neurology.  Has LE neuropathy.   Seen by PT/OT.  Has neurology follow up 10/14.  Has [facility]  home PT/OT.  Prior [facility] twice a week.      Followed by cardiology for PAF. Followed by [facility] cardiology (Dr Hunter). On Eliquis. No bleeding.       Treated for hyperlipidemia, on statin.  No myalgias.     Treated for hypothyroidism. Consistent with medication.  TSH 0.65.       On sertraline for anxiety.  Mood stable.     Sees Dr. Carlos Bhatia, urogymadison for chronic urinary incontinence. Tried botox.  Not effective. On myrbetriq now.       On Aricept, 10mg dose,  for memory loss    Sleep disrupted. Taking 9mg melatonin at bedtime, has been regular. Will get up at night to urinate.         Past Medical History:   Diagnosis Date    Arthritis     right knee, left shoulder    Atrial fibrillation (HCC)     4/12/23 Dr. Moore [facility]    CAD (coronary artery disease)     Diverticulosis     Fractures     Frequency of urination     High cholesterol     Hypertension     Hypothyroid     Long term current use of anticoagulant therapy     Morbid obesity     Osteoarthritis     Peripheral neuropathy     bilateral LE    Unspecified adverse effect of anesthesia     low BP    Wells' syndrome        Family History   Problem Relation Age of Onset    Diabetes Paternal Grandmother     Cancer Paternal Aunt     Hypertension Father     Heart Disease Father     Heart Disease Mother     Alzheimer's Disease Mother         Social History     Socioeconomic History    Marital status:      Spouse name: Not on file    Number of children: Not on file    Years

## 2024-10-07 ENCOUNTER — OFFICE VISIT (OUTPATIENT)
Age: 84
End: 2024-10-07

## 2024-10-07 VITALS
OXYGEN SATURATION: 97 % | TEMPERATURE: 97.8 F | DIASTOLIC BLOOD PRESSURE: 72 MMHG | HEART RATE: 71 BPM | SYSTOLIC BLOOD PRESSURE: 116 MMHG | RESPIRATION RATE: 18 BRPM | WEIGHT: 180.6 LBS | HEIGHT: 64 IN | BODY MASS INDEX: 30.83 KG/M2

## 2024-10-07 DIAGNOSIS — Z23 NEEDS FLU SHOT: ICD-10-CM

## 2024-10-07 DIAGNOSIS — I48.0 PAF (PAROXYSMAL ATRIAL FIBRILLATION) (HCC): ICD-10-CM

## 2024-10-07 DIAGNOSIS — I10 PRIMARY HYPERTENSION: ICD-10-CM

## 2024-10-07 DIAGNOSIS — M48.02 CERVICAL STENOSIS OF SPINAL CANAL: Primary | ICD-10-CM

## 2024-10-07 DIAGNOSIS — E03.9 ACQUIRED HYPOTHYROIDISM: ICD-10-CM

## 2024-10-07 RX ORDER — MIRABEGRON 25 MG/1
25 TABLET, FILM COATED, EXTENDED RELEASE ORAL DAILY
COMMUNITY

## 2024-10-07 SDOH — ECONOMIC STABILITY: FOOD INSECURITY: WITHIN THE PAST 12 MONTHS, YOU WORRIED THAT YOUR FOOD WOULD RUN OUT BEFORE YOU GOT MONEY TO BUY MORE.: NEVER TRUE

## 2024-10-07 SDOH — ECONOMIC STABILITY: FOOD INSECURITY: WITHIN THE PAST 12 MONTHS, THE FOOD YOU BOUGHT JUST DIDN'T LAST AND YOU DIDN'T HAVE MONEY TO GET MORE.: NEVER TRUE

## 2024-10-07 SDOH — ECONOMIC STABILITY: INCOME INSECURITY: HOW HARD IS IT FOR YOU TO PAY FOR THE VERY BASICS LIKE FOOD, HOUSING, MEDICAL CARE, AND HEATING?: NOT HARD AT ALL

## 2024-10-07 SDOH — ECONOMIC STABILITY: TRANSPORTATION INSECURITY
IN THE PAST 12 MONTHS, HAS LACK OF TRANSPORTATION KEPT YOU FROM MEETINGS, WORK, OR FROM GETTING THINGS NEEDED FOR DAILY LIVING?: NO

## 2024-10-07 NOTE — PATIENT INSTRUCTIONS
Sleep hygiene: Basic rules for a good night's sleep  Sleep only as much as you need to feel rested and then get out of bed   Keep a regular sleep schedule   Do not try to sleep unless you feel sleepy   Exercise regularly, preferably at least 4 to 5 hours before bedtime   Avoid caffeinated beverages after lunch   Avoid alcohol near bedtime: no \"night cap\"   Avoid smoking, especially in the evening   Do not go to bed hungry   Make the bedroom environment conducive to sleep   Avoid prolonged use of light-emitting screens before bedtime    Deal with your worries before bedtime        REDUCE MELATONIN TO AS NEEDED, NOT NIGHTLY      CHANGE THE SECOND DOSE OF CLONIDINE TO BEDTIME     CUT OUT SUGAR FREE FOODS

## 2024-10-13 NOTE — PROGRESS NOTES
[facility] NEUROLOGY CLINIC  In Office FOLLOW-UP VISIT         Chichi Archuleta is a 84 y.o. female who presents today for the following:  Chief Complaint   Patient presents with    Fall     4 falls  seen in ER on 9/23/24- Neuropathy         ASSESSMENT AND PLAN  Patient is known to this practice.  Chart and history reviewed in detail at today's office visit.    1. Neuropathy  Assessment & Plan:  Supported by EMG completed on 6/4/2020 by Dr. Carlos which was abnormal.  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    Patient has been experiencing falls without neuropathic pain.    Factors that may contribute to her falls include chronic ankle issues bilaterally, right knee issues, and neuropathy.    Fall safety was discussed with patient.  She was encouraged to use a walker or a cane to ambulate at all times to prevent future falls.    She is to adopt healthy lifestyles which include nutrition and exercise which would help alleviate her symptoms.    She is to continue to work with PT/OT as prescribed.    2. Memory deficit  Assessment & Plan:   Per patient's family, her memory loss  worsening    Initially, patient was taking donepezil 5 mg at bedtime as a preventative given she has a family history of dementia.  Donepezil was increased 4 months ago to 10 mg nightly by her primary care provider.  She denied any side effects.    Her current neurological examination revealed mild cognitive impairment.  She did not answer the questions correctly, but had difficulty drawing clock with the right time.    Education on many factors that may contribute to her memory loss was reviewed with patient which include lack of sleep, untreated anxiety and depression, metabolic abnormality, and stroke.    We will not obtain imaging at this time given patient was recently hospitalized and brain MRI without contrast from 9/23/2024 revealed no acute intracranial abnormality.  Unchanged

## 2024-10-14 ENCOUNTER — OFFICE VISIT (OUTPATIENT)
Age: 84
End: 2024-10-14
Payer: MEDICARE

## 2024-10-14 VITALS
WEIGHT: 180 LBS | SYSTOLIC BLOOD PRESSURE: 118 MMHG | HEIGHT: 64 IN | OXYGEN SATURATION: 98 % | DIASTOLIC BLOOD PRESSURE: 68 MMHG | HEART RATE: 81 BPM | BODY MASS INDEX: 30.73 KG/M2 | RESPIRATION RATE: 16 BRPM

## 2024-10-14 DIAGNOSIS — Z91.89 STROKE RISK: ICD-10-CM

## 2024-10-14 DIAGNOSIS — G62.9 NEUROPATHY: Primary | ICD-10-CM

## 2024-10-14 DIAGNOSIS — R41.3 MEMORY DEFICIT: ICD-10-CM

## 2024-10-14 DIAGNOSIS — E56.9 VITAMIN DEFICIENCY: ICD-10-CM

## 2024-10-14 DIAGNOSIS — R51.9 NONINTRACTABLE EPISODIC HEADACHE, UNSPECIFIED HEADACHE TYPE: ICD-10-CM

## 2024-10-14 DIAGNOSIS — E55.9 VITAMIN D DEFICIENCY: ICD-10-CM

## 2024-10-14 PROCEDURE — 1111F DSCHRG MED/CURRENT MED MERGE: CPT

## 2024-10-14 PROCEDURE — G8399 PT W/DXA RESULTS DOCUMENT: HCPCS

## 2024-10-14 PROCEDURE — G8417 CALC BMI ABV UP PARAM F/U: HCPCS

## 2024-10-14 PROCEDURE — 1123F ACP DISCUSS/DSCN MKR DOCD: CPT

## 2024-10-14 PROCEDURE — 1090F PRES/ABSN URINE INCON ASSESS: CPT

## 2024-10-14 PROCEDURE — 99215 OFFICE O/P EST HI 40 MIN: CPT

## 2024-10-14 PROCEDURE — 1036F TOBACCO NON-USER: CPT

## 2024-10-14 PROCEDURE — 3078F DIAST BP <80 MM HG: CPT

## 2024-10-14 PROCEDURE — G8427 DOCREV CUR MEDS BY ELIG CLIN: HCPCS

## 2024-10-14 PROCEDURE — 3074F SYST BP LT 130 MM HG: CPT

## 2024-10-14 PROCEDURE — G8482 FLU IMMUNIZE ORDER/ADMIN: HCPCS

## 2024-10-14 RX ORDER — MEMANTINE HYDROCHLORIDE 5 MG/1
TABLET ORAL
Qty: 180 TABLET | Refills: 1 | Status: SHIPPED | OUTPATIENT
Start: 2024-10-14

## 2024-10-14 ASSESSMENT — PATIENT HEALTH QUESTIONNAIRE - PHQ9
SUM OF ALL RESPONSES TO PHQ QUESTIONS 1-9: 0
SUM OF ALL RESPONSES TO PHQ QUESTIONS 1-9: 0
2. FEELING DOWN, DEPRESSED OR HOPELESS: NOT AT ALL
1. LITTLE INTEREST OR PLEASURE IN DOING THINGS: NOT AT ALL
SUM OF ALL RESPONSES TO PHQ9 QUESTIONS 1 & 2: 0
SUM OF ALL RESPONSES TO PHQ QUESTIONS 1-9: 0
SUM OF ALL RESPONSES TO PHQ QUESTIONS 1-9: 0

## 2024-10-14 NOTE — PROGRESS NOTES
1. Have you been to the ER, urgent care clinic since your last visit?  Hospitalized since your last visit?  9/23/24    2. Have you seen or consulted any other health care providers outside of the [facility] since your last visit?  Include any pap smears or colon screening.   No.      Chief Complaint   Patient presents with    Fall     4 falls  seen in ER on 9/23/24- Neuropathy

## 2024-10-14 NOTE — ASSESSMENT & PLAN NOTE
Per patient's family, her memory loss  worsening    Initially, patient was taking donepezil 5 mg at bedtime as a preventative given she has a family history of dementia.  Donepezil was increased 4 months ago to 10 mg nightly by her primary care provider.  She denied any side effects.    Her current neurological examination revealed mild cognitive impairment.  She did not answer the questions correctly, but had difficulty drawing clock with the right time.    Education on many factors that may contribute to her memory loss was reviewed with patient which include lack of sleep, untreated anxiety and depression, metabolic abnormality, and stroke.    We will not obtain imaging at this time given patient was recently hospitalized and brain MRI without contrast from 9/23/2024 revealed no acute intracranial abnormality.  Unchanged generalized parenchymal volume loss and mild chronic microvascular ischemic disease.    We will not make any changes on her donepezil at this time.  She is to continue donepezil 10 mg nightly.  Given she has been experiencing nightmares, we will continue to monitor her for this and will not increase the dosage of donepezil given her symptoms.    Sleep hygiene was discussed with patient.  Education on melatonin was provided as well.  Sister still deferred patient starting on melatonin due to side effects of drowsiness in the morning which patient denied.    Patient is to reach out to primary care provider for another sleeping agent.    She is to continue sertraline 100 mg daily as prescribed by her PCP.    Neuropsych evaluation done with Dr. Blunt but was canceled by patient's sister in the hope she would find somebody else to get it done sooner.  Neuropsych testing was reported to be done earlier this year but sister has not seen the results because patient did not want her to see them.  She is going to request the results from Ms. Vangie Sin office.    Will also order labs to look for

## 2024-10-14 NOTE — PATIENT INSTRUCTIONS
As per our discussion,    For the memory loss, I would like to have a copy of the neuropsych testing that was completed earlier this year.    I will not make any changes on the donepezil, we will continue donepezil 10 mg daily.  Will add Namenda 5 mg in which she can take 1 tablet daily for 2 weeks and increase to 1 tablet twice a day thereafter.    There are other factors that may contribute to memory loss includes lack of sleep, vitamin deficiencies.  Will order some labs today to look for any deficiencies that may contribute to your symptoms.  As for the sleeping difficulty, melatonin is considered to be safe and can be taken 30 minutes before bedtime.    I encouraged you  to engage in approximately 2.5 hours of physician approved physical activity on a weekly basis.    To maintain a healthy diet. Also was informed of the Mediterranean diet, which has been associated with reduced risk for neurodegenerative conditions as well as heart disease.    To maintain a cognitively stimulated lifestyle, also incorporating social interaction.    As for the falls, there are many factors that may contribute to your symptoms which include neuropathy, osteoarthritis, chronic ankle issues.  I would encourage that you take your time when you switching positions to prevent falls.  Please use a cane or a walker for walking.      As for neuropathy, I encourage you to continue to follow-up with your primary care provider and your other specialist as appropriate.  Adopt healthy lifestyles which includes nutrition and exercise to help alleviate your neuropathic symptoms.    It was a pleasure to see you and your sister today    Will see you back in 6 months or sooner if needed    Please do not hesitate to reach out for any questions or concerns

## 2024-10-14 NOTE — ASSESSMENT & PLAN NOTE
Will check vitamin D, vitamin B12, folate to look for any reversible causes that may cause her memory loss.

## 2024-10-14 NOTE — ASSESSMENT & PLAN NOTE
Resolved    This was not a concern today.     We discussed the importance of a proper diet including plenty of fruits and vegetables as this can help with headache prevention.    We discussed the importance of staying hydrated and drinking at least 32 to 64 ounces of water daily as this can be a cause of tension type headaches.    We discussed the importance of sleep hygiene and attempting to get at least 6 to 8 hours of sleep daily to help with headache prevention.

## 2024-10-14 NOTE — ASSESSMENT & PLAN NOTE
Given patient has been experiencing memory loss, will check her vitamin D to look for any vitamin D deficiency.  Will make additional recommendation based on what the results show.

## 2024-10-14 NOTE — ASSESSMENT & PLAN NOTE
Supported by EMG completed on 6/4/2020 by Dr. Carlos which was abnormal.  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    Patient has been experiencing falls without neuropathic pain.    Factors that may contribute to her falls include chronic ankle issues bilaterally, right knee issues, and neuropathy.    Fall safety was discussed with patient.  She was encouraged to use a walker or a cane to ambulate at all times to prevent future falls.    She is to adopt healthy lifestyles which include nutrition and exercise which would help alleviate her symptoms.    She is to continue to work with PT/OT as prescribed.

## 2024-10-14 NOTE — ASSESSMENT & PLAN NOTE
Patient denied any strokelike symptoms.    Recent brain MRI without contrast from 9/23/2024 showed no acute intracranial abnormality.  Unchanged generalized parenchymal volume loss and mild chronic microvascular ischemic disease.    CTA head neck from 9/23/2024 showed no major vessel occlusion.  Multilevel canal and foraminal stenosis of the cervical spine greatest at C5-C6.    CTP from 9/23/2024 showed no evidence of perfusion mismatch.    Patient is to continue on rosuvastatin 20 mg daily and apixaban 5 mg twice a day.    Patient is to continue to work to all of her comorbid conditions as managed by PCP and other specialists as appropriate    RECOMMENDATIONS:  - BP goal is less than 140/90  - Goal HbA1c is less than 7.   - LDL less than 70     Education was provided today in regards to risk factors for stroke and lifestyle modifications to help minimize the risk of future stroke.    This included medication compliance, regular follow up with primary care physician,  and healthy lifestyle habits (nutrition/exercise).

## 2024-10-15 LAB
25(OH)D3 SERPL-MCNC: 47.2 NG/ML (ref 30–100)
FOLATE SERPL-MCNC: 46.1 NG/ML (ref 5–21)
RPR SER QL: NONREACTIVE
VIT B12 SERPL-MCNC: 520 PG/ML (ref 193–986)

## 2024-10-17 ENCOUNTER — TELEPHONE (OUTPATIENT)
Age: 84
End: 2024-10-17

## 2024-10-17 NOTE — TELEPHONE ENCOUNTER
Brando from [facility] was calling to get orders for pt to have Occupation therapy once a week for 4 weeks    Brando - [phone] please call with any questions

## 2024-10-23 NOTE — RESULT ENCOUNTER NOTE
This was reviewed with the patient's family over the portal.  It was recommended that patient's stop taking multivitamin and we will recheck level in 90 days.  This also can be managed by her primary care provider.

## 2024-10-24 DIAGNOSIS — I10 PRIMARY HYPERTENSION: ICD-10-CM

## 2024-10-25 RX ORDER — POTASSIUM CHLORIDE 750 MG/1
TABLET, EXTENDED RELEASE ORAL
Qty: 360 TABLET | Refills: 1 | Status: SHIPPED | OUTPATIENT
Start: 2024-10-25

## 2024-10-27 DIAGNOSIS — R35.1 NOCTURIA: ICD-10-CM

## 2024-10-27 DIAGNOSIS — N39.41 URGE INCONTINENCE OF URINE: ICD-10-CM

## 2024-10-27 RX ORDER — OXYBUTYNIN CHLORIDE 5 MG/1
5 TABLET, EXTENDED RELEASE ORAL DAILY
Qty: 90 TABLET | Refills: 1 | Status: SHIPPED | OUTPATIENT
Start: 2024-10-27

## 2024-10-29 DIAGNOSIS — F41.9 ANXIETY: ICD-10-CM

## 2024-10-29 RX ORDER — SERTRALINE HYDROCHLORIDE 100 MG/1
100 TABLET, FILM COATED ORAL DAILY
Qty: 90 TABLET | Refills: 1 | Status: SHIPPED | OUTPATIENT
Start: 2024-10-29

## 2024-11-18 DIAGNOSIS — I10 PRIMARY HYPERTENSION: ICD-10-CM

## 2024-11-18 RX ORDER — CLONIDINE HYDROCHLORIDE 0.1 MG/1
0.1 TABLET ORAL 2 TIMES DAILY
Qty: 180 TABLET | Refills: 1 | Status: SHIPPED | OUTPATIENT
Start: 2024-11-18

## 2024-12-23 ENCOUNTER — TELEPHONE (OUTPATIENT)
Age: 84
End: 2024-12-23

## 2024-12-23 DIAGNOSIS — M48.02 CERVICAL STENOSIS OF SPINE: Primary | ICD-10-CM

## 2024-12-23 NOTE — TELEPHONE ENCOUNTER
Duane with [facility] called in requesting referral for PT    Please fax referral to [phone]

## 2024-12-26 NOTE — TELEPHONE ENCOUNTER
Spoke to Duane at [facility]. He states it is still PT for her neck and spinal stenosis.   Referral pended for review

## 2025-02-06 NOTE — PROGRESS NOTES
Chichi Archuleta is a 84 y.o. female who presents for follow-up. In with daughter.     Fell on ,  bruising left arm.  Per daughter, patient lost balance and hit a desk. Using cane most of the time. Prior falls before use of cane. Is in PT at [facility],  PT advised walker.     Followed by cardiology for PAF. Followed by [facility] cardiology (Dr Hunter). On Eliquis. No bleeding.      Treated for HTN, on entresto and clonidine.  Per daughter, she is sleepy during day and increased dreaming.       Treated for hyperlipidemia, on statin.  No myalgias.     Treated for hypothyroidism. Consistent with medication.  TSH at goal     On sertraline for anxiety.  Mood stable.     Sees tiffany Mclean for chronic urinary incontinence. Tried botox.  Not effective. On gemtesa daily.        On Aricept, 10mg dose and Namenda 5mg BID,  for memory loss       Past Medical History:   Diagnosis Date    Anxiety     Arthritis     right knee, left shoulder    Atrial fibrillation (HCC)     23 Dr. Moore [facility]    CAD (coronary artery disease)     Diverticulosis     Fractures     Frequency of urination     High cholesterol     Hypertension     Hypothyroid     Long term current use of anticoagulant therapy     Morbid obesity     Osteoarthritis     Peripheral neuropathy     bilateral LE    Unspecified adverse effect of anesthesia     low BP    Urinary incontinence     Wells' syndrome        Family History   Problem Relation Age of Onset    Diabetes Paternal Grandmother     Cancer Paternal Aunt     Hypertension Father     Heart Disease Father     Early Death Father          at age 51 from heart attack    Heart Attack Father     Heart Disease Mother     Alzheimer's Disease Mother     Atrial Fibrillation Mother     Heart Attack Mother     Osteoarthritis Mother     Osteoporosis Mother     High Cholesterol Sister     Osteoarthritis Sister         Social History     Socioeconomic History    Marital status:      Spouse

## 2025-02-07 ENCOUNTER — OFFICE VISIT (OUTPATIENT)
Age: 85
End: 2025-02-07
Payer: MEDICARE

## 2025-02-07 VITALS
DIASTOLIC BLOOD PRESSURE: 67 MMHG | WEIGHT: 180.6 LBS | RESPIRATION RATE: 18 BRPM | SYSTOLIC BLOOD PRESSURE: 109 MMHG | TEMPERATURE: 98.1 F | BODY MASS INDEX: 30.83 KG/M2 | HEART RATE: 63 BPM | OXYGEN SATURATION: 99 % | HEIGHT: 64 IN

## 2025-02-07 DIAGNOSIS — Z86.79 S/P ABLATION OF ATRIAL FIBRILLATION: ICD-10-CM

## 2025-02-07 DIAGNOSIS — R41.3 MEMORY DEFICIT: ICD-10-CM

## 2025-02-07 DIAGNOSIS — E78.00 PURE HYPERCHOLESTEROLEMIA: ICD-10-CM

## 2025-02-07 DIAGNOSIS — Z98.890 S/P ABLATION OF ATRIAL FIBRILLATION: ICD-10-CM

## 2025-02-07 DIAGNOSIS — I48.0 PAF (PAROXYSMAL ATRIAL FIBRILLATION) (HCC): ICD-10-CM

## 2025-02-07 DIAGNOSIS — E03.9 ACQUIRED HYPOTHYROIDISM: ICD-10-CM

## 2025-02-07 DIAGNOSIS — M48.02 CERVICAL STENOSIS OF SPINAL CANAL: ICD-10-CM

## 2025-02-07 DIAGNOSIS — I10 PRIMARY HYPERTENSION: Primary | ICD-10-CM

## 2025-02-07 PROCEDURE — 99214 OFFICE O/P EST MOD 30 MIN: CPT | Performed by: FAMILY MEDICINE

## 2025-02-07 PROCEDURE — G8417 CALC BMI ABV UP PARAM F/U: HCPCS | Performed by: FAMILY MEDICINE

## 2025-02-07 PROCEDURE — 1159F MED LIST DOCD IN RCRD: CPT | Performed by: FAMILY MEDICINE

## 2025-02-07 PROCEDURE — 1036F TOBACCO NON-USER: CPT | Performed by: FAMILY MEDICINE

## 2025-02-07 PROCEDURE — G8399 PT W/DXA RESULTS DOCUMENT: HCPCS | Performed by: FAMILY MEDICINE

## 2025-02-07 PROCEDURE — G8427 DOCREV CUR MEDS BY ELIG CLIN: HCPCS | Performed by: FAMILY MEDICINE

## 2025-02-07 PROCEDURE — 3074F SYST BP LT 130 MM HG: CPT | Performed by: FAMILY MEDICINE

## 2025-02-07 PROCEDURE — 1160F RVW MEDS BY RX/DR IN RCRD: CPT | Performed by: FAMILY MEDICINE

## 2025-02-07 PROCEDURE — 1123F ACP DISCUSS/DSCN MKR DOCD: CPT | Performed by: FAMILY MEDICINE

## 2025-02-07 PROCEDURE — 3078F DIAST BP <80 MM HG: CPT | Performed by: FAMILY MEDICINE

## 2025-02-07 PROCEDURE — 1090F PRES/ABSN URINE INCON ASSESS: CPT | Performed by: FAMILY MEDICINE

## 2025-02-07 RX ORDER — ROSUVASTATIN CALCIUM 20 MG/1
20 TABLET, COATED ORAL DAILY
Qty: 90 TABLET | Refills: 1 | Status: SHIPPED | OUTPATIENT
Start: 2025-02-07

## 2025-02-07 RX ORDER — MEMANTINE HYDROCHLORIDE 5 MG/1
TABLET ORAL
Qty: 180 TABLET | Refills: 1 | Status: SHIPPED | OUTPATIENT
Start: 2025-02-07

## 2025-02-07 RX ORDER — VIBEGRON 75 MG/1
75 TABLET, FILM COATED ORAL DAILY
Qty: 90 TABLET | Refills: 1 | Status: SHIPPED | OUTPATIENT
Start: 2025-02-07

## 2025-02-07 RX ORDER — POTASSIUM CHLORIDE 750 MG/1
TABLET, EXTENDED RELEASE ORAL
Qty: 360 TABLET | Refills: 1 | Status: SHIPPED | OUTPATIENT
Start: 2025-02-07

## 2025-02-07 RX ORDER — VIBEGRON 75 MG/1
1 TABLET, FILM COATED ORAL DAILY
COMMUNITY
Start: 2024-11-11 | End: 2025-02-07 | Stop reason: SDUPTHER

## 2025-02-07 RX ORDER — LEVOTHYROXINE SODIUM 50 UG/1
50 TABLET ORAL
Qty: 90 TABLET | Refills: 1 | Status: SHIPPED | OUTPATIENT
Start: 2025-02-07

## 2025-02-07 ASSESSMENT — PATIENT HEALTH QUESTIONNAIRE - PHQ9
SUM OF ALL RESPONSES TO PHQ QUESTIONS 1-9: 0
SUM OF ALL RESPONSES TO PHQ9 QUESTIONS 1 & 2: 0
SUM OF ALL RESPONSES TO PHQ QUESTIONS 1-9: 0
1. LITTLE INTEREST OR PLEASURE IN DOING THINGS: NOT AT ALL
2. FEELING DOWN, DEPRESSED OR HOPELESS: NOT AT ALL

## 2025-02-07 NOTE — PATIENT INSTRUCTIONS
HOLD THE CLONIDINE MORNING DOSE.  CONTINUE SECOND CLONIDINE AT BEDTIME.  CONTINUE TO MONITOR BLOOD PRESSURE, GOAL OF ALWAYS UNDER 160.      CHANGE NAMENDA TO BREAKFAST AND DINNER, KEEP DONEPEZIL AT BEDTIME.

## 2025-02-21 ENCOUNTER — TELEPHONE (OUTPATIENT)
Age: 85
End: 2025-02-21

## 2025-02-21 NOTE — TELEPHONE ENCOUNTER
Pt states blood pressure is elevated and would like a call from clinical staff. Pt experiencing fatigue and overall not feeling well. Patient states she took her pressure this morning and it was 155/76. She states she took 1/2 of a cloNIDine and then took it around 1pm and it was   Pt's current bp (02/21): 190/97     Patients medication was changed at the last office visit due to patient having low pressures.

## 2025-02-21 NOTE — TELEPHONE ENCOUNTER
Pt states blood pressure is elevated and would like a call from clinical staff. Pt experiencing fatigue and overall not feeling well.    Pt's current bp (02/21): 190/97     Call transferred to nurse.

## 2025-02-24 NOTE — TELEPHONE ENCOUNTER
Voicemail not set up, unable to leave a message for patient to schedule an in office appointment in March with pcp.

## 2025-04-13 NOTE — PROGRESS NOTES
[facility] NEUROLOGY CLINIC  In Office FOLLOW-UP VISIT         Chichi Archuleta is a 85 y.o. female who presents today for the following:  Chief Complaint   Patient presents with    Idiopathic Neuropathy      Regular falls, gait unstable     Memory Loss         ASSESSMENT AND PLAN  Patient is known to this practice.  Chart and history reviewed in detail at today's office visit.    1. Mild cognitive impairment  Assessment & Plan:  Neuropsych evaluation from Dr. Vangie Sin dated 3/30/2023-6/8/2023 indicates a diagnosis of mild cognitive impairment with a recommendation for annual follow-up.    Patient reports increased forgetfulness, daytime sleepiness, and difficulty following up on task.    Her current neurological examination revealed mild cognitive impairment.    She is currently on donepezil 10 mg and memantine 5 mg twice daily.  The dosage of memantine will be increased to 10 mg twice daily and she is to continue donepezil 10 mg nightly.  Side effects were reviewed.    A repeat neuropsych evaluation was discussed to patient and family, both deferred at this time.    Vitamin B12, vitamin D, folate, RPR, and TSH were all normal.    Patient was encouraged to engage in approximately 2.5 hours of physician approved physical activity on a weekly basis.     Patient was encouraged to maintain a healthy diet. they will also be informed of the Mediterranean diet, which has been associated with reduced risk for neurodegenerative conditions as well as heart disease.     Patient was encouraged to maintain a cognitively stimulated lifestyle, also incorporating social interaction.    Orders:  -     memantine (NAMENDA) 10 MG tablet; Take 1 tablet by mouth 2 times daily Take 1 tablet two times a day, Disp-180 tablet, R-4Normal  2. Neuropathy  Assessment & Plan:  Supported by EMG completed on 6/4/2020 by Dr. Carlos which was abnormal.  There was electrophysiological evidence of a length dependent axonal

## 2025-04-14 ENCOUNTER — OFFICE VISIT (OUTPATIENT)
Age: 85
End: 2025-04-14
Payer: MEDICARE

## 2025-04-14 VITALS
SYSTOLIC BLOOD PRESSURE: 128 MMHG | HEART RATE: 75 BPM | RESPIRATION RATE: 15 BRPM | WEIGHT: 182 LBS | HEIGHT: 64 IN | OXYGEN SATURATION: 98 % | DIASTOLIC BLOOD PRESSURE: 64 MMHG | BODY MASS INDEX: 31.07 KG/M2

## 2025-04-14 DIAGNOSIS — G62.9 NEUROPATHY: ICD-10-CM

## 2025-04-14 DIAGNOSIS — Z91.89 STROKE RISK: ICD-10-CM

## 2025-04-14 DIAGNOSIS — G31.84 MILD COGNITIVE IMPAIRMENT: Primary | ICD-10-CM

## 2025-04-14 PROCEDURE — 1090F PRES/ABSN URINE INCON ASSESS: CPT

## 2025-04-14 PROCEDURE — 1159F MED LIST DOCD IN RCRD: CPT

## 2025-04-14 PROCEDURE — 1160F RVW MEDS BY RX/DR IN RCRD: CPT

## 2025-04-14 PROCEDURE — 1123F ACP DISCUSS/DSCN MKR DOCD: CPT

## 2025-04-14 PROCEDURE — 3078F DIAST BP <80 MM HG: CPT

## 2025-04-14 PROCEDURE — G8417 CALC BMI ABV UP PARAM F/U: HCPCS

## 2025-04-14 PROCEDURE — 99215 OFFICE O/P EST HI 40 MIN: CPT

## 2025-04-14 PROCEDURE — 1036F TOBACCO NON-USER: CPT

## 2025-04-14 PROCEDURE — 3074F SYST BP LT 130 MM HG: CPT

## 2025-04-14 PROCEDURE — G8427 DOCREV CUR MEDS BY ELIG CLIN: HCPCS

## 2025-04-14 PROCEDURE — G8399 PT W/DXA RESULTS DOCUMENT: HCPCS

## 2025-04-14 RX ORDER — MEMANTINE HYDROCHLORIDE 10 MG/1
10 TABLET ORAL 2 TIMES DAILY
Qty: 180 TABLET | Refills: 4 | Status: SHIPPED | OUTPATIENT
Start: 2025-04-14

## 2025-04-14 RX ORDER — MEMANTINE HYDROCHLORIDE 10 MG/1
10 TABLET ORAL 2 TIMES DAILY
Qty: 180 TABLET | Refills: 4 | Status: SHIPPED | OUTPATIENT
Start: 2025-04-14 | End: 2025-04-14

## 2025-04-14 ASSESSMENT — PATIENT HEALTH QUESTIONNAIRE - PHQ9
1. LITTLE INTEREST OR PLEASURE IN DOING THINGS: NOT AT ALL
SUM OF ALL RESPONSES TO PHQ QUESTIONS 1-9: 0
2. FEELING DOWN, DEPRESSED OR HOPELESS: NOT AT ALL
SUM OF ALL RESPONSES TO PHQ QUESTIONS 1-9: 0

## 2025-04-14 ASSESSMENT — ENCOUNTER SYMPTOMS
GASTROINTESTINAL NEGATIVE: 1
RESPIRATORY NEGATIVE: 1
ALLERGIC/IMMUNOLOGIC NEGATIVE: 1

## 2025-04-14 NOTE — PROGRESS NOTES
1. Have you been to the ER, urgent care clinic since your last visit?  Hospitalized since your last visit?  No.    2. Have you seen or consulted any other health care providers outside of the [facility] since your last visit?  Include any pap smears or colon screening.   No.      Chief Complaint   Patient presents with    Idiopathic Neuropathy      Regular falls, gait unstable     Memory Loss

## 2025-04-14 NOTE — PATIENT INSTRUCTIONS
As per our discussion,    Neuropathy can contribute to loss of balance and falls.  Please take a time when you are walking or when you are sitting position to prevent falls.  Continue to work with physical therapy as appropriate.    As for your memory, we will continue donepezil 10 mg nightly.  I do not increase any episode to 20 due to side effects of diarrhea.  Will increase memantine today to 10 mg twice daily.  Again, these medications are there to slow down the memory loss process.    I encouraged you to continue to follow-up with your primary care provider and your other specialist further, with condition as appropriate.    I encouraged you to engage in approximately 2.5 hours of physician approved physical activity on a weekly basis.    To maintain a healthy diet. Also was informed of the Mediterranean diet, which has been associated with reduced risk for neurodegenerative conditions as well as heart disease.    To maintain a cognitively stimulated lifestyle, also incorporating social interaction.    It was a pleasure to see you and your sister today    Will see you back in 6 months or sooner if needed    Please do not hesitate to reach out for any questions or concerns

## 2025-04-15 NOTE — ASSESSMENT & PLAN NOTE
Stable without any stroke symptoms.    She does have stroke risk factors which include hypertension, dyslipidemia, and atrial fibrillation.    Patient is to continue on rosuvastatin 20 mg daily and apixaban 5 mg twice a day.    Patient is to continue to work to all of her comorbid conditions as managed by PCP and other specialists as appropriate    RECOMMENDATIONS:  - BP goal is less than 140/90  - Goal HbA1c is less than 7.   - LDL less than 70     Education was provided today in regards to risk factors for stroke and lifestyle modifications to help minimize the risk of future stroke.    This included medication compliance, regular follow up with primary care physician,  and healthy lifestyle habits (nutrition/exercise).

## 2025-04-15 NOTE — ASSESSMENT & PLAN NOTE
Supported by EMG completed on 6/4/2020 by Dr. Carlos which was abnormal.  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    Patient reports no change in her neuropathic symptoms.    Patient has been experiencing falls without neuropathic pain.    She is currently undergoing physical therapy twice a week, although she expresses dislike for it.    Factors that may contribute to her falls include chronic ankle issues bilaterally, right knee issues, neuropathy, visual disturbances from cataracts.    Patient is to continue to remain active.  Adopt healthy lifestyles which include nutrition and exercise which would help alleviate her neuropathic symptoms.    Patient is advised to continue with physical therapy as it is beneficial for her condition.  She uses a cane for mobility and has not experienced any recent falls.    Patient has cataract in both eyes, which may contributing to her falls and difficulty seeing changes in floor types.  Cataract surgery was discussed as a potential treatment option, but she is currently not interested in ongoing the procedure.    Fall safety was discussed with patient.

## 2025-04-15 NOTE — ASSESSMENT & PLAN NOTE
Neuropsych evaluation from Dr. Vangie Sin dated 3/30/2023-6/8/2023 indicates a diagnosis of mild cognitive impairment with a recommendation for annual follow-up.    Patient reports increased forgetfulness, daytime sleepiness, and difficulty following up on task.    Her current neurological examination revealed mild cognitive impairment.    She is currently on donepezil 10 mg and memantine 5 mg twice daily.  The dosage of memantine will be increased to 10 mg twice daily and she is to continue donepezil 10 mg nightly.  Side effects were reviewed.    A repeat neuropsych evaluation was discussed to patient and family, both deferred at this time.    Vitamin B12, vitamin D, folate, RPR, and TSH were all normal.    Patient was encouraged to engage in approximately 2.5 hours of physician approved physical activity on a weekly basis.     Patient was encouraged to maintain a healthy diet. they will also be informed of the Mediterranean diet, which has been associated with reduced risk for neurodegenerative conditions as well as heart disease.     Patient was encouraged to maintain a cognitively stimulated lifestyle, also incorporating social interaction.

## 2025-04-18 ENCOUNTER — CLINICAL DOCUMENTATION (OUTPATIENT)
Age: 85
End: 2025-04-18

## 2025-04-18 NOTE — PROGRESS NOTES
Received Neuropsychology evaluation from [facility]. David reviewed and signed. Placed in fast scan.

## 2025-04-20 DIAGNOSIS — F41.9 ANXIETY: ICD-10-CM

## 2025-04-20 RX ORDER — SERTRALINE HYDROCHLORIDE 100 MG/1
100 TABLET, FILM COATED ORAL DAILY
Qty: 90 TABLET | Refills: 1 | Status: SHIPPED | OUTPATIENT
Start: 2025-04-20

## 2025-05-03 ENCOUNTER — APPOINTMENT (OUTPATIENT)
Facility: HOSPITAL | Age: 85
End: 2025-05-03
Payer: MEDICARE

## 2025-05-03 ENCOUNTER — HOSPITAL ENCOUNTER (EMERGENCY)
Facility: HOSPITAL | Age: 85
Discharge: HOME | End: 2025-05-03
Attending: EMERGENCY MEDICINE
Payer: MEDICARE

## 2025-05-03 VITALS
TEMPERATURE: 97.5 F | SYSTOLIC BLOOD PRESSURE: 159 MMHG | RESPIRATION RATE: 17 BRPM | OXYGEN SATURATION: 97 % | WEIGHT: 170.64 LBS | BODY MASS INDEX: 29.13 KG/M2 | DIASTOLIC BLOOD PRESSURE: 85 MMHG | HEIGHT: 64 IN | HEART RATE: 63 BPM

## 2025-05-03 DIAGNOSIS — G47.00 INSOMNIA, UNSPECIFIED TYPE: Primary | ICD-10-CM

## 2025-05-03 DIAGNOSIS — R00.2 PALPITATIONS: ICD-10-CM

## 2025-05-03 DIAGNOSIS — I10 HYPERTENSION, UNSPECIFIED TYPE: ICD-10-CM

## 2025-05-03 LAB
ALBUMIN SERPL-MCNC: 3.9 G/DL (ref 3.5–5)
ALBUMIN/GLOB SERPL: 1.2 (ref 1.1–2.2)
ALP SERPL-CCNC: 76 U/L (ref 45–117)
ALT SERPL-CCNC: 21 U/L (ref 12–78)
ANION GAP SERPL CALC-SCNC: 7 MMOL/L (ref 2–12)
AST SERPL-CCNC: 15 U/L (ref 15–37)
BASOPHILS # BLD: 0.04 K/UL (ref 0–0.1)
BASOPHILS NFR BLD: 1.1 % (ref 0–1)
BILIRUB SERPL-MCNC: 0.4 MG/DL (ref 0.2–1)
BUN SERPL-MCNC: 22 MG/DL (ref 6–20)
BUN/CREAT SERPL: 24 (ref 12–20)
CALCIUM SERPL-MCNC: 9.2 MG/DL (ref 8.5–10.1)
CHLORIDE SERPL-SCNC: 107 MMOL/L (ref 97–108)
CO2 SERPL-SCNC: 27 MMOL/L (ref 21–32)
CREAT SERPL-MCNC: 0.9 MG/DL (ref 0.55–1.02)
DIFFERENTIAL METHOD BLD: ABNORMAL
EKG ATRIAL RATE: 61 BPM
EKG DIAGNOSIS: NORMAL
EKG P AXIS: 45 DEGREES
EKG P-R INTERVAL: 172 MS
EKG Q-T INTERVAL: 470 MS
EKG QRS DURATION: 80 MS
EKG QTC CALCULATION (BAZETT): 473 MS
EKG R AXIS: -16 DEGREES
EKG T AXIS: 35 DEGREES
EKG VENTRICULAR RATE: 61 BPM
EOSINOPHIL # BLD: 0.17 K/UL (ref 0–0.4)
EOSINOPHIL NFR BLD: 4.5 % (ref 0–7)
ERYTHROCYTE [DISTWIDTH] IN BLOOD BY AUTOMATED COUNT: 12.9 % (ref 11.5–14.5)
GLOBULIN SER CALC-MCNC: 3.3 G/DL (ref 2–4)
GLUCOSE SERPL-MCNC: 99 MG/DL (ref 65–100)
HCT VFR BLD AUTO: 38.1 % (ref 35–47)
HGB BLD-MCNC: 12.7 G/DL (ref 11.5–16)
IMM GRANULOCYTES # BLD AUTO: 0.01 K/UL (ref 0–0.04)
IMM GRANULOCYTES NFR BLD AUTO: 0.3 % (ref 0–0.5)
LYMPHOCYTES # BLD: 1.04 K/UL (ref 0.8–3.5)
LYMPHOCYTES NFR BLD: 27.4 % (ref 12–49)
MCH RBC QN AUTO: 31.6 PG (ref 26–34)
MCHC RBC AUTO-ENTMCNC: 33.3 G/DL (ref 30–36.5)
MCV RBC AUTO: 94.8 FL (ref 80–99)
MONOCYTES # BLD: 0.33 K/UL (ref 0–1)
MONOCYTES NFR BLD: 8.7 % (ref 5–13)
NEUTS SEG # BLD: 2.21 K/UL (ref 1.8–8)
NEUTS SEG NFR BLD: 58 % (ref 32–75)
NRBC # BLD: 0 K/UL (ref 0–0.01)
NRBC BLD-RTO: 0 PER 100 WBC
PLATELET # BLD AUTO: 154 K/UL (ref 150–400)
PMV BLD AUTO: 10.1 FL (ref 8.9–12.9)
POTASSIUM SERPL-SCNC: 3.9 MMOL/L (ref 3.5–5.1)
PROT SERPL-MCNC: 7.2 G/DL (ref 6.4–8.2)
RBC # BLD AUTO: 4.02 M/UL (ref 3.8–5.2)
SODIUM SERPL-SCNC: 141 MMOL/L (ref 136–145)
TROPONIN I SERPL HS-MCNC: 6 NG/L (ref 0–51)
WBC # BLD AUTO: 3.8 K/UL (ref 3.6–11)

## 2025-05-03 PROCEDURE — 99285 EMERGENCY DEPT VISIT HI MDM: CPT

## 2025-05-03 PROCEDURE — 85025 COMPLETE CBC W/AUTO DIFF WBC: CPT

## 2025-05-03 PROCEDURE — 36415 COLL VENOUS BLD VENIPUNCTURE: CPT

## 2025-05-03 PROCEDURE — 84484 ASSAY OF TROPONIN QUANT: CPT

## 2025-05-03 PROCEDURE — 80053 COMPREHEN METABOLIC PANEL: CPT

## 2025-05-03 PROCEDURE — 71045 X-RAY EXAM CHEST 1 VIEW: CPT

## 2025-05-03 PROCEDURE — 93005 ELECTROCARDIOGRAM TRACING: CPT | Performed by: EMERGENCY MEDICINE

## 2025-05-03 ASSESSMENT — PAIN SCALES - GENERAL: PAINLEVEL_OUTOF10: 0

## 2025-05-03 NOTE — ED NOTES
Report given to ELENI Katz. Nurse was informed of reason for arrival, vitals, labs, medications, orders, procedures, results, anything left pending and current plan of action. Questions were asked and received prior to departure from the patient.

## 2025-05-03 NOTE — ED PROVIDER NOTES
[facility] EMERGENCY DEPARTMENT  EMERGENCY DEPARTMENT ENCOUNTER       Pt Name: Chichi Archuleta  MRN: [id number]  Birthdate 1940  Date of evaluation: 5/3/2025  Provider: Frankie Monte MD   PCP: Katlyn Marsh MD  Note Started: 8:18 AM 5/3/25     CHIEF COMPLAINT       Chief Complaint   Patient presents with    Hypertension    Fatigue     Pt came in via EMS from home with cc of elevated BP and feeling weak started yesterday. Denies any CP. Hx of HTN and afib        HISTORY OF PRESENT ILLNESS: 1 or more elements      History From: Patient, History limited by: None     Chichi Archuleta is a 85 y.o. female with a history of hypertension, prior CVA who presents with palpitations and insomnia.  Patient reports she was unable to get sleep last night.  She felt palpitations and felt that her blood pressure might be high because of her palpitations.  She checked it and it was high at 188 systolic.  She checked it again overnight as she was still unable to sleep and it was 192 systolic.  She did have a clonidine at 427 without improvement to her blood pressure who presents for evaluation.  She feels very tired but attributes this to not sleeping at all last night.  Denies chest pain or shortness of breath, denies unilateral weakness or numbness.     Nursing Notes were all reviewed and agreed with or any disagreements were addressed in the HPI.     REVIEW OF SYSTEMS        Positives and Pertinent negatives as per HPI.    PAST HISTORY     Past Medical History:  Past Medical History:   Diagnosis Date    Anxiety     Arthritis     right knee, left shoulder    Atrial fibrillation (HCC)     4/12/23 Dr. Moore [facility]    CAD (coronary artery disease)     Dementia (HCC)     Getting worse    Diverticulosis     Fractures     Frequency of urination     High cholesterol     Hypertension     Hypothyroid     Long term current use of anticoagulant therapy     Memory disorder     Getting worse    Morbid obesity (HCC)      ZOLOFT  TAKE 1 TABLET BY MOUTH EVERY DAY     Tikosyn 500 MCG capsule  Generic drug: dofetilide                DISCONTINUED MEDICATIONS:  Current Discharge Medication List          I am the Primary Clinician of Record.   Frankie Monte MD (electronically signed)    (Please note that parts of this dictation were completed with voice recognition software. Quite often unanticipated grammatical, syntax, homophones, and other interpretive errors are inadvertently transcribed by the computer software. Please disregards these errors. Please excuse any errors that have escaped final proofreading.)         Frankie Monte MD  05/03/25 [id number]

## 2025-05-03 NOTE — DISCHARGE INSTRUCTIONS
Thank You!    It was a pleasure taking care of you in our Emergency Department today. We know that when you come to our Emergency Department, you are entrusting us with your health, comfort, and safety. Our physicians and nurses honor that trust, and truly appreciate the opportunity to care for you and your loved ones.      We also value your feedback. If you receive a survey about your Emergency Department experience today, please fill it out.  We care about our patients' feedback, and we listen to what you have to say.  Thank you.    Frankie Monte MD  ________________________________________________________________________  I have included a copy of your lab results and/or radiologic studies from today's visit so you can have them easily available at your follow-up visit. We hope you feel better and please do not hesitate to contact the ED if you have any questions at all!    Recent Results (from the past 12 hours)   EKG 12 Lead    Collection Time: 05/03/25  7:31 AM   Result Value Ref Range    Ventricular Rate 61 BPM    Atrial Rate 61 BPM    P-R Interval 172 ms    QRS Duration 80 ms    Q-T Interval 470 ms    QTc Calculation (Bazett) 473 ms    P Axis 45 degrees    R Axis -16 degrees    T Axis 35 degrees    Diagnosis       Normal sinus rhythm  Low voltage QRS  When compared with ECG of 03-MAY-2025 06:50,  MANUAL COMPARISON REQUIRED, DATA IS UNCONFIRMED  Confirmed by MD Monte William ([id number]) on 5/3/2025 8:11:35 AM     CBC with Auto Differential    Collection Time: 05/03/25  7:37 AM   Result Value Ref Range    WBC 3.8 3.6 - 11.0 K/uL    RBC 4.02 3.80 - 5.20 M/uL    Hemoglobin 12.7 11.5 - 16.0 g/dL    Hematocrit 38.1 35.0 - 47.0 %    MCV 94.8 80.0 - 99.0 FL    MCH 31.6 26.0 - 34.0 PG    MCHC 33.3 30.0 - 36.5 g/dL    RDW 12.9 11.5 - 14.5 %    Platelets 154 150 - 400 K/uL    MPV 10.1 8.9 - 12.9 FL    Nucleated RBCs 0.0 0  WBC    nRBC 0.00 0.00 - 0.01 K/uL    Neutrophils % 58.0 32.0 - 75.0 %    Lymphocytes % 27.4

## 2025-05-06 DIAGNOSIS — R41.3 MEMORY LOSS: ICD-10-CM

## 2025-05-06 RX ORDER — DONEPEZIL HYDROCHLORIDE 10 MG/1
10 TABLET, FILM COATED ORAL NIGHTLY
Qty: 90 TABLET | Refills: 1 | Status: SHIPPED | OUTPATIENT
Start: 2025-05-06

## 2025-05-07 ENCOUNTER — OFFICE VISIT (OUTPATIENT)
Age: 85
End: 2025-05-07
Payer: MEDICARE

## 2025-05-07 VITALS
BODY MASS INDEX: 29.02 KG/M2 | HEART RATE: 64 BPM | DIASTOLIC BLOOD PRESSURE: 69 MMHG | SYSTOLIC BLOOD PRESSURE: 117 MMHG | TEMPERATURE: 97.5 F | RESPIRATION RATE: 20 BRPM | WEIGHT: 170 LBS | HEIGHT: 64 IN | OXYGEN SATURATION: 98 %
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
## 2025-05-07 DIAGNOSIS — I48.0 PAF (PAROXYSMAL ATRIAL FIBRILLATION) (HCC): ICD-10-CM

## 2025-05-07 DIAGNOSIS — I10 PRIMARY HYPERTENSION: Primary | ICD-10-CM

## 2025-05-07 PROCEDURE — 1123F ACP DISCUSS/DSCN MKR DOCD: CPT | Performed by: FAMILY MEDICINE

## 2025-05-07 PROCEDURE — 1090F PRES/ABSN URINE INCON ASSESS: CPT | Performed by: FAMILY MEDICINE

## 2025-05-07 PROCEDURE — 3078F DIAST BP <80 MM HG: CPT | Performed by: FAMILY MEDICINE

## 2025-05-07 PROCEDURE — 99214 OFFICE O/P EST MOD 30 MIN: CPT | Performed by: FAMILY MEDICINE

## 2025-05-07 PROCEDURE — 1159F MED LIST DOCD IN RCRD: CPT | Performed by: FAMILY MEDICINE

## 2025-05-07 PROCEDURE — G8427 DOCREV CUR MEDS BY ELIG CLIN: HCPCS | Performed by: FAMILY MEDICINE

## 2025-05-07 PROCEDURE — 3074F SYST BP LT 130 MM HG: CPT | Performed by: FAMILY MEDICINE

## 2025-05-07 PROCEDURE — 1036F TOBACCO NON-USER: CPT | Performed by: FAMILY MEDICINE

## 2025-05-07 PROCEDURE — G8399 PT W/DXA RESULTS DOCUMENT: HCPCS | Performed by: FAMILY MEDICINE

## 2025-05-07 PROCEDURE — G8417 CALC BMI ABV UP PARAM F/U: HCPCS | Performed by: FAMILY MEDICINE

## 2025-05-07 PROCEDURE — 1160F RVW MEDS BY RX/DR IN RCRD: CPT | Performed by: FAMILY MEDICINE

## 2025-05-07 RX ORDER — DOXYCYCLINE HYCLATE 100 MG
100 TABLET ORAL 2 TIMES DAILY
Qty: 20 TABLET | Refills: 0 | Status: SHIPPED | OUTPATIENT
Start: 2025-05-07 | End: 2025-05-17

## 2025-05-07 NOTE — PROGRESS NOTES
Have you been to the ER, urgent care clinic since your last visit?  Hospitalized since your last visit?   5/3: [facility]; elevated blood pressure.     Have you seen or consulted any other health care providers outside our system since your last visit?   Cardiologist; Rema Magana   Neurologist; Marvin Velasquez (pt unaware)             
MD Maximo    
Benefits, risks, and possible complications of procedure explained to patient/caregiver who verbalized understanding and gave written consent.

## 2025-06-11 DIAGNOSIS — I10 PRIMARY HYPERTENSION: ICD-10-CM

## 2025-06-11 RX ORDER — CLONIDINE HYDROCHLORIDE 0.1 MG/1
0.1 TABLET ORAL 2 TIMES DAILY
Qty: 180 TABLET | Refills: 1 | Status: SHIPPED | OUTPATIENT
Start: 2025-06-11

## 2025-07-12 DIAGNOSIS — E78.00 PURE HYPERCHOLESTEROLEMIA: ICD-10-CM

## 2025-07-14 RX ORDER — ROSUVASTATIN CALCIUM 20 MG/1
20 TABLET, COATED ORAL DAILY
Qty: 90 TABLET | Refills: 1 | Status: SHIPPED | OUTPATIENT
Start: 2025-07-14

## 2025-07-25 PROBLEM — Z86.73 HISTORY OF STROKE: Status: ACTIVE | Noted: 2024-09-23

## 2025-08-05 SDOH — HEALTH STABILITY: PHYSICAL HEALTH: ON AVERAGE, HOW MANY DAYS PER WEEK DO YOU ENGAGE IN MODERATE TO STRENUOUS EXERCISE (LIKE A BRISK WALK)?: 0 DAYS

## 2025-08-05 ASSESSMENT — LIFESTYLE VARIABLES
HOW OFTEN DO YOU HAVE A DRINK CONTAINING ALCOHOL: 1
HOW MANY STANDARD DRINKS CONTAINING ALCOHOL DO YOU HAVE ON A TYPICAL DAY: 0
HOW MANY STANDARD DRINKS CONTAINING ALCOHOL DO YOU HAVE ON A TYPICAL DAY: PATIENT DOES NOT DRINK
HOW OFTEN DO YOU HAVE SIX OR MORE DRINKS ON ONE OCCASION: 1
HOW OFTEN DO YOU HAVE A DRINK CONTAINING ALCOHOL: NEVER

## 2025-08-05 ASSESSMENT — PATIENT HEALTH QUESTIONNAIRE - PHQ9
1. LITTLE INTEREST OR PLEASURE IN DOING THINGS: SEVERAL DAYS
SUM OF ALL RESPONSES TO PHQ QUESTIONS 1-9: 2
SUM OF ALL RESPONSES TO PHQ QUESTIONS 1-9: 2
2. FEELING DOWN, DEPRESSED OR HOPELESS: SEVERAL DAYS
SUM OF ALL RESPONSES TO PHQ QUESTIONS 1-9: 2
SUM OF ALL RESPONSES TO PHQ QUESTIONS 1-9: 2

## 2025-08-08 ENCOUNTER — OFFICE VISIT (OUTPATIENT)
Age: 85
End: 2025-08-08
Payer: MEDICARE

## 2025-08-08 VITALS
BODY MASS INDEX: 31.07 KG/M2 | OXYGEN SATURATION: 98 % | DIASTOLIC BLOOD PRESSURE: 84 MMHG | HEART RATE: 64 BPM | WEIGHT: 182 LBS | HEIGHT: 64 IN | SYSTOLIC BLOOD PRESSURE: 132 MMHG | TEMPERATURE: 98.1 F | RESPIRATION RATE: 20 BRPM

## 2025-08-08 DIAGNOSIS — I48.0 PAF (PAROXYSMAL ATRIAL FIBRILLATION) (HCC): ICD-10-CM

## 2025-08-08 DIAGNOSIS — F41.9 ANXIETY: ICD-10-CM

## 2025-08-08 DIAGNOSIS — E03.9 ACQUIRED HYPOTHYROIDISM: Primary | ICD-10-CM

## 2025-08-08 DIAGNOSIS — I10 PRIMARY HYPERTENSION: ICD-10-CM

## 2025-08-08 DIAGNOSIS — Z98.890 S/P ABLATION OF ATRIAL FIBRILLATION: ICD-10-CM

## 2025-08-08 DIAGNOSIS — E55.9 VITAMIN D DEFICIENCY: ICD-10-CM

## 2025-08-08 DIAGNOSIS — E78.00 PURE HYPERCHOLESTEROLEMIA: ICD-10-CM

## 2025-08-08 DIAGNOSIS — Z86.79 S/P ABLATION OF ATRIAL FIBRILLATION: ICD-10-CM

## 2025-08-08 DIAGNOSIS — R41.3 MEMORY LOSS: ICD-10-CM

## 2025-08-08 DIAGNOSIS — Z00.00 MEDICARE ANNUAL WELLNESS VISIT, SUBSEQUENT: ICD-10-CM

## 2025-08-08 PROCEDURE — 99214 OFFICE O/P EST MOD 30 MIN: CPT | Performed by: FAMILY MEDICINE

## 2025-08-08 RX ORDER — CLONIDINE HYDROCHLORIDE 0.1 MG/1
0.1 TABLET ORAL
COMMUNITY

## 2025-08-08 RX ORDER — CLOBETASOL PROPIONATE 0.5 MG/G
CREAM TOPICAL
COMMUNITY
Start: 2025-06-20

## 2025-08-08 RX ORDER — POTASSIUM CHLORIDE 750 MG/1
TABLET, EXTENDED RELEASE ORAL
Qty: 360 TABLET | Refills: 3 | Status: SHIPPED | OUTPATIENT
Start: 2025-08-08

## 2025-08-08 RX ORDER — ASPIRIN 325 MG
TABLET ORAL
COMMUNITY

## 2025-08-08 RX ORDER — VIBEGRON 75 MG/1
75 TABLET, FILM COATED ORAL DAILY
Qty: 90 TABLET | Refills: 3 | Status: SHIPPED | OUTPATIENT
Start: 2025-08-08

## 2025-08-08 RX ORDER — SERTRALINE HYDROCHLORIDE 100 MG/1
100 TABLET, FILM COATED ORAL DAILY
Qty: 90 TABLET | Refills: 3 | Status: SHIPPED | OUTPATIENT
Start: 2025-08-08

## 2025-08-08 RX ORDER — MUPIROCIN 2 %
OINTMENT (GRAM) TOPICAL
COMMUNITY
Start: 2024-12-31

## 2025-08-08 RX ORDER — DONEPEZIL HYDROCHLORIDE 10 MG/1
10 TABLET, FILM COATED ORAL NIGHTLY
Qty: 90 TABLET | Refills: 3 | Status: SHIPPED | OUTPATIENT
Start: 2025-08-08

## 2025-08-08 RX ORDER — LEVOTHYROXINE SODIUM 50 UG/1
50 TABLET ORAL
Qty: 90 TABLET | Refills: 3 | Status: SHIPPED | OUTPATIENT
Start: 2025-08-08

## 2025-08-08 RX ORDER — HYDROXYZINE HYDROCHLORIDE 10 MG/1
TABLET, FILM COATED ORAL
COMMUNITY
Start: 2025-06-20

## 2025-08-09 LAB
25(OH)D3 SERPL-MCNC: 55.4 NG/ML (ref 30–100)
ALBUMIN SERPL-MCNC: 4.1 G/DL (ref 3.5–5.2)
ALBUMIN/GLOB SERPL: 1.5 (ref 1.1–2.2)
ALP SERPL-CCNC: 79 U/L (ref 35–104)
ALT SERPL-CCNC: 14 U/L (ref 10–35)
ANION GAP SERPL CALC-SCNC: 10 MMOL/L (ref 2–14)
AST SERPL-CCNC: 18 U/L (ref 10–35)
BILIRUB SERPL-MCNC: 0.3 MG/DL (ref 0–1.2)
BUN SERPL-MCNC: 18 MG/DL (ref 8–23)
BUN/CREAT SERPL: 21 (ref 12–20)
CALCIUM SERPL-MCNC: 9.6 MG/DL (ref 8.8–10.2)
CHLORIDE SERPL-SCNC: 104 MMOL/L (ref 98–107)
CHOLEST SERPL-MCNC: 176 MG/DL (ref 0–200)
CO2 SERPL-SCNC: 25 MMOL/L (ref 20–29)
CREAT SERPL-MCNC: 0.89 MG/DL (ref 0.6–1)
GLOBULIN SER CALC-MCNC: 2.8 G/DL (ref 2–4)
GLUCOSE SERPL-MCNC: 93 MG/DL (ref 65–100)
HDLC SERPL-MCNC: 64 MG/DL (ref 40–60)
HDLC SERPL: 2.7 (ref 0–5)
LDLC SERPL CALC-MCNC: 93 MG/DL (ref 0–100)
POTASSIUM SERPL-SCNC: 4.3 MMOL/L (ref 3.5–5.1)
PROT SERPL-MCNC: 6.9 G/DL (ref 6.4–8.3)
SODIUM SERPL-SCNC: 139 MMOL/L (ref 136–145)
TRIGL SERPL-MCNC: 93 MG/DL (ref 0–150)
TSH, 3RD GENERATION: 0.66 UIU/ML (ref 0.27–4.2)
VLDLC SERPL CALC-MCNC: 19 MG/DL

## 2025-08-27 ENCOUNTER — TELEPHONE (OUTPATIENT)
Age: 85
End: 2025-08-27

## 2025-08-28 ENCOUNTER — TELEPHONE (OUTPATIENT)
Age: 85
End: 2025-08-28

## 2025-08-28 RX ORDER — DOXYCYCLINE HYCLATE 100 MG
100 TABLET ORAL 2 TIMES DAILY
Qty: 20 TABLET | Refills: 0 | Status: SHIPPED | OUTPATIENT
Start: 2025-08-28 | End: 2025-09-07

## 2025-09-02 ENCOUNTER — TELEPHONE (OUTPATIENT)
Age: 85
End: 2025-09-02

## 2025-09-04 ENCOUNTER — OFFICE VISIT (OUTPATIENT)
Age: 85
End: 2025-09-04

## 2025-09-04 VITALS
TEMPERATURE: 97.8 F | SYSTOLIC BLOOD PRESSURE: 127 MMHG | WEIGHT: 182 LBS | DIASTOLIC BLOOD PRESSURE: 77 MMHG | OXYGEN SATURATION: 99 % | HEART RATE: 69 BPM | BODY MASS INDEX: 31.24 KG/M2 | RESPIRATION RATE: 16 BRPM

## 2025-09-04 DIAGNOSIS — B96.89 ACUTE BACTERIAL BRONCHITIS: Primary | ICD-10-CM

## 2025-09-04 DIAGNOSIS — R05.1 ACUTE COUGH: ICD-10-CM

## 2025-09-04 DIAGNOSIS — J20.8 ACUTE BACTERIAL BRONCHITIS: Primary | ICD-10-CM

## 2025-09-04 LAB
Lab: NORMAL
PERFORMING INSTRUMENT: NORMAL
QC PASS/FAIL: NORMAL
SARS-COV-2, POC: NORMAL

## 2025-09-04 RX ORDER — AZITHROMYCIN 250 MG/1
TABLET, FILM COATED ORAL
Qty: 6 TABLET | Refills: 0 | Status: SHIPPED | OUTPATIENT
Start: 2025-09-04 | End: 2025-09-14

## 2025-09-04 RX ORDER — BENZONATATE 100 MG/1
100-200 CAPSULE ORAL 3 TIMES DAILY PRN
Qty: 42 CAPSULE | Refills: 0 | Status: SHIPPED | OUTPATIENT
Start: 2025-09-04 | End: 2025-09-11

## 2025-09-04 ASSESSMENT — ENCOUNTER SYMPTOMS
COUGH: 1
RHINORRHEA: 0
SORE THROAT: 1
SHORTNESS OF BREATH: 0

## (undated) DEVICE — SOLUTION IRRIG 1000ML H2O STRL BLT

## (undated) DEVICE — NEEDLE HYPO 18GA L1.5IN PNK S STL HUB POLYPR SHLD REG BVL

## (undated) DEVICE — TOWEL 4 PLY TISS 19X30 SUE WHT

## (undated) DEVICE — NEONATAL-ADULT SPO2 SENSOR: Brand: NELLCOR

## (undated) DEVICE — BASIN EMSIS 16OZ GRAPHITE PLAS KID SHP MOLD GRAD FOR ORAL

## (undated) DEVICE — Z DISCONTINUED PER MEDLINE LINE GAS SAMPLING O2/CO2 LNG AD 13 FT NSL W/ TBNG FILTERLINE

## (undated) DEVICE — FORCEPS BX L160CM DIA8MM GRSP DISECT CUP TIP NONLOCKING ROT

## (undated) DEVICE — Device

## (undated) DEVICE — SOLIDIFIER MEDC 1200ML -- CONVERT TO 356117

## (undated) DEVICE — CONTAINER SPEC 20 ML LID NEUT BUFF FORMALIN 10 % POLYPR STS

## (undated) DEVICE — 1200 GUARD II KIT W/5MM TUBE W/O VAC TUBE: Brand: GUARDIAN

## (undated) DEVICE — SOLIDIFIER FLD 2OZ 1500CC N DISINF IN BTL DISP SAFESORB

## (undated) DEVICE — ELECTRODE,RADIOTRANSLUCENT,FOAM,5PK: Brand: MEDLINE

## (undated) DEVICE — BLOCK BITE ENDOSCP AD 21 MM W/ DIL BLU LF DISP

## (undated) DEVICE — Device: Brand: BALLOON3

## (undated) DEVICE — BAG SPEC BIOHZRD 10 X 10 IN --

## (undated) DEVICE — SYR 10ML LUER LOK 1/5ML GRAD --

## (undated) DEVICE — SYR 3ML LL TIP 1/10ML GRAD --

## (undated) DEVICE — CATH IV AUTOGRD BC PNK 20GA 25 -- INSYTE

## (undated) DEVICE — YANKAUER,TAPERED BULBOUS TIP,W/O VENT: Brand: MEDLINE

## (undated) DEVICE — SET ADMIN 16ML TBNG L100IN 2 Y INJ SITE IV PIGGY BK DISP

## (undated) DEVICE — Device: Brand: SINGLE USE ASPIRATION NEEDLE

## (undated) DEVICE — SNARE ENDOSCP M L240CM W27MM SHTH DIA2.4MM CHN 2.8MM OVL

## (undated) DEVICE — STRAINER URIN CALC RNL MSH -- CONVERT TO ITEM 357634

## (undated) DEVICE — TRAP,MUCUS SPECIMEN, 80CC: Brand: MEDLINE

## (undated) DEVICE — FORCEPS BX L240CM JAW DIA2.8MM L CAP W/ NDL MIC MESH TOOTH

## (undated) DEVICE — NON-REM POLYHESIVE PATIENT RETURN ELECTRODE: Brand: VALLEYLAB